# Patient Record
Sex: FEMALE | Race: WHITE | Employment: FULL TIME | URBAN - METROPOLITAN AREA
[De-identification: names, ages, dates, MRNs, and addresses within clinical notes are randomized per-mention and may not be internally consistent; named-entity substitution may affect disease eponyms.]

---

## 2017-04-27 PROBLEM — B00.89 HERPES DERMATITIS: Chronic | Status: ACTIVE | Noted: 2017-04-27

## 2018-04-05 ENCOUNTER — TELEPHONE (OUTPATIENT)
Dept: FAMILY MEDICINE CLINIC | Age: 63
End: 2018-04-05

## 2018-04-05 DIAGNOSIS — F51.05 INSOMNIA SECONDARY TO ANXIETY: Primary | ICD-10-CM

## 2018-04-05 DIAGNOSIS — F41.9 INSOMNIA SECONDARY TO ANXIETY: Primary | ICD-10-CM

## 2018-04-06 ENCOUNTER — TELEPHONE (OUTPATIENT)
Dept: FAMILY MEDICINE CLINIC | Age: 63
End: 2018-04-06

## 2018-04-06 PROBLEM — F41.9 INSOMNIA SECONDARY TO ANXIETY: Status: ACTIVE | Noted: 2018-04-06

## 2018-04-06 PROBLEM — F51.05 INSOMNIA SECONDARY TO ANXIETY: Status: ACTIVE | Noted: 2018-04-06

## 2018-04-06 RX ORDER — HYDROXYZINE HYDROCHLORIDE 25 MG/1
TABLET, FILM COATED ORAL
Qty: 60 TABLET | Refills: 2 | Status: SHIPPED | OUTPATIENT
Start: 2018-04-06 | End: 2018-06-28 | Stop reason: SDUPTHER

## 2018-06-05 ENCOUNTER — OFFICE VISIT (OUTPATIENT)
Dept: FAMILY MEDICINE CLINIC | Age: 63
End: 2018-06-05
Payer: COMMERCIAL

## 2018-06-05 VITALS
SYSTOLIC BLOOD PRESSURE: 124 MMHG | HEART RATE: 76 BPM | WEIGHT: 172 LBS | DIASTOLIC BLOOD PRESSURE: 80 MMHG | BODY MASS INDEX: 26.07 KG/M2 | RESPIRATION RATE: 12 BRPM | TEMPERATURE: 98.1 F | HEIGHT: 68 IN | OXYGEN SATURATION: 98 %

## 2018-06-05 DIAGNOSIS — J30.2 ACUTE SEASONAL ALLERGIC RHINITIS, UNSPECIFIED TRIGGER: Primary | ICD-10-CM

## 2018-06-05 PROCEDURE — 99213 OFFICE O/P EST LOW 20 MIN: CPT | Performed by: PHYSICIAN ASSISTANT

## 2018-06-05 RX ORDER — FLUTICASONE PROPIONATE 50 MCG
1 SPRAY, SUSPENSION (ML) NASAL DAILY
Qty: 1 BOTTLE | Refills: 1 | Status: SHIPPED
Start: 2018-06-05 | End: 2020-08-19

## 2018-08-23 ENCOUNTER — OFFICE VISIT (OUTPATIENT)
Dept: FAMILY MEDICINE CLINIC | Age: 63
End: 2018-08-23
Payer: MEDICAID

## 2018-08-23 VITALS
HEIGHT: 68 IN | BODY MASS INDEX: 26.41 KG/M2 | DIASTOLIC BLOOD PRESSURE: 70 MMHG | WEIGHT: 174.25 LBS | OXYGEN SATURATION: 98 % | SYSTOLIC BLOOD PRESSURE: 124 MMHG | TEMPERATURE: 98.3 F | HEART RATE: 82 BPM

## 2018-08-23 DIAGNOSIS — G43.909 MIGRAINE WITHOUT STATUS MIGRAINOSUS, NOT INTRACTABLE, UNSPECIFIED MIGRAINE TYPE: ICD-10-CM

## 2018-08-23 DIAGNOSIS — K21.00 GASTROESOPHAGEAL REFLUX DISEASE WITH ESOPHAGITIS: Chronic | ICD-10-CM

## 2018-08-23 DIAGNOSIS — G44.029 CHRONIC CLUSTER HEADACHE, NOT INTRACTABLE: ICD-10-CM

## 2018-08-23 DIAGNOSIS — F41.9 INSOMNIA SECONDARY TO ANXIETY: Primary | ICD-10-CM

## 2018-08-23 DIAGNOSIS — Z00.00 HEALTH CARE MAINTENANCE: ICD-10-CM

## 2018-08-23 DIAGNOSIS — B00.9 HERPES: ICD-10-CM

## 2018-08-23 DIAGNOSIS — F51.05 INSOMNIA SECONDARY TO ANXIETY: Primary | ICD-10-CM

## 2018-08-23 DIAGNOSIS — Z23 NEED FOR SHINGLES VACCINE: ICD-10-CM

## 2018-08-23 PROCEDURE — G8419 CALC BMI OUT NRM PARAM NOF/U: HCPCS | Performed by: FAMILY MEDICINE

## 2018-08-23 PROCEDURE — 3017F COLORECTAL CA SCREEN DOC REV: CPT | Performed by: FAMILY MEDICINE

## 2018-08-23 PROCEDURE — 99214 OFFICE O/P EST MOD 30 MIN: CPT | Performed by: FAMILY MEDICINE

## 2018-08-23 PROCEDURE — G8427 DOCREV CUR MEDS BY ELIG CLIN: HCPCS | Performed by: FAMILY MEDICINE

## 2018-08-23 PROCEDURE — 1036F TOBACCO NON-USER: CPT | Performed by: FAMILY MEDICINE

## 2018-08-23 RX ORDER — SODIUM BICARBONATE 650 MG/1
1300 TABLET ORAL DAILY
Qty: 60 TABLET | Refills: 11 | Status: SHIPPED | OUTPATIENT
Start: 2018-08-23 | End: 2019-02-19

## 2018-08-23 RX ORDER — ACYCLOVIR 400 MG/1
TABLET ORAL
Qty: 90 TABLET | Refills: 3 | Status: SHIPPED | OUTPATIENT
Start: 2018-08-23 | End: 2019-02-19 | Stop reason: SDUPTHER

## 2018-08-23 RX ORDER — BUTALBITAL, ACETAMINOPHEN AND CAFFEINE 50; 325; 40 MG/1; MG/1; MG/1
1 TABLET ORAL 3 TIMES DAILY PRN
Qty: 90 TABLET | Refills: 5 | Status: SHIPPED | OUTPATIENT
Start: 2018-08-23 | End: 2019-02-19 | Stop reason: SDUPTHER

## 2018-08-23 RX ORDER — ACETAMINOPHEN, ASPIRIN AND CAFFEINE 250; 250; 65 MG/1; MG/1; MG/1
1 TABLET, FILM COATED ORAL PRN
Qty: 90 TABLET | Refills: 5 | Status: SHIPPED
Start: 2018-08-23 | End: 2020-08-19 | Stop reason: SDUPTHER

## 2018-08-23 RX ORDER — OMEPRAZOLE 40 MG/1
40 CAPSULE, DELAYED RELEASE ORAL DAILY
Qty: 90 CAPSULE | Refills: 3 | Status: SHIPPED | OUTPATIENT
Start: 2018-08-23 | End: 2019-08-20 | Stop reason: SDUPTHER

## 2018-08-23 ASSESSMENT — ENCOUNTER SYMPTOMS
SHORTNESS OF BREATH: 0
HEARTBURN: 0
BLURRED VISION: 0
BLOOD IN STOOL: 0
COUGH: 0
SORE THROAT: 0
WHEEZING: 0
NAUSEA: 0
VOMITING: 0
SPUTUM PRODUCTION: 0
ABDOMINAL PAIN: 0
ORTHOPNEA: 0
CONSTIPATION: 0
BACK PAIN: 0
DIARRHEA: 0
DOUBLE VISION: 0

## 2018-08-23 ASSESSMENT — PATIENT HEALTH QUESTIONNAIRE - PHQ9
SUM OF ALL RESPONSES TO PHQ9 QUESTIONS 1 & 2: 0
SUM OF ALL RESPONSES TO PHQ QUESTIONS 1-9: 0
SUM OF ALL RESPONSES TO PHQ QUESTIONS 1-9: 0
1. LITTLE INTEREST OR PLEASURE IN DOING THINGS: 0
2. FEELING DOWN, DEPRESSED OR HOPELESS: 0

## 2018-08-23 NOTE — PROGRESS NOTES
Danie Javier is a 63 y.o. female who presents today for follow up and medication refills  She has been treated for GERD and migraine/cluster headaches. Headache  Patient with a history of  headache. Symptoms began about several years ago. Generally, the headaches last about several hours and occur several times per week. The headaches do not seem to be related to any time of day or year. The headaches are usually moderate, dull, sharp and throbbing and are located in global scalp/head. The patient rates her most severe headaches a 8 on a scale from 1 to 10. Recently, the headaches have been stable. Work attendance or other daily activities are not affected by the headaches. Precipitating factors include: cold temperatures, light, stress and weather changes. The headaches are usually not preceded by an aura. Associated neurologic symptoms: vomiting in the early morning. The patient denies decreased physical activity, depression, dizziness, loss of balance, muscle weakness, numbness of extremities, speech difficulties, vision problems and worsening school/work performance. Home treatment has included acetaminophen, amitriptyline, fioricet, Imitrex oral and Tylenol with codeine, darkening the room, resting and sleeping with no improvement. Other history includes: headaches of unknown type diagnosed in the past. Family history includes no known family members with significant headaches. Fioricet provides the most relief from this medication as opposed to the multiple trial of other medication in the past.  Maximum use would be TID. States she takes it couple times a week. GERD  Paitent complains of heartburn. This has been associated with abdominal bloating, bilious reflux, early satiety, midespigastric pain, upper abdominal discomfort and vomiting.   She denies belching, belching and eructation, chest pain, choking on food, cough, difficulty swallowing, dysphagia, heartburn, hematemesis, hoarseness, laryngitis, melena, need to clear throat frequently, nocturnal burning, odynophagia, shortness of breath, symptoms primarily relate to meals, and lying down after meals, unexpected weight loss and wheezing. Symptoms have been present for 10 years. She denies dysphagia. She has had a weight loss of 40 pounds over a period of 2 years because she is afraid to eat. She denies melena, hematochezia, hematemesis, and coffee ground emesis. Medical therapy in the past has included antacids, H2 antagonists and proton pump inhibitors. Patient's primary symptoms are vomiting of acidic material.  She has S/P UEGD 6/15; H. Pylori reported at that time to be negative. The only medication that seems to be effective to somewhat controlling her symptoms is omeprazole and sodium bicarbonate. Even so, she states that she is still symptomatic at least once a month. She has been advise to consider surgery but then was informed that she was underweight. Patient has a history of consuming a lot of peppermint/peppermint candy, which has been implicated in persistent/worsening GERD. Anxiety/Insomnia Secondary to Anxiety:  Both parents passed away in spring of 2018. She was treated with hydroxyzine 25 mg 1-2 tablets QHS PRN. Initially got mild to moderate benefit but not getting much benefit at this time. Patient's father had been on Hosp[facility] prior to his passing. She is reticent to become involved in group bereavement and has inquired about one on one bereavement counseling. She was advised to inquire about such resources with [facility]. She indicated that she will take hydroxyzine as directed until the prescription is completed then she will discontinue. Herpes Suppression:  On acyclovir. Still taking daily without any flare ups since last year in April. Contracted the infection from a dirty tanning bed but has since quit going.        Health Maintenance:  Aristides Lynch is due for Hepatitis C and HIV screening and multiple immunizations. She is also due for Breast cancer and colon cancer screening. She knows she needs mammogram and colon cancer screening but she declines at this time, citing this to be a bad time. She states she will do so at a later time. She is agreeable to shingles vaccine    PMFSH:  Patient's past medical, surgical, social and/or family history reviewed, updated in chart, and are non-contributory (unless otherwise stated). Medications and allergies also reviewed and updated in chart. Review of Systems  Review of Systems   Constitutional: Negative for malaise/fatigue and weight loss. HENT: Negative for congestion, ear pain and sore throat. Cold symptoms x \"a couple of days\"   Eyes: Negative for blurred vision and double vision. Respiratory: Negative for cough, sputum production, shortness of breath and wheezing. Cardiovascular: Negative for chest pain, palpitations, orthopnea and leg swelling. Gastrointestinal: Negative for abdominal pain, blood in stool, constipation, diarrhea, heartburn, nausea and vomiting. Genitourinary: Negative for dysuria, frequency, hematuria and urgency. Musculoskeletal: Negative for back pain, joint pain, myalgias and neck pain. Skin: Negative for itching and rash. Neurological: Positive for headaches. Negative for dizziness, tingling, focal weakness and weakness. Psychiatric/Behavioral: Negative for depression, memory loss and substance abuse. The patient is not nervous/anxious and does not have insomnia. Physical Exam:    VS:    /70   Pulse 82   Temp 98.3 °F (36.8 °C) (Oral)   Ht 5' 8\" (1.727 m)   Wt 174 lb 4 oz (79 kg)   SpO2 98%   Breastfeeding? No   BMI 26.49 kg/m²   LAST WEIGHT:  Wt Readings from Last 3 Encounters:   08/23/18 174 lb 4 oz (79 kg)   06/05/18 172 lb (78 kg)   02/25/18 166 lb (75.3 kg)     Physical Exam   Constitutional: She is oriented to person, place, and time.  She regarding the possible side effects, risks, benefits and alternatives to treatment; patient and/or guardian verbalizes understanding, agrees, feels comfortable with and wishes to proceed with above treatment plan. Advised patient to call with any new medication issues, and read all Rx info from pharmacy to assure aware of all possible risks and side effects of medication before taking. Reviewed age and gender appropriate health screening exams and vaccinations. Advised patient regarding importance of keeping up with recommended health maintenance and to schedule as soon as possible if overdue, as this is important in assessing for undiagnosed pathology, especially cancer, as well as protecting against potentially harmful/life threatening disease. Patient and/or guardian verbalizes understanding and agrees with above counseling, assessment and plan. All questions answered.     Leigha Haq MD

## 2018-09-19 ENCOUNTER — TELEPHONE (OUTPATIENT)
Dept: FAMILY MEDICINE CLINIC | Age: 63
End: 2018-09-19

## 2018-09-19 DIAGNOSIS — F51.05 INSOMNIA SECONDARY TO ANXIETY: ICD-10-CM

## 2018-09-19 DIAGNOSIS — F41.9 INSOMNIA SECONDARY TO ANXIETY: ICD-10-CM

## 2018-09-20 RX ORDER — HYDROXYZINE HYDROCHLORIDE 25 MG/1
TABLET, FILM COATED ORAL
Qty: 60 TABLET | Refills: 5 | Status: SHIPPED | OUTPATIENT
Start: 2018-09-20 | End: 2019-02-19 | Stop reason: SDUPTHER

## 2018-09-22 PROBLEM — Z00.00 HEALTH CARE MAINTENANCE: Status: RESOLVED | Noted: 2018-08-23 | Resolved: 2018-09-22

## 2019-02-19 ENCOUNTER — OFFICE VISIT (OUTPATIENT)
Dept: FAMILY MEDICINE CLINIC | Age: 64
End: 2019-02-19
Payer: COMMERCIAL

## 2019-02-19 VITALS
DIASTOLIC BLOOD PRESSURE: 86 MMHG | HEART RATE: 88 BPM | OXYGEN SATURATION: 98 % | RESPIRATION RATE: 16 BRPM | TEMPERATURE: 98.1 F | WEIGHT: 188 LBS | SYSTOLIC BLOOD PRESSURE: 134 MMHG | HEIGHT: 68 IN | BODY MASS INDEX: 28.49 KG/M2

## 2019-02-19 DIAGNOSIS — G43.909 MIGRAINE WITHOUT STATUS MIGRAINOSUS, NOT INTRACTABLE, UNSPECIFIED MIGRAINE TYPE: Primary | ICD-10-CM

## 2019-02-19 DIAGNOSIS — B00.9 HERPES: ICD-10-CM

## 2019-02-19 DIAGNOSIS — G44.029 CHRONIC CLUSTER HEADACHE, NOT INTRACTABLE: ICD-10-CM

## 2019-02-19 DIAGNOSIS — K21.00 GASTROESOPHAGEAL REFLUX DISEASE WITH ESOPHAGITIS: Chronic | ICD-10-CM

## 2019-02-19 DIAGNOSIS — F41.9 INSOMNIA SECONDARY TO ANXIETY: ICD-10-CM

## 2019-02-19 DIAGNOSIS — F51.05 INSOMNIA SECONDARY TO ANXIETY: ICD-10-CM

## 2019-02-19 PROCEDURE — G8484 FLU IMMUNIZE NO ADMIN: HCPCS | Performed by: FAMILY MEDICINE

## 2019-02-19 PROCEDURE — 3017F COLORECTAL CA SCREEN DOC REV: CPT | Performed by: FAMILY MEDICINE

## 2019-02-19 PROCEDURE — 1036F TOBACCO NON-USER: CPT | Performed by: FAMILY MEDICINE

## 2019-02-19 PROCEDURE — 99214 OFFICE O/P EST MOD 30 MIN: CPT | Performed by: FAMILY MEDICINE

## 2019-02-19 PROCEDURE — G8419 CALC BMI OUT NRM PARAM NOF/U: HCPCS | Performed by: FAMILY MEDICINE

## 2019-02-19 PROCEDURE — G8427 DOCREV CUR MEDS BY ELIG CLIN: HCPCS | Performed by: FAMILY MEDICINE

## 2019-02-19 RX ORDER — OMEPRAZOLE 40 MG/1
40 CAPSULE, DELAYED RELEASE ORAL DAILY
Qty: 90 CAPSULE | Refills: 3 | Status: SHIPPED
Start: 2019-02-19 | End: 2020-02-19 | Stop reason: SDUPTHER

## 2019-02-19 RX ORDER — ACYCLOVIR 400 MG/1
TABLET ORAL
Qty: 90 TABLET | Refills: 3 | Status: SHIPPED | OUTPATIENT
Start: 2019-02-19 | End: 2019-08-20 | Stop reason: SDUPTHER

## 2019-02-19 RX ORDER — HYDROXYZINE HYDROCHLORIDE 25 MG/1
TABLET, FILM COATED ORAL
Qty: 120 TABLET | Refills: 5 | Status: SHIPPED | OUTPATIENT
Start: 2019-02-19 | End: 2019-08-20 | Stop reason: SDUPTHER

## 2019-02-19 RX ORDER — BUTALBITAL, ACETAMINOPHEN AND CAFFEINE 50; 325; 40 MG/1; MG/1; MG/1
1 TABLET ORAL 3 TIMES DAILY PRN
Qty: 90 TABLET | Refills: 5 | Status: SHIPPED | OUTPATIENT
Start: 2019-02-19 | End: 2019-08-20 | Stop reason: SDUPTHER

## 2019-02-19 ASSESSMENT — ENCOUNTER SYMPTOMS
DOUBLE VISION: 0
BLOOD IN STOOL: 0
CONSTIPATION: 0
BACK PAIN: 0
VOMITING: 0
SPUTUM PRODUCTION: 0
WHEEZING: 0
SHORTNESS OF BREATH: 0
BLURRED VISION: 0
COUGH: 0
SORE THROAT: 0
DIARRHEA: 0
ORTHOPNEA: 0
NAUSEA: 0
ABDOMINAL PAIN: 0
HEARTBURN: 0

## 2019-02-19 ASSESSMENT — PATIENT HEALTH QUESTIONNAIRE - PHQ9
SUM OF ALL RESPONSES TO PHQ QUESTIONS 1-9: 0
SUM OF ALL RESPONSES TO PHQ9 QUESTIONS 1 & 2: 0
SUM OF ALL RESPONSES TO PHQ QUESTIONS 1-9: 0
2. FEELING DOWN, DEPRESSED OR HOPELESS: 0
1. LITTLE INTEREST OR PLEASURE IN DOING THINGS: 0
DEPRESSION UNABLE TO ASSESS: FUNCTIONAL CAPACITY MOTIVATION LIMITS ACCURACY

## 2019-03-25 ENCOUNTER — APPOINTMENT (OUTPATIENT)
Dept: GENERAL RADIOLOGY | Age: 64
End: 2019-03-25
Payer: MEDICAID

## 2019-03-25 ENCOUNTER — HOSPITAL ENCOUNTER (EMERGENCY)
Age: 64
Discharge: HOME | End: 2019-03-25
Attending: EMERGENCY MEDICINE
Payer: MEDICAID

## 2019-03-25 ENCOUNTER — TELEPHONE (OUTPATIENT)
Dept: FAMILY MEDICINE CLINIC | Age: 64
End: 2019-03-25

## 2019-03-25 VITALS
SYSTOLIC BLOOD PRESSURE: 154 MMHG | HEIGHT: 68 IN | DIASTOLIC BLOOD PRESSURE: 90 MMHG | BODY MASS INDEX: 24.25 KG/M2 | TEMPERATURE: 98.1 F | RESPIRATION RATE: 16 BRPM | WEIGHT: 160 LBS | OXYGEN SATURATION: 98 % | HEART RATE: 72 BPM

## 2019-03-25 DIAGNOSIS — S82.832A OTHER CLOSED FRACTURE OF DISTAL END OF LEFT FIBULA, INITIAL ENCOUNTER: ICD-10-CM

## 2019-03-25 DIAGNOSIS — I10 ESSENTIAL HYPERTENSION: Primary | ICD-10-CM

## 2019-03-25 PROCEDURE — 99283 EMERGENCY DEPT VISIT LOW MDM: CPT

## 2019-03-25 PROCEDURE — 73610 X-RAY EXAM OF ANKLE: CPT

## 2019-03-25 RX ORDER — IBUPROFEN 600 MG/1
600 TABLET ORAL EVERY 6 HOURS PRN
Qty: 30 TABLET | Refills: 0 | Status: SHIPPED | OUTPATIENT
Start: 2019-03-25 | End: 2020-11-08

## 2019-03-25 ASSESSMENT — PAIN DESCRIPTION - DESCRIPTORS: DESCRIPTORS: ACHING

## 2019-03-25 ASSESSMENT — PAIN DESCRIPTION - FREQUENCY: FREQUENCY: CONTINUOUS

## 2019-03-25 ASSESSMENT — PAIN - FUNCTIONAL ASSESSMENT: PAIN_FUNCTIONAL_ASSESSMENT: PREVENTS OR INTERFERES SOME ACTIVE ACTIVITIES AND ADLS

## 2019-03-25 ASSESSMENT — PAIN DESCRIPTION - PROGRESSION: CLINICAL_PROGRESSION: GRADUALLY WORSENING

## 2019-03-25 ASSESSMENT — PAIN DESCRIPTION - PAIN TYPE: TYPE: ACUTE PAIN

## 2019-03-25 ASSESSMENT — PAIN DESCRIPTION - ORIENTATION: ORIENTATION: RIGHT

## 2019-03-25 ASSESSMENT — PAIN DESCRIPTION - LOCATION: LOCATION: ANKLE

## 2019-03-25 ASSESSMENT — PAIN SCALES - GENERAL: PAINLEVEL_OUTOF10: 1

## 2019-08-20 ENCOUNTER — OFFICE VISIT (OUTPATIENT)
Dept: FAMILY MEDICINE CLINIC | Age: 64
End: 2019-08-20
Payer: COMMERCIAL

## 2019-08-20 ENCOUNTER — HOSPITAL ENCOUNTER (OUTPATIENT)
Age: 64
Discharge: HOME | End: 2019-08-22
Payer: MEDICAID

## 2019-08-20 VITALS
BODY MASS INDEX: 28.49 KG/M2 | OXYGEN SATURATION: 98 % | DIASTOLIC BLOOD PRESSURE: 80 MMHG | WEIGHT: 188 LBS | HEART RATE: 84 BPM | TEMPERATURE: 98.2 F | HEIGHT: 68 IN | SYSTOLIC BLOOD PRESSURE: 122 MMHG

## 2019-08-20 DIAGNOSIS — Z79.899 ENCOUNTER FOR MONITORING LONG-TERM PROTON PUMP INHIBITOR THERAPY: ICD-10-CM

## 2019-08-20 DIAGNOSIS — Z51.81 ENCOUNTER FOR MONITORING LONG-TERM PROTON PUMP INHIBITOR THERAPY: ICD-10-CM

## 2019-08-20 DIAGNOSIS — K21.00 GASTROESOPHAGEAL REFLUX DISEASE WITH ESOPHAGITIS: Chronic | ICD-10-CM

## 2019-08-20 DIAGNOSIS — G44.029 CHRONIC CLUSTER HEADACHE, NOT INTRACTABLE: ICD-10-CM

## 2019-08-20 DIAGNOSIS — S82.892S CLOSED FRACTURE OF LEFT ANKLE, SEQUELA: ICD-10-CM

## 2019-08-20 DIAGNOSIS — F51.05 INSOMNIA SECONDARY TO ANXIETY: ICD-10-CM

## 2019-08-20 DIAGNOSIS — F41.9 INSOMNIA SECONDARY TO ANXIETY: ICD-10-CM

## 2019-08-20 DIAGNOSIS — B00.9 HERPES: ICD-10-CM

## 2019-08-20 DIAGNOSIS — Z51.81 ENCOUNTER FOR MONITORING LONG-TERM PROTON PUMP INHIBITOR THERAPY: Primary | ICD-10-CM

## 2019-08-20 DIAGNOSIS — Z79.899 ENCOUNTER FOR MONITORING LONG-TERM PROTON PUMP INHIBITOR THERAPY: Primary | ICD-10-CM

## 2019-08-20 DIAGNOSIS — G43.909 MIGRAINE WITHOUT STATUS MIGRAINOSUS, NOT INTRACTABLE, UNSPECIFIED MIGRAINE TYPE: ICD-10-CM

## 2019-08-20 LAB
ALBUMIN SERPL-MCNC: 4.2 G/DL (ref 3.5–5.2)
ALP BLD-CCNC: 95 U/L (ref 35–104)
ALT SERPL-CCNC: 18 U/L (ref 0–32)
ANION GAP SERPL CALCULATED.3IONS-SCNC: 13 MMOL/L (ref 7–16)
AST SERPL-CCNC: 20 U/L (ref 0–31)
BILIRUB SERPL-MCNC: <0.2 MG/DL (ref 0–1.2)
BUN BLDV-MCNC: 15 MG/DL (ref 8–23)
CALCIUM SERPL-MCNC: 9.4 MG/DL (ref 8.6–10.2)
CHLORIDE BLD-SCNC: 106 MMOL/L (ref 98–107)
CO2: 24 MMOL/L (ref 22–29)
CREAT SERPL-MCNC: 0.8 MG/DL (ref 0.5–1)
FOLATE: 12.5 NG/ML (ref 4.8–24.2)
GFR AFRICAN AMERICAN: >60
GFR NON-AFRICAN AMERICAN: >60 ML/MIN/1.73
GLUCOSE BLD-MCNC: 87 MG/DL (ref 74–99)
POTASSIUM SERPL-SCNC: 4 MMOL/L (ref 3.5–5)
SODIUM BLD-SCNC: 143 MMOL/L (ref 132–146)
TOTAL PROTEIN: 7.2 G/DL (ref 6.4–8.3)
VITAMIN B-12: 285 PG/ML (ref 211–946)
VITAMIN D 25-HYDROXY: 27 NG/ML (ref 30–100)

## 2019-08-20 PROCEDURE — 82607 VITAMIN B-12: CPT

## 2019-08-20 PROCEDURE — 1036F TOBACCO NON-USER: CPT | Performed by: FAMILY MEDICINE

## 2019-08-20 PROCEDURE — 3017F COLORECTAL CA SCREEN DOC REV: CPT | Performed by: FAMILY MEDICINE

## 2019-08-20 PROCEDURE — G8427 DOCREV CUR MEDS BY ELIG CLIN: HCPCS | Performed by: FAMILY MEDICINE

## 2019-08-20 PROCEDURE — G8419 CALC BMI OUT NRM PARAM NOF/U: HCPCS | Performed by: FAMILY MEDICINE

## 2019-08-20 PROCEDURE — 80053 COMPREHEN METABOLIC PANEL: CPT

## 2019-08-20 PROCEDURE — 82306 VITAMIN D 25 HYDROXY: CPT

## 2019-08-20 PROCEDURE — 82746 ASSAY OF FOLIC ACID SERUM: CPT

## 2019-08-20 PROCEDURE — 99214 OFFICE O/P EST MOD 30 MIN: CPT | Performed by: FAMILY MEDICINE

## 2019-08-20 RX ORDER — OMEPRAZOLE 40 MG/1
CAPSULE, DELAYED RELEASE ORAL
Qty: 90 CAPSULE | Refills: 3 | Status: SHIPPED
Start: 2019-08-20 | End: 2020-02-19 | Stop reason: SDUPTHER

## 2019-08-20 RX ORDER — HYDROXYZINE HYDROCHLORIDE 25 MG/1
TABLET, FILM COATED ORAL
Qty: 120 TABLET | Refills: 5 | Status: SHIPPED
Start: 2019-08-20 | End: 2020-02-19 | Stop reason: DRUGHIGH

## 2019-08-20 RX ORDER — BUTALBITAL, ACETAMINOPHEN AND CAFFEINE 50; 325; 40 MG/1; MG/1; MG/1
1 TABLET ORAL 3 TIMES DAILY PRN
Qty: 90 TABLET | Refills: 5 | Status: SHIPPED
Start: 2019-08-20 | End: 2020-02-19 | Stop reason: SDUPTHER

## 2019-08-20 RX ORDER — ACYCLOVIR 400 MG/1
TABLET ORAL
Qty: 90 TABLET | Refills: 3 | Status: SHIPPED
Start: 2019-08-20 | End: 2020-02-19 | Stop reason: SDUPTHER

## 2019-08-20 ASSESSMENT — ENCOUNTER SYMPTOMS
SPUTUM PRODUCTION: 0
BLOOD IN STOOL: 0
BLURRED VISION: 0
VOMITING: 0
HEARTBURN: 0
SORE THROAT: 0
NAUSEA: 0
ABDOMINAL PAIN: 0
SHORTNESS OF BREATH: 0
COUGH: 0
ORTHOPNEA: 0
DIARRHEA: 0
CONSTIPATION: 0
DOUBLE VISION: 0
WHEEZING: 0
BACK PAIN: 0

## 2019-08-20 ASSESSMENT — PATIENT HEALTH QUESTIONNAIRE - PHQ9
SUM OF ALL RESPONSES TO PHQ QUESTIONS 1-9: 0
SUM OF ALL RESPONSES TO PHQ9 QUESTIONS 1 & 2: 0
DEPRESSION UNABLE TO ASSESS: FUNCTIONAL CAPACITY MOTIVATION LIMITS ACCURACY
1. LITTLE INTEREST OR PLEASURE IN DOING THINGS: 0
SUM OF ALL RESPONSES TO PHQ QUESTIONS 1-9: 0
2. FEELING DOWN, DEPRESSED OR HOPELESS: 0

## 2019-08-20 NOTE — PROGRESS NOTES
Bernice Augustine is a 66 y.o. female who presents today for     Chief Complaint   Patient presents with    6 Month Follow-Up    Medication Refill       She has been treated for GERD and migraine/cluster headaches. Headache  Patient with a history of  headache. Symptoms began about several years ago. Generally, the headaches last about several hours and occur several times per week. The headaches do not seem to be related to any time of day or year. The headaches are usually moderate, dull, sharp and throbbing and are located in global scalp/head. The patient rates her most severe headaches a 8 on a scale from 1 to 10. Recently, the headaches have been stable. Work attendance or other daily activities are not affected by the headaches. Precipitating factors include: cold temperatures, light, stress and weather changes. The headaches are usually not preceded by an aura. Associated neurologic symptoms: vomiting in the early morning. The patient denies decreased physical activity, depression, dizziness, loss of balance, muscle weakness, numbness of extremities, speech difficulties, vision problems and worsening school/work performance. Home treatment has included acetaminophen, amitriptyline, fioricet, Imitrex oral and Tylenol with codeine, darkening the room, resting and sleeping with no improvement. Other history includes: headaches of unknown type diagnosed in the past. Family history includes no known family members with significant headaches. Fioricet provides the most relief from this medication as opposed to the multiple trial of other medication in the past.  Maximum use would be TID. States she takes it couple times a week. GERD  Paitent complains of heartburn. This has been associated with abdominal bloating, bilious reflux, early satiety, midespigastric pain, upper abdominal discomfort and vomiting.   She denies belching, belching and eructation, chest pain, choking on food, cough, screening. She reports that the time is not right now. .. [address]:  Patient's past medical, surgical, social and/or family history reviewed, updated in chart, and are non-contributory (unless otherwise stated). Medications and allergies also reviewed and updated in chart. Review of Systems  Review of Systems   Constitutional: Negative for malaise/fatigue and weight loss. HENT: Negative for congestion, ear pain and sore throat. Eyes: Negative for blurred vision and double vision. Respiratory: Negative for cough, sputum production, shortness of breath and wheezing. Cardiovascular: Negative for chest pain, palpitations, orthopnea and leg swelling. Gastrointestinal: Negative for abdominal pain, blood in stool, constipation, diarrhea, heartburn, nausea and vomiting. Genitourinary: Negative for dysuria, frequency, hematuria and urgency. Musculoskeletal: Negative for back pain, joint pain, myalgias and neck pain. Fx left ankle 3/2019. Closed fracture that did not require surgery. She was advised that PPI may be putting her at risk for bone thinning/increased risk fracture   Skin: Negative for itching and rash. Neurological: Positive for headaches. Negative for dizziness, tingling, focal weakness and weakness. Psychiatric/Behavioral: Negative for depression, memory loss and substance abuse. The patient is not nervous/anxious and does not have insomnia. Physical Exam:    VS:    /80   Pulse 84   Temp 98.2 °F (36.8 °C) (Oral)   Ht 5' 8\" (1.727 m)   Wt 188 lb (85.3 kg)   SpO2 98%   BMI 28.59 kg/m²   LAST WEIGHT:  Wt Readings from Last 3 Encounters:   08/20/19 188 lb (85.3 kg)   03/25/19 160 lb (72.6 kg)   02/19/19 188 lb (85.3 kg)     Physical Exam   Constitutional: She is oriented to person, place, and time. She appears well-developed and well-nourished. No distress. HENT:   Head: Normocephalic and atraumatic.    Right Ear: External ear normal.   Left Ear: External ear

## 2019-08-21 ENCOUNTER — TELEPHONE (OUTPATIENT)
Dept: FAMILY MEDICINE CLINIC | Age: 64
End: 2019-08-21

## 2019-08-21 DIAGNOSIS — T63.441A ALLERGIC REACTION TO BEE STING: Primary | ICD-10-CM

## 2019-08-21 RX ORDER — EPINEPHRINE 0.3 MG/.3ML
INJECTION SUBCUTANEOUS
Qty: 1 EACH | Refills: 5 | Status: SHIPPED
Start: 2019-08-21 | End: 2021-08-24 | Stop reason: SDUPTHER

## 2020-02-19 ENCOUNTER — OFFICE VISIT (OUTPATIENT)
Dept: FAMILY MEDICINE CLINIC | Age: 65
End: 2020-02-19
Payer: COMMERCIAL

## 2020-02-19 VITALS
TEMPERATURE: 98.4 F | DIASTOLIC BLOOD PRESSURE: 82 MMHG | WEIGHT: 179.5 LBS | SYSTOLIC BLOOD PRESSURE: 128 MMHG | OXYGEN SATURATION: 97 % | HEART RATE: 74 BPM | BODY MASS INDEX: 27.2 KG/M2 | HEIGHT: 68 IN | RESPIRATION RATE: 16 BRPM

## 2020-02-19 PROCEDURE — G8427 DOCREV CUR MEDS BY ELIG CLIN: HCPCS | Performed by: FAMILY MEDICINE

## 2020-02-19 PROCEDURE — G8484 FLU IMMUNIZE NO ADMIN: HCPCS | Performed by: FAMILY MEDICINE

## 2020-02-19 PROCEDURE — 99214 OFFICE O/P EST MOD 30 MIN: CPT | Performed by: FAMILY MEDICINE

## 2020-02-19 PROCEDURE — G8419 CALC BMI OUT NRM PARAM NOF/U: HCPCS | Performed by: FAMILY MEDICINE

## 2020-02-19 PROCEDURE — 1036F TOBACCO NON-USER: CPT | Performed by: FAMILY MEDICINE

## 2020-02-19 PROCEDURE — 3017F COLORECTAL CA SCREEN DOC REV: CPT | Performed by: FAMILY MEDICINE

## 2020-02-19 RX ORDER — OMEPRAZOLE 40 MG/1
40 CAPSULE, DELAYED RELEASE ORAL DAILY
Qty: 90 CAPSULE | Refills: 3 | Status: SHIPPED
Start: 2020-02-19 | End: 2020-08-19 | Stop reason: SDUPTHER

## 2020-02-19 RX ORDER — HYDROXYZINE HYDROCHLORIDE 25 MG/1
TABLET, FILM COATED ORAL
Qty: 120 TABLET | Refills: 5 | Status: SHIPPED
Start: 2020-02-19 | End: 2020-08-19 | Stop reason: SDUPTHER

## 2020-02-19 RX ORDER — ACYCLOVIR 400 MG/1
TABLET ORAL
Qty: 90 TABLET | Refills: 3 | Status: SHIPPED
Start: 2020-02-19 | End: 2020-08-19 | Stop reason: SDUPTHER

## 2020-02-19 RX ORDER — BUTALBITAL, ACETAMINOPHEN AND CAFFEINE 50; 325; 40 MG/1; MG/1; MG/1
1 TABLET ORAL 3 TIMES DAILY PRN
Qty: 90 TABLET | Refills: 5 | Status: SHIPPED
Start: 2020-02-19 | End: 2020-08-19 | Stop reason: SDUPTHER

## 2020-02-19 ASSESSMENT — ENCOUNTER SYMPTOMS
DOUBLE VISION: 0
DIARRHEA: 0
BLOOD IN STOOL: 0
CONSTIPATION: 0
ABDOMINAL PAIN: 0
BLURRED VISION: 0
SHORTNESS OF BREATH: 0
HEARTBURN: 0
COUGH: 0
NAUSEA: 0
WHEEZING: 0
SORE THROAT: 0
BACK PAIN: 0
VOMITING: 0
ORTHOPNEA: 0
SPUTUM PRODUCTION: 0

## 2020-02-19 ASSESSMENT — PATIENT HEALTH QUESTIONNAIRE - PHQ9
1. LITTLE INTEREST OR PLEASURE IN DOING THINGS: 0
SUM OF ALL RESPONSES TO PHQ QUESTIONS 1-9: 0
2. FEELING DOWN, DEPRESSED OR HOPELESS: 0
SUM OF ALL RESPONSES TO PHQ9 QUESTIONS 1 & 2: 0
SUM OF ALL RESPONSES TO PHQ QUESTIONS 1-9: 0

## 2020-02-19 NOTE — PROGRESS NOTES
Sujit Abreu is a 60 y.o. female who presents today for     Chief Complaint   Patient presents with    Gastroesophageal Reflux     follow up, medication refills       She has been treated for GERD and migraine/cluster headaches. Headache  Patient with a history of  headache. Symptoms began about several years ago. Generally, the headaches last about several hours and occur several times per week. The headaches do not seem to be related to any time of day or year. The headaches are usually moderate, dull, sharp and throbbing and are located in global scalp/head. The patient rates her most severe headaches a 8 on a scale from 1 to 10. Recently, the headaches have been stable. Work attendance or other daily activities are not affected by the headaches. Precipitating factors include: cold temperatures, light, stress and weather changes. The headaches are usually not preceded by an aura. Associated neurologic symptoms: vomiting in the early morning. The patient denies decreased physical activity, depression, dizziness, loss of balance, muscle weakness, numbness of extremities, speech difficulties, vision problems and worsening school/work performance. Home treatment has included acetaminophen, amitriptyline, fioricet, Imitrex oral and Tylenol with codeine, darkening the room, resting and sleeping with no improvement. Other history includes: headaches of unknown type diagnosed in the past. Family history includes no known family members with significant headaches. Fioricet provides the most relief from this medication as opposed to the multiple trial of other medication in the past.  Maximum use would be TID. States she takes it couple times a week. GERD  Paitent complains of heartburn. This has been associated with abdominal bloating, bilious reflux, early satiety, midespigastric pain, upper abdominal discomfort and vomiting.   She denies belching, belching and eructation, chest pain, choking on food, cough, difficulty swallowing, dysphagia, heartburn, hematemesis, hoarseness, laryngitis, melena, need to clear throat frequently, nocturnal burning, odynophagia, shortness of breath, symptoms primarily relate to meals, and lying down after meals, unexpected weight loss and wheezing. Symptoms have been present for several years. She denies dysphagia. She denies melena, hematochezia, hematemesis, and coffee ground emesis. Medical therapy in the past has included antacids, H2 antagonists and proton pump inhibitors. Patient's primary symptoms are vomiting of acidic material.  She has S/P UEGD 6/15; H. Pylori reported at that time to be negative. The only medication that seems to be effective to somewhat controlling her symptoms is omeprazole and sodium bicarbonate. She does continue to consume peppermint candy. Symptoms have been well controlled until the day after Super Bowl Sunday. ..she became very symptomatic after eating pizza. ... As long as she is mindful of what/how she eats, she is fine    She is on long term PPI. She reports that she had a closed fracture of left ankle that may suggest adverse effects of long term PPI use. Herpes Suppression:  On acyclovir. Still taking daily without any flare ups since last year in April. Health Maintenance:  Bonita Colin is due for breast cancer/colon cancer screening. She reports that the time is not right now. .. [address]:  Patient's past medical, surgical, social and/or family history reviewed, updated in chart, and are non-contributory (unless otherwise stated). Medications and allergies also reviewed and updated in chart. Review of Systems  Review of Systems   Constitutional: Negative for malaise/fatigue and weight loss. HENT: Negative for congestion, ear pain and sore throat. Eyes: Negative for blurred vision and double vision. Respiratory: Negative for cough, sputum production, shortness of breath and wheezing. Cardiovascular: Negative for chest pain, palpitations, orthopnea and leg swelling. Gastrointestinal: Negative for abdominal pain, blood in stool, constipation, diarrhea, heartburn, nausea and vomiting. Genitourinary: Negative for dysuria, frequency, hematuria and urgency. Musculoskeletal: Negative for back pain, joint pain, myalgias and neck pain. Skin: Negative for itching and rash. Neurological: Positive for headaches. Negative for dizziness, tingling, focal weakness and weakness. Psychiatric/Behavioral: Negative for depression, memory loss and substance abuse. The patient is not nervous/anxious and does not have insomnia. Physical Exam:    VS:    /82 (Site: Left Upper Arm, Position: Sitting, Cuff Size: Medium Adult)   Pulse 74   Temp 98.4 °F (36.9 °C) (Oral)   Resp 16   Ht 5' 8\" (1.727 m)   Wt 179 lb 8 oz (81.4 kg)   SpO2 97%   Breastfeeding No   BMI 27.29 kg/m²   LAST WEIGHT:  Wt Readings from Last 3 Encounters:   02/19/20 179 lb 8 oz (81.4 kg)   08/20/19 188 lb (85.3 kg)   03/25/19 160 lb (72.6 kg)     BMI Readings from Last 3 Encounters:   02/19/20 27.29 kg/m²   08/20/19 28.59 kg/m²   03/25/19 24.33 kg/m²       Physical Exam   Constitutional: She is oriented to person, place, and time. She appears well-developed and well-nourished. No distress. HENT:   Head: Normocephalic and atraumatic. Right Ear: External ear normal.   Left Ear: External ear normal.   Mouth/Throat: Oropharynx is clear and moist. No oropharyngeal exudate. Eyes: Pupils are equal, round, and reactive to light. Conjunctivae and EOM are normal. Right eye exhibits no discharge. No scleral icterus. Neck: Normal range of motion. Neck supple. No thyromegaly present. Cardiovascular: Normal rate, regular rhythm, normal heart sounds and intact distal pulses. No murmur heard. Pulmonary/Chest: Effort normal. No stridor. No respiratory distress. She has no wheezes. She has no rales.  She exhibits no tenderness. Abdominal: Soft. Bowel sounds are normal. She exhibits no distension and no mass. There is no abdominal tenderness. There is no guarding. Musculoskeletal: Normal range of motion. General: No tenderness or edema. Lymphadenopathy:     She has no cervical adenopathy. Neurological: She is alert and oriented to person, place, and time. Skin: Skin is warm and dry. No rash noted. She is not diaphoretic. No erythema. No pallor. Psychiatric: She has a normal mood and affect. Her behavior is normal. Thought content normal.       Labs:  Lab Results   Component Value Date     08/20/2019    K 4.0 08/20/2019     08/20/2019    CO2 24 08/20/2019    BUN 15 08/20/2019    CREATININE 0.8 08/20/2019    PROT 7.2 08/20/2019    LABALBU 4.2 08/20/2019    CALCIUM 9.4 08/20/2019    GFRAA >60 08/20/2019    LABGLOM >60 08/20/2019    GLUCOSE 87 08/20/2019    AST 20 08/20/2019    ALT 18 08/20/2019    ALKPHOS 95 08/20/2019    BILITOT <0.2 08/20/2019         Assessment / Plan:      Charlene Plasencia was seen today for gastroesophageal reflux. Diagnoses and all orders for this visit:    Chronic cluster headache, not intractable: stable and well controlled  -     butalbital-acetaminophen-caffeine (FIORICET, ESGIC) -40 MG per tablet; Take 1 tablet by mouth 3 times daily as needed for Headaches or Migraine Indications: Cluster Headache    Migraine without status migrainosus, not intractable, unspecified migraine type: stable and well controlled  -     butalbital-acetaminophen-caffeine (FIORICET, ESGIC) -40 MG per tablet; Take 1 tablet by mouth 3 times daily as needed for Headaches or Migraine Indications: Cluster Headache    Gastroesophageal reflux disease with esophagitis:  Stable  -     omeprazole (PRILOSEC) 40 MG delayed release capsule; Take 1 capsule by mouth daily  -     Patient counseled to keep mint intake to a minimum because it will exacerbate GERD.   Patient verbalizes understanding     Herpes

## 2020-08-19 ENCOUNTER — OFFICE VISIT (OUTPATIENT)
Dept: FAMILY MEDICINE CLINIC | Age: 65
End: 2020-08-19
Payer: MEDICAID

## 2020-08-19 VITALS
HEART RATE: 64 BPM | OXYGEN SATURATION: 94 % | RESPIRATION RATE: 14 BRPM | SYSTOLIC BLOOD PRESSURE: 120 MMHG | TEMPERATURE: 97 F | DIASTOLIC BLOOD PRESSURE: 82 MMHG | BODY MASS INDEX: 26.98 KG/M2 | WEIGHT: 178 LBS | HEIGHT: 68 IN

## 2020-08-19 PROCEDURE — G8427 DOCREV CUR MEDS BY ELIG CLIN: HCPCS | Performed by: FAMILY MEDICINE

## 2020-08-19 PROCEDURE — 99214 OFFICE O/P EST MOD 30 MIN: CPT | Performed by: FAMILY MEDICINE

## 2020-08-19 PROCEDURE — G8419 CALC BMI OUT NRM PARAM NOF/U: HCPCS | Performed by: FAMILY MEDICINE

## 2020-08-19 PROCEDURE — 3017F COLORECTAL CA SCREEN DOC REV: CPT | Performed by: FAMILY MEDICINE

## 2020-08-19 PROCEDURE — 1036F TOBACCO NON-USER: CPT | Performed by: FAMILY MEDICINE

## 2020-08-19 RX ORDER — ACETAMINOPHEN, ASPIRIN AND CAFFEINE 250; 250; 65 MG/1; MG/1; MG/1
1 TABLET, FILM COATED ORAL PRN
Qty: 90 TABLET | Refills: 11 | COMMUNITY
Start: 2020-08-19 | End: 2022-03-02 | Stop reason: SDUPTHER

## 2020-08-19 RX ORDER — OMEPRAZOLE 40 MG/1
40 CAPSULE, DELAYED RELEASE ORAL DAILY
Qty: 90 CAPSULE | Refills: 3 | Status: SHIPPED
Start: 2020-08-19 | End: 2021-02-17 | Stop reason: SDUPTHER

## 2020-08-19 RX ORDER — HYDROXYZINE HYDROCHLORIDE 25 MG/1
TABLET, FILM COATED ORAL
Qty: 120 TABLET | Refills: 5 | Status: SHIPPED
Start: 2020-08-19 | End: 2021-02-17 | Stop reason: SDUPTHER

## 2020-08-19 RX ORDER — BUTALBITAL, ACETAMINOPHEN AND CAFFEINE 50; 325; 40 MG/1; MG/1; MG/1
1 TABLET ORAL 3 TIMES DAILY PRN
Qty: 90 TABLET | Refills: 5 | Status: SHIPPED
Start: 2020-08-19 | End: 2021-02-17 | Stop reason: SDUPTHER

## 2020-08-19 RX ORDER — ACYCLOVIR 400 MG/1
TABLET ORAL
Qty: 90 TABLET | Refills: 3 | Status: SHIPPED
Start: 2020-08-19 | End: 2021-02-17 | Stop reason: SDUPTHER

## 2020-08-19 ASSESSMENT — PATIENT HEALTH QUESTIONNAIRE - PHQ9
SUM OF ALL RESPONSES TO PHQ QUESTIONS 1-9: 0
SUM OF ALL RESPONSES TO PHQ QUESTIONS 1-9: 0
1. LITTLE INTEREST OR PLEASURE IN DOING THINGS: 0
2. FEELING DOWN, DEPRESSED OR HOPELESS: 0
SUM OF ALL RESPONSES TO PHQ9 QUESTIONS 1 & 2: 0

## 2020-08-19 ASSESSMENT — ENCOUNTER SYMPTOMS
SHORTNESS OF BREATH: 0
NAUSEA: 0
DOUBLE VISION: 0
ORTHOPNEA: 0
WHEEZING: 0
DIARRHEA: 0
COUGH: 0
SPUTUM PRODUCTION: 0
HEARTBURN: 0
SORE THROAT: 0
CONSTIPATION: 0
BACK PAIN: 0
VOMITING: 0
BLOOD IN STOOL: 0
ABDOMINAL PAIN: 0
BLURRED VISION: 0

## 2020-08-19 NOTE — PROGRESS NOTES
Siobhan Lomax is a 60 y.o. female who presents today for     Chief Complaint   Patient presents with    6 Month Follow-Up       She has been treated for GERD and migraine/cluster headaches. Headache  Patient with a history of  headache. Symptoms began about several years ago. Generally, the headaches last about several hours and occur several times per week. The headaches do not seem to be related to any time of day or year. The headaches are usually moderate, dull, sharp and throbbing and are located in global scalp/head. The patient rates her most severe headaches a 8 on a scale from 1 to 10. Recently, the headaches have been stable. Work attendance or other daily activities are not affected by the headaches. Precipitating factors include: cold temperatures, light, stress and weather changes. The headaches are usually not preceded by an aura. Associated neurologic symptoms: vomiting in the early morning. The patient denies decreased physical activity, depression, dizziness, loss of balance, muscle weakness, numbness of extremities, speech difficulties, vision problems and worsening school/work performance. Home treatment has included acetaminophen, amitriptyline, fioricet, Imitrex oral and Tylenol with codeine, darkening the room, resting and sleeping with no improvement. Other history includes: headaches of unknown type diagnosed in the past. Family history includes no known family members with significant headaches. Fioricet provides the most relief from this medication as opposed to the multiple trial of other medication in the past.  Maximum use would be TID. States she takes it couple times a week. GERD  Paitent complains of heartburn. This has been associated with abdominal bloating, bilious reflux, early satiety, midespigastric pain, upper abdominal discomfort and vomiting.   She denies belching, belching and eructation, chest pain, choking on food, cough, difficulty swallowing, dysphagia, heartburn, hematemesis, hoarseness, laryngitis, melena, need to clear throat frequently, nocturnal burning, odynophagia, shortness of breath, symptoms primarily relate to meals, and lying down after meals, unexpected weight loss and wheezing. Symptoms have been present for several years. She denies dysphagia. She denies melena, hematochezia, hematemesis, and coffee ground emesis. Medical therapy in the past has included antacids, H2 antagonists and proton pump inhibitors. Patient's primary symptoms are vomiting of acidic material.  She has S/P UEGD 6/15; H. Pylori reported at that time to be negative. The only medication that seems to be effective to somewhat controlling her symptoms is omeprazole and sodium bicarbonate. She does continue to consume peppermint candy. Symptoms have been well controlled until the day after Super Bowl Sunday. ..she became very symptomatic after eating pizza. ... As long as she is mindful of what/how she eats, she is fine    She is on long term PPI. Herpes Suppression:  On acyclovir. Still taking daily without any flare ups since last year in April. Health Maintenance:  Andrea Marquez is due for breast cancer screening. She agreed to take the order today, but she is not sure when she will be able to schedule it. [address]:  Patient's past medical, surgical, social and/or family history reviewed, updated in chart, and are non-contributory (unless otherwise stated). Medications and allergies also reviewed and updated in chart. Review of Systems  Review of Systems   Constitutional: Negative for malaise/fatigue and weight loss. HENT: Negative for congestion, ear pain and sore throat. Eyes: Negative for blurred vision and double vision. Respiratory: Negative for cough, sputum production, shortness of breath and wheezing. Cardiovascular: Negative for chest pain, palpitations, orthopnea and leg swelling.    Gastrointestinal: Negative for abdominal pain, blood in stool, constipation, diarrhea, heartburn, nausea and vomiting. Genitourinary: Negative for dysuria, frequency, hematuria and urgency. Musculoskeletal: Negative for back pain, joint pain, myalgias and neck pain. Skin: Negative for itching and rash. Neurological: Positive for headaches. Negative for dizziness, tingling, focal weakness and weakness. Psychiatric/Behavioral: Negative for depression, memory loss and substance abuse. The patient is not nervous/anxious and does not have insomnia. Physical Exam:    VS:    /82   Pulse 64   Temp 97 °F (36.1 °C) (Temporal)   Resp 14   Ht 5' 8\" (1.727 m)   Wt 178 lb (80.7 kg)   SpO2 94%   BMI 27.06 kg/m²   LAST WEIGHT:  Wt Readings from Last 3 Encounters:   08/19/20 178 lb (80.7 kg)   02/19/20 179 lb 8 oz (81.4 kg)   08/20/19 188 lb (85.3 kg)     BMI Readings from Last 3 Encounters:   08/19/20 27.06 kg/m²   02/19/20 27.29 kg/m²   08/20/19 28.59 kg/m²       Physical Exam   Constitutional: She is oriented to person, place, and time. She appears well-developed and well-nourished. No distress. HENT:   Head: Normocephalic and atraumatic. Right Ear: External ear normal.   Left Ear: External ear normal.   Mouth/Throat: Oropharynx is clear and moist. No oropharyngeal exudate. Eyes: Pupils are equal, round, and reactive to light. Conjunctivae and EOM are normal. Right eye exhibits no discharge. No scleral icterus. Neck: Normal range of motion. Neck supple. No thyromegaly present. Cardiovascular: Normal rate, regular rhythm, normal heart sounds and intact distal pulses. No murmur heard. Pulmonary/Chest: Effort normal. No stridor. No respiratory distress. She has no wheezes. She has no rales. She exhibits no tenderness. Abdominal: Soft. Bowel sounds are normal. She exhibits no distension and no mass. There is no abdominal tenderness. There is no guarding. Musculoskeletal: Normal range of motion.          General: No tenderness or edema.   Lymphadenopathy:     She has no cervical adenopathy. Neurological: She is alert and oriented to person, place, and time. Skin: Skin is warm and dry. No rash noted. She is not diaphoretic. No erythema. No pallor. Psychiatric: She has a normal mood and affect. Her behavior is normal. Thought content normal.       Labs:  Lab Results   Component Value Date     08/20/2019    K 4.0 08/20/2019     08/20/2019    CO2 24 08/20/2019    BUN 15 08/20/2019    CREATININE 0.8 08/20/2019    PROT 7.2 08/20/2019    LABALBU 4.2 08/20/2019    CALCIUM 9.4 08/20/2019    GFRAA >60 08/20/2019    LABGLOM >60 08/20/2019    GLUCOSE 87 08/20/2019    AST 20 08/20/2019    ALT 18 08/20/2019    ALKPHOS 95 08/20/2019    BILITOT <0.2 08/20/2019         Assessment / Plan:      Oral So was seen today for 6 month follow-up. Diagnoses and all orders for this visit:    Migraine without status migrainosus, not intractable, unspecified migraine type: stable and well controlled  -     aspirin-acetaminophen-caffeine (EXCEDRIN MIGRAINE) 250-250-65 MG per tablet; Take 1 tablet by mouth as needed for Headaches  -     butalbital-acetaminophen-caffeine (FIORICET, ESGIC) -40 MG per tablet; Take 1 tablet by mouth 3 times daily as needed for Headaches or Migraine Indications: Cluster Headache    Chronic cluster headache, not intractable: stable and well controlled  -     aspirin-acetaminophen-caffeine (EXCEDRIN MIGRAINE) 250-250-65 MG per tablet; Take 1 tablet by mouth as needed for Headaches  -     butalbital-acetaminophen-caffeine (FIORICET, ESGIC) -40 MG per tablet; Take 1 tablet by mouth 3 times daily as needed for Headaches or Migraine Indications: Cluster Headache    Gastroesophageal reflux disease with esophagitis: stable and well controlled  -     omeprazole (PRILOSEC) 40 MG delayed release capsule;  Take 1 capsule by mouth daily    Insomnia secondary to anxiety: stable and well controlled  -     hydrOXYzine (ATARAX) 25 MG tablet; take 1 to 2 tablets by mouth daily PRN    Herpes suppression: stable and well controlled  -     acyclovir (ZOVIRAX) 400 MG tablet; take 1 tablet by mouth once daily    Breast cancer screening  -     [facility] DIGITAL SCREEN W OR WO CAD BILATERAL; Future      Call or go to ED immediately if symptoms worsen or persist.    Follow Up:  Return for F/U 6 mos (2/2021) for medication refills. , or sooner if necessary. Educational materials and/or home exercises printed for patient's review and were included in patient instructions on his/her After Visit Summary and given to patient at the end of visit. Counseled regarding above diagnosis, including possible risks and complications,  especially if left uncontrolled. Counseled regarding the possible side effects, risks, benefits and alternatives to treatment; patient and/or guardian verbalizes understanding, agrees, feels comfortable with and wishes to proceed with above treatment plan. Advised patient to call with any new medication issues, and read all Rx info from pharmacy to assure aware of all possible risks and side effects of medication before taking. Reviewed age and gender appropriate health screening exams and vaccinations. Advised patient regarding importance of keeping up with recommended health maintenance and to schedule as soon as possible if overdue, as this is important in assessing for undiagnosed pathology, especially cancer, as well as protecting against potentially harmful/life threatening disease. Patient and/or guardian verbalizes understanding and agrees with above counseling, assessment and plan. All questions answered.     Jeanette Cook MD

## 2020-11-08 ENCOUNTER — APPOINTMENT (OUTPATIENT)
Dept: GENERAL RADIOLOGY | Age: 65
End: 2020-11-08
Payer: MEDICAID

## 2020-11-08 ENCOUNTER — HOSPITAL ENCOUNTER (EMERGENCY)
Age: 65
Discharge: HOME | End: 2020-11-08
Attending: FAMILY MEDICINE
Payer: MEDICAID

## 2020-11-08 VITALS
HEIGHT: 68 IN | RESPIRATION RATE: 16 BRPM | DIASTOLIC BLOOD PRESSURE: 80 MMHG | BODY MASS INDEX: 25.01 KG/M2 | TEMPERATURE: 97.5 F | OXYGEN SATURATION: 97 % | WEIGHT: 165 LBS | SYSTOLIC BLOOD PRESSURE: 147 MMHG | HEART RATE: 73 BPM

## 2020-11-08 PROCEDURE — 99283 EMERGENCY DEPT VISIT LOW MDM: CPT

## 2020-11-08 PROCEDURE — 73610 X-RAY EXAM OF ANKLE: CPT

## 2020-11-08 ASSESSMENT — PAIN DESCRIPTION - ORIENTATION: ORIENTATION: LEFT

## 2020-11-08 ASSESSMENT — PAIN - FUNCTIONAL ASSESSMENT: PAIN_FUNCTIONAL_ASSESSMENT: PREVENTS OR INTERFERES SOME ACTIVE ACTIVITIES AND ADLS

## 2020-11-08 ASSESSMENT — PAIN DESCRIPTION - PAIN TYPE: TYPE: ACUTE PAIN

## 2020-11-08 ASSESSMENT — PAIN DESCRIPTION - LOCATION: LOCATION: ANKLE

## 2020-11-08 ASSESSMENT — PAIN DESCRIPTION - FREQUENCY: FREQUENCY: INTERMITTENT

## 2020-11-08 ASSESSMENT — PAIN SCALES - GENERAL: PAINLEVEL_OUTOF10: 3

## 2020-11-08 ASSESSMENT — PAIN DESCRIPTION - DESCRIPTORS: DESCRIPTORS: DISCOMFORT

## 2020-11-08 ASSESSMENT — PAIN DESCRIPTION - ONSET: ONSET: ON-GOING

## 2020-11-08 NOTE — ED PROVIDER NOTES
Department of Emergency Medicine   ED  Provider Note  Admit Date/RoomTime: 11/8/2020 10:09 AM  ED Room: 08/08  Chief Complaint:   Ankle Pain (Left ankle pain, no known injury. Hx of fracture years ago.)    History of Present Illness   Source of history provided by:  patient. History/Exam Limitations: none. Betty Bai is a 60 y.o. old female presenting to the emergency department by private vehicle and ambulatory, for Left ankle foot pain which occured 1 week(s) prior to arrival.  Cause of complaint: No known recent injury There has been a history of prior problems with this area in the past.  Since onset the symptoms have been moderate in degree with pain with inversion of the foot. Her pain is aggraveated by movement of foot and ankle and relieved by nothing. Tetanus Status: within past 10 years. ROS    Pertinent positives and negatives are stated within HPI, all other systems reviewed and are negative. Past Surgical History:   Procedure Laterality Date    CHOLECYSTECTOMY, LAPAROSCOPIC  04/08/2014    TONSILLECTOMY  1960    1960s    UPPER GASTROINTESTINAL ENDOSCOPY  12/13/2013    [facility]  [facility]    GERD, Dr. Janis Anthony, office   Social History:  reports that she has never smoked. She has never used smokeless tobacco. She reports that she does not drink alcohol or use drugs. Family History: family history includes Diabetes in her mother; Heart Attack in her father and maternal grandfather; Heart Disease in her father; Heart Surgery in her father; High Cholesterol in her father; Hypertension in her mother; Stroke in her father.    Allergies: Bee pollen and Tetracyclines & related    Physical Exam           ED Triage Vitals [11/08/20 0953]   BP Temp Temp Source Pulse Resp SpO2 Height Weight   (!) 147/80 97.5 °F (36.4 °C) Temporal 73 16 97 % 5' 8\" (1.727 m) 165 lb (74.8 kg)       Oxygen Saturation Interpretation: Normal.    Constitutional:  Alert, development consistent with age. Neck:  Normal ROM. Supple. Ankle:  Left Posterior and Lateral:              Tenderness:  mild. Swelling: None. Deformity: no.             ROM: full range of motion. Skin:  no erythema, rash or wounds noted. Neurovascular: Motor deficit: none. Sensory deficit:   none. Pulse deficit: none. Capillary refill: normal.  Knee:              Tenderness:  none. Swelling: None. Deformity: no.             ROM: full range of motion. Skin:  no erythema, rash or wounds noted. Foot:              Tenderness:  none. Swelling: None. Deformity: no.             ROM: full range of motion. Skin:  no erythema, rash or wounds noted. Gait:  normal.   Lymphatics: No lymphangitis or adenopathy noted. Neurological:  Oriented. Motor functions intact. Lab / Imaging Results   (All laboratory and radiology results have been personally reviewed by myself)  Labs:  No results found for this visit on 11/08/20. Imaging: All Radiology results interpreted by Radiologist unless otherwise noted. XR ANKLE LEFT (MIN 3 VIEWS)   Final Result   No definite radiographically visible acute skeletal pathology. ED Course / Medical Decision Making   Medications - No data to display     Consults:   None    Procedure(s):   none    MDM:       Films were obtained based on low suspicion for bony injury as per history/physical findings . Plan is subsequently for symptom control, limited use and  immobilization with appropriate outpatient follow-up. Counseling: The emergency provider has spoken with the patient and discussed todays results, in addition to providing specific details for the plan of care and counseling regarding the diagnosis and prognosis.   Questions are answered at this time and they are agreeable with the plan. Assessment      1. Left ankle pain, unspecified chronicity      Plan   Discharge to home  Patient condition is good    New Medications     New Prescriptions    NAPROXEN (NAPROXEN) 500 MG EC TABLET    Take 1 tablet by mouth 2 times daily (with meals) And a full glass of water     Electronically signed by Chari Kilgore MD   DD: 11/8/20  **This report was transcribed using voice recognition software. Every effort was made to ensure accuracy; however, inadvertent computerized transcription errors may be present.   END OF ED PROVIDER NOTE        Chari Kilgore MD  11/08/20 8001

## 2021-01-27 ASSESSMENT — ENCOUNTER SYMPTOMS
CONSTIPATION: 0
BLURRED VISION: 0
HEARTBURN: 0
COUGH: 0
SHORTNESS OF BREATH: 0
DIARRHEA: 0
ABDOMINAL PAIN: 0
VOMITING: 0
ORTHOPNEA: 0
SORE THROAT: 0
DOUBLE VISION: 0
SPUTUM PRODUCTION: 0
BACK PAIN: 0
BLOOD IN STOOL: 0
WHEEZING: 0
NAUSEA: 0

## 2021-01-27 NOTE — PROGRESS NOTES
Rasta Becerra is a 63 y.o. female who presents today for     Chief Complaint   Patient presents with    Medication Refill     6 month follow up       She has been treated for GERD and migraine/cluster headaches. Headache  Patient with a history of  headache. Symptoms began about several years ago. Generally, the headaches last about several hours and occur several times per week. The headaches do not seem to be related to any time of day or year. The headaches are usually moderate, dull, sharp and throbbing and are located in global scalp/head. The patient rates her most severe headaches a 8 on a scale from 1 to 10. Recently, the headaches have been stable. Work attendance or other daily activities are not affected by the headaches. Precipitating factors include: cold temperatures, light, stress and weather changes. The headaches are usually not preceded by an aura. Associated neurologic symptoms: vomiting in the early morning. The patient denies decreased physical activity, depression, dizziness, loss of balance, muscle weakness, numbness of extremities, speech difficulties, vision problems and worsening school/work performance. Home treatment has included acetaminophen, amitriptyline, fioricet, Imitrex oral and Tylenol with codeine, darkening the room, resting and sleeping with no improvement. Other history includes: headaches of unknown type diagnosed in the past. Family history includes no known family members with significant headaches. Fioricet provides the most relief from this medication as opposed to the multiple trial of other medication in the past.  Maximum use would be TID. States she takes it couple times a week. GERD  Paitent complains of heartburn. This has been associated with abdominal bloating, bilious reflux, early satiety, midespigastric pain, upper abdominal discomfort and vomiting.   She denies belching, belching and eructation, chest pain, choking on food, cough, difficulty swallowing, dysphagia, heartburn, hematemesis, hoarseness, laryngitis, melena, need to clear throat frequently, nocturnal burning, odynophagia, shortness of breath, symptoms primarily relate to meals, and lying down after meals, unexpected weight loss and wheezing. Symptoms have been present for several years. She denies dysphagia. She denies melena, hematochezia, hematemesis, and coffee ground emesis. Medical therapy in the past has included antacids, H2 antagonists and proton pump inhibitors. Patient's primary symptoms are vomiting of acidic material.  She has S/P UEGD 6/15; H. Pylori reported at that time to be negative. The only medication that seems to be effective to somewhat controlling her symptoms is omeprazole and sodium bicarbonate. She does continue to consume peppermint candy. Symptoms have been well controlled until the day after Super Bowl Sunday. ..she became very symptomatic after eating pizza. ... As long as she is mindful of what/how she eats, she is fine    She is on long term PPI. Herpes Suppression:  On acyclovir. Still taking daily without any recent flare ups. Health Maintenance:  Shaniqua Be is due for breast cancer screening. She has yet to schedule it. .. Discussed x 15 minutes benefits/risks of COVID vaccine    [facility]:  Patient's past medical, surgical, social and/or family history reviewed, updated in chart, and are non-contributory (unless otherwise stated). Medications and allergies also reviewed and updated in chart. Review of Systems  Review of Systems   Constitutional: Negative for malaise/fatigue and weight loss. HENT: Negative for congestion, ear pain and sore throat. Eyes: Negative for blurred vision and double vision. Respiratory: Negative for cough, sputum production, shortness of breath and wheezing. Cardiovascular: Negative for chest pain, palpitations, orthopnea and leg swelling.    Gastrointestinal: Negative for abdominal pain, blood in stool, constipation, diarrhea, heartburn, nausea and vomiting. Genitourinary: Negative for dysuria, frequency, hematuria and urgency. Musculoskeletal: Negative for back pain, joint pain, myalgias and neck pain. Skin: Negative for itching and rash. Neurological: Positive for headaches. Negative for dizziness, tingling, focal weakness and weakness. Psychiatric/Behavioral: Negative for depression, memory loss and substance abuse. The patient is not nervous/anxious and does not have insomnia. Physical Exam:    VS:    /80   Pulse 78   Temp 96.4 °F (35.8 °C) (Infrared)   Resp 18   Ht 5' 8\" (1.727 m)   Wt 187 lb (84.8 kg)   SpO2 95%   BMI 28.43 kg/m²   LAST WEIGHT:  Wt Readings from Last 3 Encounters:   02/17/21 187 lb (84.8 kg)   11/08/20 165 lb (74.8 kg)   08/19/20 178 lb (80.7 kg)     BMI Readings from Last 3 Encounters:   02/17/21 28.43 kg/m²   11/08/20 25.09 kg/m²   08/19/20 27.06 kg/m²       Physical Exam   Constitutional: She is oriented to person, place, and time. She appears well-developed and well-nourished. No distress. HENT:   Head: Normocephalic and atraumatic. Right Ear: External ear normal.   Left Ear: External ear normal.   Mouth/Throat: Oropharynx is clear and moist. No oropharyngeal exudate. Eyes: Pupils are equal, round, and reactive to light. Conjunctivae and EOM are normal. Right eye exhibits no discharge. No scleral icterus. Neck: Normal range of motion. Neck supple. No thyromegaly present. Cardiovascular: Normal rate, regular rhythm, normal heart sounds and intact distal pulses. No murmur heard. Pulmonary/Chest: Effort normal. No stridor. No respiratory distress. She has no wheezes. She has no rales. She exhibits no tenderness. Abdominal: Soft. Bowel sounds are normal. She exhibits no distension and no mass. There is no abdominal tenderness. There is no guarding. Musculoskeletal: Normal range of motion.          General: No tenderness or edema.   Lymphadenopathy:     She has no cervical adenopathy. Neurological: She is alert and oriented to person, place, and time. Skin: Skin is warm and dry. No rash noted. She is not diaphoretic. No erythema. No pallor. Psychiatric: She has a normal mood and affect. Her behavior is normal. Thought content normal.       Labs:  Lab Results   Component Value Date     08/20/2019    K 4.0 08/20/2019     08/20/2019    CO2 24 08/20/2019    BUN 15 08/20/2019    CREATININE 0.8 08/20/2019    PROT 7.2 08/20/2019    LABALBU 4.2 08/20/2019    CALCIUM 9.4 08/20/2019    GFRAA >60 08/20/2019    LABGLOM >60 08/20/2019    GLUCOSE 87 08/20/2019    AST 20 08/20/2019    ALT 18 08/20/2019    ALKPHOS 95 08/20/2019    BILITOT <0.2 08/20/2019         Assessment / Plan:      Klaus Real was seen today for medication refill. Diagnoses and all orders for this visit:    Migraine without status migrainosus, not intractable, unspecified migraine type: stable and well controlled  -     aspirin-acetaminophen-caffeine (EXCEDRIN MIGRAINE) 250-250-65 MG per tablet; Take 1 tablet by mouth as needed for Headaches  -     butalbital-acetaminophen-caffeine (FIORICET, ESGIC) -40 MG per tablet; Take 1 tablet by mouth 3 times daily as needed for Headaches or Migraine Indications: Cluster Headache    Chronic cluster headache, not intractable: stable and well controlled  -     aspirin-acetaminophen-caffeine (EXCEDRIN MIGRAINE) 250-250-65 MG per tablet; Take 1 tablet by mouth as needed for Headaches  -     butalbital-acetaminophen-caffeine (FIORICET, ESGIC) -40 MG per tablet; Take 1 tablet by mouth 3 times daily as needed for Headaches or Migraine Indications: Cluster Headache    Gastroesophageal reflux disease with esophagitis: stable and well controlled  -     omeprazole (PRILOSEC) 40 MG delayed release capsule;  Take 1 capsule by mouth daily    Insomnia secondary to anxiety: stable and well controlled  -     hydrOXYzine (ATARAX) 25

## 2021-02-17 ENCOUNTER — OFFICE VISIT (OUTPATIENT)
Dept: FAMILY MEDICINE CLINIC | Age: 66
End: 2021-02-17
Payer: MEDICAID

## 2021-02-17 VITALS
BODY MASS INDEX: 28.34 KG/M2 | TEMPERATURE: 96.4 F | WEIGHT: 187 LBS | SYSTOLIC BLOOD PRESSURE: 136 MMHG | RESPIRATION RATE: 18 BRPM | OXYGEN SATURATION: 95 % | HEIGHT: 68 IN | DIASTOLIC BLOOD PRESSURE: 80 MMHG | HEART RATE: 78 BPM

## 2021-02-17 DIAGNOSIS — K21.00 GASTROESOPHAGEAL REFLUX DISEASE WITH ESOPHAGITIS WITHOUT HEMORRHAGE: ICD-10-CM

## 2021-02-17 DIAGNOSIS — B00.9 HERPES: ICD-10-CM

## 2021-02-17 DIAGNOSIS — G43.909 MIGRAINE WITHOUT STATUS MIGRAINOSUS, NOT INTRACTABLE, UNSPECIFIED MIGRAINE TYPE: ICD-10-CM

## 2021-02-17 DIAGNOSIS — F41.9 INSOMNIA SECONDARY TO ANXIETY: ICD-10-CM

## 2021-02-17 DIAGNOSIS — G44.029 CHRONIC CLUSTER HEADACHE, NOT INTRACTABLE: ICD-10-CM

## 2021-02-17 DIAGNOSIS — F51.05 INSOMNIA SECONDARY TO ANXIETY: ICD-10-CM

## 2021-02-17 PROCEDURE — 3017F COLORECTAL CA SCREEN DOC REV: CPT | Performed by: FAMILY MEDICINE

## 2021-02-17 PROCEDURE — G8484 FLU IMMUNIZE NO ADMIN: HCPCS | Performed by: FAMILY MEDICINE

## 2021-02-17 PROCEDURE — 99214 OFFICE O/P EST MOD 30 MIN: CPT | Performed by: FAMILY MEDICINE

## 2021-02-17 PROCEDURE — 1036F TOBACCO NON-USER: CPT | Performed by: FAMILY MEDICINE

## 2021-02-17 PROCEDURE — G8427 DOCREV CUR MEDS BY ELIG CLIN: HCPCS | Performed by: FAMILY MEDICINE

## 2021-02-17 PROCEDURE — G8419 CALC BMI OUT NRM PARAM NOF/U: HCPCS | Performed by: FAMILY MEDICINE

## 2021-02-17 RX ORDER — OMEPRAZOLE 40 MG/1
40 CAPSULE, DELAYED RELEASE ORAL DAILY
Qty: 90 CAPSULE | Refills: 3 | Status: SHIPPED
Start: 2021-02-17 | End: 2022-03-02 | Stop reason: SDUPTHER

## 2021-02-17 RX ORDER — BUTALBITAL, ACETAMINOPHEN AND CAFFEINE 50; 325; 40 MG/1; MG/1; MG/1
1 TABLET ORAL 3 TIMES DAILY PRN
Qty: 90 TABLET | Refills: 5 | Status: SHIPPED
Start: 2021-02-17 | End: 2021-08-23 | Stop reason: SDUPTHER

## 2021-02-17 RX ORDER — HYDROXYZINE HYDROCHLORIDE 25 MG/1
TABLET, FILM COATED ORAL
Qty: 120 TABLET | Refills: 5 | Status: SHIPPED
Start: 2021-02-17 | End: 2022-03-02 | Stop reason: SDUPTHER

## 2021-02-17 RX ORDER — ACYCLOVIR 400 MG/1
TABLET ORAL
Qty: 90 TABLET | Refills: 3 | Status: SHIPPED
Start: 2021-02-17 | End: 2022-03-02 | Stop reason: SDUPTHER

## 2021-08-23 ASSESSMENT — ENCOUNTER SYMPTOMS
CONSTIPATION: 0
COUGH: 0
BLOOD IN STOOL: 0
SHORTNESS OF BREATH: 0
ORTHOPNEA: 0
HEARTBURN: 0
NAUSEA: 0
SORE THROAT: 0
BLURRED VISION: 0
ABDOMINAL PAIN: 0
BACK PAIN: 0
VOMITING: 0
SPUTUM PRODUCTION: 0
DOUBLE VISION: 0
DIARRHEA: 0
WHEEZING: 0

## 2021-08-24 ENCOUNTER — OFFICE VISIT (OUTPATIENT)
Dept: FAMILY MEDICINE CLINIC | Age: 66
End: 2021-08-24
Payer: MEDICAID

## 2021-08-24 VITALS
WEIGHT: 188 LBS | RESPIRATION RATE: 16 BRPM | HEIGHT: 68 IN | TEMPERATURE: 97.8 F | HEART RATE: 75 BPM | OXYGEN SATURATION: 96 % | DIASTOLIC BLOOD PRESSURE: 80 MMHG | SYSTOLIC BLOOD PRESSURE: 132 MMHG | BODY MASS INDEX: 28.49 KG/M2

## 2021-08-24 DIAGNOSIS — G44.029 CHRONIC CLUSTER HEADACHE, NOT INTRACTABLE: Primary | ICD-10-CM

## 2021-08-24 DIAGNOSIS — F41.9 INSOMNIA SECONDARY TO ANXIETY: ICD-10-CM

## 2021-08-24 DIAGNOSIS — T63.441A ALLERGIC REACTION TO BEE STING: ICD-10-CM

## 2021-08-24 DIAGNOSIS — K21.00 GASTROESOPHAGEAL REFLUX DISEASE WITH ESOPHAGITIS WITHOUT HEMORRHAGE: ICD-10-CM

## 2021-08-24 DIAGNOSIS — Z12.31 ENCOUNTER FOR SCREENING MAMMOGRAM FOR MALIGNANT NEOPLASM OF BREAST: ICD-10-CM

## 2021-08-24 DIAGNOSIS — G43.909 MIGRAINE WITHOUT STATUS MIGRAINOSUS, NOT INTRACTABLE, UNSPECIFIED MIGRAINE TYPE: ICD-10-CM

## 2021-08-24 DIAGNOSIS — F51.05 INSOMNIA SECONDARY TO ANXIETY: ICD-10-CM

## 2021-08-24 PROCEDURE — G8419 CALC BMI OUT NRM PARAM NOF/U: HCPCS | Performed by: FAMILY MEDICINE

## 2021-08-24 PROCEDURE — 99214 OFFICE O/P EST MOD 30 MIN: CPT | Performed by: FAMILY MEDICINE

## 2021-08-24 PROCEDURE — 1036F TOBACCO NON-USER: CPT | Performed by: FAMILY MEDICINE

## 2021-08-24 PROCEDURE — G8427 DOCREV CUR MEDS BY ELIG CLIN: HCPCS | Performed by: FAMILY MEDICINE

## 2021-08-24 PROCEDURE — 3017F COLORECTAL CA SCREEN DOC REV: CPT | Performed by: FAMILY MEDICINE

## 2021-08-24 RX ORDER — OMEPRAZOLE 40 MG/1
40 CAPSULE, DELAYED RELEASE ORAL DAILY
Qty: 90 CAPSULE | Refills: 3 | Status: CANCELLED | OUTPATIENT
Start: 2021-08-24 | End: 2022-08-24

## 2021-08-24 RX ORDER — BUTALBITAL, ACETAMINOPHEN AND CAFFEINE 50; 325; 40 MG/1; MG/1; MG/1
1 TABLET ORAL 3 TIMES DAILY PRN
Qty: 90 TABLET | Refills: 5 | Status: SHIPPED
Start: 2021-08-24 | End: 2022-03-02 | Stop reason: SDUPTHER

## 2021-08-24 RX ORDER — HYDROXYZINE HYDROCHLORIDE 25 MG/1
TABLET, FILM COATED ORAL
Qty: 120 TABLET | Refills: 5 | Status: CANCELLED | OUTPATIENT
Start: 2021-08-24 | End: 2022-02-24

## 2021-08-24 RX ORDER — ACYCLOVIR 400 MG/1
TABLET ORAL
Qty: 90 TABLET | Refills: 3 | Status: CANCELLED | OUTPATIENT
Start: 2021-08-24 | End: 2022-08-24

## 2021-08-24 RX ORDER — EPINEPHRINE 0.3 MG/.3ML
INJECTION SUBCUTANEOUS
Qty: 1 EACH | Refills: 5 | Status: SHIPPED
Start: 2021-08-24 | End: 2022-08-31 | Stop reason: SDUPTHER

## 2021-08-24 SDOH — ECONOMIC STABILITY: FOOD INSECURITY: WITHIN THE PAST 12 MONTHS, THE FOOD YOU BOUGHT JUST DIDN'T LAST AND YOU DIDN'T HAVE MONEY TO GET MORE.: NEVER TRUE

## 2021-08-24 SDOH — ECONOMIC STABILITY: FOOD INSECURITY: WITHIN THE PAST 12 MONTHS, YOU WORRIED THAT YOUR FOOD WOULD RUN OUT BEFORE YOU GOT MONEY TO BUY MORE.: NEVER TRUE

## 2021-08-24 ASSESSMENT — PATIENT HEALTH QUESTIONNAIRE - PHQ9
SUM OF ALL RESPONSES TO PHQ QUESTIONS 1-9: 0
SUM OF ALL RESPONSES TO PHQ9 QUESTIONS 1 & 2: 0
1. LITTLE INTEREST OR PLEASURE IN DOING THINGS: 0
SUM OF ALL RESPONSES TO PHQ QUESTIONS 1-9: 0
2. FEELING DOWN, DEPRESSED OR HOPELESS: 0
SUM OF ALL RESPONSES TO PHQ QUESTIONS 1-9: 0

## 2021-08-24 ASSESSMENT — SOCIAL DETERMINANTS OF HEALTH (SDOH): HOW HARD IS IT FOR YOU TO PAY FOR THE VERY BASICS LIKE FOOD, HOUSING, MEDICAL CARE, AND HEATING?: NOT HARD AT ALL

## 2021-08-24 ASSESSMENT — LIFESTYLE VARIABLES: HOW OFTEN DO YOU HAVE A DRINK CONTAINING ALCOHOL: NEVER

## 2021-08-24 NOTE — PROGRESS NOTES
Christy Garcia is a 63 y.o. female who presents today for     Chief Complaint   Patient presents with    Medication Refill       She has been treated for GERD and migraine/cluster headaches. Headache  Patient with a history of  headache. Symptoms began about several years ago. Generally, the headaches last about several hours and occur several times per week. The headaches do not seem to be related to any time of day or year. The headaches are usually moderate, dull, sharp and throbbing and are located in global scalp/head. The patient rates her most severe headaches a 8 on a scale from 1 to 10. Recently, the headaches have been stable. Work attendance or other daily activities are not affected by the headaches. Precipitating factors include: cold temperatures, light, stress and weather changes. The headaches are usually not preceded by an aura. Associated neurologic symptoms: vomiting in the early morning. The patient denies decreased physical activity, depression, dizziness, loss of balance, muscle weakness, numbness of extremities, speech difficulties, vision problems and worsening school/work performance. Home treatment has included acetaminophen, amitriptyline, fioricet, Imitrex oral and Tylenol with codeine, darkening the room, resting and sleeping with no improvement. Other history includes: headaches of unknown type diagnosed in the past. Family history includes no known family members with significant headaches. Fioricet provides the most relief from this medication as opposed to the multiple trial of other medication in the past.  Maximum use would be TID. States she takes it couple times a week. GERD  Paitent complains of heartburn. This has been associated with abdominal bloating, bilious reflux, early satiety, midespigastric pain, upper abdominal discomfort and vomiting.   She denies belching, belching and eructation, chest pain, choking on food, cough, difficulty swallowing, dysphagia, heartburn, hematemesis, hoarseness, laryngitis, melena, need to clear throat frequently, nocturnal burning, odynophagia, shortness of breath, symptoms primarily relate to meals, and lying down after meals, unexpected weight loss and wheezing. Symptoms have been present for several years. She denies dysphagia. She denies melena, hematochezia, hematemesis, and coffee ground emesis. Medical therapy in the past has included antacids, H2 antagonists and proton pump inhibitors. Patient's primary symptoms are vomiting of acidic material.  She has S/P UEGD 6/15; H. Pylori reported at that time to be negative. The only medication that seems to be effective to somewhat controlling her symptoms is omeprazole and sodium bicarbonate. She does continue to consume peppermint candy. Symptoms have been well controlled until the day after Super Bowl Sunday. ..she became very symptomatic after eating pizza. ... As long as she is mindful of what/how she eats, she is fine    She is on long term PPI. Herpes Suppression:  On acyclovir. Still taking daily without any recent flare ups. Health Maintenance:  Betty Bai is due for breast cancer screening and COVID vaccination. After counseling patient, specifically about the COVID vaccine, she states that she will contemplate both aspects of . .. [address]:  Patient's past medical, surgical, social and/or family history reviewed, updated in chart, and are non-contributory (unless otherwise stated). Medications and allergies also reviewed and updated in chart. Review of Systems  Review of Systems   Constitutional: Negative for malaise/fatigue and weight loss. HENT: Negative for congestion, ear pain and sore throat. Eyes: Negative for blurred vision and double vision. Respiratory: Negative for cough, sputum production, shortness of breath and wheezing. Cardiovascular: Negative for chest pain, palpitations, orthopnea and leg swelling. Gastrointestinal: Negative for abdominal pain, blood in stool, constipation, diarrhea, heartburn, nausea and vomiting. Genitourinary: Negative for dysuria, frequency, hematuria and urgency. Musculoskeletal: Negative for back pain, joint pain, myalgias and neck pain. 8/24/21: Actively treated for left LE venous insufficiency ulcer   Skin: Negative for itching and rash. Neurological: Positive for headaches. Negative for dizziness, tingling, focal weakness and weakness. Psychiatric/Behavioral: Negative for depression, memory loss and substance abuse. The patient is not nervous/anxious and does not have insomnia. Physical Exam:    VS:    /80   Pulse 75   Temp 97.8 °F (36.6 °C) (Infrared)   Resp 16   Ht 5' 8\" (1.727 m)   Wt 188 lb (85.3 kg)   SpO2 96%   BMI 28.59 kg/m²   LAST WEIGHT:  Wt Readings from Last 3 Encounters:   08/24/21 188 lb (85.3 kg)   02/17/21 187 lb (84.8 kg)   11/08/20 165 lb (74.8 kg)     BMI Readings from Last 3 Encounters:   02/17/21 28.43 kg/m²   11/08/20 25.09 kg/m²   08/19/20 27.06 kg/m²       Physical Exam  Constitutional:       General: She is not in acute distress. Appearance: She is well-developed. She is not diaphoretic. HENT:      Head: Normocephalic and atraumatic. Right Ear: External ear normal.      Left Ear: External ear normal.      Mouth/Throat:      Pharynx: No oropharyngeal exudate. Eyes:      General: No scleral icterus. Right eye: No discharge. Conjunctiva/sclera: Conjunctivae normal.      Pupils: Pupils are equal, round, and reactive to light. Neck:      Thyroid: No thyromegaly. Cardiovascular:      Rate and Rhythm: Normal rate and regular rhythm. Heart sounds: Normal heart sounds. No murmur heard. Pulmonary:      Effort: Pulmonary effort is normal. No respiratory distress. Breath sounds: No stridor. No wheezing or rales. Chest:      Chest wall: No tenderness.    Abdominal:      General: Bowel sounds are normal. There is no distension. Palpations: Abdomen is soft. There is no mass. Tenderness: There is no abdominal tenderness. There is no guarding. Musculoskeletal:         General: No tenderness. Normal range of motion. Cervical back: Normal range of motion and neck supple. Lymphadenopathy:      Cervical: No cervical adenopathy. Skin:     General: Skin is warm and dry. Coloration: Skin is not pale. Findings: No erythema or rash. Neurological:      Mental Status: She is alert and oriented to person, place, and time. Psychiatric:         Behavior: Behavior normal.         Thought Content: Thought content normal.         Labs:  Lab Results   Component Value Date     08/20/2019    K 4.0 08/20/2019     08/20/2019    CO2 24 08/20/2019    BUN 15 08/20/2019    CREATININE 0.8 08/20/2019    PROT 7.2 08/20/2019    LABALBU 4.2 08/20/2019    CALCIUM 9.4 08/20/2019    GFRAA >60 08/20/2019    LABGLOM >60 08/20/2019    GLUCOSE 87 08/20/2019    AST 20 08/20/2019    ALT 18 08/20/2019    ALKPHOS 95 08/20/2019    BILITOT <0.2 08/20/2019         Assessment / Plan:      Carin Schulte was seen today for medication refill. Diagnoses and all orders for this visit:    Migraine without status migrainosus, not intractable, unspecified migraine type: stable and well controlled  -     aspirin-acetaminophen-caffeine (EXCEDRIN MIGRAINE) 250-250-65 MG per tablet; Take 1 tablet by mouth as needed for Headaches  -     butalbital-acetaminophen-caffeine (FIORICET, ESGIC) -40 MG per tablet; Take 1 tablet by mouth 3 times daily as needed for Headaches or Migraine Indications: Cluster Headache    Chronic cluster headache, not intractable: stable and well controlled  -     aspirin-acetaminophen-caffeine (EXCEDRIN MIGRAINE) 250-250-65 MG per tablet; Take 1 tablet by mouth as needed for Headaches  -     butalbital-acetaminophen-caffeine (FIORICET, ESGIC) -40 MG per tablet;  Take 1 tablet by mouth 3 times daily as needed for Headaches or Migraine Indications: Cluster Headache    Gastroesophageal reflux disease with esophagitis: stable and well controlled  -     omeprazole (PRILOSEC) 40 MG delayed release capsule; Take 1 capsule by mouth daily    Insomnia secondary to anxiety: stable and well controlled  -     hydrOXYzine (ATARAX) 25 MG tablet; take 1 to 2 tablets by mouth daily PRN    Allergic reaction to bee sting  -     EPINEPHrine (EPIPEN) 0.3 MG/0.3ML SOAJ injection; Use as directed for allergic reaction    Encounter for screening mammogram for malignant neoplasm of breast  -     [facility] DIGITAL SCREEN BILATERAL; Future      Time spent in pre-visit planning, interview, exam, /education and coordination of care: 34 minutes    Call or go to ED immediately if symptoms worsen or persist.    Follow Up:  Return for F/U 6 mos for medication refills. , or sooner if necessary. Educational materials and/or home exercises printed for patient's review and were included in patient instructions on his/her After Visit Summary and given to patient at the end of visit. Counseled regarding above diagnosis, including possible risks and complications,  especially if left uncontrolled. Counseled regarding the possible side effects, risks, benefits and alternatives to treatment; patient and/or guardian verbalizes understanding, agrees, feels comfortable with and wishes to proceed with above treatment plan. Advised patient to call with any new medication issues, and read all Rx info from pharmacy to assure aware of all possible risks and side effects of medication before taking. Reviewed age and gender appropriate health screening exams and vaccinations.   Advised patient regarding importance of keeping up with recommended health maintenance and to schedule as soon as possible if overdue, as this is important in assessing for undiagnosed pathology, especially cancer, as well as protecting against potentially harmful/life threatening disease. Patient and/or guardian verbalizes understanding and agrees with above counseling, assessment and plan. All questions answered.     Diamante Arauz MD

## 2022-02-02 ENCOUNTER — HOSPITAL ENCOUNTER (OUTPATIENT)
Dept: WOUND CARE | Age: 67
Discharge: HOME | End: 2022-02-02
Payer: MEDICAID

## 2022-02-02 VITALS
HEIGHT: 68 IN | SYSTOLIC BLOOD PRESSURE: 164 MMHG | BODY MASS INDEX: 28.59 KG/M2 | RESPIRATION RATE: 18 BRPM | HEART RATE: 80 BPM | TEMPERATURE: 97.7 F | DIASTOLIC BLOOD PRESSURE: 88 MMHG

## 2022-02-02 DIAGNOSIS — I83.023 VENOUS STASIS ULCER OF LEFT ANKLE WITH FAT LAYER EXPOSED WITH VARICOSE VEINS (HCC): Chronic | ICD-10-CM

## 2022-02-02 DIAGNOSIS — L97.322 VENOUS STASIS ULCER OF LEFT ANKLE WITH FAT LAYER EXPOSED WITH VARICOSE VEINS (HCC): Chronic | ICD-10-CM

## 2022-02-02 PROCEDURE — 99244 OFF/OP CNSLTJ NEW/EST MOD 40: CPT | Performed by: SURGERY

## 2022-02-02 PROCEDURE — 99214 OFFICE O/P EST MOD 30 MIN: CPT

## 2022-02-02 RX ORDER — LIDOCAINE HYDROCHLORIDE 40 MG/ML
SOLUTION TOPICAL ONCE
Status: CANCELLED | OUTPATIENT
Start: 2022-02-02 | End: 2022-02-02

## 2022-02-02 RX ORDER — LIDOCAINE HYDROCHLORIDE 20 MG/ML
JELLY TOPICAL ONCE
Status: CANCELLED | OUTPATIENT
Start: 2022-02-02 | End: 2022-02-02

## 2022-02-02 RX ORDER — BACITRACIN ZINC AND POLYMYXIN B SULFATE 500; 1000 [USP'U]/G; [USP'U]/G
OINTMENT TOPICAL ONCE
Status: CANCELLED | OUTPATIENT
Start: 2022-02-02 | End: 2022-02-02

## 2022-02-02 RX ORDER — CLOBETASOL PROPIONATE 0.5 MG/G
OINTMENT TOPICAL ONCE
Status: CANCELLED | OUTPATIENT
Start: 2022-02-02 | End: 2022-02-02

## 2022-02-02 RX ORDER — GINSENG 100 MG
CAPSULE ORAL ONCE
Status: CANCELLED | OUTPATIENT
Start: 2022-02-02 | End: 2022-02-02

## 2022-02-02 RX ORDER — GENTAMICIN SULFATE 1 MG/G
OINTMENT TOPICAL ONCE
Status: CANCELLED | OUTPATIENT
Start: 2022-02-02 | End: 2022-02-02

## 2022-02-02 RX ORDER — BACITRACIN, NEOMYCIN, POLYMYXIN B 400; 3.5; 5 [USP'U]/G; MG/G; [USP'U]/G
OINTMENT TOPICAL ONCE
Status: CANCELLED | OUTPATIENT
Start: 2022-02-02 | End: 2022-02-02

## 2022-02-02 RX ORDER — CIPROFLOXACIN 500 MG/1
500 TABLET, FILM COATED ORAL 2 TIMES DAILY
COMMUNITY
End: 2022-02-09 | Stop reason: ALTCHOICE

## 2022-02-02 RX ORDER — LIDOCAINE 40 MG/G
CREAM TOPICAL ONCE
Status: CANCELLED | OUTPATIENT
Start: 2022-02-02 | End: 2022-02-02

## 2022-02-02 RX ORDER — BETAMETHASONE DIPROPIONATE 0.05 %
OINTMENT (GRAM) TOPICAL ONCE
Status: CANCELLED | OUTPATIENT
Start: 2022-02-02 | End: 2022-02-02

## 2022-02-02 RX ORDER — LIDOCAINE 50 MG/G
OINTMENT TOPICAL ONCE
Status: CANCELLED | OUTPATIENT
Start: 2022-02-02 | End: 2022-02-02

## 2022-02-02 NOTE — PROGRESS NOTES
[facility]   History and Physical/Consultation  Vascular    Referring Physician : Gene Myles MD  [address] [place] [address] Road RECORD NUMBER:  [id number]  AGE: 61 y.o. GENDER: female  : 1955  EPISODE DATE:  2022  Subjective:     Chief Complaint   Patient presents with    Wound Check     left ankle          HISTORY of PRESENT ILLNESS HPI     Ramesh Skaggs is a 61 y.o. female who presents today for wound/ulcer evaluation. History of Wound Context:  The patient has had a wound of her left ankle/calf which was first noted approximately 2021. This has been treated local wound care. On their initial visit to the wound healing center, 22,  the patient has noted that the wound has been improving. The patient has not had similar previous wounds in the past.      She started seeing Dr. Sarita Singh in 2021 and than Dr. Yadiel Orellana. She was started in [facility] ~ 2021. She has noticed some improvement since starting [facility] boot. She is currently following with Dr. Matias Jean. Pt is not on abx at time of initial visit, but has been treated with previously by podiatry. She is not a DM. She is not a smoker. She denies hx of DVT, and per her report had recent us noting no evidence of dvt at [facility]. She also had arterial studies done. I had previously seen her in the past in regards to left buttock thigh wound, which started as abscess. Pt works at [employer] in [facility] and is on her feet all day.     22  · Reflux study - if significant findings will schedule for fu  · Continue compression therapy [facility] boot per podiatry with aquacell dressing  · Elevation  · She does not have significant arterial occlusive disease    Wound/Ulcer Pain Timing/Severity: constant, moderate  Quality of pain: aching, throbbing, tender  Severity:  5 / 10   Modifying Factors: Pain worsens with dressing changes, debridement  Associated Signs/Symptoms: edema, drainage and pain    Ulcer Identification:  Ulcer Type: venous  Contributing Factors: edema and venous stasis    Diabetic/Pressure/Non Pressure Ulcers only:  Ulcer: Non-Pressure ulcer, fat layer exposed        PAST MEDICAL HISTORY      Diagnosis Date    Dizziness - light-headed     GERD (gastroesophageal reflux disease)     Herpes dermatitis 4/27/2017    Insomnia secondary to anxiety 4/6/2018    Lightheadedness     Migraine     Skin ulcer of buttock with fat layer exposed ([address] 7/20/2016    Venous stasis ulcer of left ankle with fat layer exposed with varicose veins ([facility] [address] 2/2/2022     Past Surgical History:   Procedure Laterality Date    CHOLECYSTECTOMY, LAPAROSCOPIC  04/08/2014    TONSILLECTOMY  1960    1960s    UPPER GASTROINTESTINAL ENDOSCOPY  12/13/2013    mild gastritis and small hiatal hernia, Dr Renan Rios, [facility]  2015    GERD, Dr. Ning Kinney, office     Family History   Problem Relation Age of Onset    Diabetes Mother     Hypertension Mother     Heart Disease Father     Heart Attack Father     Heart Surgery Father         angioplasty    Stroke Father     High Cholesterol Father     Heart Attack Maternal Grandfather      Social History     Tobacco Use    Smoking status: Never Smoker    Smokeless tobacco: Never Used   Substance Use Topics    Alcohol use: No    Drug use: No     Allergies   Allergen Reactions    Bee Pollen Anaphylaxis    Tetracyclines & Related Hives     Current Outpatient Medications on File Prior to Encounter   Medication Sig Dispense Refill    ciprofloxacin (CIPRO) 500 MG tablet Take 500 mg by mouth 2 times daily      omeprazole (PRILOSEC) 40 MG delayed release capsule Take 1 capsule by mouth daily 90 capsule 3    hydrOXYzine (ATARAX) 25 MG tablet take 1 to 2 tablets by mouth daily  tablet 5    acyclovir (ZOVIRAX) 400 MG tablet take 1 tablet by mouth once daily 90 tablet 3    butalbital-acetaminophen-caffeine (FIORICET, ESGIC) -40 MG per tablet Take 1 tablet by mouth 3 times daily as needed for Headaches or Migraine Indications: Cluster Headache 90 tablet 5    EPINEPHrine (EPIPEN) 0.3 MG/0.3ML SOAJ injection Use as directed for allergic reaction 1 each 5    aspirin-acetaminophen-caffeine (EXCEDRIN MIGRAINE) 250-250-65 MG per tablet Take 1 tablet by mouth as needed for Headaches 90 tablet 11     No current facility-administered medications on file prior to encounter.      REVIEW OF SYSTEMS   ROS : All others Negative if blank [], Positive if [x]  General Urinary   [] Fevers [] Hematuria   [] Chills [] Dysuria   [] Weight Loss Vascular   Skin [] Claudication   [x] Tissue Loss [] Rest Pain   Eyes Neurologic   [] Wears Glasses/Contacts [] Stroke/TIA   [] Vision Changes [] Focal weakness   Respiratory [] Slurred Speech    [] Shortness of breath ENT   Cardiovascular [] Difficulty swallowing   [] Chest Pain Gastrointestinal   [] Shortness of breath with exertion [] Abdominal Pain    [] Melena       [] Hematochezia               Objective:    BP (!) 164/88   Pulse 80   Temp 97.7 °F (36.5 °C)   Resp 18   Ht 5' 8\" (1.727 m)   BMI 28.59 kg/m²   Wt Readings from Last 3 Encounters:   08/24/21 188 lb (85.3 kg)   02/17/21 187 lb (84.8 kg)   11/08/20 165 lb (74.8 kg)     PHYSICAL EXAM  CONSTITUTIONAL:   Awake, alert, cooperative   PSYCHIATRIC :  Oriented to time, place and person      normal insight to disease process  ENT:  External ears and nose without lesions    Hearing deficits is not noted  NECK: Supple, symmetrical, trachea midline    Thyroid goiter not appreciated   LUNGS:  No increased work of breathing                  Clear to auscultation bilaterally   CARDIOVASCULAR:  regular rate and rhythm   ABDOMEN:  soft, non-distended, non-tender   Lymphatics : Cervical lymphadenopathy is not noted     Femoral lymphadenopathy is not noted  SKIN:   Venous stasis changes  EXTREMITIES:   R UE Edema is not noted  L UE Edema is not noted  R LE Edema is noted  L LE Edema is  noted  R femoral 2+ L femoral 2+   R dorsalis pedis 2+ L dorsalis pedis 2+   R posterior tibial biphasic L posterior tibial biphasic     Assessment:     Problem List Items Addressed This Visit     Venous stasis ulcer of left ankle with fat layer exposed with varicose veins (HCC) (Chronic)    Relevant Orders    Reflux study/Reflux Venous Insufficiency Unilateral          Pre Debridement Measurements:  Are located in the [facility]  Documentation Flow Sheet  Post Debridement Measurements:  Wound/Ulcer Descriptions are Pre Debridement except measurements:     Wound 08/10/16 Other (Comment) Buttocks Left;Distal #2 acq 8/4/16 (Active)   Number of days: 2002       Wound 08/31/16 Buttocks Left;Proximal #3 aquired 8-27-26 (Active)   Number of days: 1981       Incision 04/08/14 Abdomen (Active)   Number of days: 0232       Wound 02/02/22 Ankle Left;Medial #1 (Active)   Wound Image   02/02/22 0802   Wound Length (cm) 6.6 cm 02/02/22 0802   Wound Width (cm) 4.1 cm 02/02/22 0802   Wound Depth (cm) 0.1 cm 02/02/22 0802   Wound Surface Area (cm^2) 27.06 cm^2 02/02/22 0802   Wound Volume (cm^3) 2. 706 cm^3 02/02/22 0802   Wound Assessment Fibrin;Slough;Pink/red 02/02/22 0802   Drainage Amount Moderate 02/02/22 0802   Drainage Description Yellow;Serosanguinous 02/02/22 0802   Odor Moderate 02/02/22 0802   Number of days: 0        No debridement    Plan:     Pt is not a smoker   - Discussed relationship of smoking and negative affects on wound healing   - Emphasized importance of tobacco avoidace/cessation    In my professional opinion and based off the information that is available at this time this patient has appropriate indication for HBO Therapy: No    Treatment Note please see attached Discharge Instructions    Written patient dismissal instructions given to patient and signed by patient or POA.          Discharge Instructions       Visit Discharge/Physician Orders    Discharge condition: Stable    Assessment of pain at discharge: yes    Anesthetic used: 4% lidocaine solution    Discharge to: Home    Left via:Private automobile    Accompanied by:self    ECF/HHA: n/a    Dressing Orders:LEFT MEDIAL LOWER LEG-Cleanse with normal saline, apply calcium alginate, ABD pad and unna boot change weekly. Treatment Orders: Eat a diet high in protein and vitamin C. Take a multiple vitamin daily unless contraindicated. Venous study to be ordered for reflux    [address] [place] [address],[address] followup visit : 1 week_____________________________  (Please note your next appointment above and if you are unable to keep, kindly give a 24 hour notice. Thank you.)    Physician signature:__________________________    If you experience any of the following, please call the [facility] during business hours:    * Increase in Pain  * Temperature over 101  * Increase in drainage from your wound  * Drainage with a foul odor  * Bleeding  * Increase in swelling  * Need for compression bandage changes due to slippage, breakthrough drainage. If you need medical attention outside of the business hours of the [facility] please contact your PCP or go to the nearest emergency room.         Electronically signed by Maryanne Haq MD on 2/2/2022 at 9:17 AM

## 2022-02-09 ENCOUNTER — HOSPITAL ENCOUNTER (OUTPATIENT)
Dept: WOUND CARE | Age: 67
Discharge: HOME | End: 2022-02-09
Payer: MEDICAID

## 2022-02-09 VITALS
HEIGHT: 68 IN | RESPIRATION RATE: 18 BRPM | DIASTOLIC BLOOD PRESSURE: 80 MMHG | TEMPERATURE: 96.6 F | HEART RATE: 74 BPM | BODY MASS INDEX: 25.01 KG/M2 | WEIGHT: 165 LBS | SYSTOLIC BLOOD PRESSURE: 144 MMHG

## 2022-02-09 DIAGNOSIS — L97.322 VENOUS STASIS ULCER OF LEFT ANKLE WITH FAT LAYER EXPOSED WITH VARICOSE VEINS (HCC): Primary | ICD-10-CM

## 2022-02-09 DIAGNOSIS — I83.023 VENOUS STASIS ULCER OF LEFT ANKLE WITH FAT LAYER EXPOSED WITH VARICOSE VEINS (HCC): Primary | ICD-10-CM

## 2022-02-09 PROCEDURE — 11042 DBRDMT SUBQ TIS 1ST 20SQCM/<: CPT | Performed by: SURGERY

## 2022-02-09 PROCEDURE — 11042 DBRDMT SUBQ TIS 1ST 20SQCM/<: CPT

## 2022-02-09 PROCEDURE — 6370000000 HC RX 637 (ALT 250 FOR IP): Performed by: SURGERY

## 2022-02-09 RX ORDER — GINSENG 100 MG
CAPSULE ORAL ONCE
Status: CANCELLED | OUTPATIENT
Start: 2022-02-09 | End: 2022-02-09

## 2022-02-09 RX ORDER — LIDOCAINE HYDROCHLORIDE 20 MG/ML
JELLY TOPICAL ONCE
Status: CANCELLED | OUTPATIENT
Start: 2022-02-09 | End: 2022-02-09

## 2022-02-09 RX ORDER — LIDOCAINE HYDROCHLORIDE 40 MG/ML
SOLUTION TOPICAL ONCE
Status: CANCELLED | OUTPATIENT
Start: 2022-02-09 | End: 2022-02-09

## 2022-02-09 RX ORDER — GENTAMICIN SULFATE 1 MG/G
OINTMENT TOPICAL ONCE
Status: CANCELLED | OUTPATIENT
Start: 2022-02-09 | End: 2022-02-09

## 2022-02-09 RX ORDER — LIDOCAINE 40 MG/G
CREAM TOPICAL ONCE
Status: CANCELLED | OUTPATIENT
Start: 2022-02-09 | End: 2022-02-09

## 2022-02-09 RX ORDER — BETAMETHASONE DIPROPIONATE 0.05 %
OINTMENT (GRAM) TOPICAL ONCE
Status: CANCELLED | OUTPATIENT
Start: 2022-02-09 | End: 2022-02-09

## 2022-02-09 RX ORDER — BACITRACIN, NEOMYCIN, POLYMYXIN B 400; 3.5; 5 [USP'U]/G; MG/G; [USP'U]/G
OINTMENT TOPICAL ONCE
Status: CANCELLED | OUTPATIENT
Start: 2022-02-09 | End: 2022-02-09

## 2022-02-09 RX ORDER — LIDOCAINE HYDROCHLORIDE 40 MG/ML
SOLUTION TOPICAL ONCE
Status: COMPLETED | OUTPATIENT
Start: 2022-02-09 | End: 2022-02-09

## 2022-02-09 RX ORDER — CLOBETASOL PROPIONATE 0.5 MG/G
OINTMENT TOPICAL ONCE
Status: CANCELLED | OUTPATIENT
Start: 2022-02-09 | End: 2022-02-09

## 2022-02-09 RX ORDER — LIDOCAINE 50 MG/G
OINTMENT TOPICAL ONCE
Status: CANCELLED | OUTPATIENT
Start: 2022-02-09 | End: 2022-02-09

## 2022-02-09 RX ORDER — BACITRACIN ZINC AND POLYMYXIN B SULFATE 500; 1000 [USP'U]/G; [USP'U]/G
OINTMENT TOPICAL ONCE
Status: CANCELLED | OUTPATIENT
Start: 2022-02-09 | End: 2022-02-09

## 2022-02-09 RX ADMIN — LIDOCAINE HYDROCHLORIDE 5 ML: 40 SOLUTION TOPICAL at 07:49

## 2022-02-09 NOTE — PROGRESS NOTES
Wound Healing Center Followup Visit Note    Referring Physician : Thu Belle MD  [address] [place] [address] RECORD NUMBER:  [id number]  AGE: 61 y.o. GENDER: female  : 1955  EPISODE DATE:  2022    Subjective:     Chief Complaint   Patient presents with    Wound Check     left ankle      HISTORY of PRESENT ILLNESS HPI   Angel Ackerman is a 61 y.o. female who presents today in regards to follow up evaluation and treatment of wound/ulcer. That patient's past medical, family and social hx were reviewed and changes were made if present. History of Wound Context:  The patient has had a wound of her left ankle/calf which was first noted approximately 2021. This has been treated local wound care. On their initial visit to the wound healing center, 22,  the patient has noted that the wound has been improving. The patient has not had similar previous wounds in the past.      She started seeing Dr. Fahad Hawthorne in 2021 and than Dr. Elizabeth Alcaraz. She was started in [facility] ~ 2021. She has noticed some improvement since starting unna boot. She is currently following with Dr. Beni Pardo. Pt is not on abx at time of initial visit, but has been treated with previously by podiatry. She is not a DM. She is not a smoker. She denies hx of DVT, and per her report had recent us noting no evidence of dvt at [facility]. She also had arterial studies done. I had previously seen her in the past in regards to left buttock thigh wound, which started as abscess. Pt works at [employer] in [facility] [facility] and is on her feet all day.     22  · Reflux study - if significant findings will schedule for fu  · Continue compression therapy [facility] bo per podiatry with aquacell dressing  · Elevation  · She does not have significant arterial occlusive disease  22  · Patient has asked to continuing following with me going forward because of our previous relationship  · She is going to let Dr. Savanna Ro office know  · She tolerated unna boot and aquacell - some improvement    Wound/Ulcer Pain Timing/Severity: constant, moderate  Quality of pain: aching, throbbing, tender  Severity:  5 / 10   Modifying Factors: Pain worsens with dressing changes, debridement  Associated Signs/Symptoms: edema, drainage and pain    Ulcer Identification:  Ulcer Type: venous  Contributing Factors: edema and venous stasis    Diabetic/Pressure/Non Pressure Ulcers only:  Ulcer: Non-Pressure ulcer, fat layer exposed        PAST MEDICAL HISTORY      Diagnosis Date    Dizziness - light-headed     GERD (gastroesophageal reflux disease)     Herpes dermatitis 4/27/2017    Insomnia secondary to anxiety 4/6/2018    Lightheadedness     Migraine     Skin ulcer of buttock with fat layer exposed ([facility] 7/20/2016    Venous stasis ulcer of left ankle with fat layer exposed with varicose veins ([facility] [address] 2/2/2022     Past Surgical History:   Procedure Laterality Date    CHOLECYSTECTOMY, LAPAROSCOPIC  04/08/2014    TONSILLECTOMY  1960    1960s    UPPER GASTROINTESTINAL ENDOSCOPY  12/13/2013    mild gastritis and small hiatal hernia, Dr Margareth Grove, [facility]    UPPER GASTROINTESTINAL ENDOSCOPY  2015    GERD, Dr. Aníbal Sanchez, office     Family History   Problem Relation Age of Onset    Diabetes Mother     Hypertension Mother     Heart Disease Father     Heart Attack Father     Heart Surgery Father         angioplasty    Stroke Father     High Cholesterol Father     Heart Attack Maternal Grandfather      Social History     Tobacco Use    Smoking status: Never Smoker    Smokeless tobacco: Never Used   Substance Use Topics    Alcohol use: No    Drug use: No     Allergies   Allergen Reactions    Bee Pollen Anaphylaxis    Tetracyclines & Related Hives     Current Outpatient Medications on File Prior to Encounter   Medication Sig Dispense Refill    butalbital-acetaminophen-caffeine (FIORICET, ESGIC) -40 MG per tablet Take 1 tablet by mouth 3 times daily as needed for Headaches or Migraine Indications: Cluster Headache 90 tablet 5    omeprazole (PRILOSEC) 40 MG delayed release capsule Take 1 capsule by mouth daily 90 capsule 3    hydrOXYzine (ATARAX) 25 MG tablet take 1 to 2 tablets by mouth daily  tablet 5    acyclovir (ZOVIRAX) 400 MG tablet take 1 tablet by mouth once daily 90 tablet 3    aspirin-acetaminophen-caffeine (EXCEDRIN MIGRAINE) 250-250-65 MG per tablet Take 1 tablet by mouth as needed for Headaches 90 tablet 11    EPINEPHrine (EPIPEN) 0.3 MG/0.3ML SOAJ injection Use as directed for allergic reaction 1 each 5     No current facility-administered medications on file prior to encounter.        REVIEW OF SYSTEMS See HPI    Objective:    BP (!) 144/80   Pulse 74   Temp 96.6 °F (35.9 °C) (Temporal)   Resp 18   Ht 5' 8\" (1.727 m)   Wt 165 lb (74.8 kg)   BMI 25.09 kg/m²   Wt Readings from Last 3 Encounters:   02/09/22 165 lb (74.8 kg)   08/24/21 188 lb (85.3 kg)   02/17/21 187 lb (84.8 kg)     PHYSICAL EXAM  CONSTITUTIONAL:   Awake, alert, cooperative   EYES:  lids and lashes normal   ENT: external ears and nose without lesions   NECK:  supple, symmetrical, trachea midline   SKIN:  Open wound Present    Assessment:     Problem List Items Addressed This Visit     Venous stasis ulcer of left ankle with fat layer exposed with varicose veins ([address] - Primary (Chronic)    Relevant Orders    Initiate Outpatient Wound Care Protocol          Pre Debridement Measurements:  Are located in the [facility]  Documentation Flow Sheet  Post Debridement Measurements:  Wound/Ulcer Descriptions are Pre Debridement except measurements:     Wound 08/10/16 Other (Comment) Buttocks Left;Distal #2 acq 8/4/16 (Active)   Number of days: 2008       Wound 08/31/16 Buttocks Left;Proximal #3 aquired 8-27-26 (Active)   Number of days: 1988       Incision 04/08/14 Abdomen (Active)   Number of days: 8277       Wound 02/02/22 Ankle Left;Medial #1 (Active)   Wound Image 02/02/22 0802   Wound Length (cm) 6.1 cm 02/09/22 0744   Wound Width (cm) 4 cm 02/09/22 0744   Wound Depth (cm) 0.1 cm 02/09/22 0744   Wound Surface Area (cm^2) 24.4 cm^2 02/09/22 0744   Change in Wound Size % (l*w) 9.83 02/09/22 0744   Wound Volume (cm^3) 2.44 cm^3 02/09/22 0744   Wound Healing % 10 02/09/22 0744   Post-Procedure Length (cm) 6.3 cm 02/09/22 0827   Post-Procedure Width (cm) 4.1 cm 02/09/22 0827   Post-Procedure Depth (cm) 0.1 cm 02/09/22 0827   Post-Procedure Surface Area (cm^2) 25.83 cm^2 02/09/22 0827   Post-Procedure Volume (cm^3) 2.583 cm^3 02/09/22 0827   Wound Assessment Fibrin;Pink/red 02/09/22 0744   Drainage Amount Moderate 02/09/22 0744   Drainage Description Yellow 02/09/22 0744   Odor None 02/09/22 0744   Melany-wound Assessment Intact 02/09/22 0744   Number of days: 7          Procedure Note  Indications:  Based on my examination of this patient's wound(s)/ulcer(s) today, debridement is required to promote healing and evaluate the wound base. Performed by: Venu Boyd MD    Consent obtained:  Yes    Time out taken:  Yes    Pain Control: Anesthetic  Anesthetic: 4% Lidocaine Liquid Topical     Debridement:Excisional Debridement    Using curette the wound(s)/ulcer(s) was/were sharply debrided down through and including the removal of epidermis, dermis and subcutaneous tissue. Devitalized Tissue Debrided:  fibrin, biofilm, slough and exudate to stimulate bleeding to promote healing, post debridement good bleeding base and wound edges noted    Wound/Ulcer #: 1    Percent of Wound/Ulcer Debrided: 80%    Total Surface Area Debrided:  20 sq cm     Estimated Blood Loss:  Minimal  Hemostasis Achieved:  by pressure    Procedural Pain:  7  / 10   Post Procedural Pain:  5 / 10     Response to treatment:  With complaints of pain.      Plan:   Treatment Note please see attached Discharge Instructions    Written patient dismissal instructions given to patient and signed by patient or POA.         Discharge Instructions       Visit Discharge/Physician Orders     Discharge condition: Stable     Assessment of pain at discharge: yes     Anesthetic used: 4% lidocaine solution     Discharge to: Home     Left via:Private automobile     Accompanied by:self     ECF/HHA: n/a     Dressing Orders:LEFT MEDIAL LOWER LEG-Cleanse with normal saline, apply calcium alginate, ABD pad and unna boot change weekly.     Treatment Orders: Eat a diet high in protein and vitamin C. Take a multiple vitamin daily unless contraindicated.     Venous study to be ordered for reflux     [address] [place] [address],[address] followup visit : 1 week_____________________________  (Please note your next appointment above and if you are unable to keep, kindly give a 24 hour notice.  Thank you.)     Physician signature:__________________________     If you experience any of the following, please call the [facility] during business hours:     * Increase in Pain  * Temperature over 101  * Increase in drainage from your wound  * Drainage with a foul odor  * Bleeding  * Increase in swelling  * Need for compression bandage changes due to slippage, breakthrough drainage.     If you need medical attention outside of the business hours of the [facility] please contact your PCP or go to the nearest emergency room.                 Electronically signed by Calixto Dumas MD on 2/9/2022 at 8:45 AM

## 2022-02-16 ENCOUNTER — HOSPITAL ENCOUNTER (OUTPATIENT)
Dept: WOUND CARE | Age: 67
Discharge: HOME | End: 2022-02-16
Payer: MEDICAID

## 2022-02-16 VITALS
WEIGHT: 165 LBS | HEART RATE: 90 BPM | DIASTOLIC BLOOD PRESSURE: 78 MMHG | BODY MASS INDEX: 25.09 KG/M2 | RESPIRATION RATE: 18 BRPM | TEMPERATURE: 98.7 F | SYSTOLIC BLOOD PRESSURE: 148 MMHG

## 2022-02-16 DIAGNOSIS — L97.322 VENOUS STASIS ULCER OF LEFT ANKLE WITH FAT LAYER EXPOSED WITH VARICOSE VEINS (HCC): Primary | ICD-10-CM

## 2022-02-16 DIAGNOSIS — I83.023 VENOUS STASIS ULCER OF LEFT ANKLE WITH FAT LAYER EXPOSED WITH VARICOSE VEINS (HCC): Primary | ICD-10-CM

## 2022-02-16 PROCEDURE — 11042 DBRDMT SUBQ TIS 1ST 20SQCM/<: CPT

## 2022-02-16 PROCEDURE — 11042 DBRDMT SUBQ TIS 1ST 20SQCM/<: CPT | Performed by: SURGERY

## 2022-02-16 PROCEDURE — 6370000000 HC RX 637 (ALT 250 FOR IP): Performed by: SURGERY

## 2022-02-16 RX ORDER — LIDOCAINE HYDROCHLORIDE 20 MG/ML
JELLY TOPICAL ONCE
Status: CANCELLED | OUTPATIENT
Start: 2022-02-16 | End: 2022-02-16

## 2022-02-16 RX ORDER — LIDOCAINE HYDROCHLORIDE 40 MG/ML
SOLUTION TOPICAL ONCE
Status: COMPLETED | OUTPATIENT
Start: 2022-02-16 | End: 2022-02-16

## 2022-02-16 RX ORDER — BACITRACIN, NEOMYCIN, POLYMYXIN B 400; 3.5; 5 [USP'U]/G; MG/G; [USP'U]/G
OINTMENT TOPICAL ONCE
Status: CANCELLED | OUTPATIENT
Start: 2022-02-16 | End: 2022-02-16

## 2022-02-16 RX ORDER — CLOBETASOL PROPIONATE 0.5 MG/G
OINTMENT TOPICAL ONCE
Status: CANCELLED | OUTPATIENT
Start: 2022-02-16 | End: 2022-02-16

## 2022-02-16 RX ORDER — BETAMETHASONE DIPROPIONATE 0.05 %
OINTMENT (GRAM) TOPICAL ONCE
Status: CANCELLED | OUTPATIENT
Start: 2022-02-16 | End: 2022-02-16

## 2022-02-16 RX ORDER — LIDOCAINE 50 MG/G
OINTMENT TOPICAL ONCE
Status: CANCELLED | OUTPATIENT
Start: 2022-02-16 | End: 2022-02-16

## 2022-02-16 RX ORDER — LIDOCAINE 40 MG/G
CREAM TOPICAL ONCE
Status: CANCELLED | OUTPATIENT
Start: 2022-02-16 | End: 2022-02-16

## 2022-02-16 RX ORDER — BACITRACIN ZINC AND POLYMYXIN B SULFATE 500; 1000 [USP'U]/G; [USP'U]/G
OINTMENT TOPICAL ONCE
Status: CANCELLED | OUTPATIENT
Start: 2022-02-16 | End: 2022-02-16

## 2022-02-16 RX ORDER — GINSENG 100 MG
CAPSULE ORAL ONCE
Status: CANCELLED | OUTPATIENT
Start: 2022-02-16 | End: 2022-02-16

## 2022-02-16 RX ORDER — LIDOCAINE HYDROCHLORIDE 40 MG/ML
SOLUTION TOPICAL ONCE
Status: CANCELLED | OUTPATIENT
Start: 2022-02-16 | End: 2022-02-16

## 2022-02-16 RX ORDER — GENTAMICIN SULFATE 1 MG/G
OINTMENT TOPICAL ONCE
Status: CANCELLED | OUTPATIENT
Start: 2022-02-16 | End: 2022-02-16

## 2022-02-16 RX ADMIN — LIDOCAINE HYDROCHLORIDE 10 ML: 40 SOLUTION TOPICAL at 07:52

## 2022-02-16 ASSESSMENT — PAIN SCALES - GENERAL: PAINLEVEL_OUTOF10: 0

## 2022-02-16 NOTE — PROGRESS NOTES
know  · She tolerated unna boot and aquacell - some improvement  216/22  · Appearance improved, slightly larger  · Consider drawtek next week but overall drainage seems reasonably managed as periwound appears ok    Wound/Ulcer Pain Timing/Severity: constant, moderate  Quality of pain: aching, throbbing, tender  Severity:  5 / 10   Modifying Factors: Pain worsens with dressing changes, debridement  Associated Signs/Symptoms: edema, drainage and pain    Ulcer Identification:  Ulcer Type: venous  Contributing Factors: edema and venous stasis    Diabetic/Pressure/Non Pressure Ulcers only:  Ulcer: Non-Pressure ulcer, fat layer exposed        PAST MEDICAL HISTORY      Diagnosis Date    Dizziness - light-headed     GERD (gastroesophageal reflux disease)     Herpes dermatitis 4/27/2017    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 Mars Wilks, office     Family History   Problem Relation Age of Onset    Diabetes Mother     Hypertension Mother     Heart Disease Father     Heart Attack Father     Heart Surgery Father         angioplasty    Stroke Father     High Cholesterol Father     Heart Attack Maternal Grandfather      Social History     Tobacco Use    Smoking status: Never Smoker    Smokeless tobacco: Never Used   Substance Use Topics    Alcohol use: No    Drug use: No     Allergies   Allergen Reactions    Bee Pollen Anaphylaxis    Tetracyclines & Related Hives     Current Outpatient Medications on File Prior to Encounter Incision 04/08/14 Abdomen (Active)   Number of days: 6498       Wound 02/02/22 Ankle Left;Medial #1 (Active)   Wound Image   02/02/22 0802   Wound Length (cm) 7.1 cm 02/16/22 0751   Wound Width (cm) 4.4 cm 02/16/22 0751   Wound Depth (cm) 0.2 cm 02/16/22 0751   Wound Surface Area (cm^2) 31.24 cm^2 02/16/22 0751   Change in Wound Size % (l*w) -15.45 02/16/22 0751   Wound Volume (cm^3) 6.248 cm^3 02/16/22 0751   Wound Healing % -131 02/16/22 0751   Post-Procedure Length (cm) 7.2 cm 02/16/22 0817   Post-Procedure Width (cm) 4.4 cm 02/16/22 0817   Post-Procedure Depth (cm) 0.2 cm 02/16/22 0817   Post-Procedure Surface Area (cm^2) 31.68 cm^2 02/16/22 0817   Post-Procedure Volume (cm^3) 6.336 cm^3 02/16/22 0817   Wound Assessment Fibrin;Pink/red 02/16/22 0751   Drainage Amount Large 02/16/22 0751   Drainage Description Serosanguinous; Yellow; Thick 02/16/22 0751   Odor None 02/16/22 0751   Melany-wound Assessment Blanchable erythema 02/16/22 0751   Number of days: 14          Procedure Note  Indications:  Based on my examination of this patient's wound(s)/ulcer(s) today, debridement is required to promote healing and evaluate the wound base. Performed by: Mirza Hines MD    Consent obtained:  Yes    Time out taken:  Yes    Pain Control: Anesthetic  Anesthetic: 4% Lidocaine Liquid Topical     Debridement:Excisional Debridement    Using curette the wound(s)/ulcer(s) was/were sharply debrided down through and including the removal of epidermis, dermis and subcutaneous tissue.         Devitalized Tissue Debrided:  fibrin, biofilm, slough and exudate to stimulate bleeding to promote healing, post debridement good bleeding base and wound edges noted    Wound/Ulcer #: 1    Percent of Wound/Ulcer Debrided: 60%    Total Surface Area Debrided:  20 sq cm     Estimated Blood Loss:  Minimal  Hemostasis Achieved:  by pressure    Procedural Pain:  7  / 10   Post Procedural Pain:  5 / 10     Response to treatment:  With complaints of pain. Plan:   Treatment Note please see attached Discharge Instructions    Written patient dismissal instructions given to patient and signed by patient or POA. Discharge Instructions       Visit Discharge/Physician Orders     Discharge condition: Stable     Assessment of pain at discharge: yes     Anesthetic used: 4% lidocaine solution     Discharge to: Home     Left via:Private automobile     Accompanied by:self     ECF/HHA: n/a     Dressing Orders:LEFT MEDIAL LOWER LEG-Cleanse with normal saline, apply calcium alginate, ABD pad and unna boot change weekly.     Treatment Orders: Eat a diet high in protein and vitamin C. Take a multiple vitamin daily unless contraindicated.     Venous study 2-22-22     [facility] followup visit : 1 week_____________________________  (Please note your next appointment above and if you are unable to keep, kindly give a 24 hour notice.  Thank you.)     Physician signature:__________________________     If you experience any of the following, please call the [facility] during business hours:     * Increase in Pain  * Temperature over 101  * Increase in drainage from your wound  * Drainage with a foul odor  * Bleeding  * Increase in swelling  * Need for compression bandage changes due to slippage, breakthrough drainage.     If you need medical attention outside of the business hours of the [facility] please contact your PCP or go to the nearest emergency room.                    Electronically signed by Devonte Roth MD on 2/16/2022 at 8:23 AM

## 2022-02-22 ENCOUNTER — HOSPITAL ENCOUNTER (OUTPATIENT)
Dept: ULTRASOUND IMAGING | Age: 67
Discharge: HOME | End: 2022-02-24
Payer: MEDICAID

## 2022-02-22 DIAGNOSIS — I83.023 VENOUS STASIS ULCER OF LEFT ANKLE WITH FAT LAYER EXPOSED WITH VARICOSE VEINS (HCC): Chronic | ICD-10-CM

## 2022-02-22 DIAGNOSIS — L97.322 VENOUS STASIS ULCER OF LEFT ANKLE WITH FAT LAYER EXPOSED WITH VARICOSE VEINS (HCC): Chronic | ICD-10-CM

## 2022-02-22 PROCEDURE — 93971 EXTREMITY STUDY: CPT

## 2022-02-22 PROCEDURE — 93971 EXTREMITY STUDY: CPT | Performed by: RADIOLOGY

## 2022-02-23 ENCOUNTER — HOSPITAL ENCOUNTER (OUTPATIENT)
Dept: WOUND CARE | Age: 67
Discharge: HOME | End: 2022-02-23
Payer: MEDICAID

## 2022-02-23 VITALS
BODY MASS INDEX: 25.01 KG/M2 | HEIGHT: 68 IN | TEMPERATURE: 97.7 F | HEART RATE: 84 BPM | RESPIRATION RATE: 18 BRPM | DIASTOLIC BLOOD PRESSURE: 90 MMHG | WEIGHT: 165 LBS | SYSTOLIC BLOOD PRESSURE: 162 MMHG

## 2022-02-23 DIAGNOSIS — I83.023 VENOUS STASIS ULCER OF LEFT ANKLE WITH FAT LAYER EXPOSED WITH VARICOSE VEINS (HCC): Primary | ICD-10-CM

## 2022-02-23 DIAGNOSIS — L97.322 VENOUS STASIS ULCER OF LEFT ANKLE WITH FAT LAYER EXPOSED WITH VARICOSE VEINS (HCC): Primary | ICD-10-CM

## 2022-02-23 PROCEDURE — 87075 CULTR BACTERIA EXCEPT BLOOD: CPT

## 2022-02-23 PROCEDURE — 87186 SC STD MICRODIL/AGAR DIL: CPT

## 2022-02-23 PROCEDURE — 11045 DBRDMT SUBQ TISS EACH ADDL: CPT

## 2022-02-23 PROCEDURE — 11042 DBRDMT SUBQ TIS 1ST 20SQCM/<: CPT | Performed by: SURGERY

## 2022-02-23 PROCEDURE — 87070 CULTURE OTHR SPECIMN AEROBIC: CPT

## 2022-02-23 PROCEDURE — 11042 DBRDMT SUBQ TIS 1ST 20SQCM/<: CPT

## 2022-02-23 PROCEDURE — 87077 CULTURE AEROBIC IDENTIFY: CPT

## 2022-02-23 PROCEDURE — 6370000000 HC RX 637 (ALT 250 FOR IP): Performed by: SURGERY

## 2022-02-23 RX ORDER — LIDOCAINE HYDROCHLORIDE 20 MG/ML
JELLY TOPICAL ONCE
Status: CANCELLED | OUTPATIENT
Start: 2022-02-23 | End: 2022-02-23

## 2022-02-23 RX ORDER — GENTAMICIN SULFATE 1 MG/G
OINTMENT TOPICAL ONCE
Status: CANCELLED | OUTPATIENT
Start: 2022-02-23 | End: 2022-02-23

## 2022-02-23 RX ORDER — LIDOCAINE HYDROCHLORIDE 40 MG/ML
SOLUTION TOPICAL ONCE
Status: CANCELLED | OUTPATIENT
Start: 2022-02-23 | End: 2022-02-23

## 2022-02-23 RX ORDER — LIDOCAINE 40 MG/G
CREAM TOPICAL ONCE
Status: CANCELLED | OUTPATIENT
Start: 2022-02-23 | End: 2022-02-23

## 2022-02-23 RX ORDER — BACITRACIN ZINC AND POLYMYXIN B SULFATE 500; 1000 [USP'U]/G; [USP'U]/G
OINTMENT TOPICAL ONCE
Status: CANCELLED | OUTPATIENT
Start: 2022-02-23 | End: 2022-02-23

## 2022-02-23 RX ORDER — BETAMETHASONE DIPROPIONATE 0.05 %
OINTMENT (GRAM) TOPICAL ONCE
Status: CANCELLED | OUTPATIENT
Start: 2022-02-23 | End: 2022-02-23

## 2022-02-23 RX ORDER — LIDOCAINE HYDROCHLORIDE 40 MG/ML
SOLUTION TOPICAL ONCE
Status: COMPLETED | OUTPATIENT
Start: 2022-02-23 | End: 2022-02-23

## 2022-02-23 RX ORDER — LIDOCAINE 50 MG/G
OINTMENT TOPICAL ONCE
Status: CANCELLED | OUTPATIENT
Start: 2022-02-23 | End: 2022-02-23

## 2022-02-23 RX ORDER — CLOBETASOL PROPIONATE 0.5 MG/G
OINTMENT TOPICAL ONCE
Status: CANCELLED | OUTPATIENT
Start: 2022-02-23 | End: 2022-02-23

## 2022-02-23 RX ORDER — BACITRACIN, NEOMYCIN, POLYMYXIN B 400; 3.5; 5 [USP'U]/G; MG/G; [USP'U]/G
OINTMENT TOPICAL ONCE
Status: CANCELLED | OUTPATIENT
Start: 2022-02-23 | End: 2022-02-23

## 2022-02-23 RX ORDER — GINSENG 100 MG
CAPSULE ORAL ONCE
Status: CANCELLED | OUTPATIENT
Start: 2022-02-23 | End: 2022-02-23

## 2022-02-23 RX ADMIN — LIDOCAINE HYDROCHLORIDE 10 ML: 40 SOLUTION TOPICAL at 08:06

## 2022-02-23 NOTE — PLAN OF CARE
Problem: Wound:  Goal: Will show signs of wound healing; wound closure and no evidence of infection  Description: Will show signs of wound healing; wound closure and no evidence of infection  Outcome: Met This Shift     Problem: Venous:  Goal: Signs of wound healing will improve  Description: Signs of wound healing will improve  Outcome: Met This Shift

## 2022-02-23 NOTE — PROGRESS NOTES
Wound Healing Center Followup Visit Note    Referring Physician : Chantal Melo MD  [address] [place] [address] Road RECORD NUMBER:  [id number]  AGE: 61 y.o. GENDER: female  : 1955  EPISODE DATE:  2022    Subjective:     Chief Complaint   Patient presents with    Wound Check     left leg      HISTORY of PRESENT ILLNESS HPI   Marck Chanel is a 61 y.o. female who presents today in regards to follow up evaluation and treatment of wound/ulcer. That patient's past medical, family and social hx were reviewed and changes were made if present. History of Wound Context:  The patient has had a wound of her left ankle/calf which was first noted approximately 2021. This has been treated local wound care. On their initial visit to the wound healing center, 22,  the patient has noted that the wound has been improving. The patient has not had similar previous wounds in the past.      She started seeing Dr. Arlyn Shearer in 2021 and than Dr. Moustapha Srinivasan. She was started in [facility] ~ 2021. She has noticed some improvement since starting [facility] boot. She is currently following with Dr. Diya Brown. Pt is not on abx at time of initial visit, but has been treated with previously by podiatry. She is not a DM. She is not a smoker. She denies hx of DVT, and per her report had recent us noting no evidence of dvt at [facility]. She also had arterial studies done. I had previously seen her in the past in regards to left buttock thigh wound, which started as abscess. Pt works at [employer] in [place] [facility] and is on her feet all day.     22  · Reflux study - if significant findings will schedule for fu  · Continue compression therapy unna boot per podiatry with aquacell dressing  · Elevation  · She does not have significant arterial occlusive disease  22  · Patient has asked to continuing following with me going forward because of our previous relationship  · She is going to let Dr. Silvia Renae office know  · She tolerated unna boot and aquacell - some improvement  216/22  · Appearance improved, slightly larger  · Consider drawtek next week but overall drainage seems reasonably managed as periwound appears ok  2/23/2022  · The wound, has some exudate, no recent cultures were done, will do wound cultures today      Wound/Ulcer Pain Timing/Severity: constant, moderate  Quality of pain: aching, throbbing, tender  Severity:  5 / 10   Modifying Factors: Pain worsens with dressing changes, debridement  Associated Signs/Symptoms: edema, drainage and pain    Ulcer Identification:  Ulcer Type: venous  Contributing Factors: edema and venous stasis    Diabetic/Pressure/Non Pressure Ulcers only:  Ulcer: Non-Pressure ulcer, fat layer exposed        PAST MEDICAL HISTORY      Diagnosis Date    Dizziness - light-headed     GERD (gastroesophageal reflux disease)     Herpes dermatitis 4/27/2017    Insomnia secondary to anxiety 4/6/2018    Lightheadedness     Migraine     Skin ulcer of buttock with fat layer exposed ([address] 7/20/2016    Venous stasis ulcer of left ankle with fat layer exposed with varicose veins ([address] 2/2/2022     Past Surgical History:   Procedure Laterality Date    CHOLECYSTECTOMY, LAPAROSCOPIC  04/08/2014    TONSILLECTOMY  1960    1960s    UPPER GASTROINTESTINAL ENDOSCOPY  12/13/2013    mild gastritis and small hiatal hernia, Dr Christa Maldonado, [facility] ENDOSCOPY  2015    GERD, Dr. Moises Cid, office     Family History   Problem Relation Age of Onset    Diabetes Mother     Hypertension Mother     Heart Disease Father     Heart Attack Father     Heart Surgery Father         angioplasty    Stroke Father     High Cholesterol Father     Heart Attack Maternal Grandfather      Social History     Tobacco Use    Smoking status: Never Smoker    Smokeless tobacco: Never Used   Vaping Use    Vaping Use: Never used   Substance Use Topics    Alcohol use: No    Drug use: No     Allergies Allergen Reactions    Bee Pollen Anaphylaxis    Tetracyclines & Related Hives     Current Outpatient Medications on File Prior to Encounter   Medication Sig Dispense Refill    omeprazole (PRILOSEC) 40 MG delayed release capsule Take 1 capsule by mouth daily 90 capsule 3    butalbital-acetaminophen-caffeine (FIORICET, ESGIC) -40 MG per tablet Take 1 tablet by mouth 3 times daily as needed for Headaches or Migraine Indications: Cluster Headache 90 tablet 5    EPINEPHrine (EPIPEN) 0.3 MG/0.3ML SOAJ injection Use as directed for allergic reaction 1 each 5    aspirin-acetaminophen-caffeine (EXCEDRIN MIGRAINE) 250-250-65 MG per tablet Take 1 tablet by mouth as needed for Headaches 90 tablet 11     No current facility-administered medications on file prior to encounter.        REVIEW OF SYSTEMS See HPI    Objective:    BP (!) 162/90   Pulse 84   Temp 97.7 °F (36.5 °C) (Temporal)   Resp 18   Ht 5' 8\" (1.727 m)   Wt 165 lb (74.8 kg)   BMI 25.09 kg/m²   Wt Readings from Last 3 Encounters:   02/23/22 165 lb (74.8 kg)   02/16/22 165 lb (74.8 kg)   02/09/22 165 lb (74.8 kg)     PHYSICAL EXAM  CONSTITUTIONAL:   Awake, alert, cooperative   EYES:  lids and lashes normal   ENT: external ears and nose without lesions   NECK:  supple, symmetrical, trachea midline   SKIN:  Open wound Present    Assessment:     Problem List Items Addressed This Visit     Venous stasis ulcer of left ankle with fat layer exposed with varicose veins ([address] - Primary (Chronic)    Relevant Orders    Initiate Outpatient Wound Care Protocol          Pre Debridement Measurements:  Are located in the [place]  Documentation Flow Sheet  Post Debridement Measurements:  Wound/Ulcer Descriptions are Pre Debridement except measurements:     Wound 08/10/16 Other (Comment) Buttocks Left;Distal #2 acq 8/4/16 (Active)   Number of days: 2022       Wound 08/31/16 Buttocks Left;Proximal #3 aquired 8-27-26 (Active)   Number of days: 2002       Incision 04/08/14 Abdomen (Active)   Number of days: 7737       Wound 02/02/22 Ankle Left;Medial #1 (Active)   Wound Image   02/02/22 0802   Dressing Status New dressing applied 02/16/22 0839   Wound Cleansed Cleansed with saline 02/16/22 0839   Dressing/Treatment Alginate;ABD 02/16/22 0839   Offloading for Diabetic Foot Ulcers Offloading not ordered 02/16/22 0839   Wound Length (cm) 7.7 cm 02/23/22 0807   Wound Width (cm) 4.8 cm 02/23/22 0807   Wound Depth (cm) 0.2 cm 02/23/22 0807   Wound Surface Area (cm^2) 36.96 cm^2 02/23/22 0807   Change in Wound Size % (l*w) -36.59 02/23/22 0807   Wound Volume (cm^3) 7.392 cm^3 02/23/22 0807   Wound Healing % -173 02/23/22 0807   Post-Procedure Length (cm) 7.9 cm 02/23/22 0819   Post-Procedure Width (cm) 4.9 cm 02/23/22 0819   Post-Procedure Depth (cm) 0.2 cm 02/23/22 0819   Post-Procedure Surface Area (cm^2) 38.71 cm^2 02/23/22 0819   Post-Procedure Volume (cm^3) 7.742 cm^3 02/23/22 0819   Wound Assessment Fibrin;Pink/red 02/23/22 0807   Drainage Amount Large 02/23/22 0807   Drainage Description Serosanguinous 02/23/22 0807   Odor None 02/23/22 0807   Melany-wound Assessment Blanchable erythema 02/23/22 0807   Number of days: 21          Procedure Note  Indications:  Based on my examination of this patient's wound(s)/ulcer(s) today, debridement is required to promote healing and evaluate the wound base. Performed by: Ronda Mcknight MD    Consent obtained:  Yes    Time out taken:  Yes    Pain Control: Anesthetic  Anesthetic: 4% Lidocaine Liquid Topical     Debridement:Excisional Debridement    Using curette the wound(s)/ulcer(s) was/were sharply debrided down through and including the removal of epidermis, dermis and subcutaneous tissue.         Devitalized Tissue Debrided:  fibrin, biofilm, slough and exudate to stimulate bleeding to promote healing, post debridement good bleeding base and wound edges noted    Wound/Ulcer #: 1    Percent of Wound/Ulcer Debrided: 80%    Total

## 2022-02-26 LAB — ANAEROBIC CULTURE: NORMAL

## 2022-02-27 LAB
ORGANISM: ABNORMAL
WOUND/ABSCESS: ABNORMAL

## 2022-03-02 ENCOUNTER — OFFICE VISIT (OUTPATIENT)
Dept: FAMILY MEDICINE CLINIC | Age: 67
End: 2022-03-02
Payer: MEDICAID

## 2022-03-02 ENCOUNTER — HOSPITAL ENCOUNTER (OUTPATIENT)
Dept: WOUND CARE | Age: 67
Discharge: HOME | End: 2022-03-02
Payer: MEDICAID

## 2022-03-02 VITALS
BODY MASS INDEX: 25.09 KG/M2 | TEMPERATURE: 96.4 F | HEART RATE: 72 BPM | SYSTOLIC BLOOD PRESSURE: 162 MMHG | DIASTOLIC BLOOD PRESSURE: 82 MMHG | RESPIRATION RATE: 18 BRPM | WEIGHT: 165 LBS

## 2022-03-02 VITALS
OXYGEN SATURATION: 97 % | SYSTOLIC BLOOD PRESSURE: 142 MMHG | DIASTOLIC BLOOD PRESSURE: 82 MMHG | HEART RATE: 73 BPM | RESPIRATION RATE: 16 BRPM | TEMPERATURE: 97.7 F | HEIGHT: 68 IN | BODY MASS INDEX: 27.28 KG/M2 | WEIGHT: 180 LBS

## 2022-03-02 DIAGNOSIS — G44.029 CHRONIC CLUSTER HEADACHE, NOT INTRACTABLE: ICD-10-CM

## 2022-03-02 DIAGNOSIS — Z76.0 ENCOUNTER FOR MEDICATION REFILL: Primary | ICD-10-CM

## 2022-03-02 DIAGNOSIS — G43.909 MIGRAINE WITHOUT STATUS MIGRAINOSUS, NOT INTRACTABLE, UNSPECIFIED MIGRAINE TYPE: ICD-10-CM

## 2022-03-02 DIAGNOSIS — B00.9 HERPES: ICD-10-CM

## 2022-03-02 DIAGNOSIS — I83.023 VENOUS STASIS ULCER OF LEFT ANKLE WITH FAT LAYER EXPOSED WITH VARICOSE VEINS (HCC): Primary | ICD-10-CM

## 2022-03-02 DIAGNOSIS — L97.322 VENOUS STASIS ULCER OF LEFT ANKLE WITH FAT LAYER EXPOSED WITH VARICOSE VEINS (HCC): Primary | ICD-10-CM

## 2022-03-02 DIAGNOSIS — F51.05 INSOMNIA SECONDARY TO ANXIETY: ICD-10-CM

## 2022-03-02 DIAGNOSIS — F41.9 INSOMNIA SECONDARY TO ANXIETY: ICD-10-CM

## 2022-03-02 DIAGNOSIS — K21.00 GASTROESOPHAGEAL REFLUX DISEASE WITH ESOPHAGITIS WITHOUT HEMORRHAGE: ICD-10-CM

## 2022-03-02 PROCEDURE — G8484 FLU IMMUNIZE NO ADMIN: HCPCS | Performed by: FAMILY MEDICINE

## 2022-03-02 PROCEDURE — G8427 DOCREV CUR MEDS BY ELIG CLIN: HCPCS | Performed by: FAMILY MEDICINE

## 2022-03-02 PROCEDURE — 1036F TOBACCO NON-USER: CPT | Performed by: FAMILY MEDICINE

## 2022-03-02 PROCEDURE — 3017F COLORECTAL CA SCREEN DOC REV: CPT | Performed by: FAMILY MEDICINE

## 2022-03-02 PROCEDURE — 6370000000 HC RX 637 (ALT 250 FOR IP): Performed by: SURGERY

## 2022-03-02 PROCEDURE — G8419 CALC BMI OUT NRM PARAM NOF/U: HCPCS | Performed by: FAMILY MEDICINE

## 2022-03-02 PROCEDURE — 99214 OFFICE O/P EST MOD 30 MIN: CPT | Performed by: FAMILY MEDICINE

## 2022-03-02 PROCEDURE — 11042 DBRDMT SUBQ TIS 1ST 20SQCM/<: CPT

## 2022-03-02 PROCEDURE — 11042 DBRDMT SUBQ TIS 1ST 20SQCM/<: CPT | Performed by: SURGERY

## 2022-03-02 RX ORDER — BACITRACIN ZINC AND POLYMYXIN B SULFATE 500; 1000 [USP'U]/G; [USP'U]/G
OINTMENT TOPICAL ONCE
Status: CANCELLED | OUTPATIENT
Start: 2022-03-02 | End: 2022-03-02

## 2022-03-02 RX ORDER — HYDRALAZINE HYDROCHLORIDE 100 MG/1
100 TABLET, FILM COATED ORAL 2 TIMES DAILY
COMMUNITY
End: 2022-03-02 | Stop reason: CLARIF

## 2022-03-02 RX ORDER — AMOXICILLIN AND CLAVULANATE POTASSIUM 875; 125 MG/1; MG/1
1 TABLET, FILM COATED ORAL 2 TIMES DAILY
Qty: 20 TABLET | Refills: 0 | Status: SHIPPED | OUTPATIENT
Start: 2022-03-02 | End: 2022-03-12

## 2022-03-02 RX ORDER — ACETAMINOPHEN, ASPIRIN AND CAFFEINE 250; 250; 65 MG/1; MG/1; MG/1
1 TABLET, FILM COATED ORAL PRN
Qty: 300 TABLET | Refills: 3 | COMMUNITY
Start: 2022-03-02

## 2022-03-02 RX ORDER — LIDOCAINE HYDROCHLORIDE 20 MG/ML
JELLY TOPICAL ONCE
Status: CANCELLED | OUTPATIENT
Start: 2022-03-02 | End: 2022-03-02

## 2022-03-02 RX ORDER — LIDOCAINE HYDROCHLORIDE 40 MG/ML
SOLUTION TOPICAL ONCE
Status: CANCELLED | OUTPATIENT
Start: 2022-03-02 | End: 2022-03-02

## 2022-03-02 RX ORDER — HYDROXYZINE HYDROCHLORIDE 25 MG/1
TABLET, FILM COATED ORAL
Qty: 60 TABLET | Refills: 5 | Status: SHIPPED
Start: 2022-03-02 | End: 2022-08-31 | Stop reason: SDUPTHER

## 2022-03-02 RX ORDER — LIDOCAINE 50 MG/G
OINTMENT TOPICAL ONCE
Status: CANCELLED | OUTPATIENT
Start: 2022-03-02 | End: 2022-03-02

## 2022-03-02 RX ORDER — ACYCLOVIR 400 MG/1
TABLET ORAL
Qty: 90 TABLET | Refills: 3 | Status: SHIPPED
Start: 2022-03-02 | End: 2022-08-03

## 2022-03-02 RX ORDER — LIDOCAINE 40 MG/G
CREAM TOPICAL ONCE
Status: CANCELLED | OUTPATIENT
Start: 2022-03-02 | End: 2022-03-02

## 2022-03-02 RX ORDER — ACETAMINOPHEN, ASPIRIN AND CAFFEINE 250; 250; 65 MG/1; MG/1; MG/1
1 TABLET, FILM COATED ORAL PRN
Qty: 90 TABLET | Refills: 11 | Status: CANCELLED | COMMUNITY
Start: 2022-03-02 | End: 2023-03-02

## 2022-03-02 RX ORDER — OMEPRAZOLE 40 MG/1
40 CAPSULE, DELAYED RELEASE ORAL DAILY
Qty: 90 CAPSULE | Refills: 3 | Status: SHIPPED
Start: 2022-03-02 | End: 2022-08-31 | Stop reason: ALTCHOICE

## 2022-03-02 RX ORDER — CLOBETASOL PROPIONATE 0.5 MG/G
OINTMENT TOPICAL ONCE
Status: CANCELLED | OUTPATIENT
Start: 2022-03-02 | End: 2022-03-02

## 2022-03-02 RX ORDER — BETAMETHASONE DIPROPIONATE 0.05 %
OINTMENT (GRAM) TOPICAL ONCE
Status: CANCELLED | OUTPATIENT
Start: 2022-03-02 | End: 2022-03-02

## 2022-03-02 RX ORDER — GENTAMICIN SULFATE 1 MG/G
OINTMENT TOPICAL ONCE
Status: CANCELLED | OUTPATIENT
Start: 2022-03-02 | End: 2022-03-02

## 2022-03-02 RX ORDER — HYDRALAZINE HYDROCHLORIDE 100 MG/1
100 TABLET, FILM COATED ORAL 2 TIMES DAILY
Qty: 60 TABLET | Status: CANCELLED | OUTPATIENT
Start: 2022-03-02

## 2022-03-02 RX ORDER — BACITRACIN, NEOMYCIN, POLYMYXIN B 400; 3.5; 5 [USP'U]/G; MG/G; [USP'U]/G
OINTMENT TOPICAL ONCE
Status: CANCELLED | OUTPATIENT
Start: 2022-03-02 | End: 2022-03-02

## 2022-03-02 RX ORDER — LIDOCAINE HYDROCHLORIDE 40 MG/ML
SOLUTION TOPICAL ONCE
Status: COMPLETED | OUTPATIENT
Start: 2022-03-02 | End: 2022-03-02

## 2022-03-02 RX ORDER — BUTALBITAL, ACETAMINOPHEN AND CAFFEINE 50; 325; 40 MG/1; MG/1; MG/1
1 TABLET ORAL 3 TIMES DAILY PRN
Qty: 90 TABLET | Refills: 5 | Status: SHIPPED
Start: 2022-03-02 | End: 2022-08-31 | Stop reason: SDUPTHER

## 2022-03-02 RX ORDER — GINSENG 100 MG
CAPSULE ORAL ONCE
Status: CANCELLED | OUTPATIENT
Start: 2022-03-02 | End: 2022-03-02

## 2022-03-02 RX ADMIN — LIDOCAINE HYDROCHLORIDE 10 ML: 40 SOLUTION TOPICAL at 07:46

## 2022-03-02 ASSESSMENT — ENCOUNTER SYMPTOMS
DIARRHEA: 0
SHORTNESS OF BREATH: 0
HEARTBURN: 0
BACK PAIN: 0
DOUBLE VISION: 0
WHEEZING: 0
VOMITING: 0
ORTHOPNEA: 0
BLOOD IN STOOL: 0
COUGH: 0
CONSTIPATION: 0
BLURRED VISION: 0
SORE THROAT: 0
ABDOMINAL PAIN: 0
NAUSEA: 0
SPUTUM PRODUCTION: 0

## 2022-03-02 ASSESSMENT — PAIN SCALES - GENERAL: PAINLEVEL_OUTOF10: 4

## 2022-03-02 ASSESSMENT — PAIN DESCRIPTION - DESCRIPTORS: DESCRIPTORS: ACHING

## 2022-03-02 ASSESSMENT — PAIN DESCRIPTION - LOCATION: LOCATION: LEG

## 2022-03-02 ASSESSMENT — PAIN DESCRIPTION - PAIN TYPE: TYPE: CHRONIC PAIN

## 2022-03-02 ASSESSMENT — PAIN DESCRIPTION - ORIENTATION: ORIENTATION: LEFT

## 2022-03-02 ASSESSMENT — PAIN - FUNCTIONAL ASSESSMENT: PAIN_FUNCTIONAL_ASSESSMENT: PREVENTS OR INTERFERES SOME ACTIVE ACTIVITIES AND ADLS

## 2022-03-02 ASSESSMENT — PAIN DESCRIPTION - FREQUENCY: FREQUENCY: INTERMITTENT

## 2022-03-02 ASSESSMENT — PAIN DESCRIPTION - ONSET: ONSET: ON-GOING

## 2022-03-02 ASSESSMENT — PAIN DESCRIPTION - PROGRESSION: CLINICAL_PROGRESSION: NOT CHANGED

## 2022-03-02 NOTE — PLAN OF CARE
Problem: Venous:  Goal: Signs of wound healing will improve  Description: Signs of wound healing will improve  Outcome: Met This Shift

## 2022-03-02 NOTE — PROGRESS NOTES
Jake Luke is a 61 y.o. female who presents today for     Chief Complaint   Patient presents with    Headache    Medication Refill       She has been treated for GERD and migraine/cluster headaches. She continues to experience anxiety frequently; hydroxyzine helps. Headache  Patient with a history of  headache. Symptoms began about several years ago. Generally, the headaches last about several hours and occur several times per week. The headaches do not seem to be related to any time of day or year. The headaches are usually moderate, dull, sharp and throbbing and are located in global scalp/head. The patient rates her most severe headaches a 8 on a scale from 1 to 10. Recently, the headaches have been stable. Work attendance or other daily activities are not affected by the headaches. Precipitating factors include: cold temperatures, light, stress and weather changes. The headaches are usually not preceded by an aura. Associated neurologic symptoms: vomiting in the early morning. The patient denies decreased physical activity, depression, dizziness, loss of balance, muscle weakness, numbness of extremities, speech difficulties, vision problems and worsening school/work performance. Home treatment has included acetaminophen, amitriptyline, fioricet, Imitrex oral and Tylenol with codeine, darkening the room, resting and sleeping with no improvement. Other history includes: headaches of unknown type diagnosed in the past. Family history includes no known family members with significant headaches. Fioricet provides the most relief from this medication as opposed to the multiple trial of other medication in the past.  Maximum use would be TID. States she takes it couple times a week. GERD  Paitent complains of heartburn. This has been associated with abdominal bloating, bilious reflux, early satiety, midespigastric pain, upper abdominal discomfort and vomiting.   She denies belching, belching and eructation, chest pain, choking on food, cough, difficulty swallowing, dysphagia, heartburn, hematemesis, hoarseness, laryngitis, melena, need to clear throat frequently, nocturnal burning, odynophagia, shortness of breath, symptoms primarily relate to meals, and lying down after meals, unexpected weight loss and wheezing. Symptoms have been present for several years. She denies dysphagia. She denies melena, hematochezia, hematemesis, and coffee ground emesis. Medical therapy in the past has included antacids, H2 antagonists and proton pump inhibitors. Patient's primary symptoms are vomiting of acidic material.  She has S/P UEGD 6/15; H. Pylori reported at that time to be negative. The only medication that seems to be effective to somewhat controlling her symptoms is omeprazole and sodium bicarbonate. She does continue to consume peppermint candy. Symptoms have been well controlled until the day after Super Bowl Sunday. ..she became very symptomatic after eating pizza. ... As long as she is mindful of what/how she eats, she is fine    She is on long term PPI. Herpes Suppression:  On acyclovir. Still taking daily without any recent flare ups. Anxiety:  Main symptom is anxious mood and difficulty sleeping. She has had a lot of stress, including illness and passing of her mother and recent severe illness of her boyfriend, who is still in subacute rehab. She reports that hydroxyzine does seem to work well. After further discussion, agreed to take hydroxyzine 25 mg twice daily as needed anxiety    Health Maintenance:  Rosemary Sunshine is due for breast cancer screening. Still has not scheduled her mammogram.  Got 1 dose of the J&J since last visit    [address] [place] [address]:  Patient's past medical, surgical, social and/or family history reviewed, updated in chart, and are non-contributory (unless otherwise stated). Medications and allergies also reviewed and updated in chart.       Review of Systems  Review of Systems   Constitutional: Negative for malaise/fatigue and weight loss. HENT: Negative for congestion, ear pain and sore throat. Eyes: Negative for blurred vision and double vision. Respiratory: Negative for cough, sputum production, shortness of breath and wheezing. Cardiovascular: Negative for chest pain, palpitations, orthopnea and leg swelling. Gastrointestinal: Negative for abdominal pain, blood in stool, constipation, diarrhea, heartburn, nausea and vomiting. Genitourinary: Negative for dysuria, frequency, hematuria and urgency. Musculoskeletal: Negative for back pain, joint pain, myalgias and neck pain. 8/24/21: Actively treated for left LE venous insufficiency ulcer   Skin: Negative for itching and rash. Neurological: Positive for headaches. Negative for dizziness, tingling, focal weakness and weakness. Psychiatric/Behavioral: Negative for depression, memory loss and substance abuse. The patient is not nervous/anxious and does not have insomnia. Physical Exam:    VS:    BP (!) 142/82   Pulse 73   Temp 97.7 °F (36.5 °C) (Infrared)   Resp 16   Ht 5' 8\" (1.727 m)   Wt 180 lb (81.6 kg)   SpO2 97%   BMI 27.37 kg/m²   LAST WEIGHT:  Wt Readings from Last 3 Encounters:   03/02/22 180 lb (81.6 kg)   03/02/22 165 lb (74.8 kg)   02/23/22 165 lb (74.8 kg)     BMI Readings from Last 3 Encounters:   03/02/22 27.37 kg/m²   03/02/22 25.09 kg/m²   02/23/22 25.09 kg/m²       Physical Exam  Constitutional:       General: She is not in acute distress. Appearance: She is well-developed. She is not diaphoretic. HENT:      Head: Normocephalic and atraumatic. Right Ear: External ear normal.      Left Ear: External ear normal.      Mouth/Throat:      Pharynx: No oropharyngeal exudate. Eyes:      General: No scleral icterus. Right eye: No discharge. Conjunctiva/sclera: Conjunctivae normal.      Pupils: Pupils are equal, round, and reactive to light.    Neck: Thyroid: No thyromegaly. Cardiovascular:      Rate and Rhythm: Normal rate and regular rhythm. Heart sounds: Normal heart sounds. No murmur heard. Pulmonary:      Effort: Pulmonary effort is normal. No respiratory distress. Breath sounds: No stridor. No wheezing or rales. Chest:      Chest wall: No tenderness. Abdominal:      General: Bowel sounds are normal. There is no distension. Palpations: Abdomen is soft. There is no mass. Tenderness: There is no abdominal tenderness. There is no guarding. Musculoskeletal:         General: No tenderness. Normal range of motion. Cervical back: Normal range of motion and neck supple. Lymphadenopathy:      Cervical: No cervical adenopathy. Skin:     General: Skin is warm and dry. Coloration: Skin is not pale. Findings: No erythema or rash. Neurological:      Mental Status: She is alert and oriented to person, place, and time. Psychiatric:         Behavior: Behavior normal.         Thought Content: Thought content normal.         Labs:  Lab Results   Component Value Date     08/20/2019    K 4.0 08/20/2019     08/20/2019    CO2 24 08/20/2019    BUN 15 08/20/2019    CREATININE 0.8 08/20/2019    PROT 7.2 08/20/2019    LABALBU 4.2 08/20/2019    CALCIUM 9.4 08/20/2019    GFRAA >60 08/20/2019    LABGLOM >60 08/20/2019    GLUCOSE 87 08/20/2019    AST 20 08/20/2019    ALT 18 08/20/2019    ALKPHOS 95 08/20/2019    BILITOT <0.2 08/20/2019         Assessment / Plan:      Donette Sever was seen today for headache and medication refill. Diagnoses and all orders for this visit:    Encounter for medication refill  -     butalbital-acetaminophen-caffeine (FIORICET, ESGIC) -40 MG per tablet; Take 1 tablet by mouth 3 times daily as needed for Headaches or Migraine Indications: Cluster Headache  -     omeprazole (PRILOSEC) 40 MG delayed release capsule;  Take 1 capsule by mouth daily    Chronic cluster headache, not intractable: diagnosis, including possible risks and complications,  especially if left uncontrolled. Counseled regarding the possible side effects, risks, benefits and alternatives to treatment; patient and/or guardian verbalizes understanding, agrees, feels comfortable with and wishes to proceed with above treatment plan. Advised patient to call with any new medication issues, and read all Rx info from pharmacy to assure aware of all possible risks and side effects of medication before taking. Reviewed age and gender appropriate health screening exams and vaccinations. Advised patient regarding importance of keeping up with recommended health maintenance and to schedule as soon as possible if overdue, as this is important in assessing for undiagnosed pathology, especially cancer, as well as protecting against potentially harmful/life threatening disease. Patient and/or guardian verbalizes understanding and agrees with above counseling, assessment and plan. All questions answered.     Dara Marcus MD

## 2022-03-02 NOTE — PROGRESS NOTES
Wound Healing Center Followup Visit Note    Referring Physician : Maribel Deleon MD  [address] [place] [address] Road RECORD NUMBER:  [id number]  AGE: 61 y.o. GENDER: female  : 1955  EPISODE DATE:  3/2/2022    Subjective:     Chief Complaint   Patient presents with    Wound Check     Left leg      HISTORY of PRESENT ILLNESS HPI   Erwin Reveles is a 61 y.o. female who presents today in regards to follow up evaluation and treatment of wound/ulcer. That patient's past medical, family and social hx were reviewed and changes were made if present. History of Wound Context:  The patient has had a wound of her left ankle/calf which was first noted approximately 2021. This has been treated local wound care. On their initial visit to the wound healing center, 22,  the patient has noted that the wound has been improving. The patient has not had similar previous wounds in the past.      She started seeing Dr. Rena Sarabia in 2021 and than Dr. Abilio Ledezma. She was started in [facility] ~ 2021. She has noticed some improvement since starting [facility] boot. She is currently following with Dr. Patrice Still. Pt is not on abx at time of initial visit, but has been treated with previously by podiatry. She is not a DM. She is not a smoker. She denies hx of DVT, and per her report had recent us noting no evidence of dvt at [facility]. She also had arterial studies done. I had previously seen her in the past in regards to left buttock thigh wound, which started as abscess. Pt works at [employer] in [place] [employer] [facility] [employer] [facility] and is on her feet all day.     22  · Reflux study - if significant findings will schedule for fu  · Continue compression therapy unna boot per podiatry with aquacell dressing  · Elevation  · She does not have significant arterial occlusive disease  22  · Patient has asked to continuing following with me going forward because of our previous relationship  · She is going to let Dr. Wanda Ambrosio office know  · She tolerated unna boot and aquacell - some improvement  216/22  · Appearance improved, slightly larger  · Consider drawtek next week but overall drainage seems reasonably managed as periwound appears ok  2/23/2022  · The wound, has some exudate, no recent cultures were done, will do wound cultures today  3/2/22  · Reflux study reviewed - no significant reflux  · Will treat culture - augmentin 875 mg bid x 10 days - script sent  · More drainage - stable size    Wound/Ulcer Pain Timing/Severity: constant, moderate  Quality of pain: aching, throbbing, tender  Severity:  5 / 10   Modifying Factors: Pain worsens with dressing changes, debridement  Associated Signs/Symptoms: edema, drainage and pain    Ulcer Identification:  Ulcer Type: venous  Contributing Factors: edema and venous stasis    Diabetic/Pressure/Non Pressure Ulcers only:  Ulcer: Non-Pressure ulcer, fat layer exposed        PAST MEDICAL HISTORY      Diagnosis Date    Dizziness - light-headed     GERD (gastroesophageal reflux disease)     Herpes dermatitis 4/27/2017    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 Bailey Handy, office     Family History   Problem Relation Age of Onset    Diabetes Mother     Hypertension Mother     Heart Disease Father     Heart Attack Father     Heart Surgery Father         angioplasty    Stroke Father     High Cholesterol Father     Heart Attack Maternal Grandfather      Social History     Tobacco Use    Smoking status: Never Smoker    Smokeless tobacco: Never Used   Vaping Use    Vaping Use: Never used   Substance Use Topics    Alcohol use: No    Drug use: No     Allergies   Allergen Reactions    Bee Pollen Anaphylaxis    Tetracyclines & Related Hives     Current Outpatient Medications on File Prior to Encounter   Medication Sig Dispense Refill    butalbital-acetaminophen-caffeine (FIORICET, ESGIC) -40 MG per tablet Take 1 tablet by mouth 3 times daily as needed for Headaches or Migraine Indications: Cluster Headache 90 tablet 5    EPINEPHrine (EPIPEN) 0.3 MG/0.3ML SOAJ injection Use as directed for allergic reaction 1 each 5    omeprazole (PRILOSEC) 40 MG delayed release capsule Take 1 capsule by mouth daily 90 capsule 3    aspirin-acetaminophen-caffeine (EXCEDRIN MIGRAINE) 250-250-65 MG per tablet Take 1 tablet by mouth as needed for Headaches 90 tablet 11     No current facility-administered medications on file prior to encounter.        REVIEW OF SYSTEMS See HPI    Objective:    BP (!) 162/82   Pulse 72   Temp 96.4 °F (35.8 °C) (Tympanic)   Resp 18   Wt 165 lb (74.8 kg)   BMI 25.09 kg/m²   Wt Readings from Last 3 Encounters:   03/02/22 165 lb (74.8 kg)   02/23/22 165 lb (74.8 kg)   02/16/22 165 lb (74.8 kg)     PHYSICAL EXAM  CONSTITUTIONAL:   Awake, alert, cooperative   EYES:  lids and lashes normal   ENT: external ears and nose without lesions   NECK:  supple, symmetrical, trachea midline   SKIN:  Open wound Present    Assessment:     Problem List Items Addressed This Visit     * (Principal) Venous stasis ulcer of left ankle with fat layer exposed with varicose veins (HCC) - Primary (Chronic)    Relevant Orders    Initiate Outpatient Wound Care Protocol          Pre Debridement Measurements:  Are located in the [place]  Documentation Flow Sheet  Post Debridement Measurements:  Wound/Ulcer Descriptions are Pre Debridement except measurements:     Wound 08/10/16 Other (Comment) Buttocks Left;Distal #2 acq 8/4/16 (Active)   Number of days: 2029       Wound 08/31/16 Buttocks Left;Proximal #3 aquired 8-27-26 (Active)   Number of days: 2009       Incision 04/08/14 Abdomen (Active)   Number of days: 2884       Wound 02/02/22 Ankle Left;Medial #1 (Active)   Wound Image   03/02/22 0742   Dressing Status New dressing applied 02/23/22 0826   Wound Cleansed Cleansed with saline 02/23/22 0826   Dressing/Treatment Alginate with Ag;ABD 02/23/22 0826   Offloading for Diabetic Foot Ulcers Offloading not required 02/23/22 0826   Wound Length (cm) 7.8 cm 03/02/22 0742   Wound Width (cm) 4.4 cm 03/02/22 0742   Wound Depth (cm) 0.2 cm 03/02/22 0742   Wound Surface Area (cm^2) 34.32 cm^2 03/02/22 0742   Change in Wound Size % (l*w) -26.83 03/02/22 0742   Wound Volume (cm^3) 6.864 cm^3 03/02/22 0742   Wound Healing % -154 03/02/22 0742   Post-Procedure Length (cm) 7.8 cm 03/02/22 0815   Post-Procedure Width (cm) 4.6 cm 03/02/22 0815   Post-Procedure Depth (cm) 0.2 cm 03/02/22 0815   Post-Procedure Surface Area (cm^2) 35.88 cm^2 03/02/22 0815   Post-Procedure Volume (cm^3) 7.176 cm^3 03/02/22 0815   Wound Assessment Fibrin;Pink/red 03/02/22 0742   Drainage Amount Large 03/02/22 0742   Drainage Description Serosanguinous;Brown 03/02/22 0742   Odor None 03/02/22 0742   Melany-wound Assessment Blanchable erythema 03/02/22 0742   Number of days: 28          Procedure Note  Indications:  Based on my examination of this patient's wound(s)/ulcer(s) today, debridement is required to promote healing and evaluate the wound base. Performed by: Venu Boyd MD    Consent obtained:  Yes    Time out taken:  Yes    Pain Control: Anesthetic  Anesthetic: 4% Lidocaine Liquid Topical     Debridement:Excisional Debridement    Using curette the wound(s)/ulcer(s) was/were sharply debrided down through and including the removal of epidermis, dermis and subcutaneous tissue.         Devitalized Tissue Debrided:  fibrin, biofilm, slough and exudate to stimulate bleeding to promote healing, post debridement good bleeding base and wound edges noted    Wound/Ulcer #: 1    Percent of Wound/Ulcer Debrided: 50%    Total Surface Area Debrided:  20 sq cm     Estimated Blood Loss:  Minimal  Hemostasis Achieved:  by pressure    Procedural Pain:  7 / 10   Post Procedural Pain:  5 / 10     Response to treatment:  With complaints of pain. Plan:   Treatment Note please see attached Discharge Instructions    Written patient dismissal instructions given to patient and signed by patient or POA. Discharge Instructions       Visit Discharge/Physician Orders     Discharge condition: Stable     Assessment of pain at discharge: yes     Anesthetic used: 4% lidocaine solution     Discharge to: Home     Left via:Private automobile     Accompanied by:self     ECF/HHA: n/a     Dressing Orders:LEFT MEDIAL LOWER LEG-Cleanse with normal saline, apply drawtex, ABD pad and unna boot change weekly.     Treatment Orders: Eat a diet high in protein and vitamin C. Take a multiple vitamin daily unless contraindicated.      Start augmenten as ordered for 10 days    [address] [facility] followup visit : 1 week_____________________________  (Please note your next appointment above and if you are unable to keep, kindly give a 24 hour notice.  Thank you.)     Physician signature:__________________________     If you experience any of the following, please call the [email or web address] during business hours:     * Increase in Pain  * Temperature over 101  * Increase in drainage from your wound  * Drainage with a foul odor  * Bleeding  * Increase in swelling  * Need for compression bandage changes due to slippage, breakthrough drainage.     If you need medical attention outside of the business hours of the [email or web address] please contact your PCP or go to the nearest emergency room.                    Electronically signed by Melanie Hawthorne MD on 3/2/2022 at 8:18 AM

## 2022-03-09 ENCOUNTER — HOSPITAL ENCOUNTER (OUTPATIENT)
Dept: WOUND CARE | Age: 67
Discharge: HOME | End: 2022-03-09
Payer: MEDICAID

## 2022-03-09 VITALS
DIASTOLIC BLOOD PRESSURE: 86 MMHG | BODY MASS INDEX: 27.37 KG/M2 | RESPIRATION RATE: 18 BRPM | SYSTOLIC BLOOD PRESSURE: 180 MMHG | TEMPERATURE: 97.2 F | WEIGHT: 180 LBS | HEART RATE: 82 BPM

## 2022-03-09 DIAGNOSIS — L97.322 VENOUS STASIS ULCER OF LEFT ANKLE WITH FAT LAYER EXPOSED WITH VARICOSE VEINS (HCC): Primary | ICD-10-CM

## 2022-03-09 DIAGNOSIS — I83.023 VENOUS STASIS ULCER OF LEFT ANKLE WITH FAT LAYER EXPOSED WITH VARICOSE VEINS (HCC): Primary | ICD-10-CM

## 2022-03-09 PROCEDURE — 11042 DBRDMT SUBQ TIS 1ST 20SQCM/<: CPT | Performed by: SURGERY

## 2022-03-09 PROCEDURE — 6370000000 HC RX 637 (ALT 250 FOR IP): Performed by: SURGERY

## 2022-03-09 PROCEDURE — 11042 DBRDMT SUBQ TIS 1ST 20SQCM/<: CPT

## 2022-03-09 RX ORDER — BACITRACIN ZINC AND POLYMYXIN B SULFATE 500; 1000 [USP'U]/G; [USP'U]/G
OINTMENT TOPICAL ONCE
Status: CANCELLED | OUTPATIENT
Start: 2022-03-09 | End: 2022-03-09

## 2022-03-09 RX ORDER — BACITRACIN, NEOMYCIN, POLYMYXIN B 400; 3.5; 5 [USP'U]/G; MG/G; [USP'U]/G
OINTMENT TOPICAL ONCE
Status: CANCELLED | OUTPATIENT
Start: 2022-03-09 | End: 2022-03-09

## 2022-03-09 RX ORDER — LIDOCAINE HYDROCHLORIDE 40 MG/ML
SOLUTION TOPICAL ONCE
Status: CANCELLED | OUTPATIENT
Start: 2022-03-09 | End: 2022-03-09

## 2022-03-09 RX ORDER — CLOBETASOL PROPIONATE 0.5 MG/G
OINTMENT TOPICAL ONCE
Status: CANCELLED | OUTPATIENT
Start: 2022-03-09 | End: 2022-03-09

## 2022-03-09 RX ORDER — LIDOCAINE HYDROCHLORIDE 20 MG/ML
JELLY TOPICAL ONCE
Status: CANCELLED | OUTPATIENT
Start: 2022-03-09 | End: 2022-03-09

## 2022-03-09 RX ORDER — GINSENG 100 MG
CAPSULE ORAL ONCE
Status: CANCELLED | OUTPATIENT
Start: 2022-03-09 | End: 2022-03-09

## 2022-03-09 RX ORDER — LIDOCAINE 40 MG/G
CREAM TOPICAL ONCE
Status: CANCELLED | OUTPATIENT
Start: 2022-03-09 | End: 2022-03-09

## 2022-03-09 RX ORDER — BETAMETHASONE DIPROPIONATE 0.05 %
OINTMENT (GRAM) TOPICAL ONCE
Status: CANCELLED | OUTPATIENT
Start: 2022-03-09 | End: 2022-03-09

## 2022-03-09 RX ORDER — LIDOCAINE 50 MG/G
OINTMENT TOPICAL ONCE
Status: CANCELLED | OUTPATIENT
Start: 2022-03-09 | End: 2022-03-09

## 2022-03-09 RX ORDER — GENTAMICIN SULFATE 1 MG/G
OINTMENT TOPICAL ONCE
Status: CANCELLED | OUTPATIENT
Start: 2022-03-09 | End: 2022-03-09

## 2022-03-09 RX ORDER — LIDOCAINE HYDROCHLORIDE 40 MG/ML
SOLUTION TOPICAL ONCE
Status: COMPLETED | OUTPATIENT
Start: 2022-03-09 | End: 2022-03-09

## 2022-03-09 RX ADMIN — LIDOCAINE HYDROCHLORIDE 10 ML: 40 SOLUTION TOPICAL at 08:05

## 2022-03-09 ASSESSMENT — PAIN DESCRIPTION - PAIN TYPE: TYPE: CHRONIC PAIN

## 2022-03-09 ASSESSMENT — PAIN DESCRIPTION - ORIENTATION: ORIENTATION: LEFT

## 2022-03-09 ASSESSMENT — PAIN DESCRIPTION - LOCATION: LOCATION: LEG

## 2022-03-09 ASSESSMENT — PAIN DESCRIPTION - DESCRIPTORS: DESCRIPTORS: ACHING

## 2022-03-09 ASSESSMENT — PAIN SCALES - GENERAL: PAINLEVEL_OUTOF10: 4

## 2022-03-09 NOTE — PLAN OF CARE
Problem: Wound:  Goal: Will show signs of wound healing; wound closure and no evidence of infection  Description: Will show signs of wound healing; wound closure and no evidence of infection  Outcome: Ongoing     Problem: Venous:  Goal: Signs of wound healing will improve  Description: Signs of wound healing will improve  Outcome: Met This Shift

## 2022-03-09 NOTE — PROGRESS NOTES
Wound Healing Center Followup Visit Note    Referring Physician : Micki Allen MD  [address] [place] [address] RECORD NUMBER:  [id number]  AGE: 61 y.o. GENDER: female  : 1955  EPISODE DATE:  3/9/2022    Subjective:     Chief Complaint   Patient presents with    Wound Check     Left leg      HISTORY of PRESENT ILLNESS HPI   Merian Bloch is a 61 y.o. female who presents today in regards to follow up evaluation and treatment of wound/ulcer. That patient's past medical, family and social hx were reviewed and changes were made if present. History of Wound Context:  The patient has had a wound of her left ankle/calf which was first noted approximately 2021. This has been treated local wound care. On their initial visit to the wound healing center, 22,  the patient has noted that the wound has been improving. The patient has not had similar previous wounds in the past.      She started seeing Dr. Presley Cunningham in 2021 and than Dr. Lidya Turner. She was started in [facility] ~ 2021. She has noticed some improvement since starting unna boot. She is currently following with Dr. Leena Gold. Pt is not on abx at time of initial visit, but has been treated with previously by podiatry. She is not a DM. She is not a smoker. She denies hx of DVT, and per her report had recent us noting no evidence of dvt at [facility]. She also had arterial studies done. I had previously seen her in the past in regards to left buttock thigh wound, which started as abscess. Pt works at [employer] in [facility] and is on her feet all day.     22  · Reflux study - if significant findings will schedule for fu  · Continue compression therapy [facility] bo per podiatry with aquacell dressing  · Elevation  · She does not have significant arterial occlusive disease  22  · Patient has asked to continuing following with me going forward because of our previous relationship  · She is going to let Dr. Ko Car office know  · She tolerated unna boot and aquacell - some improvement  216/22  · Appearance improved, slightly larger  · Consider drawtek next week but overall drainage seems reasonably managed as periwound appears ok  2/23/2022  · The wound, has some exudate, no recent cultures were done, will do wound cultures today  3/2/22  · Reflux study reviewed - no significant reflux  · Will treat culture - augmentin 875 mg bid x 10 days - script sent  · More drainage - stable size  3/9/22  · Wound appearance improved, stable in size  · drawtek    Wound/Ulcer Pain Timing/Severity: constant, moderate  Quality of pain: aching, throbbing, tender  Severity:  5 / 10   Modifying Factors: Pain worsens with dressing changes, debridement  Associated Signs/Symptoms: edema, drainage and pain    Ulcer Identification:  Ulcer Type: venous  Contributing Factors: edema and venous stasis    Diabetic/Pressure/Non Pressure Ulcers only:  Ulcer: Non-Pressure ulcer, fat layer exposed        PAST MEDICAL HISTORY      Diagnosis Date    Dizziness - light-headed     GERD (gastroesophageal reflux disease)     Herpes dermatitis 4/27/2017    Insomnia secondary to anxiety 4/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 Tj Stanley, office     Family History   Problem Relation Age of Onset    Diabetes Mother     Hypertension Mother     Heart Disease Father     Heart Attack Father     Heart Surgery Father         angioplasty    Stroke Father     High Cholesterol Father     Heart Attack Maternal Grandfather Social History     Tobacco Use    Smoking status: Never Smoker    Smokeless tobacco: Never Used   Vaping Use    Vaping Use: Never used   Substance Use Topics    Alcohol use: No    Drug use: No     Allergies   Allergen Reactions    Bee Pollen Anaphylaxis    Tetracyclines & Related Hives     Current Outpatient Medications on File Prior to Encounter   Medication Sig Dispense Refill    amoxicillin-clavulanate (AUGMENTIN) 875-125 MG per tablet Take 1 tablet by mouth 2 times daily for 10 days 20 tablet 0    omeprazole (PRILOSEC) 40 MG delayed release capsule Take 1 capsule by mouth daily 90 capsule 3    hydrOXYzine (ATARAX) 25 MG tablet take 1 tablet BID 60 tablet 5    acyclovir (ZOVIRAX) 400 MG tablet take 1 tablet by mouth once daily 90 tablet 3    butalbital-acetaminophen-caffeine (FIORICET, ESGIC) -40 MG per tablet Take 1 tablet by mouth 3 times daily as needed for Headaches or Migraine Indications: Cluster Headache 90 tablet 5    aspirin-acetaminophen-caffeine (EXCEDRIN MIGRAINE) 250-250-65 MG per tablet Take 1 tablet by mouth as needed for Headaches 300 tablet 3    EPINEPHrine (EPIPEN) 0.3 MG/0.3ML SOAJ injection Use as directed for allergic reaction 1 each 5     No current facility-administered medications on file prior to encounter.        REVIEW OF SYSTEMS See HPI    Objective:    BP (!) 180/86   Pulse 82   Temp 97.2 °F (36.2 °C) (Tympanic)   Resp 18   Wt 180 lb (81.6 kg)   BMI 27.37 kg/m²   Wt Readings from Last 3 Encounters:   03/09/22 180 lb (81.6 kg)   03/02/22 165 lb (74.8 kg)   03/02/22 180 lb (81.6 kg)     PHYSICAL EXAM  CONSTITUTIONAL:   Awake, alert, cooperative   EYES:  lids and lashes normal   ENT: external ears and nose without lesions   NECK:  supple, symmetrical, trachea midline   SKIN:  Open wound Present    Assessment:     Problem List Items Addressed This Visit     Venous stasis ulcer of left ankle with fat layer exposed with varicose veins (HCC) - Primary (Chronic) Relevant Orders    Initiate Outpatient Wound Care Protocol          Pre Debridement Measurements:  Are located in the [facility]  Documentation Flow Sheet  Post Debridement Measurements:  Wound/Ulcer Descriptions are Pre Debridement except measurements:     Wound 08/10/16 Other (Comment) Buttocks Left;Distal #2 acq 8/4/16 (Active)   Number of days: 2037       Wound 08/31/16 Buttocks Left;Proximal #3 aquired 8-27-26 (Active)   Number of days: 2016       Incision 04/08/14 Abdomen (Active)   Number of days: 9545       Wound 02/02/22 Ankle Left;Medial #1 (Active)   Wound Image   03/02/22 0742   Dressing Status New dressing applied 03/02/22 0827   Wound Cleansed Cleansed with saline 03/02/22 0827   Dressing/Treatment ABD 03/02/22 0827   Offloading for Diabetic Foot Ulcers Offloading not required 03/02/22 0827   Wound Length (cm) 8.4 cm 03/09/22 0801   Wound Width (cm) 4.7 cm 03/09/22 0801   Wound Depth (cm) 0.2 cm 03/09/22 0801   Wound Surface Area (cm^2) 39.48 cm^2 03/09/22 0801   Change in Wound Size % (l*w) -45.9 03/09/22 0801   Wound Volume (cm^3) 7.896 cm^3 03/09/22 0801   Wound Healing % -192 03/09/22 0801   Post-Procedure Length (cm) 8.6 cm 03/09/22 0859   Post-Procedure Width (cm) 4.8 cm 03/09/22 0859   Post-Procedure Depth (cm) 0.2 cm 03/09/22 0859   Post-Procedure Surface Area (cm^2) 41.28 cm^2 03/09/22 0859   Post-Procedure Volume (cm^3) 8. 256 cm^3 03/09/22 0859   Wound Assessment Fibrin;Slough 03/09/22 0801   Drainage Amount Large 03/09/22 0801   Drainage Description Serosanguinous; Yellow 03/09/22 0801   Odor None 03/09/22 0801   Melany-wound Assessment Blanchable erythema;Dry/flaky 03/09/22 0801   Number of days: 35          Procedure Note  Indications:  Based on my examination of this patient's wound(s)/ulcer(s) today, debridement is required to promote healing and evaluate the wound base.     Performed by: Dorinda Cote MD    Consent obtained:  Yes    Time out taken:  Yes    Pain Control: Anesthetic  Anesthetic: 4% Lidocaine Liquid Topical     Debridement:Excisional Debridement    Using curette the wound(s)/ulcer(s) was/were sharply debrided down through and including the removal of epidermis, dermis and subcutaneous tissue. Devitalized Tissue Debrided:  fibrin, biofilm, slough and exudate to stimulate bleeding to promote healing, post debridement good bleeding base and wound edges noted    Wound/Ulcer #: 1    Percent of Wound/Ulcer Debrided: 50%    Total Surface Area Debrided:  20 sq cm     Estimated Blood Loss:  Minimal  Hemostasis Achieved:  by pressure    Procedural Pain:  7  / 10   Post Procedural Pain:  5 / 10     Response to treatment:  With complaints of pain. Plan:   Treatment Note please see attached Discharge Instructions    Written patient dismissal instructions given to patient and signed by patient or POA. Discharge Instructions        Visit Discharge/Physician Orders     Discharge condition: Stable     Assessment of pain at discharge: yes     Anesthetic used: 4% lidocaine solution     Discharge to: Home     Left via:Private automobile     Accompanied by:self     ECF/HHA: n/a     Dressing Orders:LEFT MEDIAL LOWER LEG-Cleanse with normal saline, apply drawtex, ABD pad and unna boot change weekly.     Treatment Orders: Eat a diet high in protein and vitamin C. Take a multiple vitamin daily unless contraindicated.      Start augmenten as ordered for 10 days     [facility] followup visit : 1 week_____________________________  (Please note your next appointment above and if you are unable to keep, kindly give a 24 hour notice.  Thank you.)     Physician signature:__________________________     If you experience any of the following, please call the [address] [place] [address] during business hours:     * Increase in Pain  * Temperature over 101  * Increase in drainage from your wound  * Drainage with a foul odor  * Bleeding  * Increase in swelling  * Need for compression bandage changes due to slippage, breakthrough drainage.     If you need medical attention outside of the business hours of the [address] [place] [address] please contact your PCP or go to the nearest emergency room.                        Electronically signed by Maryanne Haq MD on 3/9/2022 at 9:03 AM

## 2022-03-11 ASSESSMENT — PAIN DESCRIPTION - PAIN TYPE: TYPE: NEUROPATHIC PAIN

## 2022-03-16 ENCOUNTER — HOSPITAL ENCOUNTER (OUTPATIENT)
Dept: WOUND CARE | Age: 67
Discharge: HOME | End: 2022-03-16
Payer: MEDICAID

## 2022-03-16 VITALS
TEMPERATURE: 97.4 F | SYSTOLIC BLOOD PRESSURE: 142 MMHG | HEART RATE: 84 BPM | WEIGHT: 180 LBS | HEIGHT: 68 IN | BODY MASS INDEX: 27.28 KG/M2 | DIASTOLIC BLOOD PRESSURE: 84 MMHG | RESPIRATION RATE: 18 BRPM

## 2022-03-16 DIAGNOSIS — I83.023 VENOUS STASIS ULCER OF LEFT ANKLE WITH FAT LAYER EXPOSED WITH VARICOSE VEINS (HCC): Primary | ICD-10-CM

## 2022-03-16 DIAGNOSIS — L97.322 VENOUS STASIS ULCER OF LEFT ANKLE WITH FAT LAYER EXPOSED WITH VARICOSE VEINS (HCC): Primary | ICD-10-CM

## 2022-03-16 PROCEDURE — 87077 CULTURE AEROBIC IDENTIFY: CPT

## 2022-03-16 PROCEDURE — 11042 DBRDMT SUBQ TIS 1ST 20SQCM/<: CPT | Performed by: SURGERY

## 2022-03-16 PROCEDURE — 87205 SMEAR GRAM STAIN: CPT

## 2022-03-16 PROCEDURE — 11045 DBRDMT SUBQ TISS EACH ADDL: CPT | Performed by: SURGERY

## 2022-03-16 PROCEDURE — 11042 DBRDMT SUBQ TIS 1ST 20SQCM/<: CPT

## 2022-03-16 PROCEDURE — 87186 SC STD MICRODIL/AGAR DIL: CPT

## 2022-03-16 PROCEDURE — 87070 CULTURE OTHR SPECIMN AEROBIC: CPT

## 2022-03-16 PROCEDURE — 87075 CULTR BACTERIA EXCEPT BLOOD: CPT

## 2022-03-16 PROCEDURE — 11045 DBRDMT SUBQ TISS EACH ADDL: CPT

## 2022-03-16 RX ORDER — GENTAMICIN SULFATE 1 MG/G
OINTMENT TOPICAL ONCE
Status: CANCELLED | OUTPATIENT
Start: 2022-03-16 | End: 2022-03-16

## 2022-03-16 RX ORDER — BETAMETHASONE DIPROPIONATE 0.05 %
OINTMENT (GRAM) TOPICAL ONCE
Status: CANCELLED | OUTPATIENT
Start: 2022-03-16 | End: 2022-03-16

## 2022-03-16 RX ORDER — LIDOCAINE 40 MG/G
CREAM TOPICAL ONCE
Status: CANCELLED | OUTPATIENT
Start: 2022-03-16 | End: 2022-03-16

## 2022-03-16 RX ORDER — BACITRACIN, NEOMYCIN, POLYMYXIN B 400; 3.5; 5 [USP'U]/G; MG/G; [USP'U]/G
OINTMENT TOPICAL ONCE
Status: CANCELLED | OUTPATIENT
Start: 2022-03-16 | End: 2022-03-16

## 2022-03-16 RX ORDER — LIDOCAINE HYDROCHLORIDE 40 MG/ML
SOLUTION TOPICAL ONCE
Status: COMPLETED | OUTPATIENT
Start: 2022-03-16 | End: 2022-03-16

## 2022-03-16 RX ORDER — LIDOCAINE HYDROCHLORIDE 20 MG/ML
JELLY TOPICAL ONCE
Status: CANCELLED | OUTPATIENT
Start: 2022-03-16 | End: 2022-03-16

## 2022-03-16 RX ORDER — LIDOCAINE HYDROCHLORIDE 40 MG/ML
SOLUTION TOPICAL ONCE
Status: CANCELLED | OUTPATIENT
Start: 2022-03-16 | End: 2022-03-16

## 2022-03-16 RX ORDER — LIDOCAINE 50 MG/G
OINTMENT TOPICAL ONCE
Status: CANCELLED | OUTPATIENT
Start: 2022-03-16 | End: 2022-03-16

## 2022-03-16 RX ORDER — CLOBETASOL PROPIONATE 0.5 MG/G
OINTMENT TOPICAL ONCE
Status: CANCELLED | OUTPATIENT
Start: 2022-03-16 | End: 2022-03-16

## 2022-03-16 RX ORDER — GINSENG 100 MG
CAPSULE ORAL ONCE
Status: CANCELLED | OUTPATIENT
Start: 2022-03-16 | End: 2022-03-16

## 2022-03-16 RX ORDER — BACITRACIN ZINC AND POLYMYXIN B SULFATE 500; 1000 [USP'U]/G; [USP'U]/G
OINTMENT TOPICAL ONCE
Status: CANCELLED | OUTPATIENT
Start: 2022-03-16 | End: 2022-03-16

## 2022-03-16 RX ADMIN — LIDOCAINE HYDROCHLORIDE: 40 SOLUTION TOPICAL at 07:53

## 2022-03-16 ASSESSMENT — PAIN DESCRIPTION - DESCRIPTORS: DESCRIPTORS: ACHING

## 2022-03-16 ASSESSMENT — PAIN DESCRIPTION - ORIENTATION: ORIENTATION: LEFT

## 2022-03-16 ASSESSMENT — PAIN SCALES - GENERAL: PAINLEVEL_OUTOF10: 5

## 2022-03-16 ASSESSMENT — PAIN DESCRIPTION - LOCATION: LOCATION: LEG

## 2022-03-16 ASSESSMENT — PAIN DESCRIPTION - PAIN TYPE: TYPE: CHRONIC PAIN

## 2022-03-16 ASSESSMENT — PAIN DESCRIPTION - ONSET: ONSET: ON-GOING

## 2022-03-16 ASSESSMENT — PAIN DESCRIPTION - FREQUENCY: FREQUENCY: INTERMITTENT

## 2022-03-16 NOTE — PROGRESS NOTES
Wound Healing Center Followup Visit Note    Referring Physician : Gene Myles MD  [address] [place] [address] RECORD NUMBER:  [id number]  AGE: 61 y.o. GENDER: female  : 1955  EPISODE DATE:  3/16/2022    Subjective:     Chief Complaint   Patient presents with    Wound Check     left leg      HISTORY of PRESENT ILLNESS HPI   Ramesh Skaggs is a 61 y.o. female who presents today in regards to follow up evaluation and treatment of wound/ulcer. That patient's past medical, family and social hx were reviewed and changes were made if present. History of Wound Context:  The patient has had a wound of her left ankle/calf which was first noted approximately 2021. This has been treated local wound care. On their initial visit to the wound healing center, 22,  the patient has noted that the wound has been improving. The patient has not had similar previous wounds in the past.      She started seeing Dr. Sarita Singh in 2021 and than Dr. Yadiel Orellana. She was started in [facility] ~ 2021. She has noticed some improvement since starting [facility] boot. She is currently following with Dr. Matias Jean. Pt is not on abx at time of initial visit, but has been treated with previously by podiatry. She is not a DM. She is not a smoker. She denies hx of DVT, and per her report had recent us noting no evidence of dvt at [facility]. She also had arterial studies done. I had previously seen her in the past in regards to left buttock thigh wound, which started as abscess. Pt works at [employer] in [place] and is on her feet all day.     22  · Reflux study - if significant findings will schedule for fu  · Continue compression therapy unna boot per podiatry with aquacell dressing  · Elevation  · She does not have significant arterial occlusive disease  22  · Patient has asked to continuing following with me going forward because of our previous relationship  · She is going to let Dr. Cheri Vergara office know  · She tolerated unna boot and aquacell - some improvement  216/22  · Appearance improved, slightly larger  · Consider drawtek next week but overall drainage seems reasonably managed as periwound appears ok  2/23/2022  · The wound, has some exudate, no recent cultures were done, will do wound cultures today  3/2/22  · Reflux study reviewed - no significant reflux  · Will treat culture - augmentin 875 mg bid x 10 days - script sent  · More drainage - stable size  3/9/22  · Wound appearance improved, stable in size  · drawtek  3/16/22  · Wound slightly larger in size, new wound of ankle  · Continue Drawtek, change to profore  · Avoid shoes which will contact ankle area    Wound/Ulcer Pain Timing/Severity: constant, moderate  Quality of pain: aching, throbbing, tender  Severity:  5 / 10   Modifying Factors: Pain worsens with dressing changes, debridement  Associated Signs/Symptoms: edema, drainage and pain    Ulcer Identification:  Ulcer Type: venous  Contributing Factors: edema and venous stasis    Diabetic/Pressure/Non Pressure Ulcers only:  Ulcer: Non-Pressure ulcer, fat layer exposed        PAST MEDICAL HISTORY      Diagnosis Date    Dizziness - light-headed     GERD (gastroesophageal reflux disease)     Herpes dermatitis 4/27/2017    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 Maria Eugenia Burks, office     Family History   Problem Relation Age of Onset    Diabetes Mother     Hypertension Mother     Heart Disease Father     Heart Attack Father     Heart Surgery Father         angioplasty    Stroke Father     High Cholesterol Father     Heart Attack Maternal Grandfather      Social History     Tobacco Use    Smoking status: Never Smoker    Smokeless tobacco: Never Used   Vaping Use    Vaping Use: Never used   Substance Use Topics    Alcohol use: No    Drug use: No     Allergies   Allergen Reactions    Bee Pollen Anaphylaxis    Tetracyclines & Related Hives     Current Outpatient Medications on File Prior to Encounter   Medication Sig Dispense Refill    butalbital-acetaminophen-caffeine (FIORICET, ESGIC) -40 MG per tablet Take 1 tablet by mouth 3 times daily as needed for Headaches or Migraine Indications: Cluster Headache 90 tablet 5    omeprazole (PRILOSEC) 40 MG delayed release capsule Take 1 capsule by mouth daily 90 capsule 3    hydrOXYzine (ATARAX) 25 MG tablet take 1 tablet BID 60 tablet 5    aspirin-acetaminophen-caffeine (EXCEDRIN MIGRAINE) 250-250-65 MG per tablet Take 1 tablet by mouth as needed for Headaches 300 tablet 3    acyclovir (ZOVIRAX) 400 MG tablet take 1 tablet by mouth once daily 90 tablet 3    EPINEPHrine (EPIPEN) 0.3 MG/0.3ML SOAJ injection Use as directed for allergic reaction 1 each 5     No current facility-administered medications on file prior to encounter.        REVIEW OF SYSTEMS See HPI    Objective:    BP (!) 142/84   Pulse 84   Temp 97.4 °F (36.3 °C) (Temporal)   Resp 18   Ht 5' 8\" (1.727 m)   Wt 180 lb (81.6 kg)   BMI 27.37 kg/m²   Wt Readings from Last 3 Encounters:   03/16/22 180 lb (81.6 kg)   03/09/22 180 lb (81.6 kg)   03/02/22 165 lb (74.8 kg)     PHYSICAL EXAM  CONSTITUTIONAL:   Awake, alert, cooperative   EYES:  lids and lashes normal   ENT: external ears and nose without lesions   NECK:  supple, symmetrical, trachea midline   SKIN:  Open wound Present    Assessment:     Problem List Items Addressed This Visit     Venous stasis ulcer of left ankle with fat layer exposed with varicose veins ([facility] - Primary (Chronic)    Relevant Orders    Initiate Outpatient Wound Care Protocol          Pre Debridement Measurements:  Are located in the [facility]  Documentation Flow Sheet  Post Debridement Measurements:  Wound/Ulcer Descriptions are Pre Debridement except measurements:     Wound 08/10/16 Other (Comment) Buttocks Left;Distal #2 acq 8/4/16 (Active)   Number of days: 2043       Wound 08/31/16 Buttocks Left;Proximal #3 aquired 8-27-26 (Active)   Number of days: 2023       Incision 04/08/14 Abdomen (Active)   Number of days: 2898       Wound 02/02/22 Ankle Left;Medial #1 (Active)   Wound Image   03/02/22 0742   Dressing Status New dressing applied 03/16/22 0900   Wound Cleansed Cleansed with saline 03/16/22 0900   Dressing/Treatment ABD; Other (comment) 03/16/22 0900   Offloading for Diabetic Foot Ulcers Offloading not required 03/09/22 0913   Wound Length (cm) 9 cm 03/16/22 0753   Wound Width (cm) 5.1 cm 03/16/22 0753   Wound Depth (cm) 0.2 cm 03/16/22 0753   Wound Surface Area (cm^2) 45.9 cm^2 03/16/22 0753   Change in Wound Size % (l*w) -69.62 03/16/22 0753   Wound Volume (cm^3) 9.18 cm^3 03/16/22 0753   Wound Healing % -239 03/16/22 0753   Post-Procedure Length (cm) 9.2 cm 03/16/22 0840   Post-Procedure Width (cm) 5.3 cm 03/16/22 0840   Post-Procedure Depth (cm) 0.2 cm 03/16/22 0840   Post-Procedure Surface Area (cm^2) 48.76 cm^2 03/16/22 0840   Post-Procedure Volume (cm^3) 9.752 cm^3 03/16/22 0840   Wound Assessment Fibrin;Slough 03/16/22 0753   Drainage Amount Large 03/16/22 0753   Drainage Description Serosanguinous; Yellow 03/16/22 0753   Odor None 03/16/22 0753   Melany-wound Assessment Blanchable erythema;Dry/flaky 03/16/22 0753   Number of days: 41       Wound 03/16/22 Ankle Posterior; Left #2 (Active)   Wound Image   03/16/22 0198   Dressing Status New dressing applied 03/16/22 0900   Wound Cleansed Irrigated with saline 03/16/22 0900   Dressing/Treatment Dry dressing 03/16/22 0900 Wound Length (cm) 2.5 cm 03/16/22 0753   Wound Width (cm) 1 cm 03/16/22 0753   Wound Depth (cm) 0.1 cm 03/16/22 0753   Wound Surface Area (cm^2) 2.5 cm^2 03/16/22 0753   Wound Volume (cm^3) 0.25 cm^3 03/16/22 0753   Post-Procedure Length (cm) 2.6 cm 03/16/22 0840   Post-Procedure Width (cm) 1.1 cm 03/16/22 0840   Post-Procedure Depth (cm) 0.1 cm 03/16/22 0840   Post-Procedure Surface Area (cm^2) 2.86 cm^2 03/16/22 0840   Post-Procedure Volume (cm^3) 0.286 cm^3 03/16/22 0840   Wound Assessment Fibrin;Slough;Pink/red 03/16/22 0753   Drainage Amount Moderate 03/16/22 0753   Drainage Description Serosanguinous; Yellow 03/16/22 0753   Odor None 03/16/22 0753   Melany-wound Assessment Blanchable erythema 03/16/22 0753   Number of days: 0          Procedure Note  Indications:  Based on my examination of this patient's wound(s)/ulcer(s) today, debridement is required to promote healing and evaluate the wound base. Performed by: Calixto Dumas MD    Consent obtained:  Yes    Time out taken:  Yes    Pain Control: Anesthetic  Anesthetic: 4% Lidocaine Liquid Topical     Debridement:Excisional Debridement    Using curette the wound(s)/ulcer(s) was/were sharply debrided down through and including the removal of epidermis, dermis and subcutaneous tissue. Devitalized Tissue Debrided:  fibrin, biofilm, slough and exudate to stimulate bleeding to promote healing, post debridement good bleeding base and wound edges noted    Wound/Ulcer #: 1, 2    Percent of Wound/Ulcer Debrided: 80%    Total Surface Area Debrided:  40 sq cm     Estimated Blood Loss:  Minimal  Hemostasis Achieved:  by pressure    Procedural Pain:  7 / 10   Post Procedural Pain:  5 / 10     Response to treatment:  With complaints of pain. Plan:   Treatment Note please see attached Discharge Instructions    Written patient dismissal instructions given to patient and signed by patient or POA.          Discharge Instructions       Visit Discharge/Physician Orders     Discharge condition: Stable     Assessment of pain at discharge: yes     Anesthetic used: 4% lidocaine solution     Discharge to: Home     Left via:Private automobile     Accompanied by:self     ECF/HHA: n/a     Dressing Orders:LEFT MEDIAL LOWER LEG-Cleanse with normal saline, apply drawtex, ABD pad and profore wrap, change weekly.     Treatment Orders: Eat a diet high in protein and vitamin C. Take a multiple vitamin daily unless contraindicated.      Tissue culture obtained at [address] [place] [address] today-3-16-22     [address] [place] [address],[address] [facility] followup visit : 1 week_____________________________  (Please note your next appointment above and if you are unable to keep, kindly give a 24 hour notice.  Thank you.)     Physician signature:__________________________     If you experience any of the following, please call the [address] [place] [address] Road during business hours:     * Increase in Pain  * Temperature over 101  * Increase in drainage from your wound  * Drainage with a foul odor  * Bleeding  * Increase in swelling  * Need for compression bandage changes due to slippage, breakthrough drainage.     If you need medical attention outside of the business hours of the [facility] [address] please contact your PCP or go to the nearest emergency room.                    Electronically signed by Kelly Ghosh MD on 3/16/2022 at 9:02 AM

## 2022-03-16 NOTE — PLAN OF CARE
Problem: Pain:  Goal: Pain level will decrease  Description: Pain level will decrease  Outcome: Ongoing  Goal: Control of acute pain  Description: Control of acute pain  Outcome: Ongoing  Goal: Control of chronic pain  Description: Control of chronic pain  Outcome: Ongoing     Problem: Wound:  Goal: Will show signs of wound healing; wound closure and no evidence of infection  Description: Will show signs of wound healing; wound closure and no evidence of infection  Outcome: Ongoing     Problem: Venous:  Goal: Signs of wound healing will improve  Description: Signs of wound healing will improve  Outcome: Met This Shift     Problem: Pain:  Goal: Pain level will decrease  Description: Pain level will decrease  Outcome: Ongoing  Goal: Control of acute pain  Description: Control of acute pain  Outcome: Ongoing  Goal: Control of chronic pain  Description: Control of chronic pain  Outcome: Ongoing     Problem: Wound:  Goal: Will show signs of wound healing; wound closure and no evidence of infection  Description: Will show signs of wound healing; wound closure and no evidence of infection  Outcome: Ongoing     Problem: Venous:  Goal: Signs of wound healing will improve  Description: Signs of wound healing will improve  Outcome: Met This Shift

## 2022-03-17 ENCOUNTER — HOSPITAL ENCOUNTER (OUTPATIENT)
Dept: WOUND CARE | Age: 67
Discharge: HOME | End: 2022-03-17
Payer: MEDICAID

## 2022-03-17 DIAGNOSIS — I83.023 VENOUS STASIS ULCER OF LEFT ANKLE WITH FAT LAYER EXPOSED WITH VARICOSE VEINS (HCC): ICD-10-CM

## 2022-03-17 DIAGNOSIS — L97.322 VENOUS STASIS ULCER OF LEFT ANKLE WITH FAT LAYER EXPOSED WITH VARICOSE VEINS (HCC): ICD-10-CM

## 2022-03-17 PROCEDURE — 29581 APPL MULTLAYER CMPRN SYS LEG: CPT

## 2022-03-17 PROCEDURE — 29580 STRAPPING UNNA BOOT: CPT

## 2022-03-17 NOTE — FLOWSHEET NOTE
Wound Care Nurse Visit Note        Erwin Reveles  Age: 61 y.o. YOB: 1955    Today's Date:  3/17/2022    Patient presents today per physician order for a scheduled nurse visit. Orders to follow same dressings as previous visit and to report any changes in conditions if noted. There were no vitals taken for this visit. Wound Assessment:      Wound 08/10/16 Other (Comment) Buttocks Left;Distal #2 acq 8/4/16 (Active)   Number of days: 2045       Wound 08/31/16 Buttocks Left;Proximal #3 aquired 8-27-26 (Active)   Number of days: 2024       Incision 04/08/14 Abdomen (Active)   Number of days: 2899       Wound 02/02/22 Ankle Left;Medial #1 (Active)   Wound Image   03/02/22 0742   Dressing Status New dressing applied 03/17/22 1342   Wound Cleansed Cleansed with saline 03/17/22 1342   Dressing/Treatment ABD; Other (comment) 03/17/22 1342   Offloading for Diabetic Foot Ulcers Offloading not required 03/17/22 1342   Wound Length (cm) 9 cm 03/16/22 0753   Wound Width (cm) 5.1 cm 03/16/22 0753   Wound Depth (cm) 0.2 cm 03/16/22 0753   Wound Surface Area (cm^2) 45.9 cm^2 03/16/22 0753   Change in Wound Size % (l*w) -69.62 03/16/22 0753   Wound Volume (cm^3) 9.18 cm^3 03/16/22 0753   Wound Healing % -239 03/16/22 0753   Post-Procedure Length (cm) 9.2 cm 03/16/22 0840   Post-Procedure Width (cm) 5.3 cm 03/16/22 0840   Post-Procedure Depth (cm) 0.2 cm 03/16/22 0840   Post-Procedure Surface Area (cm^2) 48.76 cm^2 03/16/22 0840   Post-Procedure Volume (cm^3) 9.752 cm^3 03/16/22 0840   Wound Assessment Fibrin;Slough 03/16/22 0753   Drainage Amount Large 03/16/22 0753   Drainage Description Serosanguinous; Yellow 03/16/22 0753   Odor None 03/16/22 0753   Melany-wound Assessment Blanchable erythema;Dry/flaky 03/16/22 0756   Number of days: 43       Wound 03/16/22 Ankle Posterior; Left #2 (Active)   Wound Image   03/16/22 0759   Dressing Status New dressing applied 03/17/22 1342   Wound Cleansed Cleansed with saline 03/17/22 1342   Dressing/Treatment ABD; Other (comment) 03/17/22 1342   Offloading for Diabetic Foot Ulcers Offloading not required 03/17/22 1342   Wound Length (cm) 2.5 cm 03/16/22 0753   Wound Width (cm) 1 cm 03/16/22 0753   Wound Depth (cm) 0.1 cm 03/16/22 0753   Wound Surface Area (cm^2) 2.5 cm^2 03/16/22 0753   Wound Volume (cm^3) 0.25 cm^3 03/16/22 0753   Post-Procedure Length (cm) 2.6 cm 03/16/22 0840   Post-Procedure Width (cm) 1.1 cm 03/16/22 0840   Post-Procedure Depth (cm) 0.1 cm 03/16/22 0840   Post-Procedure Surface Area (cm^2) 2.86 cm^2 03/16/22 0840   Post-Procedure Volume (cm^3) 0.286 cm^3 03/16/22 0840   Wound Assessment Fibrin;Slough;Pink/red 03/16/22 0753   Drainage Amount Moderate 03/16/22 0753   Drainage Description Serosanguinous; Yellow 03/16/22 0753   Odor None 03/16/22 0753   Melany-wound Assessment Blanchable erythema 03/16/22 0753   Number of days: 1        Wound was redressed per order and patient discharged after instructions given.       Arjun Sharma RN

## 2022-03-19 LAB — ANAEROBIC CULTURE: NORMAL

## 2022-03-20 LAB
GRAM STAIN RESULT: ABNORMAL
ORGANISM: ABNORMAL
WOUND/ABSCESS: ABNORMAL

## 2022-03-23 ENCOUNTER — HOSPITAL ENCOUNTER (OUTPATIENT)
Dept: WOUND CARE | Age: 67
Discharge: HOME | End: 2022-03-23
Payer: MEDICAID

## 2022-03-23 VITALS
WEIGHT: 180 LBS | SYSTOLIC BLOOD PRESSURE: 160 MMHG | RESPIRATION RATE: 18 BRPM | DIASTOLIC BLOOD PRESSURE: 78 MMHG | BODY MASS INDEX: 27.37 KG/M2 | TEMPERATURE: 97.1 F | HEART RATE: 89 BPM

## 2022-03-23 DIAGNOSIS — I83.023 VENOUS STASIS ULCER OF LEFT ANKLE WITH FAT LAYER EXPOSED WITH VARICOSE VEINS (HCC): Primary | ICD-10-CM

## 2022-03-23 DIAGNOSIS — L97.322 VENOUS STASIS ULCER OF LEFT ANKLE WITH FAT LAYER EXPOSED WITH VARICOSE VEINS (HCC): Primary | ICD-10-CM

## 2022-03-23 PROCEDURE — 11042 DBRDMT SUBQ TIS 1ST 20SQCM/<: CPT

## 2022-03-23 PROCEDURE — 6370000000 HC RX 637 (ALT 250 FOR IP): Performed by: SURGERY

## 2022-03-23 PROCEDURE — 11042 DBRDMT SUBQ TIS 1ST 20SQCM/<: CPT | Performed by: SURGERY

## 2022-03-23 RX ORDER — BACITRACIN ZINC AND POLYMYXIN B SULFATE 500; 1000 [USP'U]/G; [USP'U]/G
OINTMENT TOPICAL ONCE
Status: CANCELLED | OUTPATIENT
Start: 2022-03-23 | End: 2022-03-23

## 2022-03-23 RX ORDER — LIDOCAINE HYDROCHLORIDE 40 MG/ML
SOLUTION TOPICAL ONCE
Status: CANCELLED | OUTPATIENT
Start: 2022-03-23 | End: 2022-03-23

## 2022-03-23 RX ORDER — LIDOCAINE 40 MG/G
CREAM TOPICAL ONCE
Status: CANCELLED | OUTPATIENT
Start: 2022-03-23 | End: 2022-03-23

## 2022-03-23 RX ORDER — BACITRACIN, NEOMYCIN, POLYMYXIN B 400; 3.5; 5 [USP'U]/G; MG/G; [USP'U]/G
OINTMENT TOPICAL ONCE
Status: CANCELLED | OUTPATIENT
Start: 2022-03-23 | End: 2022-03-23

## 2022-03-23 RX ORDER — LIDOCAINE HYDROCHLORIDE 20 MG/ML
JELLY TOPICAL ONCE
Status: CANCELLED | OUTPATIENT
Start: 2022-03-23 | End: 2022-03-23

## 2022-03-23 RX ORDER — CLOBETASOL PROPIONATE 0.5 MG/G
OINTMENT TOPICAL ONCE
Status: CANCELLED | OUTPATIENT
Start: 2022-03-23 | End: 2022-03-23

## 2022-03-23 RX ORDER — GENTAMICIN SULFATE 1 MG/G
OINTMENT TOPICAL ONCE
Status: CANCELLED | OUTPATIENT
Start: 2022-03-23 | End: 2022-03-23

## 2022-03-23 RX ORDER — BETAMETHASONE DIPROPIONATE 0.05 %
OINTMENT (GRAM) TOPICAL ONCE
Status: CANCELLED | OUTPATIENT
Start: 2022-03-23 | End: 2022-03-23

## 2022-03-23 RX ORDER — LIDOCAINE 50 MG/G
OINTMENT TOPICAL ONCE
Status: CANCELLED | OUTPATIENT
Start: 2022-03-23 | End: 2022-03-23

## 2022-03-23 RX ORDER — GINSENG 100 MG
CAPSULE ORAL ONCE
Status: CANCELLED | OUTPATIENT
Start: 2022-03-23 | End: 2022-03-23

## 2022-03-23 RX ORDER — LIDOCAINE HYDROCHLORIDE 40 MG/ML
SOLUTION TOPICAL ONCE
Status: COMPLETED | OUTPATIENT
Start: 2022-03-23 | End: 2022-03-23

## 2022-03-23 RX ADMIN — LIDOCAINE HYDROCHLORIDE 10 ML: 40 SOLUTION TOPICAL at 07:48

## 2022-03-23 ASSESSMENT — PAIN DESCRIPTION - DESCRIPTORS: DESCRIPTORS: ACHING

## 2022-03-23 ASSESSMENT — PAIN SCALES - GENERAL: PAINLEVEL_OUTOF10: 2

## 2022-03-23 ASSESSMENT — PAIN DESCRIPTION - LOCATION: LOCATION: LEG

## 2022-03-23 ASSESSMENT — PAIN DESCRIPTION - ORIENTATION: ORIENTATION: LEFT

## 2022-03-23 ASSESSMENT — PAIN DESCRIPTION - FREQUENCY: FREQUENCY: INTERMITTENT

## 2022-03-23 ASSESSMENT — PAIN DESCRIPTION - PROGRESSION: CLINICAL_PROGRESSION: NOT CHANGED

## 2022-03-23 ASSESSMENT — PAIN DESCRIPTION - PAIN TYPE: TYPE: CHRONIC PAIN

## 2022-03-23 ASSESSMENT — PAIN - FUNCTIONAL ASSESSMENT: PAIN_FUNCTIONAL_ASSESSMENT: PREVENTS OR INTERFERES SOME ACTIVE ACTIVITIES AND ADLS

## 2022-03-23 ASSESSMENT — PAIN DESCRIPTION - ONSET: ONSET: ON-GOING

## 2022-03-23 NOTE — PLAN OF CARE
Problem: Pain:  Goal: Pain level will decrease  Description: Pain level will decrease  Outcome: Ongoing  Goal: Control of acute pain  Description: Control of acute pain  Outcome: Ongoing  Goal: Control of chronic pain  Description: Control of chronic pain  Outcome: Ongoing     Problem: Wound:  Goal: Will show signs of wound healing; wound closure and no evidence of infection  Description: Will show signs of wound healing; wound closure and no evidence of infection  Outcome: Ongoing     Problem: Venous:  Goal: Signs of wound healing will improve  Description: Signs of wound healing will improve  Outcome: Met This Shift

## 2022-03-23 NOTE — PROGRESS NOTES
Wound Healing Center Followup Visit Note    Referring Physician : Jose Pimentel MD  [address] [place] [address] RECORD NUMBER:  [id number]  AGE: 61 y.o. GENDER: female  : 1955  EPISODE DATE:  3/23/2022    Subjective:     Chief Complaint   Patient presents with    Wound Check     Left leg      HISTORY of PRESENT ILLNESS HPI   Marifer Tian is a 61 y.o. female who presents today in regards to follow up evaluation and treatment of wound/ulcer. That patient's past medical, family and social hx were reviewed and changes were made if present. History of Wound Context:  The patient has had a wound of her left ankle/calf which was first noted approximately 2021. This has been treated local wound care. On their initial visit to the wound healing center, 22,  the patient has noted that the wound has been improving. The patient has not had similar previous wounds in the past.      She started seeing Dr. Berry Melo in 2021 and than Dr. Kathleen Damon. She was started in [facility] ~ 2021. She has noticed some improvement since starting [facility] boot. She is currently following with Dr. Jordan Choi. Pt is not on abx at time of initial visit, but has been treated with previously by podiatry. She is not a DM. She is not a smoker. She denies hx of DVT, and per her report had recent us noting no evidence of dvt at [facility]. She also had arterial studies done. I had previously seen her in the past in regards to left buttock thigh wound, which started as abscess. Pt works at [employer] in [place] [employer] and is on her feet all day.     22  · Reflux study - if significant findings will schedule for fu  · Continue compression therapy unna boot per podiatry with aquacell dressing  · Elevation  · She does not have significant arterial occlusive disease  22  · Patient has asked to continuing following with me going forward because of our previous relationship  · She is going to let Dr. Lisa Yao office know  · She tolerated unna boot and aquacell - some improvement  216/22  · Appearance improved, slightly larger  · Consider drawtek next week but overall drainage seems reasonably managed as periwound appears ok  2/23/2022  · The wound, has some exudate, no recent cultures were done, will do wound cultures today  3/2/22  · Reflux study reviewed - no significant reflux  · Will treat culture - augmentin 875 mg bid x 10 days - script sent  · More drainage - stable size  3/9/22  · Wound appearance improved, stable in size  · drawtek  3/16/22  · Wound slightly larger in size, new wound of ankle  · Continue Drawtek, change to profore  · Avoid shoes which will contact ankle area  3/23/22  · Wounds stable  · Overall appearance improved  · Will have pt see ID re cultures - disc with Dr Amarilis Hawthorne    Wound/Ulcer Pain Timing/Severity: constant, moderate  Quality of pain: aching, throbbing, tender  Severity:  5 / 10   Modifying Factors: Pain worsens with dressing changes, debridement  Associated Signs/Symptoms: edema, drainage and pain    Ulcer Identification:  Ulcer Type: venous  Contributing Factors: edema and venous stasis    Diabetic/Pressure/Non Pressure Ulcers only:  Ulcer: Non-Pressure ulcer, fat layer exposed        PAST MEDICAL HISTORY      Diagnosis Date    Dizziness - light-headed     GERD (gastroesophageal reflux disease)     Herpes dermatitis 4/27/2017    Insomnia secondary to anxiety 4/6/2018    Lightheadedness     Migraine     Skin ulcer of buttock with fat layer exposed ([address] 7/20/2016    Venous stasis ulcer of left ankle with fat layer exposed with varicose veins ([address] 2/2/2022     Past Surgical History:   Procedure Laterality Date    CHOLECYSTECTOMY, LAPAROSCOPIC  04/08/2014    TONSILLECTOMY  1960    1960s    UPPER GASTROINTESTINAL ENDOSCOPY  12/13/2013    mild gastritis and small hiatal hernia, Dr Cristin Fontana, [facility]  2015    GERD, Dr. Asim Cruz, office     Family History Problem Relation Age of Onset    Diabetes Mother     Hypertension Mother     Heart Disease Father     Heart Attack Father     Heart Surgery Father         angioplasty    Stroke Father     High Cholesterol Father     Heart Attack Maternal Grandfather      Social History     Tobacco Use    Smoking status: Never Smoker    Smokeless tobacco: Never Used   Vaping Use    Vaping Use: Never used   Substance Use Topics    Alcohol use: No    Drug use: No     Allergies   Allergen Reactions    Bee Pollen Anaphylaxis    Tetracyclines & Related Hives     Current Outpatient Medications on File Prior to Encounter   Medication Sig Dispense Refill    butalbital-acetaminophen-caffeine (FIORICET, ESGIC) -40 MG per tablet Take 1 tablet by mouth 3 times daily as needed for Headaches or Migraine Indications: Cluster Headache 90 tablet 5    omeprazole (PRILOSEC) 40 MG delayed release capsule Take 1 capsule by mouth daily 90 capsule 3    hydrOXYzine (ATARAX) 25 MG tablet take 1 tablet BID 60 tablet 5    aspirin-acetaminophen-caffeine (EXCEDRIN MIGRAINE) 250-250-65 MG per tablet Take 1 tablet by mouth as needed for Headaches 300 tablet 3    acyclovir (ZOVIRAX) 400 MG tablet take 1 tablet by mouth once daily 90 tablet 3    EPINEPHrine (EPIPEN) 0.3 MG/0.3ML SOAJ injection Use as directed for allergic reaction 1 each 5     No current facility-administered medications on file prior to encounter.        REVIEW OF SYSTEMS See HPI    Objective:    BP (!) 160/78   Pulse 89   Temp 97.1 °F (36.2 °C) (Tympanic)   Resp 18   Wt 180 lb (81.6 kg)   BMI 27.37 kg/m²   Wt Readings from Last 3 Encounters:   03/23/22 180 lb (81.6 kg)   03/16/22 180 lb (81.6 kg)   03/09/22 180 lb (81.6 kg)     PHYSICAL EXAM  CONSTITUTIONAL:   Awake, alert, cooperative   EYES:  lids and lashes normal   ENT: external ears and nose without lesions   NECK:  supple, symmetrical, trachea midline   SKIN:  Open wound Present    Assessment:     Problem List Items Addressed This Visit     Venous stasis ulcer of left ankle with fat layer exposed with varicose veins (HCC) - Primary (Chronic)    Relevant Orders    Initiate Outpatient Wound Care Protocol          Pre Debridement Measurements:  Are located in the [facility]  Documentation Flow Sheet  Post Debridement Measurements:  Wound/Ulcer Descriptions are Pre Debridement except measurements:     Wound 08/10/16 Other (Comment) Buttocks Left;Distal #2 acq 8/4/16 (Active)   Number of days: 2050       Wound 08/31/16 Buttocks Left;Proximal #3 aquired 8-27-26 (Active)   Number of days: 2029       Incision 04/08/14 Abdomen (Active)   Number of days: 9137       Wound 02/02/22 Ankle Left;Medial #1 (Active)   Wound Image   03/23/22 0826   Dressing Status New dressing applied 03/17/22 1342   Wound Cleansed Cleansed with saline 03/17/22 1342   Dressing/Treatment ABD; Other (comment) 03/17/22 1342   Offloading for Diabetic Foot Ulcers Offloading not required 03/17/22 1342   Wound Length (cm) 8 cm 03/23/22 0746   Wound Width (cm) 5.2 cm 03/23/22 0746   Wound Depth (cm) 0.2 cm 03/23/22 0746   Wound Surface Area (cm^2) 41.6 cm^2 03/23/22 0746   Change in Wound Size % (l*w) -53.73 03/23/22 0746   Wound Volume (cm^3) 8.32 cm^3 03/23/22 0746   Wound Healing % -207 03/23/22 0746   Post-Procedure Length (cm) 8.3 cm 03/23/22 0826   Post-Procedure Width (cm) 5.3 cm 03/23/22 0826   Post-Procedure Depth (cm) 0.2 cm 03/23/22 0826   Post-Procedure Surface Area (cm^2) 43.99 cm^2 03/23/22 0826   Post-Procedure Volume (cm^3) 8.798 cm^3 03/23/22 0826   Wound Assessment Fibrin;Pink/red 03/23/22 0746   Drainage Amount Large 03/23/22 0746   Drainage Description Serosanguinous;Brown 03/23/22 0746   Odor None 03/23/22 0746   Melany-wound Assessment Dry/flaky 03/23/22 0746   Number of days: 48       Wound 03/16/22 Ankle Posterior; Left #2 (Active)   Wound Image   03/23/22 0818   Dressing Status New dressing applied 03/17/22 1342   Wound Cleansed Cleansed with saline 03/17/22 1342   Dressing/Treatment ABD; Other (comment) 03/17/22 1342   Offloading for Diabetic Foot Ulcers Offloading not required 03/17/22 1342   Wound Length (cm) 1.6 cm 03/23/22 0746   Wound Width (cm) 1.4 cm 03/23/22 0746   Wound Depth (cm) 0.1 cm 03/23/22 0746   Wound Surface Area (cm^2) 2.24 cm^2 03/23/22 0746   Change in Wound Size % (l*w) 10.4 03/23/22 0746   Wound Volume (cm^3) 0.224 cm^3 03/23/22 0746   Wound Healing % 10 03/23/22 0746   Post-Procedure Length (cm) 1.8 cm 03/23/22 0826   Post-Procedure Width (cm) 1.4 cm 03/23/22 0826   Post-Procedure Depth (cm) 0.1 cm 03/23/22 0826   Post-Procedure Surface Area (cm^2) 2.52 cm^2 03/23/22 0826   Post-Procedure Volume (cm^3) 0.252 cm^3 03/23/22 0826   Wound Assessment Fibrin 03/23/22 0746   Drainage Amount Moderate 03/23/22 0746   Drainage Description Serosanguinous; Yellow 03/23/22 0746   Odor None 03/23/22 0746   Melany-wound Assessment Blanchable erythema 03/23/22 0746   Number of days: 7          Procedure Note  Indications:  Based on my examination of this patient's wound(s)/ulcer(s) today, debridement is required to promote healing and evaluate the wound base. Performed by: Dana Riley MD    Consent obtained:  Yes    Time out taken:  Yes    Pain Control: Anesthetic  Anesthetic: 4% Lidocaine Liquid Topical     Debridement:Excisional Debridement    Using curette the wound(s)/ulcer(s) was/were sharply debrided down through and including the removal of epidermis, dermis and subcutaneous tissue.         Devitalized Tissue Debrided:  fibrin, biofilm, slough and exudate to stimulate bleeding to promote healing, post debridement good bleeding base and wound edges noted    Wound/Ulcer #: 1, 2    Percent of Wound/Ulcer Debrided: 40%    Total Surface Area Debrided:  20 sq cm     Estimated Blood Loss:  Minimal  Hemostasis Achieved:  by pressure    Procedural Pain:  7  / 10   Post Procedural Pain:  5 / 10     Response to treatment:  With complaints of pain. Plan:   Treatment Note please see attached Discharge Instructions    Written patient dismissal instructions given to patient and signed by patient or POA. Discharge Instructions       Visit Discharge/Physician Orders     Discharge condition: Stable     Assessment of pain at discharge: yes     Anesthetic used: 4% lidocaine solution     Discharge to: Home     Left via:Private automobile     Accompanied by:self     ECF/HHA: n/a     Dressing Orders:LEFT MEDIAL LOWER LEG-Cleanse with normal saline, apply drawtex, ABD pad and profore wrap, change weekly.     Treatment Orders: Eat a diet high in protein and vitamin C. Take a multiple vitamin daily unless contraindicated.      To see Dr. Vincenzo Sun in future- his office to call     [facility] followup visit : 1 week_____________________________  (Please note your next appointment above and if you are unable to keep, kindly give a 24 hour notice.  Thank you.)     Physician signature:__________________________     If you experience any of the following, please call the [facility] during business hours:     * Increase in Pain  * Temperature over 101  * Increase in drainage from your wound  * Drainage with a foul odor  * Bleeding  * Increase in swelling  * Need for compression bandage changes due to slippage, breakthrough drainage.     If you need medical attention outside of the business hours of the [facility] please contact your PCP or go to the nearest emergency room.                 Electronically signed by Arlene Timmons MD on 3/23/2022 at 8:36 AM

## 2022-03-30 ENCOUNTER — HOSPITAL ENCOUNTER (OUTPATIENT)
Dept: WOUND CARE | Age: 67
Discharge: HOME | End: 2022-03-30
Payer: MEDICAID

## 2022-03-30 VITALS
RESPIRATION RATE: 18 BRPM | HEART RATE: 69 BPM | TEMPERATURE: 97.3 F | WEIGHT: 180 LBS | DIASTOLIC BLOOD PRESSURE: 90 MMHG | BODY MASS INDEX: 27.37 KG/M2 | SYSTOLIC BLOOD PRESSURE: 178 MMHG

## 2022-03-30 DIAGNOSIS — I83.023 VENOUS STASIS ULCER OF LEFT ANKLE WITH FAT LAYER EXPOSED WITH VARICOSE VEINS (HCC): Primary | ICD-10-CM

## 2022-03-30 DIAGNOSIS — L97.322 VENOUS STASIS ULCER OF LEFT ANKLE WITH FAT LAYER EXPOSED WITH VARICOSE VEINS (HCC): Primary | ICD-10-CM

## 2022-03-30 PROCEDURE — 11042 DBRDMT SUBQ TIS 1ST 20SQCM/<: CPT

## 2022-03-30 PROCEDURE — 11045 DBRDMT SUBQ TISS EACH ADDL: CPT | Performed by: SURGERY

## 2022-03-30 PROCEDURE — 11042 DBRDMT SUBQ TIS 1ST 20SQCM/<: CPT | Performed by: SURGERY

## 2022-03-30 PROCEDURE — 6370000000 HC RX 637 (ALT 250 FOR IP): Performed by: SURGERY

## 2022-03-30 PROCEDURE — 11045 DBRDMT SUBQ TISS EACH ADDL: CPT

## 2022-03-30 RX ORDER — BETAMETHASONE DIPROPIONATE 0.05 %
OINTMENT (GRAM) TOPICAL ONCE
Status: CANCELLED | OUTPATIENT
Start: 2022-03-30 | End: 2022-03-30

## 2022-03-30 RX ORDER — LIDOCAINE 50 MG/G
OINTMENT TOPICAL ONCE
Status: CANCELLED | OUTPATIENT
Start: 2022-03-30 | End: 2022-03-30

## 2022-03-30 RX ORDER — LIDOCAINE HYDROCHLORIDE 40 MG/ML
SOLUTION TOPICAL ONCE
Status: COMPLETED | OUTPATIENT
Start: 2022-03-30 | End: 2022-03-30

## 2022-03-30 RX ORDER — GENTAMICIN SULFATE 1 MG/G
OINTMENT TOPICAL ONCE
Status: CANCELLED | OUTPATIENT
Start: 2022-03-30 | End: 2022-03-30

## 2022-03-30 RX ORDER — LIDOCAINE HYDROCHLORIDE 20 MG/ML
JELLY TOPICAL ONCE
Status: CANCELLED | OUTPATIENT
Start: 2022-03-30 | End: 2022-03-30

## 2022-03-30 RX ORDER — GINSENG 100 MG
CAPSULE ORAL ONCE
Status: CANCELLED | OUTPATIENT
Start: 2022-03-30 | End: 2022-03-30

## 2022-03-30 RX ORDER — LINEZOLID 600 MG/1
600 TABLET, FILM COATED ORAL 2 TIMES DAILY
COMMUNITY
End: 2022-04-06 | Stop reason: ALTCHOICE

## 2022-03-30 RX ORDER — BACITRACIN, NEOMYCIN, POLYMYXIN B 400; 3.5; 5 [USP'U]/G; MG/G; [USP'U]/G
OINTMENT TOPICAL ONCE
Status: CANCELLED | OUTPATIENT
Start: 2022-03-30 | End: 2022-03-30

## 2022-03-30 RX ORDER — CLOBETASOL PROPIONATE 0.5 MG/G
OINTMENT TOPICAL ONCE
Status: CANCELLED | OUTPATIENT
Start: 2022-03-30 | End: 2022-03-30

## 2022-03-30 RX ORDER — LIDOCAINE HYDROCHLORIDE 40 MG/ML
SOLUTION TOPICAL ONCE
Status: CANCELLED | OUTPATIENT
Start: 2022-03-30 | End: 2022-03-30

## 2022-03-30 RX ORDER — LIDOCAINE 40 MG/G
CREAM TOPICAL ONCE
Status: CANCELLED | OUTPATIENT
Start: 2022-03-30 | End: 2022-03-30

## 2022-03-30 RX ORDER — BACITRACIN ZINC AND POLYMYXIN B SULFATE 500; 1000 [USP'U]/G; [USP'U]/G
OINTMENT TOPICAL ONCE
Status: CANCELLED | OUTPATIENT
Start: 2022-03-30 | End: 2022-03-30

## 2022-03-30 RX ADMIN — LIDOCAINE HYDROCHLORIDE 10 ML: 40 SOLUTION TOPICAL at 07:43

## 2022-03-30 ASSESSMENT — PAIN DESCRIPTION - DESCRIPTORS: DESCRIPTORS: ACHING

## 2022-03-30 ASSESSMENT — PAIN DESCRIPTION - PROGRESSION: CLINICAL_PROGRESSION: NOT CHANGED

## 2022-03-30 ASSESSMENT — PAIN SCALES - GENERAL: PAINLEVEL_OUTOF10: 1

## 2022-03-30 ASSESSMENT — PAIN DESCRIPTION - FREQUENCY: FREQUENCY: INTERMITTENT

## 2022-03-30 ASSESSMENT — PAIN - FUNCTIONAL ASSESSMENT: PAIN_FUNCTIONAL_ASSESSMENT: PREVENTS OR INTERFERES SOME ACTIVE ACTIVITIES AND ADLS

## 2022-03-30 ASSESSMENT — PAIN DESCRIPTION - ONSET: ONSET: ON-GOING

## 2022-03-30 ASSESSMENT — PAIN DESCRIPTION - PAIN TYPE: TYPE: CHRONIC PAIN

## 2022-03-30 ASSESSMENT — PAIN DESCRIPTION - LOCATION: LOCATION: LEG

## 2022-03-30 ASSESSMENT — PAIN DESCRIPTION - ORIENTATION: ORIENTATION: LEFT

## 2022-03-30 NOTE — PROGRESS NOTES
Wound Healing Center Followup Visit Note    Referring Physician : Jairo Frederick MD  [address] [place] [address] Road RECORD NUMBER:  [id number]  AGE: 61 y.o. GENDER: female  : 1955  EPISODE DATE:  3/30/2022    Subjective:     Chief Complaint   Patient presents with    Wound Check     Left leg      HISTORY of PRESENT ILLNESS HPI   Carlita Ventura is a 61 y.o. female who presents today in regards to follow up evaluation and treatment of wound/ulcer. That patient's past medical, family and social hx were reviewed and changes were made if present. History of Wound Context:  The patient has had a wound of her left ankle/calf which was first noted approximately 2021. This has been treated local wound care. On their initial visit to the wound healing center, 22,  the patient has noted that the wound has been improving. The patient has not had similar previous wounds in the past.      She started seeing Dr. Leopoldo Expose in 2021 and than Dr. Deshawn Rojas. She was started in [facility] ~ 2021. She has noticed some improvement since starting [facility] boot. She is currently following with Dr. Christa Tian. Pt is not on abx at time of initial visit, but has been treated with previously by podiatry. She is not a DM. She is not a smoker. She denies hx of DVT, and per her report had recent us noting no evidence of dvt at [facility]. She also had arterial studies done. I had previously seen her in the past in regards to left buttock thigh wound, which started as abscess. Pt works at [employer] in [facility] and is on her feet all day.     22  · Reflux study - if significant findings will schedule for fu  · Continue compression therapy unna boot per podiatry with aquacell dressing  · Elevation  · She does not have significant arterial occlusive disease  22  · Patient has asked to continuing following with me going forward because of our previous relationship  · She is going to let Dr. He Torres office know  · She tolerated unna boot and aquacell - some improvement  216/22  · Appearance improved, slightly larger  · Consider drawtek next week but overall drainage seems reasonably managed as periwound appears ok  2/23/2022  · The wound, has some exudate, no recent cultures were done, will do wound cultures today  3/2/22  · Reflux study reviewed - no significant reflux  · Will treat culture - augmentin 875 mg bid x 10 days - script sent  · More drainage - stable size  3/9/22  · Wound appearance improved, stable in size  · drawtek  3/16/22  · Wound slightly larger in size, new wound of ankle  · Continue Drawtek, change to profore  · Avoid shoes which will contact ankle area  3/23/22  · Wounds stable  · Overall appearance improved  · Will have pt see ID re cultures - disc with Dr Remy Wise  3/30/22  · Much improved appearance  · On oral abx per ID - concerns re pt ability to work while on IV - will see how her wound progresses    Wound/Ulcer Pain Timing/Severity: constant, moderate  Quality of pain: aching, throbbing, tender  Severity:  5 / 10   Modifying Factors: Pain worsens with dressing changes, debridement  Associated Signs/Symptoms: edema, drainage and pain    Ulcer Identification:  Ulcer Type: venous  Contributing Factors: edema and venous stasis    Diabetic/Pressure/Non Pressure Ulcers only:  Ulcer: Non-Pressure ulcer, fat layer exposed        PAST MEDICAL HISTORY      Diagnosis Date    Dizziness - light-headed     GERD (gastroesophageal reflux disease)     Herpes dermatitis 4/27/2017    Insomnia secondary to anxiety 4/6/2018    Lightheadedness     Migraine     Skin ulcer of buttock with fat layer exposed ([address] 7/20/2016    Venous stasis ulcer of left ankle with fat layer exposed with varicose veins ([address] 2/2/2022     Past Surgical History:   Procedure Laterality Date    CHOLECYSTECTOMY, LAPAROSCOPIC  04/08/2014    TONSILLECTOMY  1960    1960s    UPPER GASTROINTESTINAL ENDOSCOPY  12/13/2013    mild gastritis and small hiatal hernia, Dr Coleen Rodriguez, [facility]  2015    GERD, Dr. Angi Parekh, office     Family History   Problem Relation Age of Onset    Diabetes Mother     Hypertension Mother     Heart Disease Father     Heart Attack Father     Heart Surgery Father         angioplasty    Stroke Father     High Cholesterol Father     Heart Attack Maternal Grandfather      Social History     Tobacco Use    Smoking status: Never Smoker    Smokeless tobacco: Never Used   Vaping Use    Vaping Use: Never used   Substance Use Topics    Alcohol use: No    Drug use: No     Allergies   Allergen Reactions    Bee Pollen Anaphylaxis    Tetracyclines & Related Hives     Current Outpatient Medications on File Prior to Encounter   Medication Sig Dispense Refill    linezolid (ZYVOX) 600 MG tablet Take 600 mg by mouth 2 times daily      butalbital-acetaminophen-caffeine (FIORICET, ESGIC) -40 MG per tablet Take 1 tablet by mouth 3 times daily as needed for Headaches or Migraine Indications: Cluster Headache 90 tablet 5    omeprazole (PRILOSEC) 40 MG delayed release capsule Take 1 capsule by mouth daily 90 capsule 3    hydrOXYzine (ATARAX) 25 MG tablet take 1 tablet BID 60 tablet 5    aspirin-acetaminophen-caffeine (EXCEDRIN MIGRAINE) 250-250-65 MG per tablet Take 1 tablet by mouth as needed for Headaches 300 tablet 3    acyclovir (ZOVIRAX) 400 MG tablet take 1 tablet by mouth once daily 90 tablet 3    EPINEPHrine (EPIPEN) 0.3 MG/0.3ML SOAJ injection Use as directed for allergic reaction 1 each 5     No current facility-administered medications on file prior to encounter.        REVIEW OF SYSTEMS See HPI    Objective:    BP (!) 178/90   Pulse 69   Temp 97.3 °F (36.3 °C) (Tympanic)   Resp 18   Wt 180 lb (81.6 kg)   BMI 27.37 kg/m²   Wt Readings from Last 3 Encounters:   03/30/22 180 lb (81.6 kg)   03/23/22 180 lb (81.6 kg)   03/16/22 180 lb (81.6 kg)     PHYSICAL EXAM  CONSTITUTIONAL:   Awake, alert, cooperative   EYES:  lids and lashes normal   ENT: external ears and nose without lesions   NECK:  supple, symmetrical, trachea midline   SKIN:  Open wound Present    Assessment:     Problem List Items Addressed This Visit     Venous stasis ulcer of left ankle with fat layer exposed with varicose veins (HCC) - Primary (Chronic)    Relevant Orders    Initiate Outpatient Wound Care Protocol          Pre Debridement Measurements:  Are located in the [place]  Documentation Flow Sheet  Post Debridement Measurements:  Wound/Ulcer Descriptions are Pre Debridement except measurements:     Wound 08/10/16 Other (Comment) Buttocks Left;Distal #2 acq 8/4/16 (Active)   Number of days: 2058       Wound 08/31/16 Buttocks Left;Proximal #3 aquired 8-27-26 (Active)   Number of days: 2037       Incision 04/08/14 Abdomen (Active)   Number of days: 0446       Wound 02/02/22 Ankle Left;Medial #1 (Active)   Wound Image   03/23/22 0826   Dressing Status New dressing applied 03/30/22 0937   Wound Cleansed Cleansed with saline 03/30/22 0937   Dressing/Treatment ABD; Other (comment) 03/30/22 0937   Offloading for Diabetic Foot Ulcers Offloading not required 03/17/22 1342   Wound Length (cm) 8 cm 03/30/22 0739   Wound Width (cm) 5 cm 03/30/22 0739   Wound Depth (cm) 0.2 cm 03/30/22 0739   Wound Surface Area (cm^2) 40 cm^2 03/30/22 0739   Change in Wound Size % (l*w) -47.82 03/30/22 0739   Wound Volume (cm^3) 8 cm^3 03/30/22 0739   Wound Healing % -196 03/30/22 0739   Post-Procedure Length (cm) 8.2 cm 03/30/22 0813   Post-Procedure Width (cm) 5 cm 03/30/22 0813   Post-Procedure Depth (cm) 0.2 cm 03/30/22 0813   Post-Procedure Surface Area (cm^2) 41 cm^2 03/30/22 0813   Post-Procedure Volume (cm^3) 8.2 cm^3 03/30/22 0813   Wound Assessment Fibrin;Pink/red 03/30/22 0739   Drainage Amount Large 03/30/22 0739   Drainage Description Brown;Green 03/30/22 0779   Odor None 03/30/22 0739   Melany-wound Assessment Dry/flaky; Blanchable erythema 03/30/22 0739   Number of days: 56       Wound 03/16/22 Ankle Posterior; Left #2 (Active)   Wound Image   03/23/22 0826   Dressing Status New dressing applied 03/30/22 0937   Wound Cleansed Cleansed with saline 03/30/22 0937   Dressing/Treatment Other (comment);ABD 03/30/22 0937   Offloading for Diabetic Foot Ulcers Offloading not required 03/23/22 0917   Wound Length (cm) 2.5 cm 03/30/22 0739   Wound Width (cm) 1.5 cm 03/30/22 0739   Wound Depth (cm) 0.2 cm 03/30/22 0739   Wound Surface Area (cm^2) 3.75 cm^2 03/30/22 0739   Change in Wound Size % (l*w) -50 03/30/22 0739   Wound Volume (cm^3) 0.75 cm^3 03/30/22 0739   Wound Healing % -200 03/30/22 0739   Post-Procedure Length (cm) 2.6 cm 03/30/22 0813   Post-Procedure Width (cm) 1.6 cm 03/30/22 0813   Post-Procedure Depth (cm) 0.2 cm 03/30/22 0813   Post-Procedure Surface Area (cm^2) 4.16 cm^2 03/30/22 0813   Post-Procedure Volume (cm^3) 0.832 cm^3 03/30/22 0813   Wound Assessment Fibrin;Pink/red 03/30/22 0739   Drainage Amount Moderate 03/30/22 0739   Drainage Description [place] Sat 03/30/22 0739   Odor None 03/30/22 0739   Melany-wound Assessment Blanchable erythema 03/30/22 0739   Number of days: 14          Procedure Note  Indications:  Based on my examination of this patient's wound(s)/ulcer(s) today, debridement is required to promote healing and evaluate the wound base. Performed by: Oran Ahumada, MD    Consent obtained:  Yes    Time out taken:  Yes    Pain Control: Anesthetic  Anesthetic: 4% Lidocaine Liquid Topical     Debridement:Excisional Debridement    Using curette the wound(s)/ulcer(s) was/were sharply debrided down through and including the removal of epidermis, dermis and subcutaneous tissue.         Devitalized Tissue Debrided:  fibrin, biofilm, slough and exudate to stimulate bleeding to promote healing, post debridement good bleeding base and wound edges noted    Wound/Ulcer #: 1, 2    Percent of Wound/Ulcer

## 2022-04-06 ENCOUNTER — HOSPITAL ENCOUNTER (OUTPATIENT)
Dept: WOUND CARE | Age: 67
Discharge: HOME | End: 2022-04-06
Payer: MEDICAID

## 2022-04-06 VITALS
DIASTOLIC BLOOD PRESSURE: 90 MMHG | RESPIRATION RATE: 18 BRPM | TEMPERATURE: 97.7 F | WEIGHT: 180 LBS | BODY MASS INDEX: 27.28 KG/M2 | HEART RATE: 82 BPM | HEIGHT: 68 IN | SYSTOLIC BLOOD PRESSURE: 180 MMHG

## 2022-04-06 DIAGNOSIS — I83.023 VENOUS STASIS ULCER OF LEFT ANKLE WITH FAT LAYER EXPOSED WITH VARICOSE VEINS (HCC): Primary | ICD-10-CM

## 2022-04-06 DIAGNOSIS — L97.322 VENOUS STASIS ULCER OF LEFT ANKLE WITH FAT LAYER EXPOSED WITH VARICOSE VEINS (HCC): Primary | ICD-10-CM

## 2022-04-06 PROCEDURE — 6370000000 HC RX 637 (ALT 250 FOR IP): Performed by: SURGERY

## 2022-04-06 PROCEDURE — 11042 DBRDMT SUBQ TIS 1ST 20SQCM/<: CPT

## 2022-04-06 PROCEDURE — 11042 DBRDMT SUBQ TIS 1ST 20SQCM/<: CPT | Performed by: SURGERY

## 2022-04-06 RX ORDER — LIDOCAINE HYDROCHLORIDE 20 MG/ML
JELLY TOPICAL ONCE
Status: CANCELLED | OUTPATIENT
Start: 2022-04-06 | End: 2022-04-06

## 2022-04-06 RX ORDER — LIDOCAINE HYDROCHLORIDE 40 MG/ML
SOLUTION TOPICAL ONCE
Status: CANCELLED | OUTPATIENT
Start: 2022-04-06 | End: 2022-04-06

## 2022-04-06 RX ORDER — LIDOCAINE HYDROCHLORIDE 40 MG/ML
SOLUTION TOPICAL ONCE
Status: COMPLETED | OUTPATIENT
Start: 2022-04-06 | End: 2022-04-06

## 2022-04-06 RX ORDER — CLOBETASOL PROPIONATE 0.5 MG/G
OINTMENT TOPICAL ONCE
Status: CANCELLED | OUTPATIENT
Start: 2022-04-06 | End: 2022-04-06

## 2022-04-06 RX ORDER — GINSENG 100 MG
CAPSULE ORAL ONCE
Status: CANCELLED | OUTPATIENT
Start: 2022-04-06 | End: 2022-04-06

## 2022-04-06 RX ORDER — BACITRACIN ZINC AND POLYMYXIN B SULFATE 500; 1000 [USP'U]/G; [USP'U]/G
OINTMENT TOPICAL ONCE
Status: CANCELLED | OUTPATIENT
Start: 2022-04-06 | End: 2022-04-06

## 2022-04-06 RX ORDER — LIDOCAINE 50 MG/G
OINTMENT TOPICAL ONCE
Status: CANCELLED | OUTPATIENT
Start: 2022-04-06 | End: 2022-04-06

## 2022-04-06 RX ORDER — GENTAMICIN SULFATE 1 MG/G
OINTMENT TOPICAL ONCE
Status: CANCELLED | OUTPATIENT
Start: 2022-04-06 | End: 2022-04-06

## 2022-04-06 RX ORDER — LIDOCAINE 40 MG/G
CREAM TOPICAL ONCE
Status: CANCELLED | OUTPATIENT
Start: 2022-04-06 | End: 2022-04-06

## 2022-04-06 RX ORDER — BETAMETHASONE DIPROPIONATE 0.05 %
OINTMENT (GRAM) TOPICAL ONCE
Status: CANCELLED | OUTPATIENT
Start: 2022-04-06 | End: 2022-04-06

## 2022-04-06 RX ORDER — BACITRACIN, NEOMYCIN, POLYMYXIN B 400; 3.5; 5 [USP'U]/G; MG/G; [USP'U]/G
OINTMENT TOPICAL ONCE
Status: CANCELLED | OUTPATIENT
Start: 2022-04-06 | End: 2022-04-06

## 2022-04-06 RX ADMIN — LIDOCAINE HYDROCHLORIDE 10 ML: 40 SOLUTION TOPICAL at 08:05

## 2022-04-06 ASSESSMENT — PAIN - FUNCTIONAL ASSESSMENT: PAIN_FUNCTIONAL_ASSESSMENT: PREVENTS OR INTERFERES SOME ACTIVE ACTIVITIES AND ADLS

## 2022-04-06 ASSESSMENT — PAIN DESCRIPTION - LOCATION: LOCATION: LEG

## 2022-04-06 ASSESSMENT — PAIN SCALES - GENERAL: PAINLEVEL_OUTOF10: 2

## 2022-04-06 ASSESSMENT — PAIN DESCRIPTION - ONSET: ONSET: ON-GOING

## 2022-04-06 ASSESSMENT — PAIN DESCRIPTION - PROGRESSION: CLINICAL_PROGRESSION: NOT CHANGED

## 2022-04-06 ASSESSMENT — PAIN DESCRIPTION - PAIN TYPE: TYPE: CHRONIC PAIN

## 2022-04-06 ASSESSMENT — PAIN DESCRIPTION - DESCRIPTORS: DESCRIPTORS: ACHING

## 2022-04-06 ASSESSMENT — PAIN DESCRIPTION - ORIENTATION: ORIENTATION: LEFT

## 2022-04-06 ASSESSMENT — PAIN DESCRIPTION - FREQUENCY: FREQUENCY: INTERMITTENT

## 2022-04-06 NOTE — PROGRESS NOTES
Wound Healing Center Followup Visit Note    Referring Physician : Jay Shrestha MD  [address] [place] [address] RECORD NUMBER:  [id number]  AGE: 61 y.o. GENDER: female  : 1955  EPISODE DATE:  2022    Subjective:     Chief Complaint   Patient presents with    Wound Check     left leg      HISTORY of PRESENT ILLNESS HPI   Hola Faith is a 61 y.o. female who presents today in regards to follow up evaluation and treatment of wound/ulcer. That patient's past medical, family and social hx were reviewed and changes were made if present. History of Wound Context:  The patient has had a wound of her left ankle/calf which was first noted approximately 2021. This has been treated local wound care. On their initial visit to the wound healing center, 22,  the patient has noted that the wound has been improving. The patient has not had similar previous wounds in the past.      She started seeing Dr. Marquis Horvath in 2021 and than Dr. Madison Chris. She was started in [facility] ~ 2021. She has noticed some improvement since starting unna boot. She is currently following with Dr. Shashank Marshall. Pt is not on abx at time of initial visit, but has been treated with previously by podiatry. She is not a DM. She is not a smoker. She denies hx of DVT, and per her report had recent us noting no evidence of dvt at [facility]. She also had arterial studies done. I had previously seen her in the past in regards to left buttock thigh wound, which started as abscess. Pt works at [employer] in [facility] [employer] [facility] and is on her feet all day.     22  · Reflux study - if significant findings will schedule for fu  · Continue compression therapy [facility] per podiatry with aquacell dressing  · Elevation  · She does not have significant arterial occlusive disease  22  · Patient has asked to continuing following with me going forward because of our previous relationship  · She is going to let Dr. Dia Major office know  · She tolerated unna boot and aquacell - some improvement  216/22  · Appearance improved, slightly larger  · Consider drawtek next week but overall drainage seems reasonably managed as periwound appears ok  2/23/2022  · The wound, has some exudate, no recent cultures were done, will do wound cultures today  3/2/22  · Reflux study reviewed - no significant reflux  · Will treat culture - augmentin 875 mg bid x 10 days - script sent  · More drainage - stable size  3/9/22  · Wound appearance improved, stable in size  · drawtek  3/16/22  · Wound slightly larger in size, new wound of ankle  · Continue Drawtek, change to profore  · Avoid shoes which will contact ankle area  3/23/22  · Wounds stable  · Overall appearance improved  · Will have pt see ID re cultures - disc with Dr Cathleen An  3/30/22  · Much improved appearance  · On oral abx per ID - concerns re pt ability to work while on IV - will see how her wound progresses  4/6/22  · Appearance improved but size not much different  · Finished oral abx  · Will re culture next week if no significant size change - possible advance skin therapy    Wound/Ulcer Pain Timing/Severity: constant, moderate  Quality of pain: aching, throbbing, tender  Severity:  5 / 10   Modifying Factors: Pain worsens with dressing changes, debridement  Associated Signs/Symptoms: edema, drainage and pain    Ulcer Identification:  Ulcer Type: venous  Contributing Factors: edema and venous stasis    Diabetic/Pressure/Non Pressure Ulcers only:  Ulcer: Non-Pressure ulcer, fat layer exposed        PAST MEDICAL HISTORY      Diagnosis Date    Dizziness - light-headed     GERD (gastroesophageal reflux disease)     Herpes dermatitis 4/27/2017    Insomnia secondary to anxiety 4/6/2018    Lightheadedness     Migraine     Skin ulcer of buttock with fat layer exposed ([address] 7/20/2016    Venous stasis ulcer of left ankle with fat layer exposed with varicose veins ([address] 2/2/2022     Past Surgical History:   Procedure Laterality Date    CHOLECYSTECTOMY, LAPAROSCOPIC  04/08/2014    TONSILLECTOMY  1960    1960s    UPPER GASTROINTESTINAL ENDOSCOPY  12/13/2013    mild gastritis and small hiatal hernia, Dr Allie Guan, [place] [facility]  2015    GERD, Dr. Donya Chambers, office     Family History   Problem Relation Age of Onset    Diabetes Mother     Hypertension Mother     Heart Disease Father     Heart Attack Father     Heart Surgery Father         angioplasty    Stroke Father     High Cholesterol Father     Heart Attack Maternal Grandfather      Social History     Tobacco Use    Smoking status: Never Smoker    Smokeless tobacco: Never Used   Vaping Use    Vaping Use: Never used   Substance Use Topics    Alcohol use: No    Drug use: No     Allergies   Allergen Reactions    Bee Pollen Anaphylaxis    Tetracyclines & Related Hives     Current Outpatient Medications on File Prior to Encounter   Medication Sig Dispense Refill    butalbital-acetaminophen-caffeine (FIORICET, ESGIC) -40 MG per tablet Take 1 tablet by mouth 3 times daily as needed for Headaches or Migraine Indications: Cluster Headache 90 tablet 5    omeprazole (PRILOSEC) 40 MG delayed release capsule Take 1 capsule by mouth daily 90 capsule 3    hydrOXYzine (ATARAX) 25 MG tablet take 1 tablet BID 60 tablet 5    aspirin-acetaminophen-caffeine (EXCEDRIN MIGRAINE) 250-250-65 MG per tablet Take 1 tablet by mouth as needed for Headaches 300 tablet 3    acyclovir (ZOVIRAX) 400 MG tablet take 1 tablet by mouth once daily 90 tablet 3    EPINEPHrine (EPIPEN) 0.3 MG/0.3ML SOAJ injection Use as directed for allergic reaction 1 each 5     No current facility-administered medications on file prior to encounter.        REVIEW OF SYSTEMS See HPI    Objective:    BP (!) 180/90   Pulse 82   Temp 97.7 °F (36.5 °C) (Tympanic)   Resp 18   Ht 5' 8\" (1.727 m)   Wt 180 lb (81.6 kg)   BMI 27.37 kg/m²   Wt Readings from Last 3 Encounters:   04/06/22 180 lb (81.6 kg)   03/30/22 180 lb (81.6 kg)   03/23/22 180 lb (81.6 kg)     PHYSICAL EXAM  CONSTITUTIONAL:   Awake, alert, cooperative   EYES:  lids and lashes normal   ENT: external ears and nose without lesions   NECK:  supple, symmetrical, trachea midline   SKIN:  Open wound Present    Assessment:     Problem List Items Addressed This Visit     Venous stasis ulcer of left ankle with fat layer exposed with varicose veins ([facility]) - Primary (Chronic)    Relevant Orders    Initiate Outpatient Wound Care Protocol          Pre Debridement Measurements:  Are located in the [place]  Documentation Flow Sheet  Post Debridement Measurements:  Wound/Ulcer Descriptions are Pre Debridement except measurements:     Wound 08/10/16 Other (Comment) Buttocks Left;Distal #2 acq 8/4/16 (Active)   Number of days: 2064       Wound 08/31/16 Buttocks Left;Proximal #3 aquired 8-27-26 (Active)   Number of days: 2043       Incision 04/08/14 Abdomen (Active)   Number of days: 7811       Wound 02/02/22 Ankle Left;Medial #1 (Active)   Wound Image   03/23/22 0826   Dressing Status New dressing applied 03/30/22 0937   Wound Cleansed Cleansed with saline 03/30/22 0937   Dressing/Treatment ABD; Other (comment) 03/30/22 0937   Offloading for Diabetic Foot Ulcers Offloading not required 03/17/22 1342   Wound Length (cm) 8 cm 04/06/22 0801   Wound Width (cm) 4.7 cm 04/06/22 0801   Wound Depth (cm) 0.2 cm 04/06/22 0801   Wound Surface Area (cm^2) 37.6 cm^2 04/06/22 0801   Change in Wound Size % (l*w) -38.95 04/06/22 0801   Wound Volume (cm^3) 7.52 cm^3 04/06/22 0801   Wound Healing % -178 04/06/22 0801   Post-Procedure Length (cm) 8.2 cm 04/06/22 0834   Post-Procedure Width (cm) 4.9 cm 04/06/22 0834   Post-Procedure Depth (cm) 0.2 cm 04/06/22 0834   Post-Procedure Surface Area (cm^2) 40.18 cm^2 04/06/22 0834   Post-Procedure Volume (cm^3) 8.036 cm^3 04/06/22 0834   Wound Assessment Granulation tissue;Fibrin 04/06/22 0801 Drainage Amount Large 04/06/22 0801   Drainage Description Green;Yellow 04/06/22 0801   Odor None 04/06/22 0801   Melany-wound Assessment Blanchable erythema 04/06/22 0801   Number of days: 62       Wound 03/16/22 Ankle Posterior; Left #2 (Active)   Wound Image   03/23/22 0826   Dressing Status New dressing applied 03/30/22 0937   Wound Cleansed Cleansed with saline 03/30/22 0937   Dressing/Treatment Other (comment);ABD 03/30/22 0937   Offloading for Diabetic Foot Ulcers Offloading not required 03/23/22 0917   Wound Length (cm) 2.4 cm 04/06/22 0801   Wound Width (cm) 1.5 cm 04/06/22 0801   Wound Depth (cm) 0.4 cm 04/06/22 0801   Wound Surface Area (cm^2) 3.6 cm^2 04/06/22 0801   Change in Wound Size % (l*w) -44 04/06/22 0801   Wound Volume (cm^3) 1.44 cm^3 04/06/22 0801   Wound Healing % -476 04/06/22 0801   Post-Procedure Length (cm) 2.6 cm 04/06/22 0834   Post-Procedure Width (cm) 1.6 cm 04/06/22 0834   Post-Procedure Depth (cm) 0.4 cm 04/06/22 0834   Post-Procedure Surface Area (cm^2) 4.16 cm^2 04/06/22 0834   Post-Procedure Volume (cm^3) 1.664 cm^3 04/06/22 0834   Wound Assessment Fibrin;Pink/red;Slough 04/06/22 0801   Drainage Amount Moderate 04/06/22 0801   Drainage Description Yellow;Green 04/06/22 0801   Odor None 04/06/22 0801   Melany-wound Assessment Maceration 04/06/22 0801   Number of days: 21          Procedure Note  Indications:  Based on my examination of this patient's wound(s)/ulcer(s) today, debridement is required to promote healing and evaluate the wound base. Performed by: Luis Vickers MD    Consent obtained:  Yes    Time out taken:  Yes    Pain Control: Anesthetic  Anesthetic: 4% Lidocaine Liquid Topical     Debridement:Excisional Debridement    Using curette the wound(s)/ulcer(s) was/were sharply debrided down through and including the removal of epidermis, dermis and subcutaneous tissue.         Devitalized Tissue Debrided:  fibrin, biofilm, slough and exudate to stimulate bleeding to promote healing, post debridement good bleeding base and wound edges noted    Wound/Ulcer #: 1, 2    Percent of Wound/Ulcer Debrided: 50%    Total Surface Area Debrided:  20 sq cm     Estimated Blood Loss:  Minimal  Hemostasis Achieved:  by pressure    Procedural Pain:  6 / 10   Post Procedural Pain:  5 / 10     Response to treatment:  With complaints of pain. Plan:   Treatment Note please see attached Discharge Instructions    Written patient dismissal instructions given to patient and signed by patient or POA. Discharge Instructions       Visit Discharge/Physician Orders     Discharge condition: Stable     Assessment of pain at discharge: yes     Anesthetic used: 4% lidocaine solution     Discharge to: Home     Left via:Private automobile     Accompanied by:self     ECF/HHA: n/a     Dressing Orders:LEFT MEDIAL LOWER LEG-Cleanse with normal saline, apply drawtex, ABD pad and profore wrap, change weekly.     Treatment Orders: Eat a diet high in protein and vitamin C. Take a multiple vitamin daily unless contraindicated.      To see Dr. Nikolay Wilhelm in future- his office to call     Must wear slip on shoe to work due to wrap on leg!     [facility] followup visit : 1 week_____________________________  (Please note your next appointment above and if you are unable to keep, kindly give a 24 hour notice.  Thank you.)     Physician signature:__________________________     If you experience any of the following, please call the [facility] during business hours:     * Increase in Pain  * Temperature over 101  * Increase in drainage from your wound  * Drainage with a foul odor  * Bleeding  * Increase in swelling  * Need for compression bandage changes due to slippage, breakthrough drainage.     If you need medical attention outside of the business hours of the [facility] [address] please contact your PCP or go to the nearest emergency room.                             Electronically signed by Agueda Hillman MD on 4/6/2022 at 8:37 AM

## 2022-04-13 ENCOUNTER — HOSPITAL ENCOUNTER (OUTPATIENT)
Dept: WOUND CARE | Age: 67
Discharge: HOME | End: 2022-04-13
Payer: MEDICAID

## 2022-04-13 VITALS
BODY MASS INDEX: 27.37 KG/M2 | SYSTOLIC BLOOD PRESSURE: 170 MMHG | WEIGHT: 180 LBS | DIASTOLIC BLOOD PRESSURE: 86 MMHG | RESPIRATION RATE: 18 BRPM | TEMPERATURE: 96.8 F | HEART RATE: 82 BPM

## 2022-04-13 DIAGNOSIS — I83.023 VENOUS STASIS ULCER OF LEFT ANKLE WITH FAT LAYER EXPOSED WITH VARICOSE VEINS (HCC): Primary | ICD-10-CM

## 2022-04-13 DIAGNOSIS — L97.322 VENOUS STASIS ULCER OF LEFT ANKLE WITH FAT LAYER EXPOSED WITH VARICOSE VEINS (HCC): Primary | ICD-10-CM

## 2022-04-13 PROCEDURE — 11045 DBRDMT SUBQ TISS EACH ADDL: CPT

## 2022-04-13 PROCEDURE — 11045 DBRDMT SUBQ TISS EACH ADDL: CPT | Performed by: SURGERY

## 2022-04-13 PROCEDURE — 11042 DBRDMT SUBQ TIS 1ST 20SQCM/<: CPT | Performed by: SURGERY

## 2022-04-13 PROCEDURE — 11042 DBRDMT SUBQ TIS 1ST 20SQCM/<: CPT

## 2022-04-13 PROCEDURE — 6370000000 HC RX 637 (ALT 250 FOR IP): Performed by: SURGERY

## 2022-04-13 RX ORDER — LIDOCAINE 50 MG/G
OINTMENT TOPICAL ONCE
Status: CANCELLED | OUTPATIENT
Start: 2022-04-13 | End: 2022-04-13

## 2022-04-13 RX ORDER — GINSENG 100 MG
CAPSULE ORAL ONCE
Status: CANCELLED | OUTPATIENT
Start: 2022-04-13 | End: 2022-04-13

## 2022-04-13 RX ORDER — CLOBETASOL PROPIONATE 0.5 MG/G
OINTMENT TOPICAL ONCE
Status: CANCELLED | OUTPATIENT
Start: 2022-04-13 | End: 2022-04-13

## 2022-04-13 RX ORDER — LIDOCAINE HYDROCHLORIDE 20 MG/ML
JELLY TOPICAL ONCE
Status: CANCELLED | OUTPATIENT
Start: 2022-04-13 | End: 2022-04-13

## 2022-04-13 RX ORDER — BACITRACIN, NEOMYCIN, POLYMYXIN B 400; 3.5; 5 [USP'U]/G; MG/G; [USP'U]/G
OINTMENT TOPICAL ONCE
Status: CANCELLED | OUTPATIENT
Start: 2022-04-13 | End: 2022-04-13

## 2022-04-13 RX ORDER — GENTAMICIN SULFATE 1 MG/G
OINTMENT TOPICAL ONCE
Status: CANCELLED | OUTPATIENT
Start: 2022-04-13 | End: 2022-04-13

## 2022-04-13 RX ORDER — LIDOCAINE 40 MG/G
CREAM TOPICAL ONCE
Status: CANCELLED | OUTPATIENT
Start: 2022-04-13 | End: 2022-04-13

## 2022-04-13 RX ORDER — LIDOCAINE HYDROCHLORIDE 40 MG/ML
SOLUTION TOPICAL ONCE
Status: COMPLETED | OUTPATIENT
Start: 2022-04-13 | End: 2022-04-13

## 2022-04-13 RX ORDER — BACITRACIN ZINC AND POLYMYXIN B SULFATE 500; 1000 [USP'U]/G; [USP'U]/G
OINTMENT TOPICAL ONCE
Status: CANCELLED | OUTPATIENT
Start: 2022-04-13 | End: 2022-04-13

## 2022-04-13 RX ORDER — LIDOCAINE HYDROCHLORIDE 40 MG/ML
SOLUTION TOPICAL ONCE
Status: CANCELLED | OUTPATIENT
Start: 2022-04-13 | End: 2022-04-13

## 2022-04-13 RX ORDER — BETAMETHASONE DIPROPIONATE 0.05 %
OINTMENT (GRAM) TOPICAL ONCE
Status: CANCELLED | OUTPATIENT
Start: 2022-04-13 | End: 2022-04-13

## 2022-04-13 RX ADMIN — LIDOCAINE HYDROCHLORIDE 10 ML: 40 SOLUTION TOPICAL at 07:45

## 2022-04-13 ASSESSMENT — PAIN DESCRIPTION - FREQUENCY: FREQUENCY: INTERMITTENT

## 2022-04-13 ASSESSMENT — PAIN DESCRIPTION - ORIENTATION: ORIENTATION: LEFT

## 2022-04-13 ASSESSMENT — PAIN DESCRIPTION - DESCRIPTORS: DESCRIPTORS: ACHING

## 2022-04-13 ASSESSMENT — PAIN DESCRIPTION - PAIN TYPE: TYPE: CHRONIC PAIN

## 2022-04-13 ASSESSMENT — PAIN DESCRIPTION - LOCATION: LOCATION: LEG

## 2022-04-13 ASSESSMENT — PAIN SCALES - GENERAL: PAINLEVEL_OUTOF10: 1

## 2022-04-13 NOTE — PLAN OF CARE
Problem: Pain:  Goal: Pain level will decrease  Description: Pain level will decrease  Outcome: Ongoing  Goal: Control of acute pain  Description: Control of acute pain  Outcome: Ongoing     Problem: Wound:  Goal: Will show signs of wound healing; wound closure and no evidence of infection  Description: Will show signs of wound healing; wound closure and no evidence of infection  Outcome: Ongoing     Problem: Venous:  Goal: Signs of wound healing will improve  Description: Signs of wound healing will improve  Outcome: Met This Shift

## 2022-04-13 NOTE — PROGRESS NOTES
know  · She tolerated unna boot and aquacell - some improvement  216/22  · Appearance improved, slightly larger  · Consider drawtek next week but overall drainage seems reasonably managed as periwound appears ok  2/23/2022  · The wound, has some exudate, no recent cultures were done, will do wound cultures today  3/2/22  · Reflux study reviewed - no significant reflux  · Will treat culture - augmentin 875 mg bid x 10 days - script sent  · More drainage - stable size  3/9/22  · Wound appearance improved, stable in size  · drawtek  3/16/22  · Wound slightly larger in size, new wound of ankle  · Continue Drawtek, change to profore  · Avoid shoes which will contact ankle area  3/23/22  · Wounds stable  · Overall appearance improved  · Will have pt see ID re cultures - disc with Dr Nicolle Ayala  3/30/22  · Much improved appearance  · On oral abx per ID - concerns re pt ability to work while on IV - will see how her wound progresses  4/6/22  · Appearance improved but size not much different  · Finished oral abx  · Will re culture next week if no significant size change - possible advance skin therapy  4/13/22  · Appearance continues improved medially, laterally stable  · Completed abx  · Continue wrap  · Will re culture in 2 weeks if no improvement and consider advanced skin therapy    Wound/Ulcer Pain Timing/Severity: constant, moderate  Quality of pain: aching, throbbing, tender  Severity:  5 / 10   Modifying Factors: Pain worsens with dressing changes, debridement  Associated Signs/Symptoms: edema, drainage and pain    Ulcer Identification:  Ulcer Type: venous  Contributing Factors: edema and venous stasis    Diabetic/Pressure/Non Pressure Ulcers only:  Ulcer: Non-Pressure ulcer, fat layer exposed        PAST MEDICAL HISTORY      Diagnosis Date    Dizziness - light-headed     GERD (gastroesophageal reflux disease)     Herpes dermatitis 4/27/2017    Insomnia secondary to anxiety 4/6/2018    Lightheadedness     Migraine  Skin ulcer of buttock with fat layer exposed ([facility] [address] 7/20/2016    Venous stasis ulcer of left ankle with fat layer exposed with varicose veins ([facility] [address] 2/2/2022     Past Surgical History:   Procedure Laterality Date    CHOLECYSTECTOMY, LAPAROSCOPIC  04/08/2014    TONSILLECTOMY  1960    1960s    UPPER GASTROINTESTINAL ENDOSCOPY  12/13/2013    mild gastritis and small hiatal hernia, Dr Melisa Dorantes, [facility] [address]  2015    GERD, Dr. Naidu Pa, office     Family History   Problem Relation Age of Onset    Diabetes Mother     Hypertension Mother     Heart Disease Father     Heart Attack Father     Heart Surgery Father         angioplasty    Stroke Father     High Cholesterol Father     Heart Attack Maternal Grandfather      Social History     Tobacco Use    Smoking status: Never Smoker    Smokeless tobacco: Never Used   Vaping Use    Vaping Use: Never used   Substance Use Topics    Alcohol use: No    Drug use: No     Allergies   Allergen Reactions    Bee Pollen Anaphylaxis    Tetracyclines & Related Hives     Current Outpatient Medications on File Prior to Encounter   Medication Sig Dispense Refill    butalbital-acetaminophen-caffeine (FIORICET, ESGIC) -40 MG per tablet Take 1 tablet by mouth 3 times daily as needed for Headaches or Migraine Indications: Cluster Headache 90 tablet 5    omeprazole (PRILOSEC) 40 MG delayed release capsule Take 1 capsule by mouth daily 90 capsule 3    hydrOXYzine (ATARAX) 25 MG tablet take 1 tablet BID 60 tablet 5    aspirin-acetaminophen-caffeine (EXCEDRIN MIGRAINE) 250-250-65 MG per tablet Take 1 tablet by mouth as needed for Headaches 300 tablet 3    acyclovir (ZOVIRAX) 400 MG tablet take 1 tablet by mouth once daily 90 tablet 3    EPINEPHrine (EPIPEN) 0.3 MG/0.3ML SOAJ injection Use as directed for allergic reaction 1 each 5     No current facility-administered medications on file prior to encounter.        REVIEW OF SYSTEMS See HPI    Objective:    BP (!) 170/86   Pulse 82   Temp 96.8 °F (36 °C) (Tympanic)   Resp 18   Wt 180 lb (81.6 kg)   BMI 27.37 kg/m²   Wt Readings from Last 3 Encounters:   04/13/22 180 lb (81.6 kg)   04/06/22 180 lb (81.6 kg)   03/30/22 180 lb (81.6 kg)     PHYSICAL EXAM  CONSTITUTIONAL:   Awake, alert, cooperative   EYES:  lids and lashes normal   ENT: external ears and nose without lesions   NECK:  supple, symmetrical, trachea midline   SKIN:  Open wound Present    Assessment:     Problem List Items Addressed This Visit     Venous stasis ulcer of left ankle with fat layer exposed with varicose veins ([address] - Primary (Chronic)    Relevant Orders    Initiate Outpatient Wound Care Protocol          Pre Debridement Measurements:  Are located in the [place]  Documentation Flow Sheet  Post Debridement Measurements:  Wound/Ulcer Descriptions are Pre Debridement except measurements:     Wound 08/10/16 Other (Comment) Buttocks Left;Distal #2 acq 8/4/16 (Active)   Number of days: 2071       Wound 08/31/16 Buttocks Left;Proximal #3 aquired 8-27-26 (Active)   Number of days: 2050       Incision 04/08/14 Abdomen (Active)   Number of days: 2926       Wound 02/02/22 Ankle Left;Medial #1 (Active)   Wound Image   03/23/22 0826   Dressing Status New dressing applied 04/06/22 0921   Wound Cleansed Cleansed with saline 04/06/22 0921   Dressing/Treatment ABD; Other (comment) 04/06/22 0921   Offloading for Diabetic Foot Ulcers Offloading not required 03/17/22 1342   Wound Length (cm) 8 cm 04/13/22 0742   Wound Width (cm) 4.8 cm 04/13/22 0742   Wound Depth (cm) 0.2 cm 04/13/22 0742   Wound Surface Area (cm^2) 38.4 cm^2 04/13/22 0742   Change in Wound Size % (l*w) -41.91 04/13/22 0742   Wound Volume (cm^3) 7.68 cm^3 04/13/22 0742   Wound Healing % -184 04/13/22 0742   Post-Procedure Length (cm) 8.2 cm 04/13/22 0820   Post-Procedure Width (cm) 4.8 cm 04/13/22 0820   Post-Procedure Depth (cm) 0.2 cm 04/13/22 0820   Post-Procedure Surface Area (cm^2) 39.36 cm^2 04/13/22 0820   Post-Procedure Volume (cm^3) 7.872 cm^3 04/13/22 0820   Wound Assessment Fibrin;Granulation tissue 04/13/22 0742   Drainage Amount Large 04/13/22 0742   Drainage Description Green;Yellow;Brown 04/13/22 0742   Odor None 04/13/22 0742   Melany-wound Assessment Fragile;Dry/flaky 04/13/22 0742   Number of days: 69       Wound 03/16/22 Ankle Posterior; Left #2 (Active)   Wound Image   03/23/22 0826   Dressing Status New dressing applied 04/06/22 0921   Wound Cleansed Cleansed with saline 04/06/22 0921   Dressing/Treatment Other (comment);ABD 04/06/22 0921   Offloading for Diabetic Foot Ulcers Offloading not required 03/23/22 0917   Wound Length (cm) 2.4 cm 04/13/22 0742   Wound Width (cm) 1.5 cm 04/13/22 0742   Wound Depth (cm) 0.5 cm 04/13/22 0742   Wound Surface Area (cm^2) 3.6 cm^2 04/13/22 0742   Change in Wound Size % (l*w) -44 04/13/22 0742   Wound Volume (cm^3) 1.8 cm^3 04/13/22 0742   Wound Healing % -620 04/13/22 0742   Post-Procedure Length (cm) 2.5 cm 04/13/22 0820   Post-Procedure Width (cm) 1.6 cm 04/13/22 0820   Post-Procedure Depth (cm) 0.5 cm 04/13/22 0820   Post-Procedure Surface Area (cm^2) 4 cm^2 04/13/22 0820   Post-Procedure Volume (cm^3) 2 cm^3 04/13/22 0820   Wound Assessment Fibrin;Pink/red 04/13/22 0742   Drainage Amount Moderate 04/13/22 0742   Drainage Description Yellow;Green 04/13/22 0742   Odor None 04/13/22 0742   Melany-wound Assessment Dry/flaky 04/13/22 0742   Number of days: 28          Procedure Note  Indications:  Based on my examination of this patient's wound(s)/ulcer(s) today, debridement is required to promote healing and evaluate the wound base.     Performed by: Devin More MD    Consent obtained:  Yes    Time out taken:  Yes    Pain Control: Anesthetic  Anesthetic: 4% Lidocaine Liquid Topical     Debridement:Excisional Debridement    Using curette the wound(s)/ulcer(s) was/were sharply debrided down through and including the removal of epidermis, dermis and subcutaneous tissue. Devitalized Tissue Debrided:  fibrin, biofilm, slough and exudate to stimulate bleeding to promote healing, post debridement good bleeding base and wound edges noted    Wound/Ulcer #: 1, 2    Percent of Wound/Ulcer Debrided: 90%    Total Surface Area Debrided:  40 sq cm     Estimated Blood Loss:  Minimal  Hemostasis Achieved:  by pressure    Procedural Pain:  6  / 10   Post Procedural Pain:  5 / 10     Response to treatment:  With complaints of pain. Plan:   Treatment Note please see attached Discharge Instructions    Written patient dismissal instructions given to patient and signed by patient or POA. Discharge Instructions       Visit Discharge/Physician Orders     Discharge condition: Stable     Assessment of pain at discharge: yes     Anesthetic used: 4% lidocaine solution     Discharge to: Home     Left via:Private automobile     Accompanied by:self     ECF/HHA: n/a     Dressing Orders:LEFT MEDIAL LOWER LEG-Cleanse with normal saline, apply drawtex, ABD pad and profore wrap, change weekly.     Treatment Orders: Eat a diet high in protein and vitamin C. Take a multiple vitamin daily unless contraindicated.      To see Dr. Meghan Francis in future- his office to call     Must wear slip on shoe to work due to wrap on leg!     [facility] followup visit : 1 week_____________________________  (Please note your next appointment above and if you are unable to keep, kindly give a 24 hour notice.  Thank you.)     Physician signature:__________________________     If you experience any of the following, please call the [address] [place] [address] Road during business hours:     * Increase in Pain  * Temperature over 101  * Increase in drainage from your wound  * Drainage with a foul odor  * Bleeding  * Increase in swelling  * Need for compression bandage changes due to slippage, breakthrough drainage.     If you need medical attention outside of the business hours of the Wound Care Centers please contact your PCP or go to the nearest emergency room.                                      Electronically signed by Elly Clarke MD on 4/13/2022 at 8:24 AM

## 2022-04-20 ENCOUNTER — HOSPITAL ENCOUNTER (OUTPATIENT)
Dept: WOUND CARE | Age: 67
Discharge: HOME | End: 2022-04-20
Payer: MEDICAID

## 2022-04-20 VITALS
RESPIRATION RATE: 18 BRPM | TEMPERATURE: 97.4 F | HEART RATE: 63 BPM | WEIGHT: 180 LBS | SYSTOLIC BLOOD PRESSURE: 162 MMHG | DIASTOLIC BLOOD PRESSURE: 80 MMHG | BODY MASS INDEX: 27.37 KG/M2

## 2022-04-20 DIAGNOSIS — I83.023 VENOUS STASIS ULCER OF LEFT ANKLE WITH FAT LAYER EXPOSED WITH VARICOSE VEINS (HCC): Primary | ICD-10-CM

## 2022-04-20 DIAGNOSIS — L97.322 VENOUS STASIS ULCER OF LEFT ANKLE WITH FAT LAYER EXPOSED WITH VARICOSE VEINS (HCC): Primary | ICD-10-CM

## 2022-04-20 PROCEDURE — 11045 DBRDMT SUBQ TISS EACH ADDL: CPT | Performed by: SURGERY

## 2022-04-20 PROCEDURE — 11042 DBRDMT SUBQ TIS 1ST 20SQCM/<: CPT

## 2022-04-20 PROCEDURE — 6370000000 HC RX 637 (ALT 250 FOR IP): Performed by: SURGERY

## 2022-04-20 PROCEDURE — 11042 DBRDMT SUBQ TIS 1ST 20SQCM/<: CPT | Performed by: SURGERY

## 2022-04-20 PROCEDURE — 11045 DBRDMT SUBQ TISS EACH ADDL: CPT

## 2022-04-20 RX ORDER — GENTAMICIN SULFATE 1 MG/G
OINTMENT TOPICAL ONCE
Status: CANCELLED | OUTPATIENT
Start: 2022-04-20 | End: 2022-04-20

## 2022-04-20 RX ORDER — LIDOCAINE HYDROCHLORIDE 20 MG/ML
JELLY TOPICAL ONCE
Status: CANCELLED | OUTPATIENT
Start: 2022-04-20 | End: 2022-04-20

## 2022-04-20 RX ORDER — LIDOCAINE 50 MG/G
OINTMENT TOPICAL ONCE
Status: CANCELLED | OUTPATIENT
Start: 2022-04-20 | End: 2022-04-20

## 2022-04-20 RX ORDER — CLOBETASOL PROPIONATE 0.5 MG/G
OINTMENT TOPICAL ONCE
Status: CANCELLED | OUTPATIENT
Start: 2022-04-20 | End: 2022-04-20

## 2022-04-20 RX ORDER — BETAMETHASONE DIPROPIONATE 0.05 %
OINTMENT (GRAM) TOPICAL ONCE
Status: CANCELLED | OUTPATIENT
Start: 2022-04-20 | End: 2022-04-20

## 2022-04-20 RX ORDER — BACITRACIN, NEOMYCIN, POLYMYXIN B 400; 3.5; 5 [USP'U]/G; MG/G; [USP'U]/G
OINTMENT TOPICAL ONCE
Status: CANCELLED | OUTPATIENT
Start: 2022-04-20 | End: 2022-04-20

## 2022-04-20 RX ORDER — LIDOCAINE HYDROCHLORIDE 40 MG/ML
SOLUTION TOPICAL ONCE
Status: COMPLETED | OUTPATIENT
Start: 2022-04-20 | End: 2022-04-20

## 2022-04-20 RX ORDER — LIDOCAINE 40 MG/G
CREAM TOPICAL ONCE
Status: CANCELLED | OUTPATIENT
Start: 2022-04-20 | End: 2022-04-20

## 2022-04-20 RX ORDER — LIDOCAINE HYDROCHLORIDE 40 MG/ML
SOLUTION TOPICAL ONCE
Status: CANCELLED | OUTPATIENT
Start: 2022-04-20 | End: 2022-04-20

## 2022-04-20 RX ORDER — GINSENG 100 MG
CAPSULE ORAL ONCE
Status: CANCELLED | OUTPATIENT
Start: 2022-04-20 | End: 2022-04-20

## 2022-04-20 RX ORDER — BACITRACIN ZINC AND POLYMYXIN B SULFATE 500; 1000 [USP'U]/G; [USP'U]/G
OINTMENT TOPICAL ONCE
Status: CANCELLED | OUTPATIENT
Start: 2022-04-20 | End: 2022-04-20

## 2022-04-20 RX ADMIN — LIDOCAINE HYDROCHLORIDE 10 ML: 40 SOLUTION TOPICAL at 07:37

## 2022-04-20 NOTE — PLAN OF CARE
Problem: Wound:  Goal: Will show signs of wound healing; wound closure and no evidence of infection  Description: Will show signs of wound healing; wound closure and no evidence of infection  Outcome: Not Progressing     Problem: Venous:  Goal: Signs of wound healing will improve  Description: Signs of wound healing will improve  Outcome: Not Progressing

## 2022-04-27 ENCOUNTER — HOSPITAL ENCOUNTER (OUTPATIENT)
Dept: WOUND CARE | Age: 67
Discharge: HOME | End: 2022-04-27
Payer: MEDICAID

## 2022-04-27 VITALS
SYSTOLIC BLOOD PRESSURE: 155 MMHG | TEMPERATURE: 97 F | DIASTOLIC BLOOD PRESSURE: 88 MMHG | HEART RATE: 80 BPM | BODY MASS INDEX: 27.28 KG/M2 | RESPIRATION RATE: 16 BRPM | WEIGHT: 180 LBS | HEIGHT: 68 IN

## 2022-04-27 DIAGNOSIS — I83.023 VENOUS STASIS ULCER OF LEFT ANKLE WITH FAT LAYER EXPOSED WITH VARICOSE VEINS (HCC): Primary | ICD-10-CM

## 2022-04-27 DIAGNOSIS — L97.322 VENOUS STASIS ULCER OF LEFT ANKLE WITH FAT LAYER EXPOSED WITH VARICOSE VEINS (HCC): Primary | ICD-10-CM

## 2022-04-27 PROCEDURE — 87070 CULTURE OTHR SPECIMN AEROBIC: CPT

## 2022-04-27 PROCEDURE — 11042 DBRDMT SUBQ TIS 1ST 20SQCM/<: CPT | Performed by: SURGERY

## 2022-04-27 PROCEDURE — 11042 DBRDMT SUBQ TIS 1ST 20SQCM/<: CPT

## 2022-04-27 PROCEDURE — 11045 DBRDMT SUBQ TISS EACH ADDL: CPT | Performed by: SURGERY

## 2022-04-27 PROCEDURE — 87077 CULTURE AEROBIC IDENTIFY: CPT

## 2022-04-27 PROCEDURE — 87186 SC STD MICRODIL/AGAR DIL: CPT

## 2022-04-27 PROCEDURE — 11045 DBRDMT SUBQ TISS EACH ADDL: CPT

## 2022-04-27 PROCEDURE — 87075 CULTR BACTERIA EXCEPT BLOOD: CPT

## 2022-04-27 RX ORDER — LIDOCAINE HYDROCHLORIDE 40 MG/ML
SOLUTION TOPICAL ONCE
Status: DISCONTINUED | OUTPATIENT
Start: 2022-04-27 | End: 2022-04-28 | Stop reason: HOSPADM

## 2022-04-27 RX ORDER — BACITRACIN, NEOMYCIN, POLYMYXIN B 400; 3.5; 5 [USP'U]/G; MG/G; [USP'U]/G
OINTMENT TOPICAL ONCE
Status: CANCELLED | OUTPATIENT
Start: 2022-04-27 | End: 2022-04-27

## 2022-04-27 RX ORDER — LIDOCAINE 40 MG/G
CREAM TOPICAL ONCE
Status: CANCELLED | OUTPATIENT
Start: 2022-04-27 | End: 2022-04-27

## 2022-04-27 RX ORDER — GINSENG 100 MG
CAPSULE ORAL ONCE
Status: CANCELLED | OUTPATIENT
Start: 2022-04-27 | End: 2022-04-27

## 2022-04-27 RX ORDER — LIDOCAINE 50 MG/G
OINTMENT TOPICAL ONCE
Status: CANCELLED | OUTPATIENT
Start: 2022-04-27 | End: 2022-04-27

## 2022-04-27 RX ORDER — LIDOCAINE HYDROCHLORIDE 20 MG/ML
JELLY TOPICAL ONCE
Status: CANCELLED | OUTPATIENT
Start: 2022-04-27 | End: 2022-04-27

## 2022-04-27 RX ORDER — LIDOCAINE HYDROCHLORIDE 40 MG/ML
SOLUTION TOPICAL ONCE
Status: CANCELLED | OUTPATIENT
Start: 2022-04-27 | End: 2022-04-27

## 2022-04-27 RX ORDER — CLOBETASOL PROPIONATE 0.5 MG/G
OINTMENT TOPICAL ONCE
Status: CANCELLED | OUTPATIENT
Start: 2022-04-27 | End: 2022-04-27

## 2022-04-27 RX ORDER — GENTAMICIN SULFATE 1 MG/G
OINTMENT TOPICAL ONCE
Status: CANCELLED | OUTPATIENT
Start: 2022-04-27 | End: 2022-04-27

## 2022-04-27 RX ORDER — BACITRACIN ZINC AND POLYMYXIN B SULFATE 500; 1000 [USP'U]/G; [USP'U]/G
OINTMENT TOPICAL ONCE
Status: CANCELLED | OUTPATIENT
Start: 2022-04-27 | End: 2022-04-27

## 2022-04-27 RX ORDER — BETAMETHASONE DIPROPIONATE 0.05 %
OINTMENT (GRAM) TOPICAL ONCE
Status: CANCELLED | OUTPATIENT
Start: 2022-04-27 | End: 2022-04-27

## 2022-04-27 NOTE — PLAN OF CARE
Problem: Wound:  Goal: Will show signs of wound healing; wound closure and no evidence of infection  Description: Will show signs of wound healing; wound closure and no evidence of infection  Outcome: Progressing     Problem: Venous:  Goal: Signs of wound healing will improve  Description: Signs of wound healing will improve  Outcome: Progressing

## 2022-04-27 NOTE — PROGRESS NOTES
Wound Healing Center Followup Visit Note    Referring Physician : Tommy Amato MD  [address] [place] [address] RECORD NUMBER:  [id number]  AGE: 61 y.o. GENDER: female  : 1955  EPISODE DATE:  2022    Subjective:     Chief Complaint   Patient presents with    Wound Check     left ankle      HISTORY of PRESENT ILLNESS HPI   Ebenezer Nathan is a 61 y.o. female who presents today in regards to follow up evaluation and treatment of wound/ulcer. That patient's past medical, family and social hx were reviewed and changes were made if present. History of Wound Context:  The patient has had a wound of her left ankle/calf which was first noted approximately 2021. This has been treated local wound care. On their initial visit to the wound healing center, 22,  the patient has noted that the wound has been improving. The patient has not had similar previous wounds in the past.      She started seeing Dr. Jeff Geronimo in 2021 and than Dr. Ruiz Walsh. She was started in [facility] ~ 2021. She has noticed some improvement since starting unna boot. She is currently following with Dr. Rafat Fisher. Pt is not on abx at time of initial visit, but has been treated with previously by podiatry. She is not a DM. She is not a smoker. She denies hx of DVT, and per her report had recent us noting no evidence of dvt at [facility]. She also had arterial studies done. I had previously seen her in the past in regards to left buttock thigh wound, which started as abscess. Pt works at [employer] in [facility] and is on her feet all day.     22  · Reflux study - if significant findings will schedule for fu  · Continue compression therapy unna boot per podiatry with aquacell dressing  · Elevation  · She does not have significant arterial occlusive disease  22  · Patient has asked to continuing following with me going forward because of our previous relationship  · She is going to let Dr. Stephanie Bowles office know  · She tolerated unna boot and aquacell - some improvement  216/22  · Appearance improved, slightly larger  · Consider drawtek next week but overall drainage seems reasonably managed as periwound appears ok  2/23/2022  · The wound, has some exudate, no recent cultures were done, will do wound cultures today  3/2/22  · Reflux study reviewed - no significant reflux  · Will treat culture - augmentin 875 mg bid x 10 days - script sent  · More drainage - stable size  3/9/22  · Wound appearance improved, stable in size  · drawtek  3/16/22  · Wound slightly larger in size, new wound of ankle  · Continue Drawtek, change to profore  · Avoid shoes which will contact ankle area  3/23/22  · Wounds stable  · Overall appearance improved  · Will have pt see ID re cultures - disc with Dr Mahi Infante  3/30/22  · Much improved appearance  · On oral abx per ID - concerns re pt ability to work while on IV - will see how her wound progresses  4/6/22  · Appearance improved but size not much different  · Finished oral abx  · Will re culture next week if no significant size change - possible advance skin therapy  4/20/2022  · Ulcer on the medial aspect, fairly clean and granulating, ulcer of the lateral aspect, has some exudate, debrided  4/27/22  · Wounds stable   · Hypergranulation tissue removed  · Culture done - possible graft in future    Wound/Ulcer Pain Timing/Severity: constant, moderate  Quality of pain: aching, throbbing, tender  Severity:  5 / 10   Modifying Factors: Pain worsens with dressing changes, debridement  Associated Signs/Symptoms: edema, drainage and pain    Ulcer Identification:  Ulcer Type: venous  Contributing Factors: edema and venous stasis    Diabetic/Pressure/Non Pressure Ulcers only:  Ulcer: Non-Pressure ulcer, fat layer exposed        PAST MEDICAL HISTORY      Diagnosis Date    Dizziness - light-headed     GERD (gastroesophageal reflux disease)     Herpes dermatitis 4/27/2017    Insomnia secondary to anxiety 4/6/2018    Lightheadedness     Migraine     Skin ulcer of buttock with fat layer exposed ([facility] [address] 7/20/2016    Venous stasis ulcer of left ankle with fat layer exposed [facility]icose veins [facility] 2/2/2022     Past Surgical History:   Procedure Laterality Date    CHOLECYSTECTOMY, LAPAROSCOPIC  04/08/2014    TONSILLECTOMY  1960    1960s    UPPER GASTROINTESTINAL ENDOSCOPY  12/13/2013    mild gastritis and small hiatal hernia, Dr Tobin Bach, [facility]  2015    GERD, Dr. Diana Kapadia, office     Family History   Problem Relation Age of Onset    Diabetes Mother     Hypertension Mother     Heart Disease Father     Heart Attack Father     Heart Surgery Father         angioplasty    Stroke Father     High Cholesterol Father     Heart Attack Maternal Grandfather      Social History     Tobacco Use    Smoking status: Never Smoker    Smokeless tobacco: Never Used   Vaping Use    Vaping Use: Never used   Substance Use Topics    Alcohol use: No    Drug use: No     Allergies   Allergen Reactions    Bee Pollen Anaphylaxis    Tetracyclines & Related Hives     Current Outpatient Medications on File Prior to Encounter   Medication Sig Dispense Refill    butalbital-acetaminophen-caffeine (FIORICET, ESGIC) -40 MG per tablet Take 1 tablet by mouth 3 times daily as needed for Headaches or Migraine Indications: Cluster Headache 90 tablet 5    omeprazole (PRILOSEC) 40 MG delayed release capsule Take 1 capsule by mouth daily 90 capsule 3    hydrOXYzine (ATARAX) 25 MG tablet take 1 tablet BID 60 tablet 5    aspirin-acetaminophen-caffeine (EXCEDRIN MIGRAINE) 250-250-65 MG per tablet Take 1 tablet by mouth as needed for Headaches 300 tablet 3    acyclovir (ZOVIRAX) 400 MG tablet take 1 tablet by mouth once daily 90 tablet 3    EPINEPHrine (EPIPEN) 0.3 MG/0.3ML SOAJ injection Use as directed for allergic reaction 1 each 5     No current facility-administered medications on file prior to encounter. REVIEW OF SYSTEMS See HPI    Objective:    BP (!) 155/88   Pulse 80   Temp 97 °F (36.1 °C) (Temporal)   Resp 16   Ht 5' 8\" (1.727 m)   Wt 180 lb (81.6 kg)   BMI 27.37 kg/m²   Wt Readings from Last 3 Encounters:   04/27/22 180 lb (81.6 kg)   04/20/22 180 lb (81.6 kg)   04/13/22 180 lb (81.6 kg)     PHYSICAL EXAM  CONSTITUTIONAL:   Awake, alert, cooperative   EYES:  lids and lashes normal   ENT: external ears and nose without lesions   NECK:  supple, symmetrical, trachea midline   SKIN:  Open wound Present    Assessment:     Problem List Items Addressed This Visit     Venous stasis ulcer of left ankle with fat layer exposed with varicose veins ([facility]) - Primary (Chronic)    Relevant Medications    lidocaine (XYLOCAINE) 4 % external solution    Other Relevant Orders    Initiate Outpatient Wound Care Protocol          Pre Debridement Measurements:  Are located in the [facility]  Documentation Flow Sheet  Post Debridement Measurements:  Wound/Ulcer Descriptions are Pre Debridement except measurements:     Wound 08/10/16 Other (Comment) Buttocks Left;Distal #2 acq 8/4/16 (Active)   Number of days: 2085       Wound 08/31/16 Buttocks Left;Proximal #3 aquired 8-27-26 (Active)   Number of days: 2064       Incision 04/08/14 Abdomen (Active)   Number of days: 8560       Wound 02/02/22 Ankle Left;Medial #1 (Active)   Wound Image   04/20/22 0734   Dressing Status New dressing applied 04/20/22 0814   Wound Cleansed Cleansed with saline 04/20/22 0814   Dressing/Treatment ABD; Other (comment) 04/20/22 0814   Offloading for Diabetic Foot Ulcers Offloading not required 03/17/22 1342   Wound Length (cm) 8 cm 04/27/22 0807   Wound Width (cm) 5 cm 04/27/22 0807   Wound Depth (cm) 0.1 cm 04/27/22 0807   Wound Surface Area (cm^2) 40 cm^2 04/27/22 0807   Change in Wound Size % (l*w) -47.82 04/27/22 0807   Wound Volume (cm^3) 4 cm^3 04/27/22 0807   Wound Healing % -48 04/27/22 0807   Post-Procedure Length (cm) 8.2 cm 04/27/22 0831   Post-Procedure Width (cm) 5.1 cm 04/27/22 0831   Post-Procedure Depth (cm) 0.1 cm 04/27/22 0831   Post-Procedure Surface Area (cm^2) 41.82 cm^2 04/27/22 0831   Post-Procedure Volume (cm^3) 4.182 cm^3 04/27/22 0831   Wound Assessment Fibrin;Granulation tissue 04/27/22 0807   Drainage Amount Large 04/27/22 0807   Drainage Description Green;Yellow;Brown 04/27/22 0807   Odor None 04/27/22 0807   Melany-wound Assessment Dry/flaky 04/27/22 0807   Number of days: 83       Wound 03/16/22 Ankle Posterior; Left #2 (Active)   Wound Image   04/20/22 0734   Dressing Status New dressing applied 04/20/22 0814   Wound Cleansed Cleansed with saline 04/20/22 0814   Dressing/Treatment Dry dressing;ABD 04/20/22 0814   Offloading for Diabetic Foot Ulcers Offloading not required 04/20/22 0814   Wound Length (cm) 2.5 cm 04/27/22 0807   Wound Width (cm) 1.7 cm 04/27/22 0807   Wound Depth (cm) 0.4 cm 04/27/22 0807   Wound Surface Area (cm^2) 4.25 cm^2 04/27/22 0807   Change in Wound Size % (l*w) -70 04/27/22 0807   Wound Volume (cm^3) 1.7 cm^3 04/27/22 0807   Wound Healing % -580 04/27/22 0807   Post-Procedure Length (cm) 2.6 cm 04/27/22 0831   Post-Procedure Width (cm) 1.9 cm 04/27/22 0831   Post-Procedure Depth (cm) 0.4 cm 04/27/22 0831   Post-Procedure Surface Area (cm^2) 4.94 cm^2 04/27/22 0831   Post-Procedure Volume (cm^3) 1.976 cm^3 04/27/22 0831   Wound Assessment Fibrin;Pink/red;Slough 04/27/22 0807   Drainage Amount Moderate 04/27/22 0807   Drainage Description Denzel Olp 04/27/22 0807   Odor None 04/27/22 0807   Melany-wound Assessment Dry/flaky 04/20/22 0734   Number of days: 42          Procedure Note  Indications:  Based on my examination of this patient's wound(s)/ulcer(s) today, debridement is required to promote healing and evaluate the wound base.     Performed by: Elly Clarke MD    Consent obtained:  Yes    Time out taken:  Yes    Pain Control:       Debridement:Excisional Debridement    Using curette the wound(s)/ulcer(s) was/were sharply debrided down through and including the removal of epidermis, dermis and subcutaneous tissue. Devitalized Tissue Debrided:  fibrin, biofilm, slough and exudate to stimulate bleeding to promote healing, post debridement good bleeding base and wound edges noted    Wound/Ulcer #: 1, 2    Percent of Wound/Ulcer Debrided: 80%    Total Surface Area Debrided:  40 sq cm     Estimated Blood Loss:  Minimal  Hemostasis Achieved:  by pressure    Procedural Pain:  6 / 10   Post Procedural Pain:  5 / 10     Response to treatment:  With complaints of pain. Plan:   Treatment Note please see attached Discharge Instructions    Written patient dismissal instructions given to patient and signed by patient or POA. Discharge Instructions       Visit Discharge/Physician Orders     Discharge condition: Stable     Assessment of pain at discharge: yes     Anesthetic used: 4% lidocaine solution     Discharge to: Home     Left via:Private automobile     Accompanied by:self     ECF/HHA: n/a     Dressing Orders:LEFT MEDIAL LOWER LEG-Cleanse with normal saline, apply drawtex, ABD pad and profore wrap, change weekly.     Treatment Orders: Eat a diet high in protein and vitamin C. Take a multiple vitamin daily unless contraindicated.      To see Dr. Deneen Buck in future- his office to call     Must wear slip on shoe to work due to wrap on leg! C&S taken 4/27/22     [facility] followup visit : 1 week_____________________________  (Please note your next appointment above and if you are unable to keep, kindly give a 24 hour notice.  Thank you.)     Physician signature:__________________________     If you experience any of the following, please call the [address] [place] [address] Road during business hours:     * Increase in Pain  * Temperature over 101  * Increase in drainage from your wound  * Drainage with a foul odor  * Bleeding  * Increase in swelling  * Need for compression bandage changes due to slippage, breakthrough drainage.     If you need medical attention outside of the business hours of the [address] [place] [address] Road please contact your PCP or go to the nearest emergency room.                                         Electronically signed by Elizabeth Leigh MD on 4/27/2022 at 8:38 AM

## 2022-04-29 LAB
ANAEROBIC CULTURE: NORMAL
ORGANISM: ABNORMAL
ORGANISM: ABNORMAL
WOUND/ABSCESS: ABNORMAL
WOUND/ABSCESS: ABNORMAL

## 2022-05-04 ENCOUNTER — HOSPITAL ENCOUNTER (OUTPATIENT)
Dept: WOUND CARE | Age: 67
Discharge: HOME | End: 2022-05-04
Payer: MEDICAID

## 2022-05-04 VITALS
RESPIRATION RATE: 20 BRPM | DIASTOLIC BLOOD PRESSURE: 82 MMHG | HEART RATE: 82 BPM | TEMPERATURE: 96.9 F | BODY MASS INDEX: 27.37 KG/M2 | SYSTOLIC BLOOD PRESSURE: 174 MMHG | WEIGHT: 180 LBS

## 2022-05-04 DIAGNOSIS — L97.322 VENOUS STASIS ULCER OF LEFT ANKLE WITH FAT LAYER EXPOSED WITH VARICOSE VEINS (HCC): Primary | ICD-10-CM

## 2022-05-04 DIAGNOSIS — I83.023 VENOUS STASIS ULCER OF LEFT ANKLE WITH FAT LAYER EXPOSED WITH VARICOSE VEINS (HCC): Primary | ICD-10-CM

## 2022-05-04 PROCEDURE — 6370000000 HC RX 637 (ALT 250 FOR IP): Performed by: SURGERY

## 2022-05-04 PROCEDURE — 11042 DBRDMT SUBQ TIS 1ST 20SQCM/<: CPT | Performed by: SURGERY

## 2022-05-04 PROCEDURE — 11042 DBRDMT SUBQ TIS 1ST 20SQCM/<: CPT

## 2022-05-04 RX ORDER — BACITRACIN ZINC AND POLYMYXIN B SULFATE 500; 1000 [USP'U]/G; [USP'U]/G
OINTMENT TOPICAL ONCE
Status: CANCELLED | OUTPATIENT
Start: 2022-05-04 | End: 2022-05-04

## 2022-05-04 RX ORDER — GENTAMICIN SULFATE 1 MG/G
OINTMENT TOPICAL ONCE
Status: CANCELLED | OUTPATIENT
Start: 2022-05-04 | End: 2022-05-04

## 2022-05-04 RX ORDER — LIDOCAINE HYDROCHLORIDE 40 MG/ML
SOLUTION TOPICAL ONCE
Status: CANCELLED | OUTPATIENT
Start: 2022-05-04 | End: 2022-05-04

## 2022-05-04 RX ORDER — LIDOCAINE HYDROCHLORIDE 20 MG/ML
JELLY TOPICAL ONCE
Status: CANCELLED | OUTPATIENT
Start: 2022-05-04 | End: 2022-05-04

## 2022-05-04 RX ORDER — BACITRACIN, NEOMYCIN, POLYMYXIN B 400; 3.5; 5 [USP'U]/G; MG/G; [USP'U]/G
OINTMENT TOPICAL ONCE
Status: CANCELLED | OUTPATIENT
Start: 2022-05-04 | End: 2022-05-04

## 2022-05-04 RX ORDER — LIDOCAINE HYDROCHLORIDE 40 MG/ML
SOLUTION TOPICAL ONCE
Status: COMPLETED | OUTPATIENT
Start: 2022-05-04 | End: 2022-05-04

## 2022-05-04 RX ORDER — CLOBETASOL PROPIONATE 0.5 MG/G
OINTMENT TOPICAL ONCE
Status: CANCELLED | OUTPATIENT
Start: 2022-05-04 | End: 2022-05-04

## 2022-05-04 RX ORDER — GINSENG 100 MG
CAPSULE ORAL ONCE
Status: CANCELLED | OUTPATIENT
Start: 2022-05-04 | End: 2022-05-04

## 2022-05-04 RX ORDER — LIDOCAINE 50 MG/G
OINTMENT TOPICAL ONCE
Status: CANCELLED | OUTPATIENT
Start: 2022-05-04 | End: 2022-05-04

## 2022-05-04 RX ORDER — LIDOCAINE 40 MG/G
CREAM TOPICAL ONCE
Status: CANCELLED | OUTPATIENT
Start: 2022-05-04 | End: 2022-05-04

## 2022-05-04 RX ORDER — BETAMETHASONE DIPROPIONATE 0.05 %
OINTMENT (GRAM) TOPICAL ONCE
Status: CANCELLED | OUTPATIENT
Start: 2022-05-04 | End: 2022-05-04

## 2022-05-04 RX ADMIN — LIDOCAINE HYDROCHLORIDE 10 ML: 40 SOLUTION TOPICAL at 07:47

## 2022-05-04 NOTE — PLAN OF CARE
Problem: Wound:  Goal: Will show signs of wound healing; wound closure and no evidence of infection  Description: Will show signs of wound healing; wound closure and no evidence of infection  Outcome: Progressing     Problem: Venous:  Goal: Signs of wound healing will improve  Description: Signs of wound healing will improve  Outcome: Not Progressing

## 2022-05-04 NOTE — DISCHARGE INSTR - COC
Continuity of Care Form    Patient Name: Bairon Lizarraga   :  1955  MRN:  [id number]    Admit date:  2022  Discharge date:  ***    Code Status Order: Prior   Advance Directives:      Admitting Physician:  No admitting provider for patient encounter. PCP: Santiago Spurling, MD    Discharging Nurse: [facility] Unit/Room#: No information available for this encounter. Discharging Unit Phone Number: ***    Emergency Contact:   Extended Emergency Contact Information  Primary Emergency Contact: Abril Ledesma  Home Phone: [phone]  Relation: Brother/Sister  Secondary Emergency Contact: Janet Omalley  Mobile Phone: [phone]  Relation: Domestic Partner  Preferred language: English   needed?  No    Past Surgical History:  Past Surgical History:   Procedure Laterality Date    CHOLECYSTECTOMY, LAPAROSCOPIC  2014    TONSILLECTOMY  1960    1960s    UPPER GASTROINTESTINAL ENDOSCOPY  2013    mild gastritis and small hiatal hernia, Dr Thalia Lomeli, [place] [address]  2015    GERD, Dr. Larry Conklin, office       Immunization History:   Immunization History   Administered Date(s) Administered    COVID-19, J&J, PF, 0.5 mL 10/18/2021    Influenza Virus Vaccine 2017, 08/15/2019       Active Problems:  Patient Active Problem List   Diagnosis Code    Chronic cluster headache, not intractable G44.029    Migraine G43.909    GERD (gastroesophageal reflux disease) K21.9    H/O peptic ulcer Z87.11    Herpes dermatitis B00.89    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, F51.05    Encounter for monitoring long-term proton pump inhibitor therapy Z51.81, Z79.899    Herpes suppression B00.9    Venous stasis ulcer of left ankle with fat layer exposed with varicose veins ([facility] [address] I83.023, L97.322    Encounter for medication refill Z76.0       Isolation/Infection:   Isolation            No Isolation          Patient Infection Status       None to display            Nurse Assessment:  Last Vital Signs: BP (!) 174/82   Pulse 82   Temp 96.9 °F (36.1 °C) (Tympanic)   Resp 20   Wt 180 lb (81.6 kg)   BMI 27.37 kg/m²     Last documented pain score (0-10 scale):    Last Weight:   Wt Readings from Last 1 Encounters:   05/04/22 180 lb (81.6 kg)     Mental Status:  {IP PT MENTAL STATUS:23044}    IV Access:  { EDDIE IV ACCESS:023381269}    Nursing Mobility/ADLs:  Walking   {CHP DME WQEY:292968303}  Transfer  {CHP DME GSMU:109779570}  Bathing  {CHP DME AKDX:762600104}  Dressing  {CHP DME KJQE:298079731}  Toileting  {CHP DME TSNK:021746470}  Feeding  {CHP DME QYUR:806883610}  Med Admin  {CHP DME MEYS:299959719}  Med Delivery   { EDDIE MED Delivery:861882580}    Wound Care Documentation and Therapy:  Wound 08/10/16 Other (Comment) Buttocks Left;Distal #2 acq 8/4/16 (Active)   Number of days: 2092       Wound 08/31/16 Buttocks Left;Proximal #3 aquired 8-27-26 (Active)   Number of days: 2071       Incision 04/08/14 Abdomen (Active)   Number of days: 2947       Wound 02/02/22 Ankle Left;Medial #1 (Active)   Wound Image   04/20/22 0734   Dressing Status New dressing applied 04/27/22 0858   Wound Cleansed Cleansed with saline 04/27/22 0858   Dressing/Treatment ABD; Other (comment) 04/27/22 0858   Offloading for Diabetic Foot Ulcers Offloading not required 03/17/22 1342   Wound Length (cm) 7.6 cm 05/04/22 0743   Wound Width (cm) 5.2 cm 05/04/22 0743   Wound Depth (cm) 0.1 cm 05/04/22 0743   Wound Surface Area (cm^2) 39.52 cm^2 05/04/22 0743   Change in Wound Size % (l*w) -46.05 05/04/22 0743   Wound Volume (cm^3) 3.952 cm^3 05/04/22 0743   Wound Healing % -46 05/04/22 0743   Post-Procedure Length (cm) 7.7 cm 05/04/22 0822   Post-Procedure Width (cm) 5.4 cm 05/04/22 0822   Post-Procedure Depth (cm) 0.1 cm 05/04/22 0822   Post-Procedure Surface Area (cm^2) 41.58 cm^2 05/04/22 0822   Post-Procedure Volume (cm^3) 4. 158 cm^3 05/04/22 0822   Wound Assessment Fibrin;Pink/red 05/04/22 0743   Drainage Amount Large 05/04/22 0743   Drainage Description Green;Brown 05/04/22 0743   Odor Moderate 05/04/22 0743   Melany-wound Assessment Dry/flaky 05/04/22 0743   Number of days: 90       Wound 03/16/22 Ankle Posterior; Left #2 (Active)   Wound Image   04/20/22 0734   Dressing Status New dressing applied 04/27/22 0858   Wound Cleansed Cleansed with saline 04/27/22 0858   Dressing/Treatment Dry dressing;ABD 04/27/22 0858   Offloading for Diabetic Foot Ulcers Offloading not required 04/20/22 0814   Wound Length (cm) 2.4 cm 05/04/22 0743   Wound Width (cm) 1.8 cm 05/04/22 0743   Wound Depth (cm) 0.3 cm 05/04/22 0743   Wound Surface Area (cm^2) 4.32 cm^2 05/04/22 0743   Change in Wound Size % (l*w) -72.8 05/04/22 0743   Wound Volume (cm^3) 1.296 cm^3 05/04/22 0743   Wound Healing % -418 05/04/22 0743   Post-Procedure Length (cm) 2.6 cm 05/04/22 0822   Post-Procedure Width (cm) 1.9 cm 05/04/22 0822   Post-Procedure Depth (cm) 0.4 cm 05/04/22 0822   Post-Procedure Surface Area (cm^2) 4.94 cm^2 05/04/22 0822   Post-Procedure Volume (cm^3) 1.976 cm^3 05/04/22 0822   Wound Assessment Fibrin;Pink/red 05/04/22 0743   Drainage Amount Moderate 05/04/22 0743   Drainage Description Brown;Green 05/04/22 0743   Odor None 05/04/22 0743   Melany-wound Assessment Blanchable erythema 05/04/22 0743   Number of days: 49        Elimination:  Continence: Bowel: {YES / HY:96315}  Bladder: {YES / KF:14593}  Urinary Catheter: {Urinary Catheter:603540819}   Colostomy/Ileostomy/Ileal Conduit: {YES / VR:08541}       Date of Last BM: ***  No intake or output data in the 24 hours ending 05/04/22 0824  No intake/output data recorded.     Safety Concerns:     [address] Safety Concerns:576044472}    Impairments/Disabilities:      [address] Impairments/Disabilities:590818940}    Nutrition Therapy:  Current Nutrition Therapy:   [address] Diet List:975571366}    Routes of Feeding: {CHP DME Other Feedings:578978329}  Liquids: {Slp liquid thickness:46392}  Daily Fluid Restriction: {CHP DME Yes amt example:400131725}  Last Modified Barium Swallow with Video (Video Swallowing Test): {Done Not Done WH:870011575}    Treatments at the Time of Hospital Discharge:   Respiratory Treatments: ***  Oxygen Therapy:  {Therapy; copd oxygen:44811}  Ventilator:    { CC Vent LOTV:370702543}    Rehab Therapies: {THERAPEUTIC INTERVENTION:2828807631}  Weight Bearing Status/Restrictions: { CC Weight Bearin}  Other Medical Equipment (for information only, NOT a DME order):  {EQUIPMENT:130492587}  Other Treatments: ***    Patient's personal belongings (please select all that are sent with patient):  {[facility] DME Belongings:499076220}    RN SIGNATURE:  {Esignature:174735113}    CASE MANAGEMENT/SOCIAL WORK SECTION    Inpatient Status Date: ***    Readmission Risk Assessment Score:  Readmission Risk              Risk of Unplanned Readmission:  0           Discharging to Facility/ Agency   Name:   Address:  Phone:  Fax:    Dialysis Facility (if applicable)   Name:  Address:  Dialysis Schedule:  Phone:  Fax:    / signature: {Esignature:180342094}    PHYSICIAN SECTION    Prognosis: {Prognosis:7008879892}    Condition at Discharge: [address] [place] [address] Patient Condition:222979866}    Rehab Potential (if transferring to Rehab): {Prognosis:0987429268}    Recommended Labs or Other Treatments After Discharge: ***    Physician Certification: I certify the above information and transfer of Mert Arreguin  is necessary for the continuing treatment of the diagnosis listed and that she requires {Admit to Appropriate Level of Care:63435} for {GREATER/LESS:023741418} 30 days.      Update Admission H&P: {CHP DME Changes in ZQYYS:622067993}    PHYSICIAN SIGNATURE:  {Esignature:131602283}

## 2022-05-04 NOTE — PROGRESS NOTES
Wound Healing Center Followup Visit Note    Referring Physician : Frances Escobedo MD  [address] [place] [address] RECORD NUMBER:  [id number]  AGE: 61 y.o. GENDER: female  : 1955  EPISODE DATE:  2022    Subjective:     Chief Complaint   Patient presents with    Wound Check     Left leg      HISTORY of PRESENT ILLNESS HPI   Ovidio Thompson is a 61 y.o. female who presents today in regards to follow up evaluation and treatment of wound/ulcer. That patient's past medical, family and social hx were reviewed and changes were made if present. History of Wound Context:  The patient has had a wound of her left ankle/calf which was first noted approximately 2021. This has been treated local wound care. On their initial visit to the wound healing center, 22,  the patient has noted that the wound has been improving. The patient has not had similar previous wounds in the past.      She started seeing Dr. Stephanie Overton in 2021 and than Dr. Claude Hough. She was started in [facility] ~ 2021. She has noticed some improvement since starting [facility] boot. She is currently following with Dr. Letitia Cox. Pt is not on abx at time of initial visit, but has been treated with previously by podiatry. She is not a DM. She is not a smoker. She denies hx of DVT, and per her report had recent us noting no evidence of dvt at [facility]. She also had arterial studies done. I had previously seen her in the past in regards to left buttock thigh wound, which started as abscess. Pt works at [employer] in [facility] and is on her feet all day.     22  · Reflux study - if significant findings will schedule for fu  · Continue compression therapy unna boot per podiatry with aquacell dressing  · Elevation  · She does not have significant arterial occlusive disease  22  · Patient has asked to continuing following with me going forward because of our previous relationship  · She is going to let Dr. Ariel Gerber office know  · She tolerated unna boot and aquacell - some improvement  216/22  · Appearance improved, slightly larger  · Consider drawtek next week but overall drainage seems reasonably managed as periwound appears ok  2/23/2022  · The wound, has some exudate, no recent cultures were done, will do wound cultures today  3/2/22  · Reflux study reviewed - no significant reflux  · Will treat culture - augmentin 875 mg bid x 10 days - script sent  · More drainage - stable size  3/9/22  · Wound appearance improved, stable in size  · drawtek  3/16/22  · Wound slightly larger in size, new wound of ankle  · Continue Drawtek, change to profore  · Avoid shoes which will contact ankle area  3/23/22  · Wounds stable  · Overall appearance improved  · Will have pt see ID re cultures - disc with Dr Jessica Vasquez  3/30/22  · Much improved appearance  · On oral abx per ID - concerns re pt ability to work while on IV - will see how her wound progresses  4/6/22  · Appearance improved but size not much different  · Finished oral abx  · Will re culture next week if no significant size change - possible advance skin therapy  4/20/2022  · Ulcer on the medial aspect, fairly clean and granulating, ulcer of the lateral aspect, has some exudate, debrided  4/27/22  · Wounds stable   · Hypergranulation tissue removed  · Culture done - possible graft in future  5/4/22  · Wound stable  · Disc culture with Dr Jessica Vasquez who would like to start her on iv abx    Wound/Ulcer Pain Timing/Severity: constant, moderate  Quality of pain: aching, throbbing, tender  Severity:  5 / 10   Modifying Factors: Pain worsens with dressing changes, debridement  Associated Signs/Symptoms: edema, drainage and pain    Ulcer Identification:  Ulcer Type: venous  Contributing Factors: edema and venous stasis    Diabetic/Pressure/Non Pressure Ulcers only:  Ulcer: Non-Pressure ulcer, fat layer exposed        PAST MEDICAL HISTORY      Diagnosis Date    Dizziness - light-headed     GERD (gastroesophageal reflux disease)     Herpes dermatitis 4/27/2017    Insomnia secondary to anxiety 4/6/2018    Lightheadedness     Migraine     Skin ulcer of buttock with fat layer exposed ([address] 7/20/2016    Venous stasis ulcer of left ankle with fat layer exposed with varicose veins ([facility] [address] 2/2/2022     Past Surgical History:   Procedure Laterality Date    CHOLECYSTECTOMY, LAPAROSCOPIC  04/08/2014    TONSILLECTOMY  1960    1960s    UPPER GASTROINTESTINAL ENDOSCOPY  12/13/2013    [facility]    GERD, Dr. Ennis Nesc[place]ck, office     Family History   Problem Relation Age of Onset    Diabetes Mother     Hypertension Mother     Heart Disease Father     Heart Attack Father     Heart Surgery Father         angioplasty    Stroke Father     High Cholesterol Father     Heart Attack Maternal Grandfather      Social History     Tobacco Use    Smoking status: Never Smoker    Smokeless tobacco: Never Used   Vaping Use    Vaping Use: Never used   Substance Use Topics    Alcohol use: No    Drug use: No     Allergies   Allergen Reactions    Bee Pollen Anaphylaxis    Tetracyclines & Related Hives     Current Outpatient Medications on File Prior to Encounter   Medication Sig Dispense Refill    butalbital-acetaminophen-caffeine (FIORICET, ESGIC) -40 MG per tablet Take 1 tablet by mouth 3 times daily as needed for Headaches or Migraine Indications: Cluster Headache 90 tablet 5    omeprazole (PRILOSEC) 40 MG delayed release capsule Take 1 capsule by mouth daily 90 capsule 3    hydrOXYzine (ATARAX) 25 MG tablet take 1 tablet BID 60 tablet 5    aspirin-acetaminophen-caffeine (EXCEDRIN MIGRAINE) 250-250-65 MG per tablet Take 1 tablet by mouth as needed for Headaches 300 tablet 3    acyclovir (ZOVIRAX) 400 MG tablet take 1 tablet by mouth once daily 90 tablet 3    EPINEPHrine (EPIPEN) 0.3 MG/0.3ML SOAJ injection Use as directed for allergic reaction 1 each 5     No current facility-administered medications on file prior to encounter. REVIEW OF SYSTEMS See HPI    Objective:    BP (!) 174/82   Pulse 82   Temp 96.9 °F (36.1 °C) (Tympanic)   Resp 20   Wt 180 lb (81.6 kg)   BMI 27.37 kg/m²   Wt Readings from Last 3 Encounters:   05/04/22 180 lb (81.6 kg)   04/27/22 180 lb (81.6 kg)   04/20/22 180 lb (81.6 kg)     PHYSICAL EXAM  CONSTITUTIONAL:   Awake, alert, cooperative   EYES:  lids and lashes normal   ENT: external ears and nose without lesions   NECK:  supple, symmetrical, trachea midline   SKIN:  Open wound Present    Assessment:     Problem List Items Addressed This Visit     Venous stasis ulcer of left ankle with fat layer exposed with varicose veins ([address] - Primary (Chronic)    Relevant Orders    Initiate Outpatient Wound Care Protocol          Pre Debridement Measurements:  Are located in the [place]  Documentation Flow Sheet  Post Debridement Measurements:  Wound/Ulcer Descriptions are Pre Debridement except measurements:     Wound 08/10/16 Other (Comment) Buttocks Left;Distal #2 acq 8/4/16 (Active)   Number of days: 2092       Wound 08/31/16 Buttocks Left;Proximal #3 aquired 8-27-26 (Active)   Number of days: 2071       Incision 04/08/14 Abdomen (Active)   Number of days: 2947       Wound 02/02/22 Ankle Left;Medial #1 (Active)   Wound Image   04/20/22 0734   Dressing Status New dressing applied 04/27/22 0858   Wound Cleansed Cleansed with saline 04/27/22 0858   Dressing/Treatment ABD; Other (comment) 04/27/22 0858   Offloading for Diabetic Foot Ulcers Offloading not required 03/17/22 1342   Wound Length (cm) 7.6 cm 05/04/22 0743   Wound Width (cm) 5.2 cm 05/04/22 0743   Wound Depth (cm) 0.1 cm 05/04/22 0743   Wound Surface Area (cm^2) 39.52 cm^2 05/04/22 0743   Change in Wound Size % (l*w) -46.05 05/04/22 0743   Wound Volume (cm^3) 3.952 cm^3 05/04/22 0743   Wound Healing % -46 05/04/22 6034   Post-Procedure Length (cm) 7.7 cm 05/04/22 0822   Post-Procedure Width (cm) 5.4 cm 05/04/22 0822   Post-Procedure Depth (cm) 0.1 cm 05/04/22 0822   Post-Procedure Surface Area (cm^2) 41.58 cm^2 05/04/22 0822   Post-Procedure Volume (cm^3) 4. 158 cm^3 05/04/22 0822   Wound Assessment Fibrin;Pink/red 05/04/22 0743   Drainage Amount Large 05/04/22 0743   Drainage Description Green;Brown 05/04/22 0743   Odor Moderate 05/04/22 0743   Melany-wound Assessment Dry/flaky 05/04/22 0743   Number of days: 90       Wound 03/16/22 Ankle Posterior; Left #2 (Active)   Wound Image   04/20/22 0734   Dressing Status New dressing applied 04/27/22 0858   Wound Cleansed Cleansed with saline 04/27/22 0858   Dressing/Treatment Dry dressing;ABD 04/27/22 0858   Offloading for Diabetic Foot Ulcers Offloading not required 04/20/22 0814   Wound Length (cm) 2.4 cm 05/04/22 0743   Wound Width (cm) 1.8 cm 05/04/22 0743   Wound Depth (cm) 0.3 cm 05/04/22 0743   Wound Surface Area (cm^2) 4.32 cm^2 05/04/22 0743   Change in Wound Size % (l*w) -72.8 05/04/22 0743   Wound Volume (cm^3) 1.296 cm^3 05/04/22 0743   Wound Healing % -418 05/04/22 0743   Post-Procedure Length (cm) 2.6 cm 05/04/22 0822   Post-Procedure Width (cm) 1.9 cm 05/04/22 0822   Post-Procedure Depth (cm) 0.4 cm 05/04/22 0822   Post-Procedure Surface Area (cm^2) 4.94 cm^2 05/04/22 0822   Post-Procedure Volume (cm^3) 1.976 cm^3 05/04/22 0822   Wound Assessment Fibrin;Pink/red 05/04/22 0743   Drainage Amount Moderate 05/04/22 0743   Drainage Description Brown;Green 05/04/22 0743   Odor None 05/04/22 0743   Melany-wound Assessment Blanchable erythema 05/04/22 0743   Number of days: 49          Procedure Note  Indications:  Based on my examination of this patient's wound(s)/ulcer(s) today, debridement is required to promote healing and evaluate the wound base.     Performed by: Hubert Becerra MD    Consent obtained:  Yes    Time out taken:  Yes    Pain Control: Anesthetic  Anesthetic: 4% Lidocaine Liquid Topical Debridement:Excisional Debridement    Using curette the wound(s)/ulcer(s) was/were sharply debrided down through and including the removal of epidermis, dermis and subcutaneous tissue. Devitalized Tissue Debrided:  fibrin, biofilm, slough and exudate to stimulate bleeding to promote healing, post debridement good bleeding base and wound edges noted    Wound/Ulcer #: 1, 2    Percent of Wound/Ulcer Debrided: 50%    Total Surface Area Debrided:  20 sq cm     Estimated Blood Loss:  Minimal  Hemostasis Achieved:  by pressure    Procedural Pain:  6 / 10   Post Procedural Pain:  5 / 10     Response to treatment:  With complaints of pain. Plan:   Treatment Note please see attached Discharge Instructions    Written patient dismissal instructions given to patient and signed by patient or POA. Discharge Instructions       Visit Discharge/Physician Orders     Discharge condition: Stable     Assessment of pain at discharge: yes     Anesthetic used: 4% lidocaine solution     Discharge to: Home     Left via:Private automobile     Accompanied by:self     ECF/HHA: n/a     Dressing Orders:LEFT MEDIAL LOWER LEG-Cleanse with normal saline, apply drawtex, ABD pad and profore wrap, change weekly.     Treatment Orders: Eat a diet high in protein and vitamin C. Take a multiple vitamin daily unless contraindicated.      To see Dr. Hannah Landin in future- his office to call     Must wear slip on shoe to work due to wrap on leg!     Antibiotics as per Dr Hannah Landin     [facility] followup visit : 1 week_____________________________  (Please note your next appointment above and if you are unable to keep, kindly give a 24 hour notice.  Thank you.)     Physician signature:__________________________     If you experience any of the following, please call the [address] [place] [address] Road during business hours:     * Increase in Pain  * Temperature over 101  * Increase in drainage from your wound  * Drainage with a foul odor  * Bleeding  * Increase in swelling  * Need for compression bandage changes due to slippage, breakthrough drainage.     If you need medical attention outside of the business hours of the [address] [place] [address] please contact your PCP or go to the nearest emergency room.    Neal Meier                 Electronically signed by Christa Du MD on 5/4/2022 at 8:27 AM

## 2022-05-11 ENCOUNTER — HOSPITAL ENCOUNTER (OUTPATIENT)
Dept: WOUND CARE | Age: 67
Discharge: HOME | End: 2022-05-11
Payer: MEDICAID

## 2022-05-11 VITALS
HEIGHT: 68 IN | BODY MASS INDEX: 27.28 KG/M2 | TEMPERATURE: 97.4 F | RESPIRATION RATE: 20 BRPM | SYSTOLIC BLOOD PRESSURE: 180 MMHG | DIASTOLIC BLOOD PRESSURE: 92 MMHG | WEIGHT: 180 LBS | HEART RATE: 84 BPM

## 2022-05-11 DIAGNOSIS — I83.023 VENOUS STASIS ULCER OF LEFT ANKLE WITH FAT LAYER EXPOSED WITH VARICOSE VEINS (HCC): Primary | ICD-10-CM

## 2022-05-11 DIAGNOSIS — L97.322 VENOUS STASIS ULCER OF LEFT ANKLE WITH FAT LAYER EXPOSED WITH VARICOSE VEINS (HCC): Primary | ICD-10-CM

## 2022-05-11 PROCEDURE — 11042 DBRDMT SUBQ TIS 1ST 20SQCM/<: CPT | Performed by: SURGERY

## 2022-05-11 PROCEDURE — 6370000000 HC RX 637 (ALT 250 FOR IP): Performed by: SURGERY

## 2022-05-11 PROCEDURE — 11042 DBRDMT SUBQ TIS 1ST 20SQCM/<: CPT

## 2022-05-11 RX ORDER — BETAMETHASONE DIPROPIONATE 0.05 %
OINTMENT (GRAM) TOPICAL ONCE
Status: CANCELLED | OUTPATIENT
Start: 2022-05-11 | End: 2022-05-11

## 2022-05-11 RX ORDER — LIDOCAINE 40 MG/G
CREAM TOPICAL ONCE
Status: CANCELLED | OUTPATIENT
Start: 2022-05-11 | End: 2022-05-11

## 2022-05-11 RX ORDER — BACITRACIN ZINC AND POLYMYXIN B SULFATE 500; 1000 [USP'U]/G; [USP'U]/G
OINTMENT TOPICAL ONCE
Status: CANCELLED | OUTPATIENT
Start: 2022-05-11 | End: 2022-05-11

## 2022-05-11 RX ORDER — BACITRACIN, NEOMYCIN, POLYMYXIN B 400; 3.5; 5 [USP'U]/G; MG/G; [USP'U]/G
OINTMENT TOPICAL ONCE
Status: CANCELLED | OUTPATIENT
Start: 2022-05-11 | End: 2022-05-11

## 2022-05-11 RX ORDER — LIDOCAINE HYDROCHLORIDE 20 MG/ML
JELLY TOPICAL ONCE
Status: CANCELLED | OUTPATIENT
Start: 2022-05-11 | End: 2022-05-11

## 2022-05-11 RX ORDER — CLOBETASOL PROPIONATE 0.5 MG/G
OINTMENT TOPICAL ONCE
Status: CANCELLED | OUTPATIENT
Start: 2022-05-11 | End: 2022-05-11

## 2022-05-11 RX ORDER — GENTAMICIN SULFATE 1 MG/G
OINTMENT TOPICAL ONCE
Status: CANCELLED | OUTPATIENT
Start: 2022-05-11 | End: 2022-05-11

## 2022-05-11 RX ORDER — LIDOCAINE 50 MG/G
OINTMENT TOPICAL ONCE
Status: CANCELLED | OUTPATIENT
Start: 2022-05-11 | End: 2022-05-11

## 2022-05-11 RX ORDER — LIDOCAINE HYDROCHLORIDE 40 MG/ML
SOLUTION TOPICAL ONCE
Status: COMPLETED | OUTPATIENT
Start: 2022-05-11 | End: 2022-05-11

## 2022-05-11 RX ORDER — LIDOCAINE HYDROCHLORIDE 40 MG/ML
SOLUTION TOPICAL ONCE
Status: CANCELLED | OUTPATIENT
Start: 2022-05-11 | End: 2022-05-11

## 2022-05-11 RX ORDER — GINSENG 100 MG
CAPSULE ORAL ONCE
Status: CANCELLED | OUTPATIENT
Start: 2022-05-11 | End: 2022-05-11

## 2022-05-11 RX ADMIN — LIDOCAINE HYDROCHLORIDE 10 ML: 40 SOLUTION TOPICAL at 07:55

## 2022-05-11 ASSESSMENT — PAIN SCALES - GENERAL: PAINLEVEL_OUTOF10: 3

## 2022-05-11 ASSESSMENT — PAIN DESCRIPTION - FREQUENCY: FREQUENCY: INTERMITTENT

## 2022-05-11 ASSESSMENT — PAIN DESCRIPTION - LOCATION: LOCATION: LEG;BACK

## 2022-05-11 ASSESSMENT — PAIN DESCRIPTION - PAIN TYPE: TYPE: CHRONIC PAIN

## 2022-05-11 ASSESSMENT — PAIN - FUNCTIONAL ASSESSMENT: PAIN_FUNCTIONAL_ASSESSMENT: ACTIVITIES ARE NOT PREVENTED

## 2022-05-11 ASSESSMENT — PAIN DESCRIPTION - ORIENTATION: ORIENTATION: LEFT

## 2022-05-11 ASSESSMENT — PAIN DESCRIPTION - DESCRIPTORS: DESCRIPTORS: ACHING

## 2022-05-11 ASSESSMENT — PAIN DESCRIPTION - ONSET: ONSET: ON-GOING

## 2022-05-11 NOTE — PLAN OF CARE
Problem: Pain  Goal: Verbalizes/displays adequate comfort level or baseline comfort level  Outcome: Progressing     Problem: Wound:  Goal: Will show signs of wound healing; wound closure and no evidence of infection  Description: Will show signs of wound healing; wound closure and no evidence of infection  Outcome: Progressing     Problem: Venous:  Goal: Signs of wound healing will improve  Description: Signs of wound healing will improve  Outcome: Adequate for Discharge

## 2022-05-11 NOTE — PROGRESS NOTES
Wound Healing Center Followup Visit Note    Referring Physician : Angely Keys MD  [address] [place] [address] RECORD NUMBER:  [id number]  AGE: 61 y.o. GENDER: female  : 1955  EPISODE DATE:  2022    Subjective:     Chief Complaint   Patient presents with    Wound Check     left leg      HISTORY of PRESENT ILLNESS HPI   Qasim Mays is a 61 y.o. female who presents today in regards to follow up evaluation and treatment of wound/ulcer. That patient's past medical, family and social hx were reviewed and changes were made if present. History of Wound Context:  The patient has had a wound of her left ankle/calf which was first noted approximately 2021. This has been treated local wound care. On their initial visit to the wound healing center, 22,  the patient has noted that the wound has been improving. The patient has not had similar previous wounds in the past.      She started seeing Dr. June Joseph in 2021 and than Dr. Kusum Pulido. She was started in [facility] ~ 2021. She has noticed some improvement since starting [facility] boot. She is currently following with Dr. Sameer Call. Pt is not on abx at time of initial visit, but has been treated with previously by podiatry. She is not a DM. She is not a smoker. She denies hx of DVT, and per her report had recent us noting no evidence of dvt at [facility]. She also had arterial studies done. I had previously seen her in the past in regards to left buttock thigh wound, which started as abscess. Pt works at [employer] in [facility] and is on her feet all day.     22  · Reflux study - if significant findings will schedule for fu  · Continue compression therapy unna boot per podiatry with aquacell dressing  · Elevation  · She does not have significant arterial occlusive disease  22  · Patient has asked to continuing following with me going forward because of our previous relationship  · She is going to let Dr. Daxa Naranjo office know  · She tolerated unna boot and aquacell - some improvement  216/22  · Appearance improved, slightly larger  · Consider drawtek next week but overall drainage seems reasonably managed as periwound appears ok  2/23/2022  · The wound, has some exudate, no recent cultures were done, will do wound cultures today  3/2/22  · Reflux study reviewed - no significant reflux  · Will treat culture - augmentin 875 mg bid x 10 days - script sent  · More drainage - stable size  3/9/22  · Wound appearance improved, stable in size  · drawtek  3/16/22  · Wound slightly larger in size, new wound of ankle  · Continue Drawtek, change to profore  · Avoid shoes which will contact ankle area  3/23/22  · Wounds stable  · Overall appearance improved  · Will have pt see ID re cultures - disc with Dr Renée Zaragoza  3/30/22  · Much improved appearance  · On oral abx per ID - concerns re pt ability to work while on IV - will see how her wound progresses  4/6/22  · Appearance improved but size not much different  · Finished oral abx  · Will re culture next week if no significant size change - possible advance skin therapy  4/20/2022  · Ulcer on the medial aspect, fairly clean and granulating, ulcer of the lateral aspect, has some exudate, debrided  4/27/22  · Wounds stable   · Hypergranulation tissue removed  · Culture done - possible graft in future  5/4/22  · Wound stable  · Disc culture with Dr Renée Zaragoza who would like to start her on iv abx  5/11/22  · Wound stable  · Iv abx have not been started yet - spoke with Dr Renée Zaragoza - spoke with pt she will call his office  · She had spoke with [facility] but there were some issues    Wound/Ulcer Pain Timing/Severity: constant, moderate  Quality of pain: aching, throbbing, tender  Severity:  5 / 10   Modifying Factors: Pain worsens with dressing changes, debridement  Associated Signs/Symptoms: edema, drainage and pain    Ulcer Identification:  Ulcer Type: venous  Contributing Factors: edema and venous stasis    Diabetic/Pressure/Non Pressure Ulcers only:  Ulcer: Non-Pressure ulcer, fat layer exposed        PAST MEDICAL HISTORY      Diagnosis Date    Dizziness - light-headed     GERD (gastroesophageal reflux disease)     Herpes dermatitis 4/27/2017    Insomnia secondary to anxiety 4/6/2018    Lightheadedness     Migraine     Skin ulcer of buttock with fat layer exposed ([address] 7/20/2016    Venous stasis ulcer of left ankle with fat layer exposed with varicose veins ([address] 2/2/2022     Past Surgical History:   Procedure Laterality Date    CHOLECYSTECTOMY, LAPAROSCOPIC  04/08/2014    TONSILLECTOMY  1960    1960s    UPPER GASTROINTESTINAL ENDOSCOPY  12/13/2013    mild gastritis and small hiatal hernia, Dr Cristel Luna, [facility]  2015    GERD, Dr. Coni Leavitt, office     Family History   Problem Relation Age of Onset    Diabetes Mother     Hypertension Mother     Heart Disease Father     Heart Attack Father     Heart Surgery Father         angioplasty    Stroke Father     High Cholesterol Father     Heart Attack Maternal Grandfather      Social History     Tobacco Use    Smoking status: Never Smoker    Smokeless tobacco: Never Used   Vaping Use    Vaping Use: Never used   Substance Use Topics    Alcohol use: No    Drug use: No     Allergies   Allergen Reactions    Bee Pollen Anaphylaxis    Tetracyclines & Related Hives     Current Outpatient Medications on File Prior to Encounter   Medication Sig Dispense Refill    butalbital-acetaminophen-caffeine (FIORICET, ESGIC) -40 MG per tablet Take 1 tablet by mouth 3 times daily as needed for Headaches or Migraine Indications: Cluster Headache 90 tablet 5    omeprazole (PRILOSEC) 40 MG delayed release capsule Take 1 capsule by mouth daily 90 capsule 3    hydrOXYzine (ATARAX) 25 MG tablet take 1 tablet BID 60 tablet 5    aspirin-acetaminophen-caffeine (EXCEDRIN MIGRAINE) 250-250-65 MG per tablet Take 1 tablet by mouth as needed for Headaches 300 tablet 3    acyclovir (ZOVIRAX) 400 MG tablet take 1 tablet by mouth once daily 90 tablet 3    EPINEPHrine (EPIPEN) 0.3 MG/0.3ML SOAJ injection Use as directed for allergic reaction 1 each 5     No current facility-administered medications on file prior to encounter.        REVIEW OF SYSTEMS See HPI    Objective:    BP (!) 180/92   Pulse 84   Temp 97.4 °F (36.3 °C) (Tympanic)   Resp 20   Ht 5' 8\" (1.727 m)   Wt 180 lb (81.6 kg)   BMI 27.37 kg/m²   Wt Readings from Last 3 Encounters:   05/11/22 180 lb (81.6 kg)   05/04/22 180 lb (81.6 kg)   04/27/22 180 lb (81.6 kg)     PHYSICAL EXAM  CONSTITUTIONAL:   Awake, alert, cooperative   EYES:  lids and lashes normal   ENT: external ears and nose without lesions   NECK:  supple, symmetrical, trachea midline   SKIN:  Open wound Present    Assessment:     Problem List Items Addressed This Visit     Venous stasis ulcer of left ankle with fat layer exposed with varicose veins ([address] - Primary (Chronic)    Relevant Orders    Initiate Outpatient Wound Care Protocol          Pre Debridement Measurements:  Are located in the [place]  Documentation Flow Sheet  Post Debridement Measurements:  Wound/Ulcer Descriptions are Pre Debridement except measurements:     Wound 08/10/16 Other (Comment) Buttocks Left;Distal #2 acq 8/4/16 (Active)   Number of days: 2099       Wound 08/31/16 Buttocks Left;Proximal #3 aquired 8-27-26 (Active)   Number of days: 2079       Incision 04/08/14 Abdomen (Active)   Number of days: 6124       Wound 02/02/22 Ankle Left;Medial #1 (Active)   Wound Image   04/20/22 0734   Dressing Status New dressing applied 05/04/22 0844   Wound Cleansed Cleansed with saline 05/04/22 0844   Dressing/Treatment ABD 05/04/22 0844   Offloading for Diabetic Foot Ulcers Offloading not required 03/17/22 1342   Wound Length (cm) 7.9 cm 05/11/22 0752   Wound Width (cm) 5.5 cm 05/11/22 0752   Wound Depth (cm) 0.1 cm 05/11/22 0752   Wound Surface Area (cm^2) 43.45 cm^2 05/11/22 0752   Change in Wound Size % (l*w) -60.57 05/11/22 0752   Wound Volume (cm^3) 4.345 cm^3 05/11/22 0752   Wound Healing % -61 05/11/22 0752   Post-Procedure Length (cm) 7.9 cm 05/11/22 0813   Post-Procedure Width (cm) 5.7 cm 05/11/22 0813   Post-Procedure Depth (cm) 0.1 cm 05/11/22 0813   Post-Procedure Surface Area (cm^2) 45.03 cm^2 05/11/22 0813   Post-Procedure Volume (cm^3) 4.503 cm^3 05/11/22 0813   Wound Assessment Fibrin;Granulation tissue;Pink/red 05/11/22 0752   Drainage Amount Moderate 05/11/22 0752   Drainage Description Yellow;Green 05/11/22 0752   Odor None 05/11/22 0752   Melany-wound Assessment Dry/flaky 05/11/22 0752   Number of days: 98       Wound 03/16/22 Ankle Posterior; Left #2 (Active)   Wound Image   04/20/22 0734   Dressing Status New dressing applied 05/11/22 0835   Wound Cleansed Cleansed with saline 05/11/22 0835   Dressing/Treatment ABD 05/11/22 0835   Offloading for Diabetic Foot Ulcers Offloading not required 05/11/22 0835   Wound Length (cm) 2.3 cm 05/11/22 0752   Wound Width (cm) 1.8 cm 05/11/22 0752   Wound Depth (cm) 0.3 cm 05/11/22 0752   Wound Surface Area (cm^2) 4.14 cm^2 05/11/22 0752   Change in Wound Size % (l*w) -65.6 05/11/22 0752   Wound Volume (cm^3) 1.242 cm^3 05/11/22 0752   Wound Healing % -397 05/11/22 0752   Post-Procedure Length (cm) 2.4 cm 05/11/22 0813   Post-Procedure Width (cm) 1.9 cm 05/11/22 0813   Post-Procedure Depth (cm) 0.3 cm 05/11/22 0813   Post-Procedure Surface Area (cm^2) 4.56 cm^2 05/11/22 0813   Post-Procedure Volume (cm^3) 1.368 cm^3 05/11/22 0813   Wound Assessment Fibrin;Pink/red 05/11/22 0752   Drainage Amount Moderate 05/11/22 0752   Drainage Description Yellow;Green 05/11/22 0752   Odor None 05/11/22 0752   Melany-wound Assessment Intact 05/11/22 0752   Number of days: 56          Procedure Note  Indications:  Based on my examination of this patient's wound(s)/ulcer(s) today, debridement is required to promote healing and evaluate the wound base. Performed by: Hubert Becerra MD    Consent obtained:  Yes    Time out taken:  Yes    Pain Control: Anesthetic  Anesthetic: 4% Lidocaine Liquid Topical     Debridement:Excisional Debridement    Using curette the wound(s)/ulcer(s) was/were sharply debrided down through and including the removal of epidermis, dermis and subcutaneous tissue. Devitalized Tissue Debrided:  fibrin, biofilm, slough and exudate to stimulate bleeding to promote healing, post debridement good bleeding base and wound edges noted    Wound/Ulcer #: 1, 2    Percent of Wound/Ulcer Debrided: 50%    Total Surface Area Debrided:  20 sq cm     Estimated Blood Loss:  Minimal  Hemostasis Achieved:  by pressure    Procedural Pain:  6  / 10   Post Procedural Pain:  5 / 10     Response to treatment:  With complaints of pain. Plan:   Treatment Note please see attached Discharge Instructions    Written patient dismissal instructions given to patient and signed by patient or POA. Discharge Instructions       Visit Discharge/Physician Orders     Discharge condition: Stable     Assessment of pain at discharge: yes     Anesthetic used: 4% lidocaine solution     Discharge to: Home     Left via:Private automobile     Accompanied by:self     ECF/HHA: n/a     Dressing Orders:LEFT MEDIAL LOWER LEG-Cleanse with normal saline, apply drawtex, ABD pad and profore wrap, change weekly.     Treatment Orders: Eat a diet high in protein and vitamin C. Take a multiple vitamin daily unless contraindicated.      To see Dr. Mohan Callahan in future- his office to call     Must wear slip on shoe to work due to wrap on leg!     Antibiotics as per Dr Mohan Callahan     [facility] followup visit : 1 week_____________________________  (Please note your next appointment above and if you are unable to keep, kindly give a 24 hour notice.  Thank you.)     Physician signature:__________________________     If you experience any of the following, please call the [address] during business hours:     * Increase in Pain  * Temperature over 101  * Increase in drainage from your wound  * Drainage with a foul odor  * Bleeding  * Increase in swelling  * Need for compression bandage changes due to slippage, breakthrough drainage.     If you need medical attention outside of the business hours of the [address] please contact your PCP or go to the nearest emergency room.                                                     Electronically signed by Eleazar Fontenot MD on 5/11/2022 at 9:09 AM

## 2022-05-18 ENCOUNTER — HOSPITAL ENCOUNTER (OUTPATIENT)
Dept: INFUSION THERAPY | Age: 67
Setting detail: INFUSION SERIES
Discharge: HOME | End: 2022-05-18
Payer: MEDICAID

## 2022-05-18 ENCOUNTER — HOSPITAL ENCOUNTER (OUTPATIENT)
Dept: WOUND CARE | Age: 67
Discharge: HOME | End: 2022-05-18
Payer: MEDICAID

## 2022-05-18 ENCOUNTER — TELEPHONE (OUTPATIENT)
Dept: VASCULAR SURGERY | Age: 67
End: 2022-05-18

## 2022-05-18 VITALS
TEMPERATURE: 96.3 F | DIASTOLIC BLOOD PRESSURE: 80 MMHG | SYSTOLIC BLOOD PRESSURE: 171 MMHG | HEART RATE: 66 BPM | RESPIRATION RATE: 18 BRPM

## 2022-05-18 VITALS
TEMPERATURE: 99.4 F | HEART RATE: 80 BPM | WEIGHT: 180 LBS | DIASTOLIC BLOOD PRESSURE: 78 MMHG | HEIGHT: 68 IN | SYSTOLIC BLOOD PRESSURE: 152 MMHG | BODY MASS INDEX: 27.28 KG/M2 | RESPIRATION RATE: 18 BRPM

## 2022-05-18 DIAGNOSIS — L97.322 VENOUS STASIS ULCER OF LEFT ANKLE WITH FAT LAYER EXPOSED WITH VARICOSE VEINS (HCC): Primary | ICD-10-CM

## 2022-05-18 DIAGNOSIS — I83.023 VENOUS STASIS ULCER OF LEFT ANKLE WITH FAT LAYER EXPOSED WITH VARICOSE VEINS (HCC): Primary | ICD-10-CM

## 2022-05-18 DIAGNOSIS — I70.242 ATHEROSCLEROSIS OF NATIVE ARTERY OF LEFT LOWER EXTREMITY WITH ULCERATION OF CALF (HCC): Chronic | ICD-10-CM

## 2022-05-18 PROBLEM — I70.25 ATHEROSCLEROSIS OF NATIVE ARTERY OF EXTREMITY WITH ULCERATION (HCC): Chronic | Status: ACTIVE | Noted: 2022-05-18

## 2022-05-18 PROCEDURE — 36410 VNPNXR 3YR/> PHY/QHP DX/THER: CPT

## 2022-05-18 PROCEDURE — 76937 US GUIDE VASCULAR ACCESS: CPT

## 2022-05-18 PROCEDURE — 11042 DBRDMT SUBQ TIS 1ST 20SQCM/<: CPT

## 2022-05-18 PROCEDURE — 6370000000 HC RX 637 (ALT 250 FOR IP): Performed by: SURGERY

## 2022-05-18 PROCEDURE — 11042 DBRDMT SUBQ TIS 1ST 20SQCM/<: CPT | Performed by: SURGERY

## 2022-05-18 PROCEDURE — C1751 CATH, INF, PER/CENT/MIDLINE: HCPCS

## 2022-05-18 RX ORDER — BACITRACIN, NEOMYCIN, POLYMYXIN B 400; 3.5; 5 [USP'U]/G; MG/G; [USP'U]/G
OINTMENT TOPICAL ONCE
Status: CANCELLED | OUTPATIENT
Start: 2022-05-18 | End: 2022-05-18

## 2022-05-18 RX ORDER — LIDOCAINE 40 MG/G
CREAM TOPICAL ONCE
Status: CANCELLED | OUTPATIENT
Start: 2022-05-18 | End: 2022-05-18

## 2022-05-18 RX ORDER — GENTAMICIN SULFATE 1 MG/G
OINTMENT TOPICAL ONCE
Status: CANCELLED | OUTPATIENT
Start: 2022-05-18 | End: 2022-05-18

## 2022-05-18 RX ORDER — LIDOCAINE 50 MG/G
OINTMENT TOPICAL ONCE
Status: CANCELLED | OUTPATIENT
Start: 2022-05-18 | End: 2022-05-18

## 2022-05-18 RX ORDER — CLOBETASOL PROPIONATE 0.5 MG/G
OINTMENT TOPICAL ONCE
Status: CANCELLED | OUTPATIENT
Start: 2022-05-18 | End: 2022-05-18

## 2022-05-18 RX ORDER — BETAMETHASONE DIPROPIONATE 0.05 %
OINTMENT (GRAM) TOPICAL ONCE
Status: CANCELLED | OUTPATIENT
Start: 2022-05-18 | End: 2022-05-18

## 2022-05-18 RX ORDER — GINSENG 100 MG
CAPSULE ORAL ONCE
Status: CANCELLED | OUTPATIENT
Start: 2022-05-18 | End: 2022-05-18

## 2022-05-18 RX ORDER — LIDOCAINE HYDROCHLORIDE 40 MG/ML
SOLUTION TOPICAL ONCE
Status: COMPLETED | OUTPATIENT
Start: 2022-05-18 | End: 2022-05-18

## 2022-05-18 RX ORDER — BACITRACIN ZINC AND POLYMYXIN B SULFATE 500; 1000 [USP'U]/G; [USP'U]/G
OINTMENT TOPICAL ONCE
Status: CANCELLED | OUTPATIENT
Start: 2022-05-18 | End: 2022-05-18

## 2022-05-18 RX ORDER — LIDOCAINE HYDROCHLORIDE 20 MG/ML
JELLY TOPICAL ONCE
Status: CANCELLED | OUTPATIENT
Start: 2022-05-18 | End: 2022-05-18

## 2022-05-18 RX ORDER — LIDOCAINE HYDROCHLORIDE 40 MG/ML
SOLUTION TOPICAL ONCE
Status: CANCELLED | OUTPATIENT
Start: 2022-05-18 | End: 2022-05-18

## 2022-05-18 RX ORDER — OXYCODONE HYDROCHLORIDE AND ACETAMINOPHEN 5; 325 MG/1; MG/1
1 TABLET ORAL EVERY 6 HOURS PRN
Qty: 28 TABLET | Refills: 0 | Status: SHIPPED | OUTPATIENT
Start: 2022-05-18 | End: 2022-05-25

## 2022-05-18 RX ADMIN — LIDOCAINE HYDROCHLORIDE 10 ML: 40 SOLUTION TOPICAL at 08:05

## 2022-05-18 ASSESSMENT — PAIN DESCRIPTION - FREQUENCY: FREQUENCY: INTERMITTENT

## 2022-05-18 ASSESSMENT — PAIN DESCRIPTION - PAIN TYPE: TYPE: CHRONIC PAIN

## 2022-05-18 ASSESSMENT — PAIN DESCRIPTION - LOCATION: LOCATION: BACK;LEG

## 2022-05-18 ASSESSMENT — PAIN SCALES - GENERAL: PAINLEVEL_OUTOF10: 4

## 2022-05-18 ASSESSMENT — PAIN DESCRIPTION - ORIENTATION: ORIENTATION: LEFT

## 2022-05-18 ASSESSMENT — PAIN DESCRIPTION - DESCRIPTORS: DESCRIPTORS: ACHING;SHARP;STABBING

## 2022-05-18 ASSESSMENT — PAIN DESCRIPTION - ONSET: ONSET: ON-GOING

## 2022-05-18 NOTE — TELEPHONE ENCOUNTER
Scheduled abdominal aortogram possible intervention with Dr. Ayan Garcia 5/24 at 1:30 pm, pt will call back for instructions.

## 2022-05-18 NOTE — FLOWSHEET NOTE
Patient tolerated procedure well. Patient alert and oriented x3. No distress noted. Vital signs stable. Patient denies any new or worsening pain. Patient educated on mid line and about importance of not getting the site wet. Educated patient on possible signs and symptoms of infection at insertion site. Patient verbalized understanding. Offered patient education an/or discharge material.  Patient received. .   Patient denies any needs. All questions answered.

## 2022-05-18 NOTE — PROGRESS NOTES
POWER CHG MIDLINE Placement 5/18/2022    Product number: [id number]   Lot Number: [id number]      Ultrasound: YES/SONOSITE   Right Basilic vein:                Upper Arm Circumference: 28    Size: 4.5    Exposed Length: 0    Internal Length: 15   Cut: 0   Vein Measurement: 0.45    Sal Neff RN  5/18/2022  11:33 AM

## 2022-05-18 NOTE — PROGRESS NOTES
Wound Healing Center Followup Visit Note    Referring Physician : Dulce Hilario MD  [address] [place] [address] RECORD NUMBER:  [id number]  AGE: 61 y.o. GENDER: female  : 1955  EPISODE DATE:  2022    Subjective:     Chief Complaint   Patient presents with    Wound Check      HISTORY of PRESENT ILLNESS HPI   Renny Turner is a 61 y.o. female who presents today in regards to follow up evaluation and treatment of wound/ulcer. That patient's past medical, family and social hx were reviewed and changes were made if present. History of Wound Context:  The patient has had a wound of her left ankle/calf which was first noted approximately 2021. This has been treated local wound care. On their initial visit to the wound healing center, 22,  the patient has noted that the wound has been improving. The patient has not had similar previous wounds in the past.      She started seeing Dr. Elizabeth Ospina in 2021 and than Dr. Jamie Tapia. She was started in [facility] ~ 2021. She has noticed some improvement since starting unna boot. She is currently following with Dr. Samuel Maciel. Pt is not on abx at time of initial visit, but has been treated with previously by podiatry. She is not a DM. She is not a smoker. She denies hx of DVT, and per her report had recent us noting no evidence of dvt at [facility]. She also had arterial studies done. I had previously seen her in the past in regards to left buttock thigh wound, which started as abscess. Pt works at [employer] in [place] and is on her feet all day.     22  · Reflux study - if significant findings will schedule for fu  · Continue compression therapy [facility] boot per podiatry with aquacell dressing  · Elevation  · She does not have significant arterial occlusive disease  22  · Patient has asked to continuing following with me going forward because of our previous relationship  · She is going to let Dr. Dony Fuentes office know  · She tolerated unna boot and aquacell - some improvement  216/22  · Appearance improved, slightly larger  · Consider drawtek next week but overall drainage seems reasonably managed as periwound appears ok  2/23/2022  · The wound, has some exudate, no recent cultures were done, will do wound cultures today  3/2/22  · Reflux study reviewed - no significant reflux  · Will treat culture - augmentin 875 mg bid x 10 days - script sent  · More drainage - stable size  3/9/22  · Wound appearance improved, stable in size  · drawtek  3/16/22  · Wound slightly larger in size, new wound of ankle  · Continue Drawtek, change to profore  · Avoid shoes which will contact ankle area  3/23/22  · Wounds stable  · Overall appearance improved  · Will have pt see ID re cultures - disc with Dr Darrel Powell  3/30/22  · Much improved appearance  · On oral abx per ID - concerns re pt ability to work while on IV - will see how her wound progresses  4/6/22  · Appearance improved but size not much different  · Finished oral abx  · Will re culture next week if no significant size change - possible advance skin therapy  4/20/2022  · Ulcer on the medial aspect, fairly clean and granulating, ulcer of the lateral aspect, has some exudate, debrided  4/27/22  · Wounds stable   · Hypergranulation tissue removed  · Culture done - possible graft in future  5/4/22  · Wound stable  · Disc culture with Dr Darrel Powell who would like to start her on iv abx  5/11/22  · Wound stable  · Iv abx have not been started yet - spoke with Dr Darrel Powell - spoke with pt she will call his office  · She had spoke with [facility] but there were some issues  5/18/22  · Calf stable, ankle improved  · Iv abx to start this week after picc placement  · On exam   · L dp 2+, PT triphasic - with compression of dp - PT becomes weakly biphasic  · Concern that PT though triphasic is not getting inline flow and as a result isn't healing in the calf distribution because of occlusive disease  · We discussed angiogram Use Topics    Alcohol use: No    Drug use: No     Allergies   Allergen Reactions    Bee Pollen Anaphylaxis    Tetracyclines & Related Hives     Current Outpatient Medications on File Prior to Encounter   Medication Sig Dispense Refill    butalbital-acetaminophen-caffeine (FIORICET, ESGIC) -40 MG per tablet Take 1 tablet by mouth 3 times daily as needed for Headaches or Migraine Indications: Cluster Headache 90 tablet 5    omeprazole (PRILOSEC) 40 MG delayed release capsule Take 1 capsule by mouth daily 90 capsule 3    hydrOXYzine (ATARAX) 25 MG tablet take 1 tablet BID 60 tablet 5    aspirin-acetaminophen-caffeine (EXCEDRIN MIGRAINE) 250-250-65 MG per tablet Take 1 tablet by mouth as needed for Headaches 300 tablet 3    acyclovir (ZOVIRAX) 400 MG tablet take 1 tablet by mouth once daily 90 tablet 3    EPINEPHrine (EPIPEN) 0.3 MG/0.3ML SOAJ injection Use as directed for allergic reaction 1 each 5     No current facility-administered medications on file prior to encounter.        REVIEW OF SYSTEMS See HPI    Objective:    BP (!) 152/78   Pulse 80   Temp 99.4 °F (37.4 °C) (Temporal)   Resp 18   Ht 5' 8\" (1.727 m)   Wt 180 lb (81.6 kg)   BMI 27.37 kg/m²   Wt Readings from Last 3 Encounters:   05/18/22 180 lb (81.6 kg)   05/11/22 180 lb (81.6 kg)   05/04/22 180 lb (81.6 kg)     PHYSICAL EXAM  CONSTITUTIONAL:   Awake, alert, cooperative   EYES:  lids and lashes normal   ENT: external ears and nose without lesions   NECK:  supple, symmetrical, trachea midline   SKIN:  Open wound Present    Assessment:     Problem List Items Addressed This Visit     Venous stasis ulcer of left ankle with fat layer exposed with varicose veins (HCC) - Primary (Chronic)    Relevant Orders    Initiate Outpatient Wound Care Protocol    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 ([address] (Chronic)          Pre Debridement Measurements:  Are located in the [facility]  Documentation Flow Sheet  Post Debridement Measurements:  Wound/Ulcer Descriptions are Pre Debridement except measurements:     Wound 08/10/16 Other (Comment) Buttocks Left;Distal #2 acq 8/4/16 (Active)   Number of days: 2106       Wound 08/31/16 Buttocks Left;Proximal #3 aquired 8-27-26 (Active)   Number of days: 2086       Incision 04/08/14 Abdomen (Active)   Number of days: 2961       Wound 02/02/22 Ankle Left;Medial #1 (Active)   Wound Image   05/18/22 0803   Dressing Status New dressing applied 05/04/22 0844   Wound Cleansed Cleansed with saline 05/04/22 0844   Dressing/Treatment ABD 05/04/22 0844   Offloading for Diabetic Foot Ulcers Offloading not required 03/17/22 1342   Wound Length (cm) 8 cm 05/18/22 0803   Wound Width (cm) 4.5 cm 05/18/22 0803   Wound Depth (cm) 0.1 cm 05/18/22 0803   Wound Surface Area (cm^2) 36 cm^2 05/18/22 0803   Change in Wound Size % (l*w) -33.04 05/18/22 0803   Wound Volume (cm^3) 3.6 cm^3 05/18/22 0803   Wound Healing % -33 05/18/22 0803   Post-Procedure Length (cm) 8.1 cm 05/18/22 0841   Post-Procedure Width (cm) 4.8 cm 05/18/22 0841   Post-Procedure Depth (cm) 0.1 cm 05/18/22 0841   Post-Procedure Surface Area (cm^2) 38.88 cm^2 05/18/22 0841   Post-Procedure Volume (cm^3) 3.888 cm^3 05/18/22 0841   Wound Assessment Fibrin;Pink/red 05/18/22 0803   Drainage Amount Moderate 05/18/22 0803   Drainage Description Serosanguinous; Yellow 05/18/22 0803   Odor None 05/18/22 0803   Melany-wound Assessment Excoriated 05/18/22 0803   Number of days: 105       Wound 03/16/22 Ankle Posterior; Left #2 (Active)   Wound Image   05/18/22 0803   Dressing Status New dressing applied 05/18/22 0903   Wound Cleansed Cleansed with saline 05/18/22 0903   Dressing/Treatment ABD; Alginate; Foam 05/18/22 0903   Offloading for Diabetic Foot Ulcers Offloading not required 05/11/22 0835   Wound Length (cm) 3 cm 05/18/22 0803   Wound Width (cm) 1.3 cm 05/18/22 0803   Wound Depth (cm) 0.4 cm 05/18/22 0803   Wound Surface Area (cm^2) 3.9 cm^2 05/18/22 0803 Change in Wound Size % (l*w) -56 05/18/22 0803   Wound Volume (cm^3) 1.56 cm^3 05/18/22 0803   Wound Healing % -524 05/18/22 0803   Post-Procedure Length (cm) 3 cm 05/18/22 0841   Post-Procedure Width (cm) 1.6 cm 05/18/22 0841   Post-Procedure Depth (cm) 0.4 cm 05/18/22 0841   Post-Procedure Surface Area (cm^2) 4.8 cm^2 05/18/22 0841   Post-Procedure Volume (cm^3) 1.92 cm^3 05/18/22 0841   Wound Assessment Fibrin;Pink/red 05/18/22 0803   Drainage Amount Moderate 05/18/22 0803   Drainage Description Serosanguinous; Yellow 05/18/22 0803   Odor None 05/18/22 0803   Melany-wound Assessment Excoriated 05/18/22 0803   Number of days: 63          Procedure Note  Indications:  Based on my examination of this patient's wound(s)/ulcer(s) today, debridement is required to promote healing and evaluate the wound base. Performed by: Ethel Gomez MD    Consent obtained:  Yes    Time out taken:  Yes    Pain Control: Anesthetic  Anesthetic: 4% Lidocaine Liquid Topical     Debridement:Excisional Debridement    Using curette the wound(s)/ulcer(s) was/were sharply debrided down through and including the removal of epidermis, dermis and subcutaneous tissue. Devitalized Tissue Debrided:  fibrin, biofilm, slough and exudate to stimulate bleeding to promote healing, post debridement good bleeding base and wound edges noted    Wound/Ulcer #: 1, 2    Percent of Wound/Ulcer Debrided: 50%    Total Surface Area Debrided:  20 sq cm     Estimated Blood Loss:  Minimal  Hemostasis Achieved:  by pressure    Procedural Pain:  8  / 10   Post Procedural Pain:  7 / 10     Response to treatment:  With complaints of pain. Plan:   Treatment Note please see attached Discharge Instructions    Written patient dismissal instructions given to patient and signed by patient or POA.          Discharge Instructions       Visit Discharge/Physician Orders     Discharge condition: Stable     Assessment of pain at discharge: yes     Anesthetic used: 4% lidocaine solution     Discharge to: Home     Left via:Private automobile     Accompanied by:self     ECF/HHA: n/a     Dressing Orders:LEFT MEDIAL LOWER LEG-Cleanse with normal saline, apply drawtex, ABD pad and profore wrap, change weekly.     Treatment Orders: Eat a diet high in protein and vitamin C. Take a multiple vitamin daily unless contraindicated.      To see Dr. Julia Marcus -to start IV antibiotics per his orders      Must wear slip on shoe to work due to wrap on leg!     Angiogram in future- Dr. Charo Mata office will call     [address] [place] [address],[address] followup visit : 1 week_____________________________  (Please note your next appointment above and if you are unable to keep, kindly give a 24 hour notice.  Thank you.)     Physician signature:__________________________     If you experience any of the following, please call the [facility] during business hours:     * Increase in Pain  * Temperature over 101  * Increase in drainage from your wound  * Drainage with a foul odor  * Bleeding  * Increase in swelling  * Need for compression bandage changes due to slippage, breakthrough drainage.     If you need medical attention outside of the business hours of the [facility] please contact your PCP or go to the nearest emergency room.    Margareth Espinosa                 Electronically signed by Leora Abdullahi MD on 5/18/2022 at 9:13 AM

## 2022-05-23 ENCOUNTER — TELEPHONE (OUTPATIENT)
Dept: CARDIAC CATH/INVASIVE PROCEDURES | Age: 67
End: 2022-05-23

## 2022-05-24 ENCOUNTER — HOSPITAL ENCOUNTER (OUTPATIENT)
Dept: CARDIAC CATH/INVASIVE PROCEDURES | Age: 67
Discharge: HOME | End: 2022-05-24
Payer: MEDICAID

## 2022-05-24 VITALS
OXYGEN SATURATION: 100 % | WEIGHT: 160 LBS | HEIGHT: 68 IN | HEART RATE: 77 BPM | RESPIRATION RATE: 17 BRPM | DIASTOLIC BLOOD PRESSURE: 79 MMHG | SYSTOLIC BLOOD PRESSURE: 195 MMHG | BODY MASS INDEX: 24.25 KG/M2 | TEMPERATURE: 98.3 F

## 2022-05-24 DIAGNOSIS — I73.9 PVD (PERIPHERAL VASCULAR DISEASE) (HCC): ICD-10-CM

## 2022-05-24 LAB
ABO/RH: NORMAL
ANION GAP SERPL CALCULATED.3IONS-SCNC: 7 MMOL/L (ref 7–16)
ANTIBODY SCREEN: NORMAL
BUN BLDV-MCNC: 14 MG/DL (ref 6–23)
CALCIUM SERPL-MCNC: 8.8 MG/DL (ref 8.6–10.2)
CHLORIDE BLD-SCNC: 105 MMOL/L (ref 98–107)
CO2: 26 MMOL/L (ref 22–29)
CREAT SERPL-MCNC: 0.6 MG/DL (ref 0.5–1)
GFR AFRICAN AMERICAN: >60
GFR NON-AFRICAN AMERICAN: >60 ML/MIN/1.73
GLUCOSE BLD-MCNC: 100 MG/DL (ref 74–99)
HCT VFR BLD CALC: 27.1 % (ref 34–48)
HEMOGLOBIN: 8.1 G/DL (ref 11.5–15.5)
INR BLD: 1.1
MCH RBC QN AUTO: 22.4 PG (ref 26–35)
MCHC RBC AUTO-ENTMCNC: 29.9 % (ref 32–34.5)
MCV RBC AUTO: 75.1 FL (ref 80–99.9)
PDW BLD-RTO: 17.5 FL (ref 11.5–15)
PLATELET # BLD: 303 E9/L (ref 130–450)
PMV BLD AUTO: 10 FL (ref 7–12)
POTASSIUM REFLEX MAGNESIUM: 4 MMOL/L (ref 3.5–5)
PROTHROMBIN TIME: 11.9 SEC (ref 9.3–12.4)
RBC # BLD: 3.61 E12/L (ref 3.5–5.5)
SODIUM BLD-SCNC: 138 MMOL/L (ref 132–146)
WBC # BLD: 6.5 E9/L (ref 4.5–11.5)

## 2022-05-24 PROCEDURE — C1760 CLOSURE DEV, VASC: HCPCS

## 2022-05-24 PROCEDURE — 86901 BLOOD TYPING SEROLOGIC RH(D): CPT

## 2022-05-24 PROCEDURE — 36415 COLL VENOUS BLD VENIPUNCTURE: CPT

## 2022-05-24 PROCEDURE — 2709999900 HC NON-CHARGEABLE SUPPLY

## 2022-05-24 PROCEDURE — 80048 BASIC METABOLIC PNL TOTAL CA: CPT

## 2022-05-24 PROCEDURE — 86900 BLOOD TYPING SEROLOGIC ABO: CPT

## 2022-05-24 PROCEDURE — C1769 GUIDE WIRE: HCPCS

## 2022-05-24 PROCEDURE — 2500000003 HC RX 250 WO HCPCS

## 2022-05-24 PROCEDURE — 6360000002 HC RX W HCPCS

## 2022-05-24 PROCEDURE — C1887 CATHETER, GUIDING: HCPCS

## 2022-05-24 PROCEDURE — 75710 ARTERY X-RAYS ARM/LEG: CPT | Performed by: SURGERY

## 2022-05-24 PROCEDURE — 85027 COMPLETE CBC AUTOMATED: CPT

## 2022-05-24 PROCEDURE — 37228 PR REVSC OPN/PRQ TIB/PERO W/ANGIOPLASTY UNI: CPT | Performed by: SURGERY

## 2022-05-24 PROCEDURE — 85610 PROTHROMBIN TIME: CPT

## 2022-05-24 PROCEDURE — C1894 INTRO/SHEATH, NON-LASER: HCPCS

## 2022-05-24 PROCEDURE — 37232 PR REVSC OPN/PRQ TIB/PERO W/ANGIOPLASTY UNI EA VSL: CPT | Performed by: SURGERY

## 2022-05-24 PROCEDURE — C1725 CATH, TRANSLUMIN NON-LASER: HCPCS

## 2022-05-24 PROCEDURE — 37228 HC TIB PER TERRITORY PLASTY: CPT

## 2022-05-24 PROCEDURE — 37232 HC TIB PER TERR ADDL PLASTY: CPT

## 2022-05-24 PROCEDURE — 86850 RBC ANTIBODY SCREEN: CPT

## 2022-05-24 PROCEDURE — 6370000000 HC RX 637 (ALT 250 FOR IP)

## 2022-05-24 PROCEDURE — 75710 ARTERY X-RAYS ARM/LEG: CPT

## 2022-05-24 PROCEDURE — 2580000003 HC RX 258

## 2022-05-24 RX ORDER — ONDANSETRON 2 MG/ML
4 INJECTION INTRAMUSCULAR; INTRAVENOUS EVERY 6 HOURS PRN
Status: DISCONTINUED | OUTPATIENT
Start: 2022-05-24 | End: 2022-05-25 | Stop reason: HOSPADM

## 2022-05-24 RX ORDER — SODIUM CHLORIDE 0.9 % (FLUSH) 0.9 %
5-40 SYRINGE (ML) INJECTION PRN
Status: DISCONTINUED | OUTPATIENT
Start: 2022-05-24 | End: 2022-05-24

## 2022-05-24 RX ORDER — ACETAMINOPHEN 325 MG/1
650 TABLET ORAL EVERY 4 HOURS PRN
Status: DISCONTINUED | OUTPATIENT
Start: 2022-05-24 | End: 2022-05-25 | Stop reason: HOSPADM

## 2022-05-24 RX ORDER — SODIUM CHLORIDE 9 MG/ML
INJECTION, SOLUTION INTRAVENOUS CONTINUOUS
Status: DISCONTINUED | OUTPATIENT
Start: 2022-05-24 | End: 2022-05-24

## 2022-05-24 RX ORDER — SODIUM CHLORIDE 0.9 % (FLUSH) 0.9 %
5-40 SYRINGE (ML) INJECTION EVERY 12 HOURS SCHEDULED
Status: DISCONTINUED | OUTPATIENT
Start: 2022-05-24 | End: 2022-05-24

## 2022-05-24 RX ORDER — SODIUM CHLORIDE 9 MG/ML
INJECTION, SOLUTION INTRAVENOUS PRN
Status: DISCONTINUED | OUTPATIENT
Start: 2022-05-24 | End: 2022-05-24

## 2022-05-24 RX ADMIN — SODIUM CHLORIDE: 9 INJECTION, SOLUTION INTRAVENOUS at 14:00

## 2022-05-24 NOTE — OP NOTE
Cardiovascular Lab Procedure Report    Tammy Candelaria  1955    Date : 5/24/2022  Surgeon: Tami Gottron, M.D. Pre-procedure Diagnosis: L LE tissue loss  Post-procedure Diagnosis: Same  Procedure:        Right  common femoral artery access   with US guidance     6 fr angioseal used for closure    TF Left lower extremity angiogram   39712 Angiogram with catheter in Left   Common femoral, popliteal artery   61969 Left aterior tibial artery plasty with 2.5x120 nanocross   58730 Left distal peroneal, tarsal branch plasty with 2-1.5x210 nanocross   Anesthesia: Local with IV sedation  Assistants: Cath Lab Staff  Estimated Blood Loss: Minimal  Complications: none  Findings:    Left Left s/p Intervention   Common Femoral Art Patent Patent   Superficial Femoral Art Patent Patent   Profunda Femoral Art Patent Patent   AK Popliteal Art Patent Patent   BK Popliteal Art Patent Patent   Anterior Tibial Art Proximal stenosis ~ 50%   Distally small 50% stenosis Patent   Tibioperoneal Trunk Patent Patent   Peroneal Art Patent but distally diseased, occluded tarsal branches Patent, improved flow in tarsal branches   Posterior Tibial Art Patent Patent     Procedure Details :  Timeout preformed identifying pt and procedure. Groins prepped and draped in sterile fashion. Patient given sedation as needed throughout the case. Right  common femoral artery was noted to be patent and was accessed under ultrasound guidance after infiltrating with local.  Micropuncture placed, exchanged out for 5 fr sheath. Advantage glide wire and contra 2 catheter advanced into aorta. Aortogram preformed. Contra 2 and wire used to access Left  iliac system and angiogram of theLeft  lower extremity preformed. A high grade stenosis of the tibials was noted. Patient was given 6000 units of heparin and than a 6 fr destination sheath advanced to popliteal artery on the Left.  0.35 Trail blazer catheter and advantage glide wire used to traverse stenosis. The AT lesion was treated with plasty as above. The peroneal and tarsal branch lesion was treated with plasty as above. There was evidence of some residual stenosis. Completion angiogram noted much improved filling distally and brisk flow though the stenotic area. Sheath and wire pulled back to Right iliac system.   After femoral angiogram a 6 fr angioseal device used to close the Right common femoral artery    Postop Exam  Right DP 1+  PT 1+  Left Wrap in place DP triphasic    Plan  Continue Medical Management with asa  Encourage continued tobacco cessation  No plans for further surgical intervention     Emi Garcia MD    PCP : Basilio Prince MD

## 2022-05-24 NOTE — H&P
Vascular Surgery History & Physical Exam    Chief Complaint: Peripheral vascular disease, L LE tissue loss    HISTORY OF PRESENT ILLNESS:    The patient is a 61 y.o. female who presents to the hospital for elective arteriogram with possible intervention. The patient has a history of peripheral vascular disease and L LE tissue loss. 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 ([address] 5/18/2022    Dizziness - light-headed     GERD (gastroesophageal reflux disease)     Herpes dermatitis 4/27/2017    Insomnia secondary to anxiety 4/6/2018    Lightheadedness     Migraine     Skin ulcer of buttock with fat layer exposed ([address] 7/20/2016    Venous stasis ulcer of left ankle with fat layer exposed with varicose veins ([address] 2/2/2022     Past Surgical History:   Procedure Laterality Date    CHOLECYSTECTOMY, LAPAROSCOPIC  04/08/2014    TONSILLECTOMY  1960    1960s    UPPER GASTROINTESTINAL ENDOSCOPY  12/13/2013    mild gastritis and small hiatal hernia, Dr Melisa Housepe, [facility]  2015    GERD, Dr. Naidu Pa, office     Current Medications:     Current Outpatient Medications:     oxyCODONE-acetaminophen (PERCOCET) 5-325 MG per tablet, Take 1 tablet by mouth every 6 hours as needed for Pain for up to 7 days. Intended supply: 7 days.  Take lowest dose possible to manage pain, Disp: 28 tablet, Rfl: 0    butalbital-acetaminophen-caffeine (FIORICET, ESGIC) -40 MG per tablet, Take 1 tablet by mouth 3 times daily as needed for Headaches or Migraine Indications: Cluster Headache, Disp: 90 tablet, Rfl: 5    omeprazole (PRILOSEC) 40 MG delayed release capsule, Take 1 capsule by mouth daily, Disp: 90 capsule, Rfl: 3    hydrOXYzine (ATARAX) 25 MG tablet, take 1 tablet BID, Disp: 60 tablet, Rfl: 5    aspirin-acetaminophen-caffeine (EXCEDRIN MIGRAINE) 250-250-65 MG per tablet, Take 1 tablet by mouth as needed for Headaches, Disp: 300 tablet, Rfl: 3    acyclovir (ZOVIRAX) 400 MG tablet, take 1 tablet by mouth once daily, Disp: 90 tablet, Rfl: 3    EPINEPHrine (EPIPEN) 0.3 MG/0.3ML SOAJ injection, Use as directed for allergic reaction, Disp: 1 each, Rfl: 5    Current Facility-Administered Medications:     0.9 % sodium chloride infusion, , IntraVENous, Continuous, Hayden Brown APRN - CNP    sodium chloride flush 0.9 % injection 5-40 mL, 5-40 mL, IntraVENous, 2 times per day, Hayden Brown APRN - CNP    sodium chloride flush 0.9 % injection 5-40 mL, 5-40 mL, IntraVENous, PRN, Hayden Brown APRN - CNP    0.9 % sodium chloride infusion, , IntraVENous, PRN, Haydne Brown, APRN - CNP  Allergies:  Bee pollen and Tetracyclines & related  Social History     Socioeconomic History    Marital status: Single     Spouse name: Not on file    Number of children: Not on file    Years of education: Not on file    Highest education level: Not on file   Occupational History    Not on file   Tobacco Use    Smoking status: Never Smoker    Smokeless tobacco: Never Used   Vaping Use    Vaping Use: Never used   Substance and Sexual Activity    Alcohol use: No    Drug use: No    Sexual activity: Not on file   Other Topics Concern    Not on file   Social History Narrative    Not on file     Social Determinants of Health     Financial Resource Strain: Low Risk     Difficulty of Paying Living Expenses: Not hard at all   Food Insecurity: No Food Insecurity    Worried About Running Out of Food in the Last Year: Never true    Mariza of Food in the Last Year: Never true   Transportation Needs:     Lack of Transportation (Medical): Not on file    Lack of Transportation (Non-Medical):  Not on file   Physical Activity:     Days of Exercise per Week: Not on file    Minutes of Exercise per Session: Not on file   Stress:     Feeling of Stress : Not on file   Social Connections:     Frequency of Communication with Friends and Family: Not on file    Frequency of Social Gatherings with Friends and Family: Not on file    Attends Yazidi Services: Not on file    Active Member of [address] [place] [address] [employer] or Organizations: Not on file    Attends Club or Organization Meetings: Not on file    Marital Status: Not on file   Intimate Partner Violence:     Fear of Current or Ex-Partner: Not on file    Emotionally Abused: Not on file    Physically Abused: Not on file    Sexually Abused: Not on file   Housing Stability:     Unable to Pay for Housing in the Last Year: Not on file    Number of [place] in the Last Year: Not on file    Unstable Housing in the Last Year: Not on file     Family History   Problem Relation Age of Onset    Diabetes Mother     Hypertension Mother     Heart Disease Father     Heart Attack Father     Heart Surgery Father         angioplasty    Stroke Father     High Cholesterol Father     Heart Attack Maternal Grandfather      PHYSICAL EXAM:    Vitals:    05/24/22 1214   BP: (!) 195/79   Pulse: 77   Resp: 17   Temp: 98.3 °F (36.8 °C)   SpO2: 100%     CONSTITUTIONAL:  awake, alert, cooperative, no apparent distress, and appears stated age  NECK:  supple, symmetrical, trachea midline  LUNGS:  no increased work of breathing, good resp excursion  CARDIOVASCULAR:  regular rate and rhythm  ABDOMEN:  soft, non-distended and non-tenderl   Pulse Exam   R femoral 2+ L femoral 2+   R dorsalis pedis 2+ L dorsalis pedis 2+   R posterior tibial 1+ L posterior tibial triphasic     LABS:    Lab Results   Component Value Date    WBC 6.4 05/18/2022    HGB 7.2 (L) 05/18/2022    HCT 25.2 (L) 05/18/2022     05/18/2022 PROTIME 11.2 11/14/2012    INR 1.0 11/14/2012    APTT 31.7 11/14/2012    K 3.9 05/18/2022    BUN 11 05/18/2022    CREATININE 0.8 05/18/2022     Assesment:  Peripheral vascular disease. L LE tissuel oss  PLAN:    · Aortogram,  Left lower extremity arteriogram, possible intervention. · I reviewed the procedure with the patient and family as available. I discussed the procedure, risks, benefits, complications, and alternatives of the procedure. They understand and consent.   All questions were answered    Emi Garcia MD

## 2022-05-25 ENCOUNTER — HOSPITAL ENCOUNTER (OUTPATIENT)
Dept: WOUND CARE | Age: 67
Discharge: HOME | End: 2022-05-25
Payer: MEDICAID

## 2022-05-25 VITALS
RESPIRATION RATE: 18 BRPM | SYSTOLIC BLOOD PRESSURE: 126 MMHG | HEART RATE: 76 BPM | DIASTOLIC BLOOD PRESSURE: 74 MMHG | BODY MASS INDEX: 24.25 KG/M2 | WEIGHT: 160 LBS | TEMPERATURE: 96.7 F | HEIGHT: 68 IN

## 2022-05-25 DIAGNOSIS — I83.023 VENOUS STASIS ULCER OF LEFT ANKLE WITH FAT LAYER EXPOSED WITH VARICOSE VEINS (HCC): Primary | ICD-10-CM

## 2022-05-25 DIAGNOSIS — I70.242 ATHEROSCLEROSIS OF NATIVE ARTERY OF LEFT LOWER EXTREMITY WITH ULCERATION OF CALF (HCC): Chronic | ICD-10-CM

## 2022-05-25 DIAGNOSIS — L97.322 VENOUS STASIS ULCER OF LEFT ANKLE WITH FAT LAYER EXPOSED WITH VARICOSE VEINS (HCC): Primary | ICD-10-CM

## 2022-05-25 PROCEDURE — 11045 DBRDMT SUBQ TISS EACH ADDL: CPT | Performed by: SURGERY

## 2022-05-25 PROCEDURE — 11042 DBRDMT SUBQ TIS 1ST 20SQCM/<: CPT | Performed by: SURGERY

## 2022-05-25 PROCEDURE — 11045 DBRDMT SUBQ TISS EACH ADDL: CPT

## 2022-05-25 PROCEDURE — 11042 DBRDMT SUBQ TIS 1ST 20SQCM/<: CPT

## 2022-05-25 RX ORDER — BACITRACIN ZINC AND POLYMYXIN B SULFATE 500; 1000 [USP'U]/G; [USP'U]/G
OINTMENT TOPICAL ONCE
Status: CANCELLED | OUTPATIENT
Start: 2022-05-25 | End: 2022-05-25

## 2022-05-25 RX ORDER — LIDOCAINE HYDROCHLORIDE 40 MG/ML
SOLUTION TOPICAL ONCE
Status: COMPLETED | OUTPATIENT
Start: 2022-05-25 | End: 2022-05-25

## 2022-05-25 RX ORDER — GINSENG 100 MG
CAPSULE ORAL ONCE
Status: CANCELLED | OUTPATIENT
Start: 2022-05-25 | End: 2022-05-25

## 2022-05-25 RX ORDER — LIDOCAINE HYDROCHLORIDE 20 MG/ML
JELLY TOPICAL ONCE
Status: CANCELLED | OUTPATIENT
Start: 2022-05-25 | End: 2022-05-25

## 2022-05-25 RX ORDER — GENTAMICIN SULFATE 1 MG/G
OINTMENT TOPICAL ONCE
Status: CANCELLED | OUTPATIENT
Start: 2022-05-25 | End: 2022-05-25

## 2022-05-25 RX ORDER — LIDOCAINE 40 MG/G
CREAM TOPICAL ONCE
Status: CANCELLED | OUTPATIENT
Start: 2022-05-25 | End: 2022-05-25

## 2022-05-25 RX ORDER — LIDOCAINE HYDROCHLORIDE 40 MG/ML
SOLUTION TOPICAL ONCE
Status: CANCELLED | OUTPATIENT
Start: 2022-05-25 | End: 2022-05-25

## 2022-05-25 RX ORDER — BETAMETHASONE DIPROPIONATE 0.05 %
OINTMENT (GRAM) TOPICAL ONCE
Status: CANCELLED | OUTPATIENT
Start: 2022-05-25 | End: 2022-05-25

## 2022-05-25 RX ORDER — LIDOCAINE 50 MG/G
OINTMENT TOPICAL ONCE
Status: CANCELLED | OUTPATIENT
Start: 2022-05-25 | End: 2022-05-25

## 2022-05-25 RX ORDER — CLOBETASOL PROPIONATE 0.5 MG/G
OINTMENT TOPICAL ONCE
Status: CANCELLED | OUTPATIENT
Start: 2022-05-25 | End: 2022-05-25

## 2022-05-25 RX ORDER — BACITRACIN, NEOMYCIN, POLYMYXIN B 400; 3.5; 5 [USP'U]/G; MG/G; [USP'U]/G
OINTMENT TOPICAL ONCE
Status: CANCELLED | OUTPATIENT
Start: 2022-05-25 | End: 2022-05-25

## 2022-05-25 RX ADMIN — LIDOCAINE HYDROCHLORIDE: 40 SOLUTION TOPICAL at 07:53

## 2022-05-25 NOTE — PLAN OF CARE
Problem: Wound:  Goal: Will show signs of wound healing; wound closure and no evidence of infection  Description: Will show signs of wound healing; wound closure and no evidence of infection  Outcome: Progressing     Problem: Venous:  Goal: Signs of wound healing will improve  Description: Signs of wound healing will improve  Outcome: Adequate for Discharge

## 2022-05-25 NOTE — PROGRESS NOTES
Wound Healing Center Followup Visit Note    Referring Physician : Prakash Khan MD  [address] [place] [address] RECORD NUMBER:  [id number]  AGE: 61 y.o. GENDER: female  : 1955  EPISODE DATE:  2022    Subjective:     Chief Complaint   Patient presents with    Wound Check     left leg      HISTORY of PRESENT ILLNESS HPI   Leanne Cohn is a 61 y.o. female who presents today in regards to follow up evaluation and treatment of wound/ulcer. That patient's past medical, family and social hx were reviewed and changes were made if present. History of Wound Context:  The patient has had a wound of her left ankle/calf which was first noted approximately 2021. This has been treated local wound care. On their initial visit to the wound healing center, 22,  the patient has noted that the wound has been improving. The patient has not had similar previous wounds in the past.      She started seeing Dr. Oli Rebolledo in 2021 and than Dr. Gian Singletary. She was started in [facility] ~ 2021. She has noticed some improvement since starting unna boot. She is currently following with Dr. Luis A Pena. Pt is not on abx at time of initial visit, but has been treated with previously by podiatry. She is not a DM. She is not a smoker. She denies hx of DVT, and per her report had recent us noting no evidence of dvt at [facility]. She also had arterial studies done. I had previously seen her in the past in regards to left buttock thigh wound, which started as abscess. Pt works at [employer] in [facility] [employer] [facility] and is on her feet all day.     22  · Reflux study - if significant findings will schedule for fu  · Continue compression therapy [facility] boot per podiatry with aquacell dressing  · Elevation  · She does not have significant arterial occlusive disease  22  · Patient has asked to continuing following with me going forward because of our previous relationship  · She is going to let Dr. Samantha rizzo know  · She tolerated unna boot and aquacell - some improvement  216/22  · Appearance improved, slightly larger  · Consider drawtek next week but overall drainage seems reasonably managed as periwound appears ok  2/23/2022  · The wound, has some exudate, no recent cultures were done, will do wound cultures today  3/2/22  · Reflux study reviewed - no significant reflux  · Will treat culture - augmentin 875 mg bid x 10 days - script sent  · More drainage - stable size  3/9/22  · Wound appearance improved, stable in size  · drawtek  3/16/22  · Wound slightly larger in size, new wound of ankle  · Continue Drawtek, change to profore  · Avoid shoes which will contact ankle area  3/23/22  · Wounds stable  · Overall appearance improved  · Will have pt see ID re cultures - disc with Dr Mohan Callahan  3/30/22  · Much improved appearance  · On oral abx per ID - concerns re pt ability to work while on IV - will see how her wound progresses  4/6/22  · Appearance improved but size not much different  · Finished oral abx  · Will re culture next week if no significant size change - possible advance skin therapy  4/20/2022  · Ulcer on the medial aspect, fairly clean and granulating, ulcer of the lateral aspect, has some exudate, debrided  4/27/22  · Wounds stable   · Hypergranulation tissue removed  · Culture done - possible graft in future  5/4/22  · Wound stable  · Disc culture with Dr Mohan Callahan who would like to start her on iv abx  5/11/22  · Wound stable  · Iv abx have not been started yet - spoke with Dr Mohan Calalhan - spoke with pt she will call his office  · She had spoke with [facility] but there were some issues  5/18/22  · Calf stable, ankle improved  · Iv abx to start this week after picc placement  · On exam   · L dp 2+, PT triphasic - with compression of dp - PT becomes weakly biphasic  · Concern that PT though triphasic is not getting inline flow and as a result isn't healing in the calf distribution because of occlusive disease  · We discussed angiogram - will schedule  I reviewed the procedure with the patient and family as available. I discussed the procedure, risks, benefits, complications, and alternatives of the procedure. They understand and consent.   All questions were answered  Script for percocet 5/325 mg #28 prn q6 hrs - given, oarrs run  5/24/22  · Angio, L PT and peroneal plasty  5/25/22  · On iv bx  · Wound appearance better  · R groin no hematoma, L DP 2+, PT triphasic     Wound/Ulcer Pain Timing/Severity: constant, moderate  Quality of pain: aching, throbbing, tender  Severity:  7 / 10   Modifying Factors: Pain worsens with dressing changes, debridement  Associated Signs/Symptoms: edema, drainage and pain    Ulcer Identification:  Ulcer Type: venous  Contributing Factors: edema and venous stasis    Diabetic/Pressure/Non Pressure Ulcers only:  Ulcer: Non-Pressure ulcer, fat layer exposed        PAST MEDICAL HISTORY      Diagnosis Date    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 ([address] 5/18/2022    Dizziness - light-headed     GERD (gastroesophageal reflux disease)     Herpes dermatitis 4/27/2017    Insomnia secondary to anxiety 4/6/2018    Lightheadedness     Migraine     Skin ulcer of buttock with fat layer exposed ([address] 7/20/2016    Venous stasis ulcer of left ankle with fat layer exposed with varicose veins ([address] 2/2/2022     Past Surgical History:   Procedure Laterality Date    CHOLECYSTECTOMY, LAPAROSCOPIC  04/08/2014    TONSILLECTOMY  1960    1960s    UPPER GASTROINTESTINAL ENDOSCOPY  12/13/2013    mild gastritis and small hiatal hernia, Dr Melyssa Paz, [facility] Form  2015    GERD, Dr. Mcgee Duty, office     Family History   Problem Relation Age of Onset    Diabetes Mother     Hypertension Mother     Heart Disease Father     Heart Attack Father     Heart Surgery Father         angioplasty    Stroke Father     High Cholesterol Father     Heart Attack Maternal Grandfather      Social History     Tobacco Use    Smoking status: Never Smoker    Smokeless tobacco: Never Used   Vaping Use    Vaping Use: Never used   Substance Use Topics    Alcohol use: No    Drug use: No     Allergies   Allergen Reactions    Bee Pollen Anaphylaxis    Tetracyclines & Related Hives     Current Outpatient Medications on File Prior to Encounter   Medication Sig Dispense Refill    oxyCODONE-acetaminophen (PERCOCET) 5-325 MG per tablet Take 1 tablet by mouth every 6 hours as needed for Pain for up to 7 days. Intended supply: 7 days. Take lowest dose possible to manage pain 28 tablet 0    butalbital-acetaminophen-caffeine (FIORICET, ESGIC) -40 MG per tablet Take 1 tablet by mouth 3 times daily as needed for Headaches or Migraine Indications: Cluster Headache 90 tablet 5    omeprazole (PRILOSEC) 40 MG delayed release capsule Take 1 capsule by mouth daily 90 capsule 3    hydrOXYzine (ATARAX) 25 MG tablet take 1 tablet BID 60 tablet 5    aspirin-acetaminophen-caffeine (EXCEDRIN MIGRAINE) 250-250-65 MG per tablet Take 1 tablet by mouth as needed for Headaches 300 tablet 3    acyclovir (ZOVIRAX) 400 MG tablet take 1 tablet by mouth once daily 90 tablet 3    EPINEPHrine (EPIPEN) 0.3 MG/0.3ML SOAJ injection Use as directed for allergic reaction 1 each 5     No current facility-administered medications on file prior to encounter.        REVIEW OF SYSTEMS See HPI    Objective:    /74   Pulse 76   Temp (!) 96.7 °F (35.9 °C) (Temporal)   Resp 18   Ht 5' 8\" (1.727 m)   Wt 160 lb (72.6 kg)   BMI 24.33 kg/m²   Wt Readings from Last 3 Encounters:   05/25/22 160 lb (72.6 kg)   05/24/22 160 lb (72.6 kg)   05/18/22 180 lb (81.6 kg)     PHYSICAL EXAM  CONSTITUTIONAL:   Awake, alert, cooperative   EYES:  lids and lashes normal   ENT: external ears and nose without lesions   NECK:  supple, symmetrical, trachea midline   SKIN:  Open wound Present    Assessment:     Problem List Items Addressed This Visit     Venous stasis ulcer of left ankle with fat layer exposed with varicose veins ([address] - Primary (Chronic)    Relevant Orders    Initiate Outpatient Wound Care Protocol    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) (Chronic)          Pre Debridement Measurements:  Are located in the [facility]  Documentation Flow Sheet  Post Debridement Measurements:  Wound/Ulcer Descriptions are Pre Debridement except measurements:     Wound 08/10/16 Other (Comment) Buttocks Left;Distal #2 acq 8/4/16 (Active)   Number of days: 2113       Wound 08/31/16 Buttocks Left;Proximal #3 aquired 8-27-26 (Active)   Number of days: 2092       Incision 04/08/14 Abdomen (Active)   Number of days: 2968       Wound 02/02/22 Ankle Left;Medial #1 (Active)   Wound Image   05/18/22 0803   Dressing Status New dressing applied 05/04/22 0844   Wound Cleansed Cleansed with saline 05/04/22 0844   Dressing/Treatment ABD 05/04/22 0844   Offloading for Diabetic Foot Ulcers Offloading not required 03/17/22 1342   Wound Length (cm) 7.6 cm 05/25/22 0753   Wound Width (cm) 4.5 cm 05/25/22 0753   Wound Depth (cm) 0.1 cm 05/25/22 0753   Wound Surface Area (cm^2) 34.2 cm^2 05/25/22 0753   Change in Wound Size % (l*w) -26.39 05/25/22 0753   Wound Volume (cm^3) 3.42 cm^3 05/25/22 0753   Wound Healing % -26 05/25/22 0753   Post-Procedure Length (cm) 7.7 cm 05/25/22 0815   Post-Procedure Width (cm) 4.8 cm 05/25/22 0815   Post-Procedure Depth (cm) 0.1 cm 05/25/22 0815   Post-Procedure Surface Area (cm^2) 36.96 cm^2 05/25/22 0815   Post-Procedure Volume (cm^3) 3.696 cm^3 05/25/22 0815   Wound Assessment Fibrin;Pink/red 05/25/22 0753   Drainage Amount Moderate 05/25/22 0753   Drainage Description Green;Yellow;Serosanguinous 05/25/22 0753   Odor None 05/25/22 0753   Melany-wound Assessment Excoriated 05/25/22 9541   Number of days: 111       Wound 03/16/22 Ankle Posterior; Left #2 (Active)   Wound Image   05/18/22 0803   Dressing Status New dressing applied 05/18/22 0903   Wound Cleansed Cleansed with saline 05/18/22 0903   Dressing/Treatment ABD; Alginate; Foam 05/18/22 0903   Offloading for Diabetic Foot Ulcers Offloading not required 05/11/22 0835   Wound Length (cm) 2.5 cm 05/25/22 0753   Wound Width (cm) 1.9 cm 05/25/22 0753   Wound Depth (cm) 0.3 cm 05/25/22 0753   Wound Surface Area (cm^2) 4.75 cm^2 05/25/22 0753   Change in Wound Size % (l*w) -90 05/25/22 0753   Wound Volume (cm^3) 1.425 cm^3 05/25/22 0753   Wound Healing % -470 05/25/22 0753   Post-Procedure Length (cm) 2.7 cm 05/25/22 0815   Post-Procedure Width (cm) 1.9 cm 05/25/22 0815   Post-Procedure Depth (cm) 0.3 cm 05/25/22 0815   Post-Procedure Surface Area (cm^2) 5.13 cm^2 05/25/22 0815   Post-Procedure Volume (cm^3) 1.539 cm^3 05/25/22 0815   Wound Assessment Fibrin;Pink/red 05/25/22 0753   Drainage Amount Moderate 05/25/22 0753   Drainage Description Serosanguinous; Yellow;Green 05/25/22 0753   Odor None 05/25/22 0753   Melany-wound Assessment Excoriated 05/25/22 0753   Number of days: 70          Procedure Note  Indications:  Based on my examination of this patient's wound(s)/ulcer(s) today, debridement is required to promote healing and evaluate the wound base. Performed by: Ai Lam MD    Consent obtained:  Yes    Time out taken:  Yes    Pain Control: Anesthetic  Anesthetic: 4% Lidocaine Liquid Topical     Debridement:Excisional Debridement    Using curette the wound(s)/ulcer(s) was/were sharply debrided down through and including the removal of epidermis, dermis and subcutaneous tissue.         Devitalized Tissue Debrided:  fibrin, biofilm, slough and exudate to stimulate bleeding to promote healing, post debridement good bleeding base and wound edges noted    Wound/Ulcer #: 1, 2    Percent of Wound/Ulcer Debrided: 90%    Total Surface Area Debrided:  40 sq cm     Estimated Blood Loss:  Minimal  Hemostasis Achieved:  by pressure    Procedural Pain:  8  / 10   Post Procedural Pain:  7 / 10     Response to treatment:  With complaints of pain. Plan:   Treatment Note please see attached Discharge Instructions    Written patient dismissal instructions given to patient and signed by patient or POA. Discharge Instructions        Visit Discharge/Physician Orders     Discharge condition: Stable     Assessment of pain at discharge: yes     Anesthetic used: 4% lidocaine solution     Discharge to: Home     Left via:Private automobile     Accompanied by:self     ECF/HHA: n/a     Dressing Orders:LEFT MEDIAL LOWER LEG-Cleanse with normal saline, apply drawtex, ABD pad and profore wrap, change weekly.     Treatment Orders: Eat a diet high in protein and vitamin C. Take a multiple vitamin daily unless contraindicated.      To see Dr. Julio Cesar Blanca -to start IV antibiotics per his orders      Must wear slip on shoe to work due to wrap on leg!      [address] [place] [address],[address] followup visit : 1 week_____________________________  (Please note your next appointment above and if you are unable to keep, kindly give a 24 hour notice.  Thank you.)     Physician signature:__________________________     If you experience any of the following, please call the [facility] during business hours:     * Increase in Pain  * Temperature over 101  * Increase in drainage from your wound  * Drainage with a foul odor  * Bleeding  * Increase in swelling  * Need for compression bandage changes due to slippage, breakthrough drainage.     If you need medical attention outside of the business hours of the [email or web address] please contact your PCP or go to the nearest emergency room.    Mady Mishra                           Electronically signed by Christa Du MD on 5/25/2022 at 8:22 AM

## 2022-06-01 ENCOUNTER — HOSPITAL ENCOUNTER (OUTPATIENT)
Dept: WOUND CARE | Age: 67
Discharge: HOME | End: 2022-06-01
Payer: MEDICAID

## 2022-06-01 VITALS
DIASTOLIC BLOOD PRESSURE: 98 MMHG | HEART RATE: 74 BPM | TEMPERATURE: 97.7 F | SYSTOLIC BLOOD PRESSURE: 162 MMHG | BODY MASS INDEX: 24.33 KG/M2 | RESPIRATION RATE: 18 BRPM | WEIGHT: 160 LBS

## 2022-06-01 DIAGNOSIS — I70.242 ATHEROSCLEROSIS OF NATIVE ARTERY OF LEFT LOWER EXTREMITY WITH ULCERATION OF CALF (HCC): Primary | ICD-10-CM

## 2022-06-01 DIAGNOSIS — L97.322 VENOUS STASIS ULCER OF LEFT ANKLE WITH FAT LAYER EXPOSED WITH VARICOSE VEINS (HCC): ICD-10-CM

## 2022-06-01 DIAGNOSIS — I83.023 VENOUS STASIS ULCER OF LEFT ANKLE WITH FAT LAYER EXPOSED WITH VARICOSE VEINS (HCC): ICD-10-CM

## 2022-06-01 PROCEDURE — 11042 DBRDMT SUBQ TIS 1ST 20SQCM/<: CPT | Performed by: SURGERY

## 2022-06-01 PROCEDURE — 11042 DBRDMT SUBQ TIS 1ST 20SQCM/<: CPT

## 2022-06-01 PROCEDURE — 6370000000 HC RX 637 (ALT 250 FOR IP): Performed by: SURGERY

## 2022-06-01 RX ORDER — BACITRACIN, NEOMYCIN, POLYMYXIN B 400; 3.5; 5 [USP'U]/G; MG/G; [USP'U]/G
OINTMENT TOPICAL ONCE
Status: CANCELLED | OUTPATIENT
Start: 2022-06-01 | End: 2022-06-01

## 2022-06-01 RX ORDER — GENTAMICIN SULFATE 1 MG/G
OINTMENT TOPICAL ONCE
Status: CANCELLED | OUTPATIENT
Start: 2022-06-01 | End: 2022-06-01

## 2022-06-01 RX ORDER — OXYCODONE HYDROCHLORIDE AND ACETAMINOPHEN 5; 325 MG/1; MG/1
1 TABLET ORAL EVERY 4 HOURS PRN
COMMUNITY
End: 2022-10-19 | Stop reason: ALTCHOICE

## 2022-06-01 RX ORDER — LIDOCAINE 40 MG/G
CREAM TOPICAL ONCE
Status: CANCELLED | OUTPATIENT
Start: 2022-06-01 | End: 2022-06-01

## 2022-06-01 RX ORDER — BACITRACIN ZINC AND POLYMYXIN B SULFATE 500; 1000 [USP'U]/G; [USP'U]/G
OINTMENT TOPICAL ONCE
Status: CANCELLED | OUTPATIENT
Start: 2022-06-01 | End: 2022-06-01

## 2022-06-01 RX ORDER — LIDOCAINE HYDROCHLORIDE 20 MG/ML
JELLY TOPICAL ONCE
Status: CANCELLED | OUTPATIENT
Start: 2022-06-01 | End: 2022-06-01

## 2022-06-01 RX ORDER — LIDOCAINE HYDROCHLORIDE 40 MG/ML
SOLUTION TOPICAL ONCE
Status: COMPLETED | OUTPATIENT
Start: 2022-06-01 | End: 2022-06-01

## 2022-06-01 RX ORDER — LIDOCAINE HYDROCHLORIDE 40 MG/ML
SOLUTION TOPICAL ONCE
Status: CANCELLED | OUTPATIENT
Start: 2022-06-01 | End: 2022-06-01

## 2022-06-01 RX ORDER — BETAMETHASONE DIPROPIONATE 0.05 %
OINTMENT (GRAM) TOPICAL ONCE
Status: CANCELLED | OUTPATIENT
Start: 2022-06-01 | End: 2022-06-01

## 2022-06-01 RX ORDER — GINSENG 100 MG
CAPSULE ORAL ONCE
Status: CANCELLED | OUTPATIENT
Start: 2022-06-01 | End: 2022-06-01

## 2022-06-01 RX ORDER — CLOBETASOL PROPIONATE 0.5 MG/G
OINTMENT TOPICAL ONCE
Status: CANCELLED | OUTPATIENT
Start: 2022-06-01 | End: 2022-06-01

## 2022-06-01 RX ORDER — LIDOCAINE 50 MG/G
OINTMENT TOPICAL ONCE
Status: CANCELLED | OUTPATIENT
Start: 2022-06-01 | End: 2022-06-01

## 2022-06-01 RX ADMIN — LIDOCAINE HYDROCHLORIDE 10 ML: 40 SOLUTION TOPICAL at 07:50

## 2022-06-01 NOTE — PROGRESS NOTES
Wound Healing Center Followup Visit Note    Referring Physician : Fernando Todd MD  [address] [place] [address] RECORD NUMBER:  [id number]  AGE: 61 y.o. GENDER: female  : 1955  EPISODE DATE:  2022    Subjective:     Chief Complaint   Patient presents with    Wound Check     Left leg      HISTORY of PRESENT ILLNESS HPI   Terry Husain is a 61 y.o. female who presents today in regards to follow up evaluation and treatment of wound/ulcer. That patient's past medical, family and social hx were reviewed and changes were made if present. History of Wound Context:  The patient has had a wound of her left ankle/calf which was first noted approximately 2021. This has been treated local wound care. On their initial visit to the wound healing center, 22,  the patient has noted that the wound has been improving. The patient has not had similar previous wounds in the past.      She started seeing Dr. Anabel Sullivan in 2021 and than Dr. Vanessa Bonilla. She was started in [facility] ~ 2021. She has noticed some improvement since starting unna boot. She is currently following with Dr. Ban Ward. Pt is not on abx at time of initial visit, but has been treated with previously by podiatry. She is not a DM. She is not a smoker. She denies hx of DVT, and per her report had recent us noting no evidence of dvt at [facility]. She also had arterial studies done. I had previously seen her in the past in regards to left buttock thigh wound, which started as abscess. Pt works at [employer] in [facility] and is on her feet all day.     22  · Reflux study - if significant findings will schedule for fu  · Continue compression therapy [facility] bo per podiatry with aquacell dressing  · Elevation  · She does not have significant arterial occlusive disease  22  · Patient has asked to continuing following with me going forward because of our previous relationship  · She is going to let Dr. Josselyn Finney office know  · She tolerated unna boot and aquacell - some improvement  216/22  · Appearance improved, slightly larger  · Consider drawtek next week but overall drainage seems reasonably managed as periwound appears ok  2/23/2022  · The wound, has some exudate, no recent cultures were done, will do wound cultures today  3/2/22  · Reflux study reviewed - no significant reflux  · Will treat culture - augmentin 875 mg bid x 10 days - script sent  · More drainage - stable size  3/9/22  · Wound appearance improved, stable in size  · drawtek  3/16/22  · Wound slightly larger in size, new wound of ankle  · Continue Drawtek, change to profore  · Avoid shoes which will contact ankle area  3/23/22  · Wounds stable  · Overall appearance improved  · Will have pt see ID re cultures - disc with Dr Jessica Vasquez  3/30/22  · Much improved appearance  · On oral abx per ID - concerns re pt ability to work while on IV - will see how her wound progresses  4/6/22  · Appearance improved but size not much different  · Finished oral abx  · Will re culture next week if no significant size change - possible advance skin therapy  4/20/2022  · Ulcer on the medial aspect, fairly clean and granulating, ulcer of the lateral aspect, has some exudate, debrided  4/27/22  · Wounds stable   · Hypergranulation tissue removed  · Culture done - possible graft in future  5/4/22  · Wound stable  · Disc culture with Dr Jessica Vasquez who would like to start her on iv abx  5/11/22  · Wound stable  · Iv abx have not been started yet - spoke with Dr Jessica Vasquez - spoke with pt she will call his office  · She had spoke with [facility] but there were some issues  5/18/22  · Calf stable, ankle improved  · Iv abx to start this week after picc placement  · On exam   · L dp 2+, PT triphasic - with compression of dp - PT becomes weakly biphasic  · Concern that PT though triphasic is not getting inline flow and as a result isn't healing in the calf distribution because of occlusive disease  · We discussed angiogram - will schedule  I reviewed the procedure with the patient and family as available. I discussed the procedure, risks, benefits, complications, and alternatives of the procedure. They understand and consent.   All questions were answered  Script for percocet 5/325 mg #28 prn q6 hrs - given, oarrs run  5/24/22  · Angio, L PT and peroneal plasty  5/25/22  · On iv bx  · Wound appearance better  · R groin no hematoma, L DP 2+, PT triphasic   6/1/22  · Wound size and appearance better    Wound/Ulcer Pain Timing/Severity: constant, moderate  Quality of pain: aching, throbbing, tender  Severity:  7 / 10   Modifying Factors: Pain worsens with dressing changes, debridement  Associated Signs/Symptoms: edema, drainage and pain    Ulcer Identification:  Ulcer Type: venous  Contributing Factors: edema and venous stasis    Diabetic/Pressure/Non Pressure Ulcers only:  Ulcer: Non-Pressure ulcer, fat layer exposed        PAST MEDICAL HISTORY      Diagnosis Date    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 ([address] 5/18/2022    Dizziness - light-headed     GERD (gastroesophageal reflux disease)     Herpes dermatitis 4/27/2017    Insomnia secondary to anxiety 4/6/2018    Lightheadedness     Migraine     Skin ulcer of buttock with fat layer exposed ([address] 7/20/2016    Venous stasis ulcer of left ankle with fat layer exposed with varicose veins ([address] 2/2/2022     Past Surgical History:   Procedure Laterality Date    CHOLECYSTECTOMY, LAPAROSCOPIC  04/08/2014    TONSILLECTOMY  1960    1960s    UPPER GASTROINTESTINAL ENDOSCOPY  12/13/2013    mild gastritis and small hiatal hernia, Dr Allie Guan, [address] ENDOSCOPY  2015    GERD, Dr. Donya Chambers, office     Family History   Problem Relation Age of Onset    Diabetes Mother     Hypertension Mother     Heart Disease Father     Heart Attack Father     Heart Surgery Father         angioplasty    Stroke Father     High Cholesterol Father     Heart Attack Maternal Grandfather      Social History     Tobacco Use    Smoking status: Never Smoker    Smokeless tobacco: Never Used   Vaping Use    Vaping Use: Never used   Substance Use Topics    Alcohol use: No    Drug use: No     Allergies   Allergen Reactions    Bee Pollen Anaphylaxis    Tetracyclines & Related Hives     Current Outpatient Medications on File Prior to Encounter   Medication Sig Dispense Refill    oxyCODONE-acetaminophen (PERCOCET) 5-325 MG per tablet Take 1 tablet by mouth every 4 hours as needed for Pain.  sodium chloride 0.9 % SOLN 100 mL with meropenem 1 g SOLR 1,000 mg Infuse 1,000 mg intravenously      butalbital-acetaminophen-caffeine (FIORICET, ESGIC) -40 MG per tablet Take 1 tablet by mouth 3 times daily as needed for Headaches or Migraine Indications: Cluster Headache 90 tablet 5    omeprazole (PRILOSEC) 40 MG delayed release capsule Take 1 capsule by mouth daily 90 capsule 3    hydrOXYzine (ATARAX) 25 MG tablet take 1 tablet BID 60 tablet 5    aspirin-acetaminophen-caffeine (EXCEDRIN MIGRAINE) 250-250-65 MG per tablet Take 1 tablet by mouth as needed for Headaches 300 tablet 3    acyclovir (ZOVIRAX) 400 MG tablet take 1 tablet by mouth once daily 90 tablet 3    EPINEPHrine (EPIPEN) 0.3 MG/0.3ML SOAJ injection Use as directed for allergic reaction 1 each 5     No current facility-administered medications on file prior to encounter.        REVIEW OF SYSTEMS See HPI    Objective:    BP (!) 162/98   Pulse 74   Temp 97.7 °F (36.5 °C) (Tympanic)   Resp 18   Wt 160 lb (72.6 kg)   BMI 24.33 kg/m²   Wt Readings from Last 3 Encounters:   06/01/22 160 lb (72.6 kg)   05/25/22 160 lb (72.6 kg)   05/24/22 160 lb (72.6 kg)     PHYSICAL EXAM  CONSTITUTIONAL:   Awake, alert, cooperative   EYES:  lids and lashes normal   ENT: external ears and nose without lesions   NECK:  supple, symmetrical, trachea midline   SKIN:  Open wound 0745   Number of days: 118       Wound 03/16/22 Ankle Posterior; Left #2 (Active)   Wound Image   05/18/22 0803   Dressing Status New dressing applied 05/18/22 0903   Wound Cleansed Cleansed with saline 05/18/22 0903   Dressing/Treatment ABD; Alginate; Foam 05/18/22 0903   Offloading for Diabetic Foot Ulcers Offloading not required 05/11/22 0835   Wound Length (cm) 2.7 cm 06/01/22 0745   Wound Width (cm) 1 cm 06/01/22 0745   Wound Depth (cm) 0.2 cm 06/01/22 0745   Wound Surface Area (cm^2) 2.7 cm^2 06/01/22 0745   Change in Wound Size % (l*w) -8 06/01/22 0745   Wound Volume (cm^3) 0.54 cm^3 06/01/22 0745   Wound Healing % -116 06/01/22 0745   Post-Procedure Length (cm) 2.8 cm 06/01/22 0808   Post-Procedure Width (cm) 1.1 cm 06/01/22 0808   Post-Procedure Depth (cm) 0.2 cm 06/01/22 0808   Post-Procedure Surface Area (cm^2) 3.08 cm^2 06/01/22 0808   Post-Procedure Volume (cm^3) 0.616 cm^3 06/01/22 0808   Wound Assessment Fibrin;Pink/red 06/01/22 0745   Drainage Amount Moderate 06/01/22 0745   Drainage Description Serosanguinous 06/01/22 0745   Odor None 06/01/22 0745   Melany-wound Assessment Blanchable erythema 06/01/22 0745   Number of days: 77          Procedure Note  Indications:  Based on my examination of this patient's wound(s)/ulcer(s) today, debridement is required to promote healing and evaluate the wound base. Performed by: Justin Quiros MD    Consent obtained:  Yes    Time out taken:  Yes    Pain Control: Anesthetic  Anesthetic: 4% Lidocaine Liquid Topical     Debridement:Excisional Debridement    Using curette the wound(s)/ulcer(s) was/were sharply debrided down through and including the removal of epidermis, dermis and subcutaneous tissue.         Devitalized Tissue Debrided:  fibrin, biofilm, slough and exudate to stimulate bleeding to promote healing, post debridement good bleeding base and wound edges noted    Wound/Ulcer #: 1, 2    Percent of Wound/Ulcer Debrided: 70%    Total Surface Area Debrided:  20 sq cm     Estimated Blood Loss:  Minimal  Hemostasis Achieved:  by pressure    Procedural Pain:  8  / 10   Post Procedural Pain:  7 / 10     Response to treatment:  With complaints of pain. Plan:   Treatment Note please see attached Discharge Instructions    Written patient dismissal instructions given to patient and signed by patient or POA. Discharge Instructions       Visit Discharge/Physician Orders     Discharge condition: Stable     Assessment of pain at discharge: yes     Anesthetic used: 4% lidocaine solution     Discharge to: Home     Left via:Private automobile     Accompanied by:self     ECF/HHA: n/a     Dressing Orders:LEFT MEDIAL LOWER LEG-Cleanse with normal saline, apply drawtex, ABD pad and profore wrap, change weekly.     Treatment Orders: Eat a diet high in protein and vitamin C. Take a multiple vitamin daily unless contraindicated.      To see Dr. Sandra Mcgee -to start IV antibiotics per his orders      Must wear slip on shoe to work due to wrap on leg!      [facility] followup visit : 1 week_____________________________  (Please note your next appointment above and if you are unable to keep, kindly give a 24 hour notice.  Thank you.)     Physician signature:__________________________     If you experience any of the following, please call the [facility] [address] during business hours:     * Increase in Pain  * Temperature over 101  * Increase in drainage from your wound  * Drainage with a foul odor  * Bleeding  * Increase in swelling  * Need for compression bandage changes due to slippage, breakthrough drainage.     If you need medical attention outside of the business hours of the [facility] [address] please contact your PCP or go to the nearest emergency room.    Tootie Edmond                       Electronically signed by Ray Tuttle MD on 6/1/2022 at 8:19 AM

## 2022-06-08 ENCOUNTER — HOSPITAL ENCOUNTER (OUTPATIENT)
Dept: WOUND CARE | Age: 67
Discharge: HOME | End: 2022-06-08
Payer: MEDICAID

## 2022-06-08 VITALS
TEMPERATURE: 96.6 F | SYSTOLIC BLOOD PRESSURE: 142 MMHG | DIASTOLIC BLOOD PRESSURE: 68 MMHG | HEART RATE: 80 BPM | BODY MASS INDEX: 24.33 KG/M2 | RESPIRATION RATE: 18 BRPM | WEIGHT: 160 LBS

## 2022-06-08 DIAGNOSIS — I83.023 VENOUS STASIS ULCER OF LEFT ANKLE WITH FAT LAYER EXPOSED WITH VARICOSE VEINS (HCC): ICD-10-CM

## 2022-06-08 DIAGNOSIS — L97.322 VENOUS STASIS ULCER OF LEFT ANKLE WITH FAT LAYER EXPOSED WITH VARICOSE VEINS (HCC): ICD-10-CM

## 2022-06-08 DIAGNOSIS — I70.242 ATHEROSCLEROSIS OF NATIVE ARTERY OF LEFT LOWER EXTREMITY WITH ULCERATION OF CALF (HCC): Primary | ICD-10-CM

## 2022-06-08 PROCEDURE — 6370000000 HC RX 637 (ALT 250 FOR IP): Performed by: SURGERY

## 2022-06-08 PROCEDURE — 11042 DBRDMT SUBQ TIS 1ST 20SQCM/<: CPT

## 2022-06-08 PROCEDURE — 11042 DBRDMT SUBQ TIS 1ST 20SQCM/<: CPT | Performed by: SURGERY

## 2022-06-08 RX ORDER — CLOBETASOL PROPIONATE 0.5 MG/G
OINTMENT TOPICAL ONCE
Status: CANCELLED | OUTPATIENT
Start: 2022-06-08 | End: 2022-06-08

## 2022-06-08 RX ORDER — LIDOCAINE HYDROCHLORIDE 40 MG/ML
SOLUTION TOPICAL ONCE
Status: CANCELLED | OUTPATIENT
Start: 2022-06-08 | End: 2022-06-08

## 2022-06-08 RX ORDER — LIDOCAINE HYDROCHLORIDE 20 MG/ML
JELLY TOPICAL ONCE
Status: CANCELLED | OUTPATIENT
Start: 2022-06-08 | End: 2022-06-08

## 2022-06-08 RX ORDER — GENTAMICIN SULFATE 1 MG/G
OINTMENT TOPICAL ONCE
Status: CANCELLED | OUTPATIENT
Start: 2022-06-08 | End: 2022-06-08

## 2022-06-08 RX ORDER — GINSENG 100 MG
CAPSULE ORAL ONCE
Status: CANCELLED | OUTPATIENT
Start: 2022-06-08 | End: 2022-06-08

## 2022-06-08 RX ORDER — BETAMETHASONE DIPROPIONATE 0.05 %
OINTMENT (GRAM) TOPICAL ONCE
Status: CANCELLED | OUTPATIENT
Start: 2022-06-08 | End: 2022-06-08

## 2022-06-08 RX ORDER — LIDOCAINE 40 MG/G
CREAM TOPICAL ONCE
Status: CANCELLED | OUTPATIENT
Start: 2022-06-08 | End: 2022-06-08

## 2022-06-08 RX ORDER — LIDOCAINE 50 MG/G
OINTMENT TOPICAL ONCE
Status: CANCELLED | OUTPATIENT
Start: 2022-06-08 | End: 2022-06-08

## 2022-06-08 RX ORDER — BACITRACIN ZINC AND POLYMYXIN B SULFATE 500; 1000 [USP'U]/G; [USP'U]/G
OINTMENT TOPICAL ONCE
Status: CANCELLED | OUTPATIENT
Start: 2022-06-08 | End: 2022-06-08

## 2022-06-08 RX ORDER — BACITRACIN, NEOMYCIN, POLYMYXIN B 400; 3.5; 5 [USP'U]/G; MG/G; [USP'U]/G
OINTMENT TOPICAL ONCE
Status: CANCELLED | OUTPATIENT
Start: 2022-06-08 | End: 2022-06-08

## 2022-06-08 RX ORDER — LIDOCAINE HYDROCHLORIDE 40 MG/ML
SOLUTION TOPICAL ONCE
Status: COMPLETED | OUTPATIENT
Start: 2022-06-08 | End: 2022-06-08

## 2022-06-08 RX ADMIN — LIDOCAINE HYDROCHLORIDE 10 ML: 40 SOLUTION TOPICAL at 07:38

## 2022-06-08 NOTE — PROGRESS NOTES
Wound Healing Center Followup Visit Note    Referring Physician : Angely Keys MD  [address] [place] [address] RECORD NUMBER:  [id number]  AGE: 61 y.o. GENDER: female  : 1955  EPISODE DATE:  2022    Subjective:     Chief Complaint   Patient presents with    Wound Check     Left leg      HISTORY of PRESENT ILLNESS HPI   Qasim Mays is a 61 y.o. female who presents today in regards to follow up evaluation and treatment of wound/ulcer. That patient's past medical, family and social hx were reviewed and changes were made if present. History of Wound Context:  The patient has had a wound of her left ankle/calf which was first noted approximately 2021. This has been treated local wound care. On their initial visit to the wound healing center, 22,  the patient has noted that the wound has been improving. The patient has not had similar previous wounds in the past.      She started seeing Dr. Jnue Joseph in 2021 and than Dr. Kusum Pulido. She was started in [facility] ~ 2021. She has noticed some improvement since starting [facility] boot. She is currently following with Dr. Sameer Call. Pt is not on abx at time of initial visit, but has been treated with previously by podiatry. She is not a DM. She is not a smoker. She denies hx of DVT, and per her report had recent us noting no evidence of dvt at [facility]. She also had arterial studies done. I had previously seen her in the past in regards to left buttock thigh wound, which started as abscess. Pt works at [employer] in [place] [facility] and is on her feet all day.     22  · Reflux study - if significant findings will schedule for fu  · Continue compression therapy unna boot per podiatry with aquacell dressing  · Elevation  · She does not have significant arterial occlusive disease  22  · Patient has asked to continuing following with me going forward because of our previous relationship  · She is going to let Dr. Daxa Naranjo office know  · She tolerated unna boot and aquacell - some improvement  216/22  · Appearance improved, slightly larger  · Consider drawtek next week but overall drainage seems reasonably managed as periwound appears ok  2/23/2022  · The wound, has some exudate, no recent cultures were done, will do wound cultures today  3/2/22  · Reflux study reviewed - no significant reflux  · Will treat culture - augmentin 875 mg bid x 10 days - script sent  · More drainage - stable size  3/9/22  · Wound appearance improved, stable in size  · drawtek  3/16/22  · Wound slightly larger in size, new wound of ankle  · Continue Drawtek, change to profore  · Avoid shoes which will contact ankle area  3/23/22  · Wounds stable  · Overall appearance improved  · Will have pt see ID re cultures - disc with Dr Susana Lopes  3/30/22  · Much improved appearance  · On oral abx per ID - concerns re pt ability to work while on IV - will see how her wound progresses  4/6/22  · Appearance improved but size not much different  · Finished oral abx  · Will re culture next week if no significant size change - possible advance skin therapy  4/20/2022  · Ulcer on the medial aspect, fairly clean and granulating, ulcer of the lateral aspect, has some exudate, debrided  4/27/22  · Wounds stable   · Hypergranulation tissue removed  · Culture done - possible graft in future  5/4/22  · Wound stable  · Disc culture with Dr Susana Lopes who would like to start her on iv abx  5/11/22  · Wound stable  · Iv abx have not been started yet - spoke with Dr Susana Lopes - spoke with pt she will call his office  · She had spoke with [facility] but there were some issues  5/18/22  · Calf stable, ankle improved  · Iv abx to start this week after picc placement  · On exam   · L dp 2+, PT triphasic - with compression of dp - PT becomes weakly biphasic  · Concern that PT though triphasic is not getting inline flow and as a result isn't healing in the calf distribution because of occlusive disease  · We discussed angiogram - will schedule  I reviewed the procedure with the patient and family as available. I discussed the procedure, risks, benefits, complications, and alternatives of the procedure. They understand and consent.   All questions were answered  Script for percocet 5/325 mg #28 prn q6 hrs - given, oarrs run  5/24/22  · Angio, L PT and peroneal plasty  5/25/22  · On iv bx  · Wound appearance better  · R groin no hematoma, L DP 2+, PT triphasic   6/1/22  · Wound size and appearance better  6/8/22  · Wound size and appearance better  · Discussed with Dr Julia Marcus - he will stop abx    Wound/Ulcer Pain Timing/Severity: constant, moderate  Quality of pain: aching, throbbing, tender  Severity:  7 / 10   Modifying Factors: Pain worsens with dressing changes, debridement  Associated Signs/Symptoms: edema, drainage and pain    Ulcer Identification:  Ulcer Type: venous  Contributing Factors: edema and venous stasis    Diabetic/Pressure/Non Pressure Ulcers only:  Ulcer: Non-Pressure ulcer, fat layer exposed        PAST MEDICAL HISTORY      Diagnosis Date    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 ([address] 5/18/2022    Dizziness - light-headed     GERD (gastroesophageal reflux disease)     Herpes dermatitis 4/27/2017    Insomnia secondary to anxiety 4/6/2018    Lightheadedness     Migraine     Skin ulcer of buttock with fat layer exposed ([address] 7/20/2016    Venous stasis ulcer of left ankle with fat layer exposed with varicose veins ([address] 2/2/2022     Past Surgical History:   Procedure Laterality Date    CHOLECYSTECTOMY, LAPAROSCOPIC  04/08/2014    TONSILLECTOMY  1960    1960s    UPPER GASTROINTESTINAL ENDOSCOPY  12/13/2013    mild gastritis and small hiatal hernia, Dr Melisa Dorantes, [address] ENDOSCOPY  2015    GERD, Dr. Naidu Pa, office     Family History   Problem Relation Age of Onset    Diabetes Mother     Hypertension Mother     Heart Disease Father     Heart Attack Father     Heart Surgery Father         angioplasty    Stroke Father     High Cholesterol Father     Heart Attack Maternal Grandfather      Social History     Tobacco Use    Smoking status: Never Smoker    Smokeless tobacco: Never Used   Vaping Use    Vaping Use: Never used   Substance Use Topics    Alcohol use: No    Drug use: No     Allergies   Allergen Reactions    Bee Pollen Anaphylaxis    Tetracyclines & Related Hives     Current Outpatient Medications on File Prior to Encounter   Medication Sig Dispense Refill    oxyCODONE-acetaminophen (PERCOCET) 5-325 MG per tablet Take 1 tablet by mouth every 4 hours as needed for Pain.  sodium chloride 0.9 % SOLN 100 mL with meropenem 1 g SOLR 1,000 mg Infuse 1,000 mg intravenously      butalbital-acetaminophen-caffeine (FIORICET, ESGIC) -40 MG per tablet Take 1 tablet by mouth 3 times daily as needed for Headaches or Migraine Indications: Cluster Headache 90 tablet 5    omeprazole (PRILOSEC) 40 MG delayed release capsule Take 1 capsule by mouth daily 90 capsule 3    hydrOXYzine (ATARAX) 25 MG tablet take 1 tablet BID 60 tablet 5    aspirin-acetaminophen-caffeine (EXCEDRIN MIGRAINE) 250-250-65 MG per tablet Take 1 tablet by mouth as needed for Headaches 300 tablet 3    acyclovir (ZOVIRAX) 400 MG tablet take 1 tablet by mouth once daily 90 tablet 3    EPINEPHrine (EPIPEN) 0.3 MG/0.3ML SOAJ injection Use as directed for allergic reaction 1 each 5     No current facility-administered medications on file prior to encounter.        REVIEW OF SYSTEMS See HPI    Objective:    BP (!) 142/68   Pulse 80   Temp (!) 96.6 °F (35.9 °C) (Tympanic)   Resp 18   Wt 160 lb (72.6 kg)   BMI 24.33 kg/m²   Wt Readings from Last 3 Encounters:   06/08/22 160 lb (72.6 kg)   06/01/22 160 lb (72.6 kg)   05/25/22 160 lb (72.6 kg)     PHYSICAL EXAM  CONSTITUTIONAL:   Awake, alert, cooperative   EYES:  lids and lashes normal   ENT: external ears and nose without lesions NECK:  supple, symmetrical, trachea midline   SKIN:  Open wound Present    Assessment:     Problem List Items Addressed This Visit     Venous stasis ulcer of left ankle with fat layer exposed with varicose veins (HCC) (Chronic)    Relevant Orders    Initiate Outpatient Wound Care Protocol    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 ([address] - Primary (Chronic)    Relevant Orders    Initiate Outpatient Wound Care Protocol          Pre Debridement Measurements:  Are located in the [place]  Documentation Flow Sheet  Post Debridement Measurements:  Wound/Ulcer Descriptions are Pre Debridement except measurements:     Wound 08/10/16 Other (Comment) Buttocks Left;Distal #2 acq 8/4/16 (Active)   Number of days: 2128       Wound 08/31/16 Buttocks Left;Proximal #3 aquired 8-27-26 (Active)   Number of days: 2107       Incision 04/08/14 Abdomen (Active)   Number of days: 2982       Wound 02/02/22 Ankle Left;Medial #1 (Active)   Wound Image   06/08/22 0816   Dressing Status New dressing applied 06/08/22 0826   Wound Cleansed Cleansed with saline 06/08/22 0826   Dressing/Treatment ABD 06/08/22 0826   Offloading for Diabetic Foot Ulcers Offloading not required 03/17/22 1342   Wound Length (cm) 6.8 cm 06/08/22 0735   Wound Width (cm) 4.2 cm 06/08/22 0735   Wound Depth (cm) 0.1 cm 06/08/22 0735   Wound Surface Area (cm^2) 28.56 cm^2 06/08/22 0735   Change in Wound Size % (l*w) -5.54 06/08/22 0735   Wound Volume (cm^3) 2. 856 cm^3 06/08/22 0735   Wound Healing % -6 06/08/22 0735   Post-Procedure Length (cm) 6.8 cm 06/08/22 0816   Post-Procedure Width (cm) 4.4 cm 06/08/22 0816   Post-Procedure Depth (cm) 0.1 cm 06/08/22 0816   Post-Procedure Surface Area (cm^2) 29.92 cm^2 06/08/22 0816   Post-Procedure Volume (cm^3) 2.992 cm^3 06/08/22 0816   Wound Assessment Granulation tissue;Fibrin 06/08/22 0735   Drainage Amount Moderate 06/08/22 0735   Drainage Description Brown;Green 06/08/22 0735   Odor None 06/08/22 0735 Melany-wound Assessment Fragile 06/08/22 0735   Number of days: 126       Wound 03/16/22 Ankle Posterior; Left #2 (Active)   Wound Image   06/08/22 0816   Dressing Status New dressing applied;Clean;Dry; Intact 06/08/22 0826   Wound Cleansed Cleansed with saline 06/08/22 0826   Dressing/Treatment ABD 06/08/22 0826   Offloading for Diabetic Foot Ulcers Offloading not required 05/11/22 0835   Wound Length (cm) 2.6 cm 06/08/22 0735   Wound Width (cm) 1 cm 06/08/22 0735   Wound Depth (cm) 0.2 cm 06/08/22 0735   Wound Surface Area (cm^2) 2.6 cm^2 06/08/22 0735   Change in Wound Size % (l*w) -4 06/08/22 0735   Wound Volume (cm^3) 0.52 cm^3 06/08/22 0735   Wound Healing % -108 06/08/22 0735   Post-Procedure Length (cm) 2.7 cm 06/08/22 0816   Post-Procedure Width (cm) 1.2 cm 06/08/22 0816   Post-Procedure Depth (cm) 0.2 cm 06/08/22 0816   Post-Procedure Surface Area (cm^2) 3.24 cm^2 06/08/22 0816   Post-Procedure Volume (cm^3) 0.648 cm^3 06/08/22 0816   Wound Assessment Fibrin;Pink/red 06/08/22 0735   Drainage Amount Moderate 06/08/22 0735   Drainage Description Brown;Green 06/08/22 0735   Odor None 06/08/22 0735   Mleany-wound Assessment Fragile 06/08/22 0735   Number of days: 84          Procedure Note  Indications:  Based on my examination of this patient's wound(s)/ulcer(s) today, debridement is required to promote healing and evaluate the wound base. Performed by: Ray Tuttle MD    Consent obtained:  Yes    Time out taken:  Yes    Pain Control: Anesthetic  Anesthetic: 4% Lidocaine Liquid Topical     Debridement:Excisional Debridement    Using curette the wound(s)/ulcer(s) was/were sharply debrided down through and including the removal of epidermis, dermis and subcutaneous tissue.         Devitalized Tissue Debrided:  fibrin, biofilm, slough and exudate to stimulate bleeding to promote healing, post debridement good bleeding base and wound edges noted    Wound/Ulcer #: 1, 2    Percent of Wound/Ulcer Debrided: 70%    Total Surface Area Debrided:  20 sq cm     Estimated Blood Loss:  Minimal  Hemostasis Achieved:  by pressure    Procedural Pain:  8  / 10   Post Procedural Pain:  7 / 10     Response to treatment:  With complaints of pain. Plan:   Treatment Note please see attached Discharge Instructions    Written patient dismissal instructions given to patient and signed by patient or POA. Discharge Instructions       Visit Discharge/Physician Orders     Discharge condition: Stable     Assessment of pain at discharge: yes     Anesthetic used: 4% lidocaine solution     Discharge to: Home     Left via:Private automobile     Accompanied by:self     ECF/HHA: n/a     Dressing Orders:LEFT MEDIAL LOWER LEG-Cleanse with normal saline, apply drawtex, ABD pad and profore wrap, change weekly.     Treatment Orders: Eat a diet high in protein and vitamin C. Take a multiple vitamin daily unless contraindicated.      To see Dr. Kristin Romero -to start IV antibiotics per his orders      Must wear slip on shoe to work due to wrap on leg!      [facility] followup visit : 1 week_____________________________  (Please note your next appointment above and if you are unable to keep, kindly give a 24 hour notice.  Thank you.)     Physician signature:__________________________     If you experience any of the following, please call the [address] [place] [address] Road during business hours:     * Increase in Pain  * Temperature over 101  * Increase in drainage from your wound  * Drainage with a foul odor  * Bleeding  * Increase in swelling  * Need for compression bandage changes due to slippage, breakthrough drainage.     If you need medical attention outside of the business hours of the [facility] [address] please contact your PCP or go to the nearest emergency room.    Alicia Mancilla                                Electronically signed by Alisha Gomez MD on 6/8/2022 at 11:54 AM

## 2022-06-15 ENCOUNTER — HOSPITAL ENCOUNTER (OUTPATIENT)
Dept: WOUND CARE | Age: 67
Discharge: HOME | End: 2022-06-15
Payer: MEDICAID

## 2022-06-15 VITALS
HEART RATE: 60 BPM | HEIGHT: 68 IN | BODY MASS INDEX: 24.25 KG/M2 | DIASTOLIC BLOOD PRESSURE: 74 MMHG | TEMPERATURE: 97.8 F | SYSTOLIC BLOOD PRESSURE: 128 MMHG | RESPIRATION RATE: 18 BRPM | WEIGHT: 160 LBS

## 2022-06-15 DIAGNOSIS — I70.242 ATHEROSCLEROSIS OF NATIVE ARTERY OF LEFT LOWER EXTREMITY WITH ULCERATION OF CALF (HCC): Primary | ICD-10-CM

## 2022-06-15 DIAGNOSIS — L97.322 VENOUS STASIS ULCER OF LEFT ANKLE WITH FAT LAYER EXPOSED WITH VARICOSE VEINS (HCC): ICD-10-CM

## 2022-06-15 DIAGNOSIS — I83.023 VENOUS STASIS ULCER OF LEFT ANKLE WITH FAT LAYER EXPOSED WITH VARICOSE VEINS (HCC): ICD-10-CM

## 2022-06-15 PROCEDURE — 6370000000 HC RX 637 (ALT 250 FOR IP): Performed by: SURGERY

## 2022-06-15 PROCEDURE — 11042 DBRDMT SUBQ TIS 1ST 20SQCM/<: CPT

## 2022-06-15 PROCEDURE — 11042 DBRDMT SUBQ TIS 1ST 20SQCM/<: CPT | Performed by: SURGERY

## 2022-06-15 RX ORDER — LIDOCAINE HYDROCHLORIDE 40 MG/ML
SOLUTION TOPICAL ONCE
Status: COMPLETED | OUTPATIENT
Start: 2022-06-15 | End: 2022-06-15

## 2022-06-15 RX ORDER — BACITRACIN, NEOMYCIN, POLYMYXIN B 400; 3.5; 5 [USP'U]/G; MG/G; [USP'U]/G
OINTMENT TOPICAL ONCE
Status: CANCELLED | OUTPATIENT
Start: 2022-06-15 | End: 2022-06-15

## 2022-06-15 RX ORDER — CLOBETASOL PROPIONATE 0.5 MG/G
OINTMENT TOPICAL ONCE
Status: CANCELLED | OUTPATIENT
Start: 2022-06-15 | End: 2022-06-15

## 2022-06-15 RX ORDER — LIDOCAINE HYDROCHLORIDE 20 MG/ML
JELLY TOPICAL ONCE
Status: CANCELLED | OUTPATIENT
Start: 2022-06-15 | End: 2022-06-15

## 2022-06-15 RX ORDER — LIDOCAINE HYDROCHLORIDE 40 MG/ML
SOLUTION TOPICAL ONCE
Status: CANCELLED | OUTPATIENT
Start: 2022-06-15 | End: 2022-06-15

## 2022-06-15 RX ORDER — BACITRACIN ZINC AND POLYMYXIN B SULFATE 500; 1000 [USP'U]/G; [USP'U]/G
OINTMENT TOPICAL ONCE
Status: CANCELLED | OUTPATIENT
Start: 2022-06-15 | End: 2022-06-15

## 2022-06-15 RX ORDER — LIDOCAINE 50 MG/G
OINTMENT TOPICAL ONCE
Status: CANCELLED | OUTPATIENT
Start: 2022-06-15 | End: 2022-06-15

## 2022-06-15 RX ORDER — GENTAMICIN SULFATE 1 MG/G
OINTMENT TOPICAL ONCE
Status: CANCELLED | OUTPATIENT
Start: 2022-06-15 | End: 2022-06-15

## 2022-06-15 RX ORDER — LIDOCAINE 40 MG/G
CREAM TOPICAL ONCE
Status: CANCELLED | OUTPATIENT
Start: 2022-06-15 | End: 2022-06-15

## 2022-06-15 RX ORDER — BETAMETHASONE DIPROPIONATE 0.05 %
OINTMENT (GRAM) TOPICAL ONCE
Status: CANCELLED | OUTPATIENT
Start: 2022-06-15 | End: 2022-06-15

## 2022-06-15 RX ORDER — GINSENG 100 MG
CAPSULE ORAL ONCE
Status: CANCELLED | OUTPATIENT
Start: 2022-06-15 | End: 2022-06-15

## 2022-06-15 RX ADMIN — LIDOCAINE HYDROCHLORIDE 10 ML: 40 SOLUTION TOPICAL at 08:14

## 2022-06-15 ASSESSMENT — PAIN DESCRIPTION - ORIENTATION: ORIENTATION: LEFT

## 2022-06-15 ASSESSMENT — PAIN SCALES - GENERAL: PAINLEVEL_OUTOF10: 1

## 2022-06-15 ASSESSMENT — PAIN DESCRIPTION - LOCATION: LOCATION: LEG

## 2022-06-15 ASSESSMENT — PAIN DESCRIPTION - DESCRIPTORS: DESCRIPTORS: JABBING

## 2022-06-15 ASSESSMENT — PAIN DESCRIPTION - FREQUENCY: FREQUENCY: INTERMITTENT

## 2022-06-15 ASSESSMENT — PAIN DESCRIPTION - PAIN TYPE: TYPE: CHRONIC PAIN

## 2022-06-15 ASSESSMENT — PAIN DESCRIPTION - ONSET: ONSET: ON-GOING

## 2022-06-15 ASSESSMENT — PAIN - FUNCTIONAL ASSESSMENT: PAIN_FUNCTIONAL_ASSESSMENT: PREVENTS OR INTERFERES SOME ACTIVE ACTIVITIES AND ADLS

## 2022-06-15 NOTE — PROGRESS NOTES
Wound Healing Center Followup Visit Note    Referring Physician : Basilio Prince MD  [address] [place] [address] RECORD NUMBER:  [id number]  AGE: 61 y.o. GENDER: female  : 1955  EPISODE DATE:  6/15/2022    Subjective:     Chief Complaint   Patient presents with    Wound Check     left leg      HISTORY of PRESENT ILLNESS AMELIA Augustine is a 61 y.o. female who presents today in regards to follow up evaluation and treatment of wound/ulcer. That patient's past medical, family and social hx were reviewed and changes were made if present. History of Wound Context:  The patient has had a wound of her left ankle/calf which was first noted approximately 2021. This has been treated local wound care. On their initial visit to the wound healing center, 22,  the patient has noted that the wound has been improving. The patient has not had similar previous wounds in the past.      She started seeing Dr. Luisa Dominguez in 2021 and than Dr. Bassem Jenkins. She was started in [employer] ~ 2021. She has noticed some improvement since starting [facility] boot. She is currently following with Dr. Artem Turner. Pt is not on abx at time of initial visit, but has been treated with previously by podiatry. She is not a DM. She is not a smoker. She denies hx of DVT, and per her report had recent us noting no evidence of dvt at [facility]. She also had arterial studies done. I had previously seen her in the past in regards to left buttock thigh wound, which started as abscess. Pt works at [employer] in [place] and is on her feet all day.     22  · Reflux study - if significant findings will schedule for fu  · Continue compression therapy [facility] bo per podiatry with aquacell dressing  · Elevation  · She does not have significant arterial occlusive disease  22  · Patient has asked to continuing following with me going forward because of our previous relationship  · She is going to let Dr. Erick Bahena office know  · She tolerated unna boot and aquacell - some improvement  216/22  · Appearance improved, slightly larger  · Consider drawtek next week but overall drainage seems reasonably managed as periwound appears ok  2/23/2022  · The wound, has some exudate, no recent cultures were done, will do wound cultures today  3/2/22  · Reflux study reviewed - no significant reflux  · Will treat culture - augmentin 875 mg bid x 10 days - script sent  · More drainage - stable size  3/9/22  · Wound appearance improved, stable in size  · drawtek  3/16/22  · Wound slightly larger in size, new wound of ankle  · Continue Drawtek, change to profore  · Avoid shoes which will contact ankle area  3/23/22  · Wounds stable  · Overall appearance improved  · Will have pt see ID re cultures - disc with Dr Samuel Ambriz  3/30/22  · Much improved appearance  · On oral abx per ID - concerns re pt ability to work while on IV - will see how her wound progresses  4/6/22  · Appearance improved but size not much different  · Finished oral abx  · Will re culture next week if no significant size change - possible advance skin therapy  4/20/2022  · Ulcer on the medial aspect, fairly clean and granulating, ulcer of the lateral aspect, has some exudate, debrided  4/27/22  · Wounds stable   · Hypergranulation tissue removed  · Culture done - possible graft in future  5/4/22  · Wound stable  · Disc culture with Dr Samuel Ambriz who would like to start her on iv abx  5/11/22  · Wound stable  · Iv abx have not been started yet - spoke with Dr Samuel Ambriz - spoke with pt she will call his office  · She had spoke with [facility] but there were some issues  5/18/22  · Calf stable, ankle improved  · Iv abx to start this week after picc placement  · On exam   · L dp 2+, PT triphasic - with compression of dp - PT becomes weakly biphasic  · Concern that PT though triphasic is not getting inline flow and as a result isn't healing in the calf distribution because of occlusive disease  · We discussed angiogram - will schedule  I reviewed the procedure with the patient and family as available. I discussed the procedure, risks, benefits, complications, and alternatives of the procedure. They understand and consent.   All questions were answered  Script for percocet 5/325 mg #28 prn q6 hrs - given, oarrs run  5/24/22  · Angio, L PT and peroneal plasty  5/25/22  · On iv bx  · Wound appearance better  · R groin no hematoma, L DP 2+, PT triphasic   6/1/22  · Wound size and appearance better  6/8/22  · Wound size and appearance better  · Discussed with Dr Cathleen An - he will stop abx  6/15/22  · Wound size slightly improvement, appearance better    Wound/Ulcer Pain Timing/Severity: constant, moderate  Quality of pain: aching, throbbing, tender  Severity:  7 / 10   Modifying Factors: Pain worsens with dressing changes, debridement  Associated Signs/Symptoms: edema, drainage and pain    Ulcer Identification:  Ulcer Type: venous  Contributing Factors: edema and venous stasis    Diabetic/Pressure/Non Pressure Ulcers only:  Ulcer: Non-Pressure ulcer, fat layer exposed        PAST MEDICAL HISTORY      Diagnosis Date    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 ([address] 5/18/2022    Dizziness - light-headed     GERD (gastroesophageal reflux disease)     Herpes dermatitis 4/27/2017    Insomnia secondary to anxiety 4/6/2018    Lightheadedness     Migraine     Skin ulcer of buttock with fat layer exposed ([address] 7/20/2016    Venous stasis ulcer of left ankle with fat layer exposed with varicose veins ([address] 2/2/2022     Past Surgical History:   Procedure Laterality Date    CHOLECYSTECTOMY, LAPAROSCOPIC  04/08/2014    TONSILLECTOMY  1960    1960s    UPPER GASTROINTESTINAL ENDOSCOPY  12/13/2013    mild gastritis and small hiatal hernia, Dr Dread Marques, [address] ENDOSCOPY  2015    GERD, Dr. Mal De Oliveira, office     Family History   Problem Relation Age of Onset    Diabetes Mother    [facility] Hypertension Mother     Heart Disease Father     Heart Attack Father     Heart Surgery Father         angioplasty    Stroke Father     High Cholesterol Father     Heart Attack Maternal Grandfather      Social History     Tobacco Use    Smoking status: Never Smoker    Smokeless tobacco: Never Used   Vaping Use    Vaping Use: Never used   Substance Use Topics    Alcohol use: No    Drug use: No     Allergies   Allergen Reactions    Bee Pollen Anaphylaxis    Tetracyclines & Related Hives     Current Outpatient Medications on File Prior to Encounter   Medication Sig Dispense Refill    oxyCODONE-acetaminophen (PERCOCET) 5-325 MG per tablet Take 1 tablet by mouth every 4 hours as needed for Pain.  butalbital-acetaminophen-caffeine (FIORICET, ESGIC) -40 MG per tablet Take 1 tablet by mouth 3 times daily as needed for Headaches or Migraine Indications: Cluster Headache 90 tablet 5    omeprazole (PRILOSEC) 40 MG delayed release capsule Take 1 capsule by mouth daily 90 capsule 3    hydrOXYzine (ATARAX) 25 MG tablet take 1 tablet BID 60 tablet 5    aspirin-acetaminophen-caffeine (EXCEDRIN MIGRAINE) 250-250-65 MG per tablet Take 1 tablet by mouth as needed for Headaches 300 tablet 3    acyclovir (ZOVIRAX) 400 MG tablet take 1 tablet by mouth once daily 90 tablet 3    EPINEPHrine (EPIPEN) 0.3 MG/0.3ML SOAJ injection Use as directed for allergic reaction 1 each 5     No current facility-administered medications on file prior to encounter.        REVIEW OF SYSTEMS See HPI    Objective:    /74   Pulse 60   Temp 97.8 °F (36.6 °C) (Temporal)   Resp 18   Ht 5' 8\" (1.727 m)   Wt 160 lb (72.6 kg)   BMI 24.33 kg/m²   Wt Readings from Last 3 Encounters:   06/15/22 160 lb (72.6 kg)   06/08/22 160 lb (72.6 kg)   06/01/22 160 lb (72.6 kg)     PHYSICAL EXAM  CONSTITUTIONAL:   Awake, alert, cooperative   EYES:  lids and lashes normal   ENT: external ears and nose without lesions   NECK:  supple, symmetrical, trachea midline   SKIN:  Open wound Present    Assessment:     Problem List Items Addressed This Visit     Venous stasis ulcer of left ankle with fat layer exposed with varicose veins (HCC) (Chronic)    Relevant Orders    Initiate Outpatient Wound Care Protocol    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 ([address] - Primary (Chronic)    Relevant Orders    Initiate Outpatient Wound Care Protocol          Pre Debridement Measurements:  Are located in the [facility]  Documentation Flow Sheet  Post Debridement Measurements:  Wound/Ulcer Descriptions are Pre Debridement except measurements:     Wound 08/10/16 Other (Comment) Buttocks Left;Distal #2 acq 8/4/16 (Active)   Number of days: 2134       Wound 08/31/16 Buttocks Left;Proximal #3 aquired 8-27-26 (Active)   Number of days: 2113       Incision 04/08/14 Abdomen (Active)   Number of days: 2989       Wound 02/02/22 Ankle Left;Medial #1 (Active)   Wound Image   06/08/22 0816   Dressing Status New dressing applied 06/08/22 0826   Wound Cleansed Cleansed with saline 06/08/22 0826   Dressing/Treatment ABD 06/08/22 0826   Offloading for Diabetic Foot Ulcers Offloading not required 03/17/22 1342   Wound Length (cm) 6.5 cm 06/15/22 0801   Wound Width (cm) 4.1 cm 06/15/22 0801   Wound Depth (cm) 0.1 cm 06/15/22 0801   Wound Surface Area (cm^2) 26.65 cm^2 06/15/22 0801   Change in Wound Size % (l*w) 1.52 06/15/22 0801   Wound Volume (cm^3) 2.665 cm^3 06/15/22 0801   Wound Healing % 2 06/15/22 0801   Post-Procedure Length (cm) 6.6 cm 06/15/22 0849   Post-Procedure Width (cm) 4.2 cm 06/15/22 0849   Post-Procedure Depth (cm) 0.1 cm 06/15/22 0849   Post-Procedure Surface Area (cm^2) 27.72 cm^2 06/15/22 0849   Post-Procedure Volume (cm^3) 2.772 cm^3 06/15/22 0849   Wound Assessment Granulation tissue;Pink/red;Fibrin 06/15/22 0801   Drainage Amount Moderate 06/15/22 0801   Drainage Description Yellow 06/15/22 0801   Odor None 06/15/22 0801   Melany-wound Debrided: 70%    Total Surface Area Debrided:  20 sq cm     Estimated Blood Loss:  Minimal  Hemostasis Achieved:  by pressure    Procedural Pain:  8  / 10   Post Procedural Pain:  7 / 10     Response to treatment:  With complaints of pain. Plan:   Treatment Note please see attached Discharge Instructions    Written patient dismissal instructions given to patient and signed by patient or POA. Discharge Instructions       Visit Discharge/Physician Orders     Discharge condition: Stable     Assessment of pain at discharge: yes     Anesthetic used: 4% lidocaine solution     Discharge to: Home     Left via:Private automobile     Accompanied by:self     ECF/HHA: n/a     Dressing Orders:LEFT MEDIAL and LATERAL LOWER LEG-Cleanse with normal saline, apply drawtex, ABD pad and profore wrap, change weekly.     Treatment Orders: Eat a diet high in protein and vitamin C. Take a multiple vitamin daily unless contraindicated.       Dr. Mahi Infante as ordered      Must wear slip on shoe to work due to wrap on leg!      [address] [place] [address],[address] followup visit : 1 week_____________________________  (Please note your next appointment above and if you are unable to keep, kindly give a 24 hour notice.  Thank you.)     Physician signature:__________________________     If you experience any of the following, please call the [facility] during business hours:     * Increase in Pain  * Temperature over 101  * Increase in drainage from your wound  * Drainage with a foul odor  * Bleeding  * Increase in swelling  * Need for compression bandage changes due to slippage, breakthrough drainage.     If you need medical attention outside of the business hours of the [facility] please contact your PCP or go to the nearest emergency room.    Jesica Trevino          Electronically signed by Raul Holloway MD on 6/15/2022 at 8:53 AM

## 2022-06-22 ENCOUNTER — HOSPITAL ENCOUNTER (OUTPATIENT)
Dept: WOUND CARE | Age: 67
Discharge: HOME | End: 2022-06-22
Payer: MEDICAID

## 2022-06-22 VITALS
WEIGHT: 160 LBS | BODY MASS INDEX: 24.25 KG/M2 | HEIGHT: 68 IN | TEMPERATURE: 97.7 F | SYSTOLIC BLOOD PRESSURE: 142 MMHG | HEART RATE: 76 BPM | RESPIRATION RATE: 18 BRPM | DIASTOLIC BLOOD PRESSURE: 84 MMHG

## 2022-06-22 DIAGNOSIS — I83.023 VENOUS STASIS ULCER OF LEFT ANKLE WITH FAT LAYER EXPOSED WITH VARICOSE VEINS (HCC): Primary | ICD-10-CM

## 2022-06-22 DIAGNOSIS — I70.242 ATHEROSCLEROSIS OF NATIVE ARTERY OF LEFT LOWER EXTREMITY WITH ULCERATION OF CALF (HCC): ICD-10-CM

## 2022-06-22 DIAGNOSIS — L97.322 VENOUS STASIS ULCER OF LEFT ANKLE WITH FAT LAYER EXPOSED WITH VARICOSE VEINS (HCC): Primary | ICD-10-CM

## 2022-06-22 PROCEDURE — 11042 DBRDMT SUBQ TIS 1ST 20SQCM/<: CPT | Performed by: SURGERY

## 2022-06-22 PROCEDURE — 11042 DBRDMT SUBQ TIS 1ST 20SQCM/<: CPT

## 2022-06-22 PROCEDURE — 6370000000 HC RX 637 (ALT 250 FOR IP): Performed by: SURGERY

## 2022-06-22 RX ORDER — LIDOCAINE HYDROCHLORIDE 20 MG/ML
JELLY TOPICAL ONCE
Status: CANCELLED | OUTPATIENT
Start: 2022-06-22 | End: 2022-06-22

## 2022-06-22 RX ORDER — LIDOCAINE HYDROCHLORIDE 40 MG/ML
SOLUTION TOPICAL ONCE
Status: CANCELLED | OUTPATIENT
Start: 2022-06-22 | End: 2022-06-22

## 2022-06-22 RX ORDER — GINSENG 100 MG
CAPSULE ORAL ONCE
Status: CANCELLED | OUTPATIENT
Start: 2022-06-22 | End: 2022-06-22

## 2022-06-22 RX ORDER — LIDOCAINE 40 MG/G
CREAM TOPICAL ONCE
Status: CANCELLED | OUTPATIENT
Start: 2022-06-22 | End: 2022-06-22

## 2022-06-22 RX ORDER — LIDOCAINE 50 MG/G
OINTMENT TOPICAL ONCE
Status: CANCELLED | OUTPATIENT
Start: 2022-06-22 | End: 2022-06-22

## 2022-06-22 RX ORDER — CLOBETASOL PROPIONATE 0.5 MG/G
OINTMENT TOPICAL ONCE
Status: CANCELLED | OUTPATIENT
Start: 2022-06-22 | End: 2022-06-22

## 2022-06-22 RX ORDER — BACITRACIN ZINC AND POLYMYXIN B SULFATE 500; 1000 [USP'U]/G; [USP'U]/G
OINTMENT TOPICAL ONCE
Status: CANCELLED | OUTPATIENT
Start: 2022-06-22 | End: 2022-06-22

## 2022-06-22 RX ORDER — BETAMETHASONE DIPROPIONATE 0.05 %
OINTMENT (GRAM) TOPICAL ONCE
Status: CANCELLED | OUTPATIENT
Start: 2022-06-22 | End: 2022-06-22

## 2022-06-22 RX ORDER — BACITRACIN, NEOMYCIN, POLYMYXIN B 400; 3.5; 5 [USP'U]/G; MG/G; [USP'U]/G
OINTMENT TOPICAL ONCE
Status: CANCELLED | OUTPATIENT
Start: 2022-06-22 | End: 2022-06-22

## 2022-06-22 RX ORDER — GENTAMICIN SULFATE 1 MG/G
OINTMENT TOPICAL ONCE
Status: CANCELLED | OUTPATIENT
Start: 2022-06-22 | End: 2022-06-22

## 2022-06-22 RX ORDER — LIDOCAINE HYDROCHLORIDE 40 MG/ML
SOLUTION TOPICAL ONCE
Status: COMPLETED | OUTPATIENT
Start: 2022-06-22 | End: 2022-06-22

## 2022-06-22 RX ADMIN — LIDOCAINE HYDROCHLORIDE 10 ML: 40 SOLUTION TOPICAL at 07:58

## 2022-06-22 ASSESSMENT — PAIN SCALES - GENERAL: PAINLEVEL_OUTOF10: 1

## 2022-06-22 ASSESSMENT — PAIN DESCRIPTION - LOCATION: LOCATION: LEG

## 2022-06-22 ASSESSMENT — PAIN DESCRIPTION - PAIN TYPE: TYPE: CHRONIC PAIN

## 2022-06-22 ASSESSMENT — PAIN DESCRIPTION - FREQUENCY: FREQUENCY: INTERMITTENT

## 2022-06-22 ASSESSMENT — PAIN DESCRIPTION - ONSET: ONSET: ON-GOING

## 2022-06-22 ASSESSMENT — PAIN DESCRIPTION - ORIENTATION: ORIENTATION: LEFT

## 2022-06-22 ASSESSMENT — PAIN DESCRIPTION - DESCRIPTORS: DESCRIPTORS: JABBING

## 2022-06-22 ASSESSMENT — PAIN - FUNCTIONAL ASSESSMENT: PAIN_FUNCTIONAL_ASSESSMENT: PREVENTS OR INTERFERES SOME ACTIVE ACTIVITIES AND ADLS

## 2022-06-22 NOTE — PROGRESS NOTES
Wound Healing Center Followup Visit Note    Referring Physician : Beni Padron MD  [address] [place] [address] RECORD NUMBER:  [id number]  AGE: 61 y.o. GENDER: female  : 1955  EPISODE DATE:  2022    Subjective:     Chief Complaint   Patient presents with    Wound Check     left leg      HISTORY of PRESENT ILLNESS HPI   Pernell Huerta is a 61 y.o. female who presents today in regards to follow up evaluation and treatment of wound/ulcer. That patient's past medical, family and social hx were reviewed and changes were made if present. History of Wound Context:  The patient has had a wound of her left ankle/calf which was first noted approximately 2021. This has been treated local wound care. On their initial visit to the wound healing center, 22,  the patient has noted that the wound has been improving. The patient has not had similar previous wounds in the past.      She started seeing Dr. Shaniqua Max in 2021 and than Dr. Ina Bermeo. She was started in [facility] ~ 2021. She has noticed some improvement since starting [facility] boot. She is currently following with Dr. Ethan Chao. Pt is not on abx at time of initial visit, but has been treated with previously by podiatry. She is not a DM. She is not a smoker. She denies hx of DVT, and per her report had recent us noting no evidence of dvt at [facility]. She also had arterial studies done. I had previously seen her in the past in regards to left buttock thigh wound, which started as abscess. Pt works at [employer] in [place] and is on her feet all day.     22  · Reflux study - if significant findings will schedule for fu  · Continue compression therapy unna boot per podiatry with aquacell dressing  · Elevation  · She does not have significant arterial occlusive disease  22  · Patient has asked to continuing following with me going forward because of our previous relationship  · She is going to let Dr. Jluis Nath office know  · She tolerated unna boot and aquacell - some improvement  216/22  · Appearance improved, slightly larger  · Consider drawtek next week but overall drainage seems reasonably managed as periwound appears ok  2/23/2022  · The wound, has some exudate, no recent cultures were done, will do wound cultures today  3/2/22  · Reflux study reviewed - no significant reflux  · Will treat culture - augmentin 875 mg bid x 10 days - script sent  · More drainage - stable size  3/9/22  · Wound appearance improved, stable in size  · drawtek  3/16/22  · Wound slightly larger in size, new wound of ankle  · Continue Drawtek, change to profore  · Avoid shoes which will contact ankle area  3/23/22  · Wounds stable  · Overall appearance improved  · Will have pt see ID re cultures - disc with Dr Renée Zaragoza  3/30/22  · Much improved appearance  · On oral abx per ID - concerns re pt ability to work while on IV - will see how her wound progresses  4/6/22  · Appearance improved but size not much different  · Finished oral abx  · Will re culture next week if no significant size change - possible advance skin therapy  4/20/2022  · Ulcer on the medial aspect, fairly clean and granulating, ulcer of the lateral aspect, has some exudate, debrided  4/27/22  · Wounds stable   · Hypergranulation tissue removed  · Culture done - possible graft in future  5/4/22  · Wound stable  · Disc culture with Dr Renée Zaragoza who would like to start her on iv abx  5/11/22  · Wound stable  · Iv abx have not been started yet - spoke with Dr Renée Zaragoza - spoke with pt she will call his office  · She had spoke with [facility] but there were some issues  5/18/22  · Calf stable, ankle improved  · Iv abx to start this week after picc placement  · On exam   · L dp 2+, PT triphasic - with compression of dp - PT becomes weakly biphasic  · Concern that PT though triphasic is not getting inline flow and as a result isn't healing in the calf distribution because of occlusive disease  · We discussed angiogram - will schedule  I reviewed the procedure with the patient and family as available. I discussed the procedure, risks, benefits, complications, and alternatives of the procedure. They understand and consent.   All questions were answered  Script for percocet 5/325 mg #28 prn q6 hrs - given, oarrs run  5/24/22  · Angio, L PT and peroneal plasty  5/25/22  · On iv bx  · Wound appearance better  · R groin no hematoma, L DP 2+, PT triphasic   6/1/22  · Wound size and appearance better  6/8/22  · Wound size and appearance better  · Discussed with Dr Kristin Romero - he will stop abx  6/15/22  · Wound size slightly improvement, appearance better  6/22/22  · Wounds sizes smaller    Wound/Ulcer Pain Timing/Severity: constant, moderate  Quality of pain: aching, throbbing, tender  Severity:  6/ 10   Modifying Factors: Pain worsens with dressing changes, debridement  Associated Signs/Symptoms: edema, drainage and pain    Ulcer Identification:  Ulcer Type: venous  Contributing Factors: edema and venous stasis    Diabetic/Pressure/Non Pressure Ulcers only:  Ulcer: Non-Pressure ulcer, fat layer exposed        PAST MEDICAL HISTORY      Diagnosis Date    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 ([address] 5/18/2022    Dizziness - light-headed     GERD (gastroesophageal reflux disease)     Herpes dermatitis 4/27/2017    Insomnia secondary to anxiety 4/6/2018    Lightheadedness     Migraine     Skin ulcer of buttock with fat layer exposed ([address] 7/20/2016    Venous stasis ulcer of left ankle with fat layer exposed with varicose veins ([address] 2/2/2022     Past Surgical History:   Procedure Laterality Date    CHOLECYSTECTOMY, LAPAROSCOPIC  04/08/2014    TONSILLECTOMY  1960    1960s    UPPER GASTROINTESTINAL ENDOSCOPY  12/13/2013    mild gastritis and small hiatal hernia, Dr Rudd Isreal, [address] ENDOSCOPY  2015    GERD, Dr. Diana Kapadia, office     Family History   Problem Relation Age of Onset    Diabetes Mother     Hypertension Mother     Heart Disease Father     Heart Attack Father     Heart Surgery Father         angioplasty    Stroke Father     High Cholesterol Father     Heart Attack Maternal Grandfather      Social History     Tobacco Use    Smoking status: Never Smoker    Smokeless tobacco: Never Used   Vaping Use    Vaping Use: Never used   Substance Use Topics    Alcohol use: No    Drug use: No     Allergies   Allergen Reactions    Bee Pollen Anaphylaxis    Tetracyclines & Related Hives     Current Outpatient Medications on File Prior to Encounter   Medication Sig Dispense Refill    oxyCODONE-acetaminophen (PERCOCET) 5-325 MG per tablet Take 1 tablet by mouth every 4 hours as needed for Pain.  butalbital-acetaminophen-caffeine (FIORICET, ESGIC) -40 MG per tablet Take 1 tablet by mouth 3 times daily as needed for Headaches or Migraine Indications: Cluster Headache 90 tablet 5    omeprazole (PRILOSEC) 40 MG delayed release capsule Take 1 capsule by mouth daily 90 capsule 3    hydrOXYzine (ATARAX) 25 MG tablet take 1 tablet BID 60 tablet 5    aspirin-acetaminophen-caffeine (EXCEDRIN MIGRAINE) 250-250-65 MG per tablet Take 1 tablet by mouth as needed for Headaches 300 tablet 3    acyclovir (ZOVIRAX) 400 MG tablet take 1 tablet by mouth once daily 90 tablet 3    EPINEPHrine (EPIPEN) 0.3 MG/0.3ML SOAJ injection Use as directed for allergic reaction 1 each 5     No current facility-administered medications on file prior to encounter.        REVIEW OF SYSTEMS See HPI    Objective:    BP (!) 142/84   Pulse 76   Temp 97.7 °F (36.5 °C) (Temporal)   Resp 18   Ht 5' 8\" (1.727 m)   Wt 160 lb (72.6 kg)   BMI 24.33 kg/m²   Wt Readings from Last 3 Encounters:   06/22/22 160 lb (72.6 kg)   06/15/22 160 lb (72.6 kg)   06/08/22 160 lb (72.6 kg)     PHYSICAL EXAM  CONSTITUTIONAL:   Awake, alert, cooperative   EYES:  lids and lashes normal   ENT: external ears and nose without lesions   NECK:  supple, symmetrical, trachea midline   SKIN:  Open wound Present    Assessment:     Problem List Items Addressed This Visit     Venous stasis ulcer of left ankle with fat layer exposed with varicose veins ([address] - Primary (Chronic)    Relevant Orders    Initiate Outpatient Wound Care Protocol    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 ([address] (Chronic)    Relevant Orders    Initiate Outpatient Wound Care Protocol          Pre Debridement Measurements:  Are located in the [place]  Documentation Flow Sheet  Post Debridement Measurements:  Wound/Ulcer Descriptions are Pre Debridement except measurements:     Wound 08/10/16 Other (Comment) Buttocks Left;Distal #2 acq 8/4/16 (Active)   Number of days: 2141       Wound 08/31/16 Buttocks Left;Proximal #3 aquired 8-27-26 (Active)   Number of days: 2121       Incision 04/08/14 Abdomen (Active)   Number of days: 2996       Wound 02/02/22 Ankle Left;Medial #1 (Active)   Wound Image   06/08/22 0816   Dressing Status New dressing applied 06/15/22 0906   Wound Cleansed Cleansed with saline 06/15/22 0906   Dressing/Treatment ABD 06/15/22 0906   Offloading for Diabetic Foot Ulcers Offloading not required 06/15/22 0906   Wound Length (cm) 5.9 cm 06/22/22 0748   Wound Width (cm) 4.2 cm 06/22/22 0748   Wound Depth (cm) 0.1 cm 06/22/22 0748   Wound Surface Area (cm^2) 24.78 cm^2 06/22/22 0748   Change in Wound Size % (l*w) 8.43 06/22/22 0748   Wound Volume (cm^3) 2.478 cm^3 06/22/22 0748   Wound Healing % 8 06/22/22 0748   Post-Procedure Length (cm) 6.1 cm 06/22/22 0818   Post-Procedure Width (cm) 4.3 cm 06/22/22 0818   Post-Procedure Depth (cm) 0.1 cm 06/22/22 0818   Post-Procedure Surface Area (cm^2) 26.23 cm^2 06/22/22 0818   Post-Procedure Volume (cm^3) 2.623 cm^3 06/22/22 0818   Wound Assessment Granulation tissue;Pink/red;Fibrin 06/22/22 0748   Drainage Amount Moderate 06/22/22 0748   Drainage Description Yellow 06/22/22 0748   Odor None 06/22/22 0748   Melany-wound Assessment Intact 06/22/22 0748   Number of days: 140       Wound 03/16/22 Ankle Posterior; Left #2 (Active)   Wound Image   06/08/22 0816   Dressing Status New dressing applied;Clean;Dry; Intact 06/22/22 0835   Wound Cleansed Cleansed with saline 06/22/22 0835   Dressing/Treatment ABD; Foam 06/22/22 0835   Offloading for Diabetic Foot Ulcers Offloading not required 06/22/22 0835   Wound Length (cm) 2.3 cm 06/22/22 0748   Wound Width (cm) 0.9 cm 06/22/22 0748   Wound Depth (cm) 0.2 cm 06/22/22 0748   Wound Surface Area (cm^2) 2.07 cm^2 06/22/22 0748   Change in Wound Size % (l*w) 17.2 06/22/22 0748   Wound Volume (cm^3) 0.414 cm^3 06/22/22 0748   Wound Healing % -66 06/22/22 0748   Post-Procedure Length (cm) 2.4 cm 06/22/22 0818   Post-Procedure Width (cm) 1 cm 06/22/22 0818   Post-Procedure Depth (cm) 0.2 cm 06/22/22 0818   Post-Procedure Surface Area (cm^2) 2.4 cm^2 06/22/22 0818   Post-Procedure Volume (cm^3) 0.48 cm^3 06/22/22 0818   Wound Assessment Granulation tissue;Fibrin;Pink/red 06/22/22 0748   Drainage Amount Moderate 06/22/22 0748   Drainage Description Yellow 06/22/22 0748   Odor None 06/22/22 0748   Melany-wound Assessment Intact 06/22/22 0748   Number of days: 98          Procedure Note  Indications:  Based on my examination of this patient's wound(s)/ulcer(s) today, debridement is required to promote healing and evaluate the wound base. Performed by: Emi Garcia MD    Consent obtained:  Yes    Time out taken:  Yes    Pain Control: Anesthetic  Anesthetic: 4% Lidocaine Liquid Topical     Debridement:Excisional Debridement    Using curette the wound(s)/ulcer(s) was/were sharply debrided down through and including the removal of epidermis, dermis and subcutaneous tissue.         Devitalized Tissue Debrided:  fibrin, biofilm, slough and exudate to stimulate bleeding to promote healing, post debridement good bleeding base and wound edges noted    Wound/Ulcer #: 1, 2    Percent of Wound/Ulcer Debrided: 80%    Total Surface Area Debrided:  20 sq cm     Estimated Blood Loss:  Minimal  Hemostasis Achieved:  by pressure    Procedural Pain:  7  / 10   Post Procedural Pain:  6 / 10     Response to treatment:  With complaints of pain. Plan:   Treatment Note please see attached Discharge Instructions    Written patient dismissal instructions given to patient and signed by patient or POA. Discharge Instructions       Visit Discharge/Physician Orders     Discharge condition: Stable     Assessment of pain at discharge: yes     Anesthetic used: 4% lidocaine solution     Discharge to: Home     Left via:Private automobile     Accompanied by:self     ECF/HHA: n/a     Dressing Orders:LEFT MEDIAL and LATERAL LOWER LEG-Cleanse with normal saline, apply drawtex, ABD pad and profore wrap, change weekly.     Treatment Orders: Eat a diet high in protein and vitamin C. Take a multiple vitamin daily unless contraindicated.       Dr. Matilde Wilks as ordered      Must wear slip on shoe to work due to wrap on leg!      [facility] followup visit : 1 week_____________________________  (Please note your next appointment above and if you are unable to keep, kindly give a 24 hour notice.  Thank you.)     Physician signature:__________________________     If you experience any of the following, please call the [address] during business hours:     * Increase in Pain  * Temperature over 101  * Increase in drainage from your wound  * Drainage with a foul odor  * Bleeding  * Increase in swelling  * Need for compression bandage changes due to slippage, breakthrough drainage.     If you need medical attention outside of the business hours of the [facility] [address] please contact your PCP or go to the nearest emergency room.    [place]          Electronically signed by Lakeisha Del Real MD on 6/22/2022 at 9:30 AM

## 2022-06-29 ENCOUNTER — HOSPITAL ENCOUNTER (OUTPATIENT)
Dept: WOUND CARE | Age: 67
Discharge: HOME | End: 2022-06-29
Payer: MEDICAID

## 2022-06-29 VITALS
SYSTOLIC BLOOD PRESSURE: 154 MMHG | WEIGHT: 160 LBS | BODY MASS INDEX: 24.25 KG/M2 | HEART RATE: 80 BPM | RESPIRATION RATE: 18 BRPM | DIASTOLIC BLOOD PRESSURE: 78 MMHG | HEIGHT: 68 IN | TEMPERATURE: 98.2 F

## 2022-06-29 DIAGNOSIS — I83.023 VENOUS STASIS ULCER OF LEFT ANKLE WITH FAT LAYER EXPOSED WITH VARICOSE VEINS (HCC): Primary | ICD-10-CM

## 2022-06-29 DIAGNOSIS — I70.242 ATHEROSCLEROSIS OF NATIVE ARTERY OF LEFT LOWER EXTREMITY WITH ULCERATION OF CALF (HCC): ICD-10-CM

## 2022-06-29 DIAGNOSIS — L97.322 VENOUS STASIS ULCER OF LEFT ANKLE WITH FAT LAYER EXPOSED WITH VARICOSE VEINS (HCC): Primary | ICD-10-CM

## 2022-06-29 PROCEDURE — 11045 DBRDMT SUBQ TISS EACH ADDL: CPT

## 2022-06-29 PROCEDURE — 11042 DBRDMT SUBQ TIS 1ST 20SQCM/<: CPT | Performed by: NURSE PRACTITIONER

## 2022-06-29 PROCEDURE — 11045 DBRDMT SUBQ TISS EACH ADDL: CPT | Performed by: NURSE PRACTITIONER

## 2022-06-29 PROCEDURE — 11042 DBRDMT SUBQ TIS 1ST 20SQCM/<: CPT

## 2022-06-29 RX ORDER — CLOBETASOL PROPIONATE 0.5 MG/G
OINTMENT TOPICAL ONCE
Status: CANCELLED | OUTPATIENT
Start: 2022-06-29 | End: 2022-06-29

## 2022-06-29 RX ORDER — BACITRACIN, NEOMYCIN, POLYMYXIN B 400; 3.5; 5 [USP'U]/G; MG/G; [USP'U]/G
OINTMENT TOPICAL ONCE
Status: CANCELLED | OUTPATIENT
Start: 2022-06-29 | End: 2022-06-29

## 2022-06-29 RX ORDER — LIDOCAINE HYDROCHLORIDE 20 MG/ML
JELLY TOPICAL ONCE
Status: CANCELLED | OUTPATIENT
Start: 2022-06-29 | End: 2022-06-29

## 2022-06-29 RX ORDER — BACITRACIN ZINC AND POLYMYXIN B SULFATE 500; 1000 [USP'U]/G; [USP'U]/G
OINTMENT TOPICAL ONCE
Status: CANCELLED | OUTPATIENT
Start: 2022-06-29 | End: 2022-06-29

## 2022-06-29 RX ORDER — LIDOCAINE HYDROCHLORIDE 40 MG/ML
SOLUTION TOPICAL ONCE
Status: CANCELLED | OUTPATIENT
Start: 2022-06-29 | End: 2022-06-29

## 2022-06-29 RX ORDER — LIDOCAINE 40 MG/G
CREAM TOPICAL ONCE
Status: CANCELLED | OUTPATIENT
Start: 2022-06-29 | End: 2022-06-29

## 2022-06-29 RX ORDER — LIDOCAINE 50 MG/G
OINTMENT TOPICAL ONCE
Status: CANCELLED | OUTPATIENT
Start: 2022-06-29 | End: 2022-06-29

## 2022-06-29 RX ORDER — GINSENG 100 MG
CAPSULE ORAL ONCE
Status: CANCELLED | OUTPATIENT
Start: 2022-06-29 | End: 2022-06-29

## 2022-06-29 RX ORDER — GENTAMICIN SULFATE 1 MG/G
OINTMENT TOPICAL ONCE
Status: CANCELLED | OUTPATIENT
Start: 2022-06-29 | End: 2022-06-29

## 2022-06-29 RX ORDER — LIDOCAINE HYDROCHLORIDE 40 MG/ML
SOLUTION TOPICAL ONCE
Status: COMPLETED | OUTPATIENT
Start: 2022-06-29 | End: 2022-06-29

## 2022-06-29 RX ORDER — BETAMETHASONE DIPROPIONATE 0.05 %
OINTMENT (GRAM) TOPICAL ONCE
Status: CANCELLED | OUTPATIENT
Start: 2022-06-29 | End: 2022-06-29

## 2022-06-29 RX ADMIN — LIDOCAINE HYDROCHLORIDE: 40 SOLUTION TOPICAL at 07:58

## 2022-06-29 NOTE — PROGRESS NOTES
Wound Healing Center Followup Visit Note    Referring Physician : Bolivar Fung MD  [address] [place] [address] RECORD NUMBER:  [id number]  AGE: 61 y.o. GENDER: female  : 1955  EPISODE DATE:  2022    Subjective:     Chief Complaint   Patient presents with    Wound Check     left leg      HISTORY of PRESENT ILLNESS HPI   Obie Betancur is a 61 y.o. female who presents today in regards to follow up evaluation and treatment of wound/ulcer. That patient's past medical, family and social hx were reviewed and changes were made if present. History of Wound Context:  The patient has had a wound of her left ankle/calf which was first noted approximately 2021. This has been treated local wound care. On their initial visit to the wound healing center, 22,  the patient has noted that the wound has been improving. The patient has not had similar previous wounds in the past.      She started seeing Dr. Lilian Velarde in 2021 and than Dr. Teto Barajas. She was started in [facility] ~ 2021. She has noticed some improvement since starting unna boot. She is currently following with Dr. Merlin Gupta. Pt is not on abx at time of initial visit, but has been treated with previously by podiatry. She is not a DM. She is not a smoker. She denies hx of DVT, and per her report had recent us noting no evidence of dvt at [facility]. She also had arterial studies done. I had previously seen her in the past in regards to left buttock thigh wound, which started as abscess. Pt works at [employer] in [facility] and is on her feet all day.     22  · Reflux study - if significant findings will schedule for fu  · [place] [facility] with aquacell dressing  · Elevation  · She does not have significant arterial occlusive disease  22  · Patient has asked to continuing following with me going forward because of our previous relationship  · She is going to let Dr. Genesis Iniguez office know  · She tolerated unna boot and aquacell - some improvement  216/22  · Appearance improved, slightly larger  · Consider drawtek next week but overall drainage seems reasonably managed as periwound appears ok  2/23/2022  · The wound, has some exudate, no recent cultures were done, will do wound cultures today  3/2/22  · Reflux study reviewed - no significant reflux  · Will treat culture - augmentin 875 mg bid x 10 days - script sent  · More drainage - stable size  3/9/22  · Wound appearance improved, stable in size  · drawtek  3/16/22  · Wound slightly larger in size, new wound of ankle  · Continue Drawtek, change to profore  · Avoid shoes which will contact ankle area  3/23/22  · Wounds stable  · Overall appearance improved  · Will have pt see ID re cultures - disc with Dr Vivian Monique  3/30/22  · Much improved appearance  · On oral abx per ID - concerns re pt ability to work while on IV - will see how her wound progresses  4/6/22  · Appearance improved but size not much different  · Finished oral abx  · Will re culture next week if no significant size change - possible advance skin therapy  4/20/2022  · Ulcer on the medial aspect, fairly clean and granulating, ulcer of the lateral aspect, has some exudate, debrided  4/27/22  · Wounds stable   · Hypergranulation tissue removed  · Culture done - possible graft in future  5/4/22  · Wound stable  · Disc culture with Dr Vivian Monique who would like to start her on iv abx  5/11/22  · Wound stable  · Iv abx have not been started yet - spoke with Dr Vivian Monique - spoke with pt she will call his office  · She had spoke with [facility] but there were some issues  5/18/22  · Calf stable, ankle improved  · Iv abx to start this week after picc placement  · On exam   · L dp 2+, PT triphasic - with compression of dp - PT becomes weakly biphasic  · Concern that PT though triphasic is not getting inline flow and as a result isn't healing in the calf distribution because of occlusive disease  · We discussed angiogram - will schedule  I reviewed the procedure with the patient and family as available. I discussed the procedure, risks, benefits, complications, and alternatives of the procedure. They understand and consent.   All questions were answered  Script for percocet 5/325 mg #28 prn q6 hrs - given, oarrs run  5/24/22  · Angio, L PT and peroneal plasty  5/25/22  · On iv bx  · Wound appearance better  · R groin no hematoma, L DP 2+, PT triphasic   6/1/22  · Wound size and appearance better  6/8/22  · Wound size and appearance better  · Discussed with Dr Julia Marcus - he will stop abx  6/15/22  · Wound size slightly improvement, appearance better  6/22/22  · Wounds sizes smaller    6/29/22  · Wounds stable   · Hypergranulation tissue present throughout wound beds    · Continue drawtex, foam, ABD and profore     Wound/Ulcer Pain Timing/Severity: constant, moderate  Quality of pain: aching, throbbing, tender  Severity:  6/ 10   Modifying Factors: Pain worsens with dressing changes, debridement  Associated Signs/Symptoms: edema, drainage and pain    Ulcer Identification:  Ulcer Type: venous  Contributing Factors: edema and venous stasis    Diabetic/Pressure/Non Pressure Ulcers only:  Ulcer: Non-Pressure ulcer, fat layer exposed        PAST MEDICAL HISTORY      Diagnosis Date    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 ([address] 5/18/2022    Dizziness - light-headed     GERD (gastroesophageal reflux disease)     Herpes dermatitis 4/27/2017    Insomnia secondary to anxiety 4/6/2018    Lightheadedness     Migraine     Skin ulcer of buttock with fat layer exposed ([address] 7/20/2016    Venous stasis ulcer of left ankle with fat layer exposed with varicose veins ([address] 2/2/2022     Past Surgical History:   Procedure Laterality Date    CHOLECYSTECTOMY, LAPAROSCOPIC  04/08/2014    TONSILLECTOMY  1960    1960s    UPPER GASTROINTESTINAL ENDOSCOPY  12/13/2013    mild gastritis and small hiatal hernia, Dr Jaymie Shelton, [place]  2015    GERD, Dr. Nicky Ames, office     Family History   Problem Relation Age of Onset    Diabetes Mother     Hypertension Mother     Heart Disease Father     Heart Attack Father     Heart Surgery Father         angioplasty    Stroke Father     High Cholesterol Father     Heart Attack Maternal Grandfather      Social History     Tobacco Use    Smoking status: Never Smoker    Smokeless tobacco: Never Used   Vaping Use    Vaping Use: Never used   Substance Use Topics    Alcohol use: No    Drug use: No     Allergies   Allergen Reactions    Bee Pollen Anaphylaxis    Tetracyclines & Related Hives     Current Outpatient Medications on File Prior to Encounter   Medication Sig Dispense Refill    oxyCODONE-acetaminophen (PERCOCET) 5-325 MG per tablet Take 1 tablet by mouth every 4 hours as needed for Pain.  butalbital-acetaminophen-caffeine (FIORICET, ESGIC) -40 MG per tablet Take 1 tablet by mouth 3 times daily as needed for Headaches or Migraine Indications: Cluster Headache 90 tablet 5    omeprazole (PRILOSEC) 40 MG delayed release capsule Take 1 capsule by mouth daily 90 capsule 3    hydrOXYzine (ATARAX) 25 MG tablet take 1 tablet BID 60 tablet 5    aspirin-acetaminophen-caffeine (EXCEDRIN MIGRAINE) 250-250-65 MG per tablet Take 1 tablet by mouth as needed for Headaches 300 tablet 3    acyclovir (ZOVIRAX) 400 MG tablet take 1 tablet by mouth once daily 90 tablet 3    EPINEPHrine (EPIPEN) 0.3 MG/0.3ML SOAJ injection Use as directed for allergic reaction 1 each 5     No current facility-administered medications on file prior to encounter.        REVIEW OF SYSTEMS See HPI    Objective:    BP (!) 154/78   Pulse 80   Temp 98.2 °F (36.8 °C) (Temporal)   Resp 18   Ht 5' 8\" (1.727 m)   Wt 160 lb (72.6 kg)   BMI 24.33 kg/m²   Wt Readings from Last 3 Encounters:   06/29/22 160 lb (72.6 kg)   06/22/22 160 lb (72.6 kg)   06/15/22 160 lb (72.6 kg) PHYSICAL EXAM  CONSTITUTIONAL:   Awake, alert, cooperative   EYES:  lids and lashes normal   ENT: external ears and nose without lesions   NECK:  supple, symmetrical, trachea midline   SKIN:  Open wound Present    Assessment:     Problem List Items Addressed This Visit     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 ([address] (Chronic)    Relevant Orders    Initiate Outpatient Wound Care Protocol    Venous stasis ulcer of left ankle with fat layer exposed with varicose veins (HCC) - Primary (Chronic)    Relevant Orders    Initiate Outpatient Wound Care Protocol          Pre Debridement Measurements:  Are located in the [place]  Documentation Flow Sheet  Post Debridement Measurements:  Wound/Ulcer Descriptions are Pre Debridement except measurements:     Wound 08/10/16 Other (Comment) Buttocks Left;Distal #2 acq 8/4/16 (Active)   Number of days: 2148       Wound 08/31/16 Buttocks Left;Proximal #3 aquired 8-27-26 (Active)   Number of days: 2127       Incision 04/08/14 Abdomen (Active)   Number of days: 1092       Wound 02/02/22 Ankle Left;Medial #1 (Active)   Wound Image   06/08/22 0816   Dressing Status New dressing applied 06/15/22 0906   Wound Cleansed Cleansed with saline 06/15/22 0906   Dressing/Treatment ABD 06/15/22 0906   Offloading for Diabetic Foot Ulcers Offloading not required 06/15/22 0906   Wound Length (cm) 5.9 cm 06/29/22 0751   Wound Width (cm) 4 cm 06/29/22 0751   Wound Depth (cm) 0.1 cm 06/29/22 0751   Wound Surface Area (cm^2) 23.6 cm^2 06/29/22 0751   Change in Wound Size % (l*w) 12.79 06/29/22 0751   Wound Volume (cm^3) 2.36 cm^3 06/29/22 0751   Wound Healing % 13 06/29/22 0751   Post-Procedure Length (cm) 6 cm 06/29/22 0841   Post-Procedure Width (cm) 4.3 cm 06/29/22 0841   Post-Procedure Depth (cm) 0.1 cm 06/29/22 0841   Post-Procedure Surface Area (cm^2) 25.8 cm^2 06/29/22 0841   Post-Procedure Volume (cm^3) 2.58 cm^3 06/29/22 0841   Wound Assessment Granulation tissue;Pink/red;Fibrin 06/29/22 0751   Drainage Amount Moderate 06/29/22 0751   Drainage Description Thick; Yellow 06/29/22 0751   Odor None 06/29/22 0751   Melany-wound Assessment Intact 06/29/22 0751   Number of days: 146       Wound 03/16/22 Ankle Posterior; Left #2 (Active)   Wound Image   06/08/22 0816   Dressing Status New dressing applied;Clean;Dry; Intact 06/22/22 0835   Wound Cleansed Cleansed with saline 06/22/22 0835   Dressing/Treatment ABD; Foam 06/22/22 0835   Offloading for Diabetic Foot Ulcers Offloading not required 06/22/22 0835   Wound Length (cm) 2.3 cm 06/29/22 0751   Wound Width (cm) 1.1 cm 06/29/22 0751   Wound Depth (cm) 0.2 cm 06/29/22 0751   Wound Surface Area (cm^2) 2.53 cm^2 06/29/22 0751   Change in Wound Size % (l*w) -1.2 06/29/22 0751   Wound Volume (cm^3) 0.506 cm^3 06/29/22 0751   Wound Healing % -102 06/29/22 0751   Post-Procedure Length (cm) 2.4 cm 06/29/22 0841   Post-Procedure Width (cm) 1.2 cm 06/29/22 0841   Post-Procedure Depth (cm) 0.2 cm 06/29/22 0841   Post-Procedure Surface Area (cm^2) 2.88 cm^2 06/29/22 0841   Post-Procedure Volume (cm^3) 0.576 cm^3 06/29/22 0841   Wound Assessment Granulation tissue;Fibrin;Pink/red 06/29/22 0751   Drainage Amount Moderate 06/29/22 0751   Drainage Description Yellow 06/29/22 0751   Odor None 06/29/22 0751   Melany-wound Assessment Intact 06/29/22 0751   Number of days: 105          Procedure Note  Indications:  Based on my examination of this patient's wound(s)/ulcer(s) today, debridement is required to promote healing and evaluate the wound base. Performed by: LISA Godinez CNP    Consent obtained:  Yes    Time out taken:  Yes    Pain Control: Anesthetic  Anesthetic: 4% Lidocaine Liquid Topical     Debridement:Excisional Debridement    Using curette the wound(s)/ulcer(s) was/were sharply debrided down through and including the removal of epidermis, dermis and subcutaneous tissue.         Devitalized Tissue Debrided:  fibrin, Debbie                                                          Electronically signed by LISA Manning CNP on 6/29/2022 at 8:49 AM

## 2022-07-06 ENCOUNTER — HOSPITAL ENCOUNTER (OUTPATIENT)
Dept: WOUND CARE | Age: 67
Discharge: HOME | End: 2022-07-06
Payer: MEDICAID

## 2022-07-06 VITALS
TEMPERATURE: 96.4 F | SYSTOLIC BLOOD PRESSURE: 182 MMHG | DIASTOLIC BLOOD PRESSURE: 90 MMHG | WEIGHT: 160 LBS | HEART RATE: 78 BPM | RESPIRATION RATE: 18 BRPM | BODY MASS INDEX: 24.33 KG/M2

## 2022-07-06 DIAGNOSIS — L97.322 VENOUS STASIS ULCER OF LEFT ANKLE WITH FAT LAYER EXPOSED WITH VARICOSE VEINS (HCC): Primary | ICD-10-CM

## 2022-07-06 DIAGNOSIS — I70.242 ATHEROSCLEROSIS OF NATIVE ARTERY OF LEFT LOWER EXTREMITY WITH ULCERATION OF CALF (HCC): ICD-10-CM

## 2022-07-06 DIAGNOSIS — I83.023 VENOUS STASIS ULCER OF LEFT ANKLE WITH FAT LAYER EXPOSED WITH VARICOSE VEINS (HCC): Primary | ICD-10-CM

## 2022-07-06 PROCEDURE — 11042 DBRDMT SUBQ TIS 1ST 20SQCM/<: CPT

## 2022-07-06 PROCEDURE — 11042 DBRDMT SUBQ TIS 1ST 20SQCM/<: CPT | Performed by: PHYSICIAN ASSISTANT

## 2022-07-06 PROCEDURE — 6370000000 HC RX 637 (ALT 250 FOR IP): Performed by: SURGERY

## 2022-07-06 RX ORDER — LIDOCAINE 40 MG/G
CREAM TOPICAL ONCE
Status: CANCELLED | OUTPATIENT
Start: 2022-07-06 | End: 2022-07-06

## 2022-07-06 RX ORDER — LIDOCAINE HYDROCHLORIDE 20 MG/ML
JELLY TOPICAL ONCE
Status: CANCELLED | OUTPATIENT
Start: 2022-07-06 | End: 2022-07-06

## 2022-07-06 RX ORDER — LIDOCAINE 50 MG/G
OINTMENT TOPICAL ONCE
Status: CANCELLED | OUTPATIENT
Start: 2022-07-06 | End: 2022-07-06

## 2022-07-06 RX ORDER — LIDOCAINE HYDROCHLORIDE 40 MG/ML
SOLUTION TOPICAL ONCE
Status: CANCELLED | OUTPATIENT
Start: 2022-07-06 | End: 2022-07-06

## 2022-07-06 RX ORDER — BETAMETHASONE DIPROPIONATE 0.05 %
OINTMENT (GRAM) TOPICAL ONCE
Status: CANCELLED | OUTPATIENT
Start: 2022-07-06 | End: 2022-07-06

## 2022-07-06 RX ORDER — BACITRACIN ZINC AND POLYMYXIN B SULFATE 500; 1000 [USP'U]/G; [USP'U]/G
OINTMENT TOPICAL ONCE
Status: CANCELLED | OUTPATIENT
Start: 2022-07-06 | End: 2022-07-06

## 2022-07-06 RX ORDER — CLOBETASOL PROPIONATE 0.5 MG/G
OINTMENT TOPICAL ONCE
Status: CANCELLED | OUTPATIENT
Start: 2022-07-06 | End: 2022-07-06

## 2022-07-06 RX ORDER — LIDOCAINE HYDROCHLORIDE 40 MG/ML
SOLUTION TOPICAL ONCE
Status: COMPLETED | OUTPATIENT
Start: 2022-07-06 | End: 2022-07-06

## 2022-07-06 RX ORDER — BACITRACIN, NEOMYCIN, POLYMYXIN B 400; 3.5; 5 [USP'U]/G; MG/G; [USP'U]/G
OINTMENT TOPICAL ONCE
Status: CANCELLED | OUTPATIENT
Start: 2022-07-06 | End: 2022-07-06

## 2022-07-06 RX ORDER — GENTAMICIN SULFATE 1 MG/G
OINTMENT TOPICAL ONCE
Status: CANCELLED | OUTPATIENT
Start: 2022-07-06 | End: 2022-07-06

## 2022-07-06 RX ORDER — GINSENG 100 MG
CAPSULE ORAL ONCE
Status: CANCELLED | OUTPATIENT
Start: 2022-07-06 | End: 2022-07-06

## 2022-07-06 RX ADMIN — LIDOCAINE HYDROCHLORIDE 10 ML: 40 SOLUTION TOPICAL at 07:34

## 2022-07-06 NOTE — PROGRESS NOTES
Wound Healing Center Followup Visit Note    Referring Physician : Ghada Orozco MD  [address] [place] [address] RECORD NUMBER:  [id number]  AGE: 61 y.o. GENDER: female  : 1955  EPISODE DATE:  2022    Subjective:     Chief Complaint   Patient presents with    Wound Check     Left leg      HISTORY of PRESENT ILLNESS HPI   Cornelius Armstrong is a 61 y.o. female who presents today in regards to follow up evaluation and treatment of wound/ulcer. That patient's past medical, family and social hx were reviewed and changes were made if present. History of Wound Context:  The patient has had a wound of her left ankle/calf which was first noted approximately 2021. This has been treated local wound care. On their initial visit to the wound healing center, 22,  the patient has noted that the wound has been improving. The patient has not had similar previous wounds in the past.      She started seeing Dr. Jus Lara in 2021 and than Dr. Diana Henderson. She was started in [facility] ~ 2021. She has noticed some improvement since starting unna boot. She is currently following with Dr. Ester Mcburney. Pt is not on abx at time of initial visit, but has been treated with previously by podiatry. She is not a DM. She is not a smoker. She denies hx of DVT, and per her report had recent us noting no evidence of dvt at [facility]. She also had arterial studies done. I had previously seen her in the past in regards to left buttock thigh wound, which started as abscess. Pt works at [employer] in [facility] and is on her feet all day.     22  · Reflux study - if significant findings will schedule for fu  · Continue compression therapy unn boot per podiatry with aquacell dressing  · Elevation  · She does not have significant arterial occlusive disease  22  · Patient has asked to continuing following with me going forward because of our previous relationship  · She is going to let Dr. Sarmad Junior office know  · She tolerated unna boot and aquacell - some improvement  216/22  · Appearance improved, slightly larger  · Consider drawtek next week but overall drainage seems reasonably managed as periwound appears ok  2/23/2022  · The wound, has some exudate, no recent cultures were done, will do wound cultures today  3/2/22  · Reflux study reviewed - no significant reflux  · Will treat culture - augmentin 875 mg bid x 10 days - script sent  · More drainage - stable size  3/9/22  · Wound appearance improved, stable in size  · drawtek  3/16/22  · Wound slightly larger in size, new wound of ankle  · Continue Drawtek, change to profore  · Avoid shoes which will contact ankle area  3/23/22  · Wounds stable  · Overall appearance improved  · Will have pt see ID re cultures - disc with Dr Luis Sarabia  3/30/22  · Much improved appearance  · On oral abx per ID - concerns re pt ability to work while on IV - will see how her wound progresses  4/6/22  · Appearance improved but size not much different  · Finished oral abx  · Will re culture next week if no significant size change - possible advance skin therapy  4/20/2022  · Ulcer on the medial aspect, fairly clean and granulating, ulcer of the lateral aspect, has some exudate, debrided  4/27/22  · Wounds stable   · Hypergranulation tissue removed  · Culture done - possible graft in future  5/4/22  · Wound stable  · Disc culture with Dr Luis Sarabia who would like to start her on iv abx  5/11/22  · Wound stable  · Iv abx have not been started yet - spoke with Dr Luis Sarabia - spoke with pt she will call his office  · She had spoke with [facility] but there were some issues  5/18/22  · Calf stable, ankle improved  · Iv abx to start this week after picc placement  · On exam   · L dp 2+, PT triphasic - with compression of dp - PT becomes weakly biphasic  · Concern that PT though triphasic is not getting inline flow and as a result isn't healing in the calf distribution because of occlusive disease  · We discussed angiogram - will schedule  I reviewed the procedure with the patient and family as available. I discussed the procedure, risks, benefits, complications, and alternatives of the procedure. They understand and consent.   All questions were answered  Script for percocet 5/325 mg #28 prn q6 hrs - given, oarrs run  5/24/22  · Angio, L PT and peroneal plasty  5/25/22  · On iv abx  · Wound appearance better  · R groin no hematoma, L DP 2+, PT triphasic   6/1/22  · Wound size and appearance better  6/8/22  · Wound size and appearance better  · Discussed with Dr Ta Morgan - he will stop abx  6/15/22  · Wound size slightly improvement, appearance better  6/22/22  · Wounds sizes smaller    6/29/22  · Wounds stable   · Hypergranulation tissue present throughout wound beds    · Continue drawtex, foam, ABD and profore     7/6/22  · Wounds slightly improved    Wound/Ulcer Pain Timing/Severity: constant, moderate  Quality of pain: aching, throbbing, tender  Severity:  6/ 10   Modifying Factors: Pain worsens with dressing changes, debridement  Associated Signs/Symptoms: edema, drainage and pain    Ulcer Identification:  Ulcer Type: venous  Contributing Factors: edema and venous stasis    Diabetic/Pressure/Non Pressure Ulcers only:  Ulcer: Non-Pressure ulcer, fat layer exposed        PAST MEDICAL HISTORY      Diagnosis Date    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 ([address] 5/18/2022    Dizziness - light-headed     GERD (gastroesophageal reflux disease)     Herpes dermatitis 4/27/2017    Insomnia secondary to anxiety 4/6/2018    Lightheadedness     Migraine     Skin ulcer of buttock with fat layer exposed ([address] 7/20/2016    Venous stasis ulcer of left ankle with fat layer exposed with varicose veins ([address] 2/2/2022     Past Surgical History:   Procedure Laterality Date    CHOLECYSTECTOMY, LAPAROSCOPIC  04/08/2014    TONSILLECTOMY  1960    1960s    UPPER GASTROINTESTINAL ENDOSCOPY  12/13/2013 mild gastritis and small hiatal hernia, Dr Soni Venegas, [place]  2015    GERD, Dr. Blanquita Duffy, office     Family History   Problem Relation Age of Onset    Diabetes Mother     Hypertension Mother     Heart Disease Father     Heart Attack Father     Heart Surgery Father         angioplasty    Stroke Father     High Cholesterol Father     Heart Attack Maternal Grandfather      Social History     Tobacco Use    Smoking status: Never Smoker    Smokeless tobacco: Never Used   Vaping Use    Vaping Use: Never used   Substance Use Topics    Alcohol use: No    Drug use: No     Allergies   Allergen Reactions    Bee Pollen Anaphylaxis    Tetracyclines & Related Hives     Current Outpatient Medications on File Prior to Encounter   Medication Sig Dispense Refill    oxyCODONE-acetaminophen (PERCOCET) 5-325 MG per tablet Take 1 tablet by mouth every 4 hours as needed for Pain.  butalbital-acetaminophen-caffeine (FIORICET, ESGIC) -40 MG per tablet Take 1 tablet by mouth 3 times daily as needed for Headaches or Migraine Indications: Cluster Headache 90 tablet 5    omeprazole (PRILOSEC) 40 MG delayed release capsule Take 1 capsule by mouth daily 90 capsule 3    hydrOXYzine (ATARAX) 25 MG tablet take 1 tablet BID 60 tablet 5    aspirin-acetaminophen-caffeine (EXCEDRIN MIGRAINE) 250-250-65 MG per tablet Take 1 tablet by mouth as needed for Headaches 300 tablet 3    acyclovir (ZOVIRAX) 400 MG tablet take 1 tablet by mouth once daily 90 tablet 3    EPINEPHrine (EPIPEN) 0.3 MG/0.3ML SOAJ injection Use as directed for allergic reaction 1 each 5     No current facility-administered medications on file prior to encounter.        REVIEW OF SYSTEMS See HPI    Objective:    BP (!) 182/90   Pulse 78   Temp (!) 96.4 °F (35.8 °C) (Tympanic)   Resp 18   Wt 160 lb (72.6 kg)   BMI 24.33 kg/m²   Wt Readings from Last 3 Encounters:   07/06/22 160 lb (72.6 kg)   06/29/22 160 lb (72.6 kg) 06/22/22 160 lb (72.6 kg)     PHYSICAL EXAM  CONSTITUTIONAL:   Awake, alert, cooperative   EYES:  lids and lashes normal   ENT: external ears and nose without lesions   NECK:  supple, symmetrical, trachea midline   SKIN:  Open wound Present    Assessment:     Problem List Items Addressed This Visit        Circulatory    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 ([address] (Chronic)    Relevant Orders    Initiate Outpatient Wound Care Protocol    * (Principal) Venous stasis ulcer of left ankle with fat layer exposed with varicose veins (HCC) - Primary (Chronic)    Relevant Orders    Initiate Outpatient Wound Care Protocol          Pre Debridement Measurements:  Are located in the [place]  Documentation Flow Sheet  Post Debridement Measurements:  Wound/Ulcer Descriptions are Pre Debridement except measurements:     Wound 08/10/16 Other (Comment) Buttocks Left;Distal #2 acq 8/4/16 (Active)   Number of days: 2155       Wound 08/31/16 Buttocks Left;Proximal #3 aquired 8-27-26 (Active)   Number of days: 2134       Incision 04/08/14 Abdomen (Active)   Number of days: 3010       Wound 02/02/22 Ankle Left;Medial #1 (Active)   Wound Image   07/06/22 0731   Dressing Status New dressing applied 06/15/22 0906   Wound Cleansed Cleansed with saline 06/15/22 0906   Dressing/Treatment ABD 06/15/22 0906   Offloading for Diabetic Foot Ulcers Offloading not required 06/15/22 0906   Wound Length (cm) 6 cm 07/06/22 0731   Wound Width (cm) 3.5 cm 07/06/22 0731   Wound Depth (cm) 0.1 cm 07/06/22 0731   Wound Surface Area (cm^2) 21 cm^2 07/06/22 0731   Change in Wound Size % (l*w) 22.39 07/06/22 0731   Wound Volume (cm^3) 2.1 cm^3 07/06/22 0731   Wound Healing % 22 07/06/22 0731   Post-Procedure Length (cm) 6.1 cm 07/06/22 0811   Post-Procedure Width (cm) 3.6 cm 07/06/22 0811   Post-Procedure Depth (cm) 0.1 cm 07/06/22 0811   Post-Procedure Surface Area (cm^2) 21.96 cm^2 07/06/22 0811   Post-Procedure Volume (cm^3) 2.196 cm^3 07/06/22 0811   Wound Assessment Fibrin;Pink/red 07/06/22 0731   Drainage Amount Moderate 07/06/22 0731   Drainage Description Green;Yellow;Brown 07/06/22 0731   Odor None 07/06/22 0731   Melany-wound Assessment Dry/flaky 07/06/22 0731   Number of days: 153       Wound 03/16/22 Ankle Posterior; Left #2 (Active)   Wound Image   07/06/22 0731   Dressing Status New dressing applied;Clean;Dry; Intact 06/29/22 0902   Wound Cleansed Cleansed with saline 06/29/22 0902   Dressing/Treatment ABD; Foam 06/29/22 0902   Offloading for Diabetic Foot Ulcers Offloading not required 06/29/22 0902   Wound Length (cm) 2 cm 07/06/22 0731   Wound Width (cm) 1.2 cm 07/06/22 0731   Wound Depth (cm) 0.1 cm 07/06/22 0731   Wound Surface Area (cm^2) 2.4 cm^2 07/06/22 0731   Change in Wound Size % (l*w) 4 07/06/22 0731   Wound Volume (cm^3) 0.24 cm^3 07/06/22 0731   Wound Healing % 4 07/06/22 0731   Post-Procedure Length (cm) 2.4 cm 06/29/22 0841   Post-Procedure Width (cm) 1.2 cm 06/29/22 0841   Post-Procedure Depth (cm) 0.2 cm 06/29/22 0841   Post-Procedure Surface Area (cm^2) 2.88 cm^2 06/29/22 0841   Post-Procedure Volume (cm^3) 0.576 cm^3 06/29/22 0841   Wound Assessment Fibrin;Pink/red 07/06/22 0731   Drainage Amount Moderate 07/06/22 0731   Drainage Description Green;Yellow;Brown 07/06/22 0731   Odor None 07/06/22 0731   Melany-wound Assessment Dry/flaky 07/06/22 0731   Number of days: 112          Procedure Note  Indications:  Based on my examination of this patient's wound(s)/ulcer(s) today, debridement is required to promote healing and evaluate the wound base. Performed by: Luis Wilks PA-C    Consent obtained:  Yes    Time out taken:  Yes    Pain Control: Anesthetic  Anesthetic: 4% Lidocaine Liquid Topical     Debridement:Excisional Debridement    Using curette the wound(s)/ulcer(s) was/were sharply debrided down through and including the removal of epidermis, dermis and subcutaneous tissue.         Devitalized Tissue Kang Trotter                      Electronically signed by Paulo Brennan PA-C on 7/6/2022 at 8:15 AM

## 2022-07-13 ENCOUNTER — HOSPITAL ENCOUNTER (OUTPATIENT)
Dept: WOUND CARE | Age: 67
Discharge: HOME | End: 2022-07-13
Payer: MEDICAID

## 2022-07-13 VITALS
BODY MASS INDEX: 24.33 KG/M2 | HEART RATE: 76 BPM | TEMPERATURE: 99.9 F | DIASTOLIC BLOOD PRESSURE: 82 MMHG | RESPIRATION RATE: 18 BRPM | WEIGHT: 160 LBS | SYSTOLIC BLOOD PRESSURE: 152 MMHG

## 2022-07-13 DIAGNOSIS — I70.242 ATHEROSCLEROSIS OF NATIVE ARTERY OF LEFT LOWER EXTREMITY WITH ULCERATION OF CALF (HCC): ICD-10-CM

## 2022-07-13 DIAGNOSIS — I83.023 VENOUS STASIS ULCER OF LEFT ANKLE WITH FAT LAYER EXPOSED WITH VARICOSE VEINS (HCC): Primary | ICD-10-CM

## 2022-07-13 DIAGNOSIS — L97.322 VENOUS STASIS ULCER OF LEFT ANKLE WITH FAT LAYER EXPOSED WITH VARICOSE VEINS (HCC): Primary | ICD-10-CM

## 2022-07-13 PROCEDURE — 6370000000 HC RX 637 (ALT 250 FOR IP): Performed by: SURGERY

## 2022-07-13 PROCEDURE — 11042 DBRDMT SUBQ TIS 1ST 20SQCM/<: CPT | Performed by: SURGERY

## 2022-07-13 PROCEDURE — 11042 DBRDMT SUBQ TIS 1ST 20SQCM/<: CPT

## 2022-07-13 RX ORDER — BETAMETHASONE DIPROPIONATE 0.05 %
OINTMENT (GRAM) TOPICAL ONCE
Status: CANCELLED | OUTPATIENT
Start: 2022-07-13 | End: 2022-07-13

## 2022-07-13 RX ORDER — CLOBETASOL PROPIONATE 0.5 MG/G
OINTMENT TOPICAL ONCE
Status: CANCELLED | OUTPATIENT
Start: 2022-07-13 | End: 2022-07-13

## 2022-07-13 RX ORDER — LIDOCAINE HYDROCHLORIDE 20 MG/ML
JELLY TOPICAL ONCE
Status: CANCELLED | OUTPATIENT
Start: 2022-07-13 | End: 2022-07-13

## 2022-07-13 RX ORDER — GENTAMICIN SULFATE 1 MG/G
OINTMENT TOPICAL ONCE
Status: CANCELLED | OUTPATIENT
Start: 2022-07-13 | End: 2022-07-13

## 2022-07-13 RX ORDER — BACITRACIN ZINC AND POLYMYXIN B SULFATE 500; 1000 [USP'U]/G; [USP'U]/G
OINTMENT TOPICAL ONCE
Status: CANCELLED | OUTPATIENT
Start: 2022-07-13 | End: 2022-07-13

## 2022-07-13 RX ORDER — BACITRACIN, NEOMYCIN, POLYMYXIN B 400; 3.5; 5 [USP'U]/G; MG/G; [USP'U]/G
OINTMENT TOPICAL ONCE
Status: CANCELLED | OUTPATIENT
Start: 2022-07-13 | End: 2022-07-13

## 2022-07-13 RX ORDER — GINSENG 100 MG
CAPSULE ORAL ONCE
Status: CANCELLED | OUTPATIENT
Start: 2022-07-13 | End: 2022-07-13

## 2022-07-13 RX ORDER — LIDOCAINE 50 MG/G
OINTMENT TOPICAL ONCE
Status: CANCELLED | OUTPATIENT
Start: 2022-07-13 | End: 2022-07-13

## 2022-07-13 RX ORDER — LIDOCAINE HYDROCHLORIDE 40 MG/ML
SOLUTION TOPICAL ONCE
Status: COMPLETED | OUTPATIENT
Start: 2022-07-13 | End: 2022-07-13

## 2022-07-13 RX ORDER — LIDOCAINE 40 MG/G
CREAM TOPICAL ONCE
Status: CANCELLED | OUTPATIENT
Start: 2022-07-13 | End: 2022-07-13

## 2022-07-13 RX ORDER — LIDOCAINE HYDROCHLORIDE 40 MG/ML
SOLUTION TOPICAL ONCE
Status: CANCELLED | OUTPATIENT
Start: 2022-07-13 | End: 2022-07-13

## 2022-07-13 RX ADMIN — LIDOCAINE HYDROCHLORIDE 8 ML: 40 SOLUTION TOPICAL at 07:56

## 2022-07-13 ASSESSMENT — PAIN DESCRIPTION - PAIN TYPE: TYPE: CHRONIC PAIN

## 2022-07-13 ASSESSMENT — PAIN DESCRIPTION - LOCATION: LOCATION: LEG

## 2022-07-13 ASSESSMENT — PAIN DESCRIPTION - ONSET: ONSET: ON-GOING

## 2022-07-13 ASSESSMENT — PAIN DESCRIPTION - ORIENTATION: ORIENTATION: LEFT

## 2022-07-13 ASSESSMENT — PAIN SCALES - GENERAL: PAINLEVEL_OUTOF10: 0

## 2022-07-13 ASSESSMENT — PAIN DESCRIPTION - FREQUENCY: FREQUENCY: INTERMITTENT

## 2022-07-13 NOTE — PROGRESS NOTES
Wound Healing Center Followup Visit Note    Referring Physician : Jayne Cm MD  [address] [place] [address] RECORD NUMBER:  [id number]  AGE: 61 y.o. GENDER: female  : 1955  EPISODE DATE:  2022    Subjective:     Chief Complaint   Patient presents with    Wound Check     left ankle wounds      HISTORY of PRESENT ILLNESS HPI   Yari Rowley is a 61 y.o. female who presents today in regards to follow up evaluation and treatment of wound/ulcer. That patient's past medical, family and social hx were reviewed and changes were made if present. History of Wound Context:  The patient has had a wound of her left ankle/calf which was first noted approximately 2021. This has been treated local wound care. On their initial visit to the wound healing center, 22,  the patient has noted that the wound has been improving. The patient has not had similar previous wounds in the past.      She started seeing Dr. Nurys Salas in 2021 and than Dr. Siddhartha Starr. She was started in [facility] ~ 2021. She has noticed some improvement since starting unna boot. She is currently following with Dr. Sammie Mcintosh. Pt is not on abx at time of initial visit, but has been treated with previously by podiatry. She is not a DM. She is not a smoker. She denies hx of DVT, and per her report had recent us noting no evidence of dvt at [facility]. She also had arterial studies done. I had previously seen her in the past in regards to left buttock thigh wound, which started as abscess. Pt works at [employer] in [place] and is on her feet all day.     22  · Reflux study - if significant findings will schedule for fu  · Continue compression therapy [facility] bo per podiatry with aquacell dressing  · Elevation  · She does not have significant arterial occlusive disease  22  · Patient has asked to continuing following with me going forward because of our previous relationship  · She is going to let  disease  · We discussed angiogram - will schedule  I reviewed the procedure with the patient and family as available. I discussed the procedure, risks, benefits, complications, and alternatives of the procedure. They understand and consent.   All questions were answered  Script for percocet 5/325 mg #28 prn q6 hrs - given, oarrs run  5/24/22  · Angio, L PT and peroneal plasty  5/25/22  · On iv abx  · Wound appearance better  · R groin no hematoma, L DP 2+, PT triphasic   6/1/22  · Wound size and appearance better  6/8/22  · Wound size and appearance better  · Discussed with Dr Mayr Valentin - he will stop abx  6/15/22  · Wound size slightly improvement, appearance better  6/22/22  · Wounds sizes smaller  6/29/22  · Wounds stable   · Hypergranulation tissue present throughout wound beds    · Continue drawtex, foam, ABD and profore   7/6/22  · Wounds slightly improved  7/13/22  · Both wounds slightly larger  · Hyperkeratotic tissue debrided back    Wound/Ulcer Pain Timing/Severity: constant, moderate  Quality of pain: aching, throbbing, tender  Severity:  6/ 10   Modifying Factors: Pain worsens with dressing changes, debridement  Associated Signs/Symptoms: edema, drainage and pain    Ulcer Identification:  Ulcer Type: venous  Contributing Factors: edema and venous stasis    Diabetic/Pressure/Non Pressure Ulcers only:  Ulcer: Non-Pressure ulcer, fat layer exposed        PAST MEDICAL HISTORY      Diagnosis Date    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 ([address] 5/18/2022    Dizziness - light-headed     GERD (gastroesophageal reflux disease)     Herpes dermatitis 4/27/2017    Insomnia secondary to anxiety 4/6/2018    Lightheadedness     Migraine     Skin ulcer of buttock with fat layer exposed ([address] 7/20/2016    Venous stasis ulcer of left ankle with fat layer exposed with varicose veins ([address] 2/2/2022     Past Surgical History:   Procedure Laterality Date    CHOLECYSTECTOMY, LAPAROSCOPIC 04/08/2014    TONSILLECTOMY  1960    1960s    UPPER GASTROINTESTINAL ENDOSCOPY  12/13/2013    mild gastritis and small hiatal hernia, Dr Gonzales [facility], [facility]    UPPER GASTROINTESTINAL ENDOSCOPY  2015    GERD, Dr. Desirae Lazaro, office     Family History   Problem Relation Age of Onset    Diabetes Mother     Hypertension Mother     Heart Disease Father     Heart Attack Father     Heart Surgery Father         angioplasty    Stroke Father     High Cholesterol Father     Heart Attack Maternal Grandfather      Social History     Tobacco Use    Smoking status: Never Smoker    Smokeless tobacco: Never Used   Vaping Use    Vaping Use: Never used   Substance Use Topics    Alcohol use: No    Drug use: No     Allergies   Allergen Reactions    Bee Pollen Anaphylaxis    Tetracyclines & Related Hives     Current Outpatient Medications on File Prior to Encounter   Medication Sig Dispense Refill    oxyCODONE-acetaminophen (PERCOCET) 5-325 MG per tablet Take 1 tablet by mouth every 4 hours as needed for Pain.  butalbital-acetaminophen-caffeine (FIORICET, ESGIC) -40 MG per tablet Take 1 tablet by mouth 3 times daily as needed for Headaches or Migraine Indications: Cluster Headache 90 tablet 5    omeprazole (PRILOSEC) 40 MG delayed release capsule Take 1 capsule by mouth daily 90 capsule 3    hydrOXYzine (ATARAX) 25 MG tablet take 1 tablet BID 60 tablet 5    aspirin-acetaminophen-caffeine (EXCEDRIN MIGRAINE) 250-250-65 MG per tablet Take 1 tablet by mouth as needed for Headaches 300 tablet 3    acyclovir (ZOVIRAX) 400 MG tablet take 1 tablet by mouth once daily 90 tablet 3    EPINEPHrine (EPIPEN) 0.3 MG/0.3ML SOAJ injection Use as directed for allergic reaction 1 each 5     No current facility-administered medications on file prior to encounter.        REVIEW OF SYSTEMS See HPI    Objective:    BP (!) 152/82   Pulse 76   Temp 99.9 °F (37.7 °C) (Temporal)   Resp 18   Wt 160 lb (72.6 kg)   BMI 24.33 kg/m²   Wt Readings from Last 3 Encounters:   07/13/22 160 lb (72.6 kg)   07/06/22 160 lb (72.6 kg)   06/29/22 160 lb (72.6 kg)     PHYSICAL EXAM  CONSTITUTIONAL:   Awake, alert, cooperative   EYES:  lids and lashes normal   ENT: external ears and nose without lesions   NECK:  supple, symmetrical, trachea midline   SKIN:  Open wound Present    Assessment:     Problem List Items Addressed This Visit     Venous stasis ulcer of left ankle with fat layer exposed with varicose veins (HCC) - Primary (Chronic)    Relevant Orders    Initiate Outpatient Wound Care Protocol    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 ([address] (Chronic)    Relevant Orders    Initiate Outpatient Wound Care Protocol          Pre Debridement Measurements:  Are located in the [place]  Documentation Flow Sheet  Post Debridement Measurements:  Wound/Ulcer Descriptions are Pre Debridement except measurements:     Wound 08/10/16 Other (Comment) Buttocks Left;Distal #2 acq 8/4/16 (Active)   Number of days: 2162       Wound 08/31/16 Buttocks Left;Proximal #3 aquired 8-27-26 (Active)   Number of days: 2141       Incision 04/08/14 Abdomen (Active)   Number of days: 0737       Wound 02/02/22 Ankle Left;Medial #1 (Active)   Wound Image   07/06/22 0731   Dressing Status New dressing applied 07/06/22 0811   Wound Cleansed Cleansed with saline 07/06/22 0811   Dressing/Treatment ABD 07/06/22 0811   Offloading for Diabetic Foot Ulcers Offloading not required 06/15/22 0906   Wound Length (cm) 6.6 cm 07/13/22 0753   Wound Width (cm) 3.7 cm 07/13/22 0753   Wound Depth (cm) 0.1 cm 07/13/22 0753   Wound Surface Area (cm^2) 24.42 cm^2 07/13/22 0753   Change in Wound Size % (l*w) 9.76 07/13/22 0753   Wound Volume (cm^3) 2.442 cm^3 07/13/22 0753   Wound Healing % 10 07/13/22 0753   Post-Procedure Length (cm) 6.7 cm 07/13/22 0816   Post-Procedure Width (cm) 3.9 cm 07/13/22 0816   Post-Procedure Depth (cm) 0.1 cm 07/13/22 0816   Post-Procedure Surface Area (cm^2) 26.13 cm^2 07/13/22 0816   Post-Procedure Volume (cm^3) 2.613 cm^3 07/13/22 0816   Wound Assessment Fibrin;Pink/red 07/13/22 0753   Drainage Amount Moderate 07/13/22 0753   Drainage Description Green;Yellow;Brown 07/13/22 0753   Odor None 07/13/22 0753   Melany-wound Assessment Dry/flaky 07/13/22 0753   Number of days: 160       Wound 03/16/22 Ankle Posterior; Left #2 (Active)   Wound Image   07/06/22 0731   Dressing Status New dressing applied;Clean;Dry; Intact 07/06/22 0811   Wound Cleansed Cleansed with saline 07/06/22 0811   Dressing/Treatment ABD; Foam 07/06/22 0811   Offloading for Diabetic Foot Ulcers Offloading not required 06/29/22 0902   Wound Length (cm) 2.3 cm 07/13/22 0753   Wound Width (cm) 1.7 cm 07/13/22 0753   Wound Depth (cm) 0.2 cm 07/13/22 0753   Wound Surface Area (cm^2) 3.91 cm^2 07/13/22 0753   Change in Wound Size % (l*w) -56.4 07/13/22 0753   Wound Volume (cm^3) 0.782 cm^3 07/13/22 0753   Wound Healing % -213 07/13/22 0753   Post-Procedure Length (cm) 2.5 cm 07/13/22 0816   Post-Procedure Width (cm) 1.7 cm 07/13/22 0816   Post-Procedure Depth (cm) 0.2 cm 07/13/22 0816   Post-Procedure Surface Area (cm^2) 4.25 cm^2 07/13/22 0816   Post-Procedure Volume (cm^3) 0.85 cm^3 07/13/22 0816   Wound Assessment Fibrin;Pink/red 07/13/22 0753   Drainage Amount Moderate 07/13/22 0753   Drainage Description Green;Yellow;Brown 07/13/22 0753   Odor None 07/13/22 0753   Melany-wound Assessment Maceration 07/13/22 0753   Number of days: 119          Procedure Note  Indications:  Based on my examination of this patient's wound(s)/ulcer(s) today, debridement is required to promote healing and evaluate the wound base.     Performed by: Tamia Haas MD    Consent obtained:  Yes    Time out taken:  Yes    Pain Control: Anesthetic  Anesthetic: 4% Lidocaine Liquid Topical     Debridement:Excisional Debridement    Using curette the wound(s)/ulcer(s) was/were sharply debrided down through and including the removal of epidermis, dermis and subcutaneous tissue. Devitalized Tissue Debrided:  fibrin, biofilm, slough and exudate to stimulate bleeding to promote healing, post debridement good bleeding base and wound edges noted    Wound/Ulcer #: 1 and 2    Percent of Wound/Ulcer Debrided: 70%    Total Surface Area Debrided: 20 sq cm     Estimated Blood Loss:  Minimal  Hemostasis Achieved:  by pressure    Procedural Pain:  6  / 10   Post Procedural Pain:  5 / 10     Response to treatment:  With complaints of pain. Plan:   Treatment Note please see attached Discharge Instructions    Written patient dismissal instructions given to patient and signed by patient or POA. Discharge Instructions       Visit Discharge/Physician Orders     Discharge condition: Stable     Assessment of pain at discharge: yes     Anesthetic used: 4% lidocaine solution     Discharge to: Home     Left via:Private automobile     Accompanied by:self     ECF/HHA: n/a     Dressing Orders:LEFT MEDIAL and LATERAL LOWER LEG-Cleanse with normal saline, apply drawtex, foam, ABD pad and profore wrap, change weekly.     Treatment Orders: Eat a diet high in protein and vitamin C. Take a multiple vitamin daily unless contraindicated.       Dr. Diamond Keyes as ordered      Must wear slip on shoe to work due to wrap on leg!      [facility] followup visit : 1 week_____________________________  (Please note your next appointment above and if you are unable to keep, kindly give a 24 hour notice.  Thank you.)     Physician signature:__________________________     If you experience any of the following, please call the [address] [place] [address] Road during business hours:     * Increase in Pain  * Temperature over 101  * Increase in drainage from your wound  * Drainage with a foul odor  * Bleeding  * Increase in swelling  * Need for compression bandage changes due to slippage, breakthrough drainage.     If you need medical attention outside of the business hours of the Wound Care Centers please contact your PCP or go to the nearest emergency room.    Holley Davalos                Electronically signed by Amaryllis Prader, MD on 7/13/2022 at 8:21 AM

## 2022-07-14 ENCOUNTER — HOSPITAL ENCOUNTER (OUTPATIENT)
Dept: WOUND CARE | Age: 67
Discharge: HOME | End: 2022-07-14
Payer: MEDICAID

## 2022-07-14 VITALS
RESPIRATION RATE: 18 BRPM | TEMPERATURE: 96.7 F | HEIGHT: 68 IN | HEART RATE: 82 BPM | BODY MASS INDEX: 24.25 KG/M2 | DIASTOLIC BLOOD PRESSURE: 84 MMHG | SYSTOLIC BLOOD PRESSURE: 162 MMHG | WEIGHT: 160 LBS

## 2022-07-14 DIAGNOSIS — I83.023 VENOUS STASIS ULCER OF LEFT ANKLE WITH FAT LAYER EXPOSED WITH VARICOSE VEINS (HCC): Primary | ICD-10-CM

## 2022-07-14 DIAGNOSIS — I70.242 ATHEROSCLEROSIS OF NATIVE ARTERY OF LEFT LOWER EXTREMITY WITH ULCERATION OF CALF (HCC): ICD-10-CM

## 2022-07-14 DIAGNOSIS — L97.322 VENOUS STASIS ULCER OF LEFT ANKLE WITH FAT LAYER EXPOSED WITH VARICOSE VEINS (HCC): Primary | ICD-10-CM

## 2022-07-14 PROCEDURE — 99213 OFFICE O/P EST LOW 20 MIN: CPT

## 2022-07-14 PROCEDURE — 99213 OFFICE O/P EST LOW 20 MIN: CPT | Performed by: NURSE PRACTITIONER

## 2022-07-14 PROCEDURE — 87070 CULTURE OTHR SPECIMN AEROBIC: CPT

## 2022-07-14 PROCEDURE — 87186 SC STD MICRODIL/AGAR DIL: CPT

## 2022-07-14 PROCEDURE — 6370000000 HC RX 637 (ALT 250 FOR IP): Performed by: SURGERY

## 2022-07-14 PROCEDURE — 87075 CULTR BACTERIA EXCEPT BLOOD: CPT

## 2022-07-14 PROCEDURE — 87205 SMEAR GRAM STAIN: CPT

## 2022-07-14 PROCEDURE — 87077 CULTURE AEROBIC IDENTIFY: CPT

## 2022-07-14 RX ORDER — BACITRACIN ZINC AND POLYMYXIN B SULFATE 500; 1000 [USP'U]/G; [USP'U]/G
OINTMENT TOPICAL ONCE
Status: CANCELLED | OUTPATIENT
Start: 2022-07-14 | End: 2022-07-14

## 2022-07-14 RX ORDER — GENTAMICIN SULFATE 1 MG/G
OINTMENT TOPICAL ONCE
Status: CANCELLED | OUTPATIENT
Start: 2022-07-14 | End: 2022-07-14

## 2022-07-14 RX ORDER — BACITRACIN, NEOMYCIN, POLYMYXIN B 400; 3.5; 5 [USP'U]/G; MG/G; [USP'U]/G
OINTMENT TOPICAL ONCE
Status: CANCELLED | OUTPATIENT
Start: 2022-07-14 | End: 2022-07-14

## 2022-07-14 RX ORDER — CLOBETASOL PROPIONATE 0.5 MG/G
OINTMENT TOPICAL ONCE
Status: CANCELLED | OUTPATIENT
Start: 2022-07-14 | End: 2022-07-14

## 2022-07-14 RX ORDER — LIDOCAINE 50 MG/G
OINTMENT TOPICAL ONCE
Status: CANCELLED | OUTPATIENT
Start: 2022-07-14 | End: 2022-07-14

## 2022-07-14 RX ORDER — LIDOCAINE HYDROCHLORIDE 40 MG/ML
SOLUTION TOPICAL ONCE
Status: COMPLETED | OUTPATIENT
Start: 2022-07-14 | End: 2022-07-14

## 2022-07-14 RX ORDER — LIDOCAINE HYDROCHLORIDE 20 MG/ML
JELLY TOPICAL ONCE
Status: CANCELLED | OUTPATIENT
Start: 2022-07-14 | End: 2022-07-14

## 2022-07-14 RX ORDER — LIDOCAINE 40 MG/G
CREAM TOPICAL ONCE
Status: CANCELLED | OUTPATIENT
Start: 2022-07-14 | End: 2022-07-14

## 2022-07-14 RX ORDER — LIDOCAINE HYDROCHLORIDE 40 MG/ML
SOLUTION TOPICAL ONCE
Status: CANCELLED | OUTPATIENT
Start: 2022-07-14 | End: 2022-07-14

## 2022-07-14 RX ORDER — GINSENG 100 MG
CAPSULE ORAL ONCE
Status: CANCELLED | OUTPATIENT
Start: 2022-07-14 | End: 2022-07-14

## 2022-07-14 RX ORDER — BETAMETHASONE DIPROPIONATE 0.05 %
OINTMENT (GRAM) TOPICAL ONCE
Status: CANCELLED | OUTPATIENT
Start: 2022-07-14 | End: 2022-07-14

## 2022-07-14 RX ADMIN — LIDOCAINE HYDROCHLORIDE 15 ML: 40 SOLUTION TOPICAL at 11:16

## 2022-07-14 ASSESSMENT — PAIN SCALES - GENERAL: PAINLEVEL_OUTOF10: 5

## 2022-07-14 ASSESSMENT — PAIN DESCRIPTION - ORIENTATION: ORIENTATION: LEFT

## 2022-07-14 ASSESSMENT — PAIN DESCRIPTION - PAIN TYPE: TYPE: CHRONIC PAIN

## 2022-07-14 ASSESSMENT — PAIN DESCRIPTION - FREQUENCY: FREQUENCY: INTERMITTENT

## 2022-07-14 ASSESSMENT — PAIN - FUNCTIONAL ASSESSMENT: PAIN_FUNCTIONAL_ASSESSMENT: PREVENTS OR INTERFERES SOME ACTIVE ACTIVITIES AND ADLS

## 2022-07-14 ASSESSMENT — PAIN DESCRIPTION - LOCATION: LOCATION: LEG

## 2022-07-14 ASSESSMENT — PAIN DESCRIPTION - DESCRIPTORS: DESCRIPTORS: STABBING

## 2022-07-14 ASSESSMENT — PAIN DESCRIPTION - ONSET: ONSET: ON-GOING

## 2022-07-14 NOTE — DISCHARGE INSTRUCTIONS
Visit Discharge/Physician Orders     Discharge condition: Stable     Assessment of pain at discharge: yes     Anesthetic used: 4% lidocaine solution     Discharge to: Home     Left via:Private automobile     Accompanied by:self     ECF/HHA: n/a     Dressing Orders:LEFT MEDIAL and LATERAL LOWER LEG-Cleanse with normal saline, apply drawtex, foam, ABD pad and profore wrap, change weekly.     Treatment Orders: Eat a diet high in protein and vitamin C. Take a multiple vitamin daily unless contraindicated.       Dr. uDlce Lenz as ordered      Must wear slip on shoe to work due to wrap on leg!      [facility] followup visit : 1 week_____________________________  (Please note your next appointment above and if you are unable to keep, kindly give a 24 hour notice.  Thank you.)     Physician signature:__________________________     If you experience any of the following, please call the [facility] during business hours:     * Increase in Pain  * Temperature over 101  * Increase in drainage from your wound  * Drainage with a foul odor  * Bleeding  * Increase in swelling  * Need for compression bandage changes due to slippage, breakthrough drainage.     If you need medical attention outside of the business hours of the [facility] please contact your PCP or go to the nearest emergency room

## 2022-07-15 NOTE — PROGRESS NOTES
Wound Healing Center Followup Visit Note    Referring Physician : Alma Adame MD  [address] [place] [address] RECORD NUMBER:  [id number]  AGE: 61 y.o. GENDER: female  : 1955  EPISODE DATE:  2022    Subjective:     Chief Complaint   Patient presents with    Wound Check     left lower leg      HISTORY of PRESENT ILLNESS HPI   Olivia Chatterjee is a 61 y.o. female who presents today in regards to follow up evaluation and treatment of wound/ulcer. That patient's past medical, family and social hx were reviewed and changes were made if present. History of Wound Context:  The patient has had a wound of her left ankle/calf which was first noted approximately 2021. This has been treated local wound care. On their initial visit to the wound healing center, 22,  the patient has noted that the wound has been improving. The patient has not had similar previous wounds in the past.      She started seeing Dr. Mireya Leonard in 2021 and than Dr. Nellie Severance. She was started in [facility] ~ 2021. She has noticed some improvement since starting unna boot. She is currently following with Dr. Jocelyn Woo. Pt is not on abx at time of initial visit, but has been treated with previously by podiatry. She is not a DM. She is not a smoker. She denies hx of DVT, and per her report had recent us noting no evidence of dvt at [facility]. She also had arterial studies done. I had previously seen her in the past in regards to left buttock thigh wound, which started as abscess. Pt works at [employer] in [facility] and is on her feet all day.     22  Reflux study - if significant findings will schedule for fu  Continue compression therapy [facility] bo per podiatry with aquacell dressing  Elevation  She does not have significant arterial occlusive disease  22  Patient has asked to continuing following with me going forward because of our previous relationship  She is going to let Dr. Rosario Merlin office know  She tolerated unna boot and aquacell - some improvement  216/22  Appearance improved, slightly larger  Consider drawtek next week but overall drainage seems reasonably managed as periwound appears ok  2/23/2022  The wound, has some exudate, no recent cultures were done, will do wound cultures today  3/2/22  Reflux study reviewed - no significant reflux  Will treat culture - augmentin 875 mg bid x 10 days - script sent  More drainage - stable size  3/9/22  Wound appearance improved, stable in size  drawtek  3/16/22  Wound slightly larger in size, new wound of ankle  Continue Drawtek, change to profore  Avoid shoes which will contact ankle area  3/23/22  Wounds stable  Overall appearance improved  Will have pt see ID re cultures - disc with Dr Nestor Rodriguez  3/30/22  Much improved appearance  On oral abx per ID - concerns re pt ability to work while on IV - will see how her wound progresses  4/6/22  Appearance improved but size not much different  Finished oral abx  Will re culture next week if no significant size change - possible advance skin therapy  4/20/2022  Ulcer on the medial aspect, fairly clean and granulating, ulcer of the lateral aspect, has some exudate, debrided  4/27/22  Wounds stable   Hypergranulation tissue removed  Culture done - possible graft in future  5/4/22  Wound stable  Disc culture with Dr Nestor Rodriguez who would like to start her on iv abx  5/11/22  Wound stable  Iv abx have not been started yet - spoke with Dr Nestor Rodriguez - spoke with pt she will call his office  She had spoke with [facility] but there were some issues  5/18/22  Calf stable, ankle improved  Iv abx to start this week after picc placement  On exam   L dp 2+, PT triphasic - with compression of dp - PT becomes weakly biphasic  Concern that PT though triphasic is not getting inline flow and as a result isn't healing in the calf distribution because of occlusive disease  We discussed angiogram - will schedule  I reviewed the procedure with the patient and family as available. I discussed the procedure, risks, benefits, complications, and alternatives of the procedure. They understand and consent.   All questions were answered  Script for percocet 5/325 mg #28 prn q6 hrs - given, oarrs run  5/24/22  Angio, L PT and peroneal plasty  5/25/22  On iv abx  Wound appearance better  R groin no hematoma, L DP 2+, PT triphasic   6/1/22  Wound size and appearance better  6/8/22  Wound size and appearance better  Discussed with Dr Humphries Files - he will stop abx  6/15/22  Wound size slightly improvement, appearance better  6/22/22  Wounds sizes smaller  6/29/22  Wounds stable   Hypergranulation tissue present throughout wound beds    Continue drawtex, foam, ABD and profore   7/6/22  Wounds slightly improved  7/13/22  Both wounds slightly larger  Hyperkeratotic tissue debrided back    7/14/22  Patient came in due to drainage through her wrap  Dressing noted to have large amounts of yellow/green drainage throughout  Cultures obtained      Wound/Ulcer Pain Timing/Severity: constant, moderate  Quality of pain: aching, throbbing, tender  Severity:  6/ 10   Modifying Factors: Pain worsens with dressing changes, debridement  Associated Signs/Symptoms: edema, drainage and pain    Ulcer Identification:  Ulcer Type: venous  Contributing Factors: edema and venous stasis    Diabetic/Pressure/Non Pressure Ulcers only:  Ulcer: Non-Pressure ulcer, fat layer exposed        PAST MEDICAL HISTORY      Diagnosis Date    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 ([address] 5/18/2022    Dizziness - light-headed     GERD (gastroesophageal reflux disease)     Herpes dermatitis 4/27/2017    Insomnia secondary to anxiety 4/6/2018    Lightheadedness     Migraine     Skin ulcer of buttock with fat layer exposed ([address] 7/20/2016    Venous stasis ulcer of left ankle with fat layer exposed with varicose veins ([address] 2/2/2022     Past Surgical History:   Procedure Laterality Date    CHOLECYSTECTOMY, LAPAROSCOPIC  04/08/2014    TONSILLECTOMY  1960    1960s    UPPER GASTROINTESTINAL ENDOSCOPY  12/13/2013    mild gastritis and small hiatal hernia, Dr Denise Tovar, [place] [address]  2015    GERD, Dr. Mookie Nuno, office     Family History   Problem Relation Age of Onset    Diabetes Mother     Hypertension Mother     Heart Disease Father     Heart Attack Father     Heart Surgery Father         angioplasty    Stroke Father     High Cholesterol Father     Heart Attack Maternal Grandfather      Social History     Tobacco Use    Smoking status: Never    Smokeless tobacco: Never   Vaping Use    Vaping Use: Never used   Substance Use Topics    Alcohol use: No    Drug use: No     Allergies   Allergen Reactions    Bee Pollen Anaphylaxis    Tetracyclines & Related Hives     Current Outpatient Medications on File Prior to Encounter   Medication Sig Dispense Refill    oxyCODONE-acetaminophen (PERCOCET) 5-325 MG per tablet Take 1 tablet by mouth every 4 hours as needed for Pain. butalbital-acetaminophen-caffeine (FIORICET, ESGIC) -40 MG per tablet Take 1 tablet by mouth 3 times daily as needed for Headaches or Migraine Indications: Cluster Headache 90 tablet 5    omeprazole (PRILOSEC) 40 MG delayed release capsule Take 1 capsule by mouth daily 90 capsule 3    hydrOXYzine (ATARAX) 25 MG tablet take 1 tablet BID 60 tablet 5    aspirin-acetaminophen-caffeine (EXCEDRIN MIGRAINE) 250-250-65 MG per tablet Take 1 tablet by mouth as needed for Headaches 300 tablet 3    acyclovir (ZOVIRAX) 400 MG tablet take 1 tablet by mouth once daily 90 tablet 3    EPINEPHrine (EPIPEN) 0.3 MG/0.3ML SOAJ injection Use as directed for allergic reaction 1 each 5     No current facility-administered medications on file prior to encounter.        REVIEW OF SYSTEMS See HPI    Objective:    BP (!) 162/84   Pulse 82   Temp (!) 96.7 °F (35.9 °C) (Temporal)   Resp 18   Ht 5' 8\" (1.727 m)   Wt 160 lb (72.6 kg)   BMI 24.33 kg/m²   Wt Readings from Last 3 Encounters:   07/14/22 160 lb (72.6 kg)   07/13/22 160 lb (72.6 kg)   07/06/22 160 lb (72.6 kg)     PHYSICAL EXAM  CONSTITUTIONAL:   Awake, alert, cooperative   EYES:  lids and lashes normal   ENT: external ears and nose without lesions   NECK:  supple, symmetrical, trachea midline   SKIN:  Open wound Present    Assessment:     Problem List Items Addressed This Visit       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 ([address] (Chronic)    Venous stasis ulcer of left ankle with fat layer exposed with varicose veins (HCC) - Primary (Chronic)       Pre Debridement Measurements:  Are located in the [place]  Documentation Flow Sheet  Post Debridement Measurements:  Wound/Ulcer Descriptions are Pre Debridement except measurements:     Wound 08/10/16 Other (Comment) Buttocks Left;Distal #2 acq 8/4/16 (Active)   Number of days: 2165       Wound 08/31/16 Buttocks Left;Proximal #3 aquired 8-27-26 (Active)   Number of days: 2144       Incision 04/08/14 Abdomen (Active)   Number of days: 6766       Wound 02/02/22 Ankle Left;Medial #1 (Active)   Wound Image   07/06/22 0731   Dressing Status New dressing applied 07/14/22 1204   Wound Cleansed Cleansed with saline 07/14/22 1204   Dressing/Treatment Other (comment) 07/14/22 1204   Offloading for Diabetic Foot Ulcers Offloading not required 07/13/22 0835   Wound Length (cm) 6.6 cm 07/14/22 1114   Wound Width (cm) 3.7 cm 07/14/22 1114   Wound Depth (cm) 0.1 cm 07/14/22 1114   Wound Surface Area (cm^2) 24.42 cm^2 07/14/22 1114   Change in Wound Size % (l*w) 9.76 07/14/22 1114   Wound Volume (cm^3) 2.442 cm^3 07/14/22 1114   Wound Healing % 10 07/14/22 1114   Post-Procedure Length (cm) 6.7 cm 07/13/22 0816   Post-Procedure Width (cm) 3.9 cm 07/13/22 0816   Post-Procedure Depth (cm) 0.1 cm 07/13/22 0816   Post-Procedure Surface Area (cm^2) 26.13 cm^2 07/13/22 0816   Post-Procedure Volume (cm^3) 2.613 cm^3 07/13/22 0816   Wound Assessment Fibrin;Pink/red 07/14/22 1114   Drainage Amount Moderate 07/14/22 1114   Drainage Description Green;Serosanguinous; Other (Comment) 07/14/22 1114   Odor None 07/14/22 1114   Melany-wound Assessment Intact 07/14/22 1114   Number of days: 163       Wound 03/16/22 Ankle Posterior; Left #2 (Active)   Wound Image   07/06/22 0731   Dressing Status New dressing applied 07/14/22 1204   Wound Cleansed Cleansed with saline 07/14/22 1204   Dressing/Treatment Other (comment) 07/14/22 1204   Offloading for Diabetic Foot Ulcers Offloading not required 07/13/22 0835   Wound Length (cm) 2.3 cm 07/14/22 1114   Wound Width (cm) 1.7 cm 07/14/22 1114   Wound Depth (cm) 0.2 cm 07/14/22 1114   Wound Surface Area (cm^2) 3.91 cm^2 07/14/22 1114   Change in Wound Size % (l*w) -56.4 07/14/22 1114   Wound Volume (cm^3) 0.782 cm^3 07/14/22 1114   Wound Healing % -213 07/14/22 1114   Post-Procedure Length (cm) 2.5 cm 07/13/22 0816   Post-Procedure Width (cm) 1.7 cm 07/13/22 0816   Post-Procedure Depth (cm) 0.2 cm 07/13/22 0816   Post-Procedure Surface Area (cm^2) 4.25 cm^2 07/13/22 0816   Post-Procedure Volume (cm^3) 0.85 cm^3 07/13/22 0816   Wound Assessment Fibrin;Pink/red 07/14/22 1114   Drainage Amount Moderate 07/14/22 1114   Drainage Description Green;Serosanguinous; Other (Comment) 07/14/22 1114   Odor None 07/14/22 1114   Melany-wound Assessment Intact 07/14/22 1114   Number of days: 121          Procedure Note  Indications:  Based on my examination of this patient's wound(s)/ulcer(s) today, debridement is not required to promote healing and evaluate the wound base.     Performed by: LISA Aguayo CNP    Consent obtained:  Yes    Time out taken:  Yes    Pain Control: Anesthetic  Anesthetic: 4% Lidocaine Liquid Topical     Debridement:Other (comment)Wound culture     Estimated Blood Loss:  Minimal  Hemostasis Achieved:  by pressure    Procedural Pain:  6  / 10   Post Procedural Pain:  5 / 10     Response to treatment:  With

## 2022-07-16 LAB
ANAEROBIC CULTURE: NORMAL
GRAM STAIN RESULT: ABNORMAL
ORGANISM: ABNORMAL
WOUND/ABSCESS: ABNORMAL

## 2022-07-18 NOTE — DISCHARGE INSTRUCTIONS
Visit Discharge/Physician Orders     Discharge condition: Stable     Assessment of pain at discharge: yes     Anesthetic used: 4% lidocaine solution     Discharge to: Home     Left via:Private automobile     Accompanied by:self     ECF/HHA: n/a     Dressing Orders:LEFT MEDIAL and LATERAL LOWER LEG-Cleanse with normal saline, apply aquacel ag,  ABD pad and profore wrap. Change weekly. Treatment Orders: Eat a diet high in protein and vitamin C. Take a multiple vitamin daily unless contraindicated. To see Dr. Yael Ross due to culture     In addition to seeing Dr Yael Ross start clindamycin as ordered. Must wear slip on shoe to work due to wrap on leg! [facility] followup visit : 1 week_____________________________  (Please note your next appointment above and if you are unable to keep, kindly give a 24 hour notice. Thank you.)     Physician signature:__________________________     If you experience any of the following, please call the [facility] during business hours:     * Increase in Pain  * Temperature over 101  * Increase in drainage from your wound  * Drainage with a foul odor  * Bleeding  * Increase in swelling  * Need for compression bandage changes due to slippage, breakthrough drainage. If you need medical attention outside of the business hours of the [facility] please contact your PCP or go to the nearest emergency room.

## 2022-07-20 ENCOUNTER — HOSPITAL ENCOUNTER (OUTPATIENT)
Dept: WOUND CARE | Age: 67
Discharge: HOME | End: 2022-07-20
Payer: MEDICAID

## 2022-07-20 VITALS
HEART RATE: 68 BPM | TEMPERATURE: 97.8 F | RESPIRATION RATE: 18 BRPM | DIASTOLIC BLOOD PRESSURE: 78 MMHG | BODY MASS INDEX: 24.33 KG/M2 | SYSTOLIC BLOOD PRESSURE: 152 MMHG | WEIGHT: 160 LBS

## 2022-07-20 DIAGNOSIS — L97.322 VENOUS STASIS ULCER OF LEFT ANKLE WITH FAT LAYER EXPOSED WITH VARICOSE VEINS (HCC): Primary | ICD-10-CM

## 2022-07-20 DIAGNOSIS — I83.023 VENOUS STASIS ULCER OF LEFT ANKLE WITH FAT LAYER EXPOSED WITH VARICOSE VEINS (HCC): Primary | ICD-10-CM

## 2022-07-20 DIAGNOSIS — I70.243 ATHEROSCLEROSIS OF NATIVE ARTERY OF LEFT LOWER EXTREMITY WITH ULCERATION OF ANKLE (HCC): ICD-10-CM

## 2022-07-20 DIAGNOSIS — I70.242 ATHEROSCLEROSIS OF NATIVE ARTERY OF LEFT LOWER EXTREMITY WITH ULCERATION OF CALF (HCC): ICD-10-CM

## 2022-07-20 PROCEDURE — 11042 DBRDMT SUBQ TIS 1ST 20SQCM/<: CPT

## 2022-07-20 PROCEDURE — 11042 DBRDMT SUBQ TIS 1ST 20SQCM/<: CPT | Performed by: SURGERY

## 2022-07-20 PROCEDURE — 6370000000 HC RX 637 (ALT 250 FOR IP): Performed by: SURGERY

## 2022-07-20 RX ORDER — LIDOCAINE 50 MG/G
OINTMENT TOPICAL ONCE
Status: CANCELLED | OUTPATIENT
Start: 2022-07-20 | End: 2022-07-20

## 2022-07-20 RX ORDER — CLOBETASOL PROPIONATE 0.5 MG/G
OINTMENT TOPICAL ONCE
Status: CANCELLED | OUTPATIENT
Start: 2022-07-20 | End: 2022-07-20

## 2022-07-20 RX ORDER — GINSENG 100 MG
CAPSULE ORAL ONCE
Status: CANCELLED | OUTPATIENT
Start: 2022-07-20 | End: 2022-07-20

## 2022-07-20 RX ORDER — GENTAMICIN SULFATE 1 MG/G
OINTMENT TOPICAL ONCE
Status: CANCELLED | OUTPATIENT
Start: 2022-07-20 | End: 2022-07-20

## 2022-07-20 RX ORDER — CLINDAMYCIN HYDROCHLORIDE 300 MG/1
600 CAPSULE ORAL 3 TIMES DAILY
Qty: 60 CAPSULE | Refills: 0 | Status: SHIPPED | OUTPATIENT
Start: 2022-07-20 | End: 2022-07-30

## 2022-07-20 RX ORDER — BACITRACIN, NEOMYCIN, POLYMYXIN B 400; 3.5; 5 [USP'U]/G; MG/G; [USP'U]/G
OINTMENT TOPICAL ONCE
Status: CANCELLED | OUTPATIENT
Start: 2022-07-20 | End: 2022-07-20

## 2022-07-20 RX ORDER — BETAMETHASONE DIPROPIONATE 0.05 %
OINTMENT (GRAM) TOPICAL ONCE
Status: CANCELLED | OUTPATIENT
Start: 2022-07-20 | End: 2022-07-20

## 2022-07-20 RX ORDER — BACITRACIN ZINC AND POLYMYXIN B SULFATE 500; 1000 [USP'U]/G; [USP'U]/G
OINTMENT TOPICAL ONCE
Status: CANCELLED | OUTPATIENT
Start: 2022-07-20 | End: 2022-07-20

## 2022-07-20 RX ORDER — LIDOCAINE HYDROCHLORIDE 20 MG/ML
JELLY TOPICAL ONCE
Status: CANCELLED | OUTPATIENT
Start: 2022-07-20 | End: 2022-07-20

## 2022-07-20 RX ORDER — LIDOCAINE HYDROCHLORIDE 40 MG/ML
SOLUTION TOPICAL ONCE
Status: CANCELLED | OUTPATIENT
Start: 2022-07-20 | End: 2022-07-20

## 2022-07-20 RX ORDER — LIDOCAINE HYDROCHLORIDE 40 MG/ML
SOLUTION TOPICAL ONCE
Status: COMPLETED | OUTPATIENT
Start: 2022-07-20 | End: 2022-07-20

## 2022-07-20 RX ORDER — LIDOCAINE 40 MG/G
CREAM TOPICAL ONCE
Status: CANCELLED | OUTPATIENT
Start: 2022-07-20 | End: 2022-07-20

## 2022-07-20 RX ADMIN — LIDOCAINE HYDROCHLORIDE 10 ML: 40 SOLUTION TOPICAL at 07:49

## 2022-07-20 ASSESSMENT — PAIN DESCRIPTION - DESCRIPTORS: DESCRIPTORS: STABBING

## 2022-07-20 ASSESSMENT — PAIN DESCRIPTION - LOCATION: LOCATION: LEG

## 2022-07-20 ASSESSMENT — PAIN DESCRIPTION - ORIENTATION: ORIENTATION: LEFT

## 2022-07-20 ASSESSMENT — PAIN SCALES - GENERAL: PAINLEVEL_OUTOF10: 3

## 2022-07-20 NOTE — PROGRESS NOTES
Wound Healing Center Followup Visit Note    Referring Physician : Julieta Valentin MD  [address] [place] [address] RECORD NUMBER:  [id number]  AGE: 61 y.o. GENDER: female  : 1955  EPISODE DATE:  2022    Subjective:     Chief Complaint   Patient presents with    Wound Check     Left leg      HISTORY of PRESENT ILLNESS HPI   Cloria Goltz is a 61 y.o. female who presents today in regards to follow up evaluation and treatment of wound/ulcer. That patient's past medical, family and social hx were reviewed and changes were made if present. History of Wound Context:  The patient has had a wound of her left ankle/calf which was first noted approximately 2021. This has been treated local wound care. On their initial visit to the wound healing center, 22,  the patient has noted that the wound has been improving. The patient has not had similar previous wounds in the past.      She started seeing Dr. Mendez Sanchez in 2021 and than Dr. Daniel Villarreal. She was started in [facility] ~ 2021. She has noticed some improvement since starting unna boot. She is currently following with Dr. Roxy Morrow. Pt is not on abx at time of initial visit, but has been treated with previously by podiatry. She is not a DM. She is not a smoker. She denies hx of DVT, and per her report had recent us noting no evidence of dvt at [facility]. She also had arterial studies done. I had previously seen her in the past in regards to left buttock thigh wound, which started as abscess. Pt works at [employer] in [facility] and is on her feet all day.     22  Reflux study - if significant findings will schedule for fu  Continue compression therapy [facility] per podiatry with aquacell dressing  Elevation  She does not have significant arterial occlusive disease  22  Patient has asked to continuing following with me going forward because of our previous relationship  She is going to let Dr. Vicky Solano office know  She tolerated unna boot and aquacell - some improvement  216/22  Appearance improved, slightly larger  Consider drawtek next week but overall drainage seems reasonably managed as periwound appears ok  2/23/2022  The wound, has some exudate, no recent cultures were done, will do wound cultures today  3/2/22  Reflux study reviewed - no significant reflux  Will treat culture - augmentin 875 mg bid x 10 days - script sent  More drainage - stable size  3/9/22  Wound appearance improved, stable in size  drawtek  3/16/22  Wound slightly larger in size, new wound of ankle  Continue Drawtek, change to profore  Avoid shoes which will contact ankle area  3/23/22  Wounds stable  Overall appearance improved  Will have pt see ID re cultures - disc with Dr Dulce Lenz  3/30/22  Much improved appearance  On oral abx per ID - concerns re pt ability to work while on IV - will see how her wound progresses  4/6/22  Appearance improved but size not much different  Finished oral abx  Will re culture next week if no significant size change - possible advance skin therapy  4/20/2022  Ulcer on the medial aspect, fairly clean and granulating, ulcer of the lateral aspect, has some exudate, debrided  4/27/22  Wounds stable   Hypergranulation tissue removed  Culture done - possible graft in future  5/4/22  Wound stable  Disc culture with Dr Dulce Lenz who would like to start her on iv abx  5/11/22  Wound stable  Iv abx have not been started yet - spoke with Dr Dulce Lenz - spoke with pt she will call his office  She had spoke with [facility] but there were some issues  5/18/22  Calf stable, ankle improved  Iv abx to start this week after picc placement  On exam   L dp 2+, PT triphasic - with compression of dp - PT becomes weakly biphasic  Concern that PT though triphasic is not getting inline flow and as a result isn't healing in the calf distribution because of occlusive disease  We discussed angiogram - will schedule  I reviewed the procedure with the patient and family as available. I discussed the procedure, risks, benefits, complications, and alternatives of the procedure. They understand and consent.   All questions were answered  Script for percocet 5/325 mg #28 prn q6 hrs - given, oarrs run  5/24/22  Angio, L PT and peroneal plasty  5/25/22  On iv abx  Wound appearance better  R groin no hematoma, L DP 2+, PT triphasic   6/1/22  Wound size and appearance better  6/8/22  Wound size and appearance better  Discussed with Dr Ermelinda Gauthier - he will stop abx  6/15/22  Wound size slightly improvement, appearance better  6/22/22  Wounds sizes smaller  6/29/22  Wounds stable   Hypergranulation tissue present throughout wound beds    Continue drawtex, foam, ABD and profore   7/6/22  Wounds slightly improved  7/13/22  Both wounds slightly larger  Hyperkeratotic tissue debrided back  7/14/22  Patient came in due to drainage through her wrap  Dressing noted to have large amounts of yellow/green drainage throughout  Cultures obtained    7/20/22  Culture reviewed large growth S aureus and Pseudomonas  Disc with Dr Hannon - will start clindamycin 600 mg tid for staph  He will see her in office in regards to IV for pseudomonas  Wounds stable in size  Change to [employer] ag    Wound/Ulcer Pain Timing/Severity: constant, moderate  Quality of pain: aching, throbbing, tender  Severity:  6/ 10   Modifying Factors: Pain worsens with dressing changes, debridement  Associated Signs/Symptoms: edema, drainage and pain    Ulcer Identification:  Ulcer Type: venous  Contributing Factors: edema and venous stasis    Diabetic/Pressure/Non Pressure Ulcers only:  Ulcer: Non-Pressure ulcer, fat layer exposed        PAST MEDICAL HISTORY      Diagnosis Date    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 ([facility] [address] 5/18/2022    Dizziness - light-headed     GERD (gastroesophageal reflux disease)     Herpes dermatitis 4/27/2017    Insomnia secondary to anxiety 4/6/2018    Lightheadedness     Migraine Skin ulcer of buttock with fat layer exposed ([facility] [address] 7/20/2016    Venous stasis ulcer of left ankle with fat layer exposed with varicose veins ([facility] [address] 2/2/2022     Past Surgical History:   Procedure Laterality Date    CHOLECYSTECTOMY, LAPAROSCOPIC  04/08/2014    TONSILLECTOMY  1960    1960s    UPPER GASTROINTESTINAL ENDOSCOPY  12/13/2013    mild gastritis and small hiatal hernia, Dr Gonzales [facility], [place] [address]  2015    GERD, Dr. Desirae Lazaro, office     Family History   Problem Relation Age of Onset    Diabetes Mother     Hypertension Mother     Heart Disease Father     Heart Attack Father     Heart Surgery Father         angioplasty    Stroke Father     High Cholesterol Father     Heart Attack Maternal Grandfather      Social History     Tobacco Use    Smoking status: Never    Smokeless tobacco: Never   Vaping Use    Vaping Use: Never used   Substance Use Topics    Alcohol use: No    Drug use: No     Allergies   Allergen Reactions    Bee Pollen Anaphylaxis    Tetracyclines & Related Hives     Current Outpatient Medications on File Prior to Encounter   Medication Sig Dispense Refill    oxyCODONE-acetaminophen (PERCOCET) 5-325 MG per tablet Take 1 tablet by mouth every 4 hours as needed for Pain.       butalbital-acetaminophen-caffeine (FIORICET, ESGIC) -40 MG per tablet Take 1 tablet by mouth 3 times daily as needed for Headaches or Migraine Indications: Cluster Headache 90 tablet 5    omeprazole (PRILOSEC) 40 MG delayed release capsule Take 1 capsule by mouth daily 90 capsule 3    hydrOXYzine (ATARAX) 25 MG tablet take 1 tablet BID 60 tablet 5    aspirin-acetaminophen-caffeine (EXCEDRIN MIGRAINE) 250-250-65 MG per tablet Take 1 tablet by mouth as needed for Headaches 300 tablet 3    acyclovir (ZOVIRAX) 400 MG tablet take 1 tablet by mouth once daily 90 tablet 3    EPINEPHrine (EPIPEN) 0.3 MG/0.3ML SOAJ injection Use as directed for allergic reaction 1 each 5     No current facility-administered medications on file prior to encounter.        REVIEW OF SYSTEMS See HPI    Objective:    BP (!) 152/78   Pulse 68   Temp 97.8 °F (36.6 °C) (Tympanic)   Resp 18   Wt 160 lb (72.6 kg)   BMI 24.33 kg/m²   Wt Readings from Last 3 Encounters:   07/20/22 160 lb (72.6 kg)   07/14/22 160 lb (72.6 kg)   07/13/22 160 lb (72.6 kg)     PHYSICAL EXAM  CONSTITUTIONAL:   Awake, alert, cooperative   EYES:  lids and lashes normal   ENT: external ears and nose without lesions   NECK:  supple, symmetrical, trachea midline   SKIN:  Open wound Present    Assessment:     Problem List Items Addressed This Visit       Venous stasis ulcer of left ankle with fat layer exposed with varicose veins ([address] - Primary (Chronic)    Relevant Orders    Initiate Outpatient Wound Care Protocol    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 ([address] (Chronic)    Relevant Orders    Initiate Outpatient Wound Care Protocol       Pre Debridement Measurements:  Are located in the [facility]  Documentation Flow Sheet  Post Debridement Measurements:  Wound/Ulcer Descriptions are Pre Debridement except measurements:     Wound 08/10/16 Other (Comment) Buttocks Left;Distal #2 acq 8/4/16 (Active)   Number of days: 2169       Wound 08/31/16 Buttocks Left;Proximal #3 aquired 8-27-26 (Active)   Number of days: 2148       Incision 04/08/14 Abdomen (Active)   Number of days: 4346       Wound 02/02/22 Ankle Left;Medial #1 (Active)   Wound Image   07/06/22 0731   Dressing Status New dressing applied 07/14/22 1204   Wound Cleansed Cleansed with saline 07/14/22 1204   Dressing/Treatment Other (comment) 07/14/22 1204   Offloading for Diabetic Foot Ulcers Offloading not required 07/13/22 0835   Wound Length (cm) 6.6 cm 07/20/22 0745   Wound Width (cm) 3.6 cm 07/20/22 0745   Wound Depth (cm) 0.1 cm 07/20/22 0745   Wound Surface Area (cm^2) 23.76 cm^2 07/20/22 0745   Change in Wound Size % (l*w) 12.2 07/20/22 0745   Wound Volume (cm^3) 2.376 cm^3 07/20/22 0745   Wound Healing % 12 07/20/22 0745   Post-Procedure Length (cm) 6.7 cm 07/20/22 0814   Post-Procedure Width (cm) 3.7 cm 07/20/22 0814   Post-Procedure Depth (cm) 0.1 cm 07/20/22 0814   Post-Procedure Surface Area (cm^2) 24.79 cm^2 07/20/22 0814   Post-Procedure Volume (cm^3) 2.479 cm^3 07/20/22 0814   Wound Assessment Fibrin;Pink/red 07/20/22 0745   Drainage Amount Large 07/20/22 0745   Drainage Description Green;Yellow 07/20/22 0745   Odor None 07/20/22 0745   Melany-wound Assessment Excoriated;Fragile 07/20/22 0745   Number of days: 167       Wound 03/16/22 Ankle Posterior; Left #2 (Active)   Wound Image   07/06/22 0731   Dressing Status New dressing applied 07/14/22 1204   Wound Cleansed Cleansed with saline 07/14/22 1204   Dressing/Treatment Other (comment) 07/14/22 1204   Offloading for Diabetic Foot Ulcers Offloading not required 07/13/22 0835   Wound Length (cm) 2.6 cm 07/20/22 0745   Wound Width (cm) 1.7 cm 07/20/22 0745   Wound Depth (cm) 0.2 cm 07/20/22 0745   Wound Surface Area (cm^2) 4.42 cm^2 07/20/22 0745   Change in Wound Size % (l*w) -76.8 07/20/22 0745   Wound Volume (cm^3) 0.884 cm^3 07/20/22 0745   Wound Healing % -254 07/20/22 0745   Post-Procedure Length (cm) 2.7 cm 07/20/22 0813   Post-Procedure Width (cm) 1.7 cm 07/20/22 0813   Post-Procedure Depth (cm) 0.2 cm 07/20/22 0813   Post-Procedure Surface Area (cm^2) 4.59 cm^2 07/20/22 0813   Post-Procedure Volume (cm^3) 0.918 cm^3 07/20/22 0813   Wound Assessment Fibrin;Pink/red 07/20/22 0745   Drainage Amount Large 07/20/22 0745   Drainage Description Yellow 07/20/22 0745   Odor None 07/20/22 0745   Melany-wound Assessment Fragile 07/20/22 0745   Number of days: 126     Procedure Note  Indications:  Based on my examination of this patient's wound(s)/ulcer(s) today, debridement is required to promote healing and evaluate the wound base.     Performed by: Spenser Beaver MD    Consent obtained:  Yes    Time out taken:  Yes    Pain Control: Anesthetic  Anesthetic: 4% Lidocaine Liquid Topical     Debridement:Excisional Debridement    Using curette the wound(s)/ulcer(s) was/were sharply debrided down through and including the removal of epidermis, dermis, and subcutaneous tissue. Devitalized Tissue Debrided:  fibrin, biofilm, slough, and exudate to stimulate bleeding to promote healing, post debridement good bleeding base and wound edges noted    Wound/Ulcer #: 1 and 2    Percent of Wound/Ulcer Debrided: 70%    Total Surface Area Debrided:  20 sq cm     Estimated Blood Loss:  Minimal  Hemostasis Achieved:  by pressure    Procedural Pain:  7 / 10   Post Procedural Pain:  6 / 10     Response to treatment:  Well tolerated by patient. Plan:   Treatment Note please see attached Discharge Instructions    Written patient dismissal instructions given to patient and signed by patient or POA. Discharge Instructions         Visit Discharge/Physician Orders     Discharge condition: Stable     Assessment of pain at discharge: yes     Anesthetic used: 4% lidocaine solution     Discharge to: Home     Left via:Private automobile     Accompanied by:self     ECF/HHA: n/a     Dressing Orders:LEFT MEDIAL and LATERAL LOWER LEG-Cleanse with normal saline, apply aquacel ag, foam, ABD pad and profore wrap, change weekly. Treatment Orders: Eat a diet high in protein and vitamin C. Take a multiple vitamin daily unless contraindicated. Dr. Yecenia Garvin as ordered     Must wear slip on shoe to work due to wrap on leg! [facility] followup visit : 1 week_____________________________  (Please note your next appointment above and if you are unable to keep, kindly give a 24 hour notice.  Thank you.)     Physician signature:__________________________     If you experience any of the following, please call the [address] [place] [address] Road during business hours:     * Increase in Pain  * Temperature over 101  * Increase in drainage from your wound  * Drainage with a foul odor  * Bleeding  * Increase in swelling  * Need for compression bandage changes due to slippage, breakthrough drainage. If you need medical attention outside of the business hours of the [address] [place] [address] Road please contact your PCP or go to the nearest emergency room.                                                                          Electronically signed by Calli Goldsmith MD on 7/20/2022 at 8:19 AM

## 2022-07-22 NOTE — DISCHARGE INSTRUCTIONS
Visit Discharge/Physician Orders     Discharge condition: Stable     Assessment of pain at discharge: yes     Anesthetic used: 4% lidocaine solution     Discharge to: Home     Left via:Private automobile     Accompanied by:self     ECF/HHA: n/a     Dressing Orders:LEFT MEDIAL and LATERAL LOWER LEG-Cleanse with normal saline, apply aquacel ag,  ABD pad and profore wrap. Change weekly. Treatment Orders: Eat a diet high in protein and vitamin C. Take a multiple vitamin daily unless contraindicated. To see Dr. Dulce Lenz due to culture     In addition to seeing Dr Dulce Lenz start clindamycin as ordered. Must wear slip on shoe to work due to wrap on leg! [facility] followup visit : 1 week_____________________________  (Please note your next appointment above and if you are unable to keep, kindly give a 24 hour notice. Thank you.)     Physician signature:__________________________     If you experience any of the following, please call the [facility] during business hours:     * Increase in Pain  * Temperature over 101  * Increase in drainage from your wound  * Drainage with a foul odor  * Bleeding  * Increase in swelling  * Need for compression bandage changes due to slippage, breakthrough drainage. If you need medical attention outside of the business hours of the [facility] [facility] please contact your PCP or go to the nearest emergency room.

## 2022-07-27 ENCOUNTER — HOSPITAL ENCOUNTER (OUTPATIENT)
Dept: WOUND CARE | Age: 67
Discharge: HOME | End: 2022-07-27
Payer: MEDICAID

## 2022-07-27 VITALS
TEMPERATURE: 98.9 F | DIASTOLIC BLOOD PRESSURE: 66 MMHG | WEIGHT: 160 LBS | BODY MASS INDEX: 24.25 KG/M2 | SYSTOLIC BLOOD PRESSURE: 128 MMHG | RESPIRATION RATE: 16 BRPM | HEIGHT: 68 IN | HEART RATE: 72 BPM

## 2022-07-27 DIAGNOSIS — I70.243 ATHEROSCLEROSIS OF NATIVE ARTERY OF LEFT LOWER EXTREMITY WITH ULCERATION OF ANKLE (HCC): ICD-10-CM

## 2022-07-27 DIAGNOSIS — L97.322 VENOUS STASIS ULCER OF LEFT ANKLE WITH FAT LAYER EXPOSED WITH VARICOSE VEINS (HCC): Primary | ICD-10-CM

## 2022-07-27 DIAGNOSIS — I70.242 ATHEROSCLEROSIS OF NATIVE ARTERY OF LEFT LOWER EXTREMITY WITH ULCERATION OF CALF (HCC): ICD-10-CM

## 2022-07-27 DIAGNOSIS — I83.023 VENOUS STASIS ULCER OF LEFT ANKLE WITH FAT LAYER EXPOSED WITH VARICOSE VEINS (HCC): Primary | ICD-10-CM

## 2022-07-27 PROCEDURE — 11042 DBRDMT SUBQ TIS 1ST 20SQCM/<: CPT | Performed by: SURGERY

## 2022-07-27 PROCEDURE — 11042 DBRDMT SUBQ TIS 1ST 20SQCM/<: CPT

## 2022-07-27 PROCEDURE — 6370000000 HC RX 637 (ALT 250 FOR IP): Performed by: SURGERY

## 2022-07-27 RX ORDER — BACITRACIN ZINC AND POLYMYXIN B SULFATE 500; 1000 [USP'U]/G; [USP'U]/G
OINTMENT TOPICAL ONCE
Status: CANCELLED | OUTPATIENT
Start: 2022-07-27 | End: 2022-07-27

## 2022-07-27 RX ORDER — LIDOCAINE HYDROCHLORIDE 40 MG/ML
SOLUTION TOPICAL ONCE
Status: CANCELLED | OUTPATIENT
Start: 2022-07-27 | End: 2022-07-27

## 2022-07-27 RX ORDER — LIDOCAINE HYDROCHLORIDE 20 MG/ML
JELLY TOPICAL ONCE
Status: CANCELLED | OUTPATIENT
Start: 2022-07-27 | End: 2022-07-27

## 2022-07-27 RX ORDER — BACITRACIN, NEOMYCIN, POLYMYXIN B 400; 3.5; 5 [USP'U]/G; MG/G; [USP'U]/G
OINTMENT TOPICAL ONCE
Status: CANCELLED | OUTPATIENT
Start: 2022-07-27 | End: 2022-07-27

## 2022-07-27 RX ORDER — LIDOCAINE HYDROCHLORIDE 40 MG/ML
SOLUTION TOPICAL ONCE
Status: COMPLETED | OUTPATIENT
Start: 2022-07-27 | End: 2022-07-27

## 2022-07-27 RX ORDER — GINSENG 100 MG
CAPSULE ORAL ONCE
Status: CANCELLED | OUTPATIENT
Start: 2022-07-27 | End: 2022-07-27

## 2022-07-27 RX ORDER — GENTAMICIN SULFATE 1 MG/G
OINTMENT TOPICAL ONCE
Status: CANCELLED | OUTPATIENT
Start: 2022-07-27 | End: 2022-07-27

## 2022-07-27 RX ORDER — CLOBETASOL PROPIONATE 0.5 MG/G
OINTMENT TOPICAL ONCE
Status: CANCELLED | OUTPATIENT
Start: 2022-07-27 | End: 2022-07-27

## 2022-07-27 RX ORDER — BETAMETHASONE DIPROPIONATE 0.05 %
OINTMENT (GRAM) TOPICAL ONCE
Status: CANCELLED | OUTPATIENT
Start: 2022-07-27 | End: 2022-07-27

## 2022-07-27 RX ORDER — LIDOCAINE 40 MG/G
CREAM TOPICAL ONCE
Status: CANCELLED | OUTPATIENT
Start: 2022-07-27 | End: 2022-07-27

## 2022-07-27 RX ORDER — LIDOCAINE 50 MG/G
OINTMENT TOPICAL ONCE
Status: CANCELLED | OUTPATIENT
Start: 2022-07-27 | End: 2022-07-27

## 2022-07-27 RX ADMIN — LIDOCAINE HYDROCHLORIDE 15 ML: 40 SOLUTION TOPICAL at 08:00

## 2022-07-27 ASSESSMENT — PAIN DESCRIPTION - PROGRESSION: CLINICAL_PROGRESSION: NOT CHANGED

## 2022-07-27 NOTE — PLAN OF CARE
Problem: Wound:  Goal: Will show signs of wound healing; wound closure and no evidence of infection  Description: Will show signs of wound healing; wound closure and no evidence of infection  Outcome: Progressing Towards Goal     Problem: Venous:  Goal: Signs of wound healing will improve  Description: Signs of wound healing will improve  Outcome: Progressing Towards Goal

## 2022-07-27 NOTE — PROGRESS NOTES
family as available. I discussed the procedure, risks, benefits, complications, and alternatives of the procedure. They understand and consent.   All questions were answered  Script for percocet 5/325 mg #28 prn q6 hrs - given, oarrs run  5/24/22  Angio, L PT and peroneal plasty  5/25/22  On iv abx  Wound appearance better  R groin no hematoma, L DP 2+, PT triphasic   6/1/22  Wound size and appearance better  6/8/22  Wound size and appearance better  Discussed with Dr Dre Dior - he will stop abx  6/15/22  Wound size slightly improvement, appearance better  6/22/22  Wounds sizes smaller  6/29/22  Wounds stable   Hypergranulation tissue present throughout wound beds    Continue drawtex, foam, ABD and profore   7/6/22  Wounds slightly improved  7/13/22  Both wounds slightly larger  Hyperkeratotic tissue debrided back  7/14/22  Patient came in due to drainage through her wrap  Dressing noted to have large amounts of yellow/green drainage throughout  Cultures obtained    7/20/22  Culture reviewed large growth S aureus and Pseudomonas  Disc with Dr Hannon - will start clindamycin 600 mg tid for staph  He will see her in office in regards to IV for pseudomonas  Wounds stable in size  Change to aquacell ag  7/27/22  Dr Dre Dior to see  Medial slightly larger, lateral slightly smaller    Wound/Ulcer Pain Timing/Severity: constant, moderate  Quality of pain: aching, throbbing, tender  Severity:  6/ 10   Modifying Factors: Pain worsens with dressing changes, debridement  Associated Signs/Symptoms: edema, drainage and pain    Ulcer Identification:  Ulcer Type: venous  Contributing Factors: edema and venous stasis    Diabetic/Pressure/Non Pressure Ulcers only:  Ulcer: Non-Pressure ulcer, fat layer exposed        PAST MEDICAL HISTORY      Diagnosis Date    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 ([facility] [address] 5/18/2022    Dizziness - light-headed     GERD (gastroesophageal reflux disease)     Herpes dermatitis 4/27/2017 Headaches 300 tablet 3    acyclovir (ZOVIRAX) 400 MG tablet take 1 tablet by mouth once daily 90 tablet 3    EPINEPHrine (EPIPEN) 0.3 MG/0.3ML SOAJ injection Use as directed for allergic reaction 1 each 5     No current facility-administered medications on file prior to encounter.        REVIEW OF SYSTEMS See HPI    Objective:    /66   Pulse 72   Temp 98.9 °F (37.2 °C) (Temporal)   Resp 16   Ht 5' 8\" (1.727 m)   Wt 160 lb (72.6 kg)   BMI 24.33 kg/m²   Wt Readings from Last 3 Encounters:   07/27/22 160 lb (72.6 kg)   07/20/22 160 lb (72.6 kg)   07/14/22 160 lb (72.6 kg)     PHYSICAL EXAM  CONSTITUTIONAL:   Awake, alert, cooperative   EYES:  lids and lashes normal   ENT: external ears and nose without lesions   NECK:  supple, symmetrical, trachea midline   SKIN:  Open wound Present    Assessment:     Problem List Items Addressed This Visit       Venous stasis ulcer of left ankle with fat layer exposed with varicose veins (HCC) - Primary (Chronic)    Relevant Orders    Initiate Outpatient Wound Care Protocol    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 ([address] (Chronic)    Relevant Orders    Initiate Outpatient Wound Care Protocol       Pre Debridement Measurements:  Are located in the [place]  Documentation Flow Sheet  Post Debridement Measurements:  Wound/Ulcer Descriptions are Pre Debridement except measurements:     Wound 08/10/16 Other (Comment) Buttocks Left;Distal #2 acq 8/4/16 (Active)   Number of days: 2176       Wound 08/31/16 Buttocks Left;Proximal #3 aquired 8-27-26 (Active)   Number of days: 2156       Incision 04/08/14 Abdomen (Active)   Number of days: 9604       Wound 02/02/22 Ankle Left;Medial #1 (Active)   Wound Image   07/27/22 0751   Dressing Status New dressing applied 07/14/22 1204   Wound Cleansed Cleansed with saline 07/14/22 1204   Dressing/Treatment Other (comment) 07/14/22 1204   Offloading for Diabetic Foot Ulcers Offloading not required 07/13/22 0835   Wound Length (cm) 6.5 cm 07/27/22 0751   Wound Width (cm) 4 cm 07/27/22 0751   Wound Depth (cm) 0.1 cm 07/27/22 0751   Wound Surface Area (cm^2) 26 cm^2 07/27/22 0751   Change in Wound Size % (l*w) 3.92 07/27/22 0751   Wound Volume (cm^3) 2.6 cm^3 07/27/22 0751   Wound Healing % 4 07/27/22 0751   Post-Procedure Length (cm) 6.6 cm 07/27/22 0846   Post-Procedure Width (cm) 4.2 cm 07/27/22 0846   Post-Procedure Depth (cm) 0.1 cm 07/27/22 0846   Post-Procedure Surface Area (cm^2) 27.72 cm^2 07/27/22 0846   Post-Procedure Volume (cm^3) 2.772 cm^3 07/27/22 0846   Wound Assessment Granulation tissue;Fibrin 07/27/22 0751   Drainage Amount Large 07/27/22 0751   Drainage Description Yellow;Brown 07/27/22 0751   Odor None 07/27/22 0751   Melany-wound Assessment Blanchable erythema 07/27/22 0751   Number of days: 174       Wound 03/16/22 Ankle Posterior; Left #2 (Active)   Wound Image   07/27/22 0751   Dressing Status New dressing applied 07/20/22 0835   Wound Cleansed Cleansed with saline 07/20/22 0835   Dressing/Treatment ABD; Alginate with Ag 07/20/22 0835   Offloading for Diabetic Foot Ulcers Offloading not required 07/20/22 0835   Wound Length (cm) 1.9 cm 07/27/22 0751   Wound Width (cm) 1.8 cm 07/27/22 0751   Wound Depth (cm) 0.2 cm 07/27/22 0751   Wound Surface Area (cm^2) 3.42 cm^2 07/27/22 0751   Change in Wound Size % (l*w) -36.8 07/27/22 0751   Wound Volume (cm^3) 0.684 cm^3 07/27/22 0751   Wound Healing % -174 07/27/22 0751   Post-Procedure Length (cm) 1.9 cm 07/27/22 0846   Post-Procedure Width (cm) 1.9 cm 07/27/22 0846   Post-Procedure Depth (cm) 0.2 cm 07/27/22 0846   Post-Procedure Surface Area (cm^2) 3.61 cm^2 07/27/22 0846   Post-Procedure Volume (cm^3) 0.722 cm^3 07/27/22 0846   Wound Assessment Granulation tissue;Fibrin 07/27/22 0751   Drainage Amount Moderate 07/27/22 0751   Drainage Description Yellow;Brown 07/27/22 0751   Odor None 07/27/22 0751   Melany-wound Assessment Dry/flaky 07/27/22 0751   Number of days: 133 Procedure Note  Indications:  Based on my examination of this patient's wound(s)/ulcer(s) today, debridement is required to promote healing and evaluate the wound base. Performed by: Patel Boucher MD    Consent obtained:  Yes    Time out taken:  Yes    Pain Control: Anesthetic  Anesthetic: 4% Lidocaine Liquid Topical     Debridement:Excisional Debridement    Using curette the wound(s)/ulcer(s) was/were sharply debrided down through and including the removal of epidermis, dermis, and subcutaneous tissue. Devitalized Tissue Debrided:  fibrin, biofilm, slough, and exudate to stimulate bleeding to promote healing, post debridement good bleeding base and wound edges noted    Wound/Ulcer #: 1 and 2    Percent of Wound/Ulcer Debrided: 70%    Total Surface Area Debrided:  20 sq cm     Estimated Blood Loss:  Minimal  Hemostasis Achieved:  by pressure    Procedural Pain:  7 / 10   Post Procedural Pain:  6 / 10     Response to treatment:  Well tolerated by patient. Plan:   Treatment Note please see attached Discharge Instructions    Written patient dismissal instructions given to patient and signed by patient or POA. Discharge Instructions         Visit Discharge/Physician Orders     Discharge condition: Stable     Assessment of pain at discharge: yes     Anesthetic used: 4% lidocaine solution     Discharge to: Home     Left via:Private automobile     Accompanied by:self     ECF/HHA: n/a     Dressing Orders:LEFT MEDIAL and LATERAL LOWER LEG-Cleanse with normal saline, apply aquacel ag,  ABD pad and profore wrap. Change weekly. Treatment Orders: Eat a diet high in protein and vitamin C. Take a multiple vitamin daily unless contraindicated. To see Dr. Mele Arauz due to culture     In addition to seeing Dr Mele Arauz start clindamycin as ordered. Must wear slip on shoe to work due to wrap on leg!        [facility] [facility] followup visit : 1 week_____________________________  (Please note your next appointment above and if you are unable to keep, kindly give a 24 hour notice. Thank you.)     Physician signature:__________________________     If you experience any of the following, [facility] during business hours:     * Increase in Pain  * Temperature over 101  * Increase in drainage from your wound  * Drainage with a foul odor  * Bleeding  * Increase in swelling  * Need for compression bandage changes due to slippage, breakthrough drainage. If you need medical attention outside of the business hours of the [address] [place] [address] please contact your PCP or go to the nearest emergency room.                                                               Electronically signed by Geoffrey Abad MD on 7/27/2022 at 8:59 AM

## 2022-08-02 NOTE — DISCHARGE INSTRUCTIONS
Visit Discharge/Physician Orders     Discharge condition: Stable     Assessment of pain at discharge: yes     Anesthetic used: 4% lidocaine solution     Discharge to: Home     Left via:Private automobile     Accompanied by:self     ECF/HHA: n/a     Dressing Orders:LEFT MEDIAL and LATERAL LOWER LEG-Cleanse with normal saline, apply aquacel ag,  foam around site, ABD pad and profore wrap. Change weekly. Treatment Orders: Eat a diet high in protein and vitamin C. Take a multiple vitamin daily unless contraindicated. To see Dr. Hayder Pak as scheduled      Must wear slip on shoe to work due to wrap on leg! [facility] followup visit : 1 week_____________________________  (Please note your next appointment above and if you are unable to keep, kindly give a 24 hour notice. Thank you.)     Physician signature:__________________________     If you experience any of the following, please call the [facility] during business hours:     * Increase in Pain  * Temperature over 101  * Increase in drainage from your wound  * Drainage with a foul odor  * Bleeding  * Increase in swelling  * Need for compression bandage changes due to slippage, breakthrough drainage. If you need medical attention outside of the business hours of the [facility] please contact your PCP or go to the nearest emergency room.

## 2022-08-03 ENCOUNTER — HOSPITAL ENCOUNTER (OUTPATIENT)
Dept: WOUND CARE | Age: 67
Discharge: HOME | End: 2022-08-03
Payer: MEDICAID

## 2022-08-03 VITALS
TEMPERATURE: 98.1 F | SYSTOLIC BLOOD PRESSURE: 138 MMHG | HEART RATE: 78 BPM | DIASTOLIC BLOOD PRESSURE: 68 MMHG | RESPIRATION RATE: 16 BRPM

## 2022-08-03 DIAGNOSIS — I70.242 ATHEROSCLEROSIS OF NATIVE ARTERY OF LEFT LOWER EXTREMITY WITH ULCERATION OF CALF (HCC): ICD-10-CM

## 2022-08-03 DIAGNOSIS — I83.023 VENOUS STASIS ULCER OF LEFT ANKLE WITH FAT LAYER EXPOSED WITH VARICOSE VEINS (HCC): Primary | ICD-10-CM

## 2022-08-03 DIAGNOSIS — I70.243 ATHEROSCLEROSIS OF NATIVE ARTERY OF LEFT LOWER EXTREMITY WITH ULCERATION OF ANKLE (HCC): ICD-10-CM

## 2022-08-03 DIAGNOSIS — L97.322 VENOUS STASIS ULCER OF LEFT ANKLE WITH FAT LAYER EXPOSED WITH VARICOSE VEINS (HCC): Primary | ICD-10-CM

## 2022-08-03 PROCEDURE — 11042 DBRDMT SUBQ TIS 1ST 20SQCM/<: CPT

## 2022-08-03 PROCEDURE — 11042 DBRDMT SUBQ TIS 1ST 20SQCM/<: CPT | Performed by: SURGERY

## 2022-08-03 RX ORDER — LIDOCAINE HYDROCHLORIDE 20 MG/ML
JELLY TOPICAL ONCE
Status: CANCELLED | OUTPATIENT
Start: 2022-08-03 | End: 2022-08-03

## 2022-08-03 RX ORDER — CLOBETASOL PROPIONATE 0.5 MG/G
OINTMENT TOPICAL ONCE
Status: CANCELLED | OUTPATIENT
Start: 2022-08-03 | End: 2022-08-03

## 2022-08-03 RX ORDER — LIDOCAINE 40 MG/G
CREAM TOPICAL ONCE
Status: CANCELLED | OUTPATIENT
Start: 2022-08-03 | End: 2022-08-03

## 2022-08-03 RX ORDER — LIDOCAINE 50 MG/G
OINTMENT TOPICAL ONCE
Status: CANCELLED | OUTPATIENT
Start: 2022-08-03 | End: 2022-08-03

## 2022-08-03 RX ORDER — BACITRACIN ZINC AND POLYMYXIN B SULFATE 500; 1000 [USP'U]/G; [USP'U]/G
OINTMENT TOPICAL ONCE
Status: CANCELLED | OUTPATIENT
Start: 2022-08-03 | End: 2022-08-03

## 2022-08-03 RX ORDER — GENTAMICIN SULFATE 1 MG/G
OINTMENT TOPICAL ONCE
Status: CANCELLED | OUTPATIENT
Start: 2022-08-03 | End: 2022-08-03

## 2022-08-03 RX ORDER — GINSENG 100 MG
CAPSULE ORAL ONCE
Status: CANCELLED | OUTPATIENT
Start: 2022-08-03 | End: 2022-08-03

## 2022-08-03 RX ORDER — LIDOCAINE HYDROCHLORIDE 40 MG/ML
SOLUTION TOPICAL ONCE
Status: COMPLETED | OUTPATIENT
Start: 2022-08-03 | End: 2022-08-03

## 2022-08-03 RX ORDER — BETAMETHASONE DIPROPIONATE 0.05 %
OINTMENT (GRAM) TOPICAL ONCE
Status: CANCELLED | OUTPATIENT
Start: 2022-08-03 | End: 2022-08-03

## 2022-08-03 RX ORDER — LIDOCAINE HYDROCHLORIDE 40 MG/ML
SOLUTION TOPICAL ONCE
Status: CANCELLED | OUTPATIENT
Start: 2022-08-03 | End: 2022-08-03

## 2022-08-03 RX ORDER — BACITRACIN, NEOMYCIN, POLYMYXIN B 400; 3.5; 5 [USP'U]/G; MG/G; [USP'U]/G
OINTMENT TOPICAL ONCE
Status: CANCELLED | OUTPATIENT
Start: 2022-08-03 | End: 2022-08-03

## 2022-08-03 RX ADMIN — LIDOCAINE HYDROCHLORIDE: 40 SOLUTION TOPICAL at 08:02

## 2022-08-03 ASSESSMENT — PAIN DESCRIPTION - PAIN TYPE: TYPE: CHRONIC PAIN

## 2022-08-03 ASSESSMENT — PAIN DESCRIPTION - LOCATION: LOCATION: LEG

## 2022-08-03 ASSESSMENT — PAIN DESCRIPTION - DESCRIPTORS: DESCRIPTORS: STABBING

## 2022-08-03 ASSESSMENT — PAIN - FUNCTIONAL ASSESSMENT: PAIN_FUNCTIONAL_ASSESSMENT: PREVENTS OR INTERFERES SOME ACTIVE ACTIVITIES AND ADLS

## 2022-08-03 ASSESSMENT — PAIN DESCRIPTION - ORIENTATION: ORIENTATION: LEFT

## 2022-08-03 ASSESSMENT — PAIN DESCRIPTION - FREQUENCY: FREQUENCY: CONTINUOUS

## 2022-08-03 NOTE — PROGRESS NOTES
Wound Healing Center Followup Visit Note    Referring Physician : Darleene Goodpasture, MD  [address] [place] [address] Road RECORD NUMBER:  [id number]  AGE: 61 y.o. GENDER: female  : 1955  EPISODE DATE:  8/3/2022    Subjective:     Chief Complaint   Patient presents with    Wound Check     Left leg      HISTORY of PRESENT ILLNESS HPI   Clarke Garcia is a 61 y.o. female who presents today in regards to follow up evaluation and treatment of wound/ulcer. That patient's past medical, family and social hx were reviewed and changes were made if present. History of Wound Context:  The patient has had a wound of her left ankle/calf which was first noted approximately 2021. This has been treated local wound care. On their initial visit to the wound healing center, 22,  the patient has noted that the wound has been improving. The patient has not had similar previous wounds in the past.      She started seeing Dr. Sue Rudolph in 2021 and than Dr. Alejandro Maxwell. She was started in [facility] ~ 2021. She has noticed some improvement since starting unna boot. She is currently following with Dr. Apolinar Klinefelter. Pt is not on abx at time of initial visit, but has been treated with previously by podiatry. She is not a DM. She is not a smoker. She denies hx of DVT, and per her report had recent us noting no evidence of dvt at [facility]. She also had arterial studies done. I had previously seen her in the past in regards to left buttock thigh wound, which started as abscess. Pt works at [employer] in [employer] and is on her feet all day.     22  Reflux study - if significant findings will schedule for fu  Continue compression therapy [facility] per podiatry with aquacell dressing  Elevation  She does not have significant arterial occlusive disease  22  Patient has asked to continuing following with me going forward because of our previous relationship  She is going to let Dr. Luciana De Souza office know  She tolerated unna boot and aquacell - some improvement  216/22  Appearance improved, slightly larger  Consider drawtek next week but overall drainage seems reasonably managed as periwound appears ok  2/23/2022  The wound, has some exudate, no recent cultures were done, will do wound cultures today  3/2/22  Reflux study reviewed - no significant reflux  Will treat culture - augmentin 875 mg bid x 10 days - script sent  More drainage - stable size  3/9/22  Wound appearance improved, stable in size  drawtek  3/16/22  Wound slightly larger in size, new wound of ankle  Continue Drawtek, change to profore  Avoid shoes which will contact ankle area  3/23/22  Wounds stable  Overall appearance improved  Will have pt see ID re cultures - disc with Dr Bolivar Garcia  3/30/22  Much improved appearance  On oral abx per ID - concerns re pt ability to work while on IV - will see how her wound progresses  4/6/22  Appearance improved but size not much different  Finished oral abx  Will re culture next week if no significant size change - possible advance skin therapy  4/20/2022  Ulcer on the medial aspect, fairly clean and granulating, ulcer of the lateral aspect, has some exudate, debrided  4/27/22  Wounds stable   Hypergranulation tissue removed  Culture done - possible graft in future  5/4/22  Wound stable  Disc culture with Dr Bolivar Garcia who would like to start her on iv abx  5/11/22  Wound stable  Iv abx have not been started yet - spoke with Dr Bolivar Garcia - spoke with pt she will call his office  She had spoke with [facility] but there were some issues  5/18/22  Calf stable, ankle improved  Iv abx to start this week after picc placement  On exam   L dp 2+, PT triphasic - with compression of dp - PT becomes weakly biphasic  Concern that PT though triphasic is not getting inline flow and as a result isn't healing in the calf distribution because of occlusive disease  We discussed angiogram - will schedule  I reviewed the procedure with the patient and family as available. I discussed the procedure, risks, benefits, complications, and alternatives of the procedure. They understand and consent.   All questions were answered  Script for percocet 5/325 mg #28 prn q6 hrs - given, oarrs run  5/24/22  Angio, L PT and peroneal plasty  5/25/22  On iv abx  Wound appearance better  R groin no hematoma, L DP 2+, PT triphasic   6/1/22  Wound size and appearance better  6/8/22  Wound size and appearance better  Discussed with Dr Taiwo Pearl - he will stop abx  6/15/22  Wound size slightly improvement, appearance better  6/22/22  Wounds sizes smaller  6/29/22  Wounds stable   Hypergranulation tissue present throughout wound beds    Continue drawtex, foam, ABD and profore   7/6/22  Wounds slightly improved  7/13/22  Both wounds slightly larger  Hyperkeratotic tissue debrided back  7/14/22  Patient came in due to drainage through her wrap  Dressing noted to have large amounts of yellow/green drainage throughout  Cultures obtained    7/20/22  Culture reviewed large growth S aureus and Pseudomonas  Disc with Dr Hannon - will start clindamycin 600 mg tid for staph  He will see her in office in regards to IV for pseudomonas  Wounds stable in size  Change to aquacell ag  7/27/22  Dr Taiwo Pearl to see  Medial slightly larger, lateral slightly smaller  8/3/22  Dr Taiwo Pearl saw her felt wound appearance better - will hold off on iv for now  Medial slightly improved, lateral stable    Wound/Ulcer Pain Timing/Severity: constant, moderate  Quality of pain: aching, throbbing, tender  Severity:  6/ 10   Modifying Factors: Pain worsens with dressing changes, debridement  Associated Signs/Symptoms: edema, drainage and pain    Ulcer Identification:  Ulcer Type: venous  Contributing Factors: edema and venous stasis    Diabetic/Pressure/Non Pressure Ulcers only:  Ulcer: Non-Pressure ulcer, fat layer exposed        PAST MEDICAL HISTORY      Diagnosis Date    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 ([facility] [address] 5/18/2022    Dizziness - light-headed     GERD (gastroesophageal reflux disease)     Herpes dermatitis 4/27/2017    Insomnia secondary to anxiety 4/6/2018    Lightheadedness     Migraine     Skin ulcer of buttock with fat layer exposed ([address] 7/20/2016    Venous stasis ulcer of left ankle with fat layer exposed with varicose veins ([address] 2/2/2022     Past Surgical History:   Procedure Laterality Date    CHOLECYSTECTOMY, LAPAROSCOPIC  04/08/2014    TONSILLECTOMY  1960    1960s    UPPER GASTROINTESTINAL ENDOSCOPY  12/13/2013    mild gastritis and small hiatal hernia, Dr Gonzales Client, [facility]  2015    GERD, Dr. Desirae Lazaro, office     Family History   Problem Relation Age of Onset    Diabetes Mother     Hypertension Mother     Heart Disease Father     Heart Attack Father     Heart Surgery Father         angioplasty    Stroke Father     High Cholesterol Father     Heart Attack Maternal Grandfather      Social History     Tobacco Use    Smoking status: Never    Smokeless tobacco: Never   Vaping Use    Vaping Use: Never used   Substance Use Topics    Alcohol use: No    Drug use: No     Allergies   Allergen Reactions    Bee Pollen Anaphylaxis    Tetracyclines & Related Hives     Current Outpatient Medications on File Prior to Encounter   Medication Sig Dispense Refill    oxyCODONE-acetaminophen (PERCOCET) 5-325 MG per tablet Take 1 tablet by mouth every 4 hours as needed for Pain.       butalbital-acetaminophen-caffeine (FIORICET, ESGIC) -40 MG per tablet Take 1 tablet by mouth 3 times daily as needed for Headaches or Migraine Indications: Cluster Headache 90 tablet 5    omeprazole (PRILOSEC) 40 MG delayed release capsule Take 1 capsule by mouth daily 90 capsule 3    hydrOXYzine (ATARAX) 25 MG tablet take 1 tablet BID 60 tablet 5    aspirin-acetaminophen-caffeine (EXCEDRIN MIGRAINE) 250-250-65 MG per tablet Take 1 tablet by mouth as needed for Headaches 300 tablet 3    EPINEPHrine (EPIPEN) 0.3 MG/0.3ML SOAJ injection Use as directed for allergic reaction 1 each 5     No current facility-administered medications on file prior to encounter.        REVIEW OF SYSTEMS See HPI    Objective:    /68   Pulse 78   Temp 98.1 °F (36.7 °C) (Temporal)   Resp 16   Wt Readings from Last 3 Encounters:   07/27/22 160 lb (72.6 kg)   07/20/22 160 lb (72.6 kg)   07/14/22 160 lb (72.6 kg)     PHYSICAL EXAM  CONSTITUTIONAL:   Awake, alert, cooperative   EYES:  lids and lashes normal   ENT: external ears and nose without lesions   NECK:  supple, symmetrical, trachea midline   SKIN:  Open wound Present    Assessment:     Problem List Items Addressed This Visit       Venous stasis ulcer of left ankle with fat layer exposed with varicose veins ([address] - Primary (Chronic)    Relevant Orders    Initiate Outpatient Wound Care Protocol    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 ([address] (Chronic)    Relevant Orders    Initiate Outpatient Wound Care Protocol       Pre Debridement Measurements:  Are located in the [place]  Documentation Flow Sheet  Post Debridement Measurements:  Wound/Ulcer Descriptions are Pre Debridement except measurements:     Wound 08/10/16 Other (Comment) Buttocks Left;Distal #2 acq 8/4/16 (Active)   Number of days: 2183       Wound 08/31/16 Buttocks Left;Proximal #3 aquired 8-27-26 (Active)   Number of days: 2162       Incision 04/08/14 Abdomen (Active)   Number of days: 3799       Wound 02/02/22 Ankle Left;Medial #1 (Active)   Wound Image   07/27/22 0751   Dressing Status New dressing applied 07/14/22 1204   Wound Cleansed Cleansed with saline 07/14/22 1204   Dressing/Treatment Other (comment) 07/14/22 1204   Offloading for Diabetic Foot Ulcers Offloading not required 07/13/22 0835   Wound Length (cm) 6.3 cm 08/03/22 0754   Wound Width (cm) 4 cm 08/03/22 0754   Wound Depth (cm) 0.1 cm 08/03/22 0754   Wound Surface Area (cm^2) 25.2 cm^2 08/03/22 0754   Change in Wound Size % (l*w) 6.87 08/03/22 0754   Wound Volume (cm^3) 2.52 cm^3 08/03/22 0754   Wound Healing % 7 08/03/22 0754   Post-Procedure Length (cm) 6.6 cm 07/27/22 0846   Post-Procedure Width (cm) 4.2 cm 07/27/22 0846   Post-Procedure Depth (cm) 0.1 cm 07/27/22 0846   Post-Procedure Surface Area (cm^2) 27.72 cm^2 07/27/22 0846   Post-Procedure Volume (cm^3) 2.772 cm^3 07/27/22 0846   Wound Assessment Granulation tissue;Fibrin 08/03/22 0754   Drainage Amount Moderate 08/03/22 0754   Drainage Description Yellow;Brown 08/03/22 0754   Odor None 08/03/22 0754   Melany-wound Assessment Blanchable erythema 08/03/22 0754   Number of days: 181       Wound 03/16/22 Ankle Posterior; Left #2 (Active)   Wound Image   07/27/22 0751   Dressing Status New dressing applied 07/27/22 0906   Wound Cleansed Cleansed with saline 07/27/22 0906   Dressing/Treatment ABD; Alginate with Ag; Foam 07/27/22 0906   Offloading for Diabetic Foot Ulcers Offloading not required 07/20/22 0835   Wound Length (cm) 2 cm 08/03/22 0754   Wound Width (cm) 1.7 cm 08/03/22 0754   Wound Depth (cm) 0.2 cm 08/03/22 0754   Wound Surface Area (cm^2) 3.4 cm^2 08/03/22 0754   Change in Wound Size % (l*w) -36 08/03/22 0754   Wound Volume (cm^3) 0.68 cm^3 08/03/22 0754   Wound Healing % -172 08/03/22 0754   Post-Procedure Length (cm) 1.9 cm 07/27/22 0846   Post-Procedure Width (cm) 1.9 cm 07/27/22 0846   Post-Procedure Depth (cm) 0.2 cm 07/27/22 0846   Post-Procedure Surface Area (cm^2) 3.61 cm^2 07/27/22 0846   Post-Procedure Volume (cm^3) 0.722 cm^3 07/27/22 0846   Wound Assessment Granulation tissue;Fibrin 08/03/22 0754   Drainage Amount Moderate 08/03/22 0754   Drainage Description Yellow 08/03/22 0754   Odor None 08/03/22 0754   Melany-wound Assessment Dry/flaky 08/03/22 0754   Number of days: 140     Procedure Note  Indications:  Based on my examination of this patient's wound(s)/ulcer(s) today, debridement is required to promote healing and evaluate the wound base.     Performed by: Eli Blancas MD    Consent obtained:  Yes    Time out taken:  Yes    Pain Control: Anesthetic  Anesthetic: 4% Lidocaine Liquid Topical     Debridement:Excisional Debridement    Using curette the wound(s)/ulcer(s) was/were sharply debrided down through and including the removal of epidermis, dermis, and subcutaneous tissue. Devitalized Tissue Debrided:  fibrin, biofilm, slough, and exudate to stimulate bleeding to promote healing, post debridement good bleeding base and wound edges noted    Wound/Ulcer #: 1 and 2    Percent of Wound/Ulcer Debrided: 70%    Total Surface Area Debrided:  20 sq cm     Estimated Blood Loss:  Minimal  Hemostasis Achieved:  by pressure    Procedural Pain:  7  / 10   Post Procedural Pain:  6 / 10     Response to treatment:  Well tolerated by patient. Plan:   Treatment Note please see attached Discharge Instructions    Written patient dismissal instructions given to patient and signed by patient or POA. Discharge Instructions         Visit Discharge/Physician Orders     Discharge condition: Stable     Assessment of pain at discharge: yes     Anesthetic used: 4% lidocaine solution     Discharge to: Home     Left via:Private automobile     Accompanied by:self     ECF/HHA: n/a     Dressing Orders:LEFT MEDIAL and LATERAL LOWER LEG-Cleanse with normal saline, apply aquacel ag,  foam around site, ABD pad and profore wrap. Change weekly. Treatment Orders: Eat a diet high in protein and vitamin C. Take a multiple vitamin daily unless contraindicated. To see Dr. Ta Morgan as scheduled      Must wear slip on shoe to work due to wrap on leg! [facility] followup visit : 1 week_____________________________  (Please note your next appointment above and if you are unable to keep, kindly give a 24 hour notice.  Thank you.)     Physician signature:__________________________     If you experience any of the following, please call the [address] [place] [address] during business

## 2022-08-08 NOTE — DISCHARGE INSTRUCTIONS
Visit Discharge/Physician Orders     Discharge condition: Stable     Assessment of pain at discharge: yes     Anesthetic used: 4% lidocaine solution     Discharge to: Home     Left via:Private automobile     Accompanied by:self     ECF/HHA: n/a     Dressing Orders:LEFT MEDIAL and LATERAL LOWER LEG-Cleanse with normal saline, apply aquacel ag,  foam around site, ABD pad and profore wrap. Change weekly. Treatment Orders: Eat a diet high in protein and vitamin C. Take a multiple vitamin daily unless contraindicated. To see Dr. Freya Sargent as scheduled      Must wear slip on shoe to work due to wrap on leg! [facility] followup visit : 1 week_____________________________  (Please note your next appointment above and if you are unable to keep, kindly give a 24 hour notice. Thank you.)     Physician signature:__________________________     If you experience any of the following, please call the [facility] during business hours:     * Increase in Pain  * Temperature over 101  * Increase in drainage from your wound  * Drainage with a foul odor  * Bleeding  * Increase in swelling  * Need for compression bandage changes due to slippage, breakthrough drainage. If you need medical attention outside of the business hours of the [facility] please contact your PCP or go to the nearest emergency room.

## 2022-08-10 ENCOUNTER — HOSPITAL ENCOUNTER (OUTPATIENT)
Dept: WOUND CARE | Age: 67
Discharge: HOME | End: 2022-08-10
Payer: MEDICAID

## 2022-08-10 VITALS
WEIGHT: 160 LBS | TEMPERATURE: 97.8 F | HEIGHT: 68 IN | DIASTOLIC BLOOD PRESSURE: 76 MMHG | BODY MASS INDEX: 24.25 KG/M2 | RESPIRATION RATE: 18 BRPM | HEART RATE: 78 BPM | SYSTOLIC BLOOD PRESSURE: 146 MMHG

## 2022-08-10 DIAGNOSIS — I70.243 ATHEROSCLEROSIS OF NATIVE ARTERY OF LEFT LOWER EXTREMITY WITH ULCERATION OF ANKLE (HCC): ICD-10-CM

## 2022-08-10 DIAGNOSIS — I70.242 ATHEROSCLEROSIS OF NATIVE ARTERY OF LEFT LOWER EXTREMITY WITH ULCERATION OF CALF (HCC): ICD-10-CM

## 2022-08-10 DIAGNOSIS — I83.023 VENOUS STASIS ULCER OF LEFT ANKLE WITH FAT LAYER EXPOSED WITH VARICOSE VEINS (HCC): Primary | ICD-10-CM

## 2022-08-10 DIAGNOSIS — L97.322 VENOUS STASIS ULCER OF LEFT ANKLE WITH FAT LAYER EXPOSED WITH VARICOSE VEINS (HCC): Primary | ICD-10-CM

## 2022-08-10 PROCEDURE — 11042 DBRDMT SUBQ TIS 1ST 20SQCM/<: CPT | Performed by: SURGERY

## 2022-08-10 PROCEDURE — 6370000000 HC RX 637 (ALT 250 FOR IP): Performed by: SURGERY

## 2022-08-10 PROCEDURE — 11042 DBRDMT SUBQ TIS 1ST 20SQCM/<: CPT

## 2022-08-10 RX ORDER — LIDOCAINE HYDROCHLORIDE 40 MG/ML
SOLUTION TOPICAL ONCE
Status: CANCELLED | OUTPATIENT
Start: 2022-08-10 | End: 2022-08-10

## 2022-08-10 RX ORDER — BACITRACIN, NEOMYCIN, POLYMYXIN B 400; 3.5; 5 [USP'U]/G; MG/G; [USP'U]/G
OINTMENT TOPICAL ONCE
Status: CANCELLED | OUTPATIENT
Start: 2022-08-10 | End: 2022-08-10

## 2022-08-10 RX ORDER — GENTAMICIN SULFATE 1 MG/G
OINTMENT TOPICAL ONCE
Status: CANCELLED | OUTPATIENT
Start: 2022-08-10 | End: 2022-08-10

## 2022-08-10 RX ORDER — LIDOCAINE 50 MG/G
OINTMENT TOPICAL ONCE
Status: CANCELLED | OUTPATIENT
Start: 2022-08-10 | End: 2022-08-10

## 2022-08-10 RX ORDER — LIDOCAINE HYDROCHLORIDE 20 MG/ML
JELLY TOPICAL ONCE
Status: CANCELLED | OUTPATIENT
Start: 2022-08-10 | End: 2022-08-10

## 2022-08-10 RX ORDER — CLOBETASOL PROPIONATE 0.5 MG/G
OINTMENT TOPICAL ONCE
Status: CANCELLED | OUTPATIENT
Start: 2022-08-10 | End: 2022-08-10

## 2022-08-10 RX ORDER — LIDOCAINE HYDROCHLORIDE 40 MG/ML
SOLUTION TOPICAL ONCE
Status: COMPLETED | OUTPATIENT
Start: 2022-08-10 | End: 2022-08-10

## 2022-08-10 RX ORDER — BETAMETHASONE DIPROPIONATE 0.05 %
OINTMENT (GRAM) TOPICAL ONCE
Status: CANCELLED | OUTPATIENT
Start: 2022-08-10 | End: 2022-08-10

## 2022-08-10 RX ORDER — BACITRACIN ZINC AND POLYMYXIN B SULFATE 500; 1000 [USP'U]/G; [USP'U]/G
OINTMENT TOPICAL ONCE
Status: CANCELLED | OUTPATIENT
Start: 2022-08-10 | End: 2022-08-10

## 2022-08-10 RX ORDER — LIDOCAINE 40 MG/G
CREAM TOPICAL ONCE
Status: CANCELLED | OUTPATIENT
Start: 2022-08-10 | End: 2022-08-10

## 2022-08-10 RX ORDER — GINSENG 100 MG
CAPSULE ORAL ONCE
Status: CANCELLED | OUTPATIENT
Start: 2022-08-10 | End: 2022-08-10

## 2022-08-10 RX ADMIN — LIDOCAINE HYDROCHLORIDE 10 ML: 40 SOLUTION TOPICAL at 07:56

## 2022-08-10 ASSESSMENT — PAIN DESCRIPTION - DESCRIPTORS: DESCRIPTORS: THROBBING

## 2022-08-10 ASSESSMENT — PAIN DESCRIPTION - ORIENTATION: ORIENTATION: LEFT

## 2022-08-10 ASSESSMENT — PAIN DESCRIPTION - LOCATION: LOCATION: LEG

## 2022-08-10 ASSESSMENT — PAIN DESCRIPTION - PAIN TYPE: TYPE: CHRONIC PAIN

## 2022-08-10 ASSESSMENT — PAIN - FUNCTIONAL ASSESSMENT: PAIN_FUNCTIONAL_ASSESSMENT: ACTIVITIES ARE NOT PREVENTED

## 2022-08-10 ASSESSMENT — PAIN SCALES - GENERAL: PAINLEVEL_OUTOF10: 2

## 2022-08-10 ASSESSMENT — PAIN DESCRIPTION - ONSET: ONSET: ON-GOING

## 2022-08-10 ASSESSMENT — PAIN DESCRIPTION - FREQUENCY: FREQUENCY: INTERMITTENT

## 2022-08-10 NOTE — PLAN OF CARE
Problem: Pain  Goal: Verbalizes/displays adequate comfort level or baseline comfort level  Outcome: Progressing     Problem: Venous:  Goal: Signs of wound healing will improve  Description: Signs of wound healing will improve  Outcome: Adequate for Discharge

## 2022-08-10 NOTE — PROGRESS NOTES
Wound Healing Center Followup Visit Note    Referring Physician : Julieta Valentin MD  [address] [place] [address] RECORD NUMBER:  [id number]  AGE: 61 y.o. GENDER: female  : 1955  EPISODE DATE:  8/10/2022    Subjective:     Chief Complaint   Patient presents with    Wound Check     Left lower leg      HISTORY of PRESENT ILLNESS HPI   Cloria Goltz is a 61 y.o. female who presents today in regards to follow up evaluation and treatment of wound/ulcer. That patient's past medical, family and social hx were reviewed and changes were made if present. History of Wound Context:  The patient has had a wound of her left ankle/calf which was first noted approximately 2021. This has been treated local wound care. On their initial visit to the wound healing center, 22,  the patient has noted that the wound has been improving. The patient has not had similar previous wounds in the past.      She started seeing Dr. Mendez Sanchez in 2021 and than Dr. Daniel Villarreal. She was started in [facility] ~ 2021. She has noticed some improvement since starting unna boot. She is currently following with Dr. Roxy Morrow. Pt is not on abx at time of initial visit, but has been treated with previously by podiatry. She is not a DM. She is not a smoker. She denies hx of DVT, and per her report had recent us noting no evidence of dvt at [facility]. She also had arterial studies done. I had previously seen her in the past in regards to left buttock thigh wound, which started as abscess. Pt works at [employer] in [facility] and is on her feet all day.     22  Reflux study - if significant findings will schedule for fu  Continue compression therapy [facility] per podiatry with aquacell dressing  Elevation  She does not have significant arterial occlusive disease  22  Patient has asked to continuing following with me going forward because of our previous relationship  She is going to let Dr. Vicky Solano office and family as available. I discussed the procedure, risks, benefits, complications, and alternatives of the procedure. They understand and consent.   All questions were answered  Script for percocet 5/325 mg #28 prn q6 hrs - given, oarrs run  5/24/22  Angio, L PT and peroneal plasty  5/25/22  On iv abx  Wound appearance better  R groin no hematoma, L DP 2+, PT triphasic   6/1/22  Wound size and appearance better  6/8/22  Wound size and appearance better  Discussed with Dr Yoni Mirelse - he will stop abx  6/15/22  Wound size slightly improvement, appearance better  6/22/22  Wounds sizes smaller  6/29/22  Wounds stable   Hypergranulation tissue present throughout wound beds    Continue drawtex, foam, ABD and profore   7/6/22  Wounds slightly improved  7/13/22  Both wounds slightly larger  Hyperkeratotic tissue debrided back  7/14/22  Patient came in due to drainage through her wrap  Dressing noted to have large amounts of yellow/green drainage throughout  Cultures obtained    7/20/22  Culture reviewed large growth S aureus and Pseudomonas  Disc with Dr Hannon - will start clindamycin 600 mg tid for staph  He will see her in office in regards to IV for pseudomonas  Wounds stable in size  Change to aquacell ag  7/27/22  Dr Yoni Mireles to see  Medial slightly larger, lateral slightly smaller  8/3/22  Dr Yoni Mireles saw her felt wound appearance better - will hold off on iv for now  Medial slightly improved, lateral stable  8/10/2022  Wounds stable    Wound/Ulcer Pain Timing/Severity: constant, moderate  Quality of pain: aching, throbbing, tender  Severity:  6/ 10   Modifying Factors: Pain worsens with dressing changes, debridement  Associated Signs/Symptoms: edema, drainage and pain    Ulcer Identification:  Ulcer Type: venous  Contributing Factors: edema and venous stasis    Diabetic/Pressure/Non Pressure Ulcers only:  Ulcer: Non-Pressure ulcer, fat layer exposed        PAST MEDICAL HISTORY      Diagnosis Date    Atherosclerosis of native tablet 3    EPINEPHrine (EPIPEN) 0.3 MG/0.3ML SOAJ injection Use as directed for allergic reaction 1 each 5     No current facility-administered medications on file prior to encounter. REVIEW OF SYSTEMS See HPI    Objective:    BP (!) 146/76   Pulse 78   Temp 97.8 °F (36.6 °C) (Temporal)   Resp 18   Ht 5' 8\" (1.727 m)   Wt 160 lb (72.6 kg)   BMI 24.33 kg/m²   Wt Readings from Last 3 Encounters:   08/10/22 160 lb (72.6 kg)   07/27/22 160 lb (72.6 kg)   07/20/22 160 lb (72.6 kg)     PHYSICAL EXAM  CONSTITUTIONAL:   Awake, alert, cooperative   EYES:  lids and lashes normal   ENT: external ears and nose without lesions   NECK:  supple, symmetrical, trachea midline   SKIN:  Open wound Present    Assessment:     Problem List Items Addressed This Visit          High    Venous stasis ulcer of left ankle with fat layer exposed with varicose veins (HCC) - Primary (Chronic)    Relevant Orders    Initiate Outpatient Wound Care Protocol       Medium    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 ([address] (Chronic)    Relevant Orders    Initiate Outpatient Wound Care Protocol       Pre Debridement Measurements:  Are located in the [place]  Documentation Flow Sheet  Post Debridement Measurements:  Wound/Ulcer Descriptions are Pre Debridement except measurements:     Wound 08/10/16 Other (Comment) Buttocks Left;Distal #2 acq 8/4/16 (Active)   Number of days: 2190       Wound 08/31/16 Buttocks Left;Proximal #3 aquired 8-27-26 (Active)   Number of days: 2169       Incision 04/08/14 Abdomen (Active)   Number of days: 6365       Wound 02/02/22 Ankle Left;Medial #1 (Active)   Wound Image   07/27/22 0751   Dressing Status New dressing applied 08/03/22 0837   Wound Cleansed Cleansed with saline 08/03/22 0837   Dressing/Treatment ABD; Alginate with Ag 08/03/22 0837   Offloading for Diabetic Foot Ulcers Offloading not required 07/13/22 0835   Wound Length (cm) 5.8 cm 08/10/22 0743   Wound Width (cm) 4.5 cm 08/10/22 0743   Wound Depth (cm) 0.1 cm 08/10/22 0743   Wound Surface Area (cm^2) 26.1 cm^2 08/10/22 0743   Change in Wound Size % (l*w) 3.55 08/10/22 0743   Wound Volume (cm^3) 2.61 cm^3 08/10/22 0743   Wound Healing % 4 08/10/22 0743   Post-Procedure Length (cm) 6 cm 08/10/22 0810   Post-Procedure Width (cm) 4.6 cm 08/10/22 0810   Post-Procedure Depth (cm) 0.1 cm 08/10/22 0810   Post-Procedure Surface Area (cm^2) 27.6 cm^2 08/10/22 0810   Post-Procedure Volume (cm^3) 2.76 cm^3 08/10/22 0810   Wound Assessment Granulation tissue;Fibrin 08/10/22 0743   Drainage Amount Moderate 08/10/22 0743   Drainage Description Yellow;Brown 08/10/22 0743   Odor None 08/10/22 0743   Melany-wound Assessment Intact;Dry/flaky 08/10/22 0743   Number of days: 188       Wound 03/16/22 Ankle Posterior; Left #2 (Active)   Wound Image   07/27/22 0751   Dressing Status New dressing applied 07/27/22 0906   Wound Cleansed Cleansed with saline 07/27/22 0906   Dressing/Treatment ABD; Alginate with Ag; Foam 07/27/22 0906   Offloading for Diabetic Foot Ulcers Offloading not required 07/20/22 0835   Wound Length (cm) 2.9 cm 08/10/22 0743   Wound Width (cm) 2.8 cm 08/10/22 0743   Wound Depth (cm) 0.2 cm 08/10/22 0743   Wound Surface Area (cm^2) 8.12 cm^2 08/10/22 0743   Change in Wound Size % (l*w) -224.8 08/10/22 0743   Wound Volume (cm^3) 1.624 cm^3 08/10/22 0743   Wound Healing % -550 08/10/22 0743   Post-Procedure Length (cm) 3 cm 08/10/22 0810   Post-Procedure Width (cm) 3 cm 08/10/22 0810   Post-Procedure Depth (cm) 0.2 cm 08/10/22 0810   Post-Procedure Surface Area (cm^2) 9 cm^2 08/10/22 0810   Post-Procedure Volume (cm^3) 1.8 cm^3 08/10/22 0810   Wound Assessment Granulation tissue;Fibrin 08/10/22 0743   Drainage Amount Moderate 08/10/22 0743   Drainage Description Yellow 08/10/22 0743   Odor None 08/10/22 0743   Melany-wound Assessment Dry/flaky 08/10/22 0743   Number of days: 147     Procedure Note  Indications:  Based on my examination of this patient's wound(s)/ulcer(s) today, debridement is required to promote healing and evaluate the wound base. Performed by: Maxim Akbar MD    Consent obtained:  Yes    Time out taken:  Yes    Pain Control: Anesthetic  Anesthetic: 4% Lidocaine Liquid Topical     Debridement:Excisional Debridement    Using curette the wound(s)/ulcer(s) was/were sharply debrided down through and including the removal of epidermis, dermis, and subcutaneous tissue. Devitalized Tissue Debrided:  fibrin, biofilm, slough, and exudate to stimulate bleeding to promote healing, post debridement good bleeding base and wound edges noted    Wound/Ulcer #: 1 and 2    Percent of Wound/Ulcer Debrided: 70%    Total Surface Area Debrided:  20 sq cm     Estimated Blood Loss:  Minimal  Hemostasis Achieved:  by pressure    Procedural Pain:  7  / 10   Post Procedural Pain:  6 / 10     Response to treatment:  Well tolerated by patient. Plan:   Treatment Note please see attached Discharge Instructions    Written patient dismissal instructions given to patient and signed by patient or POA. Discharge Instructions         Visit Discharge/Physician Orders     Discharge condition: Stable     Assessment of pain at discharge: yes     Anesthetic used: 4% lidocaine solution     Discharge to: Home     Left via:Private automobile     Accompanied by:self     ECF/HHA: n/a     Dressing Orders:LEFT MEDIAL and LATERAL LOWER LEG-Cleanse with normal saline, apply aquacel ag,  foam around site, ABD pad and profore wrap. Change weekly. Treatment Orders: Eat a diet high in protein and vitamin C. Take a multiple vitamin daily unless contraindicated. To see Dr. Mirna Brennan as scheduled      Must wear slip on shoe to work due to wrap on leg! [facility] followup visit : 1 week_____________________________  (Please note your next appointment above and if you are unable to keep, kindly give a 24 hour notice.  Thank you.)     Physician

## 2022-08-15 NOTE — DISCHARGE INSTRUCTIONS
Visit Discharge/Physician Orders     Discharge condition: Stable     Assessment of pain at discharge: yes     Anesthetic used: 4% lidocaine solution     Discharge to: Home     Left via:Private automobile     Accompanied by:self     ECF/HHA: n/a     Dressing Orders:LEFT MEDIAL and LATERAL LOWER LEG-Cleanse with normal saline, apply aquacel ag,  foam around site, ABD pad and profore wrap. Change weekly. Treatment Orders: Eat a diet high in protein and vitamin C. Take a multiple vitamin daily unless contraindicated. To see Dr. Yecenia Garvin as scheduled      Must wear slip on shoe to work due to wrap on leg! [address] [facility] followup visit : 2 weeks D/T out patient surgical debridement_____________________________  (Please note your next appointment above and if you are unable to keep, kindly give a 24 hour notice. Thank you.)     Physician signature:__________________________     If you experience any of the following, please call the [facility] during business hours:     * Increase in Pain  * Temperature over 101  * Increase in drainage from your wound  * Drainage with a foul odor  * Bleeding  * Increase in swelling  * Need for compression bandage changes due to slippage, breakthrough drainage. If you need medical attention outside of the business hours of the [facility] please contact your PCP or go to the nearest emergency room.

## 2022-08-17 ENCOUNTER — TELEPHONE (OUTPATIENT)
Dept: VASCULAR SURGERY | Age: 67
End: 2022-08-17

## 2022-08-17 ENCOUNTER — HOSPITAL ENCOUNTER (OUTPATIENT)
Dept: WOUND CARE | Age: 67
Discharge: HOME | End: 2022-08-17
Payer: MEDICAID

## 2022-08-17 VITALS
RESPIRATION RATE: 18 BRPM | SYSTOLIC BLOOD PRESSURE: 122 MMHG | DIASTOLIC BLOOD PRESSURE: 72 MMHG | TEMPERATURE: 96.5 F | HEART RATE: 70 BPM

## 2022-08-17 DIAGNOSIS — L97.322 VENOUS STASIS ULCER OF LEFT ANKLE WITH FAT LAYER EXPOSED WITH VARICOSE VEINS (HCC): Primary | ICD-10-CM

## 2022-08-17 DIAGNOSIS — I70.242 ATHEROSCLEROSIS OF NATIVE ARTERY OF LEFT LOWER EXTREMITY WITH ULCERATION OF CALF (HCC): ICD-10-CM

## 2022-08-17 DIAGNOSIS — I83.023 VENOUS STASIS ULCER OF LEFT ANKLE WITH FAT LAYER EXPOSED WITH VARICOSE VEINS (HCC): Primary | ICD-10-CM

## 2022-08-17 DIAGNOSIS — I70.243 ATHEROSCLEROSIS OF NATIVE ARTERY OF LEFT LOWER EXTREMITY WITH ULCERATION OF ANKLE (HCC): ICD-10-CM

## 2022-08-17 PROCEDURE — 11042 DBRDMT SUBQ TIS 1ST 20SQCM/<: CPT | Performed by: SURGERY

## 2022-08-17 PROCEDURE — 11042 DBRDMT SUBQ TIS 1ST 20SQCM/<: CPT

## 2022-08-17 RX ORDER — LIDOCAINE HYDROCHLORIDE 20 MG/ML
JELLY TOPICAL ONCE
Status: CANCELLED | OUTPATIENT
Start: 2022-08-17 | End: 2022-08-17

## 2022-08-17 RX ORDER — GENTAMICIN SULFATE 1 MG/G
OINTMENT TOPICAL ONCE
Status: CANCELLED | OUTPATIENT
Start: 2022-08-17 | End: 2022-08-17

## 2022-08-17 RX ORDER — BACITRACIN, NEOMYCIN, POLYMYXIN B 400; 3.5; 5 [USP'U]/G; MG/G; [USP'U]/G
OINTMENT TOPICAL ONCE
Status: CANCELLED | OUTPATIENT
Start: 2022-08-17 | End: 2022-08-17

## 2022-08-17 RX ORDER — CLOBETASOL PROPIONATE 0.5 MG/G
OINTMENT TOPICAL ONCE
Status: CANCELLED | OUTPATIENT
Start: 2022-08-17 | End: 2022-08-17

## 2022-08-17 RX ORDER — LIDOCAINE HYDROCHLORIDE 40 MG/ML
SOLUTION TOPICAL ONCE
Status: CANCELLED | OUTPATIENT
Start: 2022-08-17 | End: 2022-08-17

## 2022-08-17 RX ORDER — GINSENG 100 MG
CAPSULE ORAL ONCE
Status: CANCELLED | OUTPATIENT
Start: 2022-08-17 | End: 2022-08-17

## 2022-08-17 RX ORDER — BACITRACIN ZINC AND POLYMYXIN B SULFATE 500; 1000 [USP'U]/G; [USP'U]/G
OINTMENT TOPICAL ONCE
Status: CANCELLED | OUTPATIENT
Start: 2022-08-17 | End: 2022-08-17

## 2022-08-17 RX ORDER — BETAMETHASONE DIPROPIONATE 0.05 %
OINTMENT (GRAM) TOPICAL ONCE
Status: CANCELLED | OUTPATIENT
Start: 2022-08-17 | End: 2022-08-17

## 2022-08-17 RX ORDER — LIDOCAINE 40 MG/G
CREAM TOPICAL ONCE
Status: CANCELLED | OUTPATIENT
Start: 2022-08-17 | End: 2022-08-17

## 2022-08-17 RX ORDER — LIDOCAINE 50 MG/G
OINTMENT TOPICAL ONCE
Status: CANCELLED | OUTPATIENT
Start: 2022-08-17 | End: 2022-08-17

## 2022-08-17 ASSESSMENT — PAIN DESCRIPTION - DESCRIPTORS: DESCRIPTORS: THROBBING

## 2022-08-17 ASSESSMENT — PAIN DESCRIPTION - ORIENTATION: ORIENTATION: LEFT

## 2022-08-17 ASSESSMENT — PAIN DESCRIPTION - PAIN TYPE: TYPE: CHRONIC PAIN

## 2022-08-17 ASSESSMENT — PAIN - FUNCTIONAL ASSESSMENT: PAIN_FUNCTIONAL_ASSESSMENT: ACTIVITIES ARE NOT PREVENTED

## 2022-08-17 ASSESSMENT — PAIN SCALES - GENERAL: PAINLEVEL_OUTOF10: 2

## 2022-08-17 ASSESSMENT — PAIN DESCRIPTION - FREQUENCY: FREQUENCY: INTERMITTENT

## 2022-08-17 ASSESSMENT — PAIN DESCRIPTION - LOCATION: LOCATION: LEG

## 2022-08-17 NOTE — PROGRESS NOTES
Wound Healing Center Followup Visit Note    Referring Physician : Guru Li MD  [address] [place] [address] RECORD NUMBER:  [id number]  AGE: 61 y.o. GENDER: female  : 1955  EPISODE DATE:  2022    Subjective:     Chief Complaint   Patient presents with    Wound Check     Leg lower left      HISTORY of PRESENT ILLNESS HPI   Little Hands is a 61 y.o. female who presents today in regards to follow up evaluation and treatment of wound/ulcer. That patient's past medical, family and social hx were reviewed and changes were made if present. History of Wound Context:  The patient has had a wound of her left ankle/calf which was first noted approximately 2021. This has been treated local wound care. On their initial visit to the wound healing center, 22,  the patient has noted that the wound has been improving. The patient has not had similar previous wounds in the past.      She started seeing Dr. Mk Yoder in 2021 and than Dr. Ras Cedeno. She was started in [facility] ~ 2021. She has noticed some improvement since starting [facility] boot. She is currently following with Dr. Cleo Singh. Pt is not on abx at time of initial visit, but has been treated with previously by podiatry. She is not a DM. She is not a smoker. She denies hx of DVT, and per her report had recent us noting no evidence of dvt at [facility]. She also had arterial studies done. I had previously seen her in the past in regards to left buttock thigh wound, which started as abscess. Pt works at [employer] in [facility] and is on her feet all day.     22  Reflux study - if significant findings will schedule for fu  Continue compression therapy [facility] bo per podiatry with aquacell dressing  Elevation  She does not have significant arterial occlusive disease  22  Patient has asked to continuing following with me going forward because of our previous relationship  She is going to let Dr. Guillermo Neumann office know  She tolerated unna boot and aquacell - some improvement  216/22  Appearance improved, slightly larger  Consider drawtek next week but overall drainage seems reasonably managed as periwound appears ok  2/23/2022  The wound, has some exudate, no recent cultures were done, will do wound cultures today  3/2/22  Reflux study reviewed - no significant reflux  Will treat culture - augmentin 875 mg bid x 10 days - script sent  More drainage - stable size  3/9/22  Wound appearance improved, stable in size  drawtek  3/16/22  Wound slightly larger in size, new wound of ankle  Continue Drawtek, change to profore  Avoid shoes which will contact ankle area  3/23/22  Wounds stable  Overall appearance improved  Will have pt see ID re cultures - disc with Dr Barak Renae  3/30/22  Much improved appearance  On oral abx per ID - concerns re pt ability to work while on IV - will see how her wound progresses  4/6/22  Appearance improved but size not much different  Finished oral abx  Will re culture next week if no significant size change - possible advance skin therapy  4/20/2022  Ulcer on the medial aspect, fairly clean and granulating, ulcer of the lateral aspect, has some exudate, debrided  4/27/22  Wounds stable   Hypergranulation tissue removed  Culture done - possible graft in future  5/4/22  Wound stable  Disc culture with Dr Barak Renae who would like to start her on iv abx  5/11/22  Wound stable  Iv abx have not been started yet - spoke with Dr Barak Renae - spoke with pt she will call his office  She had spoke with [facility] but there were some issues  5/18/22  Calf stable, ankle improved  Iv abx to start this week after picc placement  On exam   L dp 2+, PT triphasic - with compression of dp - PT becomes weakly biphasic  Concern that PT though triphasic is not getting inline flow and as a result isn't healing in the calf distribution because of occlusive disease  We discussed angiogram - will schedule  I reviewed the procedure with the patient and family as available. I discussed the procedure, risks, benefits, complications, and alternatives of the procedure. They understand and consent.   All questions were answered  Script for percocet 5/325 mg #28 prn q6 hrs - given, oarrs run  5/24/22  Angio, L PT and peroneal plasty  5/25/22  On iv abx  Wound appearance better  R groin no hematoma, L DP 2+, PT triphasic   6/1/22  Wound size and appearance better  6/8/22  Wound size and appearance better  Discussed with Dr Humphries Files - he will stop abx  6/15/22  Wound size slightly improvement, appearance better  6/22/22  Wounds sizes smaller  6/29/22  Wounds stable   Hypergranulation tissue present throughout wound beds    Continue drawtex, foam, ABD and profore   7/6/22  Wounds slightly improved  7/13/22  Both wounds slightly larger  Hyperkeratotic tissue debrided back  7/14/22  Patient came in due to drainage through her wrap  Dressing noted to have large amounts of yellow/green drainage throughout  Cultures obtained    7/20/22  Culture reviewed large growth S aureus and Pseudomonas  Disc with Dr Hannon - will start clindamycin 600 mg tid for staph  He will see her in office in regards to IV for pseudomonas  Wounds stable in size  Change to aquacell ag  7/27/22  Dr Yandel Mendes to see  Medial slightly larger, lateral slightly smaller  8/3/22  Dr Yandel Mendes saw her felt wound appearance better - will hold off on iv for now  Medial slightly improved, lateral stable  8/10/2022  Wounds stable  8/17/22  Wound stable, more pain with debridement  Discussed OR for debridement and possible advanced skin therapy - pt agreeable    Wound/Ulcer Pain Timing/Severity: constant, moderate  Quality of pain: aching, throbbing, tender  Severity:  6/ 10   Modifying Factors: Pain worsens with dressing changes, debridement  Associated Signs/Symptoms: edema, drainage and pain    Ulcer Identification:  Ulcer Type: venous  Contributing Factors: edema and venous stasis    Diabetic/Pressure/Non Pressure Ulcers only:  Ulcer: Non-Pressure ulcer, fat layer exposed        PAST MEDICAL HISTORY      Diagnosis Date    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 ([facility] [address] 5/18/2022    Dizziness - light-headed     GERD (gastroesophageal reflux disease)     Herpes dermatitis 4/27/2017    Insomnia secondary to anxiety 4/6/2018    Lightheadedness     Migraine     Skin ulcer of buttock with fat layer exposed ([facility] [address] 7/20/2016    Venous stasis ulcer of left ankle with fat layer exposed with varicose veins ([facility] [address] 2/2/2022     Past Surgical History:   Procedure Laterality Date    CHOLECYSTECTOMY, LAPAROSCOPIC  04/08/2014    TONSILLECTOMY  1960    1960s    UPPER GASTROINTESTINAL ENDOSCOPY  12/13/2013    mild gastritis and small hiatal hernia, Dr Keena Coleman, [address]  2015    GERD, Dr. Asif Arellano, office     Family History   Problem Relation Age of Onset    Diabetes Mother     Hypertension Mother     Heart Disease Father     Heart Attack Father     Heart Surgery Father         angioplasty    Stroke Father     High Cholesterol Father     Heart Attack Maternal Grandfather      Social History     Tobacco Use    Smoking status: Never    Smokeless tobacco: Never   Vaping Use    Vaping Use: Never used   Substance Use Topics    Alcohol use: No    Drug use: No     Allergies   Allergen Reactions    Bee Pollen Anaphylaxis    Tetracyclines & Related Hives     Current Outpatient Medications on File Prior to Encounter   Medication Sig Dispense Refill    oxyCODONE-acetaminophen (PERCOCET) 5-325 MG per tablet Take 1 tablet by mouth every 4 hours as needed for Pain.       butalbital-acetaminophen-caffeine (FIORICET, ESGIC) -40 MG per tablet Take 1 tablet by mouth 3 times daily as needed for Headaches or Migraine Indications: Cluster Headache 90 tablet 5    omeprazole (PRILOSEC) 40 MG delayed release capsule Take 1 capsule by mouth daily 90 capsule 3    hydrOXYzine (ATARAX) 25 MG tablet take 1 tablet BID 60 tablet 5    aspirin-acetaminophen-caffeine (EXCEDRIN MIGRAINE) 250-250-65 MG per tablet Take 1 tablet by mouth as needed for Headaches 300 tablet 3    EPINEPHrine (EPIPEN) 0.3 MG/0.3ML SOAJ injection Use as directed for allergic reaction 1 each 5     No current facility-administered medications on file prior to encounter. REVIEW OF SYSTEMS See HPI    Objective:    /72   Pulse 70   Temp (!) 96.5 °F (35.8 °C) (Temporal)   Resp 18   Wt Readings from Last 3 Encounters:   08/10/22 160 lb (72.6 kg)   07/27/22 160 lb (72.6 kg)   07/20/22 160 lb (72.6 kg)     PHYSICAL EXAM  CONSTITUTIONAL:   Awake, alert, cooperative   EYES:  lids and lashes normal   ENT: external ears and nose without lesions   NECK:  supple, symmetrical, trachea midline   SKIN:  Open wound Present    Assessment:     Problem List Items Addressed This Visit       Venous stasis ulcer of left ankle with fat layer exposed with varicose veins (HCC) - Primary (Chronic)    Relevant Orders    Initiate Outpatient Wound Care Protocol    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 ([address] (Chronic)    Relevant Orders    Initiate Outpatient Wound Care Protocol       Pre Debridement Measurements:  Are located in the [place]  Documentation Flow Sheet  Post Debridement Measurements:  Wound/Ulcer Descriptions are Pre Debridement except measurements:     Wound 08/10/16 Other (Comment) Buttocks Left;Distal #2 acq 8/4/16 (Active)   Number of days: 2197       Wound 08/31/16 Buttocks Left;Proximal #3 aquired 8-27-26 (Active)   Number of days: 2176       Incision 04/08/14 Abdomen (Active)   Number of days: 7251       Wound 02/02/22 Ankle Left;Medial #1 (Active)   Wound Image   07/27/22 0751   Dressing Status New dressing applied 08/10/22 0929   Wound Cleansed Cleansed with saline 08/10/22 0929   Dressing/Treatment ABD; Alginate with Ag 08/10/22 0929   Offloading for Diabetic Foot Ulcers Offloading not required 08/10/22 0929   Wound Length (cm) 5.7 cm 08/17/22 0742   Wound Width (cm) 4.5 cm 08/17/22 0742   Wound Depth (cm) 0.1 cm 08/17/22 0742   Wound Surface Area (cm^2) 25.65 cm^2 08/17/22 0742   Change in Wound Size % (l*w) 5.21 08/17/22 0742   Wound Volume (cm^3) 2.565 cm^3 08/17/22 0742   Wound Healing % 5 08/17/22 0742   Post-Procedure Length (cm) 5.8 cm 08/17/22 0814   Post-Procedure Width (cm) 4.7 cm 08/17/22 0814   Post-Procedure Depth (cm) 0.1 cm 08/17/22 0814   Post-Procedure Surface Area (cm^2) 27.26 cm^2 08/17/22 0814   Post-Procedure Volume (cm^3) 2.726 cm^3 08/17/22 0814   Wound Assessment Granulation tissue;Fibrin 08/17/22 0742   Drainage Amount Moderate 08/17/22 0742   Drainage Description Green;Yellow; Thick 08/17/22 0742   Odor None 08/17/22 0742   Melany-wound Assessment Intact;Dry/flaky 08/17/22 0742   Number of days: 195       Wound 03/16/22 Ankle Posterior; Left #2 (Active)   Wound Image   07/27/22 0751   Dressing Status New dressing applied 08/10/22 0929   Wound Cleansed Cleansed with saline 08/10/22 0929   Dressing/Treatment ABD; Alginate with Ag; Foam 08/10/22 0929   Offloading for Diabetic Foot Ulcers Offloading not required 08/10/22 0929   Wound Length (cm) 2.2 cm 08/17/22 0742   Wound Width (cm) 2.2 cm 08/17/22 0742   Wound Depth (cm) 0.2 cm 08/17/22 0742   Wound Surface Area (cm^2) 4.84 cm^2 08/17/22 0742   Change in Wound Size % (l*w) -93.6 08/17/22 0742   Wound Volume (cm^3) 0.968 cm^3 08/17/22 0742   Wound Healing % -287 08/17/22 0742   Post-Procedure Length (cm) 2.3 cm 08/17/22 0814   Post-Procedure Width (cm) 2.4 cm 08/17/22 0814   Post-Procedure Depth (cm) 0.2 cm 08/17/22 0814   Post-Procedure Surface Area (cm^2) 5.52 cm^2 08/17/22 0814   Post-Procedure Volume (cm^3) 1.104 cm^3 08/17/22 0814   Wound Assessment Granulation tissue;Fibrin 08/17/22 0742   Drainage Amount Moderate 08/17/22 0742   Drainage Description Yellow 08/17/22 0742   Odor None 08/17/22 0742   Melany-wound Assessment Dry/flaky 08/17/22 0742   Number of days: 154 Procedure Note  Indications:  Based on my examination of this patient's wound(s)/ulcer(s) today, debridement is required to promote healing and evaluate the wound base. Performed by: Lita Kenyon MD    Consent obtained:  Yes    Time out taken:  Yes    Pain Control: Anesthetic  Anesthetic: 4% Lidocaine Liquid Topical     Debridement:Excisional Debridement    Using curette the wound(s)/ulcer(s) was/were sharply debrided down through and including the removal of epidermis, dermis, and subcutaneous tissue. Devitalized Tissue Debrided:  fibrin, biofilm, slough, and exudate to stimulate bleeding to promote healing, post debridement good bleeding base and wound edges noted    Wound/Ulcer #: 1 and 2    Percent of Wound/Ulcer Debrided: 70%    Total Surface Area Debrided:  20 sq cm     Estimated Blood Loss:  Minimal  Hemostasis Achieved:  by pressure    Procedural Pain:  7 / 10   Post Procedural Pain:  6 / 10     Response to treatment:  Well tolerated by patient. Plan:   Treatment Note please see attached Discharge Instructions    Written patient dismissal instructions given to patient and signed by patient or POA. Discharge Instructions         Visit Discharge/Physician Orders     Discharge condition: Stable     Assessment of pain at discharge: yes     Anesthetic used: 4% lidocaine solution     Discharge to: Home     Left via:Private automobile     Accompanied by:self     ECF/HHA: n/a     Dressing Orders:LEFT MEDIAL and LATERAL LOWER LEG-Cleanse with normal saline, apply aquacel ag,  foam around site, ABD pad and profore wrap. Change weekly. Treatment Orders: Eat a diet high in protein and vitamin C. Take a multiple vitamin daily unless contraindicated. To see Dr. Kristi Marmolejo as scheduled      Must wear slip on shoe to work due to wrap on leg!        [facility] followup visit : 2 weeks D/T out patient surgical debridement_____________________________  (Please note your next appointment above and if you are unable to keep, kindly give a 24 hour notice. Thank you.)     Physician signature:__________________________     If you experience any of the following, please call the [facility] during business hours:     * Increase in Pain  * Temperature over 101  * Increase in drainage from your wound  * Drainage with a foul odor  * Bleeding  * Increase in swelling  * Need for compression bandage changes due to slippage, breakthrough drainage. If you need medical attention outside of the business hours of the [facility] please contact your PCP or go to the nearest emergency room.                                                               Electronically signed by Lyudmila Sotelo MD on 8/17/2022 at 8:22 AM

## 2022-08-17 NOTE — TELEPHONE ENCOUNTER
Scheduled debridement (L) LE with possible advanced skin therapy, possible Unna boot at [facility]) 8/24 at 12:00 pm, pt notified.

## 2022-08-19 RX ORDER — ACYCLOVIR 200 MG/1
CAPSULE ORAL DAILY
COMMUNITY
End: 2022-08-31 | Stop reason: SDUPTHER

## 2022-08-19 NOTE — PROGRESS NOTES
[address] [facility] PRE-ADMISSION TESTING GENERAL INSTRUCTIONS- [facility]-phone number:[phone]    GENERAL INSTRUCTIONS  [x] Antibacterial Soap shower Night before and/or AM of Surgery  [] Rey wipe instruction sheet and wipes given. [x] Nothing by mouth after midnight, including gum, candy, mints, or water. [x] You may brush your teeth, gargle, but do NOT swallow water. []Hibiclens shower  the night before and the morning of surgery. Do not use             Hibiclens on your face or head. [x]No smoking, chewing tobacco, illegal drugs, or alcohol within 24 hours of your surgery. [x] Jewelry, valuables or body piercing's should not be brought to the hospital. All body and/or tongue piercing's must be removed prior to arriving to hospital.  ALL hair pins must be removed. [x] Do not wear makeup, lotions, powders, deodorant. Nail polish as directed by the nurse. [x] Arrange transportation with a responsible adult  to and from the hospital. If you do not have a responsible adult  to transport you, you will need to make arrangements with a medical transportation company (i.e. [employer]. A Uber/taxi/bus is not appropriate unless you are accompanied by a responsible adult ). Arrange for someone to be with you for the remainder of the day and for 24 hours after your procedure due to having had anesthesia. Who will be your  for transportation?________david__________   Who will be staying with you for 24 hrs after your procedure?___________david_______  [x] Bring insurance card and photo ID.  [] Transfusion Bracelet: Please bring with you to hospital, day of surgery  [] Bring urine specimen day of surgery. Any small container is acceptable. [] Use inhalers the morning of surgery and bring with you to hospital.  [] Bring copy of living will or healthcare power of  papers to be placed in your electronic record.   [] CPAP/BI-PAP: Please bring your machine if you are to spend the night in the hospital.     PARKING INSTRUCTIONS:   [x] Arrival Time:_____1000________  [x] Parking lot '\"I\"  is located on [facility] (the corner of [facility] and [facility]). To enter, press the button and the gate will lift. A free token will be provided to exit the lot. One car per patient is allowed to park in this lot. All other cars are to park on [address] [place] [address] either in the parking garage or the handicap lot. [] To reach the [facility] lobby from [address] [place] [address], upon entering the hospital, take elevator B to the 3rd floor. EDUCATION INSTRUCTIONS:      [] Knee or hip replacement booklet & exercise pamphlets given. [] [address] placed in chart. [] Pre-admission Testing educational folder given  [] Incentive Spirometry,coughing & deep breathing exercises reviewed. []Medication information sheet(s)   []Fluoroscopy-Xray used in surgery reviewed with patient. Educational pamphlet placed in chart. []Pain: Post-op pain is normal and to be expected. You will be asked to rate your pain from 0-10(a zero is not acceptable-education is needed). Your post-op pain goal is:  [] Ask your nurse for your pain medication. [] Joint camp offered. [] Joint replacement booklets given. [] Other:___________________________    MEDICATION INSTRUCTIONS:   [x]Bring a complete list of your medications, please write the last time you took the medicine, give this list to the nurse. [x] Take the following medications the morning of surgery with 1-2 ounces of water: see med sheet  [] Stop herbal supplements and vitamins 5 days before your surgery. [] DO NOT take any diabetic medicine the morning of surgery. Follow instructions for insulin the day before surgery. [] If you are diabetic and your blood sugar is low or you feel symptomatic, you may drink 1-2 ounces of apple juice or take a glucose tablet.   The morning of your procedure, you may call the pre-op area if you have concerns about your blood sugar [phone]. [] Use your inhalers the morning of surgery. Bring your emergency inhaler with you day of surgery. [] Follow physician instructions regarding any blood thinners you may be taking. WHAT TO EXPECT:  [x] The day of surgery you will be greeted and checked in by the [employer].  In addition, you will be registered in the [place] by a Patient Access Representative. Please bring your photo ID and insurance card. A nurse will greet you in accordance to the time you are needed in the pre-op area to prepare you for surgery. Please do not be discouraged if you are not greeted in the order you arrive as there are many variables that are involved in patient preparation. Your patience is greatly appreciated as you wait for your nurse. Please bring in items such as: books, magazines, newspapers, electronics, or any other items  to occupy your time in the waiting area. []  Delays may occur with surgery and staff will make a sincere effort to keep you informed of delays. If any delays occur with your procedure, we apologize ahead of time for your inconvenience as we recognize the value of your time.

## 2022-08-24 ENCOUNTER — ANESTHESIA (OUTPATIENT)
Dept: OPERATING ROOM | Age: 67
End: 2022-08-24
Payer: MEDICAID

## 2022-08-24 ENCOUNTER — ANESTHESIA EVENT (OUTPATIENT)
Dept: OPERATING ROOM | Age: 67
End: 2022-08-24
Payer: MEDICAID

## 2022-08-24 ENCOUNTER — HOSPITAL ENCOUNTER (OUTPATIENT)
Age: 67
Setting detail: OUTPATIENT SURGERY
Discharge: HOME | End: 2022-08-24
Attending: SURGERY | Admitting: SURGERY
Payer: MEDICAID

## 2022-08-24 VITALS
RESPIRATION RATE: 18 BRPM | OXYGEN SATURATION: 95 % | SYSTOLIC BLOOD PRESSURE: 181 MMHG | TEMPERATURE: 49.3 F | HEIGHT: 68 IN | HEART RATE: 64 BPM | WEIGHT: 170 LBS | BODY MASS INDEX: 25.76 KG/M2 | DIASTOLIC BLOOD PRESSURE: 86 MMHG

## 2022-08-24 LAB
HCT VFR BLD CALC: 28 % (ref 34–48)
HEMOGLOBIN: 8.2 G/DL (ref 11.5–15.5)
INR BLD: 1.1
MCH RBC QN AUTO: 21.5 PG (ref 26–35)
MCHC RBC AUTO-ENTMCNC: 29.3 % (ref 32–34.5)
MCV RBC AUTO: 73.3 FL (ref 80–99.9)
PDW BLD-RTO: 19.3 FL (ref 11.5–15)
PLATELET # BLD: 350 E9/L (ref 130–450)
PMV BLD AUTO: 10.6 FL (ref 7–12)
PROTHROMBIN TIME: 11.9 SEC (ref 9.3–12.4)
RBC # BLD: 3.82 E12/L (ref 3.5–5.5)
WBC # BLD: 6.9 E9/L (ref 4.5–11.5)

## 2022-08-24 PROCEDURE — 2580000003 HC RX 258: Performed by: PHYSICIAN ASSISTANT

## 2022-08-24 PROCEDURE — 6370000000 HC RX 637 (ALT 250 FOR IP)

## 2022-08-24 PROCEDURE — 3600000012 HC SURGERY LEVEL 2 ADDTL 15MIN: Performed by: SURGERY

## 2022-08-24 PROCEDURE — 3700000001 HC ADD 15 MINUTES (ANESTHESIA): Performed by: SURGERY

## 2022-08-24 PROCEDURE — 6360000002 HC RX W HCPCS: Performed by: PHYSICIAN ASSISTANT

## 2022-08-24 PROCEDURE — 2580000003 HC RX 258: Performed by: NURSE ANESTHETIST, CERTIFIED REGISTERED

## 2022-08-24 PROCEDURE — 6360000002 HC RX W HCPCS: Performed by: NURSE ANESTHETIST, CERTIFIED REGISTERED

## 2022-08-24 PROCEDURE — 2709999900 HC NON-CHARGEABLE SUPPLY: Performed by: SURGERY

## 2022-08-24 PROCEDURE — 3600000002 HC SURGERY LEVEL 2 BASE: Performed by: SURGERY

## 2022-08-24 PROCEDURE — 36415 COLL VENOUS BLD VENIPUNCTURE: CPT

## 2022-08-24 PROCEDURE — 15271 SKIN SUB GRAFT TRNK/ARM/LEG: CPT | Performed by: SURGERY

## 2022-08-24 PROCEDURE — 7100000011 HC PHASE II RECOVERY - ADDTL 15 MIN: Performed by: SURGERY

## 2022-08-24 PROCEDURE — 85027 COMPLETE CBC AUTOMATED: CPT

## 2022-08-24 PROCEDURE — 7100000010 HC PHASE II RECOVERY - FIRST 15 MIN: Performed by: SURGERY

## 2022-08-24 PROCEDURE — 85610 PROTHROMBIN TIME: CPT

## 2022-08-24 PROCEDURE — 3700000000 HC ANESTHESIA ATTENDED CARE: Performed by: SURGERY

## 2022-08-24 RX ORDER — FENTANYL CITRATE 50 UG/ML
INJECTION, SOLUTION INTRAMUSCULAR; INTRAVENOUS PRN
Status: DISCONTINUED | OUTPATIENT
Start: 2022-08-24 | End: 2022-08-24 | Stop reason: SDUPTHER

## 2022-08-24 RX ORDER — SODIUM CHLORIDE 0.9 % (FLUSH) 0.9 %
5-40 SYRINGE (ML) INJECTION EVERY 12 HOURS SCHEDULED
Status: DISCONTINUED | OUTPATIENT
Start: 2022-08-24 | End: 2022-08-24 | Stop reason: HOSPADM

## 2022-08-24 RX ORDER — HYDROMORPHONE HCL 110MG/55ML
PATIENT CONTROLLED ANALGESIA SYRINGE INTRAVENOUS PRN
Status: DISCONTINUED | OUTPATIENT
Start: 2022-08-24 | End: 2022-08-24 | Stop reason: SDUPTHER

## 2022-08-24 RX ORDER — OXYCODONE HYDROCHLORIDE AND ACETAMINOPHEN 5; 325 MG/1; MG/1
TABLET ORAL
Status: COMPLETED
Start: 2022-08-24 | End: 2022-08-24

## 2022-08-24 RX ORDER — SODIUM CHLORIDE 0.9 % (FLUSH) 0.9 %
5-40 SYRINGE (ML) INJECTION PRN
Status: DISCONTINUED | OUTPATIENT
Start: 2022-08-24 | End: 2022-08-24 | Stop reason: HOSPADM

## 2022-08-24 RX ORDER — SODIUM CHLORIDE 9 MG/ML
INJECTION, SOLUTION INTRAVENOUS PRN
Status: DISCONTINUED | OUTPATIENT
Start: 2022-08-24 | End: 2022-08-24 | Stop reason: HOSPADM

## 2022-08-24 RX ORDER — SODIUM CHLORIDE 9 MG/ML
INJECTION, SOLUTION INTRAVENOUS CONTINUOUS PRN
Status: DISCONTINUED | OUTPATIENT
Start: 2022-08-24 | End: 2022-08-24 | Stop reason: SDUPTHER

## 2022-08-24 RX ORDER — MIDAZOLAM HYDROCHLORIDE 1 MG/ML
INJECTION INTRAMUSCULAR; INTRAVENOUS PRN
Status: DISCONTINUED | OUTPATIENT
Start: 2022-08-24 | End: 2022-08-24 | Stop reason: SDUPTHER

## 2022-08-24 RX ORDER — OXYCODONE HYDROCHLORIDE AND ACETAMINOPHEN 5; 325 MG/1; MG/1
1 TABLET ORAL
Status: DISCONTINUED | OUTPATIENT
Start: 2022-08-24 | End: 2022-08-24 | Stop reason: HOSPADM

## 2022-08-24 RX ORDER — SODIUM CHLORIDE 9 MG/ML
INJECTION, SOLUTION INTRAVENOUS CONTINUOUS
Status: DISCONTINUED | OUTPATIENT
Start: 2022-08-24 | End: 2022-08-24 | Stop reason: HOSPADM

## 2022-08-24 RX ORDER — PROPOFOL 10 MG/ML
INJECTION, EMULSION INTRAVENOUS CONTINUOUS PRN
Status: DISCONTINUED | OUTPATIENT
Start: 2022-08-24 | End: 2022-08-24 | Stop reason: SDUPTHER

## 2022-08-24 RX ADMIN — SODIUM CHLORIDE: 9 INJECTION, SOLUTION INTRAVENOUS at 11:08

## 2022-08-24 RX ADMIN — OXYCODONE AND ACETAMINOPHEN 1 TABLET: 5; 325 TABLET ORAL at 13:55

## 2022-08-24 RX ADMIN — MIDAZOLAM 2 MG: 1 INJECTION INTRAMUSCULAR; INTRAVENOUS at 12:13

## 2022-08-24 RX ADMIN — PROPOFOL 50 MG: 10 INJECTION, EMULSION INTRAVENOUS at 12:35

## 2022-08-24 RX ADMIN — CEFAZOLIN 2000 MG: 2 INJECTION, POWDER, FOR SOLUTION INTRAMUSCULAR; INTRAVENOUS at 12:07

## 2022-08-24 RX ADMIN — FENTANYL CITRATE 50 MCG: 50 INJECTION, SOLUTION INTRAMUSCULAR; INTRAVENOUS at 12:09

## 2022-08-24 RX ADMIN — PROPOFOL 50 MG: 10 INJECTION, EMULSION INTRAVENOUS at 12:38

## 2022-08-24 RX ADMIN — HYDROMORPHONE HYDROCHLORIDE 1 MG: 2 INJECTION, SOLUTION INTRAMUSCULAR; INTRAVENOUS; SUBCUTANEOUS at 13:01

## 2022-08-24 RX ADMIN — FENTANYL CITRATE 50 MCG: 50 INJECTION, SOLUTION INTRAMUSCULAR; INTRAVENOUS at 12:04

## 2022-08-24 RX ADMIN — SODIUM CHLORIDE: 9 INJECTION, SOLUTION INTRAVENOUS at 11:59

## 2022-08-24 RX ADMIN — PROPOFOL 100 MCG/KG/MIN: 10 INJECTION, EMULSION INTRAVENOUS at 12:13

## 2022-08-24 RX ADMIN — PROPOFOL 50 MG: 10 INJECTION, EMULSION INTRAVENOUS at 12:44

## 2022-08-24 RX ADMIN — MIDAZOLAM 2 MG: 1 INJECTION INTRAMUSCULAR; INTRAVENOUS at 12:00

## 2022-08-24 ASSESSMENT — PAIN - FUNCTIONAL ASSESSMENT
PAIN_FUNCTIONAL_ASSESSMENT: PREVENTS OR INTERFERES SOME ACTIVE ACTIVITIES AND ADLS
PAIN_FUNCTIONAL_ASSESSMENT: 0-10

## 2022-08-24 ASSESSMENT — PAIN DESCRIPTION - ORIENTATION
ORIENTATION: LEFT
ORIENTATION: LEFT;LOWER

## 2022-08-24 ASSESSMENT — PAIN SCALES - GENERAL
PAINLEVEL_OUTOF10: 6
PAINLEVEL_OUTOF10: 3

## 2022-08-24 ASSESSMENT — PAIN DESCRIPTION - LOCATION
LOCATION: LEG
LOCATION: LEG

## 2022-08-24 ASSESSMENT — PAIN DESCRIPTION - ONSET: ONSET: ON-GOING

## 2022-08-24 ASSESSMENT — PAIN DESCRIPTION - DESCRIPTORS
DESCRIPTORS: DISCOMFORT;STABBING
DESCRIPTORS: STABBING

## 2022-08-24 ASSESSMENT — PAIN DESCRIPTION - FREQUENCY: FREQUENCY: CONTINUOUS

## 2022-08-24 NOTE — OP NOTE
Operative Note      Patient: Savi Daly  YOB: 1955  MRN: [id number]    Date of Procedure: 8/24/2022    Pre-Op Diagnosis: L LE wound    Post-Op Diagnosis: Same       Procedures :   L LE excisional debridement with unna boot application  Application of amniofix 2x12    Calf wounds   Width (cm) Length (cm) Depth (cm)   L medial calf 6 4 0.5   L lateral calf 2 2 0.5           Surgeon(s):  Spenser Beaver MD    Assistant:   * No surgical staff found *    Anesthesia: Monitor Anesthesia Care    Estimated Blood Loss (mL): Minimal    Complications: None    DESCRIPTION OF PROCEDURE: The patient was identified and the procedure was confirmed. The wound and surrounding area was cleaned, and draped. Lidocaine gel soaked gauze was applied. It was subsequently removed. With the patient in supine position, the wound was debrided sharply of fibrotic, necrotic, and hyperkeratotic tissue, including a layer of surrounding healthy tissue using a curette for a total surface area of > 20 cm2. The skin was excised through the level of the subcutaneous tissue. 100%% of the wound was debrided. Wound was copiously irrigated with normal saline solution. There was minimal bleeding that was controlled with pressure. The wound was deemed appropriate for amniofirx and it was prepped per 's instructions. The product was applied to the wound and than secure with mepitel one, steri srips, aquacell, abdominal pad. 100% of the graft was used. Unna boot and than [place] was applied. Needle, sponge and instrument counts were reported as correct x2. The patient tolerated the procedure and was transferred to the recovery area in satisfactory condition.       Electronically signed by Spenser Beaver MD on 8/24/2022 at 1:18 PM

## 2022-08-24 NOTE — H&P
Bag at 08/24/22 1108    sodium chloride flush 0.9 % injection 5-40 mL, 5-40 mL, IntraVENous, 2 times per day, Concepcion Alfred PA-C    sodium chloride flush 0.9 % injection 5-40 mL, 5-40 mL, IntraVENous, PRN, Concepcion Alfred PA-C    0.9 % sodium chloride infusion, , IntraVENous, PRN, Concepcion Alfred PA-C    ceFAZolin (ANCEF) 2,000 mg in sterile water 20 mL IV syringe, 2,000 mg, IntraVENous, On Call to OR, Concepcion Alfred PA-C  Allergies:  Bee pollen and Tetracyclines & related  Social History     Socioeconomic History    Marital status: Single     Spouse name: Not on file    Number of children: Not on file    Years of education: Not on file    Highest education level: Not on file   Occupational History    Not on file   Tobacco Use    Smoking status: Never    Smokeless tobacco: Never   Vaping Use    Vaping Use: Never used   Substance and Sexual Activity    Alcohol use: No    Drug use: No    Sexual activity: Not on file   Other Topics Concern    Not on file   Social History Narrative    Not on file     Social Determinants of Health     Financial Resource Strain: Low Risk     Difficulty of Paying Living Expenses: Not hard at all   Food Insecurity: No Food Insecurity    Worried About Running Out of Food in the Last Year: Never true    Ran Out of Food in the Last Year: Never true   Transportation Needs: Not on file   Physical Activity: Not on file   Stress: Not on file   Social Connections: Not on file   Intimate Partner Violence: Not on file   Housing Stability: Not on file     Family History   Problem Relation Age of Onset    Diabetes Mother     Hypertension Mother     Heart Disease Father     Heart Attack Father     Heart Surgery Father         angioplasty    Stroke Father     High Cholesterol Father     Heart Attack Maternal Grandfather        REVIEW OF SYSTEMS:  The chart was reviewed.     PHYSICAL EXAM:    Vitals:    08/24/22 1047   BP: (!) 175/81   Pulse: 76   Resp: 20   Temp: 98.4 °F (36.9 °C)   SpO2: 99% CONSTITUTIONAL:  awake, alert, cooperative, no apparent distress, and appears stated age  NECK:  supple, symmetrical, trachea midline  LUNGS:  no increased work of breathing, good resp excursion  CARDIOVASCULAR:  S1S2  ABDOMEN:  soft, non-distended and non-tender  Chronic wound of the l eft lower extrmeity    LABS:    Lab Results   Component Value Date    WBC 6.9 08/24/2022    HGB 8.2 (L) 08/24/2022    HCT 28.0 (L) 08/24/2022     08/24/2022    PROTIME 11.9 08/24/2022    INR 1.1 08/24/2022    APTT 31.7 11/14/2012    K 4.3 06/01/2022    BUN 13 06/01/2022    CREATININE 0.7 06/01/2022       RADIOLOGY:    Miguel Horvath MD

## 2022-08-24 NOTE — DISCHARGE INSTRUCTIONS
Return 8/26 to work Friday    Local Wound Care Instructions  Keep unna boot in place until seen in wound center       [facility] may be drowsy or lightheaded after receiving sedation or anesthesia. A responsible person should be with you for the next 24 hours. Please follow the instructions checked below:    DIET INSTRUCTIONS:  [x]Start with light diet and progress to your normal diet as you feel like eating. If you experience nausea or repeated episodes of vomiting which persist beyond 12-24 hours, notify your doctor. []Other     ACTIVITY INSTRUCTIONS:  [x]Rest today. Increase activity as tolerated    []Elevate operative limb   [x]No heavy lifting or strenuous activity     [x]No driving  []Other     WOUND/DRESSING INSTRUCTIONS:  Always ensure you and your care giver clean hands before and after caring for the wound. []May shower      []May bathe      []Other         MEDICATION INSTRUCTIONS:    []Prescriptions sent with you. Use as directed. When taking pain medications, you may experience dizziness or drowsiness. Do not drink alcohol or drive when taking these medications. []You may take a non-prescription headache remedy, preferably one that does not contain aspirin. Other Instructions:      FOLLOW-UP CARE:  []Call the office at [phone] fo  Call physician if they or any other problems occur:  Fever over 101°    Redness, swelling, hardness or warmth at the operative site  Unrelieved nausea    Foul smelling or cloudy drainage at the operative site   Unrelieved pain    Blood soaked dressing.  (Some oozing may be normal)

## 2022-08-24 NOTE — ANESTHESIA POSTPROCEDURE EVALUATION
Department of Anesthesiology  Postprocedure Note    Patient: Olivia Chatterjee  MRN: [id number]  YOB: 1955  Date of evaluation: 8/24/2022      Procedure Summary     Date: 08/24/22 Room / Location[place] [facility]    Anesthesia Start: 4189 Anesthesia Stop: 3056    Procedure: LEFT LOWER EXTREMITY DEBRIDMENT WITH POSSIBLE ADVANCED SKIN THERAPY, POSSIBLE Norbert Levo (Left) Diagnosis:       Atherosclerosis of native artery of lower extremity with ulceration of calf, unspecified laterality ([facility] [address]      (LOWER EXTREMITY ATHEROSCLEROSIS)    Surgeons: Nadine Nicole MD Responsible Provider: Katarzyna Noel MD    Anesthesia Type: MAC ASA Status: 3          Anesthesia Type: No value filed.     Gamaliel Phase I: Gamaliel Score: 10    Gamaliel Phase II: Gamaliel Score: 10      Anesthesia Post Evaluation    Patient location during evaluation: PACU  Patient participation: complete - patient participated  Level of consciousness: awake and alert  Airway patency: patent  Nausea & Vomiting: no nausea and no vomiting  Complications: no  Cardiovascular status: hemodynamically stable  Respiratory status: acceptable  Hydration status: euvolemic  Multimodal analgesia pain management approach

## 2022-08-24 NOTE — ANESTHESIA PRE PROCEDURE
Department of Anesthesiology  Preprocedure Note       Name:  Rosalba Koroma   Age:  61 y.o.  :  1955                                          MRN:  [id number]         Date:  2022      Surgeon: Valentina Worthington):  Clau Nath MD    Procedure: Procedure(s):  LEFT LOWER EXTREMITY DEBRIDMENT WITH POSSIBLE ADVANCED SKIN THERAPY, POSSIBLE UNNA BOOT    Medications prior to admission:   Prior to Admission medications    Medication Sig Start Date End Date Taking? Authorizing Provider   acyclovir (ZOVIRAX) 200 MG capsule Take by mouth daily   Yes Historical Provider, MD   oxyCODONE-acetaminophen (PERCOCET) 5-325 MG per tablet Take 1 tablet by mouth every 4 hours as needed for Pain.     Historical Provider, MD   butalbital-acetaminophen-caffeine (FIORICET, ESGIC) -05 MG per tablet Take 1 tablet by mouth 3 times daily as needed for Headaches or Migraine Indications: Cluster Headache 3/2/22 9/2/22  Elaine Al MD   omeprazole (PRILOSEC) 40 MG delayed release capsule Take 1 capsule by mouth daily 3/2/22 3/2/23  Elaine Al MD   hydrOXYzine (ATARAX) 25 MG tablet take 1 tablet BID 3/2/22 9/2/22  Elaine Al MD   aspirin-acetaminophen-caffeine (EXCEDRIN MIGRAINE) 174-333-92 MG per tablet Take 1 tablet by mouth as needed for Headaches 3/2/22   Elaine Al MD   EPINEPHrine [facility]) 0.3 MG/0.3ML SOAJ injection Use as directed for allergic reaction 21   Elaine Al MD       Current medications:    Current Facility-Administered Medications   Medication Dose Route Frequency Provider Last Rate Last Admin    0.9 % sodium chloride infusion   IntraVENous Continuous Concepcion Alfred PA-C 75 mL/hr at 22 1108 New Bag at 22 1108    sodium chloride flush 0.9 % injection 5-40 mL  5-40 mL IntraVENous 2 times per day Concepcion Alfred PA-C        sodium chloride flush 0.9 % injection 5-40 mL  5-40 mL IntraVENous PRN Lakeshia Adams PA-C  0.9 % sodium chloride infusion   IntraVENous PRN Concepcion lAfred PA-C        ceFAZolin (ANCEF) 2,000 mg in sterile water 20 mL IV syringe  2,000 mg IntraVENous On Call to [facility] MALATHI Rodriguez           Allergies:     Allergies   Allergen Reactions    Bee Pollen Anaphylaxis    Tetracyclines & Related Hives       Problem List:    Patient Active Problem List   Diagnosis Code    Chronic cluster headache, not intractable G44.029    Migraine G43.909    GERD (gastroesophageal reflux disease) K21.9    H/O peptic ulcer Z87.11    Herpes dermatitis B00.89    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, F51.05    Encounter for monitoring long-term proton pump inhibitor therapy Z51.81, Z79.899    Herpes suppression B00.9    Venous stasis ulcer of left ankle with fat layer exposed with varicose veins (HCC) I83.023, L97.322    Encounter for medication refill Z76.0    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) I70.25    PVD (peripheral vascular disease) (HCC) I73.9       Past Medical History:        Diagnosis Date    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) 5/18/2022    Dizziness - light-headed     GERD (gastroesophageal reflux disease)     Herpes dermatitis 4/27/2017    Insomnia secondary to anxiety 4/6/2018    Lightheadedness     Migraine     Skin ulcer of buttock with fat layer exposed ([address] 7/20/2016    Venous stasis ulcer of left ankle with fat layer exposed with varicose veins ([facility] [address] 2/2/2022       Past Surgical History:        Procedure Laterality Date    CHOLECYSTECTOMY, LAPAROSCOPIC  04/08/2014    TONSILLECTOMY  1960    1960s    UPPER GASTROINTESTINAL ENDOSCOPY  12/13/2013    mild gastritis and small hiatal hernia, Dr Denise Tovar, [address]  2015    GERD, Dr. Mookie Nuno, office       Social History:    Social History     Tobacco Use    Smoking status: Never    Smokeless tobacco: Never   Substance Use Topics    Alcohol use: No                                Counseling given: Not Answered      Vital Signs (Current):   Vitals:    08/19/22 1116 08/24/22 1047   BP:  (!) 175/81   Pulse:  76   Resp:  20   Temp:  36.9 °C (98.4 °F)   TempSrc:  Temporal   SpO2:  99%   Weight: 170 lb (77.1 kg) 170 lb (77.1 kg)   Height: 5' 8\" (1.727 m) 5' 8\" (1.727 m)                                              BP Readings from Last 3 Encounters:   08/24/22 (!) 175/81   08/17/22 122/72   08/10/22 (!) 146/76       NPO Status: Time of last liquid consumption: 2100                        Time of last solid consumption: 1700                        Date of last liquid consumption: 08/23/22                        Date of last solid food consumption: 08/23/22    BMI:   Wt Readings from Last 3 Encounters:   08/24/22 170 lb (77.1 kg)   08/10/22 160 lb (72.6 kg)   07/27/22 160 lb (72.6 kg)     Body mass index is 25.85 kg/m². CBC:   Lab Results   Component Value Date/Time    WBC 6.0 06/01/2022 09:50 AM    RBC 3.12 06/01/2022 09:50 AM    HGB 6.8 06/01/2022 09:50 AM    HCT 24.1 06/01/2022 09:50 AM    MCV 77.2 06/01/2022 09:50 AM    RDW 17.9 06/01/2022 09:50 AM     06/01/2022 09:50 AM       CMP:   Lab Results   Component Value Date/Time     06/01/2022 09:50 AM    K 4.3 06/01/2022 09:50 AM    K 4.0 05/24/2022 12:25 PM     06/01/2022 09:50 AM    CO2 24 06/01/2022 09:50 AM    BUN 13 06/01/2022 09:50 AM    CREATININE 0.7 06/01/2022 09:50 AM    GFRAA >60 06/01/2022 09:50 AM    LABGLOM >60 06/01/2022 09:50 AM    GLUCOSE 90 06/01/2022 09:50 AM    PROT 7.0 06/01/2022 09:50 AM    CALCIUM 8.3 06/01/2022 09:50 AM    BILITOT <0.2 06/01/2022 09:50 AM    ALKPHOS 88 06/01/2022 09:50 AM    AST 20 06/01/2022 09:50 AM    ALT 13 06/01/2022 09:50 AM       POC Tests: No results for input(s): POCGLU, POCNA, POCK, POCCL, POCBUN, POCHEMO, POCHCT in the last 72 hours.     Coags:   Lab Results   Component Value Date/Time    PROTIME 11.9 05/24/2022 12:25 PM    INR 1.1 05/24/2022 12:25 PM    APTT 31.7 11/14/2012 12:01 AM       HCG (If Applicable):   Lab Results   Component Value Date    PREGTESTUR NEGATIVE 11/17/2013        ABGs: No results found for: PHART, PO2ART, HCE4YLH, WJR7KCN, BEART, O9KZTEWC     Type & Screen (If Applicable):  No results found for: LABABO, LABRH    Drug/Infectious Status (If Applicable):  No results found for: HIV, HEPCAB    COVID-19 Screening (If Applicable): No results found for: COVID19        Anesthesia Evaluation  Patient summary reviewed and Nursing notes reviewed  Airway: Mallampati: II  TM distance: >3 FB   Neck ROM: full  Mouth opening: > = 3 FB   Dental:    (+) upper dentures      Pulmonary:Negative Pulmonary ROS and normal exam  breath sounds clear to auscultation                             Cardiovascular:Negative CV ROS                      Neuro/Psych:               GI/Hepatic/Renal:        GERD:  pt takes omeprazole, well controlled. Endo/Other: Negative Endo/Other ROS                    Abdominal:             Vascular: Other Findings:           Anesthesia Plan      MAC     ASA 3             Anesthetic plan and risks discussed with patient. Plan discussed with CRNA and attending.                     [address], APRN - CRNA   8/24/2022

## 2022-08-31 ENCOUNTER — OFFICE VISIT (OUTPATIENT)
Dept: FAMILY MEDICINE CLINIC | Age: 67
End: 2022-08-31
Payer: MEDICAID

## 2022-08-31 ENCOUNTER — HOSPITAL ENCOUNTER (OUTPATIENT)
Dept: WOUND CARE | Age: 67
Discharge: HOME | End: 2022-08-31
Payer: MEDICAID

## 2022-08-31 VITALS
SYSTOLIC BLOOD PRESSURE: 140 MMHG | WEIGHT: 180.6 LBS | HEIGHT: 68 IN | BODY MASS INDEX: 27.37 KG/M2 | DIASTOLIC BLOOD PRESSURE: 76 MMHG | RESPIRATION RATE: 16 BRPM | TEMPERATURE: 97 F | HEART RATE: 53 BPM | OXYGEN SATURATION: 92 %

## 2022-08-31 VITALS
RESPIRATION RATE: 18 BRPM | WEIGHT: 170 LBS | BODY MASS INDEX: 25.76 KG/M2 | SYSTOLIC BLOOD PRESSURE: 160 MMHG | DIASTOLIC BLOOD PRESSURE: 78 MMHG | HEART RATE: 74 BPM | TEMPERATURE: 97.5 F | HEIGHT: 68 IN

## 2022-08-31 DIAGNOSIS — I73.9 PVD (PERIPHERAL VASCULAR DISEASE) (HCC): ICD-10-CM

## 2022-08-31 DIAGNOSIS — G44.029 CHRONIC CLUSTER HEADACHE, NOT INTRACTABLE: ICD-10-CM

## 2022-08-31 DIAGNOSIS — Z13.1 DIABETES MELLITUS SCREENING: ICD-10-CM

## 2022-08-31 DIAGNOSIS — F51.05 INSOMNIA SECONDARY TO ANXIETY: ICD-10-CM

## 2022-08-31 DIAGNOSIS — Z76.0 ENCOUNTER FOR MEDICATION REFILL: ICD-10-CM

## 2022-08-31 DIAGNOSIS — K21.00 GASTROESOPHAGEAL REFLUX DISEASE WITH ESOPHAGITIS WITHOUT HEMORRHAGE: Primary | ICD-10-CM

## 2022-08-31 DIAGNOSIS — L97.322 VENOUS STASIS ULCER OF LEFT ANKLE WITH FAT LAYER EXPOSED WITH VARICOSE VEINS (HCC): ICD-10-CM

## 2022-08-31 DIAGNOSIS — T63.441A ALLERGIC REACTION TO BEE STING: ICD-10-CM

## 2022-08-31 DIAGNOSIS — B00.89 HERPES DERMATITIS: ICD-10-CM

## 2022-08-31 DIAGNOSIS — G43.909 MIGRAINE WITHOUT STATUS MIGRAINOSUS, NOT INTRACTABLE, UNSPECIFIED MIGRAINE TYPE: ICD-10-CM

## 2022-08-31 DIAGNOSIS — F41.9 INSOMNIA SECONDARY TO ANXIETY: ICD-10-CM

## 2022-08-31 DIAGNOSIS — I83.023 VENOUS STASIS ULCER OF LEFT ANKLE WITH FAT LAYER EXPOSED WITH VARICOSE VEINS (HCC): ICD-10-CM

## 2022-08-31 DIAGNOSIS — B00.9 HERPES: ICD-10-CM

## 2022-08-31 DIAGNOSIS — I70.242 ATHEROSCLEROSIS OF NATIVE ARTERY OF LEFT LOWER EXTREMITY WITH ULCERATION OF CALF (HCC): Primary | ICD-10-CM

## 2022-08-31 LAB — HBA1C MFR BLD: 5.1 %

## 2022-08-31 PROCEDURE — G8427 DOCREV CUR MEDS BY ELIG CLIN: HCPCS | Performed by: FAMILY MEDICINE

## 2022-08-31 PROCEDURE — 83036 HEMOGLOBIN GLYCOSYLATED A1C: CPT | Performed by: FAMILY MEDICINE

## 2022-08-31 PROCEDURE — G8419 CALC BMI OUT NRM PARAM NOF/U: HCPCS | Performed by: FAMILY MEDICINE

## 2022-08-31 PROCEDURE — 1036F TOBACCO NON-USER: CPT | Performed by: FAMILY MEDICINE

## 2022-08-31 PROCEDURE — 99213 OFFICE O/P EST LOW 20 MIN: CPT | Performed by: SURGERY

## 2022-08-31 PROCEDURE — 3017F COLORECTAL CA SCREEN DOC REV: CPT | Performed by: FAMILY MEDICINE

## 2022-08-31 PROCEDURE — 99213 OFFICE O/P EST LOW 20 MIN: CPT

## 2022-08-31 PROCEDURE — 99214 OFFICE O/P EST MOD 30 MIN: CPT | Performed by: FAMILY MEDICINE

## 2022-08-31 RX ORDER — GINSENG 100 MG
CAPSULE ORAL ONCE
Status: CANCELLED | OUTPATIENT
Start: 2022-08-31 | End: 2022-08-31

## 2022-08-31 RX ORDER — BACITRACIN ZINC AND POLYMYXIN B SULFATE 500; 1000 [USP'U]/G; [USP'U]/G
OINTMENT TOPICAL ONCE
Status: CANCELLED | OUTPATIENT
Start: 2022-08-31 | End: 2022-08-31

## 2022-08-31 RX ORDER — LIDOCAINE 50 MG/G
OINTMENT TOPICAL ONCE
Status: CANCELLED | OUTPATIENT
Start: 2022-08-31 | End: 2022-08-31

## 2022-08-31 RX ORDER — LIDOCAINE HYDROCHLORIDE 40 MG/ML
SOLUTION TOPICAL ONCE
Status: CANCELLED | OUTPATIENT
Start: 2022-08-31 | End: 2022-08-31

## 2022-08-31 RX ORDER — LIDOCAINE 40 MG/G
CREAM TOPICAL ONCE
Status: CANCELLED | OUTPATIENT
Start: 2022-08-31 | End: 2022-08-31

## 2022-08-31 RX ORDER — PANTOPRAZOLE SODIUM 40 MG/1
40 TABLET, DELAYED RELEASE ORAL
Qty: 90 TABLET | Refills: 3 | Status: SHIPPED | OUTPATIENT
Start: 2022-08-31 | End: 2023-08-31

## 2022-08-31 RX ORDER — GENTAMICIN SULFATE 1 MG/G
OINTMENT TOPICAL ONCE
Status: CANCELLED | OUTPATIENT
Start: 2022-08-31 | End: 2022-08-31

## 2022-08-31 RX ORDER — CLOBETASOL PROPIONATE 0.5 MG/G
OINTMENT TOPICAL ONCE
Status: CANCELLED | OUTPATIENT
Start: 2022-08-31 | End: 2022-08-31

## 2022-08-31 RX ORDER — BACITRACIN, NEOMYCIN, POLYMYXIN B 400; 3.5; 5 [USP'U]/G; MG/G; [USP'U]/G
OINTMENT TOPICAL ONCE
Status: CANCELLED | OUTPATIENT
Start: 2022-08-31 | End: 2022-08-31

## 2022-08-31 RX ORDER — LIDOCAINE HYDROCHLORIDE 20 MG/ML
JELLY TOPICAL ONCE
Status: CANCELLED | OUTPATIENT
Start: 2022-08-31 | End: 2022-08-31

## 2022-08-31 RX ORDER — LIDOCAINE HYDROCHLORIDE 40 MG/ML
SOLUTION TOPICAL ONCE
Status: COMPLETED | OUTPATIENT
Start: 2022-08-31 | End: 2022-08-31

## 2022-08-31 RX ORDER — BETAMETHASONE DIPROPIONATE 0.05 %
OINTMENT (GRAM) TOPICAL ONCE
Status: CANCELLED | OUTPATIENT
Start: 2022-08-31 | End: 2022-08-31

## 2022-08-31 RX ORDER — BUTALBITAL, ACETAMINOPHEN AND CAFFEINE 50; 325; 40 MG/1; MG/1; MG/1
1 TABLET ORAL 3 TIMES DAILY PRN
Qty: 90 TABLET | Refills: 5 | Status: SHIPPED | OUTPATIENT
Start: 2022-08-31 | End: 2023-03-02

## 2022-08-31 RX ORDER — ACYCLOVIR 200 MG/1
200 CAPSULE ORAL DAILY
Qty: 90 CAPSULE | Refills: 3 | Status: SHIPPED
Start: 2022-08-31 | End: 2022-09-02 | Stop reason: DRUGHIGH

## 2022-08-31 RX ORDER — EPINEPHRINE 0.3 MG/.3ML
INJECTION SUBCUTANEOUS
Qty: 1 EACH | Refills: 5 | Status: SHIPPED | OUTPATIENT
Start: 2022-08-31 | End: 2023-03-02

## 2022-08-31 RX ORDER — HYDROXYZINE HYDROCHLORIDE 25 MG/1
TABLET, FILM COATED ORAL
Qty: 60 TABLET | Refills: 5 | Status: SHIPPED | OUTPATIENT
Start: 2022-08-31 | End: 2023-03-02

## 2022-08-31 RX ADMIN — LIDOCAINE HYDROCHLORIDE: 40 SOLUTION TOPICAL at 08:08

## 2022-08-31 SDOH — ECONOMIC STABILITY: FOOD INSECURITY: WITHIN THE PAST 12 MONTHS, THE FOOD YOU BOUGHT JUST DIDN'T LAST AND YOU DIDN'T HAVE MONEY TO GET MORE.: NEVER TRUE

## 2022-08-31 SDOH — ECONOMIC STABILITY: FOOD INSECURITY: WITHIN THE PAST 12 MONTHS, YOU WORRIED THAT YOUR FOOD WOULD RUN OUT BEFORE YOU GOT MONEY TO BUY MORE.: NEVER TRUE

## 2022-08-31 ASSESSMENT — ENCOUNTER SYMPTOMS
SHORTNESS OF BREATH: 0
VOMITING: 0
COUGH: 0
WHEEZING: 0
DIARRHEA: 0
SORE THROAT: 0
SPUTUM PRODUCTION: 0
NAUSEA: 0
ABDOMINAL PAIN: 0
BACK PAIN: 0
ORTHOPNEA: 0
DOUBLE VISION: 0
CONSTIPATION: 0
BLOOD IN STOOL: 0
HEARTBURN: 0
BLURRED VISION: 0

## 2022-08-31 ASSESSMENT — PAIN DESCRIPTION - FREQUENCY: FREQUENCY: CONTINUOUS

## 2022-08-31 ASSESSMENT — LIFESTYLE VARIABLES
HOW OFTEN DO YOU HAVE A DRINK CONTAINING ALCOHOL: NEVER
HOW MANY STANDARD DRINKS CONTAINING ALCOHOL DO YOU HAVE ON A TYPICAL DAY: PATIENT DOES NOT DRINK

## 2022-08-31 ASSESSMENT — PAIN DESCRIPTION - ONSET: ONSET: ON-GOING

## 2022-08-31 ASSESSMENT — PATIENT HEALTH QUESTIONNAIRE - PHQ9
SUM OF ALL RESPONSES TO PHQ QUESTIONS 1-9: 0
SUM OF ALL RESPONSES TO PHQ QUESTIONS 1-9: 0
2. FEELING DOWN, DEPRESSED OR HOPELESS: 0
SUM OF ALL RESPONSES TO PHQ9 QUESTIONS 1 & 2: 0
1. LITTLE INTEREST OR PLEASURE IN DOING THINGS: 0
SUM OF ALL RESPONSES TO PHQ QUESTIONS 1-9: 0
SUM OF ALL RESPONSES TO PHQ QUESTIONS 1-9: 0

## 2022-08-31 ASSESSMENT — PAIN SCALES - GENERAL: PAINLEVEL_OUTOF10: 5

## 2022-08-31 ASSESSMENT — PAIN DESCRIPTION - PAIN TYPE: TYPE: CHRONIC PAIN

## 2022-08-31 ASSESSMENT — SOCIAL DETERMINANTS OF HEALTH (SDOH): HOW HARD IS IT FOR YOU TO PAY FOR THE VERY BASICS LIKE FOOD, HOUSING, MEDICAL CARE, AND HEATING?: NOT HARD AT ALL

## 2022-08-31 ASSESSMENT — PAIN DESCRIPTION - ORIENTATION: ORIENTATION: LEFT

## 2022-08-31 ASSESSMENT — PAIN DESCRIPTION - DESCRIPTORS: DESCRIPTORS: ACHING;STABBING

## 2022-08-31 ASSESSMENT — PAIN DESCRIPTION - LOCATION: LOCATION: LEG

## 2022-08-31 NOTE — PROGRESS NOTES
Terrell Ortiz is a 61 y.o. female who presents today for     Chief Complaint   Patient presents with    Medication Refill     Follow up        She has been treated for GERD and migraine/cluster headaches. She continues to experience anxiety frequently; hydroxyzine helps. Since last visit, she has developed vascular insufficiency ulcers requiring debridement surgically with skin grafting (8/24/22). Under the care of Vascular (Dr. Lo Carty). Wounds had been infected that she required a protracted course of IV antibiotics via PICC line. Onset of symptoms started 18-24 months ago. Because of all these vascular issues, patient inquired about being tested for diabetes. She denies signs or symptoms of hyperglycemia or hypoglycemia. She reports that distant family members were diagnosed and treated for diabetes in the past.      Headache  Patient with a history of  headache. Symptoms began about several years ago. Generally, the headaches last about several hours and occur several times per week. The headaches do not seem to be related to any time of day or year. The headaches are usually moderate, dull, sharp and throbbing and are located in global scalp/head. The patient rates her most severe headaches a 8 on a scale from 1 to 10. Recently, the headaches have been stable. Work attendance or other daily activities are not affected by the headaches. Precipitating factors include: cold temperatures, light, stress and weather changes. The headaches are usually not preceded by an aura. Associated neurologic symptoms: vomiting in the early morning. The patient denies decreased physical activity, depression, dizziness, loss of balance, muscle weakness, numbness of extremities, speech difficulties, vision problems and worsening school/work performance.  Home treatment has included acetaminophen, amitriptyline, fioricet, Imitrex oral and Tylenol with codeine, darkening the room, resting and sleeping with no improvement. Other history includes: headaches of unknown type diagnosed in the past. Family history includes no known family members with significant headaches. Fioricet provides the most relief from this medication as opposed to the multiple trial of other medication in the past.  Maximum use would be TID. States she takes it couple times a week. KIT Carmona complains of heartburn. This has been associated with abdominal bloating, bilious reflux, early satiety, midespigastric pain, upper abdominal discomfort and vomiting. She denies belching, belching and eructation, chest pain, choking on food, cough, difficulty swallowing, dysphagia, heartburn, hematemesis, hoarseness, laryngitis, melena, need to clear throat frequently, nocturnal burning, odynophagia, shortness of breath, symptoms primarily relate to meals, and lying down after meals, unexpected weight loss and wheezing. Symptoms have been present for several years. She denies dysphagia. She denies melena, hematochezia, hematemesis, and coffee ground emesis. Medical therapy in the past has included antacids, H2 antagonists and proton pump inhibitors. As long as she is mindful of what/how she eats, she is fine    She is on long term PPI. Herpes Suppression:  On acyclovir. Still taking daily without any recent flare ups. Anxiety:  Main symptom is anxious mood and difficulty sleeping. She has had a lot of stress, including illness and passing of her mother and recent severe illness of her boyfriend, who is still in subacute rehab. She reports that hydroxyzine does seem to work well. After further discussion, agreed to take hydroxyzine 25 mg twice daily as needed anxiety    Health Maintenance:  Yari Rowley is due for breast cancer screening.   Still has not scheduled her mammogram.  Got 1 dose of the J&J since last visit    [facility]:  Patient's past medical, surgical, social and/or family history reviewed, updated in chart, and are icterus. Right eye: No discharge. Conjunctiva/sclera: Conjunctivae normal.      Pupils: Pupils are equal, round, and reactive to light. Neck:      Thyroid: No thyromegaly. Cardiovascular:      Rate and Rhythm: Normal rate and regular rhythm. Heart sounds: Normal heart sounds. No murmur heard. Pulmonary:      Effort: Pulmonary effort is normal. No respiratory distress. Breath sounds: No stridor. No wheezing or rales. Chest:      Chest wall: No tenderness. Abdominal:      General: Bowel sounds are normal. There is no distension. Palpations: Abdomen is soft. There is no mass. Tenderness: There is no abdominal tenderness. There is no guarding. Musculoskeletal:         General: No tenderness. Normal range of motion. Cervical back: Normal range of motion and neck supple. Lymphadenopathy:      Cervical: No cervical adenopathy. Skin:     General: Skin is warm and dry. Coloration: Skin is not pale. Findings: No erythema or rash. Neurological:      Mental Status: She is alert and oriented to person, place, and time. Psychiatric:         Behavior: Behavior normal.         Thought Content: Thought content normal.       Labs:  Lab Results   Component Value Date/Time     06/01/2022 09:50 AM    K 4.3 06/01/2022 09:50 AM    K 4.0 05/24/2022 12:25 PM     06/01/2022 09:50 AM    CO2 24 06/01/2022 09:50 AM    BUN 13 06/01/2022 09:50 AM    CREATININE 0.7 06/01/2022 09:50 AM    PROT 7.0 06/01/2022 09:50 AM    LABALBU 3.7 06/01/2022 09:50 AM    CALCIUM 8.3 06/01/2022 09:50 AM    GFRAA >60 06/01/2022 09:50 AM    LABGLOM >60 06/01/2022 09:50 AM    GLUCOSE 90 06/01/2022 09:50 AM    AST 20 06/01/2022 09:50 AM    ALT 13 06/01/2022 09:50 AM    ALKPHOS 88 06/01/2022 09:50 AM    BILITOT <0.2 06/01/2022 09:50 AM         Assessment / Plan:      Marlo Baker was seen today for medication refill.     Diagnoses and all orders for this visit:    Gastroesophageal reflux disease with esophagitis without hemorrhage: Suboptimal symptom control. Patient reports that omeprazole is no longer effective        -     Discontinue omeprazole  -     Trial pantoprazole (PROTONIX) 40 MG tablet; Take 1 tablet by mouth every morning (before breakfast)    Chronic cluster headache, not intractable: Medication refill  -     butalbital-acetaminophen-caffeine (FIORICET, ESGIC) -40 MG per tablet; Take 1 tablet by mouth 3 times daily as needed for Headaches or Migraine Indications: Cluster Headache    Migraine without status migrainosus, not intractable, unspecified migraine type: Medication refill  -     butalbital-acetaminophen-caffeine (FIORICET, ESGIC) -40 MG per tablet; Take 1 tablet by mouth 3 times daily as needed for Headaches or Migraine Indications: Cluster Headache    PVD (peripheral vascular disease) ([facility]): Under the care of vascular and previously under care of ID. Issues are currently active. Herpes dermatitis: Stable and well-controlled medication refill  -     acyclovir (ZOVIRAX) 200 MG capsule; Take 1 capsule by mouth daily    Herpes suppression: Stable and well-controlled. Medication refill  -     acyclovir (ZOVIRAX) 200 MG capsule; Take 1 capsule by mouth daily    Insomnia secondary to anxiety: Stable and well-controlled. Medication refill  -     hydrOXYzine HCl (ATARAX) 25 MG tablet; take 1 tablet BID    Encounter for medication refill  -     butalbital-acetaminophen-caffeine (FIORICET, ESGIC) -40 MG per tablet; Take 1 tablet by mouth 3 times daily as needed for Headaches or Migraine Indications: Cluster Headache  -     EPINEPHrine (EPIPEN) 0.3 MG/0.3ML SOAJ injection; Use as directed for allergic reaction  -     hydrOXYzine HCl (ATARAX) 25 MG tablet; take 1 tablet BID  -     pantoprazole (PROTONIX) 40 MG tablet; Take 1 tablet by mouth every morning (before breakfast)  -     acyclovir (ZOVIRAX) 200 MG capsule;  Take 1 capsule by mouth daily    Allergic reaction to bee sting  -     EPINEPHrine (EPIPEN) 0.3 MG/0.3ML SOAJ injection; Use as directed for allergic reaction    Diabetes mellitus screening: Per patient request because of ongoing peripheral vascular issues. -     POCT glycosylated hemoglobin (Hb A1C)      Call or go to ED immediately if symptoms worsen or persist.    Follow Up:  Return F/U 6- weeks, for to assess response of symptoms (GERD) to new treatment (pantoprazole). , or sooner if necessary. Educational materials and/or home exercises printed for patient's review and were included in patient instructions on his/her After Visit Summary and given to patient at the end of visit. Counseled regarding above diagnosis, including possible risks and complications,  especially if left uncontrolled. Counseled regarding the possible side effects, risks, benefits and alternatives to treatment; patient and/or guardian verbalizes understanding, agrees, feels comfortable with and wishes to proceed with above treatment plan. Advised patient to call with any new medication issues, and read all Rx info from pharmacy to assure aware of all possible risks and side effects of medication before taking. Reviewed age and gender appropriate health screening exams and vaccinations. Advised patient regarding importance of keeping up with recommended health maintenance and to schedule as soon as possible if overdue, as this is important in assessing for undiagnosed pathology, especially cancer, as well as protecting against potentially harmful/life threatening disease. Patient and/or guardian verbalizes understanding and agrees with above counseling, assessment and plan. All questions answered.     Darris Severs, MD

## 2022-09-01 NOTE — PROGRESS NOTES
Wound Healing Center Followup Visit Note    Referring Physician : Hilda Durham MD  [address] [place] [address] RECORD NUMBER:  [id number]  AGE: 61 y.o. GENDER: female  : 1955  EPISODE DATE:  2022    Subjective:     Chief Complaint   Patient presents with    Wound Check      HISTORY of PRESENT ILLNESS HPI   Elizabeth Villalba is a 61 y.o. female who presents today in regards to follow up evaluation and treatment of wound/ulcer. That patient's past medical, family and social hx were reviewed and changes were made if present. History of Wound Context:  The patient has had a wound of her left ankle/calf which was first noted approximately 2021. This has been treated local wound care. On their initial visit to the wound healing center, 22,  the patient has noted that the wound has been improving. The patient has not had similar previous wounds in the past.      She started seeing Dr. Rodrigo Low in 2021 and than Dr. Arnie Boudreaux. She was started in [facility] ~ 2021. She has noticed some improvement since [facility]. She is currently following with Dr. Debora Butt. Pt is not on abx at time of initial visit, but has been treated with previously by podiatry. She is not a DM. She is not a smoker. She denies hx of DVT, and per her report had recent us noting no evidence of dvt at [facility]. She also had arterial studies done. I had previously seen her in the past in regards to left buttock thigh wound, which started as abscess. Pt works at [employer] in [employer] and is on her feet all day.     22  Reflux study - if significant findings will schedule for fu  Continue compression therapy [facility] boot per podiatry with aquacell dressing  Elevation  She does not have significant arterial occlusive disease  22  Patient has asked to continuing following with me going forward because of our previous relationship  She is going to let Dr. Nellie Andino office know  She tolerated unna boot and faiza - some improvement  216/22  Appearance improved, slightly larger  Consider drawtek next week but overall drainage seems reasonably managed as periwound appears ok  2/23/2022  The wound, has some exudate, no recent cultures were done, will do wound cultures today  3/2/22  Reflux study reviewed - no significant reflux  Will treat culture - augmentin 875 mg bid x 10 days - script sent  More drainage - stable size  3/9/22  Wound appearance improved, stable in size  drawtek  3/16/22  Wound slightly larger in size, new wound of ankle  Continue Drawtek, change to profore  Avoid shoes which will contact ankle area  3/23/22  Wounds stable  Overall appearance improved  Will have pt see ID re cultures - disc with Dr Krupa Godinez  3/30/22  Much improved appearance  On oral abx per ID - concerns re pt ability to work while on IV - will see how her wound progresses  4/6/22  Appearance improved but size not much different  Finished oral abx  Will re culture next week if no significant size change - possible advance skin therapy  4/20/2022  Ulcer on the medial aspect, fairly clean and granulating, ulcer of the lateral aspect, has some exudate, debrided  4/27/22  Wounds stable   Hypergranulation tissue removed  Culture done - possible graft in future  5/4/22  Wound stable  Disc culture with Dr Krupa Godinez who would like to start her on iv abx  5/11/22  Wound stable  Iv abx have not been started yet - spoke with Dr Krupa Godinez - spoke with pt she will call his office  She had spoke with [facility] but there were some issues  5/18/22  Calf stable, ankle improved  Iv abx to start this week after picc placement  On exam   L dp 2+, PT triphasic - with compression of dp - PT becomes weakly biphasic  Concern that PT though triphasic is not getting inline flow and as a result isn't healing in the calf distribution because of occlusive disease  We discussed angiogram - will schedule  I reviewed the procedure with the patient and family as available. I discussed the procedure, risks, benefits, complications, and alternatives of the procedure. They understand and consent.   All questions were answered  Script for percocet 5/325 mg #28 prn q6 hrs - given, oarrs run  5/24/22  Angio, L PT and peroneal plasty  5/25/22  On iv abx  Wound appearance better  R groin no hematoma, L DP 2+, PT triphasic   6/1/22  Wound size and appearance better  6/8/22  Wound size and appearance better  Discussed with Dr Ermelinda Gauthier - he will stop abx  6/15/22  Wound size slightly improvement, appearance better  6/22/22  Wounds sizes smaller  6/29/22  Wounds stable   Hypergranulation tissue present throughout wound beds    Continue drawtex, foam, ABD and profore   7/6/22  Wounds slightly improved  7/13/22  Both wounds slightly larger  Hyperkeratotic tissue debrided back  7/14/22  Patient came in due to drainage through her wrap  Dressing noted to have large amounts of yellow/green drainage throughout  Cultures obtained    7/20/22  Culture reviewed large growth S aureus and Pseudomonas  Disc with Dr Hannon - will start clindamycin 600 mg tid for staph  He will see her in office in regards to IV for pseudomonas  Wounds stable in size  Change to aquacell ag  7/27/22  Dr Ermelinda Gauthier to see  Medial slightly larger, lateral slightly smaller  8/3/22  Dr Ermelinda Gauthier saw her felt wound appearance better - will hold off on iv for now  Medial slightly improved, lateral stable  8/10/2022  Wounds stable  8/17/22  Wound stable, more pain with debridement  Discussed OR for debridement and possible advanced skin therapy - pt agreeable  8/24/22  Debridement, kerecise application  5/71/73  Wound appearance improved  Medial wound improved, lateral stable    Wound/Ulcer Pain Timing/Severity: constant, moderate  Quality of pain: aching, throbbing, tender  Severity:  6/ 10   Modifying Factors: Pain worsens with dressing changes, debridement  Associated Signs/Symptoms: edema, drainage and pain    Ulcer Identification:  Ulcer Type: venous  Contributing Factors: edema and venous stasis    Diabetic/Pressure/Non Pressure Ulcers only:  Ulcer: Non-Pressure ulcer, fat layer exposed        PAST MEDICAL HISTORY      Diagnosis Date    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 ([facility] [address] 5/18/2022    Dizziness - light-headed     GERD (gastroesophageal reflux disease)     Herpes dermatitis 4/27/2017    Insomnia secondary to anxiety 4/6/2018    Lightheadedness     Migraine     Skin ulcer of buttock with fat layer exposed ([address] 7/20/2016    Venous stasis ulcer of left ankle with fat layer exposed with varicose veins ([address] 2/2/2022     Past Surgical History:   Procedure Laterality Date    CHOLECYSTECTOMY, LAPAROSCOPIC  04/08/2014    LEG SURGERY Left 8/24/2022    LEFT LOWER EXTREMITY DEBRIDMENT WITH POSSIBLE ADVANCED SKIN THERAPY, POSSIBLE Maggy Zayas performed by Mary Lou Caicedo MD at [facility] ENDOSCOPY  12/13/2013    mild gastritis and small hiatal hernia, Dr Zulay Helm, [place] [id number]  2015    GERD, Dr. Lili Almazan, office     Family History   Problem Relation Age of Onset    Diabetes Mother     Hypertension Mother     Heart Disease Father     Heart Attack Father     Heart Surgery Father         angioplasty    Stroke Father     High Cholesterol Father     Heart Attack Maternal Grandfather      Social History     Tobacco Use    Smoking status: Never    Smokeless tobacco: Never   Vaping Use    Vaping Use: Never used   Substance Use Topics    Alcohol use: No    Drug use: No     Allergies   Allergen Reactions    Bee Pollen Anaphylaxis    Tetracyclines & Related Hives     Current Outpatient Medications on File Prior to Encounter   Medication Sig Dispense Refill    oxyCODONE-acetaminophen (PERCOCET) 5-325 MG per tablet Take 1 tablet by mouth every 4 hours as needed for Pain.       aspirin-acetaminophen-caffeine (EXCEDRIN MIGRAINE) 250-250-65 MG per tablet Take 1 tablet by mouth as needed for Headaches 300 tablet 3     No current facility-administered medications on file prior to encounter. REVIEW OF SYSTEMS See HPI    Objective:    BP (!) 160/78   Pulse 74   Temp 97.5 °F (36.4 °C)   Resp 18   Ht 5' 8\" (1.727 m)   Wt 170 lb (77.1 kg)   BMI 25.85 kg/m²   Wt Readings from Last 3 Encounters:   08/31/22 180 lb 9.6 oz (81.9 kg)   08/31/22 170 lb (77.1 kg)   08/24/22 170 lb (77.1 kg)     PHYSICAL EXAM  CONSTITUTIONAL:   Awake, alert, cooperative   EYES:  lids and lashes normal   ENT: external ears and nose without lesions   NECK:  supple, symmetrical, trachea midline   SKIN:  Open wound Present    Assessment:     Problem List Items Addressed This Visit       Venous stasis ulcer of left ankle with fat layer exposed with varicose veins (HCC) (Chronic)    Relevant Orders    Initiate Outpatient Wound Care Protocol    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 ([facility] [address] - Primary (Chronic)    Relevant Orders    Initiate Outpatient Wound Care Protocol       Pre Debridement Measurements:  Are located in the [place]  Documentation Flow Sheet  Post Debridement Measurements:  Wound/Ulcer Descriptions are Pre Debridement except measurements:     Wound 08/10/16 Other (Comment) Buttocks Left;Distal #2 acq 8/4/16 (Active)   Number of days: 8428       Wound 08/31/16 Buttocks Left;Proximal #3 aquired 8-27-26 (Active)   Number of days: 2191       Wound 02/02/22 Ankle Left;Medial #1 (Active)   Wound Image   07/27/22 0751   Dressing Status New dressing applied 08/17/22 0828   Wound Cleansed Cleansed with saline 08/17/22 0828   Dressing/Treatment ABD; Alginate with Ag 08/17/22 0828   Offloading for Diabetic Foot Ulcers Offloading not required 08/17/22 0828   Wound Length (cm) 5.5 cm 08/31/22 0742   Wound Width (cm) 3.5 cm 08/31/22 0742   Wound Depth (cm) 0.1 cm 08/31/22 0742   Wound Surface Area (cm^2) 19.25 cm^2 08/31/22 0742   Change in Wound Size % (l*w) 28.86 08/31/22 7516 Wound Volume (cm^3) 1.925 cm^3 08/31/22 0742   Wound Healing % 29 08/31/22 0742   Post-Procedure Length (cm) 5.8 cm 08/17/22 0814   Post-Procedure Width (cm) 4.7 cm 08/17/22 0814   Post-Procedure Depth (cm) 0.1 cm 08/17/22 0814   Post-Procedure Surface Area (cm^2) 27.26 cm^2 08/17/22 0814   Post-Procedure Volume (cm^3) 2.726 cm^3 08/17/22 0814   Wound Assessment Granulation tissue;Fibrin 08/31/22 0742   Drainage Amount Moderate 08/31/22 0742   Drainage Description Green;Yellow; Thick 08/31/22 0742   Odor None 08/31/22 0742   Melany-wound Assessment Intact;Dry/flaky 08/31/22 0742   Number of days: 210       Wound 03/16/22 Ankle Posterior; Left #2 (Active)   Wound Image   07/27/22 0751   Dressing Status New dressing applied 08/17/22 0828   Wound Cleansed Cleansed with saline 08/17/22 0828   Dressing/Treatment ABD; Alginate with Ag; Foam 08/17/22 0828   Offloading for Diabetic Foot Ulcers Offloading not required 08/17/22 0828   Wound Length (cm) 2.2 cm 08/17/22 0742   Wound Width (cm) 2.2 cm 08/17/22 0742   Wound Depth (cm) 0.2 cm 08/17/22 0742   Wound Surface Area (cm^2) 4.84 cm^2 08/17/22 0742   Change in Wound Size % (l*w) -93.6 08/17/22 0742   Wound Volume (cm^3) 0.968 cm^3 08/17/22 0742   Wound Healing % -287 08/17/22 0742   Post-Procedure Length (cm) 2.3 cm 08/17/22 0814   Post-Procedure Width (cm) 2.4 cm 08/17/22 0814   Post-Procedure Depth (cm) 0.2 cm 08/17/22 0814   Post-Procedure Surface Area (cm^2) 5.52 cm^2 08/17/22 0814   Post-Procedure Volume (cm^3) 1.104 cm^3 08/17/22 0814   Wound Assessment Granulation tissue;Fibrin 08/17/22 0742   Drainage Amount Moderate 08/17/22 0742   Drainage Description Yellow 08/17/22 0742   Odor None 08/17/22 0742   Melany-wound Assessment Dry/flaky 08/17/22 0742   Number of days: 168     No debridement    Plan:   Treatment Note please see attached Discharge Instructions    Written patient dismissal instructions given to patient and signed by patient or POA.          Discharge Instructions         Visit Discharge/Physician Orders     Discharge condition: Stable     Assessment of pain at discharge: yes     Anesthetic used: 4% lidocaine solution     Discharge to: Home     Left via:Private automobile     Accompanied by:self     ECF/HHA: n/a     Dressing Orders:LEFT MEDIAL and LATERAL LOWER LEG-Cleanse with normal saline, apply aquacel ag,  foam around site, ABD pad and unna boot and coban. Change weekly. Treatment Orders: Eat a diet high in protein and vitamin C. Take a multiple vitamin daily unless contraindicated. To see Dr. Yecenia Garvin as scheduled      Must wear slip on shoe to work due to wrap on leg! [address] [facility] followup visit : 1 week_____________________________  (Please note your next appointment above and if you are unable to keep, kindly give a 24 hour notice. Thank you.)     Physician signature:__________________________     If you experience any of the following, please call the [facility] during business hours:     * Increase in Pain  * Temperature over 101  * Increase in drainage from your wound  * Drainage with a foul odor  * Bleeding  * Increase in swelling  * Need for compression bandage changes due to slippage, breakthrough drainage. If you need medical attention outside of the business hours of the [facility] please contact your PCP or go to the nearest emergency room.      Electronically signed by Elizabeth Jiang MD

## 2022-09-02 ENCOUNTER — TELEPHONE (OUTPATIENT)
Dept: FAMILY MEDICINE CLINIC | Age: 67
End: 2022-09-02

## 2022-09-02 DIAGNOSIS — B00.9 HERPES: ICD-10-CM

## 2022-09-02 RX ORDER — ACYCLOVIR 400 MG/1
TABLET ORAL
Qty: 90 TABLET | Refills: 3 | Status: SHIPPED | OUTPATIENT
Start: 2022-09-02 | End: 2023-09-02

## 2022-09-02 NOTE — TELEPHONE ENCOUNTER
I called patient and informed, as noted by Dr. Zora Up. Patient verbalized understanding and was agreeable.

## 2022-09-02 NOTE — TELEPHONE ENCOUNTER
Per Dr. Buckner Risk -     Chart reviewed and prescription changed to acyclovir 400 mg/day. She can double up on the 200 mg dosage until it is time to get the other filled.

## 2022-09-02 NOTE — TELEPHONE ENCOUNTER
Patient called stating she's been on Acyclovir 400 mg and Dr. Riccardo Escudero sent RX for Acyclovir 200 mg which she picked up yesterday. Patient asked why dose was decreased and if she can take Acyclovir 200 mg, 2 tablets daily. I informed patient that Dr. Riccardo Escudero is not in the ofc.     Last seen 8/31/2022  Next appt 10/12/2022  [facility]

## 2022-09-06 NOTE — DISCHARGE INSTRUCTIONS
Visit Discharge/Physician Orders     Discharge condition: Stable     Assessment of pain at discharge: yes     Anesthetic used: 4% lidocaine solution     Discharge to: Home     Left via:Private automobile     Accompanied by:self     ECF/HHA: n/a     Dressing Orders:LEFT MEDIAL and LATERAL LOWER LEG-Cleanse with normal saline, apply aquacel ag,  foam around site, ABD pad and unna boot and coban. Change weekly. Treatment Orders: Eat a diet high in protein and vitamin C. Take a multiple vitamin daily unless contraindicated. To see Dr. Herminia Jean as scheduled      Must wear slip on shoe to work due to wrap on leg! [facility] followup visit : 1 week_____________________________  (Please note your next appointment above and if you are unable to keep, kindly give a 24 hour notice. Thank you.)     Physician signature:__________________________     If you experience any of the following, please call the [facility] during business hours:     * Increase in Pain  * Temperature over 101  * Increase in drainage from your wound  * Drainage with a foul odor  * Bleeding  * Increase in swelling  * Need for compression bandage changes due to slippage, breakthrough drainage. If you need medical attention outside of the business hours of the [employer] [facility] [employer] please contact your PCP or go to the nearest emergency room.

## 2022-09-07 ENCOUNTER — HOSPITAL ENCOUNTER (OUTPATIENT)
Dept: WOUND CARE | Age: 67
Discharge: HOME | End: 2022-09-07
Payer: MEDICAID

## 2022-09-07 VITALS
SYSTOLIC BLOOD PRESSURE: 142 MMHG | RESPIRATION RATE: 16 BRPM | DIASTOLIC BLOOD PRESSURE: 70 MMHG | HEART RATE: 60 BPM | TEMPERATURE: 96.5 F

## 2022-09-07 DIAGNOSIS — I70.242 ATHEROSCLEROSIS OF NATIVE ARTERY OF LEFT LOWER EXTREMITY WITH ULCERATION OF CALF (HCC): ICD-10-CM

## 2022-09-07 DIAGNOSIS — I83.023 VENOUS STASIS ULCER OF LEFT ANKLE WITH FAT LAYER EXPOSED WITH VARICOSE VEINS (HCC): Primary | ICD-10-CM

## 2022-09-07 DIAGNOSIS — I70.243 ATHEROSCLEROSIS OF NATIVE ARTERY OF LEFT LOWER EXTREMITY WITH ULCERATION OF ANKLE (HCC): ICD-10-CM

## 2022-09-07 DIAGNOSIS — L97.322 VENOUS STASIS ULCER OF LEFT ANKLE WITH FAT LAYER EXPOSED WITH VARICOSE VEINS (HCC): Primary | ICD-10-CM

## 2022-09-07 PROCEDURE — 11042 DBRDMT SUBQ TIS 1ST 20SQCM/<: CPT | Performed by: SURGERY

## 2022-09-07 PROCEDURE — 11042 DBRDMT SUBQ TIS 1ST 20SQCM/<: CPT

## 2022-09-07 RX ORDER — LIDOCAINE 50 MG/G
OINTMENT TOPICAL ONCE
Status: CANCELLED | OUTPATIENT
Start: 2022-09-07 | End: 2022-09-07

## 2022-09-07 RX ORDER — LIDOCAINE 40 MG/G
CREAM TOPICAL ONCE
Status: CANCELLED | OUTPATIENT
Start: 2022-09-07 | End: 2022-09-07

## 2022-09-07 RX ORDER — CLOBETASOL PROPIONATE 0.5 MG/G
OINTMENT TOPICAL ONCE
Status: CANCELLED | OUTPATIENT
Start: 2022-09-07 | End: 2022-09-07

## 2022-09-07 RX ORDER — LIDOCAINE HYDROCHLORIDE 20 MG/ML
JELLY TOPICAL ONCE
Status: CANCELLED | OUTPATIENT
Start: 2022-09-07 | End: 2022-09-07

## 2022-09-07 RX ORDER — LIDOCAINE HYDROCHLORIDE 40 MG/ML
SOLUTION TOPICAL ONCE
Status: CANCELLED | OUTPATIENT
Start: 2022-09-07 | End: 2022-09-07

## 2022-09-07 RX ORDER — BETAMETHASONE DIPROPIONATE 0.05 %
OINTMENT (GRAM) TOPICAL ONCE
Status: CANCELLED | OUTPATIENT
Start: 2022-09-07 | End: 2022-09-07

## 2022-09-07 RX ORDER — GINSENG 100 MG
CAPSULE ORAL ONCE
Status: CANCELLED | OUTPATIENT
Start: 2022-09-07 | End: 2022-09-07

## 2022-09-07 RX ORDER — LIDOCAINE HYDROCHLORIDE 40 MG/ML
SOLUTION TOPICAL ONCE
Status: COMPLETED | OUTPATIENT
Start: 2022-09-07 | End: 2022-09-07

## 2022-09-07 RX ORDER — GENTAMICIN SULFATE 1 MG/G
OINTMENT TOPICAL ONCE
Status: CANCELLED | OUTPATIENT
Start: 2022-09-07 | End: 2022-09-07

## 2022-09-07 RX ORDER — BACITRACIN ZINC AND POLYMYXIN B SULFATE 500; 1000 [USP'U]/G; [USP'U]/G
OINTMENT TOPICAL ONCE
Status: CANCELLED | OUTPATIENT
Start: 2022-09-07 | End: 2022-09-07

## 2022-09-07 RX ORDER — BACITRACIN, NEOMYCIN, POLYMYXIN B 400; 3.5; 5 [USP'U]/G; MG/G; [USP'U]/G
OINTMENT TOPICAL ONCE
Status: CANCELLED | OUTPATIENT
Start: 2022-09-07 | End: 2022-09-07

## 2022-09-07 RX ADMIN — LIDOCAINE HYDROCHLORIDE: 40 SOLUTION TOPICAL at 07:57

## 2022-09-07 ASSESSMENT — PAIN - FUNCTIONAL ASSESSMENT: PAIN_FUNCTIONAL_ASSESSMENT: PREVENTS OR INTERFERES SOME ACTIVE ACTIVITIES AND ADLS

## 2022-09-07 ASSESSMENT — PAIN DESCRIPTION - DESCRIPTORS: DESCRIPTORS: ACHING;BURNING

## 2022-09-07 ASSESSMENT — PAIN SCALES - GENERAL: PAINLEVEL_OUTOF10: 3

## 2022-09-07 ASSESSMENT — PAIN DESCRIPTION - ORIENTATION: ORIENTATION: LEFT

## 2022-09-07 ASSESSMENT — PAIN DESCRIPTION - LOCATION: LOCATION: LEG

## 2022-09-07 NOTE — PROGRESS NOTES
Wound Healing Center Followup Visit Note    Referring Physician : Guru Li MD  [address] [place] [address] RECORD NUMBER:  [id number]  AGE: 61 y.o. GENDER: female  : 1955  EPISODE DATE:  2022    Subjective:     Chief Complaint   Patient presents with    Wound Check     Left lower leg      HISTORY of PRESENT ILLNESS HPI   Jaimie Hands is a 61 y.o. female who presents today in regards to follow up evaluation and treatment of wound/ulcer. That patient's past medical, family and social hx were reviewed and changes were made if present. History of Wound Context:  The patient has had a wound of her left ankle/calf which was first noted approximately 2021. This has been treated local wound care. On their initial visit to the wound healing center, 22,  the patient has noted that the wound has been improving. The patient has not had similar previous wounds in the past.      She started seeing Dr. Mk Yoder in 2021 and than Dr. Ras Cedeno. She was started in [facility] ~ 2021. She has noticed some improvement since starting [facility] boot. She is currently following with Dr. Cleo Singh. Pt is not on abx at time of initial visit, but has been treated with previously by podiatry. She is not a DM. She is not a smoker. She denies hx of DVT, and per her report had recent us noting no evidence of dvt at [facility]. She also had arterial studies done. I had previously seen her in the past in regards to left buttock thigh wound, which started as abscess. Pt works at [employer] in [facility] and is on her feet all day.     22  Reflux study - if significant findings will schedule for fu  Continue compression therapy [facility] bo per podiatry with aquacell dressing  Elevation  She does not have significant arterial occlusive disease  22  Patient has asked to continuing following with me going forward because of our previous relationship  She is going to let Dr. Guillermo Neumann office know  She tolerated unna boot and aquacell - some improvement  216/22  Appearance improved, slightly larger  Consider drawtek next week but overall drainage seems reasonably managed as periwound appears ok  2/23/2022  The wound, has some exudate, no recent cultures were done, will do wound cultures today  3/2/22  Reflux study reviewed - no significant reflux  Will treat culture - augmentin 875 mg bid x 10 days - script sent  More drainage - stable size  3/9/22  Wound appearance improved, stable in size  drawtek  3/16/22  Wound slightly larger in size, new wound of ankle  Continue Drawtek, change to profore  Avoid shoes which will contact ankle area  3/23/22  Wounds stable  Overall appearance improved  Will have pt see ID re cultures - disc with Dr Luis Sarabia  3/30/22  Much improved appearance  On oral abx per ID - concerns re pt ability to work while on IV - will see how her wound progresses  4/6/22  Appearance improved but size not much different  Finished oral abx  Will re culture next week if no significant size change - possible advance skin therapy  4/20/2022  Ulcer on the medial aspect, fairly clean and granulating, ulcer of the lateral aspect, has some exudate, debrided  4/27/22  Wounds stable   Hypergranulation tissue removed  Culture done - possible graft in future  5/4/22  Wound stable  Disc culture with Dr Luis Sarabia who would like to start her on iv abx  5/11/22  Wound stable  Iv abx have not been started yet - spoke with Dr Luis Sarabia - spoke with pt she will call his office  She had spoke with [facility] but there were some issues  5/18/22  Calf stable, ankle improved  Iv abx to start this week after picc placement  On exam   L dp 2+, PT triphasic - with compression of dp - PT becomes weakly biphasic  Concern that PT though triphasic is not getting inline flow and as a result isn't healing in the calf distribution because of occlusive disease  We discussed angiogram - will schedule  I reviewed the procedure with the patient and family as available. I discussed the procedure, risks, benefits, complications, and alternatives of the procedure. They understand and consent.   All questions were answered  Script for percocet 5/325 mg #28 prn q6 hrs - given, oarrs run  5/24/22  Angio, L PT and peroneal plasty  5/25/22  On iv abx  Wound appearance better  R groin no hematoma, L DP 2+, PT triphasic   6/1/22  Wound size and appearance better  6/8/22  Wound size and appearance better  Discussed with Dr Fritz Son - he will stop abx  6/15/22  Wound size slightly improvement, appearance better  6/22/22  Wounds sizes smaller  6/29/22  Wounds stable   Hypergranulation tissue present throughout wound beds    Continue drawtex, foam, ABD and profore   7/6/22  Wounds slightly improved  7/13/22  Both wounds slightly larger  Hyperkeratotic tissue debrided back  7/14/22  Patient came in due to drainage through her wrap  Dressing noted to have large amounts of yellow/green drainage throughout  Cultures obtained    7/20/22  Culture reviewed large growth S aureus and Pseudomonas  Disc with Dr Hannon - will start clindamycin 600 mg tid for staph  He will see her in office in regards to IV for pseudomonas  Wounds stable in size  Change to [employer] ag  7/27/22  Dr Fritz Son to see  Medial slightly larger, lateral slightly smaller  8/3/22  Dr Fritz Son saw her felt wound appearance better - will hold off on iv for now  Medial slightly improved, lateral stable  8/10/2022  Wounds stable  8/17/22  Wound stable, more pain with debridement  Discussed OR for debridement and possible advanced skin therapy - pt agreeable  8/24/22  Debridement, kerecise application  0/45/80  Wound appearance improved  Medial wound improved, lateral stable  9/7/22  Wounds are smaller    Wound/Ulcer Pain Timing/Severity: constant, moderate  Quality of pain: aching, throbbing, tender  Severity:  6/ 10   Modifying Factors: Pain worsens with dressing changes, debridement  Associated Signs/Symptoms: edema, drainage and pain    Ulcer Identification:  Ulcer Type: venous  Contributing Factors: edema and venous stasis    Diabetic/Pressure/Non Pressure Ulcers only:  Ulcer: Non-Pressure ulcer, fat layer exposed        PAST MEDICAL HISTORY      Diagnosis Date    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 ([facility] 5/18/2022    Dizziness - light-headed     GERD (gastroesophageal reflux disease)     Herpes dermatitis 4/27/2017    Insomnia secondary to anxiety 4/6/2018    Lightheadedness     Migraine     Skin ulcer of buttock with fat layer exposed ([address] 7/20/2016    Venous stasis ulcer of left ankle with fat layer exposed with varicose veins ([address] 2/2/2022     Past Surgical History:   Procedure Laterality Date    CHOLECYSTECTOMY, LAPAROSCOPIC  04/08/2014    LEG SURGERY Left 8/24/2022    LEFT LOWER EXTREMITY DEBRIDMENT WITH POSSIBLE ADVANCED SKIN THERAPY, POSSIBLE Dorothye Ban performed by Binta Felipe MD at [facility] ENDOSCOPY  12/13/2013    mild gastritis and small hiatal hernia, Dr Antoine Bergman, [place] [id number]  2015    GERD, Dr. Yousif Alvarado, office     Family History   Problem Relation Age of Onset    Diabetes Mother     Hypertension Mother     Heart Disease Father     Heart Attack Father     Heart Surgery Father         angioplasty    Stroke Father     High Cholesterol Father     Heart Attack Maternal Grandfather      Social History     Tobacco Use    Smoking status: Never    Smokeless tobacco: Never   Vaping Use    Vaping Use: Never used   Substance Use Topics    Alcohol use: No    Drug use: No     Allergies   Allergen Reactions    Bee Pollen Anaphylaxis    Tetracyclines & Related Hives     Current Outpatient Medications on File Prior to Encounter   Medication Sig Dispense Refill    acyclovir (ZOVIRAX) 400 MG tablet take 1 tablet by mouth once daily 90 tablet 3    butalbital-acetaminophen-caffeine (FIORICET, ESGIC) -40 MG per tablet Take 1 tablet by mouth 3 times daily as needed for Headaches or Migraine Indications: Cluster Headache 90 tablet 5    EPINEPHrine (EPIPEN) 0.3 MG/0.3ML SOAJ injection Use as directed for allergic reaction 1 each 5    hydrOXYzine HCl (ATARAX) 25 MG tablet take 1 tablet BID 60 tablet 5    pantoprazole (PROTONIX) 40 MG tablet Take 1 tablet by mouth every morning (before breakfast) 90 tablet 3    oxyCODONE-acetaminophen (PERCOCET) 5-325 MG per tablet Take 1 tablet by mouth every 4 hours as needed for Pain. aspirin-acetaminophen-caffeine (EXCEDRIN MIGRAINE) 250-250-65 MG per tablet Take 1 tablet by mouth as needed for Headaches 300 tablet 3     No current facility-administered medications on file prior to encounter.        REVIEW OF SYSTEMS See HPI    Objective:    BP (!) 142/70   Pulse 60   Temp (!) 96.5 °F (35.8 °C) (Temporal)   Resp 16   Wt Readings from Last 3 Encounters:   08/31/22 170 lb (77.1 kg)   08/31/22 180 lb 9.6 oz (81.9 kg)   08/24/22 170 lb (77.1 kg)     PHYSICAL EXAM  CONSTITUTIONAL:   Awake, alert, cooperative   EYES:  lids and lashes normal   ENT: external ears and nose without lesions   NECK:  supple, symmetrical, trachea midline   SKIN:  Open wound Present    Assessment:     Problem List Items Addressed This Visit       Venous stasis ulcer of left ankle with fat layer exposed with varicose veins (HCC) - Primary (Chronic)    Relevant Orders    Initiate Outpatient Wound Care Protocol    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 ([address] (Chronic)    Relevant Orders    Initiate Outpatient Wound Care Protocol       Pre Debridement Measurements:  Are located in the [facility]  Documentation Flow Sheet  Post Debridement Measurements:  Wound/Ulcer Descriptions are Pre Debridement except measurements:     Wound 08/10/16 Other (Comment) Buttocks Left;Distal #2 acq 8/4/16 (Active)   Number of days: 0231       Wound 08/31/16 Buttocks Left;Proximal #3 aquired 8-27-26 (Active) Number of days: 2197       Wound 02/02/22 Ankle Left;Medial #1 (Active)   Wound Image   09/07/22 0747   Dressing Status New dressing applied 08/17/22 0828   Wound Cleansed Cleansed with saline 08/17/22 0828   Dressing/Treatment ABD; Alginate with Ag 08/17/22 0828   Offloading for Diabetic Foot Ulcers Offloading not required 08/17/22 0828   Wound Length (cm) 5.2 cm 09/07/22 0747   Wound Width (cm) 3.4 cm 09/07/22 0747   Wound Depth (cm) 0.1 cm 09/07/22 0747   Wound Surface Area (cm^2) 17.68 cm^2 09/07/22 0747   Change in Wound Size % (l*w) 34.66 09/07/22 0747   Wound Volume (cm^3) 1.768 cm^3 09/07/22 0747   Wound Healing % 35 09/07/22 0747   Post-Procedure Length (cm) 5.4 cm 09/07/22 0818   Post-Procedure Width (cm) 3.4 cm 09/07/22 0818   Post-Procedure Depth (cm) 0.1 cm 09/07/22 0818   Post-Procedure Surface Area (cm^2) 18.36 cm^2 09/07/22 0818   Post-Procedure Volume (cm^3) 1.836 cm^3 09/07/22 0818   Wound Assessment Granulation tissue;Fibrin 09/07/22 0747   Drainage Amount Moderate 09/07/22 0747   Drainage Description Brown;Yellow 09/07/22 0747   Odor None 09/07/22 0747   Melany-wound Assessment Maceration;Blanchable erythema 09/07/22 0747   Number of days: 216       Wound 03/16/22 Ankle Posterior; Left #2 (Active)   Wound Image   09/07/22 0747   Dressing Status New dressing applied;Clean;Dry; Intact 09/07/22 0843   Wound Cleansed Cleansed with saline 09/07/22 0843   Dressing/Treatment ABD; Alginate with Ag; Foam 09/07/22 0843   Offloading for Diabetic Foot Ulcers Offloading not required 08/17/22 0828   Wound Length (cm) 2.5 cm 09/07/22 0747   Wound Width (cm) 2.2 cm 09/07/22 0747   Wound Depth (cm) 0.2 cm 09/07/22 0747   Wound Surface Area (cm^2) 5.5 cm^2 09/07/22 0747   Change in Wound Size % (l*w) -120 09/07/22 0747   Wound Volume (cm^3) 1.1 cm^3 09/07/22 0747   Wound Healing % -340 09/07/22 0747   Post-Procedure Length (cm) 2.6 cm 09/07/22 0818   Post-Procedure Width (cm) 2.4 cm 09/07/22 0818   Post-Procedure Depth (cm) 0.2 cm 09/07/22 0818   Post-Procedure Surface Area (cm^2) 6.24 cm^2 09/07/22 0818   Post-Procedure Volume (cm^3) 1.248 cm^3 09/07/22 0818   Wound Assessment Granulation tissue;Fibrin 09/07/22 0747   Drainage Amount Moderate 09/07/22 0747   Drainage Description Yellow;Brown 09/07/22 0747   Odor None 09/07/22 0747   Melany-wound Assessment Dry/flaky 09/07/22 0747   Number of days: 175     Incision 08/24/22 Leg Left (Active)   Dressing Status Clean;Dry; Intact 08/24/22 1305   Dressing/Treatment ABD pad;Steri-strips;Coban/self-adherent bandages 08/24/22 1305   Drainage Amount None 08/24/22 1305   Number of days: 14       Procedure Note  Indications:  Based on my examination of this patient's wound(s)/ulcer(s) today, debridement is required to promote healing and evaluate the wound base. Performed by: Roger Blue MD    Consent obtained:  Yes    Time out taken:  Yes    Pain Control: Anesthetic  Anesthetic: 4% Lidocaine Liquid Topical     Debridement:Excisional Debridement    Using curette the wound(s)/ulcer(s) was/were sharply debrided down through and including the removal of epidermis, dermis, and subcutaneous tissue. Devitalized Tissue Debrided:  fibrin, biofilm, slough, and exudate to stimulate bleeding to promote healing, post debridement good bleeding base and wound edges noted    Wound/Ulcer #: 1 and 2    Percent of Wound/Ulcer Debrided: 80%    Total Surface Area Debrided:  20 sq cm     Estimated Blood Loss:  Minimal  Hemostasis Achieved:  by pressure    Procedural Pain:  6  / 10   Post Procedural Pain:  5 / 10     Response to treatment:  Well tolerated by patient. Plan:   Treatment Note please see attached Discharge Instructions    Written patient dismissal instructions given to patient and signed by patient or POA.          Discharge Instructions         Visit Discharge/Physician Orders     Discharge condition: Stable     Assessment of pain at discharge: yes     Anesthetic used: 4% lidocaine solution     Discharge to: Home     Left via:Private automobile     Accompanied by:self     ECF/HHA: n/a     Dressing Orders:LEFT MEDIAL and LATERAL LOWER LEG-Cleanse with normal saline, apply aquacel ag,  foam around site, ABD pad and unna boot and coban. Change weekly. Treatment Orders: Eat a diet high in protein and vitamin C. Take a multiple vitamin daily unless contraindicated. To see Dr. Diamond Keyes as scheduled      Must wear slip on shoe to work due to wrap on leg! [facility] followup visit : 1 week_____________________________  (Please note your next appointment above and if you are unable to keep, kindly give a 24 hour notice. Thank you.)     Physician signature:__________________________     If you experience any of the following, please call the [employer] during business hours:     * Increase in Pain  * Temperature over 101  * Increase in drainage from your wound  * Drainage with a foul odor  * Bleeding  * Increase in swelling  * Need for compression bandage changes due to slippage, breakthrough drainage. If you need medical attention outside of the business hours of the [employer] please contact your PCP or go to the nearest emergency room.                    Electronically signed by Skylar Rao MD

## 2022-09-13 NOTE — DISCHARGE INSTRUCTIONS
Visit Discharge/Physician Orders     Discharge condition: Stable     Assessment of pain at discharge: yes     Anesthetic used: 4% lidocaine solution     Discharge to: Home     Left via:Private automobile     Accompanied by:self     ECF/HHA: n/a     Dressing Orders:LEFT MEDIAL and LATERAL LOWER LEG-Cleanse with normal saline, apply endoform, drawtex,  ABD pad and unna boot and coban. Change weekly. Treatment Orders: Eat a diet high in protein and vitamin C. Take a multiple vitamin daily unless contraindicated. To see Dr. Keven Sloan as scheduled      Must wear slip on shoe to work due to wrap on leg! [facility] followup visit : 1 week_____________________________  (Please note your next appointment above and if you are unable to keep, kindly give a 24 hour notice. Thank you.)     Physician signature:__________________________     If you experience any of the following, please call the [employer] during business hours:     * Increase in Pain  * Temperature over 101  * Increase in drainage from your wound  * Drainage with a foul odor  * Bleeding  * Increase in swelling  * Need for compression bandage changes due to slippage, breakthrough drainage. If you need medical attention outside of the business hours of the [employer] please contact your PCP or go to the nearest emergency room.

## 2022-09-14 ENCOUNTER — HOSPITAL ENCOUNTER (OUTPATIENT)
Dept: WOUND CARE | Age: 67
Discharge: HOME | End: 2022-09-14
Payer: MEDICAID

## 2022-09-14 VITALS
TEMPERATURE: 97.2 F | DIASTOLIC BLOOD PRESSURE: 66 MMHG | SYSTOLIC BLOOD PRESSURE: 136 MMHG | RESPIRATION RATE: 16 BRPM | HEART RATE: 76 BPM

## 2022-09-14 DIAGNOSIS — L97.322 VENOUS STASIS ULCER OF LEFT ANKLE WITH FAT LAYER EXPOSED WITH VARICOSE VEINS (HCC): Primary | ICD-10-CM

## 2022-09-14 DIAGNOSIS — I83.023 VENOUS STASIS ULCER OF LEFT ANKLE WITH FAT LAYER EXPOSED WITH VARICOSE VEINS (HCC): Primary | ICD-10-CM

## 2022-09-14 DIAGNOSIS — I70.242 ATHEROSCLEROSIS OF NATIVE ARTERY OF LEFT LOWER EXTREMITY WITH ULCERATION OF CALF (HCC): ICD-10-CM

## 2022-09-14 PROCEDURE — 11042 DBRDMT SUBQ TIS 1ST 20SQCM/<: CPT

## 2022-09-14 PROCEDURE — 11042 DBRDMT SUBQ TIS 1ST 20SQCM/<: CPT | Performed by: SURGERY

## 2022-09-14 RX ORDER — LIDOCAINE 50 MG/G
OINTMENT TOPICAL ONCE
Status: CANCELLED | OUTPATIENT
Start: 2022-09-14 | End: 2022-09-14

## 2022-09-14 RX ORDER — LIDOCAINE HYDROCHLORIDE 40 MG/ML
SOLUTION TOPICAL ONCE
Status: COMPLETED | OUTPATIENT
Start: 2022-09-14 | End: 2022-09-14

## 2022-09-14 RX ORDER — BETAMETHASONE DIPROPIONATE 0.05 %
OINTMENT (GRAM) TOPICAL ONCE
Status: CANCELLED | OUTPATIENT
Start: 2022-09-14 | End: 2022-09-14

## 2022-09-14 RX ORDER — LIDOCAINE HYDROCHLORIDE 40 MG/ML
SOLUTION TOPICAL ONCE
Status: CANCELLED | OUTPATIENT
Start: 2022-09-14 | End: 2022-09-14

## 2022-09-14 RX ORDER — GENTAMICIN SULFATE 1 MG/G
OINTMENT TOPICAL ONCE
Status: CANCELLED | OUTPATIENT
Start: 2022-09-14 | End: 2022-09-14

## 2022-09-14 RX ORDER — LIDOCAINE HYDROCHLORIDE 20 MG/ML
JELLY TOPICAL ONCE
Status: CANCELLED | OUTPATIENT
Start: 2022-09-14 | End: 2022-09-14

## 2022-09-14 RX ORDER — BACITRACIN ZINC AND POLYMYXIN B SULFATE 500; 1000 [USP'U]/G; [USP'U]/G
OINTMENT TOPICAL ONCE
Status: CANCELLED | OUTPATIENT
Start: 2022-09-14 | End: 2022-09-14

## 2022-09-14 RX ORDER — CLOBETASOL PROPIONATE 0.5 MG/G
OINTMENT TOPICAL ONCE
Status: CANCELLED | OUTPATIENT
Start: 2022-09-14 | End: 2022-09-14

## 2022-09-14 RX ORDER — LIDOCAINE 40 MG/G
CREAM TOPICAL ONCE
Status: CANCELLED | OUTPATIENT
Start: 2022-09-14 | End: 2022-09-14

## 2022-09-14 RX ORDER — BACITRACIN, NEOMYCIN, POLYMYXIN B 400; 3.5; 5 [USP'U]/G; MG/G; [USP'U]/G
OINTMENT TOPICAL ONCE
Status: CANCELLED | OUTPATIENT
Start: 2022-09-14 | End: 2022-09-14

## 2022-09-14 RX ORDER — GINSENG 100 MG
CAPSULE ORAL ONCE
Status: CANCELLED | OUTPATIENT
Start: 2022-09-14 | End: 2022-09-14

## 2022-09-14 RX ADMIN — LIDOCAINE HYDROCHLORIDE: 40 SOLUTION TOPICAL at 07:55

## 2022-09-14 ASSESSMENT — PAIN DESCRIPTION - FREQUENCY: FREQUENCY: INTERMITTENT

## 2022-09-14 ASSESSMENT — PAIN DESCRIPTION - ORIENTATION: ORIENTATION: LEFT

## 2022-09-14 ASSESSMENT — PAIN DESCRIPTION - PAIN TYPE: TYPE: CHRONIC PAIN

## 2022-09-14 ASSESSMENT — PAIN - FUNCTIONAL ASSESSMENT: PAIN_FUNCTIONAL_ASSESSMENT: PREVENTS OR INTERFERES SOME ACTIVE ACTIVITIES AND ADLS

## 2022-09-14 ASSESSMENT — PAIN SCALES - GENERAL: PAINLEVEL_OUTOF10: 4

## 2022-09-14 ASSESSMENT — PAIN DESCRIPTION - DESCRIPTORS: DESCRIPTORS: STABBING;THROBBING

## 2022-09-14 ASSESSMENT — PAIN DESCRIPTION - LOCATION: LOCATION: LEG

## 2022-09-14 NOTE — PROGRESS NOTES
Wound Healing Center Followup Visit Note    Referring Physician : Susan Pierce MD  [address] [place] [address] RECORD NUMBER:  [id number]  AGE: 61 y.o. GENDER: female  : 1955  EPISODE DATE:  2022    Subjective:     Chief Complaint   Patient presents with    Wound Check     Left lower leg      HISTORY of PRESENT ILLNESS HPI   Dorinda Allen is a 61 y.o. female who presents today in regards to follow up evaluation and treatment of wound/ulcer. That patient's past medical, family and social hx were reviewed and changes were made if present. History of Wound Context:  The patient has had a wound of her left ankle/calf which was first noted approximately 2021. This has been treated local wound care. On their initial visit to the wound healing center, 22,  the patient has noted that the wound has been improving. The patient has not had similar previous wounds in the past.      She started seeing Dr. Viri Schulte in 2021 and than Dr. Kashif Núñez. She was started in [facility] ~ 2021. She has noticed some improvement since starting unna boot. She is currently following with Dr. Lb Dorman. Pt is not on abx at time of initial visit, but has been treated with previously by podiatry. She is not a DM. She is not a smoker. She denies hx of DVT, and per her report had recent us noting no evidence of dvt at [facility]. She also had arterial studies done. I had previously seen her in the past in regards to left buttock thigh wound, which started as abscess. Pt works at [employer] in [employer] ([place], [place]) and is on her feet all day.     22  Reflux study - if significant findings will schedule for fu  [place] [facility]iatry with aquacell dressing  Elevation  She does not have significant arterial occlusive disease  22  Patient has asked to continuing following with me going forward because of our previous relationship  She is going to let Dr. Chong Para office know  She tolerated unna boot and aquacell - some improvement  216/22  Appearance improved, slightly larger  Consider drawtek next week but overall drainage seems reasonably managed as periwound appears ok  2/23/2022  The wound, has some exudate, no recent cultures were done, will do wound cultures today  3/2/22  Reflux study reviewed - no significant reflux  Will treat culture - augmentin 875 mg bid x 10 days - script sent  More drainage - stable size  3/9/22  Wound appearance improved, stable in size  drawtek  3/16/22  Wound slightly larger in size, new wound of ankle  Continue Drawtek, change to profore  Avoid shoes which will contact ankle area  3/23/22  Wounds stable  Overall appearance improved  Will have pt see ID re cultures - disc with Dr Yael Ross  3/30/22  Much improved appearance  On oral abx per ID - concerns re pt ability to work while on IV - will see how her wound progresses  4/6/22  Appearance improved but size not much different  Finished oral abx  Will re culture next week if no significant size change - possible advance skin therapy  4/20/2022  Ulcer on the medial aspect, fairly clean and granulating, ulcer of the lateral aspect, has some exudate, debrided  4/27/22  Wounds stable   Hypergranulation tissue removed  Culture done - possible graft in future  5/4/22  Wound stable  Disc culture with Dr Yael Ross who would like to start her on iv abx  5/11/22  Wound stable  Iv abx have not been started yet - spoke with Dr Yael Ross - spoke with pt she will call his office  She had spoke with [facility] but there were some issues  5/18/22  Calf stable, ankle improved  Iv abx to start this week after picc placement  On exam   L dp 2+, PT triphasic - with compression of dp - PT becomes weakly biphasic  Concern that PT though triphasic is not getting inline flow and as a result isn't healing in the calf distribution because of occlusive disease  We discussed angiogram - will schedule  I reviewed the procedure with the patient and family as available. I discussed the procedure, risks, benefits, complications, and alternatives of the procedure. They understand and consent.   All questions were answered  Script for percocet 5/325 mg #28 prn q6 hrs - given, oarrs run  5/24/22  Angio, L PT and peroneal plasty  5/25/22  On iv abx  Wound appearance better  R groin no hematoma, L DP 2+, PT triphasic   6/1/22  Wound size and appearance better  6/8/22  Wound size and appearance better  Discussed with Dr Kristi Marmolejo - he will stop abx  6/15/22  Wound size slightly improvement, appearance better  6/22/22  Wounds sizes smaller  6/29/22  Wounds stable   Hypergranulation tissue present throughout wound beds    Continue drawtex, foam, ABD and profore   7/6/22  Wounds slightly improved  7/13/22  Both wounds slightly larger  Hyperkeratotic tissue debrided back  7/14/22  Patient came in due to drainage through her wrap  Dressing noted to have large amounts of yellow/green drainage throughout  Cultures obtained    7/20/22  Culture reviewed large growth S aureus and Pseudomonas  Disc with Dr Hannon - will start clindamycin 600 mg tid for staph  He will see her in office in regards to IV for pseudomonas  Wounds stable in size  Change to Turbulenzell ag  7/27/22  Dr Kristi Marmolejo to see  Medial slightly larger, lateral slightly smaller  8/3/22  Dr Kristi Marmolejo saw her felt wound appearance better - will hold off on iv for now  Medial slightly improved, lateral stable  8/10/2022  Wounds stable  8/17/22  Wound stable, more pain with debridement  Discussed OR for debridement and possible advanced skin therapy - pt agreeable  8/24/22  Debridement, kerecise application  2/51/95  Wound appearance improved  Medial wound improved, lateral stable  9/7/22  Wounds are smaller  9/14/22  Wound stable  Enodfrom and drawtek    Wound/Ulcer Pain Timing/Severity: constant, moderate  Quality of pain: aching, throbbing, tender  Severity:  6/ 10   Modifying Factors: Pain worsens with dressing changes, debridement  Associated Signs/Symptoms: edema, drainage and pain    Ulcer Identification:  Ulcer Type: venous  Contributing Factors: edema and venous stasis    Diabetic/Pressure/Non Pressure Ulcers only:  Ulcer: Non-Pressure ulcer, fat layer exposed        PAST MEDICAL HISTORY      Diagnosis Date    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 ([address] 5/18/2022    Dizziness - light-headed     GERD (gastroesophageal reflux disease)     Herpes dermatitis 4/27/2017    Insomnia secondary to anxiety 4/6/2018    Lightheadedness     Migraine     Skin ulcer of buttock with fat layer exposed ([address] 7/20/2016    Venous stasis ulcer of left ankle with fat layer exposed with varicose veins ([address] 2/2/2022     Past Surgical History:   Procedure Laterality Date    CHOLECYSTECTOMY, LAPAROSCOPIC  04/08/2014    LEG SURGERY Left 8/24/2022    LEFT LOWER EXTREMITY DEBRIDMENT WITH POSSIBLE ADVANCED SKIN THERAPY, POSSIBLE Norbert Levo performed by Nadine Nicole MD at [facility] ENDOSCOPY  12/13/2013    mild gastritis and small hiatal hernia, Dr Shadia Pavon, [place] [address]  2015    GERD, Dr. Harleen Frey, office     Family History   Problem Relation Age of Onset    Diabetes Mother     Hypertension Mother     Heart Disease Father     Heart Attack Father     Heart Surgery Father         angioplasty    Stroke Father     High Cholesterol Father     Heart Attack Maternal Grandfather      Social History     Tobacco Use    Smoking status: Never    Smokeless tobacco: Never   Vaping Use    Vaping Use: Never used   Substance Use Topics    Alcohol use: No    Drug use: No     Allergies   Allergen Reactions    Bee Pollen Anaphylaxis    Tetracyclines & Related Hives     Current Outpatient Medications on File Prior to Encounter   Medication Sig Dispense Refill    acyclovir (ZOVIRAX) 400 MG tablet take 1 tablet by mouth once daily 90 tablet 3 butalbital-acetaminophen-caffeine (FIORICET, ESGIC) -40 MG per tablet Take 1 tablet by mouth 3 times daily as needed for Headaches or Migraine Indications: Cluster Headache 90 tablet 5    EPINEPHrine (EPIPEN) 0.3 MG/0.3ML SOAJ injection Use as directed for allergic reaction 1 each 5    hydrOXYzine HCl (ATARAX) 25 MG tablet take 1 tablet BID 60 tablet 5    pantoprazole (PROTONIX) 40 MG tablet Take 1 tablet by mouth every morning (before breakfast) 90 tablet 3    oxyCODONE-acetaminophen (PERCOCET) 5-325 MG per tablet Take 1 tablet by mouth every 4 hours as needed for Pain. aspirin-acetaminophen-caffeine (EXCEDRIN MIGRAINE) 250-250-65 MG per tablet Take 1 tablet by mouth as needed for Headaches 300 tablet 3     No current facility-administered medications on file prior to encounter.        REVIEW OF SYSTEMS See HPI    Objective:    /66   Pulse 76   Temp 97.2 °F (36.2 °C) (Temporal)   Resp 16   Wt Readings from Last 3 Encounters:   08/31/22 170 lb (77.1 kg)   08/31/22 180 lb 9.6 oz (81.9 kg)   08/24/22 170 lb (77.1 kg)     PHYSICAL EXAM  CONSTITUTIONAL:   Awake, alert, cooperative   EYES:  lids and lashes normal   ENT: external ears and nose without lesions   NECK:  supple, symmetrical, trachea midline   SKIN:  Open wound Present    Assessment:     Problem List Items Addressed This Visit       Venous stasis ulcer of left ankle with fat layer exposed with varicose veins (HCC) - Primary (Chronic)    Relevant Orders    Initiate Outpatient Wound Care Protocol    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 ([address] (Chronic)    Relevant Orders    Initiate Outpatient Wound Care Protocol       Pre Debridement Measurements:  Are located in the [facility]  Documentation Flow Sheet  Post Debridement Measurements:  Wound/Ulcer Descriptions are Pre Debridement except measurements:     Wound 08/10/16 Other (Comment) Buttocks Left;Distal #2 acq 8/4/16 (Active)   Number of days: 1156       Wound 08/31/16 Buttocks Left;Proximal #3 aquired 8-27-26 (Active)   Number of days: 2262       Wound 02/02/22 Ankle Left;Medial #1 (Active)   Wound Image   09/07/22 0747   Dressing Status New dressing applied 08/17/22 0828   Wound Cleansed Cleansed with saline 08/17/22 0828   Dressing/Treatment ABD; Alginate with Ag 08/17/22 0828   Offloading for Diabetic Foot Ulcers Offloading not required 08/17/22 0828   Wound Length (cm) 5 cm 09/14/22 0746   Wound Width (cm) 4 cm 09/14/22 0746   Wound Depth (cm) 0.1 cm 09/14/22 0746   Wound Surface Area (cm^2) 20 cm^2 09/14/22 0746   Change in Wound Size % (l*w) 26.09 09/14/22 0746   Wound Volume (cm^3) 2 cm^3 09/14/22 0746   Wound Healing % 26 09/14/22 0746   Post-Procedure Length (cm) 5.2 cm 09/14/22 0814   Post-Procedure Width (cm) 4 cm 09/14/22 0814   Post-Procedure Depth (cm) 0.1 cm 09/14/22 0814   Post-Procedure Surface Area (cm^2) 20.8 cm^2 09/14/22 0814   Post-Procedure Volume (cm^3) 2.08 cm^3 09/14/22 0814   Wound Assessment Granulation tissue;Fibrin 09/14/22 0746   Drainage Amount Moderate 09/14/22 0746   Drainage Description Yellow;Green;Serosanguinous 09/14/22 0746   Odor None 09/14/22 0746   Melany-wound Assessment Maceration;Blanchable erythema 09/14/22 0746   Number of days: 223       Wound 03/16/22 Ankle Posterior; Left #2 (Active)   Wound Image   09/07/22 0747   Dressing Status New dressing applied;Clean;Dry; Intact 09/07/22 0843   Wound Cleansed Cleansed with saline 09/07/22 0843   Dressing/Treatment ABD; Alginate with Ag; Foam 09/07/22 0843   Offloading for Diabetic Foot Ulcers Offloading not required 08/17/22 0828   Wound Length (cm) 3 cm 09/14/22 0746   Wound Width (cm) 2.2 cm 09/14/22 0746   Wound Depth (cm) 0.2 cm 09/14/22 0746   Wound Surface Area (cm^2) 6.6 cm^2 09/14/22 0746   Change in Wound Size % (l*w) -164 09/14/22 0746   Wound Volume (cm^3) 1.32 cm^3 09/14/22 0746   Wound Healing % -428 09/14/22 0746   Post-Procedure Length (cm) 3.2 cm 09/14/22 0814 Stable     Assessment of pain at discharge: yes     Anesthetic used: 4% lidocaine solution     Discharge to: Home     Left via:Private automobile     Accompanied by:self     ECF/HHA: n/a     Dressing Orders:LEFT MEDIAL and LATERAL LOWER LEG-Cleanse with normal saline, apply endoform, drawtex,  ABD pad and unna boot and coban. Change weekly. Treatment Orders: Eat a diet high in protein and vitamin C. Take a multiple vitamin daily unless contraindicated. To see Dr. Yamile Granados as scheduled      Must wear slip on shoe to work due to wrap on leg! [address] [place] [address],[address] followup visit : 1 week_____________________________  (Please note your next appointment above and if you are unable to keep, kindly give a 24 hour notice. Thank you.)     Physician signature:__________________________     If you experience any of the following, please call the [facility] during business hours:     * Increase in Pain  * Temperature over 101  * Increase in drainage from your wound  * Drainage with a foul odor  * Bleeding  * Increase in swelling  * Need for compression bandage changes due to slippage, breakthrough drainage. If you need medical attention outside of the business hours of the [facility] please contact your PCP or go to the nearest emergency room.                        Electronically signed by Roger Blue MD

## 2022-09-19 NOTE — DISCHARGE INSTRUCTIONS
Visit Discharge/Physician Orders     Discharge condition: Stable     Assessment of pain at discharge: yes     Anesthetic used: 4% lidocaine solution     Discharge to: Home     Left via:Private automobile     Accompanied by:self     ECF/HHA: n/a     Dressing Orders:LEFT MEDIAL and LATERAL LOWER LEG-Cleanse with normal saline, apply endoform, drawtex,  ABD pad and unna boot and coban. Change weekly. Treatment Orders: Eat a diet high in protein and vitamin C. Take a multiple vitamin daily unless contraindicated. To see Dr. Yael Ross as scheduled      Must wear slip on shoe to work due to wrap on leg! [facility] followup visit : 1 week_____________________________  (Please note your next appointment above and if you are unable to keep, kindly give a 24 hour notice. Thank you.)     Physician signature:__________________________     If you experience any of the following, please call the [facility] during business hours:     * Increase in Pain  * Temperature over 101  * Increase in drainage from your wound  * Drainage with a foul odor  * Bleeding  * Increase in swelling  * Need for compression bandage changes due to slippage, breakthrough drainage. If you need medical attention outside of the business hours of the [facility] please contact your PCP or go to the nearest emergency room.

## 2022-09-21 ENCOUNTER — HOSPITAL ENCOUNTER (OUTPATIENT)
Dept: WOUND CARE | Age: 67
Discharge: HOME | End: 2022-09-21
Payer: MEDICAID

## 2022-09-21 VITALS
SYSTOLIC BLOOD PRESSURE: 128 MMHG | RESPIRATION RATE: 16 BRPM | DIASTOLIC BLOOD PRESSURE: 68 MMHG | TEMPERATURE: 98.1 F | HEART RATE: 80 BPM

## 2022-09-21 DIAGNOSIS — I83.023 VENOUS STASIS ULCER OF LEFT ANKLE WITH FAT LAYER EXPOSED WITH VARICOSE VEINS (HCC): Primary | ICD-10-CM

## 2022-09-21 DIAGNOSIS — L97.322 VENOUS STASIS ULCER OF LEFT ANKLE WITH FAT LAYER EXPOSED WITH VARICOSE VEINS (HCC): Primary | ICD-10-CM

## 2022-09-21 DIAGNOSIS — I70.243 ATHEROSCLEROSIS OF NATIVE ARTERY OF LEFT LOWER EXTREMITY WITH ULCERATION OF ANKLE (HCC): ICD-10-CM

## 2022-09-21 DIAGNOSIS — I70.242 ATHEROSCLEROSIS OF NATIVE ARTERY OF LEFT LOWER EXTREMITY WITH ULCERATION OF CALF (HCC): ICD-10-CM

## 2022-09-21 PROCEDURE — 11042 DBRDMT SUBQ TIS 1ST 20SQCM/<: CPT

## 2022-09-21 PROCEDURE — 11042 DBRDMT SUBQ TIS 1ST 20SQCM/<: CPT | Performed by: SURGERY

## 2022-09-21 RX ORDER — LIDOCAINE 40 MG/G
CREAM TOPICAL ONCE
Status: CANCELLED | OUTPATIENT
Start: 2022-09-21 | End: 2022-09-21

## 2022-09-21 RX ORDER — CLOBETASOL PROPIONATE 0.5 MG/G
OINTMENT TOPICAL ONCE
Status: CANCELLED | OUTPATIENT
Start: 2022-09-21 | End: 2022-09-21

## 2022-09-21 RX ORDER — BACITRACIN ZINC AND POLYMYXIN B SULFATE 500; 1000 [USP'U]/G; [USP'U]/G
OINTMENT TOPICAL ONCE
Status: CANCELLED | OUTPATIENT
Start: 2022-09-21 | End: 2022-09-21

## 2022-09-21 RX ORDER — LIDOCAINE HYDROCHLORIDE 40 MG/ML
SOLUTION TOPICAL ONCE
Status: COMPLETED | OUTPATIENT
Start: 2022-09-21 | End: 2022-09-21

## 2022-09-21 RX ORDER — LIDOCAINE HYDROCHLORIDE 20 MG/ML
JELLY TOPICAL ONCE
Status: CANCELLED | OUTPATIENT
Start: 2022-09-21 | End: 2022-09-21

## 2022-09-21 RX ORDER — LIDOCAINE 50 MG/G
OINTMENT TOPICAL ONCE
Status: CANCELLED | OUTPATIENT
Start: 2022-09-21 | End: 2022-09-21

## 2022-09-21 RX ORDER — LIDOCAINE HYDROCHLORIDE 40 MG/ML
SOLUTION TOPICAL ONCE
Status: CANCELLED | OUTPATIENT
Start: 2022-09-21 | End: 2022-09-21

## 2022-09-21 RX ORDER — BACITRACIN, NEOMYCIN, POLYMYXIN B 400; 3.5; 5 [USP'U]/G; MG/G; [USP'U]/G
OINTMENT TOPICAL ONCE
Status: CANCELLED | OUTPATIENT
Start: 2022-09-21 | End: 2022-09-21

## 2022-09-21 RX ORDER — BETAMETHASONE DIPROPIONATE 0.05 %
OINTMENT (GRAM) TOPICAL ONCE
Status: CANCELLED | OUTPATIENT
Start: 2022-09-21 | End: 2022-09-21

## 2022-09-21 RX ORDER — GENTAMICIN SULFATE 1 MG/G
OINTMENT TOPICAL ONCE
Status: CANCELLED | OUTPATIENT
Start: 2022-09-21 | End: 2022-09-21

## 2022-09-21 RX ORDER — GINSENG 100 MG
CAPSULE ORAL ONCE
Status: CANCELLED | OUTPATIENT
Start: 2022-09-21 | End: 2022-09-21

## 2022-09-21 RX ADMIN — LIDOCAINE HYDROCHLORIDE: 40 SOLUTION TOPICAL at 07:54

## 2022-09-21 ASSESSMENT — PAIN - FUNCTIONAL ASSESSMENT: PAIN_FUNCTIONAL_ASSESSMENT: PREVENTS OR INTERFERES SOME ACTIVE ACTIVITIES AND ADLS

## 2022-09-21 ASSESSMENT — PAIN DESCRIPTION - DESCRIPTORS: DESCRIPTORS: STABBING

## 2022-09-21 ASSESSMENT — PAIN DESCRIPTION - LOCATION: LOCATION: LEG

## 2022-09-21 ASSESSMENT — PAIN DESCRIPTION - ORIENTATION: ORIENTATION: LEFT

## 2022-09-21 ASSESSMENT — PAIN DESCRIPTION - PAIN TYPE: TYPE: CHRONIC PAIN

## 2022-09-21 ASSESSMENT — PAIN SCALES - GENERAL: PAINLEVEL_OUTOF10: 3

## 2022-09-21 NOTE — PROGRESS NOTES
Wound Healing Center Followup Visit Note    Referring Physician : Reid Santos MD  [address] [place] [address] RECORD NUMBER:  [id number]  AGE: 61 y.o. GENDER: female  : 1955  EPISODE DATE:  2022    Subjective:     Chief Complaint   Patient presents with    Wound Check     Leg left lower      HISTORY of PRESENT ILLNESS HPI   Simona Leary is a 61 y.o. female who presents today in regards to follow up evaluation and treatment of wound/ulcer. That patient's past medical, family and social hx were reviewed and changes were made if present. History of Wound Context:  The patient has had a wound of her left ankle/calf which was first noted approximately 2021. This has been treated local wound care. On their initial visit to the wound healing center, 22,  the patient has noted that the wound has been improving. The patient has not had similar previous wounds in the past.      She started seeing Dr. Neri Shepherd in 2021 and than Dr. Jackelin Sharp. She was started in [facility] ~ 2021. She has noticed some improvement since starting unna boot. She is currently following with Dr. Khari Choi. Pt is not on abx at time of initial visit, but has been treated with previously by podiatry. She is not a DM. She is not a smoker. She denies hx of DVT, and per her report had recent us noting no evidence of dvt at [facility]. She also had arterial studies done. I had previously seen her in the past in regards to left buttock thigh wound, which started as abscess. Pt works at [employer] in [employer] [facility] [employer] and is on her feet all day.     22  Reflux study - if significant findings will schedule for fu  Continue compression therapy [facility] per podiatry with aquacell dressing  Elevation  She does not have significant arterial occlusive disease  22  Patient has asked to continuing following with me going forward because of our previous relationship  She is going to let Dr. Chester Wilcox office know  She tolerated unna boot and aquacell - some improvement  216/22  Appearance improved, slightly larger  Consider drawtek next week but overall drainage seems reasonably managed as periwound appears ok  2/23/2022  The wound, has some exudate, no recent cultures were done, will do wound cultures today  3/2/22  Reflux study reviewed - no significant reflux  Will treat culture - augmentin 875 mg bid x 10 days - script sent  More drainage - stable size  3/9/22  Wound appearance improved, stable in size  drawtek  3/16/22  Wound slightly larger in size, new wound of ankle  Continue Drawtek, change to profore  Avoid shoes which will contact ankle area  3/23/22  Wounds stable  Overall appearance improved  Will have pt see ID re cultures - disc with Dr Bolivar Garcia  3/30/22  Much improved appearance  On oral abx per ID - concerns re pt ability to work while on IV - will see how her wound progresses  4/6/22  Appearance improved but size not much different  Finished oral abx  Will re culture next week if no significant size change - possible advance skin therapy  4/20/2022  Ulcer on the medial aspect, fairly clean and granulating, ulcer of the lateral aspect, has some exudate, debrided  4/27/22  Wounds stable   Hypergranulation tissue removed  Culture done - possible graft in future  5/4/22  Wound stable  Disc culture with Dr Bolivar Garcia who would like to start her on iv abx  5/11/22  Wound stable  Iv abx have not been started yet - spoke with Dr Bolivar Garcia - spoke with pt she will call his office  She had spoke with [facility] but there were some issues  5/18/22  Calf stable, ankle improved  Iv abx to start this week after picc placement  On exam   L dp 2+, PT triphasic - with compression of dp - PT becomes weakly biphasic  Concern that PT though triphasic is not getting inline flow and as a result isn't healing in the calf distribution because of occlusive disease  We discussed angiogram - will schedule  I reviewed the procedure with the patient and family as available. I discussed the procedure, risks, benefits, complications, and alternatives of the procedure. They understand and consent.   All questions were answered  Script for percocet 5/325 mg #28 prn q6 hrs - given, oarrs run  5/24/22  Angio, L PT and peroneal plasty  5/25/22  On iv abx  Wound appearance better  R groin no hematoma, L DP 2+, PT triphasic   6/1/22  Wound size and appearance better  6/8/22  Wound size and appearance better  Discussed with Dr Victor Manuel Hernandez - he will stop abx  6/15/22  Wound size slightly improvement, appearance better  6/22/22  Wounds sizes smaller  6/29/22  Wounds stable   Hypergranulation tissue present throughout wound beds    Continue drawtex, foam, ABD and profore   7/6/22  Wounds slightly improved  7/13/22  Both wounds slightly larger  Hyperkeratotic tissue debrided back  7/14/22  Patient came in due to drainage through her wrap  Dressing noted to have large amounts of yellow/green drainage throughout  Cultures obtained    7/20/22  Culture reviewed large growth S aureus and Pseudomonas  Disc with Dr Hannon - will start clindamycin 600 mg tid for staph  He will see her in office in regards to IV for pseudomonas  Wounds stable in size  Change to [employer] ag  7/27/22  Dr Victor Manuel Hernandez to see  Medial slightly larger, lateral slightly smaller  8/3/22  Dr Victor Manuel Hernandez saw her felt wound appearance better - will hold off on iv for now  Medial slightly improved, lateral stable  8/10/2022  Wounds stable  8/17/22  Wound stable, more pain with debridement  Discussed OR for debridement and possible advanced skin therapy - pt agreeable  8/24/22  Debridement, kerecise application  0/72/28  Wound appearance improved  Medial wound improved, lateral stable  9/7/22  Wounds are smaller  9/14/22  Wound stable  Enodfrom and drawtek  9/21/2022  Wound debrided, stable according to the measurements  Wound/Ulcer Pain Timing/Severity: constant, moderate  Quality of pain: aching, throbbing, tender  Severity:  6/ 10   Modifying Factors: Pain worsens with dressing changes, debridement  Associated Signs/Symptoms: edema, drainage and pain    Ulcer Identification:  Ulcer Type: venous  Contributing Factors: edema and venous stasis    Diabetic/Pressure/Non Pressure Ulcers only:  Ulcer: Non-Pressure ulcer, fat layer exposed        PAST MEDICAL HISTORY      Diagnosis Date    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 ([facility] [address] 5/18/2022    Dizziness - light-headed     GERD (gastroesophageal reflux disease)     Herpes dermatitis 4/27/2017    Insomnia secondary to anxiety 4/6/2018    Lightheadedness     Migraine     Skin ulcer of buttock with fat layer exposed ([address] 7/20/2016    Venous stasis ulcer of left ankle with fat layer exposed with varicose veins ([facility] [address] 2/2/2022     Past Surgical History:   Procedure Laterality Date    CHOLECYSTECTOMY, LAPAROSCOPIC  04/08/2014    LEG SURGERY Left 8/24/2022    LEFT LOWER EXTREMITY DEBRIDMENT WITH POSSIBLE ADVANCED SKIN THERAPY, POSSIBLE Archana Galloway performed by Jaleesa Vera MD at [facility] ENDOSCOPY  12/13/2013    mild gastritis and small hiatal hernia, Dr Soren Boucher, [place] [address]  2015    GERD, Dr. Shan Young, office     Family History   Problem Relation Age of Onset    Diabetes Mother     Hypertension Mother     Heart Disease Father     Heart Attack Father     Heart Surgery Father         angioplasty    Stroke Father     High Cholesterol Father     Heart Attack Maternal Grandfather      Social History     Tobacco Use    Smoking status: Never    Smokeless tobacco: Never   Vaping Use    Vaping Use: Never used   Substance Use Topics    Alcohol use: No    Drug use: No     Allergies   Allergen Reactions    Bee Pollen Anaphylaxis    Tetracyclines & Related Hives     Current Outpatient Medications on File Prior to Encounter   Medication Sig Dispense Refill    acyclovir (ZOVIRAX) 400 MG tablet take 1 tablet by mouth once daily 90 tablet 3    butalbital-acetaminophen-caffeine (FIORICET, ESGIC) -40 MG per tablet Take 1 tablet by mouth 3 times daily as needed for Headaches or Migraine Indications: Cluster Headache 90 tablet 5    EPINEPHrine (EPIPEN) 0.3 MG/0.3ML SOAJ injection Use as directed for allergic reaction 1 each 5    hydrOXYzine HCl (ATARAX) 25 MG tablet take 1 tablet BID 60 tablet 5    pantoprazole (PROTONIX) 40 MG tablet Take 1 tablet by mouth every morning (before breakfast) 90 tablet 3    oxyCODONE-acetaminophen (PERCOCET) 5-325 MG per tablet Take 1 tablet by mouth every 4 hours as needed for Pain. aspirin-acetaminophen-caffeine (EXCEDRIN MIGRAINE) 250-250-65 MG per tablet Take 1 tablet by mouth as needed for Headaches 300 tablet 3     No current facility-administered medications on file prior to encounter.        REVIEW OF SYSTEMS See HPI    Objective:    /68   Pulse 80   Temp 98.1 °F (36.7 °C) (Temporal)   Resp 16   Wt Readings from Last 3 Encounters:   08/31/22 170 lb (77.1 kg)   08/31/22 180 lb 9.6 oz (81.9 kg)   08/24/22 170 lb (77.1 kg)     PHYSICAL EXAM  CONSTITUTIONAL:   Awake, alert, cooperative   EYES:  lids and lashes normal   ENT: external ears and nose without lesions   NECK:  supple, symmetrical, trachea midline   SKIN:  Open wound Present    Assessment:     Problem List Items Addressed This Visit          High    Venous stasis ulcer of left ankle with fat layer exposed with varicose veins (HCC) - Primary (Chronic)    Relevant Orders    Initiate Outpatient Wound Care Protocol       Medium    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) (Chronic)    Relevant Orders    Initiate Outpatient Wound Care Protocol       Pre Debridement Measurements:  Are located in the [place]  Documentation Flow Sheet  Post Debridement Measurements:  Wound/Ulcer Descriptions are Pre Debridement except measurements:     Wound 08/10/16 Other (Comment) Buttocks 0745   Post-Procedure Length (cm) 3.2 cm 09/14/22 0814   Post-Procedure Width (cm) 2.2 cm 09/14/22 0814   Post-Procedure Depth (cm) 0.2 cm 09/14/22 0814   Post-Procedure Surface Area (cm^2) 7.04 cm^2 09/14/22 0814   Post-Procedure Volume (cm^3) 1.408 cm^3 09/14/22 0814   Wound Assessment Granulation tissue;Fibrin 09/21/22 0745   Drainage Amount Moderate 09/21/22 0745   Drainage Description Yellow 09/21/22 0745   Odor None 09/21/22 0745   Melany-wound Assessment Maceration;Fragile 09/21/22 0745   Number of days: 189     Incision 08/24/22 Leg Left (Active)   Dressing Status Clean;Dry; Intact 08/24/22 1305   Dressing/Treatment ABD pad;Steri-strips;Coban/self-adherent bandages 08/24/22 1305   Drainage Amount None 08/24/22 1305   Number of days: 28       Procedure Note  Indications:  Based on my examination of this patient's wound(s)/ulcer(s) today, debridement is required to promote healing and evaluate the wound base. Performed by: Luis M Arroyo MD    Consent obtained:  Yes    Time out taken:  Yes    Pain Control: Anesthetic  Anesthetic: 4% Lidocaine Liquid Topical     Debridement:Excisional Debridement    Using curette the wound(s)/ulcer(s) was/were sharply debrided down through and including the removal of epidermis, dermis, and subcutaneous tissue. Devitalized Tissue Debrided:  fibrin, biofilm, slough, and exudate to stimulate bleeding to promote healing, post debridement good bleeding base and wound edges noted    Wound/Ulcer #: 1 and 2    Percent of Wound/Ulcer Debrided: 80%    Total Surface Area Debrided:  20 sq cm     Estimated Blood Loss:  Minimal  Hemostasis Achieved:  by pressure    Procedural Pain:  6  / 10   Post Procedural Pain:  5 / 10     Response to treatment:  Well tolerated by patient. Plan:   Treatment Note please see attached Discharge Instructions    Written patient dismissal instructions given to patient and signed by patient or POA.          Discharge Instructions         Visit Discharge/Physician Orders     Discharge condition: Stable     Assessment of pain at discharge: yes     Anesthetic used: 4% lidocaine solution     Discharge to: Home     Left via:Private automobile     Accompanied by:self     ECF/HHA: n/a     Dressing Orders:LEFT MEDIAL and LATERAL LOWER LEG-Cleanse with normal saline, apply endoform, drawtex,  ABD pad and unna boot and coban. Change weekly. Treatment Orders: Eat a diet high in protein and vitamin C. Take a multiple vitamin daily unless contraindicated. To see Dr. Dre Dior as scheduled      Must wear slip on shoe to work due to wrap on leg! [address] [place] [address],[address] followup visit : 1 week_____________________________  (Please note your next appointment above and if you are unable to keep, kindly give a 24 hour notice. Thank you.)     Physician signature:__________________________     If you experience any of the following, please call the [facility] during business hours:     * Increase in Pain  * Temperature over 101  * Increase in drainage from your wound  * Drainage with a foul odor  * Bleeding  * Increase in swelling  * Need for compression bandage changes due to slippage, breakthrough drainage. If you need medical attention outside of the business hours of the [facility] please contact your PCP or go to the nearest emergency room.                                      Electronically signed by Danny Lipscomb MD

## 2022-09-27 NOTE — DISCHARGE INSTRUCTIONS
Visit Discharge/Physician Orders     Discharge condition: Stable     Assessment of pain at discharge: yes     Anesthetic used: 4% lidocaine solution     Discharge to: Home     Left via:Private automobile     Accompanied by:self     ECF/HHA: n/a     Dressing Orders:LEFT MEDIAL and LATERAL LOWER LEG-Cleanse with normal saline, aquacel AG,  ABD pad and unna boot and coban. Change weekly. Treatment Orders: Eat a diet high in protein and vitamin C. Take a multiple vitamin daily unless contraindicated. To see Dr. Ermelinda Gauthier as scheduled      Must wear slip on shoe to work due to wrap on leg! C&S taken at [facility] 9/28       [facility] followup visit : 1 week_____________________________  (Please note your next appointment above and if you are unable to keep, kindly give a 24 hour notice. Thank you.)     Physician signature:__________________________     If you experience any of the following, please call the [facility] during business hours:     * Increase in Pain  * Temperature over 101  * Increase in drainage from your wound  * Drainage with a foul odor  * Bleeding  * Increase in swelling  * Need for compression bandage changes due to slippage, breakthrough drainage. If you need medical attention outside of the business hours of the [facility] please contact your PCP or go to the nearest emergency room.

## 2022-09-28 ENCOUNTER — HOSPITAL ENCOUNTER (OUTPATIENT)
Dept: WOUND CARE | Age: 67
Discharge: HOME | End: 2022-09-28
Payer: MEDICAID

## 2022-09-28 VITALS
RESPIRATION RATE: 18 BRPM | TEMPERATURE: 97.1 F | HEART RATE: 78 BPM | SYSTOLIC BLOOD PRESSURE: 146 MMHG | DIASTOLIC BLOOD PRESSURE: 88 MMHG

## 2022-09-28 DIAGNOSIS — L97.322 VENOUS STASIS ULCER OF LEFT ANKLE WITH FAT LAYER EXPOSED WITH VARICOSE VEINS (HCC): ICD-10-CM

## 2022-09-28 DIAGNOSIS — I83.023 VENOUS STASIS ULCER OF LEFT ANKLE WITH FAT LAYER EXPOSED WITH VARICOSE VEINS (HCC): ICD-10-CM

## 2022-09-28 DIAGNOSIS — I70.242 ATHEROSCLEROSIS OF NATIVE ARTERY OF LEFT LOWER EXTREMITY WITH ULCERATION OF CALF (HCC): Primary | ICD-10-CM

## 2022-09-28 PROCEDURE — 87075 CULTR BACTERIA EXCEPT BLOOD: CPT

## 2022-09-28 PROCEDURE — 6370000000 HC RX 637 (ALT 250 FOR IP): Performed by: SURGERY

## 2022-09-28 PROCEDURE — 11042 DBRDMT SUBQ TIS 1ST 20SQCM/<: CPT | Performed by: SURGERY

## 2022-09-28 PROCEDURE — 87070 CULTURE OTHR SPECIMN AEROBIC: CPT

## 2022-09-28 PROCEDURE — 87205 SMEAR GRAM STAIN: CPT

## 2022-09-28 PROCEDURE — 87077 CULTURE AEROBIC IDENTIFY: CPT

## 2022-09-28 PROCEDURE — 87186 SC STD MICRODIL/AGAR DIL: CPT

## 2022-09-28 PROCEDURE — 11042 DBRDMT SUBQ TIS 1ST 20SQCM/<: CPT

## 2022-09-28 RX ORDER — LIDOCAINE HYDROCHLORIDE 20 MG/ML
JELLY TOPICAL ONCE
Status: CANCELLED | OUTPATIENT
Start: 2022-09-28 | End: 2022-09-28

## 2022-09-28 RX ORDER — BACITRACIN, NEOMYCIN, POLYMYXIN B 400; 3.5; 5 [USP'U]/G; MG/G; [USP'U]/G
OINTMENT TOPICAL ONCE
Status: CANCELLED | OUTPATIENT
Start: 2022-09-28 | End: 2022-09-28

## 2022-09-28 RX ORDER — LIDOCAINE 50 MG/G
OINTMENT TOPICAL ONCE
Status: CANCELLED | OUTPATIENT
Start: 2022-09-28 | End: 2022-09-28

## 2022-09-28 RX ORDER — BACITRACIN ZINC AND POLYMYXIN B SULFATE 500; 1000 [USP'U]/G; [USP'U]/G
OINTMENT TOPICAL ONCE
Status: CANCELLED | OUTPATIENT
Start: 2022-09-28 | End: 2022-09-28

## 2022-09-28 RX ORDER — LIDOCAINE 40 MG/G
CREAM TOPICAL ONCE
Status: CANCELLED | OUTPATIENT
Start: 2022-09-28 | End: 2022-09-28

## 2022-09-28 RX ORDER — LIDOCAINE HYDROCHLORIDE 40 MG/ML
SOLUTION TOPICAL ONCE
Status: CANCELLED | OUTPATIENT
Start: 2022-09-28 | End: 2022-09-28

## 2022-09-28 RX ORDER — GENTAMICIN SULFATE 1 MG/G
OINTMENT TOPICAL ONCE
Status: CANCELLED | OUTPATIENT
Start: 2022-09-28 | End: 2022-09-28

## 2022-09-28 RX ORDER — CLOBETASOL PROPIONATE 0.5 MG/G
OINTMENT TOPICAL ONCE
Status: CANCELLED | OUTPATIENT
Start: 2022-09-28 | End: 2022-09-28

## 2022-09-28 RX ORDER — GINSENG 100 MG
CAPSULE ORAL ONCE
Status: CANCELLED | OUTPATIENT
Start: 2022-09-28 | End: 2022-09-28

## 2022-09-28 RX ORDER — BETAMETHASONE DIPROPIONATE 0.05 %
OINTMENT (GRAM) TOPICAL ONCE
Status: CANCELLED | OUTPATIENT
Start: 2022-09-28 | End: 2022-09-28

## 2022-09-28 RX ORDER — LIDOCAINE HYDROCHLORIDE 40 MG/ML
SOLUTION TOPICAL ONCE
Status: COMPLETED | OUTPATIENT
Start: 2022-09-28 | End: 2022-09-28

## 2022-09-28 RX ADMIN — LIDOCAINE HYDROCHLORIDE 10 ML: 40 SOLUTION TOPICAL at 08:02

## 2022-09-28 NOTE — PROGRESS NOTES
Wound Healing Center Followup Visit Note    Referring Physician : Janina Farris MD  [address] [place] [address] RECORD NUMBER:  [id number]  AGE: 61 y.o. GENDER: female  : 1955  EPISODE DATE:  2022    Subjective:     Chief Complaint   Patient presents with    Wound Check     Left leg        HISTORY of PRESENT ILLNESS HPI   Oli Cummings is a 61 y.o. female who presents today in regards to follow up evaluation and treatment of wound/ulcer. That patient's past medical, family and social hx were reviewed and changes were made if present. History of Wound Context:  The patient has had a wound of her left ankle/calf which was first noted approximately 2021. This has been treated local wound care. On their initial visit to the wound healing center, 22,  the patient has noted that the wound has been improving. The patient has not had similar previous wounds in the past.      She started seeing Dr. Ramy Glover in 2021 and than Dr. Lindsey Oleary. She was started in [facility] ~ 2021. She has noticed some improvement since starting unna boot. She is currently following with Dr. Kevin Lorenzo. Pt is not on abx at time of initial visit, but has been treated with previously by podiatry. She is not a DM. She is not a smoker. She denies hx of DVT, and per her report had recent us noting no evidence of dvt at [facility]. She also had arterial studies done. I had previously seen her in the past in regards to left buttock thigh wound, which started as abscess. Pt works at [employer] in [place] [facility] [place] and is on her feet all day.     22  Reflux study - if significant findings will schedule for fu  Continue compression therapy [facility] per podiatry with aquacell dressing  Elevation  She does not have significant arterial occlusive disease  22  Patient has asked to continuing following with me going forward because of our previous relationship  She is going to let Dr. Audelia Meigs office know  She tolerated unna boot and aquacell - some improvement  216/22  Appearance improved, slightly larger  Consider drawtek next week but overall drainage seems reasonably managed as periwound appears ok  2/23/2022  The wound, has some exudate, no recent cultures were done, will do wound cultures today  3/2/22  Reflux study reviewed - no significant reflux  Will treat culture - augmentin 875 mg bid x 10 days - script sent  More drainage - stable size  3/9/22  Wound appearance improved, stable in size  drawtek  3/16/22  Wound slightly larger in size, new wound of ankle  Continue Drawtek, change to profore  Avoid shoes which will contact ankle area  3/23/22  Wounds stable  Overall appearance improved  Will have pt see ID re cultures - disc with Dr Yoni Mireles  3/30/22  Much improved appearance  On oral abx per ID - concerns re pt ability to work while on IV - will see how her wound progresses  4/6/22  Appearance improved but size not much different  Finished oral abx  Will re culture next week if no significant size change - possible advance skin therapy  4/20/2022  Ulcer on the medial aspect, fairly clean and granulating, ulcer of the lateral aspect, has some exudate, debrided  4/27/22  Wounds stable   Hypergranulation tissue removed  Culture done - possible graft in future  5/4/22  Wound stable  Disc culture with Dr Yoni Mireles who would like to start her on iv abx  5/11/22  Wound stable  Iv abx have not been started yet - spoke with Dr Yoni Mireles - spoke with pt she will call his office  She had spoke with [facility] but there were some issues  5/18/22  Calf stable, ankle improved  Iv abx to start this week after picc placement  On exam   L dp 2+, PT triphasic - with compression of dp - PT becomes weakly biphasic  Concern that PT though triphasic is not getting inline flow and as a result isn't healing in the calf distribution because of occlusive disease  We discussed angiogram - will schedule  I reviewed the procedure with the patient and family as available. I discussed the procedure, risks, benefits, complications, and alternatives of the procedure. They understand and consent.   All questions were answered  Script for percocet 5/325 mg #28 prn q6 hrs - given, oarrs run  5/24/22  Angio, L PT and peroneal plasty  5/25/22  On iv abx  Wound appearance better  R groin no hematoma, L DP 2+, PT triphasic   6/1/22  Wound size and appearance better  6/8/22  Wound size and appearance better  Discussed with Dr Kristi Marmolejo - he will stop abx  6/15/22  Wound size slightly improvement, appearance better  6/22/22  Wounds sizes smaller  6/29/22  Wounds stable   Hypergranulation tissue present throughout wound beds    Continue drawtex, foam, ABD and profore   7/6/22  Wounds slightly improved  7/13/22  Both wounds slightly larger  Hyperkeratotic tissue debrided back  7/14/22  Patient came in due to drainage through her wrap  Dressing noted to have large amounts of yellow/green drainage throughout  Cultures obtained    7/20/22  Culture reviewed large growth S aureus and Pseudomonas  Disc with Dr Hannon - will start clindamycin 600 mg tid for staph  He will see her in office in regards to IV for pseudomonas  Wounds stable in size  Change to aquacell ag  7/27/22  Dr Kristi Marmolejo to see  Medial slightly larger, lateral slightly smaller  8/3/22  Dr Kristi Marmolejo saw her felt wound appearance better - will hold off on iv for now  Medial slightly improved, lateral stable  8/10/2022  Wounds stable  8/17/22  Wound stable, more pain with debridement  Discussed OR for debridement and possible advanced skin therapy - pt agreeable  8/24/22  Debridement, kerecise application  2/34/39  Wound appearance improved  Medial wound improved, lateral stable  9/7/22  Wounds are smaller  9/14/22  Wound stable  Enodfrom and drawtek  9/21/2022  Wound debrided, stable according to the measurements  9/28/22  Wound larger  Culture done  Discussed OR  Aquacell ag change     Wound/Ulcer Pain Timing/Severity: constant, moderate  Quality of pain: aching, throbbing, tender  Severity:  6/ 10   Modifying Factors: Pain worsens with dressing changes, debridement  Associated Signs/Symptoms: edema, drainage and pain    Ulcer Identification:  Ulcer Type: venous  Contributing Factors: edema and venous stasis    Diabetic/Pressure/Non Pressure Ulcers only:  Ulcer: Non-Pressure ulcer, fat layer exposed        PAST MEDICAL HISTORY      Diagnosis Date    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 ([address] 5/18/2022    Dizziness - light-headed     GERD (gastroesophageal reflux disease)     Herpes dermatitis 4/27/2017    Insomnia secondary to anxiety 4/6/2018    Lightheadedness     Migraine     Skin ulcer of buttock with fat layer exposed ([address] 7/20/2016    Venous stasis ulcer of left ankle with fat layer exposed with varicose veins ([address] 2/2/2022     Past Surgical History:   Procedure Laterality Date    CHOLECYSTECTOMY, LAPAROSCOPIC  04/08/2014    LEG SURGERY Left 8/24/2022    LEFT LOWER EXTREMITY DEBRIDMENT WITH POSSIBLE ADVANCED SKIN THERAPY, POSSIBLE Adam Halt performed by Roger Blue MD at [address] [place] [address] ENDOSCOPY  12/13/2013    mild gastritis and small hiatal hernia, Dr Keena Coleman, [address]  2015    GERD, Dr. Asif Arellano, office     Family History   Problem Relation Age of Onset    Diabetes Mother     Hypertension Mother     Heart Disease Father     Heart Attack Father     Heart Surgery Father         angioplasty    Stroke Father     High Cholesterol Father     Heart Attack Maternal Grandfather      Social History     Tobacco Use    Smoking status: Never    Smokeless tobacco: Never   Vaping Use    Vaping Use: Never used   Substance Use Topics    Alcohol use: No    Drug use: No     Allergies   Allergen Reactions    Bee Pollen Anaphylaxis    Tetracyclines & Related Hives     Current Outpatient Medications on File Prior to Encounter Medication Sig Dispense Refill    acyclovir (ZOVIRAX) 400 MG tablet take 1 tablet by mouth once daily 90 tablet 3    butalbital-acetaminophen-caffeine (FIORICET, ESGIC) -40 MG per tablet Take 1 tablet by mouth 3 times daily as needed for Headaches or Migraine Indications: Cluster Headache 90 tablet 5    EPINEPHrine (EPIPEN) 0.3 MG/0.3ML SOAJ injection Use as directed for allergic reaction 1 each 5    hydrOXYzine HCl (ATARAX) 25 MG tablet take 1 tablet BID 60 tablet 5    pantoprazole (PROTONIX) 40 MG tablet Take 1 tablet by mouth every morning (before breakfast) 90 tablet 3    oxyCODONE-acetaminophen (PERCOCET) 5-325 MG per tablet Take 1 tablet by mouth every 4 hours as needed for Pain. aspirin-acetaminophen-caffeine (EXCEDRIN MIGRAINE) 250-250-65 MG per tablet Take 1 tablet by mouth as needed for Headaches 300 tablet 3     No current facility-administered medications on file prior to encounter.        REVIEW OF SYSTEMS See HPI    Objective:    BP (!) 146/88   Pulse 78   Temp 97.1 °F (36.2 °C) (Temporal)   Resp 18   Wt Readings from Last 3 Encounters:   08/31/22 170 lb (77.1 kg)   08/31/22 180 lb 9.6 oz (81.9 kg)   08/24/22 170 lb (77.1 kg)     PHYSICAL EXAM  CONSTITUTIONAL:   Awake, alert, cooperative   EYES:  lids and lashes normal   ENT: external ears and nose without lesions   NECK:  supple, symmetrical, trachea midline   SKIN:  Open wound Present    Assessment:     Problem List Items Addressed This Visit       Venous stasis ulcer of left ankle with fat layer exposed with varicose veins (HCC) (Chronic)    Relevant Orders    Initiate Outpatient Wound Care Protocol    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 ([facility] - Primary (Chronic)    Relevant Orders    Initiate Outpatient Wound Care Protocol       Pre Debridement Measurements:  Are located in the Katherin [facility]add  Documentation Flow Sheet  Post Debridement Measurements:  Wound/Ulcer Descriptions are Pre Debridement except measurements:     Wound 08/10/16 Other (Comment) Buttocks Left;Distal #2 acq 8/4/16 (Active)   Number of days: 2239       Wound 08/31/16 Buttocks Left;Proximal #3 aquired 8-27-26 (Active)   Number of days: 2218       Wound 02/02/22 Ankle Left;Medial #1 (Active)   Wound Image   09/07/22 0747   Dressing Status New dressing applied 08/17/22 0828   Wound Cleansed Cleansed with saline 08/17/22 0828   Dressing/Treatment ABD; Alginate with Ag 08/17/22 0828   Offloading for Diabetic Foot Ulcers Offloading not required 08/17/22 0828   Wound Length (cm) 8.1 cm 09/28/22 0752   Wound Width (cm) 4.5 cm 09/28/22 0752   Wound Depth (cm) 0.2 cm 09/28/22 0752   Wound Surface Area (cm^2) 36.45 cm^2 09/28/22 0752   Change in Wound Size % (l*w) -34.7 09/28/22 0752   Wound Volume (cm^3) 7.29 cm^3 09/28/22 0752   Wound Healing % -169 09/28/22 0752   Post-Procedure Length (cm) 8.2 cm 09/28/22 0814   Post-Procedure Width (cm) 4.5 cm 09/28/22 0814   Post-Procedure Depth (cm) 0.2 cm 09/28/22 0814   Post-Procedure Surface Area (cm^2) 36.9 cm^2 09/28/22 0814   Post-Procedure Volume (cm^3) 7.38 cm^3 09/28/22 0814   Wound Assessment Pink/red;Fibrin 09/28/22 0752   Drainage Amount Moderate 09/28/22 0752   Drainage Description Yellow 09/28/22 0752   Odor None 09/28/22 0752   Melany-wound Assessment Blanchable erythema;Fragile 09/28/22 0752   Number of days: 237       Wound 03/16/22 Ankle Posterior; Left #2 (Active)   Wound Image   09/07/22 0747   Dressing Status New dressing applied;Clean;Dry; Intact 09/21/22 0831   Wound Cleansed Cleansed with saline 09/21/22 0831   Dressing/Treatment ABD 09/21/22 0831   Offloading for Diabetic Foot Ulcers Offloading not required 09/21/22 0831   Wound Length (cm) 4.5 cm 09/28/22 0752   Wound Width (cm) 2.3 cm 09/28/22 0752   Wound Depth (cm) 0.2 cm 09/28/22 0752   Wound Surface Area (cm^2) 10.35 cm^2 09/28/22 0752   Change in Wound Size % (l*w) -314 09/28/22 0752   Wound Volume (cm^3) 2.07 cm^3 09/28/22 0752   Wound Healing % -728 09/28/22 0752   Post-Procedure Length (cm) 4.6 cm 09/28/22 0814   Post-Procedure Width (cm) 2.4 cm 09/28/22 0814   Post-Procedure Depth (cm) 0.2 cm 09/28/22 0814   Post-Procedure Surface Area (cm^2) 11.04 cm^2 09/28/22 0814   Post-Procedure Volume (cm^3) 2. 208 cm^3 09/28/22 0814   Wound Assessment Fibrin;Pink/red 09/28/22 0752   Drainage Amount Moderate 09/28/22 0752   Drainage Description Yellow 09/28/22 0752   Odor None 09/28/22 0752   Melany-wound Assessment Blanchable erythema;Fragile 09/28/22 0752   Number of days: 196     Incision 08/24/22 Leg Left (Active)   Number of days: 35       Procedure Note  Indications:  Based on my examination of this patient's wound(s)/ulcer(s) today, debridement is required to promote healing and evaluate the wound base. Performed by: Simona Palacios MD    Consent obtained:  Yes    Time out taken:  Yes    Pain Control: Anesthetic  Anesthetic: 4% Lidocaine Liquid Topical     Debridement:Excisional Debridement    Using curette the wound(s)/ulcer(s) was/were sharply debrided down through and including the removal of epidermis, dermis, and subcutaneous tissue. Devitalized Tissue Debrided:  fibrin, biofilm, slough, and exudate to stimulate bleeding to promote healing, post debridement good bleeding base and wound edges noted    Wound/Ulcer #: 1 and 2    Percent of Wound/Ulcer Debrided: 60%    Total Surface Area Debrided:  20 sq cm     Estimated Blood Loss:  Minimal  Hemostasis Achieved:  by pressure    Procedural Pain:  8  / 10   Post Procedural Pain:  7 / 10     Response to treatment:  Well tolerated by patient. Plan:   Treatment Note please see attached Discharge Instructions    Written patient dismissal instructions given to patient and signed by patient or POA.          Discharge Instructions         Visit Discharge/Physician Orders     Discharge condition: Stable     Assessment of pain at discharge: yes     Anesthetic used: 4% lidocaine solution Discharge to: Home     Left via:Private automobile     Accompanied by:self     ECF/HHA: n/a     Dressing Orders:LEFT MEDIAL and LATERAL LOWER LEG-Cleanse with normal saline, aquacel AG,  ABD pad and unna boot and coban. Change weekly. Treatment Orders: Eat a diet high in protein and vitamin C. Take a multiple vitamin daily unless contraindicated. To see Dr. Yecenia Garvin as scheduled      Must wear slip on shoe to work due to wrap on leg! C&S taken at [facility] 9/28       [facility] followup visit : 1 week_____________________________  (Please note your next appointment above and if you are unable to keep, kindly give a 24 hour notice. Thank you.)     Physician signature:__________________________     If you experience any of the following, please call the [address] [place] [address] [facility] during business hours:     * Increase in Pain  * Temperature over 101  * Increase in drainage from your wound  * Drainage with a foul odor  * Bleeding  * Increase in swelling  * Need for compression bandage changes due to slippage, breakthrough drainage. If you need medical attention outside of the business hours of the [facility] [facility] [address] please contact your PCP or go to the nearest emergency room.     Electronically signed by Elizabeth Jiang MD

## 2022-10-01 LAB — ANAEROBIC CULTURE: NORMAL

## 2022-10-03 LAB
GRAM STAIN RESULT: ABNORMAL
ORGANISM: ABNORMAL
WOUND/ABSCESS: ABNORMAL

## 2022-10-03 NOTE — DISCHARGE INSTRUCTIONS
Visit Discharge/Physician Orders     Discharge condition: Stable     Assessment of pain at discharge: yes     Anesthetic used: 4% lidocaine solution     Discharge to: Home     Left via:Private automobile     Accompanied by:self     ECF/HHA: n/a     Dressing Orders:LEFT MEDIAL and LATERAL LOWER LEG-Cleanse with normal saline, aquacel AG,  ABD pad and unna boot and coban. Change weekly. Treatment Orders: Eat a diet high in protein and vitamin C. Take a multiple vitamin daily unless contraindicated. To see Dr. Cristhian Casarez as scheduled      Must wear slip on shoe to work due to wrap on leg! [facility] followup visit : 1 week_____________________________  (Please note your next appointment above and if you are unable to keep, kindly give a 24 hour notice. Thank you.)     Physician signature:__________________________     If you experience any of the following, please call the [facility] during business hours:     * Increase in Pain  * Temperature over 101  * Increase in drainage from your wound  * Drainage with a foul odor  * Bleeding  * Increase in swelling  * Need for compression bandage changes due to slippage, breakthrough drainage. If you need medical attention outside of the business hours of the [facility] please contact your PCP or go to the nearest emergency room.

## 2022-10-05 ENCOUNTER — HOSPITAL ENCOUNTER (OUTPATIENT)
Dept: WOUND CARE | Age: 67
Discharge: HOME | End: 2022-10-05
Payer: MEDICAID

## 2022-10-05 VITALS
TEMPERATURE: 98.1 F | SYSTOLIC BLOOD PRESSURE: 142 MMHG | DIASTOLIC BLOOD PRESSURE: 72 MMHG | HEART RATE: 80 BPM | RESPIRATION RATE: 18 BRPM

## 2022-10-05 DIAGNOSIS — I83.023 VENOUS STASIS ULCER OF LEFT ANKLE WITH FAT LAYER EXPOSED WITH VARICOSE VEINS (HCC): Primary | ICD-10-CM

## 2022-10-05 DIAGNOSIS — L97.322 VENOUS STASIS ULCER OF LEFT ANKLE WITH FAT LAYER EXPOSED WITH VARICOSE VEINS (HCC): Primary | ICD-10-CM

## 2022-10-05 DIAGNOSIS — I70.243 ATHEROSCLEROSIS OF NATIVE ARTERY OF LEFT LOWER EXTREMITY WITH ULCERATION OF ANKLE (HCC): ICD-10-CM

## 2022-10-05 DIAGNOSIS — I70.242 ATHEROSCLEROSIS OF NATIVE ARTERY OF LEFT LOWER EXTREMITY WITH ULCERATION OF CALF (HCC): ICD-10-CM

## 2022-10-05 PROCEDURE — 11042 DBRDMT SUBQ TIS 1ST 20SQCM/<: CPT | Performed by: SURGERY

## 2022-10-05 PROCEDURE — 11042 DBRDMT SUBQ TIS 1ST 20SQCM/<: CPT

## 2022-10-05 RX ORDER — BETAMETHASONE DIPROPIONATE 0.05 %
OINTMENT (GRAM) TOPICAL ONCE
Status: CANCELLED | OUTPATIENT
Start: 2022-10-05 | End: 2022-10-05

## 2022-10-05 RX ORDER — LIDOCAINE HYDROCHLORIDE 20 MG/ML
JELLY TOPICAL ONCE
Status: CANCELLED | OUTPATIENT
Start: 2022-10-05 | End: 2022-10-05

## 2022-10-05 RX ORDER — GENTAMICIN SULFATE 1 MG/G
OINTMENT TOPICAL ONCE
Status: CANCELLED | OUTPATIENT
Start: 2022-10-05 | End: 2022-10-05

## 2022-10-05 RX ORDER — OXYCODONE HYDROCHLORIDE AND ACETAMINOPHEN 5; 325 MG/1; MG/1
1 TABLET ORAL EVERY 6 HOURS PRN
Qty: 25 TABLET | Refills: 0 | Status: SHIPPED | OUTPATIENT
Start: 2022-10-05 | End: 2022-10-19

## 2022-10-05 RX ORDER — GINSENG 100 MG
CAPSULE ORAL ONCE
Status: CANCELLED | OUTPATIENT
Start: 2022-10-05 | End: 2022-10-05

## 2022-10-05 RX ORDER — LIDOCAINE HYDROCHLORIDE 40 MG/ML
SOLUTION TOPICAL ONCE
Status: COMPLETED | OUTPATIENT
Start: 2022-10-05 | End: 2022-10-05

## 2022-10-05 RX ORDER — LIDOCAINE HYDROCHLORIDE 40 MG/ML
SOLUTION TOPICAL ONCE
Status: CANCELLED | OUTPATIENT
Start: 2022-10-05 | End: 2022-10-05

## 2022-10-05 RX ORDER — LIDOCAINE 40 MG/G
CREAM TOPICAL ONCE
Status: CANCELLED | OUTPATIENT
Start: 2022-10-05 | End: 2022-10-05

## 2022-10-05 RX ORDER — CLOBETASOL PROPIONATE 0.5 MG/G
OINTMENT TOPICAL ONCE
Status: CANCELLED | OUTPATIENT
Start: 2022-10-05 | End: 2022-10-05

## 2022-10-05 RX ORDER — BACITRACIN ZINC AND POLYMYXIN B SULFATE 500; 1000 [USP'U]/G; [USP'U]/G
OINTMENT TOPICAL ONCE
Status: CANCELLED | OUTPATIENT
Start: 2022-10-05 | End: 2022-10-05

## 2022-10-05 RX ORDER — BACITRACIN, NEOMYCIN, POLYMYXIN B 400; 3.5; 5 [USP'U]/G; MG/G; [USP'U]/G
OINTMENT TOPICAL ONCE
Status: CANCELLED | OUTPATIENT
Start: 2022-10-05 | End: 2022-10-05

## 2022-10-05 RX ORDER — LIDOCAINE 50 MG/G
OINTMENT TOPICAL ONCE
Status: CANCELLED | OUTPATIENT
Start: 2022-10-05 | End: 2022-10-05

## 2022-10-05 RX ADMIN — LIDOCAINE HYDROCHLORIDE: 40 SOLUTION TOPICAL at 08:01

## 2022-10-05 ASSESSMENT — PAIN DESCRIPTION - FREQUENCY: FREQUENCY: CONTINUOUS

## 2022-10-05 ASSESSMENT — PAIN - FUNCTIONAL ASSESSMENT: PAIN_FUNCTIONAL_ASSESSMENT: PREVENTS OR INTERFERES SOME ACTIVE ACTIVITIES AND ADLS

## 2022-10-05 ASSESSMENT — PAIN DESCRIPTION - ONSET: ONSET: ON-GOING

## 2022-10-05 ASSESSMENT — PAIN DESCRIPTION - DESCRIPTORS: DESCRIPTORS: STABBING

## 2022-10-05 ASSESSMENT — PAIN DESCRIPTION - ORIENTATION: ORIENTATION: LEFT

## 2022-10-05 ASSESSMENT — PAIN DESCRIPTION - LOCATION: LOCATION: LEG

## 2022-10-05 ASSESSMENT — PAIN SCALES - GENERAL: PAINLEVEL_OUTOF10: 5

## 2022-10-05 ASSESSMENT — PAIN DESCRIPTION - PAIN TYPE: TYPE: CHRONIC PAIN

## 2022-10-05 NOTE — PROGRESS NOTES
know  She tolerated unna boot and aquacell - some improvement  216/22  Appearance improved, slightly larger  Consider drawtek next week but overall drainage seems reasonably managed as periwound appears ok  2/23/2022  The wound, has some exudate, no recent cultures were done, will do wound cultures today  3/2/22  Reflux study reviewed - no significant reflux  Will treat culture - augmentin 875 mg bid x 10 days - script sent  More drainage - stable size  3/9/22  Wound appearance improved, stable in size  drawtek  3/16/22  Wound slightly larger in size, new wound of ankle  Continue Drawtek, change to profore  Avoid shoes which will contact ankle area  3/23/22  Wounds stable  Overall appearance improved  Will have pt see ID re cultures - disc with Dr Cristhian Casarez  3/30/22  Much improved appearance  On oral abx per ID - concerns re pt ability to work while on IV - will see how her wound progresses  4/6/22  Appearance improved but size not much different  Finished oral abx  Will re culture next week if no significant size change - possible advance skin therapy  4/20/2022  Ulcer on the medial aspect, fairly clean and granulating, ulcer of the lateral aspect, has some exudate, debrided  4/27/22  Wounds stable   Hypergranulation tissue removed  Culture done - possible graft in future  5/4/22  Wound stable  Disc culture with Dr Cristhian Casarez who would like to start her on iv abx  5/11/22  Wound stable  Iv abx have not been started yet - spoke with Dr Cristhian Casarez - spoke with pt she will call his office  She had spoke with [facility] but there were some issues  5/18/22  Calf stable, ankle improved  Iv abx to start this week after picc placement  On exam   L dp 2+, PT triphasic - with compression of dp - PT becomes weakly biphasic  Concern that PT though triphasic is not getting inline flow and as a result isn't healing in the calf distribution because of occlusive disease  We discussed angiogram - will schedule  I reviewed the procedure with the patient and family as available. I discussed the procedure, risks, benefits, complications, and alternatives of the procedure. They understand and consent.   All questions were answered  Script for percocet 5/325 mg #28 prn q6 hrs - given, oarrs run  5/24/22  Angio, L PT and peroneal plasty  5/25/22  On iv abx  Wound appearance better  R groin no hematoma, L DP 2+, PT triphasic   6/1/22  Wound size and appearance better  6/8/22  Wound size and appearance better  Discussed with Dr Celia Holley - he will stop abx  6/15/22  Wound size slightly improvement, appearance better  6/22/22  Wounds sizes smaller  6/29/22  Wounds stable   Hypergranulation tissue present throughout wound beds    Continue drawtex, foam, ABD and profore   7/6/22  Wounds slightly improved  7/13/22  Both wounds slightly larger  Hyperkeratotic tissue debrided back  7/14/22  Patient came in due to drainage through her wrap  Dressing noted to have large amounts of yellow/green drainage throughout  Cultures obtained    7/20/22  Culture reviewed large growth S aureus and Pseudomonas  Disc with Dr Hannon - will start clindamycin 600 mg tid for staph  He will see her in office in regards to IV for pseudomonas  Wounds stable in size  Change to aquacell ag  7/27/22  Dr Celia Holley to see  Medial slightly larger, lateral slightly smaller  8/3/22  Dr Celia Holley saw her felt wound appearance better - will hold off on iv for now  Medial slightly improved, lateral stable  8/10/2022  Wounds stable  8/17/22  Wound stable, more pain with debridement  Discussed OR for debridement and possible advanced skin therapy - pt agreeable  8/24/22  Debridement, kerecise application  6/62/76  Wound appearance improved  Medial wound improved, lateral stable  9/7/22  Wounds are smaller  9/14/22  Wound stable  Enodfrom and drawtek  9/21/2022  Wound debrided, stable according to the measurements  9/28/22  Wound larger  Culture done  Discussed OR  Aquacell ag change   10/5/22  Wound slightly larger  Percocet 5/325 mg #25 - script given, oarrs reviewed  Cx reviewed - will disc with Dr Ferreira Prom - all light growth   Not interested in OR at this time    Wound/Ulcer Pain Timing/Severity: constant, moderate  Quality of pain: aching, throbbing, tender  Severity:  6/ 10   Modifying Factors: Pain worsens with dressing changes, debridement  Associated Signs/Symptoms: edema, drainage and pain    Ulcer Identification:  Ulcer Type: venous  Contributing Factors: edema and venous stasis    Diabetic/Pressure/Non Pressure Ulcers only:  Ulcer: Non-Pressure ulcer, fat layer exposed        PAST MEDICAL HISTORY      Diagnosis Date    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 ([facility] [address] 5/18/2022    Dizziness - light-headed     GERD (gastroesophageal reflux disease)     Herpes dermatitis 4/27/2017    Insomnia secondary to anxiety 4/6/2018    Lightheadedness     Migraine     Skin ulcer of buttock with fat layer exposed ([address] 7/20/2016    Venous stasis ulcer of left ankle with fat layer exposed with varicose veins ([facility] [address] 2/2/2022     Past Surgical History:   Procedure Laterality Date    CHOLECYSTECTOMY, LAPAROSCOPIC  04/08/2014    LEG SURGERY Left 8/24/2022    LEFT LOWER EXTREMITY DEBRIDMENT WITH POSSIBLE ADVANCED SKIN THERAPY, POSSIBLE Teresa Griffes performed by Uvaldo Bustillo MD at [facility] ENDOSCOPY  12/13/2013    mild gastritis and small hiatal her[place] [id number]  2015    GERD, Dr. Evita Gauthier, office     Family History   Problem Relation Age of Onset    Diabetes Mother     Hypertension Mother     Heart Disease Father     Heart Attack Father     Heart Surgery Father         angioplasty    Stroke Father     High Cholesterol Father     Heart Attack Maternal Grandfather      Social History     Tobacco Use    Smoking status: Never    Smokeless tobacco: Never   Vaping Use    Vaping Use: Never used   Substance Use Topics    Alcohol use: No    Drug use: No     Allergies   Allergen Reactions    Bee Pollen Anaphylaxis    Tetracyclines & Related Hives     Current Outpatient Medications on File Prior to Encounter   Medication Sig Dispense Refill    acyclovir (ZOVIRAX) 400 MG tablet take 1 tablet by mouth once daily 90 tablet 3    butalbital-acetaminophen-caffeine (FIORICET, ESGIC) -40 MG per tablet Take 1 tablet by mouth 3 times daily as needed for Headaches or Migraine Indications: Cluster Headache 90 tablet 5    EPINEPHrine (EPIPEN) 0.3 MG/0.3ML SOAJ injection Use as directed for allergic reaction 1 each 5    hydrOXYzine HCl (ATARAX) 25 MG tablet take 1 tablet BID 60 tablet 5    pantoprazole (PROTONIX) 40 MG tablet Take 1 tablet by mouth every morning (before breakfast) 90 tablet 3    oxyCODONE-acetaminophen (PERCOCET) 5-325 MG per tablet Take 1 tablet by mouth every 4 hours as needed for Pain. aspirin-acetaminophen-caffeine (EXCEDRIN MIGRAINE) 250-250-65 MG per tablet Take 1 tablet by mouth as needed for Headaches 300 tablet 3     No current facility-administered medications on file prior to encounter.        REVIEW OF SYSTEMS See HPI    Objective:    BP (!) 142/72   Pulse 80   Temp 98.1 °F (36.7 °C) (Temporal)   Resp 18   Wt Readings from Last 3 Encounters:   08/31/22 170 lb (77.1 kg)   08/31/22 180 lb 9.6 oz (81.9 kg)   08/24/22 170 lb (77.1 kg)     PHYSICAL EXAM  CONSTITUTIONAL:   Awake, alert, cooperative   EYES:  lids and lashes normal   ENT: external ears and nose without lesions   NECK:  supple, symmetrical, trachea midline   SKIN:  Open wound Present    Assessment:     Problem List Items Addressed This Visit       Venous stasis ulcer of left ankle with fat layer exposed with varicose veins (HCC) - Primary (Chronic)    Relevant Medications    oxyCODONE-acetaminophen (PERCOCET) 5-325 MG per tablet    Other Relevant Orders    Initiate Outpatient Wound Care Protocol    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 [facility]) (Chronic)    Relevant Orders    Initiate Outpatient Wound Care Protocol       Pre Debridement Measurements:  Are located in the [facility]  Documentation Flow Sheet  Post Debridement Measurements:  Wound/Ulcer Descriptions are Pre Debridement except measurements:     Wound 08/10/16 Other (Comment) Buttocks Left;Distal #2 acq 8/4/16 (Active)   Number of days: 5484       Wound 08/31/16 Buttocks Left;Proximal #3 aquired 8-27-26 (Active)   Number of days: 2226       Wound 02/02/22 Ankle Left;Medial #1 (Active)   Wound Image   10/05/22 0748   Dressing Status New dressing applied 09/28/22 0835   Wound Cleansed Cleansed with saline 09/28/22 0835   Dressing/Treatment ABD; Alginate with Ag 09/28/22 0835   Offloading for Diabetic Foot Ulcers Offloading not required 09/28/22 0835   Wound Length (cm) 7.9 cm 10/05/22 0748   Wound Width (cm) 4.5 cm 10/05/22 0748   Wound Depth (cm) 0.2 cm 10/05/22 0748   Wound Surface Area (cm^2) 35.55 cm^2 10/05/22 0748   Change in Wound Size % (l*w) -31.37 10/05/22 0748   Wound Volume (cm^3) 7.11 cm^3 10/05/22 0748   Wound Healing % -163 10/05/22 0748   Post-Procedure Length (cm) 8 cm 10/05/22 0816   Post-Procedure Width (cm) 4.6 cm 10/05/22 0816   Post-Procedure Depth (cm) 0.2 cm 10/05/22 0816   Post-Procedure Surface Area (cm^2) 36.8 cm^2 10/05/22 0816   Post-Procedure Volume (cm^3) 7.36 cm^3 10/05/22 0816   Wound Assessment Pink/red;Fibrin 10/05/22 0748   Drainage Amount Moderate 10/05/22 0748   Drainage Description Yellow;Brown 10/05/22 0748   Odor Mild 10/05/22 0748   Melany-wound Assessment Blanchable erythema;Fragile 10/05/22 0748   Number of days: 245       Wound 03/16/22 Ankle Posterior; Left #2 (Active)   Wound Image   10/05/22 0748   Dressing Status New dressing applied;Clean;Dry; Intact 10/05/22 0841   Wound Cleansed Cleansed with saline 10/05/22 0841   Dressing/Treatment Alginate with Ag;ABD; Foam 10/05/22 0841   Offloading for Diabetic Foot Ulcers Offloading not required 09/28/22 9665   Wound Length (cm) 4.5 cm 10/05/22 0748   Wound Width (cm) 2.6 cm 10/05/22 0748   Wound Depth (cm) 0.2 cm 10/05/22 0748   Wound Surface Area (cm^2) 11.7 cm^2 10/05/22 0748   Change in Wound Size % (l*w) -368 10/05/22 0748   Wound Volume (cm^3) 2.34 cm^3 10/05/22 0748   Wound Healing % -836 10/05/22 0748   Post-Procedure Length (cm) 4.7 cm 10/05/22 0816   Post-Procedure Width (cm) 2.6 cm 10/05/22 0816   Post-Procedure Depth (cm) 0.2 cm 10/05/22 0816   Post-Procedure Surface Area (cm^2) 12.22 cm^2 10/05/22 0816   Post-Procedure Volume (cm^3) 2.444 cm^3 10/05/22 0816   Wound Assessment Fibrin;Pink/red 10/05/22 0748   Drainage Amount Moderate 10/05/22 0748   Drainage Description Yellow;Brown 10/05/22 0748   Odor None 10/05/22 0748   Melany-wound Assessment Blanchable erythema;Fragile 10/05/22 0748   Number of days: 203     Incision 08/24/22 Leg Left (Active)   Number of days: 42       Procedure Note  Indications:  Based on my examination of this patient's wound(s)/ulcer(s) today, debridement is required to promote healing and evaluate the wound base. Performed by: Benja Augustin MD    Consent obtained:  Yes    Time out taken:  Yes    Pain Control: Anesthetic  Anesthetic: 4% Lidocaine Liquid Topical     Debridement:Excisional Debridement    Using curette the wound(s)/ulcer(s) was/were sharply debrided down through and including the removal of epidermis, dermis, and subcutaneous tissue. Devitalized Tissue Debrided:  fibrin, biofilm, slough, and exudate to stimulate bleeding to promote healing, post debridement good bleeding base and wound edges noted    Wound/Ulcer #: 1 and 2    Percent of Wound/Ulcer Debrided: 60%    Total Surface Area Debrided:  20 sq cm     Estimated Blood Loss:  Minimal  Hemostasis Achieved:  by pressure    Procedural Pain:  8 / 10   Post Procedural Pain:  7 / 10     Response to treatment:  Well tolerated by patient.      Plan:   Treatment Note please see attached Discharge Instructions    Written patient dismissal instructions given to patient and signed by patient or POA. Discharge Instructions         Visit Discharge/Physician Orders     Discharge condition: Stable     Assessment of pain at discharge: yes     Anesthetic used: 4% lidocaine solution     Discharge to: Home     Left via:Private automobile     Accompanied by:self     ECF/HHA: n/a     Dressing Orders:LEFT MEDIAL and LATERAL LOWER LEG-Cleanse with normal saline, aquacel AG,  ABD pad and unna boot and coban. Change weekly. Treatment Orders: Eat a diet high in protein and vitamin C. Take a multiple vitamin daily unless contraindicated. To see Dr. Haresh Phan as scheduled      Must wear slip on shoe to work due to wrap on leg! [facility] followup visit : 1 week_____________________________  (Please note your next appointment above and if you are unable to keep, kindly give a 24 hour notice. Thank you.)     Physician signature:__________________________     If you experience any of the following, please call the [facility] during business hours:     * Increase in Pain  * Temperature over 101  * Increase in drainage from your wound  * Drainage with a foul odor  * Bleeding  * Increase in swelling  * Need for compression bandage changes due to slippage, breakthrough drainage. If you need medical attention outside of the business hours of the [facility] please contact your PCP or go to the nearest emergency room.                   Electronically signed by Sandra Saleh MD

## 2022-10-12 ENCOUNTER — HOSPITAL ENCOUNTER (OUTPATIENT)
Dept: WOUND CARE | Age: 67
Discharge: HOME | End: 2022-10-12
Payer: MEDICAID

## 2022-10-12 VITALS — TEMPERATURE: 98 F | HEART RATE: 76 BPM | SYSTOLIC BLOOD PRESSURE: 167 MMHG | DIASTOLIC BLOOD PRESSURE: 72 MMHG

## 2022-10-12 DIAGNOSIS — I70.243 ATHEROSCLEROSIS OF NATIVE ARTERY OF LEFT LOWER EXTREMITY WITH ULCERATION OF ANKLE (HCC): ICD-10-CM

## 2022-10-12 DIAGNOSIS — I70.242 ATHEROSCLEROSIS OF NATIVE ARTERY OF LEFT LOWER EXTREMITY WITH ULCERATION OF CALF (HCC): ICD-10-CM

## 2022-10-12 DIAGNOSIS — I83.023 VENOUS STASIS ULCER OF LEFT ANKLE WITH FAT LAYER EXPOSED WITH VARICOSE VEINS (HCC): Primary | ICD-10-CM

## 2022-10-12 DIAGNOSIS — L97.322 VENOUS STASIS ULCER OF LEFT ANKLE WITH FAT LAYER EXPOSED WITH VARICOSE VEINS (HCC): Primary | ICD-10-CM

## 2022-10-12 PROCEDURE — 11042 DBRDMT SUBQ TIS 1ST 20SQCM/<: CPT

## 2022-10-12 PROCEDURE — 11042 DBRDMT SUBQ TIS 1ST 20SQCM/<: CPT | Performed by: SURGERY

## 2022-10-12 RX ORDER — BACITRACIN ZINC AND POLYMYXIN B SULFATE 500; 1000 [USP'U]/G; [USP'U]/G
OINTMENT TOPICAL ONCE
Status: CANCELLED | OUTPATIENT
Start: 2022-10-12 | End: 2022-10-12

## 2022-10-12 RX ORDER — LIDOCAINE HYDROCHLORIDE 40 MG/ML
SOLUTION TOPICAL ONCE
Status: COMPLETED | OUTPATIENT
Start: 2022-10-12 | End: 2022-10-12

## 2022-10-12 RX ORDER — LIDOCAINE HYDROCHLORIDE 40 MG/ML
SOLUTION TOPICAL ONCE
Status: CANCELLED | OUTPATIENT
Start: 2022-10-12 | End: 2022-10-12

## 2022-10-12 RX ORDER — BETAMETHASONE DIPROPIONATE 0.05 %
OINTMENT (GRAM) TOPICAL ONCE
Status: CANCELLED | OUTPATIENT
Start: 2022-10-12 | End: 2022-10-12

## 2022-10-12 RX ORDER — GENTAMICIN SULFATE 1 MG/G
OINTMENT TOPICAL ONCE
Status: CANCELLED | OUTPATIENT
Start: 2022-10-12 | End: 2022-10-12

## 2022-10-12 RX ORDER — GINSENG 100 MG
CAPSULE ORAL ONCE
Status: CANCELLED | OUTPATIENT
Start: 2022-10-12 | End: 2022-10-12

## 2022-10-12 RX ORDER — CLOBETASOL PROPIONATE 0.5 MG/G
OINTMENT TOPICAL ONCE
Status: CANCELLED | OUTPATIENT
Start: 2022-10-12 | End: 2022-10-12

## 2022-10-12 RX ORDER — LIDOCAINE 50 MG/G
OINTMENT TOPICAL ONCE
Status: CANCELLED | OUTPATIENT
Start: 2022-10-12 | End: 2022-10-12

## 2022-10-12 RX ORDER — BACITRACIN, NEOMYCIN, POLYMYXIN B 400; 3.5; 5 [USP'U]/G; MG/G; [USP'U]/G
OINTMENT TOPICAL ONCE
Status: CANCELLED | OUTPATIENT
Start: 2022-10-12 | End: 2022-10-12

## 2022-10-12 RX ORDER — LIDOCAINE HYDROCHLORIDE 20 MG/ML
JELLY TOPICAL ONCE
Status: CANCELLED | OUTPATIENT
Start: 2022-10-12 | End: 2022-10-12

## 2022-10-12 RX ORDER — LIDOCAINE 40 MG/G
CREAM TOPICAL ONCE
Status: CANCELLED | OUTPATIENT
Start: 2022-10-12 | End: 2022-10-12

## 2022-10-12 RX ADMIN — LIDOCAINE HYDROCHLORIDE: 40 SOLUTION TOPICAL at 07:44

## 2022-10-12 ASSESSMENT — PAIN DESCRIPTION - DESCRIPTORS: DESCRIPTORS: STABBING

## 2022-10-12 ASSESSMENT — PAIN SCALES - GENERAL: PAINLEVEL_OUTOF10: 5

## 2022-10-12 ASSESSMENT — PAIN DESCRIPTION - ORIENTATION: ORIENTATION: LEFT

## 2022-10-12 ASSESSMENT — PAIN DESCRIPTION - PAIN TYPE: TYPE: CHRONIC PAIN

## 2022-10-12 ASSESSMENT — PAIN DESCRIPTION - LOCATION: LOCATION: LEG

## 2022-10-17 NOTE — DISCHARGE INSTRUCTIONS
Visit Discharge/Physician Orders     Discharge condition: Stable     Assessment of pain at discharge: yes     Anesthetic used: 4% lidocaine solution(none 10-19-22)     Discharge to: Home     Left via:Private automobile     Accompanied by:self     ECF/HHA: n/a     Dressing Orders:LEFT MEDIAL and LATERAL LOWER LEG-Cleanse with normal saline, apply zinc to fiona wound, aquacel AG,  ABD pad , unna boot and coban. Change weekly. Treatment Orders: Eat a diet high in protein and vitamin C. Take a multiple vitamin daily unless contraindicated. To see Dr. Ferreira Prom as scheduled      Must wear slip on shoe to work due to wrap on leg! Out patient surgical debridement in future- his office will call with date and instructions. [address] [place] [address],[address] followup visit : 1 week_____________________________  (Please note your next appointment above and if you are unable to keep, kindly give a 24 hour notice. Thank you.)     Physician signature:__________________________     If you experience any of the following, please call the [facility] during business hours:     * Increase in Pain  * Temperature over 101  * Increase in drainage from your wound  * Drainage with a foul odor  * Bleeding  * Increase in swelling  * Need for compression bandage changes due to slippage, breakthrough drainage. If you need medical attention outside of the business hours of the [facility] please contact your PCP or go to the nearest emergency room.

## 2022-10-19 ENCOUNTER — PREP FOR PROCEDURE (OUTPATIENT)
Dept: VASCULAR SURGERY | Age: 67
End: 2022-10-19

## 2022-10-19 ENCOUNTER — TELEPHONE (OUTPATIENT)
Dept: VASCULAR SURGERY | Age: 67
End: 2022-10-19

## 2022-10-19 ENCOUNTER — HOSPITAL ENCOUNTER (OUTPATIENT)
Dept: WOUND CARE | Age: 67
Discharge: HOME | End: 2022-10-19
Payer: MEDICAID

## 2022-10-19 VITALS
DIASTOLIC BLOOD PRESSURE: 72 MMHG | WEIGHT: 170 LBS | RESPIRATION RATE: 18 BRPM | HEIGHT: 68 IN | HEART RATE: 80 BPM | SYSTOLIC BLOOD PRESSURE: 142 MMHG | TEMPERATURE: 99.3 F | BODY MASS INDEX: 25.76 KG/M2

## 2022-10-19 DIAGNOSIS — I70.243 ATHEROSCLEROSIS OF NATIVE ARTERY OF LEFT LOWER EXTREMITY WITH ULCERATION OF ANKLE (HCC): ICD-10-CM

## 2022-10-19 DIAGNOSIS — L97.322 VENOUS STASIS ULCER OF LEFT ANKLE WITH FAT LAYER EXPOSED WITH VARICOSE VEINS (HCC): Primary | ICD-10-CM

## 2022-10-19 DIAGNOSIS — I70.242 ATHEROSCLEROSIS OF NATIVE ARTERY OF LEFT LOWER EXTREMITY WITH ULCERATION OF CALF (HCC): ICD-10-CM

## 2022-10-19 DIAGNOSIS — I83.023 VENOUS STASIS ULCER OF LEFT ANKLE WITH FAT LAYER EXPOSED WITH VARICOSE VEINS (HCC): Primary | ICD-10-CM

## 2022-10-19 PROCEDURE — 99213 OFFICE O/P EST LOW 20 MIN: CPT

## 2022-10-19 PROCEDURE — 99213 OFFICE O/P EST LOW 20 MIN: CPT | Performed by: SURGERY

## 2022-10-19 RX ORDER — BACITRACIN, NEOMYCIN, POLYMYXIN B 400; 3.5; 5 [USP'U]/G; MG/G; [USP'U]/G
OINTMENT TOPICAL ONCE
Status: CANCELLED | OUTPATIENT
Start: 2022-10-19 | End: 2022-10-19

## 2022-10-19 RX ORDER — GINSENG 100 MG
CAPSULE ORAL ONCE
Status: CANCELLED | OUTPATIENT
Start: 2022-10-19 | End: 2022-10-19

## 2022-10-19 RX ORDER — BACITRACIN ZINC AND POLYMYXIN B SULFATE 500; 1000 [USP'U]/G; [USP'U]/G
OINTMENT TOPICAL ONCE
Status: CANCELLED | OUTPATIENT
Start: 2022-10-19 | End: 2022-10-19

## 2022-10-19 RX ORDER — GENTAMICIN SULFATE 1 MG/G
OINTMENT TOPICAL ONCE
Status: CANCELLED | OUTPATIENT
Start: 2022-10-19 | End: 2022-10-19

## 2022-10-19 RX ORDER — LIDOCAINE 50 MG/G
OINTMENT TOPICAL ONCE
Status: CANCELLED | OUTPATIENT
Start: 2022-10-19 | End: 2022-10-19

## 2022-10-19 RX ORDER — LIDOCAINE 40 MG/G
CREAM TOPICAL ONCE
Status: CANCELLED | OUTPATIENT
Start: 2022-10-19 | End: 2022-10-19

## 2022-10-19 RX ORDER — LIDOCAINE HYDROCHLORIDE 20 MG/ML
JELLY TOPICAL ONCE
Status: CANCELLED | OUTPATIENT
Start: 2022-10-19 | End: 2022-10-19

## 2022-10-19 RX ORDER — CLOBETASOL PROPIONATE 0.5 MG/G
OINTMENT TOPICAL ONCE
Status: CANCELLED | OUTPATIENT
Start: 2022-10-19 | End: 2022-10-19

## 2022-10-19 RX ORDER — LIDOCAINE HYDROCHLORIDE 40 MG/ML
SOLUTION TOPICAL ONCE
Status: CANCELLED | OUTPATIENT
Start: 2022-10-19 | End: 2022-10-19

## 2022-10-19 RX ORDER — BETAMETHASONE DIPROPIONATE 0.05 %
OINTMENT (GRAM) TOPICAL ONCE
Status: CANCELLED | OUTPATIENT
Start: 2022-10-19 | End: 2022-10-19

## 2022-10-19 ASSESSMENT — PAIN DESCRIPTION - FREQUENCY: FREQUENCY: CONTINUOUS

## 2022-10-19 ASSESSMENT — PAIN DESCRIPTION - LOCATION: LOCATION: LEG

## 2022-10-19 ASSESSMENT — PAIN DESCRIPTION - ORIENTATION: ORIENTATION: LEFT

## 2022-10-19 ASSESSMENT — PAIN - FUNCTIONAL ASSESSMENT: PAIN_FUNCTIONAL_ASSESSMENT: PREVENTS OR INTERFERES SOME ACTIVE ACTIVITIES AND ADLS

## 2022-10-19 ASSESSMENT — PAIN DESCRIPTION - DESCRIPTORS: DESCRIPTORS: STABBING

## 2022-10-19 ASSESSMENT — PAIN SCALES - GENERAL: PAINLEVEL_OUTOF10: 5

## 2022-10-19 ASSESSMENT — PAIN DESCRIPTION - ONSET: ONSET: ON-GOING

## 2022-10-19 ASSESSMENT — PAIN DESCRIPTION - PROGRESSION: CLINICAL_PROGRESSION: NOT CHANGED

## 2022-10-19 ASSESSMENT — PAIN DESCRIPTION - PAIN TYPE: TYPE: CHRONIC PAIN

## 2022-10-19 NOTE — TELEPHONE ENCOUNTER
Scheduled (L) LE debridement, possible advanced skin therapy, possible Unna boot 10/25 at [facility] ([facility]). Message left on pt's voicemail to report at 9:00 a.m, NPO after midnight except heart and/or BP meds in a.m with sips of water.

## 2022-10-19 NOTE — PROGRESS NOTES
Wound Healing Center Followup Visit Note    Referring Physician : Stephane Peck MD  [address] [place] [address] RECORD NUMBER:  [id number]  AGE: 61 y.o. GENDER: female  : 1955  EPISODE DATE:  10/19/2022    Subjective:     Chief Complaint   Patient presents with    Wound Check     Left lower leg      HISTORY of PRESENT ILLNESS AMELIA Lagunas is a 61 y.o. female who presents today in regards to follow up evaluation and treatment of wound/ulcer. That patient's past medical, family and social hx were reviewed and changes were made if present. History of Wound Context:  The patient has had a wound of her left ankle/calf which was first noted approximately 2021. This has been treated local wound care. On their initial visit to the wound healing center, 22,  the patient has noted that the wound has been improving. The patient has not had similar previous wounds in the past.      She started seeing Dr. Janelle Brown in 2021 and than Dr. Lobo Gonzalez. She was started in [facility] ~ 2021. She has noticed some improvement since starting unna boot. She is currently following with Dr. Priyanka Celestin. Pt is not on abx at time of initial visit, but has been treated with previously by podiatry. She is not a DM. She is not a smoker. She denies hx of DVT, and per her report had recent us noting no evidence of dvt at [facility]. She also had arterial studies done. I had previously seen her in the past in regards to left buttock thigh wound, which started as abscess. Pt works at [employer] in [facility] and is on her feet all day.     22  Reflux study - if significant findings will schedule for fu  Continue compression therapy [facility] per podiatry with aquacell dressing  Elevation  She does not have significant arterial occlusive disease  22  Patient has asked to continuing following with me going forward because of our previous relationship  She is going to let Dr. Koko Richmond office know  She tolerated unna boot and aquacell - some improvement  216/22  Appearance improved, slightly larger  Consider drawtek next week but overall drainage seems reasonably managed as periwound appears ok  2/23/2022  The wound, has some exudate, no recent cultures were done, will do wound cultures today  3/2/22  Reflux study reviewed - no significant reflux  Will treat culture - augmentin 875 mg bid x 10 days - script sent  More drainage - stable size  3/9/22  Wound appearance improved, stable in size  drawtek  3/16/22  Wound slightly larger in size, new wound of ankle  Continue Drawtek, change to profore  Avoid shoes which will contact ankle area  3/23/22  Wounds stable  Overall appearance improved  Will have pt see ID re cultures - disc with Dr Dea Parrish  3/30/22  Much improved appearance  On oral abx per ID - concerns re pt ability to work while on IV - will see how her wound progresses  4/6/22  Appearance improved but size not much different  Finished oral abx  Will re culture next week if no significant size change - possible advance skin therapy  4/20/2022  Ulcer on the medial aspect, fairly clean and granulating, ulcer of the lateral aspect, has some exudate, debrided  4/27/22  Wounds stable   Hypergranulation tissue removed  Culture done - possible graft in future  5/4/22  Wound stable  Disc culture with Dr Dea Parrish who would like to start her on iv abx  5/11/22  Wound stable  Iv abx have not been started yet - spoke with Dr Dea Parrish - spoke with pt she will call his office  She had spoke with [facility] but there were some issues  5/18/22  Calf stable, ankle improved  Iv abx to start this week after picc placement  On exam   L dp 2+, PT triphasic - with compression of dp - PT becomes weakly biphasic  Concern that PT though triphasic is not getting inline flow and as a result isn't healing in the calf distribution because of occlusive disease  We discussed angiogram - will schedule  I reviewed the procedure with the patient and family as available. I discussed the procedure, risks, benefits, complications, and alternatives of the procedure. They understand and consent.   All questions were answered  Script for percocet 5/325 mg #28 prn q6 hrs - given, oarrs run  5/24/22  Angio, L PT and peroneal plasty  5/25/22  On iv abx  Wound appearance better  R groin no hematoma, L DP 2+, PT triphasic   6/1/22  Wound size and appearance better  6/8/22  Wound size and appearance better  Discussed with Dr Ayesha Alba - he will stop abx  6/15/22  Wound size slightly improvement, appearance better  6/22/22  Wounds sizes smaller  6/29/22  Wounds stable   Hypergranulation tissue present throughout wound beds    Continue drawtex, foam, ABD and profore   7/6/22  Wounds slightly improved  7/13/22  Both wounds slightly larger  Hyperkeratotic tissue debrided back  7/14/22  Patient came in due to drainage through her wrap  Dressing noted to have large amounts of yellow/green drainage throughout  Cultures obtained    7/20/22  Culture reviewed large growth S aureus and Pseudomonas  Disc with Dr Hannon - will start clindamycin 600 mg tid for staph  He will see her in office in regards to IV for pseudomonas  Wounds stable in size  Change to aquacell ag  7/27/22  Dr Ayesha Alba to see  Medial slightly larger, lateral slightly smaller  8/3/22  Dr Ayesha Alba saw her felt wound appearance better - will hold off on iv for now  Medial slightly improved, lateral stable  8/10/2022  Wounds stable  8/17/22  Wound stable, more pain with debridement  Discussed OR for debridement and possible advanced skin therapy - pt agreeable  8/24/22  Debridement, kerecise application  2/66/70  Wound appearance improved  Medial wound improved, lateral stable  9/7/22  Wounds are smaller  9/14/22  Wound stable  Enodfrom and drawtek  9/21/2022  Wound debrided, stable according to the measurements  9/28/22  Wound larger  Culture done  Discussed OR  Aquacell ag change   10/5/22  Wound slightly larger  Percocet 5/325 mg #25 - script given, oarrs reviewed  Cx reviewed - will disc with Dr Carmela Chauhan - all light growth   Not interested in OR at this time  10/12/22  Wound stable  Hold on cx tx  10/19/22  Wound stable  Ok for surgical debridement will schedule  Declined debridement today    Wound/Ulcer Pain Timing/Severity: constant, moderate  Quality of pain: aching, throbbing, tender  Severity:  6/ 10   Modifying Factors: Pain worsens with dressing changes, debridement  Associated Signs/Symptoms: edema, drainage and pain    Ulcer Identification:  Ulcer Type: venous  Contributing Factors: edema and venous stasis    Diabetic/Pressure/Non Pressure Ulcers only:  Ulcer: Non-Pressure ulcer, fat layer exposed        PAST MEDICAL HISTORY      Diagnosis Date    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 ([address] 5/18/2022    Dizziness - light-headed     GERD (gastroesophageal reflux disease)     Herpes dermatitis 4/27/2017    Insomnia secondary to anxiety 4/6/2018    Lightheadedness     Migraine     Skin ulcer of buttock with fat layer exposed ([address] 7/20/2016    Venous stasis ulcer of left ankle with fat layer exposed with varicose veins ([address] 2/2/2022     Past Surgical History:   Procedure Laterality Date    CHOLECYSTECTOMY, LAPAROSCOPIC  04/08/2014    LEG SURGERY Left 8/24/2022    LEFT LOWER EXTREMITY DEBRIDMENT WITH POSSIBLE ADVANCED SKIN THERAPY, POSSIBLE Sravan Mooney performed by Geno Bautista MD at [facility] ENDOSCOPY  12/13/2013    mild gastritis and small hiatal hernia, Dr Krystian Matos, Oriana Oquendo  2015    GERD, Dr. Noe Adan, office     Family History   Problem Relation Age of Onset    Diabetes Mother     Hypertension Mother     Heart Disease Father     Heart Attack Father     Heart Surgery Father         angioplasty    Stroke Father     High Cholesterol Father     Heart Attack Maternal Grandfather      Social History     Tobacco Use    Smoking status: Never    Smokeless tobacco: Never   Vaping Use    Vaping Use: Never used   Substance Use Topics    Alcohol use: No    Drug use: No     Allergies   Allergen Reactions    Bee Pollen Anaphylaxis    Tetracyclines & Related Hives     Current Outpatient Medications on File Prior to Encounter   Medication Sig Dispense Refill    oxyCODONE-acetaminophen (PERCOCET) 5-325 MG per tablet Take 1 tablet by mouth every 6 hours as needed for Pain for up to 14 days. 25 tablet 0    acyclovir (ZOVIRAX) 400 MG tablet take 1 tablet by mouth once daily 90 tablet 3    butalbital-acetaminophen-caffeine (FIORICET, ESGIC) -40 MG per tablet Take 1 tablet by mouth 3 times daily as needed for Headaches or Migraine Indications: Cluster Headache 90 tablet 5    EPINEPHrine (EPIPEN) 0.3 MG/0.3ML SOAJ injection Use as directed for allergic reaction 1 each 5    hydrOXYzine HCl (ATARAX) 25 MG tablet take 1 tablet BID 60 tablet 5    pantoprazole (PROTONIX) 40 MG tablet Take 1 tablet by mouth every morning (before breakfast) 90 tablet 3    aspirin-acetaminophen-caffeine (EXCEDRIN MIGRAINE) 250-250-65 MG per tablet Take 1 tablet by mouth as needed for Headaches 300 tablet 3     No current facility-administered medications on file prior to encounter.        REVIEW OF SYSTEMS See HPI    Objective:    BP (!) 142/72   Pulse 80   Temp 99.3 °F (37.4 °C) (Temporal)   Resp 18   Ht 5' 8\" (1.727 m)   Wt 170 lb (77.1 kg)   BMI 25.85 kg/m²   Wt Readings from Last 3 Encounters:   10/19/22 170 lb (77.1 kg)   08/31/22 170 lb (77.1 kg)   08/31/22 180 lb 9.6 oz (81.9 kg)     PHYSICAL EXAM  CONSTITUTIONAL:   Awake, alert, cooperative   EYES:  lids and lashes normal   ENT: external ears and nose without lesions   NECK:  supple, symmetrical, trachea midline   SKIN:  Open wound Present    Assessment:     Problem List Items Addressed This Visit       Venous stasis ulcer of left ankle with fat layer exposed with varicose veins ([facility] - Primary (Chronic)    Relevant Orders    Initiate Outpatient Wound Care Protocol    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) (Chronic)    Relevant Orders    Initiate Outpatient Wound Care Protocol       Pre Debridement Measurements:  Are located in the [facility]  Documentation Flow Sheet  Post Debridement Measurements:  Wound/Ulcer Descriptions are Pre Debridement except measurements:     Wound 08/10/16 Other (Comment) Buttocks Left;Distal #2 acq 8/4/16 (Active)   Number of days: 3386       Wound 08/31/16 Buttocks Left;Proximal #3 aquired 8-27-26 (Active)   Number of days: 2240       Wound 02/02/22 Ankle Left;Medial #1 (Active)   Wound Image   10/05/22 0748   Dressing Status New dressing applied 09/28/22 0835   Wound Cleansed Cleansed with saline 09/28/22 0835   Dressing/Treatment ABD; Alginate with Ag 09/28/22 0835   Offloading for Diabetic Foot Ulcers Offloading not required 09/28/22 0835   Wound Length (cm) 8.4 cm 10/19/22 0802   Wound Width (cm) 5.1 cm 10/19/22 0802   Wound Depth (cm) 0.3 cm 10/19/22 0802   Wound Surface Area (cm^2) 42.84 cm^2 10/19/22 0802   Change in Wound Size % (l*w) -58.31 10/19/22 0802   Wound Volume (cm^3) 12.852 cm^3 10/19/22 0802   Wound Healing % -375 10/19/22 0802   Post-Procedure Length (cm) 8 cm 10/05/22 0816   Post-Procedure Width (cm) 4.6 cm 10/05/22 0816   Post-Procedure Depth (cm) 0.2 cm 10/05/22 0816   Post-Procedure Surface Area (cm^2) 36.8 cm^2 10/05/22 0816   Post-Procedure Volume (cm^3) 7.36 cm^3 10/05/22 0816   Wound Assessment Pink/red;Fibrin 10/19/22 0802   Drainage Amount Large 10/19/22 0802   Drainage Description Serous; Yellow 10/19/22 0802   Odor None 10/19/22 0802   Melany-wound Assessment Dry/flaky 10/19/22 0802   Number of days: 259       Wound 03/16/22 Ankle Posterior; Left #2 (Active)   Wound Image   10/05/22 0748   Dressing Status New dressing applied;Clean;Dry; Intact 10/19/22 0904   Wound Cleansed Cleansed with saline 10/19/22 0955 Dressing/Treatment Alginate with Ag;ABD; Foam 10/19/22 0904   Offloading for Diabetic Foot Ulcers Offloading not required 10/19/22 0904   Wound Length (cm) 4.7 cm 10/19/22 0802   Wound Width (cm) 3.1 cm 10/19/22 0802   Wound Depth (cm) 0.3 cm 10/19/22 0802   Wound Surface Area (cm^2) 14.57 cm^2 10/19/22 0802   Change in Wound Size % (l*w) -482.8 10/19/22 0802   Wound Volume (cm^3) 4.371 cm^3 10/19/22 0802   Wound Healing % -1648 10/19/22 0802   Post-Procedure Length (cm) 4.7 cm 10/05/22 0816   Post-Procedure Width (cm) 2.6 cm 10/05/22 0816   Post-Procedure Depth (cm) 0.2 cm 10/05/22 0816   Post-Procedure Surface Area (cm^2) 12.22 cm^2 10/05/22 0816   Post-Procedure Volume (cm^3) 2.444 cm^3 10/05/22 0816   Wound Assessment Pink/red;Fibrin 10/19/22 0802   Drainage Amount Large 10/19/22 0802   Drainage Description Serosanguinous; Yellow 10/19/22 0802   Odor None 10/19/22 0802   Fiona-wound Assessment Dry/flaky 10/19/22 0802   Number of days: 217     Incision 08/24/22 Leg Left (Active)   Number of days: 56     No debridement    Plan:   Treatment Note please see attached Discharge Instructions    Written patient dismissal instructions given to patient and signed by patient or POA. Discharge Instructions         Visit Discharge/Physician Orders     Discharge condition: Stable     Assessment of pain at discharge: yes     Anesthetic used: 4% lidocaine solution(none 10-19-22)     Discharge to: Home     Left via:Private automobile     Accompanied by:self     ECF/HHA: n/a     Dressing Orders:LEFT MEDIAL and LATERAL LOWER LEG-Cleanse with normal saline, apply zinc to fiona wound, aquacel AG,  ABD pad , unna boot and coban. Change weekly. Treatment Orders: Eat a diet high in protein and vitamin C. Take a multiple vitamin daily unless contraindicated. To see Dr. Carleen Stovall as scheduled      Must wear slip on shoe to work due to wrap on leg!      Out patient surgical debridement in future- his office will call with date and instructions. [facility] followup visit : 1 week_____________________________  (Please note your next appointment above and if you are unable to keep, kindly give a 24 hour notice. Thank you.)     Physician signature:__________________________     If you experience any of the following, please call the [address] [place] [address] Road during business hours:     * Increase in Pain  * Temperature over 101  * Increase in drainage from your wound  * Drainage with a foul odor  * Bleeding  * Increase in swelling  * Need for compression bandage changes due to slippage, breakthrough drainage. If you need medical attention outside of the business hours of the [address] [place] [address] Road please contact your PCP or go to the nearest emergency room.     Electronically signed by Bruno Vang MD

## 2022-10-19 NOTE — PROGRESS NOTES
Wound Healing Center Followup Visit Note    Referring Physician : Rik Castleman, MD  [address] [place] [address] RECORD NUMBER:  [id number]  AGE: 61 y.o. GENDER: female  : 1955  EPISODE DATE:  10/12/2022    Subjective:     Chief Complaint   Patient presents with    Wound Check     Leg left      HISTORY of PRESENT ILLNESS AMELIA Callahan is a 61 y.o. female who presents today in regards to follow up evaluation and treatment of wound/ulcer. That patient's past medical, family and social hx were reviewed and changes were made if present. History of Wound Context:  The patient has had a wound of her left ankle/calf which was first noted approximately 2021. This has been treated local wound care. On their initial visit to the wound healing center, 22,  the patient has noted that the wound has been improving. The patient has not had similar previous wounds in the past.      She started seeing Dr. Cosmo Martin in 2021 and than Dr. Joseluis Saravia. She was started in [facility] ~ 2021. She has noticed some improvement since starting unna boot. She is currently following with Dr. Brittany Pink. Pt is not on abx at time of initial visit, but has been treated with previously by podiatry. She is not a DM. She is not a smoker. She denies hx of DVT, and per her report had recent us noting no evidence of dvt at [facility]. She also had arterial studies done. I had previously seen her in the past in regards to left buttock thigh wound, which started as abscess. Pt works at [employer] in [place] and is on her feet all day.     22  Reflux study - if significant findings will schedule for fu  Continue compression therapy [facility] per podiatry with aquacell dressing  Elevation  She does not have significant arterial occlusive disease  22  Patient has asked to continuing following with me going forward because of our previous relationship  She is going to let Dr. Lucero Otero office know  She tolerated unna boot and aquacell - some improvement  216/22  Appearance improved, slightly larger  Consider drawtek next week but overall drainage seems reasonably managed as periwound appears ok  2/23/2022  The wound, has some exudate, no recent cultures were done, will do wound cultures today  3/2/22  Reflux study reviewed - no significant reflux  Will treat culture - augmentin 875 mg bid x 10 days - script sent  More drainage - stable size  3/9/22  Wound appearance improved, stable in size  drawtek  3/16/22  Wound slightly larger in size, new wound of ankle  Continue Drawtek, change to profore  Avoid shoes which will contact ankle area  3/23/22  Wounds stable  Overall appearance improved  Will have pt see ID re cultures - disc with Dr Reyna Hardy  3/30/22  Much improved appearance  On oral abx per ID - concerns re pt ability to work while on IV - will see how her wound progresses  4/6/22  Appearance improved but size not much different  Finished oral abx  Will re culture next week if no significant size change - possible advance skin therapy  4/20/2022  Ulcer on the medial aspect, fairly clean and granulating, ulcer of the lateral aspect, has some exudate, debrided  4/27/22  Wounds stable   Hypergranulation tissue removed  Culture done - possible graft in future  5/4/22  Wound stable  Disc culture with Dr Reyna Hardy who would like to start her on iv abx  5/11/22  Wound stable  Iv abx have not been started yet - spoke with Dr Reyna Hardy - spoke with pt she will call his office  She had spoke with [facility] but there were some issues  5/18/22  Calf stable, ankle improved  Iv abx to start this week after picc placement  On exam   L dp 2+, PT triphasic - with compression of dp - PT becomes weakly biphasic  Concern that PT though triphasic is not getting inline flow and as a result isn't healing in the calf distribution because of occlusive disease  We discussed angiogram - will schedule  I reviewed the procedure with the patient and family as available. I discussed the procedure, risks, benefits, complications, and alternatives of the procedure. They understand and consent.   All questions were answered  Script for percocet 5/325 mg #28 prn q6 hrs - given, oarrs run  5/24/22  Angio, L PT and peroneal plasty  5/25/22  On iv abx  Wound appearance better  R groin no hematoma, L DP 2+, PT triphasic   6/1/22  Wound size and appearance better  6/8/22  Wound size and appearance better  Discussed with Dr Mohsen Goetz - he will stop abx  6/15/22  Wound size slightly improvement, appearance better  6/22/22  Wounds sizes smaller  6/29/22  Wounds stable   Hypergranulation tissue present throughout wound beds    Continue drawtex, foam, ABD and profore   7/6/22  Wounds slightly improved  7/13/22  Both wounds slightly larger  Hyperkeratotic tissue debrided back  7/14/22  Patient came in due to drainage through her wrap  Dressing noted to have large amounts of yellow/green drainage throughout  Cultures obtained    7/20/22  Culture reviewed large growth S aureus and Pseudomonas  Disc with Dr Hannon - will start clindamycin 600 mg tid for staph  He will see her in office in regards to IV for pseudomonas  Wounds stable in size  Change to aquacell ag  7/27/22  Dr Mohsen Goetz to see  Medial slightly larger, lateral slightly smaller  8/3/22  Dr Mohsen Goetz saw her felt wound appearance better - will hold off on iv for now  Medial slightly improved, lateral stable  8/10/2022  Wounds stable  8/17/22  Wound stable, more pain with debridement  Discussed OR for debridement and possible advanced skin therapy - pt agreeable  8/24/22  Debridement, kerecise application  4/27/42  Wound appearance improved  Medial wound improved, lateral stable  9/7/22  Wounds are smaller  9/14/22  Wound stable  Enodfrom and drawtek  9/21/2022  Wound debrided, stable according to the measurements  9/28/22  Wound larger  Culture done  Discussed OR  Aquacell ag change   10/5/22  Wound slightly larger  Percocet 5/325 mg #25 - script given, oarrs reviewed  Cx reviewed - will disc with Dr Caryl Barthel - all light growth   Not interested in OR at this time  10/12/22  Wound stable  Discussed or - not interested    Wound/Ulcer Pain Timing/Severity: constant, moderate  Quality of pain: aching, throbbing, tender  Severity:  6/ 10   Modifying Factors: Pain worsens with dressing changes, debridement  Associated Signs/Symptoms: edema, drainage and pain    Ulcer Identification:  Ulcer Type: venous  Contributing Factors: edema and venous stasis    Diabetic/Pressure/Non Pressure Ulcers only:  Ulcer: Non-Pressure ulcer, fat layer exposed        PAST MEDICAL HISTORY      Diagnosis Date    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 ([address] 5/18/2022    Dizziness - light-headed     GERD (gastroesophageal reflux disease)     Herpes dermatitis 4/27/2017    Insomnia secondary to anxiety 4/6/2018    Lightheadedness     Migraine     Skin ulcer of buttock with fat layer exposed ([address] 7/20/2016    Venous stasis ulcer of left ankle with fat layer exposed with varicose veins ([address] 2/2/2022     Past Surgical History:   Procedure Laterality Date    CHOLECYSTECTOMY, LAPAROSCOPIC  04/08/2014    LEG SURGERY Left 8/24/2022    LEFT LOWER EXTREMITY DEBRIDMENT WITH POSSIBLE ADVANCED SKIN THERAPY, POSSIBLE Carloz Henle performed by Kiran Begum MD at [facility] ENDOSCOPY  12/13/2013    mild gastritis and small hiatal hernia, Dr David Juares, [place] [address]  2015    GERD, Dr. Luisa Diallo, office     Family History   Problem Relation Age of Onset    Diabetes Mother     Hypertension Mother     Heart Disease Father     Heart Attack Father     Heart Surgery Father         angioplasty    Stroke Father     High Cholesterol Father     Heart Attack Maternal Grandfather      Social History     Tobacco Use    Smoking status: Never    Smokeless tobacco: Never   Vaping Use    Vaping Use: Never used   Substance Use Topics    Alcohol use: No    Drug use: No     Allergies   Allergen Reactions    Bee Pollen Anaphylaxis    Tetracyclines & Related Hives     Current Outpatient Medications on File Prior to Encounter   Medication Sig Dispense Refill    oxyCODONE-acetaminophen (PERCOCET) 5-325 MG per tablet Take 1 tablet by mouth every 6 hours as needed for Pain for up to 14 days. 25 tablet 0    acyclovir (ZOVIRAX) 400 MG tablet take 1 tablet by mouth once daily 90 tablet 3    butalbital-acetaminophen-caffeine (FIORICET, ESGIC) -40 MG per tablet Take 1 tablet by mouth 3 times daily as needed for Headaches or Migraine Indications: Cluster Headache 90 tablet 5    EPINEPHrine (EPIPEN) 0.3 MG/0.3ML SOAJ injection Use as directed for allergic reaction 1 each 5    hydrOXYzine HCl (ATARAX) 25 MG tablet take 1 tablet BID 60 tablet 5    pantoprazole (PROTONIX) 40 MG tablet Take 1 tablet by mouth every morning (before breakfast) 90 tablet 3    aspirin-acetaminophen-caffeine (EXCEDRIN MIGRAINE) 250-250-65 MG per tablet Take 1 tablet by mouth as needed for Headaches 300 tablet 3     No current facility-administered medications on file prior to encounter.        REVIEW OF SYSTEMS See HPI    Objective:    BP (!) 167/72   Pulse 76   Temp 98 °F (36.7 °C)   Wt Readings from Last 3 Encounters:   10/19/22 170 lb (77.1 kg)   08/31/22 170 lb (77.1 kg)   08/31/22 180 lb 9.6 oz (81.9 kg)     PHYSICAL EXAM  CONSTITUTIONAL:   Awake, alert, cooperative   EYES:  lids and lashes normal   ENT: external ears and nose without lesions   NECK:  supple, symmetrical, trachea midline   SKIN:  Open wound Present    Assessment:     Problem List Items Addressed This Visit       Venous stasis ulcer of left ankle with fat layer exposed with varicose veins (HCC) - Primary (Chronic)    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) (Chronic)       Pre Debridement Measurements:  Are located in the [place]  Documentation Flow Sheet  Post Debridement Measurements:  Wound/Ulcer Descriptions are Pre Debridement except measurements:     Wound 08/10/16 Other (Comment) Buttocks Left;Distal #2 acq 8/4/16 (Active)   Number of days: 8040       Wound 08/31/16 Buttocks Left;Proximal #3 aquired 8-27-26 (Active)   Number of days: 2240       Wound 02/02/22 Ankle Left;Medial #1 (Active)   Wound Image   10/05/22 0748   Dressing Status New dressing applied 09/28/22 0835   Wound Cleansed Cleansed with saline 09/28/22 0835   Dressing/Treatment ABD; Alginate with Ag 09/28/22 0835   Offloading for Diabetic Foot Ulcers Offloading not required 09/28/22 0835   Wound Length (cm) 8.4 cm 10/19/22 0802   Wound Width (cm) 5.1 cm 10/19/22 0802   Wound Depth (cm) 0.3 cm 10/19/22 0802   Wound Surface Area (cm^2) 42.84 cm^2 10/19/22 0802   Change in Wound Size % (l*w) -58.31 10/19/22 0802   Wound Volume (cm^3) 12.852 cm^3 10/19/22 0802   Wound Healing % -375 10/19/22 0802   Post-Procedure Length (cm) 8 cm 10/05/22 0816   Post-Procedure Width (cm) 4.6 cm 10/05/22 0816   Post-Procedure Depth (cm) 0.2 cm 10/05/22 0816   Post-Procedure Surface Area (cm^2) 36.8 cm^2 10/05/22 0816   Post-Procedure Volume (cm^3) 7.36 cm^3 10/05/22 0816   Wound Assessment Pink/red;Fibrin 10/19/22 0802   Drainage Amount Large 10/19/22 0802   Drainage Description Serous; Yellow 10/19/22 0802   Odor None 10/19/22 0802   Melany-wound Assessment Dry/flaky 10/19/22 0802   Number of days: 259       Wound 03/16/22 Ankle Posterior; Left #2 (Active)   Wound Image   10/05/22 0748   Dressing Status New dressing applied;Clean;Dry; Intact 10/19/22 0904   Wound Cleansed Cleansed with saline 10/19/22 0904   Dressing/Treatment Alginate with Ag;ABD; Foam 10/19/22 0904   Offloading for Diabetic Foot Ulcers Offloading not required 10/19/22 0904   Wound Length (cm) 4.7 cm 10/19/22 0802   Wound Width (cm) 3.1 cm 10/19/22 0802   Wound Depth (cm) 0.3 cm 10/19/22 0802   Wound Surface Area (cm^2) 14.57 cm^2 10/19/22 0802   Change in Wound Size % (l*w) -482.8 10/19/22 0802   Wound Volume (cm^3) 4.371 cm^3 10/19/22 0802   Wound Healing % -1648 10/19/22 0802   Post-Procedure Length (cm) 4.7 cm 10/05/22 0816   Post-Procedure Width (cm) 2.6 cm 10/05/22 0816   Post-Procedure Depth (cm) 0.2 cm 10/05/22 0816   Post-Procedure Surface Area (cm^2) 12.22 cm^2 10/05/22 0816   Post-Procedure Volume (cm^3) 2.444 cm^3 10/05/22 0816   Wound Assessment Pink/red;Fibrin 10/19/22 0802   Drainage Amount Large 10/19/22 0802   Drainage Description Serosanguinous; Yellow 10/19/22 0802   Odor None 10/19/22 0802   Melany-wound Assessment Dry/flaky 10/19/22 0802   Number of days: 217     Incision 08/24/22 Leg Left (Active)   Number of days: 56       Procedure Note  Indications:  Based on my examination of this patient's wound(s)/ulcer(s) today, debridement is required to promote healing and evaluate the wound base. Performed by: Cooper Mcfadden MD    Consent obtained:  Yes    Time out taken:  Yes    Pain Control: Anesthetic  Anesthetic: 4% Lidocaine Liquid Topical     Debridement:Excisional Debridement    Using curette the wound(s)/ulcer(s) was/were sharply debrided down through and including the removal of epidermis, dermis, and subcutaneous tissue. Devitalized Tissue Debrided:  fibrin, biofilm, slough, and exudate to stimulate bleeding to promote healing, post debridement good bleeding base and wound edges noted    Wound/Ulcer #: 1 and 2    Percent of Wound/Ulcer Debrided: 60%    Total Surface Area Debrided:  20 sq cm     Estimated Blood Loss:  Minimal  Hemostasis Achieved:  by pressure    Procedural Pain:  8  / 10   Post Procedural Pain:  7 / 10     Response to treatment:  Well tolerated by patient. Plan:   Treatment Note please see attached Discharge Instructions    Written patient dismissal instructions given to patient and signed by patient or POA.          Discharge Instructions         Visit Discharge/Physician Orders Discharge condition: Stable     Assessment of pain at discharge: yes     Anesthetic used: 4% lidocaine solution     Discharge to: Home     Left via:Private automobile     Accompanied by:self     ECF/HHA: n/a     Dressing Orders:LEFT MEDIAL and LATERAL LOWER LEG-Cleanse with normal saline, aquacel AG,  ABD pad and dry dressing. Change weekly. Apply spandagrip. On in am and off in pm. Elevate legs as much as possible above level of the heart. Treatment Orders: Eat a diet high in protein and vitamin C. Take a multiple vitamin daily unless contraindicated. To see Dr. Stephanie Victor as scheduled      Must wear slip on shoe to work due to wrap on leg! [address] [place] [address],[address] followup visit : 1 week_____________________________  (Please note your next appointment above and if you are unable to keep, kindly give a 24 hour notice. Thank you.)     Physician signature:__________________________     If you experience any of the following, please call the [facility] during business hours:     * Increase in Pain  * Temperature over 101  * Increase in drainage from your wound  * Drainage with a foul odor  * Bleeding  * Increase in swelling  * Need for compression bandage changes due to slippage, breakthrough drainage. If you need medical attention outside of the business hours of the [facility] please contact your PCP or go to the nearest emergency room.                       Electronically signed by Hue Chahal MD

## 2022-10-19 NOTE — DISCHARGE INSTR - COC
Continuity of Care Form    Patient Name: Demi Nails   :  1955  MRN:  [id number]    Admit date:  10/19/2022  Discharge date:  ***    Code Status Order: Prior   Advance Directives:     Admitting Physician:  No admitting provider for patient encounter. PCP: Kimmy Cortez MD    Discharging Nurse: [facility] Unit/Room#: No information available for this encounter. Discharging Unit Phone Number: ***    Emergency Contact:   Extended Emergency Contact Information  Primary Emergency Contact: Colby Camacho  Mobile Phone: [phone]  Relation: Domestic Partner  Preferred language: English   needed?  No    Past Surgical History:  Past Surgical History:   Procedure Laterality Date    CHOLECYSTECTOMY, LAPAROSCOPIC  2014    LEG SURGERY Left 2022    LEFT LOWER EXTREMITY DEBRIDMENT WITH POSSIBLE ADVANCED SKIN THERAPY, POSSIBLE Mittie Rank performed by Coleen Blanchard MD at [facility] ENDOSCOPY  2013    mild gastritis and small hiatal hernia, Dr Carlene Alvarez, [place] [address]      GERD, Dr. Vivien Serna, office       Immunization History:   Immunization History   Administered Date(s) Administered    COVID-19, [employer], (age 18y+), IM, 0.5 mL 10/18/2021    Influenza Virus Vaccine 2017, 08/15/2019       Active Problems:  Patient Active Problem List   Diagnosis Code    Chronic cluster headache, not intractable G44.029    Migraine G43.909    GERD (gastroesophageal reflux disease) K21.9    H/O peptic ulcer Z87.11    Herpes dermatitis B00.89    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, F51.05    Encounter for monitoring long-term proton pump inhibitor therapy Z51.81, Z79.899    Herpes suppression B00.9    Venous stasis ulcer of left ankle with fat layer exposed with varicose veins ([facility] I83.023, L97.322    Encounter for medication refill Z76.0    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 ([facility]) I70.25    PVD (peripheral vascular disease) ([facility]) I73.9       Isolation/Infection:   Isolation            No Isolation          Patient Infection Status       None to display            Nurse Assessment:  Last Vital Signs: There were no vitals taken for this visit. Last documented pain score (0-10 scale):    Last Weight:   Wt Readings from Last 1 Encounters:   08/31/22 170 lb (77.1 kg)     Mental Status:  {IP PT MENTAL STATUS:70732}    IV Access:  { EDDIE IV ACCESS:934987586}    Nursing Mobility/ADLs:  Walking   {P DME JVME:383164033}  Transfer  {CHP DME QHHE:678219458}  Bathing  {CHP DME NRPS:725681614}  Dressing  {CHP DME YMEA:830112572}  Toileting  {CHP DME EVMH:175485864}  Feeding  {P DME GEUF:599297231}  Med Admin  {P DME TWAO:857239076}  Med Delivery   { EDDIE MED Delivery:438191549}    Wound Care Documentation and Therapy:  Wound 08/10/16 Other (Comment) Buttocks Left;Distal #2 acq 8/4/16 (Active)   Number of days: 2260       Wound 08/31/16 Buttocks Left;Proximal #3 aquired 8-27-26 (Active)   Number of days: 2239       Wound 02/02/22 Ankle Left;Medial #1 (Active)   Wound Image   10/05/22 0748   Dressing Status New dressing applied 09/28/22 0835   Wound Cleansed Cleansed with saline 09/28/22 0835   Dressing/Treatment ABD; Alginate with Ag 09/28/22 0835   Offloading for Diabetic Foot Ulcers Offloading not required 09/28/22 0835   Wound Length (cm) 8.5 cm 10/12/22 0739   Wound Width (cm) 5.5 cm 10/12/22 0739   Wound Depth (cm) 0.3 cm 10/12/22 0739   Wound Surface Area (cm^2) 46.75 cm^2 10/12/22 0739   Change in Wound Size % (l*w) -72.76 10/12/22 0739   Wound Volume (cm^3) 14. 025 cm^3 10/12/22 0739   Wound Healing % -418 10/12/22 0739   Post-Procedure Length (cm) 8 cm 10/05/22 0816   Post-Procedure Width (cm) 4.6 cm 10/05/22 0816   Post-Procedure Depth (cm) 0.2 cm 10/05/22 0816   Post-Procedure Surface Area (cm^2) 36.8 cm^2 10/05/22 0816   Post-Procedure Volume (cm^3) 7.36 cm^3 10/05/22 9363   Wound Assessment Pink/red;Fibrin;Pale granulation tissue 10/12/22 0739   Drainage Amount Large 10/12/22 0739   Drainage Description Yellow;Green 10/12/22 0739   Odor Mild 10/12/22 0739   Melany-wound Assessment Blanchable erythema;Fragile 10/12/22 0739   Number of days: 258       Wound 03/16/22 Ankle Posterior; Left #2 (Active)   Wound Image   10/05/22 0748   Dressing Status New dressing applied;Clean;Dry; Intact 10/12/22 0824   Wound Cleansed Cleansed with saline 10/12/22 0824   Dressing/Treatment Alginate with Ag;ABD; Foam 10/12/22 0824   Offloading for Diabetic Foot Ulcers Offloading not required 10/12/22 0824   Wound Length (cm) 4.8 cm 10/12/22 0739   Wound Width (cm) 3 cm 10/12/22 0739   Wound Depth (cm) 0.3 cm 10/12/22 0739   Wound Surface Area (cm^2) 14.4 cm^2 10/12/22 0739   Change in Wound Size % (l*w) -476 10/12/22 0739   Wound Volume (cm^3) 4.32 cm^3 10/12/22 0739   Wound Healing % -1628 10/12/22 0739   Post-Procedure Length (cm) 4.7 cm 10/05/22 0816   Post-Procedure Width (cm) 2.6 cm 10/05/22 0816   Post-Procedure Depth (cm) 0.2 cm 10/05/22 0816   Post-Procedure Surface Area (cm^2) 12.22 cm^2 10/05/22 0816   Post-Procedure Volume (cm^3) 2.444 cm^3 10/05/22 0816   Wound Assessment Fibrin;Pink/red 10/12/22 0739   Drainage Amount Moderate 10/12/22 0739   Drainage Description Yellow;Green 10/12/22 0739   Odor None 10/12/22 0739   Melany-wound Assessment Blanchable erythema;Fragile; Maceration 10/12/22 0739   Number of days: 216       Incision 08/24/22 Leg Left (Active)   Number of days: 56        Elimination:  Continence: Bowel: {YES / AGUSTINA:72975}  Bladder: {YES / NK:72832}  Urinary Catheter: {Urinary Catheter:312454204}   Colostomy/Ileostomy/Ileal Conduit: {YES / PD:38820}       Date of Last BM: ***  No intake or output data in the 24 hours ending 10/19/22 0755  No intake/output data recorded.     Safety Concerns:     [address] [facility] Safety Concerns:808397741}    Impairments/Disabilities:      [address] MORGAN Impairments/Disabilities:943707800}    Nutrition Therapy:  Current Nutrition Therapy:   [address] EDDIE Diet List:600898461}    Routes of Feeding: {CHP DME Other Feedings:819833059}  Liquids: {Slp liquid thickness:71289}  Daily Fluid Restriction: {CHP DME Yes amt example:075093703}  Last Modified Barium Swallow with Video (Video Swallowing Test): {Done Not Done HWST:606054134}    Treatments at the Time of Hospital Discharge:   Respiratory Treatments: ***  Oxygen Therapy:  {Therapy; copd oxygen:54372}  Ventilator:    {[facility] Vent YCHN:461421124}    Rehab Therapies: {THERAPEUTIC INTERVENTION:4502407553}  Weight Bearing Status/Restrictions: {[facility] Weight Bearin}  Other Medical Equipment (for information only, NOT a DME order):  {EQUIPMENT:338677724}  Other Treatments: ***    Patient's personal belongings (please select all that are sent with patient):  {[facility] DME Belongings:754206984}    RN SIGNATURE:  {Esignature:135048947}    CASE MANAGEMENT/SOCIAL WORK SECTION    Inpatient Status Date: ***    Readmission Risk Assessment Score:  Readmission Risk              Risk of Unplanned Readmission:  0           Discharging to Facility/ Agency   Name:   Address:  Phone:  Fax:    Dialysis Facility (if applicable)   Name:  Address:  Dialysis Schedule:  Phone:  Fax:    / signature: {Esignature:406722447}    PHYSICIAN SECTION    Prognosis: {Prognosis:1651964774}    Condition at Discharge: [address] Patient Condition:207088910}    Rehab Potential (if transferring to Rehab): {Prognosis:2478305552}    Recommended Labs or Other Treatments After Discharge: ***    Physician Certification: I certify the above information and transfer of Gissel Diane  is necessary for the continuing treatment of the diagnosis listed and that she requires {Admit to Appropriate Level of Care:43169} for {GREATER/LESS:451225475} 30 days.      Update Admission H&P: {CHP DME Changes in SNZSI:810751228}    PHYSICIAN SIGNATURE: {[facility]:471369215}

## 2022-10-20 NOTE — PROGRESS NOTES
[address]   PRE-ADMISSION TESTING GENERAL INSTRUCTIONS  PAT Phone Number: [phone]      GENERAL INSTRUCTIONS:    [x] Antibacterial Soap Shower Night before and/or AM of surgery. [] CHG Wipes instruction sheet and wipes given. [] Hibiclens shower the night before and the morning of surgery.   -Do not use Hibiclens on your face or head. [x] Do not wear makeup, lotions, powders, deodorant. [x] Nothing by mouth after midnight; including gum, candy, mints, or water. [x] You may brush your teeth, gargle, but do NOT swallow water. [x] No tobacco products, illegal drugs, or alcohol within 24 hours of your surgery. [x] Jewelry or valuables should not be brought to the hospital. All body and/or tongue piercing's must be removed prior to arriving to hospital. No contact lens or hair pins. [x] Arrange transportation with a responsible adult  to and from the hospital. Arrange for someone to be with you for the remainder of the day and for 24 hours after your procedure due to having had anesthesia. -Who will be your  for transportation?___Colby_         -Who will be staying with you for 24 hrs after your procedure? Colby__  [x] Bring insurance card and photo ID.  [] Bring copy of living will or healthcare power of  papers to be placed in your electronic record. [] Urine Pregnancy test will be preformed the day of surgery. A specimen sample may be brought from home. [] Transfusion Bracelet: Please bring with you to hospital, day of surgery. PARKING INSTRUCTIONS:     [x] ARRIVAL DATE & TIME:  10/25  @  0900  [x] Enter into the [employer]. Two people may accompany you. Masks are not required but are recommended. [x] Parking Lot \"I\" is where you will park. It is located on the corner of [facility] and [facility]. The entrance is on [facility].    Upon entering the parking lot, a voucher ticket will print    EDUCATION INSTRUCTIONS: [] Knee or Hip replacement booklet & exercise pamphlets given. [] [address] placed in chart. [] Pre-admission Testing educational folder given  [] Incentive Spirometry,coughing & deep breathing exercises reviewed. [] Medication information sheet(s)   [] Fluoroscopy-Xray used in surgery reviewed with patient. Educational pamphlet placed in chart. [] Pain: Post-op pain is normal and to be expected. You will be asked to rate your pain from 0-10. [] Joint camp offered. [] Joint replacement booklets given.  [] Spine Navigator to see in PAT. [] Other:___________________________    MEDICATION INSTRUCTIONS:    [x] Bring a complete list of your medications, please write the last time you took the medicine, give this list to the nurse in Pre-Op. [x] Take only the following medications the morning of surgery with 1-2 ounces of water: atarax, protonix if needed   [x] Stop all herbal supplements and vitamins 5 days before surgery. Stop NSAIDS 7 days before surgery. [] DO NOT take any diabetic medicine the morning of surgery. Follow instructions for insulin the day before surgery. [] If you are diabetic and your blood sugar is low or you feel symptomatic, you may drink 1-2 ounces of apple juice or take a glucose tablet.            -The morning of your procedure, you may call the pre-op area if you have concerns about your blood sugar [phone]. [] Use your inhalers the morning of surgery. Bring your emergency inhaler with you day of surgery. [x] Follow physician instructions regarding any blood thinners you may be taking. WHAT TO EXPECT:    [x] The day of surgery you will be greeted and checked in by the [employer].  In addition, you will be registered in the [place] by a Patient Access Representative. Please bring your photo ID and insurance card.  A nurse will greet you in accordance to the time you are needed in the pre-op area to prepare you for surgery. Please do not be discouraged if you are not greeted in the order you arrive as there are many variables that are involved in patient preparation. Your patience is greatly appreciated as you wait for your nurse. Please bring in items such as: books, magazines, newspapers, electronics, or any other items  to occupy your time in the waiting area. [x]  Delays may occur with surgery and staff will make a sincere effort to keep you informed of delays. If any delays occur with your procedure, we apologize ahead of time for your inconvenience as we recognize the value of your time.

## 2022-10-25 ENCOUNTER — ANESTHESIA (OUTPATIENT)
Dept: OPERATING ROOM | Age: 67
End: 2022-10-25
Payer: MEDICAID

## 2022-10-25 ENCOUNTER — ANESTHESIA EVENT (OUTPATIENT)
Dept: OPERATING ROOM | Age: 67
End: 2022-10-25
Payer: MEDICAID

## 2022-10-25 ENCOUNTER — HOSPITAL ENCOUNTER (OUTPATIENT)
Age: 67
Setting detail: OUTPATIENT SURGERY
Discharge: HOME | End: 2022-10-25
Attending: SURGERY | Admitting: SURGERY
Payer: MEDICAID

## 2022-10-25 VITALS
HEIGHT: 68 IN | BODY MASS INDEX: 25.01 KG/M2 | RESPIRATION RATE: 20 BRPM | HEART RATE: 73 BPM | OXYGEN SATURATION: 98 % | DIASTOLIC BLOOD PRESSURE: 84 MMHG | SYSTOLIC BLOOD PRESSURE: 176 MMHG | WEIGHT: 165 LBS | TEMPERATURE: 98 F

## 2022-10-25 DIAGNOSIS — L97.322 NON-PRESSURE CHRONIC ULCER OF LEFT ANKLE WITH FAT LAYER EXPOSED (HCC): ICD-10-CM

## 2022-10-25 LAB
HCT VFR BLD CALC: 28 % (ref 34–48)
HEMOGLOBIN: 8.2 G/DL (ref 11.5–15.5)
INR BLD: 1.2
MCH RBC QN AUTO: 21.9 PG (ref 26–35)
MCHC RBC AUTO-ENTMCNC: 29.3 % (ref 32–34.5)
MCV RBC AUTO: 74.9 FL (ref 80–99.9)
PDW BLD-RTO: 18.7 FL (ref 11.5–15)
PLATELET # BLD: 386 E9/L (ref 130–450)
PMV BLD AUTO: 10.8 FL (ref 7–12)
PROTHROMBIN TIME: 12.6 SEC (ref 9.3–12.4)
RBC # BLD: 3.74 E12/L (ref 3.5–5.5)
WBC # BLD: 13 E9/L (ref 4.5–11.5)

## 2022-10-25 PROCEDURE — 3700000001 HC ADD 15 MINUTES (ANESTHESIA): Performed by: SURGERY

## 2022-10-25 PROCEDURE — 2709999900 HC NON-CHARGEABLE SUPPLY: Performed by: SURGERY

## 2022-10-25 PROCEDURE — 2580000003 HC RX 258: Performed by: NURSE PRACTITIONER

## 2022-10-25 PROCEDURE — 6370000000 HC RX 637 (ALT 250 FOR IP): Performed by: ANESTHESIOLOGY

## 2022-10-25 PROCEDURE — 85610 PROTHROMBIN TIME: CPT

## 2022-10-25 PROCEDURE — 6360000002 HC RX W HCPCS: Performed by: NURSE PRACTITIONER

## 2022-10-25 PROCEDURE — 15271 SKIN SUB GRAFT TRNK/ARM/LEG: CPT | Performed by: SURGERY

## 2022-10-25 PROCEDURE — 6370000000 HC RX 637 (ALT 250 FOR IP): Performed by: SURGERY

## 2022-10-25 PROCEDURE — 3700000000 HC ANESTHESIA ATTENDED CARE: Performed by: SURGERY

## 2022-10-25 PROCEDURE — 36415 COLL VENOUS BLD VENIPUNCTURE: CPT

## 2022-10-25 PROCEDURE — 3600000002 HC SURGERY LEVEL 2 BASE: Performed by: SURGERY

## 2022-10-25 PROCEDURE — 7100000011 HC PHASE II RECOVERY - ADDTL 15 MIN: Performed by: SURGERY

## 2022-10-25 PROCEDURE — 3600000012 HC SURGERY LEVEL 2 ADDTL 15MIN: Performed by: SURGERY

## 2022-10-25 PROCEDURE — 7100000010 HC PHASE II RECOVERY - FIRST 15 MIN: Performed by: SURGERY

## 2022-10-25 PROCEDURE — 85027 COMPLETE CBC AUTOMATED: CPT

## 2022-10-25 PROCEDURE — 6360000002 HC RX W HCPCS: Performed by: ANESTHESIOLOGIST ASSISTANT

## 2022-10-25 DEVICE — DRESSING WND MICRONIZED PARTIC 500 MG MATRISTEM MICROMATRIX: Type: IMPLANTABLE DEVICE | Site: LEG | Status: FUNCTIONAL

## 2022-10-25 RX ORDER — LIDOCAINE HYDROCHLORIDE 20 MG/ML
JELLY TOPICAL PRN
Status: DISCONTINUED | OUTPATIENT
Start: 2022-10-25 | End: 2022-10-25 | Stop reason: ALTCHOICE

## 2022-10-25 RX ORDER — KETOROLAC TROMETHAMINE 30 MG/ML
INJECTION, SOLUTION INTRAMUSCULAR; INTRAVENOUS PRN
Status: DISCONTINUED | OUTPATIENT
Start: 2022-10-25 | End: 2022-10-25 | Stop reason: SDUPTHER

## 2022-10-25 RX ORDER — OSTOMY ADHESIVE
STRIP MISCELLANEOUS PRN
Status: DISCONTINUED | OUTPATIENT
Start: 2022-10-25 | End: 2022-10-25 | Stop reason: ALTCHOICE

## 2022-10-25 RX ORDER — PROPOFOL 10 MG/ML
INJECTION, EMULSION INTRAVENOUS CONTINUOUS PRN
Status: DISCONTINUED | OUTPATIENT
Start: 2022-10-25 | End: 2022-10-25 | Stop reason: SDUPTHER

## 2022-10-25 RX ORDER — SODIUM CHLORIDE 0.9 % (FLUSH) 0.9 %
5-40 SYRINGE (ML) INJECTION EVERY 12 HOURS SCHEDULED
Status: DISCONTINUED | OUTPATIENT
Start: 2022-10-25 | End: 2022-10-25 | Stop reason: HOSPADM

## 2022-10-25 RX ORDER — MIDAZOLAM HYDROCHLORIDE 1 MG/ML
INJECTION INTRAMUSCULAR; INTRAVENOUS PRN
Status: DISCONTINUED | OUTPATIENT
Start: 2022-10-25 | End: 2022-10-25 | Stop reason: SDUPTHER

## 2022-10-25 RX ORDER — SODIUM CHLORIDE 9 MG/ML
INJECTION, SOLUTION INTRAVENOUS CONTINUOUS
Status: DISCONTINUED | OUTPATIENT
Start: 2022-10-25 | End: 2022-10-25 | Stop reason: HOSPADM

## 2022-10-25 RX ORDER — FENTANYL CITRATE 50 UG/ML
INJECTION, SOLUTION INTRAMUSCULAR; INTRAVENOUS PRN
Status: DISCONTINUED | OUTPATIENT
Start: 2022-10-25 | End: 2022-10-25 | Stop reason: SDUPTHER

## 2022-10-25 RX ORDER — SODIUM CHLORIDE 0.9 % (FLUSH) 0.9 %
5-40 SYRINGE (ML) INJECTION PRN
Status: DISCONTINUED | OUTPATIENT
Start: 2022-10-25 | End: 2022-10-25 | Stop reason: HOSPADM

## 2022-10-25 RX ORDER — SODIUM CHLORIDE 9 MG/ML
INJECTION, SOLUTION INTRAVENOUS PRN
Status: DISCONTINUED | OUTPATIENT
Start: 2022-10-25 | End: 2022-10-25 | Stop reason: HOSPADM

## 2022-10-25 RX ORDER — LIDOCAINE HYDROCHLORIDE 20 MG/ML
INJECTION, SOLUTION INTRAVENOUS PRN
Status: DISCONTINUED | OUTPATIENT
Start: 2022-10-25 | End: 2022-10-25 | Stop reason: SDUPTHER

## 2022-10-25 RX ORDER — OXYCODONE HYDROCHLORIDE AND ACETAMINOPHEN 5; 325 MG/1; MG/1
1 TABLET ORAL ONCE
Status: COMPLETED | OUTPATIENT
Start: 2022-10-25 | End: 2022-10-25

## 2022-10-25 RX ORDER — ONDANSETRON 2 MG/ML
INJECTION INTRAMUSCULAR; INTRAVENOUS PRN
Status: DISCONTINUED | OUTPATIENT
Start: 2022-10-25 | End: 2022-10-25 | Stop reason: SDUPTHER

## 2022-10-25 RX ORDER — DEXAMETHASONE SODIUM PHOSPHATE 10 MG/ML
INJECTION, EMULSION INTRAMUSCULAR; INTRAVENOUS PRN
Status: DISCONTINUED | OUTPATIENT
Start: 2022-10-25 | End: 2022-10-25 | Stop reason: SDUPTHER

## 2022-10-25 RX ADMIN — SODIUM CHLORIDE: 9 INJECTION, SOLUTION INTRAVENOUS at 10:11

## 2022-10-25 RX ADMIN — LIDOCAINE HYDROCHLORIDE 80 MG: 20 INJECTION, SOLUTION INTRAVENOUS at 11:36

## 2022-10-25 RX ADMIN — CEFAZOLIN 2000 MG: 2 INJECTION, POWDER, FOR SOLUTION INTRAMUSCULAR; INTRAVENOUS at 11:37

## 2022-10-25 RX ADMIN — DEXAMETHASONE SODIUM PHOSPHATE 10 MG: 10 INJECTION, EMULSION INTRAMUSCULAR; INTRAVENOUS at 11:37

## 2022-10-25 RX ADMIN — FENTANYL CITRATE 50 MCG: 50 INJECTION, SOLUTION INTRAMUSCULAR; INTRAVENOUS at 11:50

## 2022-10-25 RX ADMIN — FENTANYL CITRATE 50 MCG: 50 INJECTION, SOLUTION INTRAMUSCULAR; INTRAVENOUS at 11:25

## 2022-10-25 RX ADMIN — PROPOFOL 100 MCG/KG/MIN: 10 INJECTION, EMULSION INTRAVENOUS at 11:36

## 2022-10-25 RX ADMIN — FENTANYL CITRATE 50 MCG: 50 INJECTION, SOLUTION INTRAMUSCULAR; INTRAVENOUS at 11:44

## 2022-10-25 RX ADMIN — SODIUM CHLORIDE: 9 INJECTION, SOLUTION INTRAVENOUS at 11:05

## 2022-10-25 RX ADMIN — FENTANYL CITRATE 50 MCG: 50 INJECTION, SOLUTION INTRAMUSCULAR; INTRAVENOUS at 11:52

## 2022-10-25 RX ADMIN — MIDAZOLAM 2 MG: 1 INJECTION INTRAMUSCULAR; INTRAVENOUS at 11:25

## 2022-10-25 RX ADMIN — ONDANSETRON HYDROCHLORIDE 4 MG: 2 SOLUTION INTRAMUSCULAR; INTRAVENOUS at 11:55

## 2022-10-25 RX ADMIN — KETOROLAC TROMETHAMINE 15 MG: 30 INJECTION, SOLUTION INTRAMUSCULAR; INTRAVENOUS at 11:55

## 2022-10-25 RX ADMIN — FENTANYL CITRATE 50 MCG: 50 INJECTION, SOLUTION INTRAMUSCULAR; INTRAVENOUS at 11:41

## 2022-10-25 RX ADMIN — OXYCODONE AND ACETAMINOPHEN 1 TABLET: 5; 325 TABLET ORAL at 12:57

## 2022-10-25 RX ADMIN — MIDAZOLAM 2 MG: 1 INJECTION INTRAMUSCULAR; INTRAVENOUS at 11:36

## 2022-10-25 RX ADMIN — FENTANYL CITRATE 50 MCG: 50 INJECTION, SOLUTION INTRAMUSCULAR; INTRAVENOUS at 11:36

## 2022-10-25 ASSESSMENT — PAIN DESCRIPTION - LOCATION: LOCATION: LEG

## 2022-10-25 ASSESSMENT — PAIN SCALES - GENERAL: PAINLEVEL_OUTOF10: 7

## 2022-10-25 ASSESSMENT — PAIN DESCRIPTION - ORIENTATION: ORIENTATION: LEFT

## 2022-10-25 ASSESSMENT — PAIN DESCRIPTION - DESCRIPTORS: DESCRIPTORS: THROBBING

## 2022-10-25 ASSESSMENT — PAIN - FUNCTIONAL ASSESSMENT: PAIN_FUNCTIONAL_ASSESSMENT: 0-10

## 2022-10-25 NOTE — ANESTHESIA POSTPROCEDURE EVALUATION
Department of Anesthesiology  Postprocedure Note    Patient: Hasmukh Izquierdo  MRN: [id number]  YOB: 1955  Date of evaluation: 10/25/2022      Procedure Summary     Date: 10/25/22 Room / Location: [facility] OR 03 / [facility]    Anesthesia Start: 1105 Anesthesia Stop: 1219    Procedure: DEBRIDEMENT LEFT LEG, POSSIBLE ADVANCED SKIN THERAPY with acell micromatrix application , UNNA BOOT (Left) Diagnosis:       Stasis ulcer of ankle, left (HCC)      Non-pressure chronic ulcer of left ankle with fat layer exposed ([facility])      (Stasis ulcer of ankle, left ([facility]) [I83.023, L97.329])      (Non-pressure chronic ulcer of left ankle with fat layer exposed ([facility]) [M93.971])    Surgeons: Bradford Garcia MD Responsible Provider: Antwon Pereira MD    Anesthesia Type: MAC ASA Status: 3          Anesthesia Type: No value filed.     Gamaliel Phase I: Gamaliel Score: 10    Gamaliel Phase II: Gamaliel Score: 8      Anesthesia Post Evaluation    Patient location during evaluation: PACU  Patient participation: complete - patient participated  Level of consciousness: awake and alert  Airway patency: patent  Nausea & Vomiting: no nausea and no vomiting  Complications: no  Cardiovascular status: blood pressure returned to baseline and hemodynamically stable  Respiratory status: acceptable and spontaneous ventilation  Hydration status: euvolemic  Multimodal analgesia pain management approach

## 2022-10-25 NOTE — ANESTHESIA PRE PROCEDURE
Department of Anesthesiology  Preprocedure Note       Name:  Aliyah Ferris   Age:  61 y.o.  :  1955                                          MRN:  [id number]         Date:  10/25/2022      Surgeon: Deanna Mustafa):  Mele Sebastian MD    Procedure: Procedure(s):  DEBRIDEMENT LEFT LEG, POSSIBLE ADVANCED SKIN THERAPY, POSSIBLE UNNA BOOT    Medications prior to admission:   Prior to Admission medications    Medication Sig Start Date End Date Taking?  Authorizing Provider   acyclovir (ZOVIRAX) 400 MG tablet take 1 tablet by mouth once daily 22  Casey Donovan MD   butalbital-acetaminophen-caffeine (FIORICET, ESGIC) -73 MG per tablet Take 1 tablet by mouth 3 times daily as needed for Headaches or Migraine Indications: Cluster Headache 8/31/22 3/2/23  Casey Donovan MD   EPINEPHrine [facility]) 0.3 MG/0.3ML SOAJ injection Use as directed for allergic reaction 8/31/22 3/2/23  Casey Donovan MD   hydrOXYzine HCl (ATARAX) 25 MG tablet take 1 tablet BID 8/31/22 3/2/23  Casey Donovan MD   pantoprazole (PROTONIX) 40 MG tablet Take 1 tablet by mouth every morning (before breakfast) 22  Casey Donovan MD   aspirin-acetaminophen-caffeine (EXCEDRIN MIGRAINE) 537-027-79 MG per tablet Take 1 tablet by mouth as needed for Headaches 3/2/22   Casey Donovan MD       Current medications:    Current Facility-Administered Medications   Medication Dose Route Frequency Provider Last Rate Last Admin    0.9 % sodium chloride infusion   IntraVENous Continuous LISA Nava CNP 75 mL/hr at 10/25/22 1011 New Bag at 10/25/22 1011    sodium chloride flush 0.9 % injection 5-40 mL  5-40 mL IntraVENous 2 times per day LISA Nava - CNP        sodium chloride flush 0.9 % injection 5-40 mL  5-40 mL IntraVENous PRN LISA Nava - CNP        0.9 % sodium chloride infusion   IntraVENous PRN LISA Nava CNP        Dr. Duglas PANTOJA, office       Social History:    Social History     Tobacco Use    Smoking status: Never    Smokeless tobacco: Never   Substance Use Topics    Alcohol use: No                                Counseling given: Not Answered      Vital Signs (Current):   Vitals:    10/20/22 1231 10/25/22 0944 10/25/22 1011   BP:  (!) 192/86 (!) 185/86   Pulse:  80    Resp:  18    Temp:  98.2 °F (36.8 °C)    TempSrc:  Temporal    SpO2:  100%    Weight: 165 lb (74.8 kg) 165 lb (74.8 kg)    Height: 5' 8\" (1.727 m) 5' 8\" (1.727 m)                                               BP Readings from Last 3 Encounters:   10/25/22 (!) 185/86   10/19/22 (!) 142/72   10/12/22 (!) 167/72       NPO Status: Time of last liquid consumption: 2100                        Time of last solid consumption: 1600                        Date of last liquid consumption: 10/24/22                        Date of last solid food consumption: 10/24/22    BMI:   Wt Readings from Last 3 Encounters:   10/25/22 165 lb (74.8 kg)   10/19/22 170 lb (77.1 kg)   08/31/22 170 lb (77.1 kg)     Body mass index is 25.09 kg/m².     CBC:   Lab Results   Component Value Date/Time    WBC 6.9 08/24/2022 11:05 AM    RBC 3.82 08/24/2022 11:05 AM    HGB 8.2 08/24/2022 11:05 AM    HCT 28.0 08/24/2022 11:05 AM    MCV 73.3 08/24/2022 11:05 AM    RDW 19.3 08/24/2022 11:05 AM     08/24/2022 11:05 AM       CMP:   Lab Results   Component Value Date/Time     06/01/2022 09:50 AM    K 4.3 06/01/2022 09:50 AM    K 4.0 05/24/2022 12:25 PM     06/01/2022 09:50 AM    CO2 24 06/01/2022 09:50 AM    BUN 13 06/01/2022 09:50 AM    CREATININE 0.7 06/01/2022 09:50 AM    GFRAA >60 06/01/2022 09:50 AM    LABGLOM >60 06/01/2022 09:50 AM    GLUCOSE 90 06/01/2022 09:50 AM    PROT 7.0 06/01/2022 09:50 AM    CALCIUM 8.3 06/01/2022 09:50 AM    BILITOT <0.2 06/01/2022 09:50 AM    ALKPHOS 88 06/01/2022 09:50 AM    AST 20 06/01/2022 09:50 AM    ALT 13 06/01/2022 09:50 AM       POC Tests: No results for input(s): POCGLU, POCNA, POCK, POCCL, POCBUN, POCHEMO, POCHCT in the last 72 hours. Coags:   Lab Results   Component Value Date/Time    PROTIME 11.9 08/24/2022 11:05 AM    INR 1.1 08/24/2022 11:05 AM    APTT 31.7 11/14/2012 12:01 AM       HCG (If Applicable):   Lab Results   Component Value Date    PREGTESTUR NEGATIVE 11/17/2013        ABGs: No results found for: PHART, PO2ART, QRU0RTR, OUU7SKZ, BEART, N7VKROUE     Type & Screen (If Applicable):  No results found for: LABABO, LABRH    Drug/Infectious Status (If Applicable):  No results found for: HIV, HEPCAB    COVID-19 Screening (If Applicable): No results found for: COVID19        Anesthesia Evaluation  Patient summary reviewed and Nursing notes reviewed   history of anesthetic complications: PONV. Airway: Mallampati: II  TM distance: >3 FB   Neck ROM: full  Mouth opening: > = 3 FB   Dental:    (+) upper dentures      Pulmonary:Negative Pulmonary ROS and normal exam  breath sounds clear to auscultation                             Cardiovascular:Negative CV ROS                      Neuro/Psych:               GI/Hepatic/Renal:   (+) GERD ( pt takes omeprazole, well controlled):,           Endo/Other:    (+) blood dyscrasia: anemia:., .                 Abdominal:             Vascular: Other Findings:             Anesthesia Plan      MAC     ASA 3             Anesthetic plan and risks discussed with patient. Plan discussed with CRNA and attending. DOS STAFF ADDENDUM:    Patient seen and chart reviewed. Physical exam and history updated as indicated. NPO status confirmed. Anesthesia options and plan discussed including risks benefits with patient/legal guardian and family as available. Concerns and questions addressed. Consent verbalized to proceed.   Anesthesia plan, options and intraoperative/postoperative concerns discussed with care team.    PONV after last MAC procedure (given propofol, midazolam, fentanyl and dilaudid). She was given percocet po before discharge and nausea started after that. Will add toradol, decadron and zofran intraoperative. Hold percocet on NPO patient.       Kadi Bermeo MD, MD  10/25/2022  10:32 AM        Kadi Bermeo MD   10/25/2022

## 2022-10-25 NOTE — DISCHARGE INSTRUCTIONS
Local Wound Care Instructions  Leave wrap in place  [place] may be drowsy or lightheaded after receiving sedation or anesthesia. A responsible person should be with you for the next 24 hours. Please follow the instructions checked below:    DIET INSTRUCTIONS:  [x]Start with light diet and progress to your normal diet as you feel like eating. If you experience nausea or repeated episodes of vomiting which persist beyond 12-24 hours, notify your doctor. []Other     ACTIVITY INSTRUCTIONS:  [x]Rest today. Increase activity as tolerated    []Elevate operative limb   [x]No heavy lifting or strenuous activity     [x]No driving  []Other     WOUND/DRESSING INSTRUCTIONS:  Always ensure you and your care giver clean hands before and after caring for the wound. []May shower      []May bathe      []Other         MEDICATION INSTRUCTIONS:    []Prescriptions sent with you. Use as directed. When taking pain medications, you may experience dizziness or drowsiness. Do not drink alcohol or drive when taking these medications. []You may take a non-prescription headache remedy, preferably one that does not contain aspirin. Other Instructions:      FOLLOW-UP CARE:  []Call the office at [phone] for follow-up appointment     Call physician if they or any other problems occur:  Fever over 101°    Redness, swelling, hardness or warmth at the operative site  Unrelieved nausea    Foul smelling or cloudy drainage at the operative site   Unrelieved pain    Blood soaked dressing.  (Some oozing may be normal)

## 2022-10-25 NOTE — H&P
Vascular Surgery History & Physical Exam      Chief Complaint: Chronic wound of the L LE    HISTORY OF PRESENT ILLNESS:                The patient is a 61 y.o. female who presents to the hospital for elective debridement of chronic wound of the L LE. IMPRESSION:  Chronic wound of the L LE    PLAN:  Debridement of chronic wound of the L LE. Possible wound vac, possible advanced skin therapy. I reviewed the procedure with the patient and family as available. I discussed the procedure, risks, benefits, complications, and alternatives of the procedure. They understand and consent.   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 ([address] 5/18/2022    Dizziness - light-headed     GERD (gastroesophageal reflux disease)     Herpes dermatitis 4/27/2017    Insomnia secondary to anxiety 4/6/2018    Lightheadedness     Migraine     Skin ulcer of buttock with fat layer exposed ([address] 7/20/2016    Venous stasis ulcer of left ankle with fat layer exposed with varicose veins ([address] 2/2/2022     Past Surgical History:   Procedure Laterality Date    CHOLECYSTECTOMY, LAPAROSCOPIC  04/08/2014    LEG SURGERY Left 8/24/2022    LEFT LOWER EXTREMITY DEBRIDMENT WITH POSSIBLE ADVANCED SKIN THERAPY, POSSIBLE UNNA BOOT performed by Stephen Peterson MD at [address] [place] [address]  12/13/2013    m[facility] gastritis and [facility]atal [facility]    GERD, Dr. Nurys Nicole, office     Current Medications: Current Facility-Administered Medications:     0.9 % sodium chloride infusion, , IntraVENous, Continuous, LISA Mccarty - CNP, Last Rate: 75 mL/hr at 10/25/22 1011, New Bag at 10/25/22 1011    sodium chloride flush 0.9 % injection 5-40 mL, 5-40 mL, IntraVENous, 2 times per day, Daljittine Sylvia APRN - CNP    sodium chloride flush 0.9 % injection 5-40 mL, 5-40 mL, IntraVENous, PRN, Gustavo Ward APRN - CNP    0.9 % sodium chloride infusion, , IntraVENous, PRN, Daljittine Sylvia, APRN - CNP    ceFAZolin (ANCEF) 2,000 mg in sterile water 20 mL IV syringe, 2,000 mg, IntraVENous, On Call to OR, LISA Mccarty CNP  Allergies:  Bee pollen and Tetracyclines & related  Social History     Socioeconomic History    Marital status: Single     Spouse name: Not on file    Number of children: Not on file    Years of education: Not on file    Highest education level: Not on file   Occupational History    Not on file   Tobacco Use    Smoking status: Never    Smokeless tobacco: Never   Vaping Use    Vaping Use: Never used   Substance and Sexual Activity    Alcohol use: No    Drug use: No    Sexual activity: Not on file   Other Topics Concern    Not on file   Social History Narrative    Not on file     Social Determinants of Health     Financial Resource Strain: Low Risk     Difficulty of Paying Living Expenses: Not hard at all   Food Insecurity: No Food Insecurity    Worried About Running Out of Food in the Last Year: Never true    Ran Out of Food in the Last Year: Never true   Transportation Needs: Not on file   Physical Activity: Not on file   Stress: Not on file   Social Connections: Not on file   Intimate Partner Violence: Not on file   Housing Stability: Not on file     Family History   Problem Relation Age of Onset    Diabetes Mother     Hypertension Mother     Heart Disease Father     Heart Attack Father     Heart Surgery Father         angioplasty    Stroke Father     High Cholesterol Father     Heart Attack Maternal Grandfather        REVIEW OF SYSTEMS:  The chart was reviewed.     PHYSICAL EXAM:    Vitals:    10/25/22 1011   BP: (!) 185/86   Pulse:    Resp:    Temp:    SpO2:      CONSTITUTIONAL:  awake, alert, cooperative, no apparent distress, and appears stated age  NECK:  supple, symmetrical, trachea midline  LUNGS:  no increased work of breathing, good resp excursion  CARDIOVASCULAR:  S1S2  ABDOMEN:  soft, non-distended and non-tender  Chronic wound of the l eft lower extrmeity    LABS:    Lab Results   Component Value Date    WBC 13.0 (H) 10/25/2022    HGB 8.2 (L) 10/25/2022    HCT 28.0 (L) 10/25/2022     10/25/2022    PROTIME 12.6 (H) 10/25/2022    INR 1.2 10/25/2022    APTT 31.7 11/14/2012    K 4.3 06/01/2022    BUN 13 06/01/2022    CREATININE 0.7 06/01/2022       RADIOLOGY:    Dominique Lopez MD

## 2022-10-25 NOTE — OP NOTE
Alix Lenz  1955      DATE OF PROCEDURE: 10/25/2022     SURGEON: Sandra Saleh M.D. Assistant:   Surgical Assistant: Leticia Garcia  Resident: Viki John MD     PREOPERATIVE DIAGNOSIS: Chronic wound of the L LE Venous stasis ulceration     POSTOPERATIVE DIAGNOSIS: Same    OPERATION: Irrigation and excisional debridement of left medial and lateral ankle wound, Application of 966 mcg micromatrix acel  Application of unna boot    Wound  L Lateral ankle 3.5 cm width x 5 cm length x 0.5 cm depth  L Medial ankle 5.5 cm width x 8 cm length x 0.5 cm depth     ANESTHESIA: Lidocaine Jelly, LMAC     ESTIMATED BLOOD LOSS: Minimal     COMPLICATIONS: None    DESCRIPTION OF PROCEDURE: The patient was identified and the procedure was confirmed. The wound and surrounding area was cleaned, and draped. Lidocaine gel soaked gauze was applied. It was subsequently removed. With the patient in supine position, the wound was debrided sharply of fibrotic, necrotic, and hyperkeratotic tissue, including a layer of surrounding healthy tissue using a curette for a total surface area of > 20 cm2. The skin was excised through the level of the subcutaneous tissue. 100%% of the wound was debrided. Wound was copiously irrigated with normal saline solution. There was minimal bleeding that was controlled with pressure. The wound was deemed appropriate for microtmatrix and it was prepped per 's instructions. The product was applied to the medial and lateral ankle wound and than secure with mepitel one, steristrips, aquacell, abdominal pads, unna boot and koban. Needle, sponge and instrument counts were reported as correct x2. The patient tolerated the procedure and was transferred to the recovery area in satisfactory condition. Implants:  Implant Name Type Inv.  Item Serial No.  Lot No. LRB No. Used Action   DRESSING WND MICRONIZED PARTIC 500 MG [employer] MI[employer] - [id number]  DRESSING WND MICRONIZED PARTIC 500 MG Valencia [place] [id number] [facility]- [id number] Left 1 Implanted     Electronically signed by Elizabeth Mcintyre MD on 10/25/2022 at 12:25 PM

## 2022-10-26 NOTE — DISCHARGE INSTRUCTIONS
Visit Discharge/Physician Orders     Discharge condition: Stable     Assessment of pain at discharge: yes     Anesthetic used: 4% lidocaine solution(none 10-19-22)     Discharge to: Home     Left via:Private automobile     Accompanied by:self     ECF/HHA: n/a     Dressing Orders:LEFT MEDIAL and LATERAL LOWER LEG-Cleanse with normal saline, apply zinc to fiona wound, aquacel AG,  ABD pad , unna boot and coban. Change weekly. Treatment Orders: Eat a diet high in protein and vitamin C. Take a multiple vitamin daily unless contraindicated. To see Dr. Kayleigh Dobson as scheduled      Must wear slip on shoe to work due to wrap on leg! [facility] followup visit : 1 week_____________________________  (Please note your next appointment above and if you are unable to keep, kindly give a 24 hour notice. Thank you.)     Physician signature:__________________________     If you experience any of the following, please call the [facility] during business hours:     * Increase in Pain  * Temperature over 101  * Increase in drainage from your wound  * Drainage with a foul odor  * Bleeding  * Increase in swelling  * Need for compression bandage changes due to slippage, breakthrough drainage. If you need medical attention outside of the business hours of the [facility] please contact your PCP or go to the nearest emergency room.

## 2022-11-02 ENCOUNTER — HOSPITAL ENCOUNTER (OUTPATIENT)
Dept: WOUND CARE | Age: 67
Discharge: HOME | End: 2022-11-02
Payer: MEDICARE

## 2022-11-02 VITALS
RESPIRATION RATE: 18 BRPM | HEART RATE: 72 BPM | TEMPERATURE: 97.9 F | DIASTOLIC BLOOD PRESSURE: 64 MMHG | SYSTOLIC BLOOD PRESSURE: 180 MMHG

## 2022-11-02 DIAGNOSIS — I83.023 VENOUS STASIS ULCER OF LEFT ANKLE WITH FAT LAYER EXPOSED WITH VARICOSE VEINS (HCC): Primary | ICD-10-CM

## 2022-11-02 DIAGNOSIS — L97.322 VENOUS STASIS ULCER OF LEFT ANKLE WITH FAT LAYER EXPOSED WITH VARICOSE VEINS (HCC): Primary | ICD-10-CM

## 2022-11-02 DIAGNOSIS — I70.242 ATHEROSCLEROSIS OF NATIVE ARTERY OF LEFT LOWER EXTREMITY WITH ULCERATION OF CALF (HCC): ICD-10-CM

## 2022-11-02 PROCEDURE — 99213 OFFICE O/P EST LOW 20 MIN: CPT | Performed by: SURGERY

## 2022-11-02 PROCEDURE — 99213 OFFICE O/P EST LOW 20 MIN: CPT

## 2022-11-02 RX ORDER — BACITRACIN, NEOMYCIN, POLYMYXIN B 400; 3.5; 5 [USP'U]/G; MG/G; [USP'U]/G
OINTMENT TOPICAL ONCE
Status: CANCELLED | OUTPATIENT
Start: 2022-11-02 | End: 2022-11-02

## 2022-11-02 RX ORDER — CLOBETASOL PROPIONATE 0.5 MG/G
OINTMENT TOPICAL ONCE
Status: CANCELLED | OUTPATIENT
Start: 2022-11-02 | End: 2022-11-02

## 2022-11-02 RX ORDER — LIDOCAINE 50 MG/G
OINTMENT TOPICAL ONCE
Status: CANCELLED | OUTPATIENT
Start: 2022-11-02 | End: 2022-11-02

## 2022-11-02 RX ORDER — LIDOCAINE HYDROCHLORIDE 20 MG/ML
JELLY TOPICAL ONCE
Status: CANCELLED | OUTPATIENT
Start: 2022-11-02 | End: 2022-11-02

## 2022-11-02 RX ORDER — BETAMETHASONE DIPROPIONATE 0.05 %
OINTMENT (GRAM) TOPICAL ONCE
Status: CANCELLED | OUTPATIENT
Start: 2022-11-02 | End: 2022-11-02

## 2022-11-02 RX ORDER — LIDOCAINE 40 MG/G
CREAM TOPICAL ONCE
Status: CANCELLED | OUTPATIENT
Start: 2022-11-02 | End: 2022-11-02

## 2022-11-02 RX ORDER — GENTAMICIN SULFATE 1 MG/G
OINTMENT TOPICAL ONCE
Status: CANCELLED | OUTPATIENT
Start: 2022-11-02 | End: 2022-11-02

## 2022-11-02 RX ORDER — GINSENG 100 MG
CAPSULE ORAL ONCE
Status: CANCELLED | OUTPATIENT
Start: 2022-11-02 | End: 2022-11-02

## 2022-11-02 RX ORDER — LIDOCAINE HYDROCHLORIDE 40 MG/ML
SOLUTION TOPICAL ONCE
Status: CANCELLED | OUTPATIENT
Start: 2022-11-02 | End: 2022-11-02

## 2022-11-02 RX ORDER — BACITRACIN ZINC AND POLYMYXIN B SULFATE 500; 1000 [USP'U]/G; [USP'U]/G
OINTMENT TOPICAL ONCE
Status: CANCELLED | OUTPATIENT
Start: 2022-11-02 | End: 2022-11-02

## 2022-11-02 ASSESSMENT — PAIN SCALES - GENERAL: PAINLEVEL_OUTOF10: 5

## 2022-11-02 ASSESSMENT — PAIN DESCRIPTION - DESCRIPTORS: DESCRIPTORS: ACHING

## 2022-11-02 ASSESSMENT — PAIN DESCRIPTION - LOCATION: LOCATION: LEG

## 2022-11-02 ASSESSMENT — PAIN DESCRIPTION - ORIENTATION: ORIENTATION: LEFT

## 2022-11-02 NOTE — PROGRESS NOTES
Wound Healing Center Followup Visit Note    Referring Physician : William Lawrence MD  [address] [place] [address] RECORD NUMBER:  [id number]  AGE: 61 y.o. GENDER: female  : 1955  EPISODE DATE:  2022    Subjective:     Chief Complaint   Patient presents with    Wound Check     Left leg      HISTORY of PRESENT ILLNESS HPI   Cindy Moeller is a 61 y.o. female who presents today in regards to follow up evaluation and treatment of wound/ulcer. That patient's past medical, family and social hx were reviewed and changes were made if present. History of Wound Context:  The patient has had a wound of her left ankle/calf which was first noted approximately 2021. This has been treated local wound care. On their initial visit to the wound healing center, 22,  the patient has noted that the wound has been improving. The patient has not had similar previous wounds in the past.      She started seeing Dr. Qasim Beebe in 2021 and than Dr. Marybel Jarrett. She was started in [facility] ~ 2021. She has noticed some improvement since starting [facility] boot. She is currently following with Dr. Wang Mc. Pt is not on abx at time of initial visit, but has been treated with previously by podiatry. She is not a DM. She is not a smoker. She denies hx of DVT, and per her report had recent us noting no evidence of dvt at [facility]. She also had arterial studies done. I had previously seen her in the past in regards to left buttock thigh wound, which started as abscess. Pt works at [employer] in [facility] [employer] [facility] and is on her feet all day.     22  Reflux study - if significant findings will schedule for fu  Continue compression therapy [facility] bo per podiatry with aquacell dressing  Elevation  She does not have significant arterial occlusive disease  22  Patient has asked to continuing following with me going forward because of our previous relationship  She is going to let Dr. Chema Cho office know  She tolerated unna boot and aquacell - some improvement  216/22  Appearance improved, slightly larger  Consider drawtek next week but overall drainage seems reasonably managed as periwound appears ok  2/23/2022  The wound, has some exudate, no recent cultures were done, will do wound cultures today  3/2/22  Reflux study reviewed - no significant reflux  Will treat culture - augmentin 875 mg bid x 10 days - script sent  More drainage - stable size  3/9/22  Wound appearance improved, stable in size  drawtek  3/16/22  Wound slightly larger in size, new wound of ankle  Continue Drawtek, change to profore  Avoid shoes which will contact ankle area  3/23/22  Wounds stable  Overall appearance improved  Will have pt see ID re cultures - disc with Dr Ivette Salmon  3/30/22  Much improved appearance  On oral abx per ID - concerns re pt ability to work while on IV - will see how her wound progresses  4/6/22  Appearance improved but size not much different  Finished oral abx  Will re culture next week if no significant size change - possible advance skin therapy  4/20/2022  Ulcer on the medial aspect, fairly clean and granulating, ulcer of the lateral aspect, has some exudate, debrided  4/27/22  Wounds stable   Hypergranulation tissue removed  Culture done - possible graft in future  5/4/22  Wound stable  Disc culture with Dr Ivette Salmon who would like to start her on iv abx  5/11/22  Wound stable  Iv abx have not been started yet - spoke with Dr Ivette Salmon - spoke with pt she will call his office  She had spoke with [facility] but there were some issues  5/18/22  Calf stable, ankle improved  Iv abx to start this week after picc placement  On exam   L dp 2+, PT triphasic - with compression of dp - PT becomes weakly biphasic  Concern that PT though triphasic is not getting inline flow and as a result isn't healing in the calf distribution because of occlusive disease  We discussed angiogram - will schedule  I reviewed the procedure with the patient and family as available. I discussed the procedure, risks, benefits, complications, and alternatives of the procedure. They understand and consent.   All questions were answered  Script for percocet 5/325 mg #28 prn q6 hrs - given, oarrs run  5/24/22  Angio, L PT and peroneal plasty  5/25/22  On iv abx  Wound appearance better  R groin no hematoma, L DP 2+, PT triphasic   6/1/22  Wound size and appearance better  6/8/22  Wound size and appearance better  Discussed with Dr Gi Garcia - he will stop abx  6/15/22  Wound size slightly improvement, appearance better  6/22/22  Wounds sizes smaller  6/29/22  Wounds stable   Hypergranulation tissue present throughout wound beds    Continue drawtex, foam, ABD and profore   7/6/22  Wounds slightly improved  7/13/22  Both wounds slightly larger  Hyperkeratotic tissue debrided back  7/14/22  Patient came in due to drainage through her wrap  Dressing noted to have large amounts of yellow/green drainage throughout  Cultures obtained    7/20/22  Culture reviewed large growth S aureus and Pseudomonas  Disc with Dr Hannon - will start clindamycin 600 mg tid for staph  He will see her in office in regards to IV for pseudomonas  Wounds stable in size  Change to aquacell ag  7/27/22  Dr Gi Garcia to see  Medial slightly larger, lateral slightly smaller  8/3/22  Dr Gi Garcia saw her felt wound appearance better - will hold off on iv for now  Medial slightly improved, lateral stable  8/10/2022  Wounds stable  8/17/22  Wound stable, more pain with debridement  Discussed OR for debridement and possible advanced skin therapy - pt agreeable  8/24/22  Debridement, kerecise application  0/34/04  Wound appearance improved  Medial wound improved, lateral stable  9/7/22  Wounds are smaller  9/14/22  Wound stable  Enodfrom and drawtek  9/21/2022  Wound debrided, stable according to the measurements  9/28/22  Wound larger  Culture done  Discussed OR  Aquacell ag change   10/5/22  Wound slightly larger  Percocet 5/325 mg #25 - script given, oarrs reviewed  Cx reviewed - will disc with Dr Germain Mitchell - all light growth   Not interested in OR at this time  10/12/22  Wound stable  Hold on cx tx  10/19/22  Wound stable  Ok for surgical debridement will schedule  Declined debridement today  10/25/22  Irrigation and excisional debridement of left medial and lateral ankle wound, Application of 231 mcg micromatrix acel  Application of unna boot  Lateral 15.5 sq cm, Medial 40.5 sq cm  11/2/22  Appearance improved  Measuring larger  No debridement    Wound/Ulcer Pain Timing/Severity: constant, moderate  Quality of pain: aching, throbbing, tender  Severity:  6/ 10   Modifying Factors: Pain worsens with dressing changes, debridement  Associated Signs/Symptoms: edema, drainage and pain    Ulcer Identification:  Ulcer Type: venous  Contributing Factors: edema and venous stasis    Diabetic/Pressure/Non Pressure Ulcers only:  Ulcer: Non-Pressure ulcer, fat layer exposed        PAST MEDICAL HISTORY      Diagnosis Date    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 ([address] 5/18/2022    Dizziness - light-headed     GERD (gastroesophageal reflux disease)     Herpes dermatitis 4/27/2017    Insomnia secondary to anxiety 4/6/2018    Lightheadedness     Migraine     Skin ulcer of buttock with fat layer exposed ([address] 7/20/2016    Venous stasis ulcer of left ankle with fat layer exposed with varicose veins ([address] 2/2/2022     Past Surgical History:   Procedure Laterality Date    CHOLECYSTECTOMY, LAPAROSCOPIC  04/08/2014    LEG SURGERY Left 8/24/2022    LEFT LOWER EXTREMITY DEBRIDMENT WITH POSSIBLE ADVANCED SKIN THERAPY, POSSIBLE UNNA BOOT performed by Tom Loya MD at [facility] Left 10/25/2022    DEBRIDEMENT LEFT LEG, POSSIBLE ADVANCED SKIN THERAPY with acell micromatrix application , Tashi Price performed by Tom Loya MD at [address] [place] [address] 12/13/2013    mild gastritis and small hiatal hernia, Dr Jone Pepe, [facility]  2015    GERD, Dr. Penny Pfeiffer, office     Family History   Problem Relation Age of Onset    Diabetes Mother     Hypertension Mother     Heart Disease Father     Heart Attack Father     Heart Surgery Father         angioplasty    Stroke Father     High Cholesterol Father     Heart Attack Maternal Grandfather      Social History     Tobacco Use    Smoking status: Never    Smokeless tobacco: Never   Vaping Use    Vaping Use: Never used   Substance Use Topics    Alcohol use: No    Drug use: No     Allergies   Allergen Reactions    Bee Pollen Anaphylaxis    Tetracyclines & Related Hives     Current Outpatient Medications on File Prior to Encounter   Medication Sig Dispense Refill    acyclovir (ZOVIRAX) 400 MG tablet take 1 tablet by mouth once daily 90 tablet 3    butalbital-acetaminophen-caffeine (FIORICET, ESGIC) -40 MG per tablet Take 1 tablet by mouth 3 times daily as needed for Headaches or Migraine Indications: Cluster Headache 90 tablet 5    EPINEPHrine (EPIPEN) 0.3 MG/0.3ML SOAJ injection Use as directed for allergic reaction 1 each 5    hydrOXYzine HCl (ATARAX) 25 MG tablet take 1 tablet BID 60 tablet 5    pantoprazole (PROTONIX) 40 MG tablet Take 1 tablet by mouth every morning (before breakfast) 90 tablet 3    aspirin-acetaminophen-caffeine (EXCEDRIN MIGRAINE) 250-250-65 MG per tablet Take 1 tablet by mouth as needed for Headaches 300 tablet 3     No current facility-administered medications on file prior to encounter.        REVIEW OF SYSTEMS See HPI    Objective:    BP (!) 180/64   Pulse 72   Temp 97.9 °F (36.6 °C) (Temporal)   Resp 18   Wt Readings from Last 3 Encounters:   10/25/22 165 lb (74.8 kg)   10/19/22 170 lb (77.1 kg)   08/31/22 170 lb (77.1 kg)     PHYSICAL EXAM  CONSTITUTIONAL:   Awake, alert, cooperative   EYES:  lids and lashes normal   ENT: external ears and nose without lesions NECK:  supple, symmetrical, trachea midline   SKIN:  Open wound Present    Assessment:     Problem List Items Addressed This Visit       Venous stasis ulcer of left ankle with fat layer exposed with varicose veins (HCC) - Primary (Chronic)    Relevant Orders    Initiate Outpatient Wound Care Protocol    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 ([address] (Chronic)    Relevant Orders    Initiate Outpatient Wound Care Protocol       Pre Debridement Measurements:  Are located in the [place]  Documentation Flow Sheet  Post Debridement Measurements:  Wound/Ulcer Descriptions are Pre Debridement except measurements:     Wound 08/10/16 Other (Comment) Buttocks Left;Distal #2 acq 8/4/16 (Active)   Number of days: 2274       Wound 08/31/16 Buttocks Left;Proximal #3 aquired 8-27-26 (Active)   Number of days: 7340       Wound 02/02/22 Ankle Left;Medial #1 (Active)   Wound Image   11/02/22 0754   Dressing Status New dressing applied;Clean;Dry; Intact 11/02/22 0908   Wound Cleansed Cleansed with saline 11/02/22 0908   Dressing/Treatment ABD; Alginate with Ag; Foam 11/02/22 0908   Offloading for Diabetic Foot Ulcers Offloading not required 09/28/22 0835   Wound Length (cm) 9 cm 11/02/22 0754   Wound Width (cm) 6.4 cm 11/02/22 0754   Wound Depth (cm) 0.3 cm 11/02/22 0754   Wound Surface Area (cm^2) 57.6 cm^2 11/02/22 0754   Change in Wound Size % (l*w) -112.86 11/02/22 0754   Wound Volume (cm^3) 17.28 cm^3 11/02/22 0754   Wound Healing % -539 11/02/22 0754   Post-Procedure Length (cm) 8 cm 10/05/22 0816   Post-Procedure Width (cm) 4.6 cm 10/05/22 0816   Post-Procedure Depth (cm) 0.2 cm 10/05/22 0816   Post-Procedure Surface Area (cm^2) 36.8 cm^2 10/05/22 0816   Post-Procedure Volume (cm^3) 7.36 cm^3 10/05/22 0816   Wound Assessment Pink/red;Fibrin 11/02/22 0754   Drainage Amount Large 11/02/22 0754   Drainage Description Yellow;Serosanguinous 11/02/22 0754   Odor None 11/02/22 0754   Melany-wound Assessment Maceration;Blanchable erythema 11/02/22 0754   Number of days: 273       Wound 03/16/22 Ankle Posterior; Left #2 (Active)   Wound Image   11/02/22 0754   Dressing Status New dressing applied;Clean;Dry; Intact 11/02/22 0908   Wound Cleansed Cleansed with saline 11/02/22 0908   Dressing/Treatment Alginate with Ag;ABD; Foam 11/02/22 0908   Offloading for Diabetic Foot Ulcers Offloading not required 10/19/22 0904   Wound Length (cm) 7 cm 11/02/22 0754   Wound Width (cm) 5.3 cm 11/02/22 0754   Wound Depth (cm) 0.3 cm 11/02/22 0754   Wound Surface Area (cm^2) 37.1 cm^2 11/02/22 0754   Change in Wound Size % (l*w) -1384 11/02/22 0754   Wound Volume (cm^3) 11.13 cm^3 11/02/22 0754   Wound Healing % -4352 11/02/22 0754   Post-Procedure Length (cm) 4.7 cm 10/05/22 0816   Post-Procedure Width (cm) 2.6 cm 10/05/22 0816   Post-Procedure Depth (cm) 0.2 cm 10/05/22 0816   Post-Procedure Surface Area (cm^2) 12.22 cm^2 10/05/22 0816   Post-Procedure Volume (cm^3) 2.444 cm^3 10/05/22 0816   Wound Assessment Pink/red;Fibrin;Slough 11/02/22 0754   Drainage Amount Large 11/02/22 0754   Drainage Description Serosanguinous; Yellow 11/02/22 0754   Odor None 11/02/22 0754   Melany-wound Assessment Maceration;Fragile 11/02/22 0754   Number of days: 231       Wound 10/25/22 Leg Left (Active)   Number of days: 7     Incision 08/24/22 Leg Left (Active)   Dressing Status Clean;Dry; Intact 10/25/22 1220   Incision Cleansed Cleansed with saline 10/25/22 1208   Dressing/Treatment Other (comment) 10/25/22 1208   Drainage Amount None 10/25/22 1220   Number of days: 70     No debridement    Plan:   Treatment Note please see attached Discharge Instructions    Written patient dismissal instructions given to patient and signed by patient or POA.          Discharge Instructions         Visit Discharge/Physician Orders     Discharge condition: Stable     Assessment of pain at discharge: yes     Anesthetic used: 4% lidocaine solution(none 10-19-22)     Discharge to: Home     Left via:Private automobile     Accompanied by:self     ECF/HHA: n/a     Dressing Orders:LEFT MEDIAL and LATERAL LOWER LEG-Cleanse with normal saline, apply zinc to fiona wound, aquacel AG,  ABD pad , unna boot and coban. Change weekly. Treatment Orders: Eat a diet high in protein and vitamin C. Take a multiple vitamin daily unless contraindicated. To see Dr. Ayesha Alba as scheduled      Must wear slip on shoe to work due to wrap on leg! [address] [place] [address],[address] followup visit : 1 week_____________________________  (Please note your next appointment above and if you are unable to keep, kindly give a 24 hour notice. Thank you.)     Physician signature:__________________________     If you experience any of the following, please call the [facility] during business hours:     * Increase in Pain  * Temperature over 101  * Increase in drainage from your wound  * Drainage with a foul odor  * Bleeding  * Increase in swelling  * Need for compression bandage changes due to slippage, breakthrough drainage. If you need medical attention outside of the business hours of the [facility] [address] please contact your PCP or go to the nearest emergency room.     Electronically signed by Pamella Morgan MD

## 2022-11-02 NOTE — PLAN OF CARE
Problem: Pain  Goal: Verbalizes/displays adequate comfort level or baseline comfort level  Outcome: Progressing     Problem: Wound:  Goal: Will show signs of wound healing; wound closure and no evidence of infection  Description: Will show signs of wound healing; wound closure and no evidence of infection  Outcome: Progressing     Problem: Venous:  Goal: Signs of wound healing will improve  Description: Signs of wound healing will improve  Outcome: Adequate for Discharge     Problem: Compression therapy:  Goal: Will be free from complications associated with compression therapy  Description: Will be free from complications associated with compression therapy  Outcome: Progressing

## 2022-11-03 NOTE — DISCHARGE INSTRUCTIONS
Visit Discharge/Physician Orders     Discharge condition: Stable     Assessment of pain at discharge: yes     Anesthetic used: 4% lidocaine solution(none 10-19-22)     Discharge to: Home     Left via:Private automobile     Accompanied by:self     ECF/HHA: n/a     Dressing Orders:LEFT MEDIAL and LATERAL LOWER LEG-Cleanse with normal saline, apply zinc to fiona wound, aquacel AG, foam dressing, ABD pad , unna boot and coban. Change weekly. Treatment Orders: Eat a diet high in protein and vitamin C. Take a multiple vitamin daily unless contraindicated. To see Dr. Kathia Burroughs as scheduled      Must wear slip on shoe to work due to wrap on leg! [facility] followup visit : 1 week_____________________________  (Please note your next appointment above and if you are unable to keep, kindly give a 24 hour notice. Thank you.)     Physician signature:__________________________     If you experience any of the following, please call the [facility] during business hours:     * Increase in Pain  * Temperature over 101  * Increase in drainage from your wound  * Drainage with a foul odor  * Bleeding  * Increase in swelling  * Need for compression bandage changes due to slippage, breakthrough drainage. If you need medical attention outside of the business hours of the [facility] [facility] please contact your PCP or go to the nearest emergency room.

## 2022-11-09 ENCOUNTER — HOSPITAL ENCOUNTER (OUTPATIENT)
Dept: WOUND CARE | Age: 67
Discharge: HOME | End: 2022-11-09
Payer: MEDICARE

## 2022-11-09 VITALS
HEIGHT: 68 IN | RESPIRATION RATE: 18 BRPM | TEMPERATURE: 98 F | SYSTOLIC BLOOD PRESSURE: 162 MMHG | BODY MASS INDEX: 25.01 KG/M2 | DIASTOLIC BLOOD PRESSURE: 56 MMHG | HEART RATE: 83 BPM | WEIGHT: 165 LBS

## 2022-11-09 DIAGNOSIS — I83.023 VENOUS STASIS ULCER OF LEFT ANKLE WITH FAT LAYER EXPOSED WITH VARICOSE VEINS (HCC): Primary | ICD-10-CM

## 2022-11-09 DIAGNOSIS — I70.243 ATHEROSCLEROSIS OF NATIVE ARTERY OF LEFT LOWER EXTREMITY WITH ULCERATION OF ANKLE (HCC): ICD-10-CM

## 2022-11-09 DIAGNOSIS — L97.322 VENOUS STASIS ULCER OF LEFT ANKLE WITH FAT LAYER EXPOSED WITH VARICOSE VEINS (HCC): Primary | ICD-10-CM

## 2022-11-09 DIAGNOSIS — I70.242 ATHEROSCLEROSIS OF NATIVE ARTERY OF LEFT LOWER EXTREMITY WITH ULCERATION OF CALF (HCC): ICD-10-CM

## 2022-11-09 PROCEDURE — 11042 DBRDMT SUBQ TIS 1ST 20SQCM/<: CPT | Performed by: SURGERY

## 2022-11-09 PROCEDURE — 11045 DBRDMT SUBQ TISS EACH ADDL: CPT | Performed by: SURGERY

## 2022-11-09 PROCEDURE — 11042 DBRDMT SUBQ TIS 1ST 20SQCM/<: CPT

## 2022-11-09 PROCEDURE — 11045 DBRDMT SUBQ TISS EACH ADDL: CPT

## 2022-11-09 RX ORDER — LIDOCAINE HYDROCHLORIDE 40 MG/ML
SOLUTION TOPICAL ONCE
Status: COMPLETED | OUTPATIENT
Start: 2022-11-09 | End: 2022-11-09

## 2022-11-09 RX ORDER — GINSENG 100 MG
CAPSULE ORAL ONCE
Status: CANCELLED | OUTPATIENT
Start: 2022-11-09 | End: 2022-11-09

## 2022-11-09 RX ORDER — BACITRACIN ZINC AND POLYMYXIN B SULFATE 500; 1000 [USP'U]/G; [USP'U]/G
OINTMENT TOPICAL ONCE
Status: CANCELLED | OUTPATIENT
Start: 2022-11-09 | End: 2022-11-09

## 2022-11-09 RX ORDER — LIDOCAINE 50 MG/G
OINTMENT TOPICAL ONCE
Status: CANCELLED | OUTPATIENT
Start: 2022-11-09 | End: 2022-11-09

## 2022-11-09 RX ORDER — CLOBETASOL PROPIONATE 0.5 MG/G
OINTMENT TOPICAL ONCE
Status: CANCELLED | OUTPATIENT
Start: 2022-11-09 | End: 2022-11-09

## 2022-11-09 RX ORDER — BETAMETHASONE DIPROPIONATE 0.05 %
OINTMENT (GRAM) TOPICAL ONCE
Status: CANCELLED | OUTPATIENT
Start: 2022-11-09 | End: 2022-11-09

## 2022-11-09 RX ORDER — BACITRACIN, NEOMYCIN, POLYMYXIN B 400; 3.5; 5 [USP'U]/G; MG/G; [USP'U]/G
OINTMENT TOPICAL ONCE
Status: CANCELLED | OUTPATIENT
Start: 2022-11-09 | End: 2022-11-09

## 2022-11-09 RX ORDER — LIDOCAINE HYDROCHLORIDE 20 MG/ML
JELLY TOPICAL ONCE
Status: CANCELLED | OUTPATIENT
Start: 2022-11-09 | End: 2022-11-09

## 2022-11-09 RX ORDER — GENTAMICIN SULFATE 1 MG/G
OINTMENT TOPICAL ONCE
Status: CANCELLED | OUTPATIENT
Start: 2022-11-09 | End: 2022-11-09

## 2022-11-09 RX ORDER — LIDOCAINE HYDROCHLORIDE 40 MG/ML
SOLUTION TOPICAL ONCE
Status: CANCELLED | OUTPATIENT
Start: 2022-11-09 | End: 2022-11-09

## 2022-11-09 RX ORDER — LIDOCAINE 40 MG/G
CREAM TOPICAL ONCE
Status: CANCELLED | OUTPATIENT
Start: 2022-11-09 | End: 2022-11-09

## 2022-11-09 RX ADMIN — LIDOCAINE HYDROCHLORIDE 10 ML: 40 SOLUTION TOPICAL at 08:02

## 2022-11-09 ASSESSMENT — PAIN DESCRIPTION - FREQUENCY: FREQUENCY: CONTINUOUS

## 2022-11-09 ASSESSMENT — PAIN DESCRIPTION - PAIN TYPE: TYPE: CHRONIC PAIN

## 2022-11-09 ASSESSMENT — PAIN DESCRIPTION - LOCATION: LOCATION: LEG

## 2022-11-09 ASSESSMENT — PAIN DESCRIPTION - ONSET: ONSET: ON-GOING

## 2022-11-09 ASSESSMENT — PAIN DESCRIPTION - ORIENTATION: ORIENTATION: LEFT

## 2022-11-09 ASSESSMENT — PAIN SCALES - GENERAL: PAINLEVEL_OUTOF10: 5

## 2022-11-09 ASSESSMENT — PAIN DESCRIPTION - DESCRIPTORS: DESCRIPTORS: ACHING

## 2022-11-09 ASSESSMENT — PAIN - FUNCTIONAL ASSESSMENT: PAIN_FUNCTIONAL_ASSESSMENT: PREVENTS OR INTERFERES SOME ACTIVE ACTIVITIES AND ADLS

## 2022-11-09 NOTE — PROGRESS NOTES
Wound Healing Center Followup Visit Note    Referring Physician : Lynne Moran MD  [address] [place] [address] RECORD NUMBER:  [id number]  AGE: 61 y.o. GENDER: female  : 1955  EPISODE DATE:  2022    Subjective:     Chief Complaint   Patient presents with    Wound Check     Left ankle      HISTORY of PRESENT ILLNESS HPI   Avis Orozco is a 61 y.o. female who presents today in regards to follow up evaluation and treatment of wound/ulcer. That patient's past medical, family and social hx were reviewed and changes were made if present. History of Wound Context:  The patient has had a wound of her left ankle/calf which was first noted approximately 2021. This has been treated local wound care. On their initial visit to the wound healing center, 22,  the patient has noted that the wound has been improving. The patient has not had similar previous wounds in the past.      She started seeing Dr. Paul Francis in 2021 and than Dr. Debbi Strickland. She was started in [facility] ~ 2021. She has noticed some improvement since starting unna boot. She is currently following with Dr. Gabriela Escoto. Pt is not on abx at time of initial visit, but has been treated with previously by podiatry. She is not a DM. She is not a smoker. She denies hx of DVT, and per her report had recent us noting no evidence of dvt at [facility]. She also had arterial studies done. I had previously seen her in the past in regards to left buttock thigh wound, which started as abscess. Pt works at [employer] in [place] [employer] and is on her feet all day.     22  Reflux study - if significant findings will schedule for fu  Continue compression therapy [facility] per podiatry with aquacell dressing  Elevation  She does not have significant arterial occlusive disease  22  Patient has asked to continuing following with me going forward because of our previous relationship  She is going to let Dr. Vale Altman office know  She tolerated unna boot and aquacell - some improvement  216/22  Appearance improved, slightly larger  Consider drawtek next week but overall drainage seems reasonably managed as periwound appears ok  2/23/2022  The wound, has some exudate, no recent cultures were done, will do wound cultures today  3/2/22  Reflux study reviewed - no significant reflux  Will treat culture - augmentin 875 mg bid x 10 days - script sent  More drainage - stable size  3/9/22  Wound appearance improved, stable in size  drawtek  3/16/22  Wound slightly larger in size, new wound of ankle  Continue Drawtek, change to profore  Avoid shoes which will contact ankle area  3/23/22  Wounds stable  Overall appearance improved  Will have pt see ID re cultures - disc with Dr Miriam Vitale  3/30/22  Much improved appearance  On oral abx per ID - concerns re pt ability to work while on IV - will see how her wound progresses  4/6/22  Appearance improved but size not much different  Finished oral abx  Will re culture next week if no significant size change - possible advance skin therapy  4/20/2022  Ulcer on the medial aspect, fairly clean and granulating, ulcer of the lateral aspect, has some exudate, debrided  4/27/22  Wounds stable   Hypergranulation tissue removed  Culture done - possible graft in future  5/4/22  Wound stable  Disc culture with Dr Miriam Vitale who would like to start her on iv abx  5/11/22  Wound stable  Iv abx have not been started yet - spoke with Dr Miriam Vitale - spoke with pt she will call his office  She had spoke with [facility] but there were some issues  5/18/22  Calf stable, ankle improved  Iv abx to start this week after picc placement  On exam   L dp 2+, PT triphasic - with compression of dp - PT becomes weakly biphasic  Concern that PT though triphasic is not getting inline flow and as a result isn't healing in the calf distribution because of occlusive disease  We discussed angiogram - will schedule  I reviewed the procedure with the patient and family as available. I discussed the procedure, risks, benefits, complications, and alternatives of the procedure. They understand and consent.   All questions were answered  Script for percocet 5/325 mg #28 prn q6 hrs - given, oarrs run  5/24/22  Angio, L PT and peroneal plasty  5/25/22  On iv abx  Wound appearance better  R groin no hematoma, L DP 2+, PT triphasic   6/1/22  Wound size and appearance better  6/8/22  Wound size and appearance better  Discussed with Dr Kristie Nichols - he will stop abx  6/15/22  Wound size slightly improvement, appearance better  6/22/22  Wounds sizes smaller  6/29/22  Wounds stable   Hypergranulation tissue present throughout wound beds    Continue drawtex, foam, ABD and profore   7/6/22  Wounds slightly improved  7/13/22  Both wounds slightly larger  Hyperkeratotic tissue debrided back  7/14/22  Patient came in due to drainage through her wrap  Dressing noted to have large amounts of yellow/green drainage throughout  Cultures obtained    7/20/22  Culture reviewed large growth S aureus and Pseudomonas  Disc with Dr Hannon - will start clindamycin 600 mg tid for staph  He will see her in office in regards to IV for pseudomonas  Wounds stable in size  Change to aquacell ag  7/27/22  Dr Kristie Nichols to see  Medial slightly larger, lateral slightly smaller  8/3/22  Dr Kristie Nichols saw her felt wound appearance better - will hold off on iv for now  Medial slightly improved, lateral stable  8/10/2022  Wounds stable  8/17/22  Wound stable, more pain with debridement  Discussed OR for debridement and possible advanced skin therapy - pt agreeable  8/24/22  Debridement, kerecise application  3/47/40  Wound appearance improved  Medial wound improved, lateral stable  9/7/22  Wounds are smaller  9/14/22  Wound stable  Enodfrom and drawtek  9/21/2022  Wound debrided, stable according to the measurements  9/28/22  Wound larger  Culture done  Discussed OR  Aquacell ag change   10/5/22  Wound slightly larger  Percocet 5/325 mg #25 - script given, oarrs reviewed  Cx reviewed - will disc with Dr Carleen Stovall - all light growth   Not interested in OR at this time  10/12/22  Wound stable  Hold on cx tx  10/19/22  Wound stable  Ok for surgical debridement will schedule  Declined debridement today  10/25/22  Irrigation and excisional debridement of left medial and lateral ankle wound, Application of 011 mcg micromatrix acel  Application of unna boot  Lateral 15.5 sq cm, Medial 40.5 sq cm  11/2/22  Appearance improved  Measuring larger  No debridement  11/9/22  Appearance improved  Medial 62 (57), Lateral 18 (37)  Aquacell ag and wraps    Wound/Ulcer Pain Timing/Severity: constant, moderate  Quality of pain: aching, throbbing, tender  Severity:  6/ 10   Modifying Factors: Pain worsens with dressing changes, debridement  Associated Signs/Symptoms: edema, drainage and pain    Ulcer Identification:  Ulcer Type: venous  Contributing Factors: edema and venous stasis    Diabetic/Pressure/Non Pressure Ulcers only:  Ulcer: Non-Pressure ulcer, fat layer exposed        PAST MEDICAL HISTORY      Diagnosis Date    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 ([address] 5/18/2022    Dizziness - light-headed     GERD (gastroesophageal reflux disease)     Herpes dermatitis 4/27/2017    Insomnia secondary to anxiety 4/6/2018    Lightheadedness     Migraine     Skin ulcer of buttock with fat layer exposed ([address] 7/20/2016    Venous stasis ulcer of left ankle with fat layer exposed with varicose veins ([address] 2/2/2022     Past Surgical History:   Procedure Laterality Date    CHOLECYSTECTOMY, LAPAROSCOPIC  04/08/2014    LEG SURGERY Left 8/24/2022    LEFT LOWER EXTREMITY DEBRIDMENT WITH POSSIBLE ADVANCED SKIN THERAPY, POSSIBLE UNNA BOOT performed by Timo Worthy MD at [address] [place] [address] Left 10/25/2022    DEBRIDEMENT LEFT LEG, POSSIBLE ADVANCED SKIN THERAPY with acell micromatrix application , UNNA BOOT performed by Timo Worthy MD at [facility] ENDOSCOPY  12/13/2013    mild gastritis and small hiatal hernia, Dr Jesus Warren, [place] [address]  2015    GERD, Dr. Eileen Magdaleno, office     Family History   Problem Relation Age of Onset    Diabetes Mother     Hypertension Mother     Heart Disease Father     Heart Attack Father     Heart Surgery Father         angioplasty    Stroke Father     High Cholesterol Father     Heart Attack Maternal Grandfather      Social History     Tobacco Use    Smoking status: Never    Smokeless tobacco: Never   Vaping Use    Vaping Use: Never used   Substance Use Topics    Alcohol use: No    Drug use: No     Allergies   Allergen Reactions    Bee Pollen Anaphylaxis    Tetracyclines & Related Hives     Current Outpatient Medications on File Prior to Encounter   Medication Sig Dispense Refill    acyclovir (ZOVIRAX) 400 MG tablet take 1 tablet by mouth once daily 90 tablet 3    butalbital-acetaminophen-caffeine (FIORICET, ESGIC) -40 MG per tablet Take 1 tablet by mouth 3 times daily as needed for Headaches or Migraine Indications: Cluster Headache 90 tablet 5    EPINEPHrine (EPIPEN) 0.3 MG/0.3ML SOAJ injection Use as directed for allergic reaction 1 each 5    hydrOXYzine HCl (ATARAX) 25 MG tablet take 1 tablet BID 60 tablet 5    pantoprazole (PROTONIX) 40 MG tablet Take 1 tablet by mouth every morning (before breakfast) 90 tablet 3    aspirin-acetaminophen-caffeine (EXCEDRIN MIGRAINE) 250-250-65 MG per tablet Take 1 tablet by mouth as needed for Headaches 300 tablet 3     No current facility-administered medications on file prior to encounter.        REVIEW OF SYSTEMS See HPI    Objective:    BP (!) 162/56   Pulse 83   Temp 98 °F (36.7 °C) (Tympanic)   Resp 18   Ht 5' 8\" (1.727 m)   Wt 165 lb (74.8 kg)   BMI 25.09 kg/m²   Wt Readings from Last 3 Encounters:   11/09/22 165 lb (74.8 kg)   10/25/22 165 lb (74.8 kg)   10/19/22 170 lb (77.1 kg) PHYSICAL EXAM  CONSTITUTIONAL:   Awake, alert, cooperative   EYES:  lids and lashes normal   ENT: external ears and nose without lesions   NECK:  supple, symmetrical, trachea midline   SKIN:  Open wound Present    Assessment:     Problem List Items Addressed This Visit       Venous stasis ulcer of left ankle with fat layer exposed with varicose veins (HCC) - Primary (Chronic)    Relevant Orders    Initiate Outpatient Wound Care Protocol    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 ([address] (Chronic)    Relevant Orders    Initiate Outpatient Wound Care Protocol       Pre Debridement Measurements:  Are located in the [place]  Documentation Flow Sheet  Post Debridement Measurements:  Wound/Ulcer Descriptions are Pre Debridement except measurements:     Wound 08/10/16 Other (Comment) Buttocks Left;Distal #2 acq 8/4/16 (Active)   Number of days: 6085       Wound 08/31/16 Buttocks Left;Proximal #3 aquired 8-27-26 (Active)   Number of days: 2557       Wound 02/02/22 Ankle Left;Medial #1 (Active)   Wound Image   11/02/22 0754   Dressing Status New dressing applied;Clean;Dry; Intact 11/02/22 0908   Wound Cleansed Cleansed with saline 11/02/22 0908   Dressing/Treatment ABD; Alginate with Ag; Foam 11/02/22 0908   Offloading for Diabetic Foot Ulcers Offloading not required 09/28/22 0835   Wound Length (cm) 9.2 cm 11/09/22 0755   Wound Width (cm) 6.8 cm 11/09/22 0755   Wound Depth (cm) 0.3 cm 11/09/22 0755   Wound Surface Area (cm^2) 62.56 cm^2 11/09/22 0755   Change in Wound Size % (l*w) -131.19 11/09/22 0755   Wound Volume (cm^3) 18.768 cm^3 11/09/22 0755   Wound Healing % -594 11/09/22 0755   Post-Procedure Length (cm) 9.4 cm 11/09/22 0905   Post-Procedure Width (cm) 6.9 cm 11/09/22 0905   Post-Procedure Depth (cm) 0.3 cm 11/09/22 0905   Post-Procedure Surface Area (cm^2) 64.86 cm^2 11/09/22 0905   Post-Procedure Volume (cm^3) 19.458 cm^3 11/09/22 0905   Wound Assessment Pink/red;Fibrin 11/09/22 8128 Drainage Amount Large 11/09/22 0755   Drainage Description Yellow;Green;Serosanguinous 11/09/22 0755   Odor None 11/09/22 0755   Melany-wound Assessment Blanchable erythema 11/09/22 0755   Number of days: 280       Wound 03/16/22 Ankle Posterior; Left #2 (Active)   Wound Image   11/02/22 0754   Dressing Status New dressing applied;Clean;Dry; Intact 11/02/22 0908   Wound Cleansed Cleansed with saline 11/02/22 0908   Dressing/Treatment Alginate with Ag;ABD; Foam 11/02/22 0908   Offloading for Diabetic Foot Ulcers Offloading not required 10/19/22 0904   Wound Length (cm) 5.3 cm 11/09/22 0755   Wound Width (cm) 3.5 cm 11/09/22 0755   Wound Depth (cm) 0.3 cm 11/09/22 0755   Wound Surface Area (cm^2) 18.55 cm^2 11/09/22 0755   Change in Wound Size % (l*w) -642 11/09/22 0755   Wound Volume (cm^3) 5.565 cm^3 11/09/22 0755   Wound Healing % -2126 11/09/22 0755   Post-Procedure Length (cm) 5.6 cm 11/09/22 0905   Post-Procedure Width (cm) 3.6 cm 11/09/22 0905   Post-Procedure Depth (cm) 0.3 cm 11/09/22 0905   Post-Procedure Surface Area (cm^2) 20.16 cm^2 11/09/22 0905   Post-Procedure Volume (cm^3) 6.048 cm^3 11/09/22 0905   Wound Assessment Pink/red;Fibrin 11/09/22 0755   Drainage Amount Large 11/09/22 0755   Drainage Description Yellow;Green;Serosanguinous 11/09/22 0755   Odor None 11/09/22 0755   Melany-wound Assessment Blanchable erythema 11/09/22 0755   Number of days: 238     Incision 08/24/22 Leg Left (Active)   Dressing Status Clean;Dry; Intact 10/25/22 1220   Incision Cleansed Cleansed with saline 10/25/22 1208   Dressing/Treatment Other (comment) 10/25/22 1208   Drainage Amount None 10/25/22 1220   Number of days: 77       Procedure Note  Indications:  Based on my examination of this patient's wound(s)/ulcer(s) today, debridement is required to promote healing and evaluate the wound base.     Performed by: Ning Bee MD    Consent obtained:  Yes    Time out taken:  Yes    Pain Control: Anesthetic  Anesthetic: 4% Lidocaine Liquid Topical     Debridement:Excisional Debridement    Using curette the wound(s)/ulcer(s) was/were sharply debrided down through and including the removal of epidermis, dermis, and subcutaneous tissue. Devitalized Tissue Debrided:  fibrin, biofilm, slough, and exudate to stimulate bleeding to promote healing, post debridement good bleeding base and wound edges noted    Wound/Ulcer #: 1 and 2    Percent of Wound/Ulcer Debrided: 50%    Total Surface Area Debrided:  40 sq cm     Estimated Blood Loss:  Minimal  Hemostasis Achieved:  by pressure    Procedural Pain:  8  / 10   Post Procedural Pain:  7 / 10     Response to treatment:  With complaints of pain. Plan:   Treatment Note please see attached Discharge Instructions    Written patient dismissal instructions given to patient and signed by patient or POA. Discharge Instructions         Visit Discharge/Physician Orders     Discharge condition: Stable     Assessment of pain at discharge: yes     Anesthetic used: 4% lidocaine solution(none 10-19-22)     Discharge to: Home     Left via:Private automobile     Accompanied by:self     ECF/HHA: n/a     Dressing Orders:LEFT MEDIAL and LATERAL LOWER LEG-Cleanse with normal saline, apply zinc to fiona wound, aquacel AG, foam dressing, ABD pad , unna boot and coban. Change weekly. Treatment Orders: Eat a diet high in protein and vitamin C. Take a multiple vitamin daily unless contraindicated. To see Dr. Cristhian Casarez as scheduled      Must wear slip on shoe to work due to wrap on leg! [address] [place] [address],[address] followup visit : 1 week_____________________________  (Please note your next appointment above and if you are unable to keep, kindly give a 24 hour notice.  Thank you.)     Physician signature:__________________________     If you experience any of the following, please call the [facility] during business hours:     * Increase in Pain  * Temperature over 101  * Increase in drainage from your wound  * Drainage with a foul odor  * Bleeding  * Increase in swelling  * Need for compression bandage changes due to slippage, breakthrough drainage. If you need medical attention outside of the business hours of the [address] [place] [address] Road please contact your PCP or go to the nearest emergency room.     Electronically signed by Yoselin Garcia MD

## 2022-11-09 NOTE — DISCHARGE INSTRUCTIONS
Visit Discharge/Physician Orders     Discharge condition: Stable     Assessment of pain at discharge: yes     Anesthetic used: 4% lidocaine solution(none 10-19-22)     Discharge to: Home     Left via:Private automobile     Accompanied by:self     ECF/HHA: n/a     Dressing Orders:LEFT MEDIAL and LATERAL LOWER LEG-Cleanse with normal saline, apply zinc to fiona wound, aquacel AG and drawtex, dressing, ABD pad , unna boot and coban. Change weekly. Treatment Orders: Eat a diet high in protein and vitamin C. Take a multiple vitamin daily unless contraindicated. To see Dr. Carmela Chauhan as scheduled      Must wear slip on shoe to work due to wrap on leg! [facility] followup visit : 1 week_____________________________  (Please note your next appointment above and if you are unable to keep, kindly give a 24 hour notice.  Thank you.)     Physician signature:__________________________     If you experience any of the following, [facility] during business hours:

## 2022-11-16 ENCOUNTER — HOSPITAL ENCOUNTER (OUTPATIENT)
Dept: WOUND CARE | Age: 67
Discharge: HOME | End: 2022-11-16
Payer: MEDICARE

## 2022-11-16 VITALS — WEIGHT: 165 LBS | RESPIRATION RATE: 18 BRPM | TEMPERATURE: 96.4 F | BODY MASS INDEX: 25.09 KG/M2 | HEART RATE: 84 BPM

## 2022-11-16 DIAGNOSIS — I83.023 VENOUS STASIS ULCER OF LEFT ANKLE WITH FAT LAYER EXPOSED WITH VARICOSE VEINS (HCC): Primary | ICD-10-CM

## 2022-11-16 DIAGNOSIS — L97.322 VENOUS STASIS ULCER OF LEFT ANKLE WITH FAT LAYER EXPOSED WITH VARICOSE VEINS (HCC): Primary | ICD-10-CM

## 2022-11-16 DIAGNOSIS — I70.242 ATHEROSCLEROSIS OF NATIVE ARTERY OF LEFT LOWER EXTREMITY WITH ULCERATION OF CALF (HCC): ICD-10-CM

## 2022-11-16 DIAGNOSIS — I70.243 ATHEROSCLEROSIS OF NATIVE ARTERY OF LEFT LOWER EXTREMITY WITH ULCERATION OF ANKLE (HCC): ICD-10-CM

## 2022-11-16 PROCEDURE — 11042 DBRDMT SUBQ TIS 1ST 20SQCM/<: CPT | Performed by: SURGERY

## 2022-11-16 PROCEDURE — 11045 DBRDMT SUBQ TISS EACH ADDL: CPT

## 2022-11-16 PROCEDURE — 11045 DBRDMT SUBQ TISS EACH ADDL: CPT | Performed by: SURGERY

## 2022-11-16 PROCEDURE — 11042 DBRDMT SUBQ TIS 1ST 20SQCM/<: CPT

## 2022-11-16 RX ORDER — LIDOCAINE 40 MG/G
CREAM TOPICAL ONCE
Status: CANCELLED | OUTPATIENT
Start: 2022-11-16 | End: 2022-11-16

## 2022-11-16 RX ORDER — BACITRACIN, NEOMYCIN, POLYMYXIN B 400; 3.5; 5 [USP'U]/G; MG/G; [USP'U]/G
OINTMENT TOPICAL ONCE
Status: CANCELLED | OUTPATIENT
Start: 2022-11-16 | End: 2022-11-16

## 2022-11-16 RX ORDER — BACITRACIN ZINC AND POLYMYXIN B SULFATE 500; 1000 [USP'U]/G; [USP'U]/G
OINTMENT TOPICAL ONCE
Status: CANCELLED | OUTPATIENT
Start: 2022-11-16 | End: 2022-11-16

## 2022-11-16 RX ORDER — LIDOCAINE HYDROCHLORIDE 20 MG/ML
JELLY TOPICAL ONCE
Status: CANCELLED | OUTPATIENT
Start: 2022-11-16 | End: 2022-11-16

## 2022-11-16 RX ORDER — LIDOCAINE HYDROCHLORIDE 40 MG/ML
SOLUTION TOPICAL ONCE
Status: COMPLETED | OUTPATIENT
Start: 2022-11-16 | End: 2022-11-16

## 2022-11-16 RX ORDER — CLOBETASOL PROPIONATE 0.5 MG/G
OINTMENT TOPICAL ONCE
Status: CANCELLED | OUTPATIENT
Start: 2022-11-16 | End: 2022-11-16

## 2022-11-16 RX ORDER — GINSENG 100 MG
CAPSULE ORAL ONCE
Status: CANCELLED | OUTPATIENT
Start: 2022-11-16 | End: 2022-11-16

## 2022-11-16 RX ORDER — LIDOCAINE HYDROCHLORIDE 40 MG/ML
SOLUTION TOPICAL ONCE
Status: CANCELLED | OUTPATIENT
Start: 2022-11-16 | End: 2022-11-16

## 2022-11-16 RX ORDER — GENTAMICIN SULFATE 1 MG/G
OINTMENT TOPICAL ONCE
Status: CANCELLED | OUTPATIENT
Start: 2022-11-16 | End: 2022-11-16

## 2022-11-16 RX ORDER — BETAMETHASONE DIPROPIONATE 0.05 %
OINTMENT (GRAM) TOPICAL ONCE
Status: CANCELLED | OUTPATIENT
Start: 2022-11-16 | End: 2022-11-16

## 2022-11-16 RX ORDER — LIDOCAINE 50 MG/G
OINTMENT TOPICAL ONCE
Status: CANCELLED | OUTPATIENT
Start: 2022-11-16 | End: 2022-11-16

## 2022-11-16 RX ORDER — OXYCODONE HYDROCHLORIDE AND ACETAMINOPHEN 5; 325 MG/1; MG/1
1 TABLET ORAL EVERY 6 HOURS PRN
Qty: 25 TABLET | Refills: 0 | Status: SHIPPED | OUTPATIENT
Start: 2022-11-16 | End: 2022-11-30

## 2022-11-16 RX ADMIN — LIDOCAINE HYDROCHLORIDE 10 ML: 40 SOLUTION TOPICAL at 07:42

## 2022-11-16 ASSESSMENT — PAIN DESCRIPTION - DESCRIPTORS: DESCRIPTORS: ACHING

## 2022-11-16 ASSESSMENT — PAIN DESCRIPTION - ORIENTATION: ORIENTATION: LEFT

## 2022-11-16 ASSESSMENT — PAIN SCALES - GENERAL: PAINLEVEL_OUTOF10: 5

## 2022-11-16 ASSESSMENT — PAIN - FUNCTIONAL ASSESSMENT: PAIN_FUNCTIONAL_ASSESSMENT: ACTIVITIES ARE NOT PREVENTED

## 2022-11-16 ASSESSMENT — PAIN DESCRIPTION - LOCATION: LOCATION: LEG

## 2022-11-16 NOTE — PROGRESS NOTES
Wound Healing Center Followup Visit Note    Referring Physician : Jerry Guzman MD  [address] [place] [address] RECORD NUMBER:  [id number]  AGE: 61 y.o. GENDER: female  : 1955  EPISODE DATE:  2022    Subjective:     Chief Complaint   Patient presents with    Wound Check     Left leg      HISTORY of PRESENT ILLNESS HPI   Gissel Diane is a 61 y.o. female who presents today in regards to follow up evaluation and treatment of wound/ulcer. That patient's past medical, family and social hx were reviewed and changes were made if present. History of Wound Context:  The patient has had a wound of her left ankle/calf which was first noted approximately 2021. This has been treated local wound care. On their initial visit to the wound healing center, 22,  the patient has noted that the wound has been improving. The patient has not had similar previous wounds in the past.      She started seeing Dr. Abrahan Morataya in 2021 and than Dr. Zuleyka Myrick. She was started in [facility] ~ 2021. She has noticed some improvement since starting unna boot. She is currently following with Dr. Susan Rivera. Pt is not on abx at time of initial visit, but has been treated with previously by podiatry. She is not a DM. She is not a smoker. She denies hx of DVT, and per her report had recent us noting no evidence of dvt at [facility]. She also had arterial studies done. I had previously seen her in the past in regards to left buttock thigh wound, which started as abscess. Pt works at [employer] in [facility] and is on her feet all day.     22  Reflux study - if significant findings will schedule for fu  Continue compression therapy [facility] per podiatry with aquacell dressing  Elevation  She does not have significant arterial occlusive disease  22  Patient has asked to continuing following with me going forward because of our previous relationship  She is going to let Dr. Luz Lee office know  She tolerated unna boot and aquacell - some improvement  216/22  Appearance improved, slightly larger  Consider drawtek next week but overall drainage seems reasonably managed as periwound appears ok  2/23/2022  The wound, has some exudate, no recent cultures were done, will do wound cultures today  3/2/22  Reflux study reviewed - no significant reflux  Will treat culture - augmentin 875 mg bid x 10 days - script sent  More drainage - stable size  3/9/22  Wound appearance improved, stable in size  drawtek  3/16/22  Wound slightly larger in size, new wound of ankle  Continue Drawtek, change to profore  Avoid shoes which will contact ankle area  3/23/22  Wounds stable  Overall appearance improved  Will have pt see ID re cultures - disc with Dr Janessa Pereyra  3/30/22  Much improved appearance  On oral abx per ID - concerns re pt ability to work while on IV - will see how her wound progresses  4/6/22  Appearance improved but size not much different  Finished oral abx  Will re culture next week if no significant size change - possible advance skin therapy  4/20/2022  Ulcer on the medial aspect, fairly clean and granulating, ulcer of the lateral aspect, has some exudate, debrided  4/27/22  Wounds stable   Hypergranulation tissue removed  Culture done - possible graft in future  5/4/22  Wound stable  Disc culture with Dr Janessa Pereyra who would like to start her on iv abx  5/11/22  Wound stable  Iv abx have not been started yet - spoke with Dr Janessa Pereyra - spoke with pt she will call his office  She had spoke with [facility] but there were some issues  5/18/22  Calf stable, ankle improved  Iv abx to start this week after picc placement  On exam   L dp 2+, PT triphasic - with compression of dp - PT becomes weakly biphasic  Concern that PT though triphasic is not getting inline flow and as a result isn't healing in the calf distribution because of occlusive disease  We discussed angiogram - will schedule  I reviewed the procedure with the patient and family as available. I discussed the procedure, risks, benefits, complications, and alternatives of the procedure. They understand and consent.   All questions were answered  Script for percocet 5/325 mg #28 prn q6 hrs - given, oarrs run  5/24/22  Angio, L PT and peroneal plasty  5/25/22  On iv abx  Wound appearance better  R groin no hematoma, L DP 2+, PT triphasic   6/1/22  Wound size and appearance better  6/8/22  Wound size and appearance better  Discussed with Dr Miriam Vitale - he will stop abx  6/15/22  Wound size slightly improvement, appearance better  6/22/22  Wounds sizes smaller  6/29/22  Wounds stable   Hypergranulation tissue present throughout wound beds    Continue drawtex, foam, ABD and profore   7/6/22  Wounds slightly improved  7/13/22  Both wounds slightly larger  Hyperkeratotic tissue debrided back  7/14/22  Patient came in due to drainage through her wrap  Dressing noted to have large amounts of yellow/green drainage throughout  Cultures obtained    7/20/22  Culture reviewed large growth S aureus and Pseudomonas  Disc with Dr Hannon - will start clindamycin 600 mg tid for staph  He will see her in office in regards to IV for pseudomonas  Wounds stable in size  Change to aquacell ag  7/27/22  Dr Miriam Vitale to see  Medial slightly larger, lateral slightly smaller  8/3/22  Dr Miriam Vitale saw her felt wound appearance better - will hold off on iv for now  Medial slightly improved, lateral stable  8/10/2022  Wounds stable  8/17/22  Wound stable, more pain with debridement  Discussed OR for debridement and possible advanced skin therapy - pt agreeable  8/24/22  Debridement, kerecise application  5/02/38  Wound appearance improved  Medial wound improved, lateral stable  9/7/22  Wounds are smaller  9/14/22  Wound stable  Enodfrom and drawtek  9/21/2022  Wound debrided, stable according to the measurements  9/28/22  Wound larger  Culture done  Discussed OR  Aquacell ag change   10/5/22  Wound slightly larger  Percocet 5/325 mg #25 - script given, oarrs reviewed  Cx reviewed - will disc with Dr Qasim Hemphill - all light growth   Not interested in OR at this time  10/12/22  Wound stable  Hold on cx tx  10/19/22  Wound stable  Ok for surgical debridement will schedule  Declined debridement today  10/25/22  Irrigation and excisional debridement of left medial and lateral ankle wound, Application of 350 mcg micromatrix acel  Application of unna boot  Lateral 15.5 sq cm, Medial 40.5 sq cm  11/2/22  Appearance improved  Measuring larger  No debridement  11/9/22  Appearance improved  Medial 62 (57), Lateral 18 (37)  Aquacell ag and wraps  11/16/22  Both appearance much improved  Medial 58 (62) Lateral 18 (18)  Aquacell ag , dratwek over top due to increased drainage  Percocet 5/325 mg #28 oarrs reviewed    Wound/Ulcer Pain Timing/Severity: constant, moderate  Quality of pain: aching, throbbing, tender  Severity:  6/ 10   Modifying Factors: Pain worsens with dressing changes, debridement  Associated Signs/Symptoms: edema, drainage and pain    Ulcer Identification:  Ulcer Type: venous  Contributing Factors: edema and venous stasis    Diabetic/Pressure/Non Pressure Ulcers only:  Ulcer: Non-Pressure ulcer, fat layer exposed        PAST MEDICAL HISTORY      Diagnosis Date    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 ([address] 5/18/2022    Dizziness - light-headed     GERD (gastroesophageal reflux disease)     Herpes dermatitis 4/27/2017    Insomnia secondary to anxiety 4/6/2018    Lightheadedness     Migraine     Skin ulcer of buttock with fat layer exposed ([address] 7/20/2016    Venous stasis ulcer of left ankle with fat layer exposed with varicose veins ([address] 2/2/2022     Past Surgical History:   Procedure Laterality Date    CHOLECYSTECTOMY, LAPAROSCOPIC  04/08/2014    LEG SURGERY Left 8/24/2022    LEFT LOWER EXTREMITY DEBRIDMENT WITH POSSIBLE ADVANCED SKIN THERAPY, POSSIBLE UNNA BOOT performed by Stephen Peterson MD at [address] [place] [address] LEG SURGERY Left 10/25/2022    DEBRIDEMENT LEFT LEG, POSSIBLE ADVANCED SKIN THERAPY with acell micromatrix application , Emily Scrape performed by Yoselin Garcia MD at [address] [facility] ENDOSCOPY  12/13/2013    mild gastritis and small hiatal hernia, Dr Ruslan Mcdaniel, [place]  2015    GERD, Dr. Jumana Bunn, office     Family History   Problem Relation Age of Onset    Diabetes Mother     Hypertension Mother     Heart Disease Father     Heart Attack Father     Heart Surgery Father         angioplasty    Stroke Father     High Cholesterol Father     Heart Attack Maternal Grandfather      Social History     Tobacco Use    Smoking status: Never    Smokeless tobacco: Never   Vaping Use    Vaping Use: Never used   Substance Use Topics    Alcohol use: No    Drug use: No     Allergies   Allergen Reactions    Bee Pollen Anaphylaxis    Tetracyclines & Related Hives     Current Outpatient Medications on File Prior to Encounter   Medication Sig Dispense Refill    acyclovir (ZOVIRAX) 400 MG tablet take 1 tablet by mouth once daily 90 tablet 3    butalbital-acetaminophen-caffeine (FIORICET, ESGIC) -40 MG per tablet Take 1 tablet by mouth 3 times daily as needed for Headaches or Migraine Indications: Cluster Headache 90 tablet 5    EPINEPHrine (EPIPEN) 0.3 MG/0.3ML SOAJ injection Use as directed for allergic reaction 1 each 5    hydrOXYzine HCl (ATARAX) 25 MG tablet take 1 tablet BID 60 tablet 5    pantoprazole (PROTONIX) 40 MG tablet Take 1 tablet by mouth every morning (before breakfast) 90 tablet 3    aspirin-acetaminophen-caffeine (EXCEDRIN MIGRAINE) 250-250-65 MG per tablet Take 1 tablet by mouth as needed for Headaches 300 tablet 3     No current facility-administered medications on file prior to encounter.        REVIEW OF SYSTEMS See HPI    Objective:    Pulse 84   Temp (!) 96.4 °F (35.8 °C) (Tympanic)   Resp 18   Wt 165 lb (74.8 kg) BMI 25.09 kg/m²   Wt Readings from Last 3 Encounters:   11/16/22 165 lb (74.8 kg)   11/09/22 165 lb (74.8 kg)   10/25/22 165 lb (74.8 kg)     PHYSICAL EXAM  CONSTITUTIONAL:   Awake, alert, cooperative   EYES:  lids and lashes normal   ENT: external ears and nose without lesions   NECK:  supple, symmetrical, trachea midline   SKIN:  Open wound Present    Assessment:     Problem List Items Addressed This Visit       Venous stasis ulcer of left ankle with fat layer exposed with varicose veins (HCC) - Primary (Chronic)    Relevant Medications    oxyCODONE-acetaminophen (PERCOCET) 5-325 MG per tablet    Other Relevant Orders    Initiate Outpatient Wound Care Protocol    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 ([address] (Chronic)    Relevant Orders    Initiate Outpatient Wound Care Protocol       Pre Debridement Measurements:  Are located in the [place]  Documentation Flow Sheet  Post Debridement Measurements:  Wound/Ulcer Descriptions are Pre Debridement except measurements:     Wound 08/10/16 Other (Comment) Buttocks Left;Distal #2 acq 8/4/16 (Active)   Number of days: 6410       Wound 08/31/16 Buttocks Left;Proximal #3 aquired 8-27-26 (Active)   Number of days: 2623       Wound 02/02/22 Ankle Left;Medial #1 (Active)   Wound Image   11/02/22 0754   Dressing Status New dressing applied;Clean;Dry; Intact 11/02/22 0908   Wound Cleansed Cleansed with saline 11/02/22 0908   Dressing/Treatment ABD; Alginate with Ag; Foam 11/02/22 0908   Offloading for Diabetic Foot Ulcers Offloading not required 09/28/22 0835   Wound Length (cm) 9 cm 11/16/22 0739   Wound Width (cm) 6.5 cm 11/16/22 0739   Wound Depth (cm) 0.3 cm 11/16/22 0739   Wound Surface Area (cm^2) 58.5 cm^2 11/16/22 0739   Change in Wound Size % (l*w) -116.19 11/16/22 0739   Wound Volume (cm^3) 17.55 cm^3 11/16/22 0739   Wound Healing % -549 11/16/22 0739   Post-Procedure Length (cm) 9.4 cm 11/16/22 0829   Post-Procedure Width (cm) 6.9 cm 11/16/22 0873 Post-Procedure Depth (cm) 0.3 cm 11/16/22 0829   Post-Procedure Surface Area (cm^2) 64.86 cm^2 11/16/22 0829   Post-Procedure Volume (cm^3) 19.458 cm^3 11/16/22 0829   Wound Assessment Pink/red;Fibrin 11/16/22 0739   Drainage Amount Copious 11/16/22 0739   Drainage Description Green;Yellow;Brown 11/16/22 0739   Odor Mild 11/16/22 0739   Melany-wound Assessment Blanchable erythema;Fragile 11/16/22 0739   Number of days: 287       Wound 03/16/22 Ankle Posterior; Left #2 (Active)   Wound Image   11/02/22 0754   Dressing Status New dressing applied;Clean;Dry; Intact 11/02/22 0908   Wound Cleansed Cleansed with saline 11/02/22 0908   Dressing/Treatment Alginate with Ag;ABD; Foam 11/02/22 0908   Offloading for Diabetic Foot Ulcers Offloading not required 10/19/22 0904   Wound Length (cm) 5.4 cm 11/16/22 0739   Wound Width (cm) 3.5 cm 11/16/22 0739   Wound Depth (cm) 0.2 cm 11/16/22 0739   Wound Surface Area (cm^2) 18.9 cm^2 11/16/22 0739   Change in Wound Size % (l*w) -656 11/16/22 0739   Wound Volume (cm^3) 3.78 cm^3 11/16/22 0739   Wound Healing % -1412 11/16/22 0739   Post-Procedure Length (cm) 5.5 cm 11/16/22 0829   Post-Procedure Width (cm) 3.7 cm 11/16/22 0829   Post-Procedure Depth (cm) 0.2 cm 11/16/22 0829   Post-Procedure Surface Area (cm^2) 20.35 cm^2 11/16/22 0829   Post-Procedure Volume (cm^3) 4.07 cm^3 11/16/22 0829   Wound Assessment Pink/red;Fibrin 11/16/22 0739   Drainage Amount Large 11/16/22 0739   Drainage Description Green;Yellow;Brown 11/16/22 0739   Odor Mild 11/16/22 0739   Melany-wound Assessment Blanchable erythema;Fragile 11/16/22 0739   Number of days: 245            Procedure Note  Indications:  Based on my examination of this patient's wound(s)/ulcer(s) today, debridement is required to promote healing and evaluate the wound base.     Performed by: Marian Montes MD    Consent obtained:  Yes    Time out taken:  Yes    Pain Control: Anesthetic  Anesthetic: 4% Lidocaine Liquid Topical Debridement:Excisional Debridement    Using curette the wound(s)/ulcer(s) was/were sharply debrided down through and including the removal of epidermis, dermis, and subcutaneous tissue. Devitalized Tissue Debrided:  fibrin, biofilm, slough, and exudate to stimulate bleeding to promote healing, post debridement good bleeding base and wound edges noted    Wound/Ulcer #: 1 and 2    Percent of Wound/Ulcer Debrided: 50%    Total Surface Area Debrided:  40 sq cm     Estimated Blood Loss:  Minimal  Hemostasis Achieved:  by pressure    Procedural Pain:  8  / 10   Post Procedural Pain:  7 / 10     Response to treatment:  With complaints of pain. Plan:   Treatment Note please see attached Discharge Instructions    Written patient dismissal instructions given to patient and signed by patient or POA. Discharge Instructions         Visit Discharge/Physician Orders     Discharge condition: Stable     Assessment of pain at discharge: yes     Anesthetic used: 4% lidocaine solution(none 10-19-22)     Discharge to: Home     Left via:Private automobile     Accompanied by:self     ECF/HHA: n/a     Dressing Orders:LEFT MEDIAL and LATERAL LOWER LEG-Cleanse with normal saline, apply zinc to fiona wound, aquacel AG and drawtex, dressing, ABD pad , unna boot and coban. Change weekly. Treatment Orders: Eat a diet high in protein and vitamin C. Take a multiple vitamin daily unless contraindicated. To see Dr. Stephanie Victor as scheduled      Must wear slip on shoe to work due to wrap on leg! [facility] followup visit : 1 week_____________________________  (Please note your next appointment above and if you are unable to keep, kindly give a 24 hour notice.  Thank you.)     Physician signature:__________________________     If you experience any of the following, please call the [facility] [address] during business hours:  Electronically signed by Hue Chahal MD

## 2022-11-17 NOTE — DISCHARGE INSTRUCTIONS
Visit Discharge/Physician Orders     Discharge condition: Stable     Assessment of pain at discharge: yes     Anesthetic used: 4% lidocaine solution(none 10-19-22)     Discharge to: Home     Left via:Private automobile     Accompanied by:self     ECF/HHA: n/a     Dressing Orders:LEFT MEDIAL and LATERAL LOWER LEG-Cleanse with normal saline, apply zinc to fiona wound, aquacel AG and drawtex, dressing, ABD pad , unna boot and coban. Change weekly. Treatment Orders: Eat a diet high in protein and vitamin C. Take a multiple vitamin daily unless contraindicated. To see Dr. Laxmi Carr as scheduled      Must wear slip on shoe to work due to wrap on leg! Venous reflux study in Dr. Karsten Ulloa office Tuesday 11-29-22     [address] [place] [address],[address] followup visit : 1 week_____________________________  (Please note your next appointment above and if you are unable to keep, kindly give a 24 hour notice.  Thank you.)     Physician signature:__________________________     If you experience any of the following, please call the [facility] [address] during business hours:

## 2022-11-23 ENCOUNTER — HOSPITAL ENCOUNTER (OUTPATIENT)
Dept: WOUND CARE | Age: 67
Discharge: HOME | End: 2022-11-23
Payer: MEDICARE

## 2022-11-23 ENCOUNTER — OFFICE VISIT (OUTPATIENT)
Dept: FAMILY MEDICINE CLINIC | Age: 67
End: 2022-11-23
Payer: MEDICARE

## 2022-11-23 VITALS
DIASTOLIC BLOOD PRESSURE: 64 MMHG | WEIGHT: 165 LBS | SYSTOLIC BLOOD PRESSURE: 160 MMHG | HEART RATE: 80 BPM | TEMPERATURE: 97.6 F | RESPIRATION RATE: 18 BRPM | BODY MASS INDEX: 25.09 KG/M2

## 2022-11-23 VITALS
SYSTOLIC BLOOD PRESSURE: 152 MMHG | BODY MASS INDEX: 26.46 KG/M2 | DIASTOLIC BLOOD PRESSURE: 84 MMHG | OXYGEN SATURATION: 97 % | HEIGHT: 68 IN | HEART RATE: 70 BPM | WEIGHT: 174.6 LBS | TEMPERATURE: 97.5 F

## 2022-11-23 DIAGNOSIS — I70.242 ATHEROSCLEROSIS OF NATIVE ARTERY OF LEFT LOWER EXTREMITY WITH ULCERATION OF CALF (HCC): ICD-10-CM

## 2022-11-23 DIAGNOSIS — L97.322 VENOUS STASIS ULCER OF LEFT ANKLE WITH FAT LAYER EXPOSED WITH VARICOSE VEINS (HCC): Primary | ICD-10-CM

## 2022-11-23 DIAGNOSIS — I70.243 ATHEROSCLEROSIS OF NATIVE ARTERY OF LEFT LOWER EXTREMITY WITH ULCERATION OF ANKLE (HCC): ICD-10-CM

## 2022-11-23 DIAGNOSIS — J01.90 ACUTE NON-RECURRENT SINUSITIS, UNSPECIFIED LOCATION: Primary | ICD-10-CM

## 2022-11-23 DIAGNOSIS — I83.023 VENOUS STASIS ULCER OF LEFT ANKLE WITH FAT LAYER EXPOSED WITH VARICOSE VEINS (HCC): Primary | ICD-10-CM

## 2022-11-23 PROCEDURE — 99213 OFFICE O/P EST LOW 20 MIN: CPT | Performed by: NURSE PRACTITIONER

## 2022-11-23 PROCEDURE — G8427 DOCREV CUR MEDS BY ELIG CLIN: HCPCS | Performed by: NURSE PRACTITIONER

## 2022-11-23 PROCEDURE — 1036F TOBACCO NON-USER: CPT | Performed by: NURSE PRACTITIONER

## 2022-11-23 PROCEDURE — 3017F COLORECTAL CA SCREEN DOC REV: CPT | Performed by: NURSE PRACTITIONER

## 2022-11-23 PROCEDURE — G8419 CALC BMI OUT NRM PARAM NOF/U: HCPCS | Performed by: NURSE PRACTITIONER

## 2022-11-23 PROCEDURE — G8484 FLU IMMUNIZE NO ADMIN: HCPCS | Performed by: NURSE PRACTITIONER

## 2022-11-23 PROCEDURE — 99213 OFFICE O/P EST LOW 20 MIN: CPT

## 2022-11-23 RX ORDER — LIDOCAINE HYDROCHLORIDE 40 MG/ML
SOLUTION TOPICAL ONCE
Status: COMPLETED | OUTPATIENT
Start: 2022-11-23 | End: 2022-11-23

## 2022-11-23 RX ORDER — BACITRACIN ZINC AND POLYMYXIN B SULFATE 500; 1000 [USP'U]/G; [USP'U]/G
OINTMENT TOPICAL ONCE
OUTPATIENT
Start: 2022-11-23 | End: 2022-11-23

## 2022-11-23 RX ORDER — GENTAMICIN SULFATE 1 MG/G
OINTMENT TOPICAL ONCE
OUTPATIENT
Start: 2022-11-23 | End: 2022-11-23

## 2022-11-23 RX ORDER — AZITHROMYCIN 250 MG/1
250 TABLET, FILM COATED ORAL SEE ADMIN INSTRUCTIONS
Qty: 6 TABLET | Refills: 0 | Status: SHIPPED | OUTPATIENT
Start: 2022-11-23 | End: 2022-11-28

## 2022-11-23 RX ORDER — LIDOCAINE HYDROCHLORIDE 20 MG/ML
JELLY TOPICAL ONCE
OUTPATIENT
Start: 2022-11-23 | End: 2022-11-23

## 2022-11-23 RX ORDER — LIDOCAINE 40 MG/G
CREAM TOPICAL ONCE
OUTPATIENT
Start: 2022-11-23 | End: 2022-11-23

## 2022-11-23 RX ORDER — BENZONATATE 100 MG/1
100 CAPSULE ORAL 3 TIMES DAILY PRN
Qty: 30 CAPSULE | Refills: 0 | Status: SHIPPED | OUTPATIENT
Start: 2022-11-23 | End: 2022-12-03

## 2022-11-23 RX ORDER — BETAMETHASONE DIPROPIONATE 0.05 %
OINTMENT (GRAM) TOPICAL ONCE
OUTPATIENT
Start: 2022-11-23 | End: 2022-11-23

## 2022-11-23 RX ORDER — LIDOCAINE HYDROCHLORIDE 40 MG/ML
SOLUTION TOPICAL ONCE
OUTPATIENT
Start: 2022-11-23 | End: 2022-11-23

## 2022-11-23 RX ORDER — BACITRACIN, NEOMYCIN, POLYMYXIN B 400; 3.5; 5 [USP'U]/G; MG/G; [USP'U]/G
OINTMENT TOPICAL ONCE
OUTPATIENT
Start: 2022-11-23 | End: 2022-11-23

## 2022-11-23 RX ORDER — LIDOCAINE 50 MG/G
OINTMENT TOPICAL ONCE
OUTPATIENT
Start: 2022-11-23 | End: 2022-11-23

## 2022-11-23 RX ORDER — GINSENG 100 MG
CAPSULE ORAL ONCE
OUTPATIENT
Start: 2022-11-23 | End: 2022-11-23

## 2022-11-23 RX ORDER — CLOBETASOL PROPIONATE 0.5 MG/G
OINTMENT TOPICAL ONCE
OUTPATIENT
Start: 2022-11-23 | End: 2022-11-23

## 2022-11-23 RX ADMIN — LIDOCAINE HYDROCHLORIDE 10 ML: 40 SOLUTION TOPICAL at 07:40

## 2022-11-23 ASSESSMENT — PAIN - FUNCTIONAL ASSESSMENT: PAIN_FUNCTIONAL_ASSESSMENT: ACTIVITIES ARE NOT PREVENTED

## 2022-11-23 ASSESSMENT — PAIN DESCRIPTION - DESCRIPTORS: DESCRIPTORS: ACHING

## 2022-11-23 ASSESSMENT — PAIN DESCRIPTION - LOCATION: LOCATION: LEG

## 2022-11-23 ASSESSMENT — PAIN SCALES - GENERAL: PAINLEVEL_OUTOF10: 3

## 2022-11-23 ASSESSMENT — PAIN DESCRIPTION - ORIENTATION: ORIENTATION: LEFT

## 2022-11-23 NOTE — PROGRESS NOTES
Wound Healing Center Followup Visit Note    Referring Physician : Anirudh Shelton MD  [address] [place] [address] RECORD NUMBER:  [id number]  AGE: 61 y.o. GENDER: female  : 1955  EPISODE DATE:  2022    Subjective:     Chief Complaint   Patient presents with    Wound Check     Left leg      HISTORY of PRESENT ILLNESS HPI   Danna Krueger is a 61 y.o. female who presents today in regards to follow up evaluation and treatment of wound/ulcer. That patient's past medical, family and social hx were reviewed and changes were made if present. History of Wound Context:  The patient has had a wound of her left ankle/calf which was first noted approximately 2021. This has been treated local wound care. On their initial visit to the wound healing center, 22,  the patient has noted that the wound has been improving. The patient has not had similar previous wounds in the past.      She started seeing Dr. Joshua Viera in 2021 and than Dr. Marian Haas. She was started in [facility] ~ 2021. She has noticed some improvement since starting unna boot. She is currently following with Dr. Lucille Lamas. Pt is not on abx at time of initial visit, but has been treated with previously by podiatry. She is not a DM. She is not a smoker. She denies hx of DVT, and per her report had recent us noting no evidence of dvt at [facility]. She also had arterial studies done. I had previously seen her in the past in regards to left buttock thigh wound, which started as abscess. Pt works at [employer] in [facility] and is on her feet all day.     22  Reflux study - if significant findings will schedule for fu  Continue compression therapy [facility] per podiatry with aquacell dressing  Elevation  She does not have significant arterial occlusive disease  22  Patient has asked to continuing following with me going forward because of our previous relationship  She is going to let Dr. Pamela Painting office know  She tolerated unna boot and aquacell - some improvement  216/22  Appearance improved, slightly larger  Consider drawtek next week but overall drainage seems reasonably managed as periwound appears ok  2/23/2022  The wound, has some exudate, no recent cultures were done, will do wound cultures today  3/2/22  Reflux study reviewed - no significant reflux  Will treat culture - augmentin 875 mg bid x 10 days - script sent  More drainage - stable size  3/9/22  Wound appearance improved, stable in size  drawtek  3/16/22  Wound slightly larger in size, new wound of ankle  Continue Drawtek, change to profore  Avoid shoes which will contact ankle area  3/23/22  Wounds stable  Overall appearance improved  Will have pt see ID re cultures - disc with Dr Liliana Villalba  3/30/22  Much improved appearance  On oral abx per ID - concerns re pt ability to work while on IV - will see how her wound progresses  4/6/22  Appearance improved but size not much different  Finished oral abx  Will re culture next week if no significant size change - possible advance skin therapy  4/20/2022  Ulcer on the medial aspect, fairly clean and granulating, ulcer of the lateral aspect, has some exudate, debrided  4/27/22  Wounds stable   Hypergranulation tissue removed  Culture done - possible graft in future  5/4/22  Wound stable  Disc culture with Dr Liliana Villalba who would like to start her on iv abx  5/11/22  Wound stable  Iv abx have not been started yet - spoke with Dr Liliana Villalba - spoke with pt she will call his office  She had spoke with [facility] but there were some issues  5/18/22  Calf stable, ankle improved  Iv abx to start this week after picc placement  On exam   L dp 2+, PT triphasic - with compression of dp - PT becomes weakly biphasic  Concern that PT though triphasic is not getting inline flow and as a result isn't healing in the calf distribution because of occlusive disease  We discussed angiogram - will schedule  I reviewed the procedure with the patient and family as available. I discussed the procedure, risks, benefits, complications, and alternatives of the procedure. They understand and consent.   All questions were answered  Script for percocet 5/325 mg #28 prn q6 hrs - given, oarrs run  5/24/22  Angio, L PT and peroneal plasty  5/25/22  On iv abx  Wound appearance better  R groin no hematoma, L DP 2+, PT triphasic   6/1/22  Wound size and appearance better  6/8/22  Wound size and appearance better  Discussed with Dr Gonzalo Sarabia - he will stop abx  6/15/22  Wound size slightly improvement, appearance better  6/22/22  Wounds sizes smaller  6/29/22  Wounds stable   Hypergranulation tissue present throughout wound beds    Continue drawtex, foam, ABD and profore   7/6/22  Wounds slightly improved  7/13/22  Both wounds slightly larger  Hyperkeratotic tissue debrided back  7/14/22  Patient came in due to drainage through her wrap  Dressing noted to have large amounts of yellow/green drainage throughout  Cultures obtained    7/20/22  Culture reviewed large growth S aureus and Pseudomonas  Disc with Dr Hannon - will start clindamycin 600 mg tid for staph  He will see her in office in regards to IV for pseudomonas  Wounds stable in size  Change to aquacell ag  7/27/22  Dr Gonzalo Sarabia to see  Medial slightly larger, lateral slightly smaller  8/3/22  Dr Gonzalo Sarabia saw her felt wound appearance better - will hold off on iv for now  Medial slightly improved, lateral stable  8/10/2022  Wounds stable  8/17/22  Wound stable, more pain with debridement  Discussed OR for debridement and possible advanced skin therapy - pt agreeable  8/24/22  Debridement, kerecise application  7/72/52  Wound appearance improved  Medial wound improved, lateral stable  9/7/22  Wounds are smaller  9/14/22  Wound stable  Enodfrom and drawtek  9/21/2022  Wound debrided, stable according to the measurements  9/28/22  Wound larger  Culture done  Discussed OR  Aquacell ag change   10/5/22  Wound slightly larger  Percocet 5/325 mg #25 - script given, oarrs reviewed  Cx reviewed - will disc with Dr Gonzalo Sarabia - all light growth   Not interested in OR at this time  10/12/22  Wound stable  Hold on cx tx  10/19/22  Wound stable  Ok for surgical debridement will schedule  Declined debridement today  10/25/22  Irrigation and excisional debridement of left medial and lateral ankle wound, Application of 471 mcg micromatrix acel  Application of unna boot  Lateral 15.5 sq cm, Medial 40.5 sq cm  11/2/22  Appearance improved  Measuring larger  No debridement  11/9/22  Appearance improved  Medial 62 (57), Lateral 18 (37)  Aquacell ag and wraps  11/16/22  Both appearance much improved  Medial 58 (62) Lateral 18 (18)  Aquacell ag , dratwek over top due to increased drainage  Percocet 5/325 mg #28 oarrs reviewed  11/23/22  Stable in size and appearance  New venous reflux study - Tuesday 11/29 at 1230 at my office  Will give her dressings to replace wrap after it is cut off for study  Refusing debridement due to pain    Wound/Ulcer Pain Timing/Severity: constant, moderate  Quality of pain: aching, throbbing, tender  Severity:  8/ 10   Modifying Factors: Pain worsens with dressing changes, debridement  Associated Signs/Symptoms: edema, drainage and pain    Ulcer Identification:  Ulcer Type: venous  Contributing Factors: edema and venous stasis    Diabetic/Pressure/Non Pressure Ulcers only:  Ulcer: Non-Pressure ulcer, fat layer exposed        PAST MEDICAL HISTORY      Diagnosis Date    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 ([address] 5/18/2022    Dizziness - light-headed     GERD (gastroesophageal reflux disease)     Herpes dermatitis 4/27/2017    Insomnia secondary to anxiety 4/6/2018    Lightheadedness     Migraine     Skin ulcer of buttock with fat layer exposed ([address] 7/20/2016    Venous stasis ulcer of left ankle with fat layer exposed with varicose veins ([address] 2/2/2022     Past Surgical History:   Procedure Laterality Date CHOLECYSTECTOMY, LAPAROSCOPIC  04/08/2014    LEG SURGERY Left 8/24/2022    LEFT LOWER EXTREMITY DEBRIDMENT WITH POSSIBLE ADVANCED SKIN THERAPY, POSSIBLE Teresa Cash performed by Uvaldo Bustillo MD at [address] Left 10/25/2022    DEBRIDEMENT LEFT LEG, POSSIBLE ADVANCED SKIN THERAPY with acell micromatrix application , Teresa Cash performed by Uvaldo Bustillo MD at [address] [facility] ENDOSCOPY  12/13/2013    mild gastritis and small hiatal hernia, Dr Yarelis Blandon, [place] [address]    GERD, Dr. Evita Gauthier, office     Family History   Problem Relation Age of Onset    Diabetes Mother     Hypertension Mother     Heart Disease Father     Heart Attack Father     Heart Surgery Father         angioplasty    Stroke Father     High Cholesterol Father     Heart Attack Maternal Grandfather      Social History     Tobacco Use    Smoking status: Never    Smokeless tobacco: Never   Vaping Use    Vaping Use: Never used   Substance Use Topics    Alcohol use: No    Drug use: No     Allergies   Allergen Reactions    Bee Pollen Anaphylaxis    Tetracyclines & Related Hives     Current Outpatient Medications on File Prior to Encounter   Medication Sig Dispense Refill    oxyCODONE-acetaminophen (PERCOCET) 5-325 MG per tablet Take 1 tablet by mouth every 6 hours as needed for Pain for up to 14 days.  25 tablet 0    acyclovir (ZOVIRAX) 400 MG tablet take 1 tablet by mouth once daily 90 tablet 3    butalbital-acetaminophen-caffeine (FIORICET, ESGIC) -40 MG per tablet Take 1 tablet by mouth 3 times daily as needed for Headaches or Migraine Indications: Cluster Headache 90 tablet 5    EPINEPHrine (EPIPEN) 0.3 MG/0.3ML SOAJ injection Use as directed for allergic reaction 1 each 5    hydrOXYzine HCl (ATARAX) 25 MG tablet take 1 tablet BID 60 tablet 5    pantoprazole (PROTONIX) 40 MG tablet Take 1 tablet by mouth every morning (before breakfast) 90 tablet 3    aspirin-acetaminophen-caffeine (EXCEDRIN MIGRAINE) 411-889-44 MG per tablet Take 1 tablet by mouth as needed for Headaches 300 tablet 3     No current facility-administered medications on file prior to encounter. REVIEW OF SYSTEMS See HPI    Objective:    BP (!) 160/64   Pulse 80   Temp 97.6 °F (36.4 °C) (Tympanic)   Resp 18   Wt 165 lb (74.8 kg)   BMI 25.09 kg/m²   Wt Readings from Last 3 Encounters:   11/23/22 165 lb (74.8 kg)   11/16/22 165 lb (74.8 kg)   11/09/22 165 lb (74.8 kg)     PHYSICAL EXAM  CONSTITUTIONAL:   Awake, alert, cooperative   EYES:  lids and lashes normal   ENT: external ears and nose without lesions   NECK:  supple, symmetrical, trachea midline   SKIN:  Open wound Present    Assessment:     Problem List Items Addressed This Visit       Venous stasis ulcer of left ankle with fat layer exposed with varicose veins (HCC) - Primary (Chronic)    Relevant Orders    Initiate Outpatient Wound Care Protocol    Reflux study/Reflux Venous Insufficiency Unilateral    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) (Chronic)    Relevant Orders    Initiate Outpatient Wound Care Protocol       Pre Debridement Measurements:  Are located in the [place]  Documentation Flow Sheet  Post Debridement Measurements:  Wound/Ulcer Descriptions are Pre Debridement except measurements:     Wound 08/10/16 Other (Comment) Buttocks Left;Distal #2 acq 8/4/16 (Active)   Number of days: 2654       Wound 08/31/16 Buttocks Left;Proximal #3 aquired 8-27-26 (Active)   Number of days: 4466       Wound 02/02/22 Ankle Left;Medial #1 (Active)   Wound Image   11/02/22 0754   Dressing Status New dressing applied;Clean;Dry; Intact 11/16/22 0840   Wound Cleansed Cleansed with saline 11/16/22 0840   Dressing/Treatment ABD; Alginate with Ag 11/16/22 0840   Offloading for Diabetic Foot Ulcers Offloading not required 09/28/22 0835   Wound Length (cm) 9 cm 11/23/22 0737   Wound Width (cm) 6.5 cm 11/23/22 0737   Wound Depth (cm) 0.2 cm 11/23/22 0737   Wound Surface Area (cm^2) 58.5 cm^2 11/23/22 0737   Change in Wound Size % (l*w) -116.19 11/23/22 0737   Wound Volume (cm^3) 11.7 cm^3 11/23/22 0737   Wound Healing % -332 11/23/22 0737   Post-Procedure Length (cm) 9.4 cm 11/16/22 0829   Post-Procedure Width (cm) 6.9 cm 11/16/22 0829   Post-Procedure Depth (cm) 0.3 cm 11/16/22 0829   Post-Procedure Surface Area (cm^2) 64.86 cm^2 11/16/22 0829   Post-Procedure Volume (cm^3) 19.458 cm^3 11/16/22 0829   Wound Assessment Pink/red;Fibrin 11/23/22 0737   Drainage Amount Copious 11/23/22 0737   Drainage Description Green;Yellow;Brown 11/23/22 0737   Odor None 11/23/22 0737   Melany-wound Assessment Maceration 11/23/22 0737   Number of days: 294       Wound 03/16/22 Ankle Posterior; Left #2 (Active)   Wound Image   11/02/22 0754   Dressing Status New dressing applied;Clean;Dry; Intact 11/16/22 0840   Wound Cleansed Cleansed with saline 11/16/22 0840   Dressing/Treatment Alginate with Ag;ABD 11/16/22 0840   Offloading for Diabetic Foot Ulcers Offloading not required 10/19/22 0904   Wound Length (cm) 5.5 cm 11/23/22 0737   Wound Width (cm) 3.6 cm 11/23/22 0737   Wound Depth (cm) 0.2 cm 11/23/22 0737   Wound Surface Area (cm^2) 19.8 cm^2 11/23/22 0737   Change in Wound Size % (l*w) -692 11/23/22 0737   Wound Volume (cm^3) 3.96 cm^3 11/23/22 0737   Wound Healing % -1484 11/23/22 0737   Post-Procedure Length (cm) 5.5 cm 11/16/22 0829   Post-Procedure Width (cm) 3.7 cm 11/16/22 0829   Post-Procedure Depth (cm) 0.2 cm 11/16/22 0829   Post-Procedure Surface Area (cm^2) 20.35 cm^2 11/16/22 0829   Post-Procedure Volume (cm^3) 4.07 cm^3 11/16/22 0829   Wound Assessment Pink/red;Fibrin 11/23/22 0737   Drainage Amount Moderate 11/23/22 0737   Drainage Description Green;Yellow;Brown 11/23/22 0737   Odor None 11/23/22 0737   Melany-wound Assessment Maceration 11/23/22 0737   Number of days: 252          Plan:   Treatment Note please see attached Discharge Instructions    Written patient dismissal instructions given to patient and signed by patient or POA. Discharge Instructions         Visit Discharge/Physician Orders     Discharge condition: Stable     Assessment of pain at discharge: yes     Anesthetic used: 4% lidocaine solution(none 10-19-22)     Discharge to: Home     Left via:Private automobile     Accompanied by:self     ECF/HHA: n/a     Dressing Orders:LEFT MEDIAL and LATERAL LOWER LEG-Cleanse with normal saline, apply zinc to fiona wound, aquacel AG and drawtex, dressing, ABD pad , unna boot and coban. Change weekly. Treatment Orders: Eat a diet high in protein and vitamin C. Take a multiple vitamin daily unless contraindicated. To see Dr. Kristie Nichols as scheduled      Must wear slip on shoe to work due to wrap on leg! Venous reflux study in Dr. Wick [place] office Tuesday 11-29-22     [address] [place] [address],[address] followup visit : 1 week_____________________________  (Please note your next appointment above and if you are unable to keep, kindly give a 24 hour notice.  Thank you.)     Physician signature:__________________________     If you experience any of the following, please call the [facility] during business hours:  Electronically signed by Cooper Mcfadden MD

## 2022-11-23 NOTE — PROGRESS NOTES
Chief Complaint   Cough (X2-3 days. Declined testing ), Congestion, and Drainage      HPI   Source of history provided by: patient. Cindy Goldsmith is a 61 y.o. old female who presents to walk-in care for evaluation of nasal congestion X 3 days. Associated symptoms include sore throat, nasal congestion, rhinorrhea, and cough. Since onset symptoms have been about the same. The patient is fully vaccinated. Has taken nothing at home with some symptomatic relief. Denies fever, chills, headache, chest congestion, chest pain, shortness of breath, nausea, vomiting, lethargy, body aches, otalgia, and malaise. Pertinent PMH of: PMHpositive: nothing respiratory. Denies any PMH of URIhistory: COPD, asthma, recurrent bronchitis, and pneumonia. The patient has no history of tobacco abuse. ROS   Pertinent positives and negatives are stated within HPI, all other systems reviewed and are negative. Past Medical History:  has a past medical history of 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 ([address] Dizziness - light-headed, GERD (gastroesophageal reflux disease), Herpes dermatitis, Insomnia secondary to anxiety, Lightheadedness, Migraine, Skin ulcer of buttock with fat layer exposed ([address] and Venous stasis ulcer of left ankle with fat layer exposed with varicose veins ([address] Surgical History:  has a past surgical history that includes Tonsillectomy (1960); Cholecystectomy, laparoscopic (04/08/2014); Upper gastrointestinal endoscopy (12/13/2013); Upper gastrointestinal endoscopy (2015); Leg Surgery (Left, 8/24/2022); and Leg Surgery (Left, 10/25/2022). Social History:  reports that she has never smoked. She has never used smokeless tobacco. She reports that she does not drink alcohol and does not use drugs.   Family History: family history includes Diabetes in her mother; Heart Attack in her father and maternal grandfather; Heart Disease in her father; Heart Surgery in her father; High Cholesterol in her father; Hypertension in her mother; Stroke in her father. Allergies: Bee pollen and Tetracyclines & related    Physical Exam      VS:  BP (!) 152/84   Pulse 70   Temp 97.5 °F (36.4 °C)   Ht 5' 8\" (1.727 m)   Wt 174 lb 9.6 oz (79.2 kg)   SpO2 97%   BMI 26.55 kg/m²    Oxygen Saturation Interpretation: Normal.    Physical Exam  Vitals and nursing note reviewed. Constitutional:       Appearance: Normal appearance. She is normal weight. HENT:      Head: Normocephalic and atraumatic. Right Ear: Ear canal and external ear normal. No middle ear effusion. Left Ear: Ear canal and external ear normal.  No middle ear effusion. Nose: Congestion and rhinorrhea present. Right Turbinates: Not swollen or pale. Left Turbinates: Not swollen or pale. Right Sinus: Frontal sinus tenderness present. No maxillary sinus tenderness. Left Sinus: Frontal sinus tenderness present. No maxillary sinus tenderness. Comments: Clear post nasal drip     Mouth/Throat:      Mouth: Mucous membranes are moist.      Pharynx: Oropharynx is clear. Eyes:      Extraocular Movements: Extraocular movements intact. Conjunctiva/sclera: Conjunctivae normal.      Pupils: Pupils are equal, round, and reactive to light. Cardiovascular:      Rate and Rhythm: Normal rate and regular rhythm. Pulses: Normal pulses. Heart sounds: Normal heart sounds. Pulmonary:      Effort: Pulmonary effort is normal.      Breath sounds: Normal breath sounds. No wheezing, rhonchi or rales. Abdominal:      General: Bowel sounds are normal.      Palpations: Abdomen is soft. Tenderness: There is no abdominal tenderness. Musculoskeletal:         General: Normal range of motion. Cervical back: Normal range of motion and neck supple. Skin:     General: Skin is warm and dry. Capillary Refill: Capillary refill takes less than 2 seconds.    Neurological:      General: No focal deficit present. Mental Status: She is alert and oriented to person, place, and time. Psychiatric:         Mood and Affect: Mood normal.         Behavior: Behavior normal.         Thought Content: Thought content normal.         Judgment: Judgment normal.         Lab / Imaging Results   (All laboratory and radiology results have been personally reviewed by myself)  Labs:  No results found for this visit on 11/23/22. Imaging: All Radiology results interpreted by Radiologist unless otherwise noted. No results found. Assessment/Plan  Ave Canavan was seen today for cough, congestion and drainage. Diagnoses and all orders for this visit:    Acute non-recurrent sinusitis, unspecified location  -     azithromycin (ZITHROMAX) 250 MG tablet; Take 1 tablet by mouth See Admin Instructions for 5 days 500mg on day 1 followed by 250mg on days 2 - 5    Other orders  -     benzonatate (TESSALON) 100 MG capsule; Take 1 capsule by mouth 3 times daily as needed for Cough      Increase fluids and rest.   Other symptomatic relief discussed including Tylenol prn pain/fever. Schedule f/u with PCP in 7-10 days if symptoms persist.  Go to ED sooner if symptoms worsen or change. ED immediately with high or refractory fever, progressive SOB, dyspnea, CP, calf pain/swelling, shaking chills, vomiting, abdominal pain, lethargy, flank pain, or decreased urinary output. Pt verbalizes understanding and is in agreement with plan of care. All questions answered. LISA Brasher NP    *NOTE: This report was transcribed using voice recognition software. Every effort was made to ensure accuracy; however, inadvertent computerized transcription errors may be present.

## 2022-11-28 NOTE — DISCHARGE INSTRUCTIONS
Visit Discharge/Physician Orders     Discharge condition: Stable     Assessment of pain at discharge: yes     Anesthetic used: 4% lidocaine solution(none 10-19-22)     Discharge to: Home     Left via:Private automobile     Accompanied by:self     ECF/HHA: n/a     Dressing Orders:LEFT MEDIAL and LATERAL LOWER LEG-Cleanse with normal saline, apply zinc to fiona wound, aquacel AG and drawtex, dressing, ABD pad , unna boot and coban. Change weekly. Treatment Orders: Eat a diet high in protein and vitamin C. Take a multiple vitamin daily unless contraindicated. To see Dr. Stephanie Victor as scheduled      Must wear slip on shoe to work due to wrap on leg! Venous reflux study in Dr. Hailee Tapia office Tuesday 11-29-22     [address] [place] [address],[address] followup visit : 1 week_____________________________  (Please note your next appointment above and if you are unable to keep, kindly give a 24 hour notice. Thank you.)     Physician signature:__________________________     If you experience any of the following, please call the [facility] [address] during business hours:    * Increase in Pain  * Temperature over 101  * Increase in drainage from your wound  * Drainage with a foul odor  * Bleeding  * Increase in swelling  * Need for compression bandage changes due to slippage, breakthrough drainage. If you need medical attention outside of the business hours of the [facility] please contact your PCP or go to the nearest emergency room.

## 2022-11-29 ENCOUNTER — OFFICE VISIT (OUTPATIENT)
Dept: FAMILY MEDICINE CLINIC | Age: 67
End: 2022-11-29
Payer: MEDICARE

## 2022-11-29 VITALS
DIASTOLIC BLOOD PRESSURE: 68 MMHG | BODY MASS INDEX: 26.12 KG/M2 | SYSTOLIC BLOOD PRESSURE: 140 MMHG | TEMPERATURE: 98.5 F | OXYGEN SATURATION: 97 % | HEART RATE: 89 BPM | WEIGHT: 171.8 LBS

## 2022-11-29 DIAGNOSIS — J10.1 INFLUENZA A: Primary | ICD-10-CM

## 2022-11-29 DIAGNOSIS — R05.9 COUGH, UNSPECIFIED TYPE: ICD-10-CM

## 2022-11-29 LAB
INFLUENZA A ANTIBODY: POSITIVE
INFLUENZA B ANTIBODY: NEGATIVE
Lab: NORMAL
QC PASS/FAIL: NORMAL
SARS-COV-2 RDRP RESP QL NAA+PROBE: NEGATIVE

## 2022-11-29 PROCEDURE — G8427 DOCREV CUR MEDS BY ELIG CLIN: HCPCS | Performed by: PHYSICIAN ASSISTANT

## 2022-11-29 PROCEDURE — 3006F CXR DOC REV: CPT | Performed by: PHYSICIAN ASSISTANT

## 2022-11-29 PROCEDURE — G8484 FLU IMMUNIZE NO ADMIN: HCPCS | Performed by: PHYSICIAN ASSISTANT

## 2022-11-29 PROCEDURE — 87635 SARS-COV-2 COVID-19 AMP PRB: CPT | Performed by: PHYSICIAN ASSISTANT

## 2022-11-29 PROCEDURE — 87804 INFLUENZA ASSAY W/OPTIC: CPT | Performed by: PHYSICIAN ASSISTANT

## 2022-11-29 PROCEDURE — 99204 OFFICE O/P NEW MOD 45 MIN: CPT | Performed by: PHYSICIAN ASSISTANT

## 2022-11-29 PROCEDURE — 3017F COLORECTAL CA SCREEN DOC REV: CPT | Performed by: PHYSICIAN ASSISTANT

## 2022-11-29 PROCEDURE — G8419 CALC BMI OUT NRM PARAM NOF/U: HCPCS | Performed by: PHYSICIAN ASSISTANT

## 2022-11-29 PROCEDURE — 1036F TOBACCO NON-USER: CPT | Performed by: PHYSICIAN ASSISTANT

## 2022-11-29 RX ORDER — OSELTAMIVIR PHOSPHATE 75 MG/1
75 CAPSULE ORAL 2 TIMES DAILY
Qty: 10 CAPSULE | Refills: 0 | Status: SHIPPED | OUTPATIENT
Start: 2022-11-29 | End: 2022-12-04

## 2022-11-29 RX ORDER — METHYLPREDNISOLONE 4 MG/1
TABLET ORAL
Qty: 1 KIT | Refills: 0 | Status: SHIPPED | OUTPATIENT
Start: 2022-11-29

## 2022-11-29 RX ORDER — DEXTROMETHORPHAN HYDROBROMIDE AND PROMETHAZINE HYDROCHLORIDE 15; 6.25 MG/5ML; MG/5ML
5 SYRUP ORAL 4 TIMES DAILY PRN
Qty: 120 ML | Refills: 0 | Status: SHIPPED | OUTPATIENT
Start: 2022-11-29 | End: 2022-12-06

## 2022-11-29 NOTE — PROGRESS NOTES
22  Saundra Ribera : 1955 Sex: female  Age 61 y.o. Subjective:  Chief Complaint   Patient presents with    Headache    Congestion    Cough    Fatigue         HPI:   Saundra Ribera , 61 y.o. female presents to express care for evaluation of cough, congestion, drainage, fatigue    HPI  74-year-old female presents to express care for evaluation cough, congestion, drainage, fatigue and headache. The patient was seen and evaluated last week and placed on azithromycin. The patient was feeling much better while on the medication and then on  the symptoms seem to return and worsen. The patient has had some generalized myalgias. The patient has had a headache. The patient is coughing up occasional sputum. The patient is not any hemoptysis. The patient any fever, chills. No longer on any antibiotics. ROS:   Unless otherwise stated in this report the patient's positive and negative responses for review of systems for constitutional, eyes, ENT, cardiovascular, respiratory, gastrointestinal, neurological, , musculoskeletal, and integument systems and related systems to the presenting problem are either stated in the history of present illness or were not pertinent or were negative for the symptoms and/or complaints related to the presenting medical problem. Positives and pertinent negatives as per HPI. All others reviewed and are negative.       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 ([address] 2022    Dizziness - light-headed     GERD (gastroesophageal reflux disease)     Herpes dermatitis 2017    Insomnia secondary to anxiety 2018    Lightheadedness     Migraine     Skin ulcer of buttock with fat layer exposed ([address] 2016    Venous stasis ulcer of left ankle with fat layer exposed with varicose veins ([address] 2022       Past Surgical History:   Procedure Laterality Date    CHOLECYSTECTOMY, LAPAROSCOPIC  04/08/2014    LEG SURGERY Left 8/24/2022    LEFT LOWER EXTREMITY DEBRIDMENT WITH POSSIBLE ADVANCED SKIN THERAPY, POSSIBLE Renell Lions performed by Patrice Galarza MD at [address] [address] Left 10/25/2022    DEBRIDEMENT LEFT LEG, POSSIBLE ADVANCED SKIN THERAPY with acell micromatrix application , Renell Lions performed by Patrice Galarza MD at [address] [facility] ENDOSCOPY  12/13/2013    mild gastritis and small hiatal hernia, Dr Pastor Buckley, [facility]  2015    GERD, Dr. Kush Bullard, office       Family History   Problem Relation Age of Onset    Diabetes Mother     Hypertension Mother     Heart Disease Father     Heart Attack Father     Heart Surgery Father         angioplasty    Stroke Father     High Cholesterol Father     Heart Attack Maternal Grandfather        Medications:     Current Outpatient Medications:     oseltamivir (TAMIFLU) 75 MG capsule, Take 1 capsule by mouth 2 times daily for 5 days, Disp: 10 capsule, Rfl: 0    promethazine-dextromethorphan (PROMETHAZINE-DM) 6.25-15 MG/5ML syrup, Take 5 mLs by mouth 4 times daily as needed for Cough, Disp: 120 mL, Rfl: 0    methylPREDNISolone (MEDROL DOSEPACK) 4 MG tablet, Take by mouth., Disp: 1 kit, Rfl: 0    benzonatate (TESSALON) 100 MG capsule, Take 1 capsule by mouth 3 times daily as needed for Cough, Disp: 30 capsule, Rfl: 0    oxyCODONE-acetaminophen (PERCOCET) 5-325 MG per tablet, Take 1 tablet by mouth every 6 hours as needed for Pain for up to 14 days. , Disp: 25 tablet, Rfl: 0    acyclovir (ZOVIRAX) 400 MG tablet, take 1 tablet by mouth once daily, Disp: 90 tablet, Rfl: 3    butalbital-acetaminophen-caffeine (FIORICET, ESGIC) -40 MG per tablet, Take 1 tablet by mouth 3 times daily as needed for Headaches or Migraine Indications: Cluster Headache, Disp: 90 tablet, Rfl: 5    EPINEPHrine (EPIPEN) 0.3 MG/0.3ML SOAJ injection, Use as directed for allergic reaction, Disp: 1 each, Rfl: 5    hydrOXYzine HCl (ATARAX) 25 MG tablet, take 1 tablet BID, Disp: 60 tablet, Rfl: 5    pantoprazole (PROTONIX) 40 MG tablet, Take 1 tablet by mouth every morning (before breakfast), Disp: 90 tablet, Rfl: 3    aspirin-acetaminophen-caffeine (EXCEDRIN MIGRAINE) 250-250-65 MG per tablet, Take 1 tablet by mouth as needed for Headaches, Disp: 300 tablet, Rfl: 3    Allergies: Allergies   Allergen Reactions    Bee Pollen Anaphylaxis    Tetracyclines & Related Hives       Social History:     Social History     Tobacco Use    Smoking status: Never    Smokeless tobacco: Never   Vaping Use    Vaping Use: Never used   Substance Use Topics    Alcohol use: No    Drug use: No       Patient lives at home. Physical Exam:     Vitals:    11/29/22 1116   BP: (!) 140/68   Site: Right Upper Arm   Position: Sitting   Pulse: 89   Temp: 98.5 °F (36.9 °C)   TempSrc: Temporal   SpO2: 97%   Weight: 171 lb 12.8 oz (77.9 kg)       Exam:  Physical Exam  Nurse's notes and vital signs reviewed. The patient is not hypoxic. ? General: Alert, no acute distress, patient resting comfortably Patient is not toxic or lethargic. Skin: Warm, intact, no pallor noted. There is no evidence of rash at this time. Head: Normocephalic, atraumatic  Eye: Normal conjunctiva  Ears, Nose, Throat: Right tympanic membrane clear, left tympanic membrane clear. No drainage or discharge noted. No pre- or post-auricular tenderness, erythema, or swelling noted. No rhinorrhea or congestion noted. Posterior oropharynx shows no erythema, tonsillar hypertrophy, or exudate. the uvula is midline. No trismus or drooling is noted. Moist mucous membranes. Neck: No anterior/posterior lymphadenopathy noted. No erythema, no masses, no fluctuance or induration noted. No meningeal signs. Cardiovascular: Regular Rate and Rhythm  Respiratory: No acute distress, diminished lung sounds bilaterally but  no rhonchi, wheezing or crackles noted.  No stridor or retractions are noted. Neurological: A&O x4, normal speech  Psychiatric: Cooperative       Testing:     Results for orders placed or performed in visit on 11/29/22   POCT Influenza A/B   Result Value Ref Range    Influenza A Ab Positive (A)     Influenza B Ab Negative    POCT COVID-19 Rapid, NAAT   Result Value Ref Range    SARS-COV-2, RdRp gene Negative Negative    Lot Number [id number]     QC Pass/Fail pass        No results found. Medical Decision Making:     Vital signs reviewed    Past medical history reviewed. Allergies reviewed. Medications reviewed. Patient on arrival does not appear to be in any apparent distress or discomfort. The patient has been seen and evaluated. The patient does not appear to be toxic or lethargic.     the patient had COVID, influenza and a chest x-ray performed. Influenza positive for influenza A. Influenza B negative. The patient had COVID that was negative. Chest x-ray did not show any definite acute cardiopulmonary process the patient will be treated with Tamiflu, Medrol Dosepak and Promethazine DM. The patient was educated on the proper dosage of motrin and tylenol and the appropriate intervals of each. The patient is to increase fluid intake over the next several days. The patient is to use OTC decongestant as needed. The patient is to return to express care or go directly to the emergency department should any of the signs or symptoms worsen. The patient is to followup with primary care physician in 2-3 days for repeat evaluation. The patient has no other questions or concerns at this time the patient will be discharged home. Clinical Impression:   Angelita Whitmore was seen today for headache, congestion, cough and fatigue. Diagnoses and all orders for this visit:    Influenza A    Cough, unspecified type  -     XR CHEST STANDARD (2 VW);  Future  -     POCT Influenza A/B  -     POCT COVID-19 Rapid, NAAT    Other orders  -     oseltamivir (TAMIFLU) 75 MG capsule; Take 1 capsule by mouth 2 times daily for 5 days  -     promethazine-dextromethorphan (PROMETHAZINE-DM) 6.25-15 MG/5ML syrup; Take 5 mLs by mouth 4 times daily as needed for Cough  -     methylPREDNISolone (MEDROL DOSEPACK) 4 MG tablet; Take by mouth. The patient is to call for any concerns or return if any of the signs or symptoms worsen. The patient is to follow-up with PCP in the next 2-3 days for repeat evaluation repeat assessment or go directly to the emergency department.      SIGNATURE: Chadd Corado III, PA-C

## 2022-11-30 ENCOUNTER — HOSPITAL ENCOUNTER (OUTPATIENT)
Dept: WOUND CARE | Age: 67
Discharge: HOME | End: 2022-11-30

## 2022-12-01 NOTE — DISCHARGE INSTRUCTIONS
Visit Discharge/Physician Orders     Discharge condition: Stable     Assessment of pain at discharge: yes     Anesthetic used: 4% lidocaine solution(none 10-19-22)     Discharge to: Home     Left via:Private automobile     Accompanied by:self     ECF/HHA: n/a     Dressing Orders:LEFT MEDIAL and LATERAL LOWER LEG-Cleanse with normal saline, apply zinc to fiona wound, aquacel AG and drawtex, dressing, ABD pad , unna boot and coban. Change weekly. Treatment Orders: Eat a diet high in protein and vitamin C. Take a multiple vitamin daily unless contraindicated. To see Dr. Dougie Gardiner as scheduled      Must wear slip on shoe to work due to wrap on leg! Venous reflux study in Dr. Rishi Villagomez office Tuesday 12-20-22     [facility] followup visit : 1 week_____________________________  (Please note your next appointment above and if you are unable to keep, kindly give a 24 hour notice. Thank you.)     Physician signature:__________________________     If you experience any of the following, please call the [facility] during business hours:     * Increase in Pain  * Temperature over 101  * Increase in drainage from your wound  * Drainage with a foul odor  * Bleeding  * Increase in swelling  * Need for compression bandage changes due to slippage, breakthrough drainage. If you need medical attention outside of the business hours of the [facility] please contact your PCP or go to the nearest emergency room.

## 2022-12-07 ENCOUNTER — HOSPITAL ENCOUNTER (OUTPATIENT)
Dept: WOUND CARE | Age: 67
Discharge: HOME | End: 2022-12-07
Payer: MEDICARE

## 2022-12-07 VITALS
HEART RATE: 80 BPM | DIASTOLIC BLOOD PRESSURE: 69 MMHG | TEMPERATURE: 97.5 F | RESPIRATION RATE: 18 BRPM | SYSTOLIC BLOOD PRESSURE: 152 MMHG

## 2022-12-07 DIAGNOSIS — M79.89 LEG SWELLING: Primary | ICD-10-CM

## 2022-12-07 DIAGNOSIS — L97.322 VENOUS STASIS ULCER OF LEFT ANKLE WITH FAT LAYER EXPOSED WITH VARICOSE VEINS (HCC): Primary | ICD-10-CM

## 2022-12-07 DIAGNOSIS — I83.023 VENOUS STASIS ULCER OF LEFT ANKLE WITH FAT LAYER EXPOSED WITH VARICOSE VEINS (HCC): Primary | ICD-10-CM

## 2022-12-07 DIAGNOSIS — I70.242 ATHEROSCLEROSIS OF NATIVE ARTERY OF LEFT LOWER EXTREMITY WITH ULCERATION OF CALF (HCC): ICD-10-CM

## 2022-12-07 PROCEDURE — 11042 DBRDMT SUBQ TIS 1ST 20SQCM/<: CPT

## 2022-12-07 PROCEDURE — 11045 DBRDMT SUBQ TISS EACH ADDL: CPT

## 2022-12-07 RX ORDER — GINSENG 100 MG
CAPSULE ORAL ONCE
OUTPATIENT
Start: 2022-12-07 | End: 2022-12-07

## 2022-12-07 RX ORDER — LIDOCAINE HYDROCHLORIDE 20 MG/ML
JELLY TOPICAL ONCE
OUTPATIENT
Start: 2022-12-07 | End: 2022-12-07

## 2022-12-07 RX ORDER — LIDOCAINE 40 MG/G
CREAM TOPICAL ONCE
OUTPATIENT
Start: 2022-12-07 | End: 2022-12-07

## 2022-12-07 RX ORDER — BETAMETHASONE DIPROPIONATE 0.05 %
OINTMENT (GRAM) TOPICAL ONCE
OUTPATIENT
Start: 2022-12-07 | End: 2022-12-07

## 2022-12-07 RX ORDER — LIDOCAINE HYDROCHLORIDE 40 MG/ML
SOLUTION TOPICAL ONCE
Status: COMPLETED | OUTPATIENT
Start: 2022-12-07 | End: 2022-12-07

## 2022-12-07 RX ORDER — CLOBETASOL PROPIONATE 0.5 MG/G
OINTMENT TOPICAL ONCE
OUTPATIENT
Start: 2022-12-07 | End: 2022-12-07

## 2022-12-07 RX ORDER — GENTAMICIN SULFATE 1 MG/G
OINTMENT TOPICAL ONCE
OUTPATIENT
Start: 2022-12-07 | End: 2022-12-07

## 2022-12-07 RX ORDER — LIDOCAINE 50 MG/G
OINTMENT TOPICAL ONCE
OUTPATIENT
Start: 2022-12-07 | End: 2022-12-07

## 2022-12-07 RX ORDER — BACITRACIN, NEOMYCIN, POLYMYXIN B 400; 3.5; 5 [USP'U]/G; MG/G; [USP'U]/G
OINTMENT TOPICAL ONCE
OUTPATIENT
Start: 2022-12-07 | End: 2022-12-07

## 2022-12-07 RX ORDER — LIDOCAINE HYDROCHLORIDE 40 MG/ML
SOLUTION TOPICAL ONCE
OUTPATIENT
Start: 2022-12-07 | End: 2022-12-07

## 2022-12-07 RX ORDER — BACITRACIN ZINC AND POLYMYXIN B SULFATE 500; 1000 [USP'U]/G; [USP'U]/G
OINTMENT TOPICAL ONCE
OUTPATIENT
Start: 2022-12-07 | End: 2022-12-07

## 2022-12-07 RX ADMIN — LIDOCAINE HYDROCHLORIDE 5 ML: 40 SOLUTION TOPICAL at 07:45

## 2022-12-07 NOTE — PLAN OF CARE
Problem: Wound:  Goal: Will show signs of wound healing; wound closure and no evidence of infection  Description: Will show signs of wound healing; wound closure and no evidence of infection  Outcome: Progressing     Problem: Venous:  Goal: Signs of wound healing will improve  Description: Signs of wound healing will improve  Outcome: Progressing     Problem: Compression therapy:  Goal: Will be free from complications associated with compression therapy  Description: Will be free from complications associated with compression therapy  Outcome: Progressing

## 2022-12-07 NOTE — PROGRESS NOTES
Wound Healing Center Followup Visit Note    Referring Physician : Kiley Helton MD  [address] [place] [address] RECORD NUMBER:  [id number]  AGE: 74 y.o. GENDER: female  : 1955  EPISODE DATE:  2022    Subjective:     Chief Complaint   Patient presents with    Wound Check     Left leg        HISTORY of PRESENT ILLNESS HPI   Zabrina Velazquez is a 74 y.o. female who presents today in regards to follow up evaluation and treatment of wound/ulcer. That patient's past medical, family and social hx were reviewed and changes were made if present. History of Wound Context:  The patient has had a wound of her left ankle/calf which was first noted approximately 2021. This has been treated local wound care. On their initial visit to the wound healing center, 22,  the patient has noted that the wound has been improving. The patient has not had similar previous wounds in the past.      She started seeing Dr. Aixa Pa in 2021 and than Dr. Jose Maria Sanders. She was started in [facility] ~ 2021. She has noticed some improvement since starting unna boot. She is currently following with Dr. Vladimir Manuel. Pt is not on abx at time of initial visit, but has been treated with previously by podiatry. She is not a DM. She is not a smoker. She denies hx of DVT, and per her report had recent us noting no evidence of dvt at [facility]. She also had arterial studies done. I had previously seen her in the past in regards to left buttock thigh wound, which started as abscess. Pt works at [employer] in [facility] and is on her feet all day.     22  Reflux study - if significant findings will schedule for fu  Continue compression therapy [facility] per podiatry with aquacell dressing  Elevation  She does not have significant arterial occlusive disease  22  Patient has asked to continuing following with me going forward because of our previous relationship  She is going to let Dr. Lamb [place] office know  She tolerated unna boot and aquacell - some improvement  216/22  Appearance improved, slightly larger  Consider drawtek next week but overall drainage seems reasonably managed as periwound appears ok  2/23/2022  The wound, has some exudate, no recent cultures were done, will do wound cultures today  3/2/22  Reflux study reviewed - no significant reflux  Will treat culture - augmentin 875 mg bid x 10 days - script sent  More drainage - stable size  3/9/22  Wound appearance improved, stable in size  drawtek  3/16/22  Wound slightly larger in size, new wound of ankle  Continue Drawtek, change to profore  Avoid shoes which will contact ankle area  3/23/22  Wounds stable  Overall appearance improved  Will have pt see ID re cultures - disc with Dr Sandra Boykin  3/30/22  Much improved appearance  On oral abx per ID - concerns re pt ability to work while on IV - will see how her wound progresses  4/6/22  Appearance improved but size not much different  Finished oral abx  Will re culture next week if no significant size change - possible advance skin therapy  4/20/2022  Ulcer on the medial aspect, fairly clean and granulating, ulcer of the lateral aspect, has some exudate, debrided  4/27/22  Wounds stable   Hypergranulation tissue removed  Culture done - possible graft in future  5/4/22  Wound stable  Disc culture with Dr Sandra Boykin who would like to start her on iv abx  5/11/22  Wound stable  Iv abx have not been started yet - spoke with Dr Sandra Boykin - spoke with pt she will call his office  She had spoke with [facility] but there were some issues  5/18/22  Calf stable, ankle improved  Iv abx to start this week after picc placement  On exam   L dp 2+, PT triphasic - with compression of dp - PT becomes weakly biphasic  Concern that PT though triphasic is not getting inline flow and as a result isn't healing in the calf distribution because of occlusive disease  We discussed angiogram - will schedule  I reviewed the procedure with the patient and family as available. I discussed the procedure, risks, benefits, complications, and alternatives of the procedure. They understand and consent.   All questions were answered  Script for percocet 5/325 mg #28 prn q6 hrs - given, oarrs run  5/24/22  Angio, L PT and peroneal plasty  5/25/22  On iv abx  Wound appearance better  R groin no hematoma, L DP 2+, PT triphasic   6/1/22  Wound size and appearance better  6/8/22  Wound size and appearance better  Discussed with Dr Gi Garcia - he will stop abx  6/15/22  Wound size slightly improvement, appearance better  6/22/22  Wounds sizes smaller  6/29/22  Wounds stable   Hypergranulation tissue present throughout wound beds    Continue drawtex, foam, ABD and profore   7/6/22  Wounds slightly improved  7/13/22  Both wounds slightly larger  Hyperkeratotic tissue debrided back  7/14/22  Patient came in due to drainage through her wrap  Dressing noted to have large amounts of yellow/green drainage throughout  Cultures obtained    7/20/22  Culture reviewed large growth S aureus and Pseudomonas  Disc with Dr Hannon - will start clindamycin 600 mg tid for staph  He will see her in office in regards to IV for pseudomonas  Wounds stable in size  Change to aquacell ag  7/27/22  Dr Gi Garcia to see  Medial slightly larger, lateral slightly smaller  8/3/22  Dr Gi Garcia saw her felt wound appearance better - will hold off on iv for now  Medial slightly improved, lateral stable  8/10/2022  Wounds stable  8/17/22  Wound stable, more pain with debridement  Discussed OR for debridement and possible advanced skin therapy - pt agreeable  8/24/22  Debridement, kerecise application  9/59/83  Wound appearance improved  Medial wound improved, lateral stable  9/7/22  Wounds are smaller  9/14/22  Wound stable  Enodfrom and drawtek  9/21/2022  Wound debrided, stable according to the measurements  9/28/22  Wound larger  Culture done  Discussed OR  Aquacell ag change   10/5/22  Wound slightly larger  Percocet 5/325 mg #25 - script given, oarrs reviewed  Cx reviewed - will disc with Dr Uziel Negrete - all light growth   Not interested in OR at this time  10/12/22  Wound stable  Hold on cx tx  10/19/22  Wound stable  Ok for surgical debridement will schedule  Declined debridement today  10/25/22  Irrigation and excisional debridement of left medial and lateral ankle wound, Application of 102 mcg micromatrix acel  Application of unna boot  Lateral 15.5 sq cm, Medial 40.5 sq cm  11/2/22  Appearance improved  Measuring larger  No debridement  11/9/22  Appearance improved  Medial 62 (57), Lateral 18 (37)  Aquacell ag and wraps  11/16/22  Both appearance much improved  Medial 58 (62) Lateral 18 (18)  Aquacell ag , dratwek over top due to increased drainage  Percocet 5/325 mg #28 oarrs reviewed  11/23/22  Stable in size and appearance  New venous reflux study - Tuesday 11/29 at 1230 at my office  Will give her dressings to replace wrap after it is cut off for study  Refusing debridement due to pain  12/7/22  Missed last week due to influenza  Improved size  Tolerated debridement a little better than usual allowing slough to be removed especially around edges  Had to be rescheduled for venous US when office US is working again    [employer] Pain Timing/Severity: constant, moderate  Quality of pain: aching, throbbing, tender  Severity:  8/ 10   Modifying Factors: Pain worsens with dressing changes, debridement  Associated Signs/Symptoms: edema, drainage and pain    Ulcer Identification:  Ulcer Type: venous  Contributing Factors: edema and venous stasis    Diabetic/Pressure/Non Pressure Ulcers only:  Ulcer: Non-Pressure ulcer, fat layer exposed        PAST MEDICAL HISTORY      Diagnosis Date    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 ([facility] [address] 5/18/2022    Dizziness - light-headed     GERD (gastroesophageal reflux disease)     Herpes dermatitis 4/27/2017    Insomnia secondary to anxiety 4/6/2018    Lightheadedness Migraine     Skin ulcer of buttock with fat layer exposed ([facility] [address] 7/20/2016    Venous stasis ulcer of left ankle with fat layer exposed with varicose veins ([facility] [address] 2/2/2022     Past Surgical History:   Procedure Laterality Date    CHOLECYSTECTOMY, LAPAROSCOPIC  04/08/2014    LEG SURGERY Left 8/24/2022    LEFT LOWER EXTREMITY DEBRIDMENT WITH POSSIBLE ADVANCED SKIN THERAPY, POSSIBLE Jarrett Bakes performed by Boo Haines MD at [address] Left 10/25/2022    DEBRIDEMENT LEFT LEG, POSSIBLE ADVANCED SKIN THERAPY with acell micromatrix application , Jarrett Bakes performed by Boo Haines MD at [address] [facility] ENDOSCOPY  12/13/2013    mild gastritis and small hiatal hernia[place] [address]  2015    GERD, Dr. Lianna Smith, office     Family History   Problem Relation Age of Onset    Diabetes Mother     Hypertension Mother     Heart Disease Father     Heart Attack Father     Heart Surgery Father         angioplasty    Stroke Father     High Cholesterol Father     Heart Attack Maternal Grandfather      Social History     Tobacco Use    Smoking status: Never    Smokeless tobacco: Never   Vaping Use    Vaping Use: Never used   Substance Use Topics    Alcohol use: No    Drug use: No     Allergies   Allergen Reactions    Bee Pollen Anaphylaxis    Tetracyclines & Related Hives     Current Outpatient Medications on File Prior to Encounter   Medication Sig Dispense Refill    methylPREDNISolone (MEDROL DOSEPACK) 4 MG tablet Take by mouth.  1 kit 0    acyclovir (ZOVIRAX) 400 MG tablet take 1 tablet by mouth once daily 90 tablet 3    butalbital-acetaminophen-caffeine (FIORICET, ESGIC) -40 MG per tablet Take 1 tablet by mouth 3 times daily as needed for Headaches or Migraine Indications: Cluster Headache 90 tablet 5    EPINEPHrine (EPIPEN) 0.3 MG/0.3ML SOAJ injection Use as directed for allergic reaction 1 each 5    hydrOXYzine HCl (ATARAX) 25 MG tablet take 1 tablet BID 60 tablet 5    pantoprazole (PROTONIX) 40 MG tablet Take 1 tablet by mouth every morning (before breakfast) 90 tablet 3    aspirin-acetaminophen-caffeine (EXCEDRIN MIGRAINE) 250-250-65 MG per tablet Take 1 tablet by mouth as needed for Headaches 300 tablet 3     No current facility-administered medications on file prior to encounter.        REVIEW OF SYSTEMS See HPI    Objective:    BP (!) 152/69   Pulse 80   Temp 97.5 °F (36.4 °C) (Temporal)   Resp 18   Wt Readings from Last 3 Encounters:   11/29/22 171 lb 12.8 oz (77.9 kg)   11/23/22 165 lb (74.8 kg)   11/23/22 174 lb 9.6 oz (79.2 kg)     PHYSICAL EXAM  CONSTITUTIONAL:   Awake, alert, cooperative   EYES:  lids and lashes normal   ENT: external ears and nose without lesions   NECK:  supple, symmetrical, trachea midline   SKIN:  Open wound Present    Assessment:     Problem List Items Addressed This Visit          Circulatory    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 ([address] (Chronic)    Relevant Orders    Initiate Outpatient Wound Care Protocol    * (Principal) Venous stasis ulcer of left ankle with fat layer exposed with varicose veins (HCC) - Primary (Chronic)    Relevant Orders    Initiate Outpatient Wound Care Protocol       Pre Debridement Measurements:  Are located in the [facility]  Documentation Flow Sheet  Post Debridement Measurements:  Wound/Ulcer Descriptions are Pre Debridement except measurements:     Wound 08/10/16 Other (Comment) Buttocks Left;Distal #2 acq 8/4/16 (Active)   Number of days: 0816       Wound 08/31/16 Buttocks Left;Proximal #3 aquired 8-27-26 (Active)   Number of days: 7166       Wound 02/02/22 Ankle Left;Medial #1 (Active)   Wound Image   11/02/22 0754   Dressing Status New dressing applied 11/23/22 0820   Wound Cleansed Cleansed with saline 11/23/22 0820   Dressing/Treatment ABD 11/23/22 0820   Offloading for Diabetic Foot Ulcers Offloading not required 11/23/22 0820 Wound Length (cm) 7.5 cm 12/07/22 0746   Wound Width (cm) 6.9 cm 12/07/22 0746   Wound Depth (cm) 0.1 cm 12/07/22 0746   Wound Surface Area (cm^2) 51.75 cm^2 12/07/22 0746   Change in Wound Size % (l*w) -91.24 12/07/22 0746   Wound Volume (cm^3) 5.175 cm^3 12/07/22 0746   Wound Healing % -91 12/07/22 0746   Post-Procedure Length (cm) 7.7 cm 12/07/22 0817   Post-Procedure Width (cm) 7 cm 12/07/22 0817   Post-Procedure Depth (cm) 0.1 cm 12/07/22 0817   Post-Procedure Surface Area (cm^2) 53.9 cm^2 12/07/22 0817   Post-Procedure Volume (cm^3) 5.39 cm^3 12/07/22 0817   Wound Assessment Pink/red;Fibrin;Pale granulation tissue 12/07/22 0746   Drainage Amount Large 12/07/22 0746   Drainage Description Green;Yellow;Brown 12/07/22 0746   Odor None 12/07/22 0746   Melany-wound Assessment Fragile 12/07/22 0746   Number of days: 308       Wound 03/16/22 Ankle Posterior; Left #2 (Active)   Wound Image   11/02/22 0754   Dressing Status New dressing applied 11/23/22 0820   Wound Cleansed Cleansed with saline 11/23/22 0820   Dressing/Treatment ABD 11/23/22 0820   Offloading for Diabetic Foot Ulcers Offloading not required 11/23/22 0820   Wound Length (cm) 4.8 cm 12/07/22 0746   Wound Width (cm) 3.5 cm 12/07/22 0746   Wound Depth (cm) 0.1 cm 12/07/22 0746   Wound Surface Area (cm^2) 16.8 cm^2 12/07/22 0746   Change in Wound Size % (l*w) -572 12/07/22 0746   Wound Volume (cm^3) 1.68 cm^3 12/07/22 0746   Wound Healing % -572 12/07/22 0746   Post-Procedure Length (cm) 4.9 cm 12/07/22 0817   Post-Procedure Width (cm) 3.6 cm 12/07/22 0817   Post-Procedure Depth (cm) 0.1 cm 12/07/22 0817   Post-Procedure Surface Area (cm^2) 17.64 cm^2 12/07/22 0817   Post-Procedure Volume (cm^3) 1.764 cm^3 12/07/22 0817   Wound Assessment Pink/red;Fibrin 12/07/22 0746   Drainage Amount Large 12/07/22 0746   Drainage Description Green;Yellow;Brown 12/07/22 0746   Odor None 12/07/22 0746   Melany-wound Assessment Fragile 12/07/22 0746   Number of days: 195 Procedure Note  Indications:  Based on my examination of this patient's wound(s)/ulcer(s) today, debridement is required to promote healing and evaluate the wound base. Performed by: Senia Kenyon MD     Consent obtained:  Yes     Time out taken:  Yes     Pain Control: Anesthetic  Anesthetic: 4% Lidocaine Liquid Topical      Debridement:Excisional Debridement     Using curette the wound(s)/ulcer(s) was/were sharply debrided down through and including the removal of epidermis, dermis, and subcutaneous tissue. Devitalized Tissue Debrided:  fibrin, biofilm, slough, and exudate to stimulate bleeding to promote healing, post debridement good bleeding base and wound edges noted     Wound/Ulcer #: 1 and 2     Percent of Wound/Ulcer Debrided: 88%     Total Surface Area Debrided:  60 sq cm      Estimated Blood Loss:  Minimal  Hemostasis Achieved:  by pressure     Procedural Pain:  8  / 10   Post Procedural Pain:  7 / 10      Response to treatment:  With complaints of pain. Plan:   Treatment Note please see attached Discharge Instructions    Written patient dismissal instructions given to patient and signed by patient or POA. Discharge Instructions         Visit Discharge/Physician Orders     Discharge condition: Stable     Assessment of pain at discharge: yes     Anesthetic used: 4% lidocaine solution(none 10-19-22)     Discharge to: Home     Left via:Private automobile     Accompanied by:self     ECF/HHA: n/a     Dressing Orders:LEFT MEDIAL and LATERAL LOWER LEG-Cleanse with normal saline, apply zinc to fiona wound, aquacel AG and drawtex, dressing, ABD pad , unna boot and coban. Change weekly. Treatment Orders: Eat a diet high in protein and vitamin C. Take a multiple vitamin daily unless contraindicated. To see Dr. Laxmi Carr as scheduled      Must wear slip on shoe to work due to wrap on leg!        Venous reflux study in Dr. Karsten Ulloa office Tuesday 12-20-22     [address] [place] [address] followup visit : 1 week_____________________________  (Please note your next appointment above and if you are unable to keep, kindly give a 24 hour notice. Thank you.)     Physician signature:__________________________     If you experience any of the following, please call the [facility] during business hours:     * Increase in Pain  * Temperature over 101  * Increase in drainage from your wound  * Drainage with a foul odor  * Bleeding  * Increase in swelling  * Need for compression bandage changes due to slippage, breakthrough drainage. If you need medical attention outside of the business hours of the [facility] [address] please contact your PCP or go to the nearest emergency room.    Electronically signed by Kanika Dailey PA-C

## 2022-12-08 NOTE — DISCHARGE INSTRUCTIONS
Visit Discharge/Physician Orders     Discharge condition: Stable     Assessment of pain at discharge: yes     Anesthetic used: 4% lidocaine solution(none 10-19-22)     Discharge to: Home     Left via:Private automobile     Accompanied by:self     ECF/HHA: n/a     Dressing Orders:LEFT MEDIAL and LATERAL LOWER LEG-Cleanse with normal saline, apply zinc to fiona wound, aquacel AG and drawtex, dressing, ABD pad , unna boot and coban. Change weekly. Treatment Orders: Eat a diet high in protein and vitamin C. Take a multiple vitamin daily unless contraindicated. To see Dr. Martinez Walker as scheduled      Must wear slip on shoe to work due to wrap on leg! Venous reflux study in Dr. Omar Brumfield office Tuesday 12-20-22     [address] [place] [address],[address] followup visit : 1 week_____________________________  (Please note your next appointment above and if you are unable to keep, kindly give a 24 hour notice. Thank you.)     Physician signature:__________________________     If you experience any of the following, please call the [facility] [address] during business hours:     * Increase in Pain  * Temperature over 101  * Increase in drainage from your wound  * Drainage with a foul odor  * Bleeding  * Increase in swelling  * Need for compression bandage changes due to slippage, breakthrough drainage. If you need medical attention outside of the business hours of the [facility] [address] please contact your PCP or go to the nearest emergency room.

## 2022-12-14 ENCOUNTER — HOSPITAL ENCOUNTER (OUTPATIENT)
Dept: WOUND CARE | Age: 67
Discharge: HOME | End: 2022-12-14
Payer: MEDICARE

## 2022-12-14 VITALS
HEART RATE: 78 BPM | RESPIRATION RATE: 18 BRPM | DIASTOLIC BLOOD PRESSURE: 85 MMHG | SYSTOLIC BLOOD PRESSURE: 165 MMHG | TEMPERATURE: 98.2 F

## 2022-12-14 DIAGNOSIS — I70.242 ATHEROSCLEROSIS OF NATIVE ARTERY OF LEFT LOWER EXTREMITY WITH ULCERATION OF CALF (HCC): ICD-10-CM

## 2022-12-14 DIAGNOSIS — L97.322 VENOUS STASIS ULCER OF LEFT ANKLE WITH FAT LAYER EXPOSED WITH VARICOSE VEINS (HCC): Primary | ICD-10-CM

## 2022-12-14 DIAGNOSIS — I83.023 VENOUS STASIS ULCER OF LEFT ANKLE WITH FAT LAYER EXPOSED WITH VARICOSE VEINS (HCC): Primary | ICD-10-CM

## 2022-12-14 DIAGNOSIS — I70.243 ATHEROSCLEROSIS OF NATIVE ARTERY OF LEFT LOWER EXTREMITY WITH ULCERATION OF ANKLE (HCC): ICD-10-CM

## 2022-12-14 PROCEDURE — 11042 DBRDMT SUBQ TIS 1ST 20SQCM/<: CPT

## 2022-12-14 RX ORDER — BETAMETHASONE DIPROPIONATE 0.05 %
OINTMENT (GRAM) TOPICAL ONCE
OUTPATIENT
Start: 2022-12-14 | End: 2022-12-14

## 2022-12-14 RX ORDER — LIDOCAINE 40 MG/G
CREAM TOPICAL ONCE
OUTPATIENT
Start: 2022-12-14 | End: 2022-12-14

## 2022-12-14 RX ORDER — LIDOCAINE HYDROCHLORIDE 20 MG/ML
JELLY TOPICAL ONCE
Status: CANCELLED | OUTPATIENT
Start: 2022-12-14 | End: 2022-12-14

## 2022-12-14 RX ORDER — BETAMETHASONE DIPROPIONATE 0.05 %
OINTMENT (GRAM) TOPICAL ONCE
Status: CANCELLED | OUTPATIENT
Start: 2022-12-14 | End: 2022-12-14

## 2022-12-14 RX ORDER — LIDOCAINE 50 MG/G
OINTMENT TOPICAL ONCE
OUTPATIENT
Start: 2022-12-14 | End: 2022-12-14

## 2022-12-14 RX ORDER — LIDOCAINE HYDROCHLORIDE 40 MG/ML
SOLUTION TOPICAL ONCE
Status: COMPLETED | OUTPATIENT
Start: 2022-12-14 | End: 2022-12-14

## 2022-12-14 RX ORDER — LIDOCAINE HYDROCHLORIDE 20 MG/ML
JELLY TOPICAL ONCE
OUTPATIENT
Start: 2022-12-14 | End: 2022-12-14

## 2022-12-14 RX ORDER — LIDOCAINE 50 MG/G
OINTMENT TOPICAL ONCE
Status: CANCELLED | OUTPATIENT
Start: 2022-12-14 | End: 2022-12-14

## 2022-12-14 RX ORDER — BACITRACIN, NEOMYCIN, POLYMYXIN B 400; 3.5; 5 [USP'U]/G; MG/G; [USP'U]/G
OINTMENT TOPICAL ONCE
Status: CANCELLED | OUTPATIENT
Start: 2022-12-14 | End: 2022-12-14

## 2022-12-14 RX ORDER — LIDOCAINE HYDROCHLORIDE 40 MG/ML
SOLUTION TOPICAL ONCE
OUTPATIENT
Start: 2022-12-14 | End: 2022-12-14

## 2022-12-14 RX ORDER — GINSENG 100 MG
CAPSULE ORAL ONCE
OUTPATIENT
Start: 2022-12-14 | End: 2022-12-14

## 2022-12-14 RX ORDER — LIDOCAINE HYDROCHLORIDE 40 MG/ML
SOLUTION TOPICAL ONCE
Status: CANCELLED | OUTPATIENT
Start: 2022-12-14 | End: 2022-12-14

## 2022-12-14 RX ORDER — LIDOCAINE 40 MG/G
CREAM TOPICAL ONCE
Status: CANCELLED | OUTPATIENT
Start: 2022-12-14 | End: 2022-12-14

## 2022-12-14 RX ORDER — CLOBETASOL PROPIONATE 0.5 MG/G
OINTMENT TOPICAL ONCE
OUTPATIENT
Start: 2022-12-14 | End: 2022-12-14

## 2022-12-14 RX ORDER — GENTAMICIN SULFATE 1 MG/G
OINTMENT TOPICAL ONCE
OUTPATIENT
Start: 2022-12-14 | End: 2022-12-14

## 2022-12-14 RX ORDER — GENTAMICIN SULFATE 1 MG/G
OINTMENT TOPICAL ONCE
Status: CANCELLED | OUTPATIENT
Start: 2022-12-14 | End: 2022-12-14

## 2022-12-14 RX ORDER — CLOBETASOL PROPIONATE 0.5 MG/G
OINTMENT TOPICAL ONCE
Status: CANCELLED | OUTPATIENT
Start: 2022-12-14 | End: 2022-12-14

## 2022-12-14 RX ORDER — BACITRACIN ZINC AND POLYMYXIN B SULFATE 500; 1000 [USP'U]/G; [USP'U]/G
OINTMENT TOPICAL ONCE
Status: CANCELLED | OUTPATIENT
Start: 2022-12-14 | End: 2022-12-14

## 2022-12-14 RX ORDER — BACITRACIN ZINC AND POLYMYXIN B SULFATE 500; 1000 [USP'U]/G; [USP'U]/G
OINTMENT TOPICAL ONCE
OUTPATIENT
Start: 2022-12-14 | End: 2022-12-14

## 2022-12-14 RX ORDER — GINSENG 100 MG
CAPSULE ORAL ONCE
Status: CANCELLED | OUTPATIENT
Start: 2022-12-14 | End: 2022-12-14

## 2022-12-14 RX ORDER — BACITRACIN, NEOMYCIN, POLYMYXIN B 400; 3.5; 5 [USP'U]/G; MG/G; [USP'U]/G
OINTMENT TOPICAL ONCE
OUTPATIENT
Start: 2022-12-14 | End: 2022-12-14

## 2022-12-14 RX ADMIN — LIDOCAINE HYDROCHLORIDE 10 ML: 40 SOLUTION TOPICAL at 07:48

## 2022-12-14 ASSESSMENT — PAIN - FUNCTIONAL ASSESSMENT: PAIN_FUNCTIONAL_ASSESSMENT: ACTIVITIES ARE NOT PREVENTED

## 2022-12-14 ASSESSMENT — PAIN SCALES - GENERAL: PAINLEVEL_OUTOF10: 8

## 2022-12-14 ASSESSMENT — PAIN DESCRIPTION - DESCRIPTORS: DESCRIPTORS: DISCOMFORT;NAGGING

## 2022-12-14 ASSESSMENT — PAIN DESCRIPTION - LOCATION: LOCATION: LEG

## 2022-12-14 ASSESSMENT — PAIN DESCRIPTION - ORIENTATION: ORIENTATION: LEFT

## 2022-12-14 NOTE — PROGRESS NOTES
Wound Healing Center Followup Visit Note    Referring Physician : Heidi Everett MD  [address] [place] [address] RECORD NUMBER:  [id number]  AGE: 74 y.o. GENDER: female  : 1955  EPISODE DATE:  2022    Subjective:     Chief Complaint   Patient presents with    Wound Check     Left leg      HISTORY of PRESENT ILLNESS HPI   Christopher Amaral is a 74 y.o. female who presents today in regards to follow up evaluation and treatment of wound/ulcer. That patient's past medical, family and social hx were reviewed and changes were made if present. History of Wound Context:  The patient has had a wound of her left ankle/calf which was first noted approximately 2021. This has been treated local wound care. On their initial visit to the wound healing center, 22,  the patient has noted that the wound has been improving. The patient has not had similar previous wounds in the past.      She started seeing Dr. Omid Monzon in 2021 and than Dr. Aristides Marques. She was started in [facility] ~ 2021. She has noticed some improvement since starting unna boot. She is currently following with Dr. Hortencia Hoang. Pt is not on abx at time of initial visit, but has been treated with previously by podiatry. She is not a DM. She is not a smoker. She denies hx of DVT, and per her report had recent us noting no evidence of dvt at [facility]. She also had arterial studies done. I had previously seen her in the past in regards to left buttock thigh wound, which started as abscess. Pt works at [employer] in [facility] and is on her feet all day.     22  Reflux study - if significant findings will schedule for fu  Continue compression therapy [facility] per podiatry with aquacell dressing  Elevation  She does not have significant arterial occlusive disease  22  Patient has asked to continuing following with me going forward because of our previous relationship  She is going to let Dr. Marquis Knowles office know  She tolerated unna boot and aquacell - some improvement  216/22  Appearance improved, slightly larger  Consider drawtek next week but overall drainage seems reasonably managed as periwound appears ok  2/23/2022  The wound, has some exudate, no recent cultures were done, will do wound cultures today  3/2/22  Reflux study reviewed - no significant reflux  Will treat culture - augmentin 875 mg bid x 10 days - script sent  More drainage - stable size  3/9/22  Wound appearance improved, stable in size  drawtek  3/16/22  Wound slightly larger in size, new wound of ankle  Continue Drawtek, change to profore  Avoid shoes which will contact ankle area  3/23/22  Wounds stable  Overall appearance improved  Will have pt see ID re cultures - disc with Dr Qasim Hemphill  3/30/22  Much improved appearance  On oral abx per ID - concerns re pt ability to work while on IV - will see how her wound progresses  4/6/22  Appearance improved but size not much different  Finished oral abx  Will re culture next week if no significant size change - possible advance skin therapy  4/20/2022  Ulcer on the medial aspect, fairly clean and granulating, ulcer of the lateral aspect, has some exudate, debrided  4/27/22  Wounds stable   Hypergranulation tissue removed  Culture done - possible graft in future  5/4/22  Wound stable  Disc culture with Dr Qasim Hemphill who would like to start her on iv abx  5/11/22  Wound stable  Iv abx have not been started yet - spoke with Dr Qasim Hemphill - spoke with pt she will call his office  She had spoke with [facility] but there were some issues  5/18/22  Calf stable, ankle improved  Iv abx to start this week after picc placement  On exam   L dp 2+, PT triphasic - with compression of dp - PT becomes weakly biphasic  Concern that PT though triphasic is not getting inline flow and as a result isn't healing in the calf distribution because of occlusive disease  We discussed angiogram - will schedule  I reviewed the procedure with the patient and family as available. I discussed the procedure, risks, benefits, complications, and alternatives of the procedure. They understand and consent.   All questions were answered  Script for percocet 5/325 mg #28 prn q6 hrs - given, oarrs run  5/24/22  Angio, L PT and peroneal plasty  5/25/22  On iv abx  Wound appearance better  R groin no hematoma, L DP 2+, PT triphasic   6/1/22  Wound size and appearance better  6/8/22  Wound size and appearance better  Discussed with Dr Carmela Chauhan - he will stop abx  6/15/22  Wound size slightly improvement, appearance better  6/22/22  Wounds sizes smaller  6/29/22  Wounds stable   Hypergranulation tissue present throughout wound beds    Continue drawtex, foam, ABD and profore   7/6/22  Wounds slightly improved  7/13/22  Both wounds slightly larger  Hyperkeratotic tissue debrided back  7/14/22  Patient came in due to drainage through her wrap  Dressing noted to have large amounts of yellow/green drainage throughout  Cultures obtained    7/20/22  Culture reviewed large growth S aureus and Pseudomonas  Disc with Dr Hannon - will start clindamycin 600 mg tid for staph  He will see her in office in regards to IV for pseudomonas  Wounds stable in size  Change to aquacell ag  7/27/22  Dr Carmela Chauhan to see  Medial slightly larger, lateral slightly smaller  8/3/22  Dr Carmela Chauhan saw her felt wound appearance better - will hold off on iv for now  Medial slightly improved, lateral stable  8/10/2022  Wounds stable  8/17/22  Wound stable, more pain with debridement  Discussed OR for debridement and possible advanced skin therapy - pt agreeable  8/24/22  Debridement, kerecise application  6/62/80  Wound appearance improved  Medial wound improved, lateral stable  9/7/22  Wounds are smaller  9/14/22  Wound stable  Enodfrom and drawtek  9/21/2022  Wound debrided, stable according to the measurements  9/28/22  Wound larger  Culture done  Discussed OR  Aquacell ag change   10/5/22  Wound slightly larger  Percocet 5/325 mg #25 - script given, oarrs reviewed  Cx reviewed - will disc with Dr Yang Human - all light growth   Not interested in OR at this time  10/12/22  Wound stable  Hold on cx tx  10/19/22  Wound stable  Ok for surgical debridement will schedule  Declined debridement today  10/25/22  Irrigation and excisional debridement of left medial and lateral ankle wound, Application of 311 mcg micromatrix acel  Application of unna boot  Lateral 15.5 sq cm, Medial 40.5 sq cm  11/2/22  Appearance improved  Measuring larger  No debridement  11/9/22  Appearance improved  Medial 62 (57), Lateral 18 (37)  Aquacell ag and wraps  11/16/22  Both appearance much improved  Medial 58 (62) Lateral 18 (18)  Aquacell ag , dratwek over top due to increased drainage  Percocet 5/325 mg #28 oarrs reviewed  11/23/22  Stable in size and appearance  New venous reflux study - Tuesday 11/29 at 1230 at my office  Will give her dressings to replace wrap after it is cut off for study  Refusing debridement due to pain  12/7/22  Missed last week due to influenza  Improved size  Tolerated debridement a little better than usual allowing slough to be removed especially around edges  Had to be rescheduled for venous US when office US is working again  12/14/22  Medial calf slightly larger but appearance improved 54 sq cm  Lateral calf slightly larger - more fibrinous exudate - 17 sq cm  Pt would only allow lateral calf debridement  Us 12/20 rescheduled    Wound/Ulcer Pain Timing/Severity: constant, moderate  Quality of pain: aching, throbbing, tender  Severity:  8/ 10   Modifying Factors: Pain worsens with dressing changes, debridement  Associated Signs/Symptoms: edema, drainage and pain    Ulcer Identification:  Ulcer Type: venous  Contributing Factors: edema and venous stasis    Diabetic/Pressure/Non Pressure Ulcers only:  Ulcer: Non-Pressure ulcer, fat layer exposed        PAST MEDICAL HISTORY      Diagnosis Date    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 ([facility] 5/18/2022    Dizziness - light-headed     GERD (gastroesophageal reflux disease)     Herpes dermatitis 4/27/2017    Insomnia secondary to anxiety 4/6/2018    Lightheadedness     Migraine     Skin ulcer of buttock with fat layer exposed ([address] 7/20/2016    Venous stasis ulcer of left ankle with fat layer exposed with varicose veins ([facility] [address] 2/2/2022     Past Surgical History:   Procedure Laterality Date    CHOLECYSTECTOMY, LAPAROSCOPIC  04/08/2014    LEG SURGERY Left 8/24/2022    LEFT LOWER EXTREMITY DEBRIDMENT WITH POSSIBLE ADVANCED SKIN THERAPY, POSSIBLE Trula Aaron performed by Stephen Peterson MD at [address] Left 10/25/2022    DEBRIDEMENT LEFT LEG, POSSIBLE ADVANCED SKIN THERAPY with acell micromatrix application , Trula Aaron performed by Stephen Peterson MD at [address] [facility] ENDOSCOPY  12/13/2013    mild gastritis and small hiatal hernia, Dr Gustabo Rodriguez, [place] [address]  2015    GERD, Dr. Nurys Nicole, office     Family History   Problem Relation Age of Onset    Diabetes Mother     Hypertension Mother     Heart Disease Father     Heart Attack Father     Heart Surgery Father         angioplasty    Stroke Father     High Cholesterol Father     Heart Attack Maternal Grandfather      Social History     Tobacco Use    Smoking status: Never    Smokeless tobacco: Never   Vaping Use    Vaping Use: Never used   Substance Use Topics    Alcohol use: No    Drug use: No     Allergies   Allergen Reactions    Bee Pollen Anaphylaxis    Tetracyclines & Related Hives     Current Outpatient Medications on File Prior to Encounter   Medication Sig Dispense Refill    methylPREDNISolone (MEDROL DOSEPACK) 4 MG tablet Take by mouth.  (Patient not taking: Reported on 12/14/2022) 1 kit 0    acyclovir (ZOVIRAX) 400 MG tablet take 1 tablet by mouth once daily 90 tablet 3    butalbital-acetaminophen-caffeine (FIORICET, ESGIC) -40 MG per tablet Take 1 tablet by mouth 3 times daily as needed for Headaches or Migraine Indications: Cluster Headache 90 tablet 5    EPINEPHrine (EPIPEN) 0.3 MG/0.3ML SOAJ injection Use as directed for allergic reaction 1 each 5    hydrOXYzine HCl (ATARAX) 25 MG tablet take 1 tablet BID 60 tablet 5    pantoprazole (PROTONIX) 40 MG tablet Take 1 tablet by mouth every morning (before breakfast) 90 tablet 3    aspirin-acetaminophen-caffeine (EXCEDRIN MIGRAINE) 250-250-65 MG per tablet Take 1 tablet by mouth as needed for Headaches 300 tablet 3     No current facility-administered medications on file prior to encounter.        REVIEW OF SYSTEMS See HPI    Objective:    BP (!) 165/85   Pulse 78   Temp 98.2 °F (36.8 °C) (Temporal)   Resp 18   Wt Readings from Last 3 Encounters:   11/29/22 171 lb 12.8 oz (77.9 kg)   11/23/22 165 lb (74.8 kg)   11/23/22 174 lb 9.6 oz (79.2 kg)     PHYSICAL EXAM  CONSTITUTIONAL:   Awake, alert, cooperative   EYES:  lids and lashes normal   ENT: external ears and nose without lesions   NECK:  supple, symmetrical, trachea midline   SKIN:  Open wound Present    Assessment:     Problem List Items Addressed This Visit       Venous stasis ulcer of left ankle with fat layer exposed with varicose veins (HCC) - Primary (Chronic)    Relevant Orders    Initiate Outpatient Wound Care Protocol    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 ([facility] (Chronic)    Relevant Orders    Initiate Outpatient Wound Care Protocol       Pre Debridement Measurements:  Are located in the [place]  Documentation Flow Sheet  Post Debridement Measurements:  Wound/Ulcer Descriptions are Pre Debridement except measurements:     Wound 08/10/16 Other (Comment) Buttocks Left;Distal #2 acq 8/4/16 (Active)   Number of days: 4912       Wound 08/31/16 Buttocks Left;Proximal #3 aquired 8-27-26 (Active)   Number of days: 8302       Wound 02/02/22 Ankle Left;Medial #1 (Active)   Wound Image   12/14/22 3985   Dressing Status New dressing applied 12/14/22 0852   Wound Cleansed Cleansed with saline 12/14/22 0852   Dressing/Treatment ABD; Alginate with Ag 12/14/22 0852   Offloading for Diabetic Foot Ulcers Offloading not required 12/14/22 0852   Wound Length (cm) 9 cm 12/14/22 0742   Wound Width (cm) 6 cm 12/14/22 0742   Wound Depth (cm) 0.1 cm 12/14/22 0742   Wound Surface Area (cm^2) 54 cm^2 12/14/22 0742   Change in Wound Size % (l*w) -99.56 12/14/22 0742   Wound Volume (cm^3) 5.4 cm^3 12/14/22 0742   Wound Healing % -100 12/14/22 0742   Post-Procedure Length (cm) 9 cm 12/14/22 0830   Post-Procedure Width (cm) 6 cm 12/14/22 0830   Post-Procedure Depth (cm) 0.1 cm 12/14/22 0830   Post-Procedure Surface Area (cm^2) 54 cm^2 12/14/22 0830   Post-Procedure Volume (cm^3) 5.4 cm^3 12/14/22 0830   Wound Assessment Pink/red;Fibrin;Pale granulation tissue 12/14/22 0742   Drainage Amount Moderate 12/14/22 0742   Drainage Description Serosanguinous;Brown;Yellow 12/14/22 0742   Odor None 12/14/22 0742   Melany-wound Assessment Fragile; Maceration 12/14/22 0742   Number of days: 315       Wound 03/16/22 Ankle Posterior; Left #2 (Active)   Wound Image   12/14/22 0742   Dressing Status New dressing applied 12/14/22 0852   Wound Cleansed Cleansed with saline 12/14/22 0852   Dressing/Treatment Alginate with Ag;ABD 12/14/22 0852   Offloading for Diabetic Foot Ulcers Offloading not required 11/23/22 0820   Wound Length (cm) 5.6 cm 12/14/22 0742   Wound Width (cm) 3.2 cm 12/14/22 0742   Wound Depth (cm) 0.2 cm 12/14/22 0742   Wound Surface Area (cm^2) 17.92 cm^2 12/14/22 0742   Change in Wound Size % (l*w) -616.8 12/14/22 0742   Wound Volume (cm^3) 3.584 cm^3 12/14/22 0742   Wound Healing % -1334 12/14/22 0742   Post-Procedure Length (cm) 5.8 cm 12/14/22 0830   Post-Procedure Width (cm) 3.2 cm 12/14/22 0830   Post-Procedure Depth (cm) 0.2 cm 12/14/22 0830   Post-Procedure Surface Area (cm^2) 18.56 cm^2 12/14/22 0830   Post-Procedure Volume (cm^3) 3.712 cm^3 12/14/22 0830   Wound Assessment Pink/red;Fibrin 12/14/22 0742   Drainage Amount Moderate 12/14/22 0742   Drainage Description Serosanguinous; Yellow;Brown 12/14/22 0742   Odor None 12/14/22 0742   Fiona-wound Assessment Fragile; Maceration 12/14/22 0742   Number of days: 273         Procedure Note  Indications:  Based on my examination of this patient's wound(s)/ulcer(s) today, debridement is required to promote healing and evaluate the wound base. Performed by: Elizabeth Mcintyre MD     Consent obtained:  Yes     Time out taken:  Yes     Pain Control: Anesthetic  Anesthetic: 4% Lidocaine Liquid Topical      Debridement:Excisional Debridement     Using curette the wound(s)/ulcer(s) was/were sharply debrided down through and including the removal of epidermis, dermis, and subcutaneous tissue. Devitalized Tissue Debrided:  fibrin, biofilm, slough, and exudate to stimulate bleeding to promote healing, post debridement good bleeding base and wound edges noted     Wound/Ulcer #:  2     Percent of Wound/Ulcer Debrided: 100%     Total Surface Area Debrided:  17.92 sq cm      Estimated Blood Loss:  Minimal  Hemostasis Achieved:  by pressure     Procedural Pain:  9  / 10   Post Procedural Pain:  8 / 10      Response to treatment:  With complaints of pain. Plan:   Treatment Note please see attached Discharge Instructions    Written patient dismissal instructions given to patient and signed by patient or POA. Discharge Instructions         Visit Discharge/Physician Orders     Discharge condition: Stable     Assessment of pain at discharge: yes     Anesthetic used: 4% lidocaine solution(none 10-19-22)     Discharge to: Home     Left via:Private automobile     Accompanied by:self     ECF/HHA: n/a     Dressing Orders:LEFT MEDIAL and LATERAL LOWER LEG-Cleanse with normal saline, apply zinc to fiona wound, aquacel AG and drawtex, dressing, ABD pad , unna boot and coban.  Change weekly. Treatment Orders: Eat a diet high in protein and vitamin C. Take a multiple vitamin daily unless contraindicated. To see Dr. Sandy Cardoso as scheduled      Must wear slip on shoe to work due to wrap on leg! Venous reflux study in Dr. Jessee Chandler office Tuesday 12-20-22     [address] [place] [address],[address] followup visit : 1 week_____________________________  (Please note your next appointment above and if you are unable to keep, kindly give a 24 hour notice. Thank you.)     Physician signature:__________________________     If you experience any of the following, please call the [facility] during business hours:     * Increase in Pain  * Temperature over 101  * Increase in drainage from your wound  * Drainage with a foul odor  * Bleeding  * Increase in swelling  * Need for compression bandage changes due to slippage, breakthrough drainage. If you need medical attention outside of the business hours of the [facility] please contact your PCP or go to the nearest emergency room.    Electronically signed by Mireille Pillai MD

## 2022-12-15 NOTE — DISCHARGE INSTRUCTIONS
Visit Discharge/Physician Orders     Discharge condition: Stable     Assessment of pain at discharge: yes     Anesthetic used: 4% lidocaine solution(none 10-19-22)     Discharge to: Home     Left via:Private automobile     Accompanied by:self     ECF/HHA: n/a     Dressing Orders:LEFT MEDIAL and LATERAL LOWER LEG-Cleanse with normal saline, apply zinc to fiona wound, aquacel AG and drawtex, dressing, ABD pad , unna boot and coban. Change weekly. Treatment Orders: Eat a diet high in protein and vitamin C. Take a multiple vitamin daily unless contraindicated. To see Dr. Caryl Barthel as scheduled      Must wear slip on shoe to work due to wrap on leg! [facility] followup visit : 1 week_____________________________  (Please note your next appointment above and if you are unable to keep, kindly give a 24 hour notice. Thank you.)     Physician signature:__________________________     If you experience any of the following, please call the [facility] during business hours:     * Increase in Pain  * Temperature over 101  * Increase in drainage from your wound  * Drainage with a foul odor  * Bleeding  * Increase in swelling  * Need for compression bandage changes due to slippage, breakthrough drainage. If you need medical attention outside of the business hours of the [facility] please contact your PCP or go to the nearest emergency room.

## 2022-12-19 ENCOUNTER — TELEPHONE (OUTPATIENT)
Dept: VASCULAR SURGERY | Age: 67
End: 2022-12-19

## 2022-12-20 ENCOUNTER — HOSPITAL ENCOUNTER (OUTPATIENT)
Dept: CARDIOLOGY | Age: 67
Discharge: HOME | End: 2022-12-20
Payer: MEDICARE

## 2022-12-20 DIAGNOSIS — M79.89 LEG SWELLING: ICD-10-CM

## 2022-12-20 PROCEDURE — 93971 EXTREMITY STUDY: CPT

## 2022-12-20 NOTE — PROGRESS NOTES
[facility] - [facility]    This is a pre read worksheet - prior to official physician interpretation    Cris Jainu  1955  Date of study: 12/20/22    Indication for study:  Leg pain and swelling, varicose veins  Study : Left Lower Extremity Venous Duplex and Reflux Examination      Duplex examination of the common, deep, superficial femoral, and the popliteal veins of the LEFT lower extremity identifies spontaneous flow. All scanned veins are compressible and free of echogenic densities. There was no evidence of reflux in the left greater saphenous vein. The left greater saphenous vein measured 0.6 x 0.6 cm. Additional comments: Negative DVT. Slightly limited d/t patient condition and machine. There was evidence of reflux in the left lesser saphenous vein lasting lasting 3623 ms. The left lesser saphenous vein measured 0.9 x 0.9 cm. Incompetent perforators visualized at the levels of Denise and mid- calf Cocket.

## 2022-12-21 ENCOUNTER — HOSPITAL ENCOUNTER (OUTPATIENT)
Dept: WOUND CARE | Age: 67
Discharge: HOME | End: 2022-12-21
Payer: MEDICARE

## 2022-12-21 VITALS
RESPIRATION RATE: 18 BRPM | TEMPERATURE: 96.6 F | HEART RATE: 86 BPM | DIASTOLIC BLOOD PRESSURE: 85 MMHG | SYSTOLIC BLOOD PRESSURE: 152 MMHG

## 2022-12-21 DIAGNOSIS — I83.023 VENOUS STASIS ULCER OF LEFT ANKLE WITH FAT LAYER EXPOSED WITH VARICOSE VEINS (HCC): ICD-10-CM

## 2022-12-21 DIAGNOSIS — L97.322 VENOUS STASIS ULCER OF LEFT ANKLE WITH FAT LAYER EXPOSED WITH VARICOSE VEINS (HCC): ICD-10-CM

## 2022-12-21 DIAGNOSIS — I70.242 ATHEROSCLEROSIS OF NATIVE ARTERY OF LEFT LOWER EXTREMITY WITH ULCERATION OF CALF (HCC): Primary | ICD-10-CM

## 2022-12-21 PROCEDURE — 11042 DBRDMT SUBQ TIS 1ST 20SQCM/<: CPT

## 2022-12-21 RX ORDER — GINSENG 100 MG
CAPSULE ORAL ONCE
OUTPATIENT
Start: 2022-12-21 | End: 2022-12-21

## 2022-12-21 RX ORDER — LIDOCAINE HYDROCHLORIDE 40 MG/ML
SOLUTION TOPICAL ONCE
OUTPATIENT
Start: 2022-12-21 | End: 2022-12-21

## 2022-12-21 RX ORDER — LIDOCAINE HYDROCHLORIDE 40 MG/ML
SOLUTION TOPICAL ONCE
Status: COMPLETED | OUTPATIENT
Start: 2022-12-21 | End: 2022-12-21

## 2022-12-21 RX ORDER — LIDOCAINE 40 MG/G
CREAM TOPICAL ONCE
OUTPATIENT
Start: 2022-12-21 | End: 2022-12-21

## 2022-12-21 RX ORDER — CLOBETASOL PROPIONATE 0.5 MG/G
OINTMENT TOPICAL ONCE
OUTPATIENT
Start: 2022-12-21 | End: 2022-12-21

## 2022-12-21 RX ORDER — LIDOCAINE HYDROCHLORIDE 20 MG/ML
JELLY TOPICAL ONCE
OUTPATIENT
Start: 2022-12-21 | End: 2022-12-21

## 2022-12-21 RX ORDER — BACITRACIN ZINC AND POLYMYXIN B SULFATE 500; 1000 [USP'U]/G; [USP'U]/G
OINTMENT TOPICAL ONCE
OUTPATIENT
Start: 2022-12-21 | End: 2022-12-21

## 2022-12-21 RX ORDER — LIDOCAINE 50 MG/G
OINTMENT TOPICAL ONCE
OUTPATIENT
Start: 2022-12-21 | End: 2022-12-21

## 2022-12-21 RX ORDER — GENTAMICIN SULFATE 1 MG/G
OINTMENT TOPICAL ONCE
OUTPATIENT
Start: 2022-12-21 | End: 2022-12-21

## 2022-12-21 RX ORDER — BETAMETHASONE DIPROPIONATE 0.05 %
OINTMENT (GRAM) TOPICAL ONCE
OUTPATIENT
Start: 2022-12-21 | End: 2022-12-21

## 2022-12-21 RX ORDER — BACITRACIN, NEOMYCIN, POLYMYXIN B 400; 3.5; 5 [USP'U]/G; MG/G; [USP'U]/G
OINTMENT TOPICAL ONCE
OUTPATIENT
Start: 2022-12-21 | End: 2022-12-21

## 2022-12-21 RX ADMIN — LIDOCAINE HYDROCHLORIDE 5 ML: 40 SOLUTION TOPICAL at 07:45

## 2022-12-21 ASSESSMENT — PAIN DESCRIPTION - ORIENTATION: ORIENTATION: LEFT

## 2022-12-21 ASSESSMENT — PAIN SCALES - GENERAL: PAINLEVEL_OUTOF10: 9

## 2022-12-21 ASSESSMENT — PAIN DESCRIPTION - DESCRIPTORS: DESCRIPTORS: TENDER

## 2022-12-21 ASSESSMENT — PAIN DESCRIPTION - LOCATION: LOCATION: LEG

## 2022-12-21 NOTE — PROGRESS NOTES
Wound Healing Center Followup Visit Note    Referring Physician : Radha Sol MD  [address] [place] [address] RECORD NUMBER:  [id number]  AGE: 74 y.o. GENDER: female  : 1955  EPISODE DATE:  2022    Subjective:     Chief Complaint   Patient presents with    Wound Check     Left leg        HISTORY of PRESENT ILLNESS HPI   Sruthi Dexter is a 74 y.o. female who presents today in regards to follow up evaluation and treatment of wound/ulcer. That patient's past medical, family and social hx were reviewed and changes were made if present. History of Wound Context:  The patient has had a wound of her left ankle/calf which was first noted approximately 2021. This has been treated local wound care. On their initial visit to the wound healing center, 22,  the patient has noted that the wound has been improving. The patient has not had similar previous wounds in the past.      She started seeing Dr. Lenore Aldrich in 2021 and than Dr. Mary Whiteside. She was started in [facility] ~ 2021. She has noticed some improvement since starting unna boot. She is currently following with Dr. Renetta Gay. Pt is not on abx at time of initial visit, but has been treated with previously by podiatry. She is not a DM. She is not a smoker. She denies hx of DVT, and per her report had recent us noting no evidence of dvt at [facility]. She also had arterial studies done. I had previously seen her in the past in regards to left buttock thigh wound, which started as abscess. Pt works at [employer] in [facility] and is on her feet all day.     22  Reflux study - if significant findings will schedule for fu  Continue compression therapy [facility] bo per podiatry with aquacell dressing  Elevation  She does not have significant arterial occlusive disease  22  Patient has asked to continuing following with me going forward because of our previous relationship  She is going to let Dr. Lovey Ganser office know  She tolerated unna boot and aquacell - some improvement  216/22  Appearance improved, slightly larger  Consider drawtek next week but overall drainage seems reasonably managed as periwound appears ok  2/23/2022  The wound, has some exudate, no recent cultures were done, will do wound cultures today  3/2/22  Reflux study reviewed - no significant reflux  Will treat culture - augmentin 875 mg bid x 10 days - script sent  More drainage - stable size  3/9/22  Wound appearance improved, stable in size  drawtek  3/16/22  Wound slightly larger in size, new wound of ankle  Continue Drawtek, change to profore  Avoid shoes which will contact ankle area  3/23/22  Wounds stable  Overall appearance improved  Will have pt see ID re cultures - disc with Dr Mohsen Goetz  3/30/22  Much improved appearance  On oral abx per ID - concerns re pt ability to work while on IV - will see how her wound progresses  4/6/22  Appearance improved but size not much different  Finished oral abx  Will re culture next week if no significant size change - possible advance skin therapy  4/20/2022  Ulcer on the medial aspect, fairly clean and granulating, ulcer of the lateral aspect, has some exudate, debrided  4/27/22  Wounds stable   Hypergranulation tissue removed  Culture done - possible graft in future  5/4/22  Wound stable  Disc culture with Dr Mohsen Goetz who would like to start her on iv abx  5/11/22  Wound stable  Iv abx have not been started yet - spoke with Dr Mohsen Goetz - spoke with pt she will call his office  She had spoke with [facility] but there were some issues  5/18/22  Calf stable, ankle improved  Iv abx to start this week after picc placement  On exam   L dp 2+, PT triphasic - with compression of dp - PT becomes weakly biphasic  Concern that PT though triphasic is not getting inline flow and as a result isn't healing in the calf distribution because of occlusive disease  We discussed angiogram - will schedule  I reviewed the procedure with the patient and family as available. I discussed the procedure, risks, benefits, complications, and alternatives of the procedure. They understand and consent.   All questions were answered  Script for percocet 5/325 mg #28 prn q6 hrs - given, oarrs run  5/24/22  Angio, L PT and peroneal plasty  5/25/22  On iv abx  Wound appearance better  R groin no hematoma, L DP 2+, PT triphasic   6/1/22  Wound size and appearance better  6/8/22  Wound size and appearance better  Discussed with Dr Nguyen Henson - he will stop abx  6/15/22  Wound size slightly improvement, appearance better  6/22/22  Wounds sizes smaller  6/29/22  Wounds stable   Hypergranulation tissue present throughout wound beds    Continue drawtex, foam, ABD and profore   7/6/22  Wounds slightly improved  7/13/22  Both wounds slightly larger  Hyperkeratotic tissue debrided back  7/14/22  Patient came in due to drainage through her wrap  Dressing noted to have large amounts of yellow/green drainage throughout  Cultures obtained    7/20/22  Culture reviewed large growth S aureus and Pseudomonas  Disc with Dr Hannon - will start clindamycin 600 mg tid for staph  He will see her in office in regards to IV for pseudomonas  Wounds stable in size  Change to aquacell ag  7/27/22  Dr Nguyen Henson to see  Medial slightly larger, lateral slightly smaller  8/3/22  Dr Nguyen Henson saw her felt wound appearance better - will hold off on iv for now  Medial slightly improved, lateral stable  8/10/2022  Wounds stable  8/17/22  Wound stable, more pain with debridement  Discussed OR for debridement and possible advanced skin therapy - pt agreeable  8/24/22  Debridement, kerecise application  4/53/00  Wound appearance improved  Medial wound improved, lateral stable  9/7/22  Wounds are smaller  9/14/22  Wound stable  Enodfrom and drawtek  9/21/2022  Wound debrided, stable according to the measurements  9/28/22  Wound larger  Culture done  Discussed OR  Aquacell ag change   10/5/22  Wound slightly larger  Percocet 5/325 mg #25 - script given, oarrs reviewed  Cx reviewed - will disc with Dr Stormy Osgood - all light growth   Not interested in OR at this time  10/12/22  Wound stable  Hold on cx tx  10/19/22  Wound stable  Ok for surgical debridement will schedule  Declined debridement today  10/25/22  Irrigation and excisional debridement of left medial and lateral ankle wound, Application of 426 mcg micromatrix acel  Application of unna boot  Lateral 15.5 sq cm, Medial 40.5 sq cm  11/2/22  Appearance improved  Measuring larger  No debridement  11/9/22  Appearance improved  Medial 62 (57), Lateral 18 (37)  Aquacell ag and wraps  11/16/22  Both appearance much improved  Medial 58 (62) Lateral 18 (18)  Aquacell ag , dratwek over top due to increased drainage  Percocet 5/325 mg #28 oarrs reviewed  11/23/22  Stable in size and appearance  New venous reflux study - Tuesday 11/29 at 1230 at my office  Will give her dressings to replace wrap after it is cut off for study  Refusing debridement due to pain  12/7/22  Missed last week due to influenza  Improved size  Tolerated debridement a little better than usual allowing slough to be removed especially around edges  Had to be rescheduled for venous US when office US is working again  12/14/22  Medial calf slightly larger but appearance improved 54 sq cm  Lateral calf slightly larger - more fibrinous exudate - 17 sq cm  Pt would only allow lateral calf debridement  Us 12/20 rescheduled  12/21/22  Minimal debridement allowed to both wounds  Venous reflux study reviewed - will discuss with Dr. Ronda Oquendo  Medial and lateral calf stable in size with fibrinous exudate    Wound/Ulcer Pain Timing/Severity: constant, moderate  Quality of pain: aching, throbbing, tender  Severity:  8/ 10   Modifying Factors: Pain worsens with dressing changes, debridement  Associated Signs/Symptoms: edema, drainage and pain    Ulcer Identification:  Ulcer Type: venous  Contributing Factors: edema and venous stasis    Diabetic/Pressure/Non Pressure Ulcers only:  Ulcer: Non-Pressure ulcer, fat layer exposed        PAST MEDICAL HISTORY      Diagnosis Date    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 ([facility] [address] 5/18/2022    Dizziness - light-headed     GERD (gastroesophageal reflux disease)     Herpes dermatitis 4/27/2017    Insomnia secondary to anxiety 4/6/2018    Lightheadedness     Migraine     Skin ulcer of buttock with fat layer exposed ([address] 7/20/2016    Venous stasis ulcer of left ankle with fat layer exposed with varicose veins ([address] 2/2/2022     Past Surgical History:   Procedure Laterality Date    CHOLECYSTECTOMY, LAPAROSCOPIC  04/08/2014    LEG SURGERY Left 8/24/2022    LEFT LOWER EXTREMITY DEBRIDMENT WITH POSSIBLE ADVANCED SKIN THERAPY, POSSIBLE Theodora Nanas performed by Nancy Feliz MD at [address] [place] [address] Left 10/25/2022    DEBRIDEMENT LEFT LEG, POSSIBLE ADVANCED SKIN THERAPY with acell micromatrix application , Theodora Curtis performed by Nancy Feliz MD at [address] [place] [address] ENDOSCOPY  12/13/2013    mild gastritis and small hiatal hernia, Dr Floresita Ch, [address]  2015    GERD, Dr. Aliya Sampson, office     Family History   Problem Relation Age of Onset    Diabetes Mother     Hypertension Mother     Heart Disease Father     Heart Attack Father     Heart Surgery Father         angioplasty    Stroke Father     High Cholesterol Father     Heart Attack Maternal Grandfather      Social History     Tobacco Use    Smoking status: Never    Smokeless tobacco: Never   Vaping Use    Vaping Use: Never used   Substance Use Topics    Alcohol use: No    Drug use: No     Allergies   Allergen Reactions    Bee Pollen Anaphylaxis    Tetracyclines & Related Hives     Current Outpatient Medications on File Prior to Encounter   Medication Sig Dispense Refill    acyclovir (ZOVIRAX) 400 MG tablet take 1 tablet by mouth once daily 90 tablet 3    butalbital-acetaminophen-caffeine (FIORICET, ESGIC) -40 MG per tablet Take 1 tablet by mouth 3 times daily as needed for Headaches or Migraine Indications: Cluster Headache 90 tablet 5    EPINEPHrine (EPIPEN) 0.3 MG/0.3ML SOAJ injection Use as directed for allergic reaction 1 each 5    hydrOXYzine HCl (ATARAX) 25 MG tablet take 1 tablet BID 60 tablet 5    pantoprazole (PROTONIX) 40 MG tablet Take 1 tablet by mouth every morning (before breakfast) 90 tablet 3    aspirin-acetaminophen-caffeine (EXCEDRIN MIGRAINE) 250-250-65 MG per tablet Take 1 tablet by mouth as needed for Headaches 300 tablet 3     No current facility-administered medications on file prior to encounter.        REVIEW OF SYSTEMS See HPI    Objective:    BP (!) 152/85   Pulse 86   Temp (!) 96.6 °F (35.9 °C) (Temporal)   Resp 18   Wt Readings from Last 3 Encounters:   11/29/22 171 lb 12.8 oz (77.9 kg)   11/23/22 165 lb (74.8 kg)   11/23/22 174 lb 9.6 oz (79.2 kg)     PHYSICAL EXAM  CONSTITUTIONAL:   Awake, alert, cooperative   EYES:  lids and lashes normal   ENT: external ears and nose without lesions   NECK:  supple, symmetrical, trachea midline   SKIN:  Open wound Present    Assessment:     Problem List Items Addressed This Visit          Circulatory    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 ([facility] [address] - Primary (Chronic)    Relevant Medications    lidocaine (XYLOCAINE) 4 % external solution (Start on 12/21/2022  8:15 AM)    Other Relevant Orders    Initiate Outpatient Wound Care Protocol    * (Principal) Venous stasis ulcer of left ankle with fat layer exposed with varicose veins (HCC) (Chronic)    Relevant Medications    lidocaine (XYLOCAINE) 4 % external solution (Start on 12/21/2022  8:15 AM)    Other Relevant Orders    Initiate Outpatient Wound Care Protocol       Pre Debridement Measurements:  Are located in the [facility]  Documentation Flow Sheet  Post Debridement Measurements:  Wound/Ulcer Descriptions are Pre Debridement except measurements:     Wound 08/10/16 Other (Comment) Buttocks Left;Distal #2 acq 8/4/16 (Active)   Number of days: 0790       Wound 08/31/16 Buttocks Left;Proximal #3 aquired 8-27-26 (Active)   Number of days: 6740       Wound 02/02/22 Ankle Left;Medial #1 (Active)   Wound Image   12/14/22 0742   Dressing Status New dressing applied 12/14/22 0852   Wound Cleansed Cleansed with saline 12/14/22 0852   Dressing/Treatment ABD; Alginate with Ag 12/14/22 0852   Offloading for Diabetic Foot Ulcers Offloading not required 12/14/22 0852   Wound Length (cm) 7.9 cm 12/21/22 0747   Wound Width (cm) 6.1 cm 12/21/22 0747   Wound Depth (cm) 0.1 cm 12/21/22 0747   Wound Surface Area (cm^2) 48.19 cm^2 12/21/22 0747   Change in Wound Size % (l*w) -78.09 12/21/22 0747   Wound Volume (cm^3) 4.819 cm^3 12/21/22 0747   Wound Healing % -78 12/21/22 0747   Post-Procedure Length (cm) 9 cm 12/14/22 0830   Post-Procedure Width (cm) 6 cm 12/14/22 0830   Post-Procedure Depth (cm) 0.1 cm 12/14/22 0830   Post-Procedure Surface Area (cm^2) 54 cm^2 12/14/22 0830   Post-Procedure Volume (cm^3) 5.4 cm^3 12/14/22 0830   Wound Assessment Pink/red;Fibrin;Pale granulation tissue 12/21/22 0747   Drainage Amount Large 12/21/22 0747   Drainage Description Serosanguinous;Brown;Yellow 12/21/22 0747   Odor None 12/21/22 0747   Melany-wound Assessment Fragile; Maceration 12/21/22 0747   Number of days: 322       Wound 03/16/22 Ankle Posterior; Left #2 (Active)   Wound Image   12/14/22 0742   Dressing Status New dressing applied 12/14/22 0852   Wound Cleansed Cleansed with saline 12/14/22 0852   Dressing/Treatment Alginate with Ag;ABD 12/14/22 0852   Offloading for Diabetic Foot Ulcers Offloading not required 11/23/22 0820   Wound Length (cm) 5.2 cm 12/21/22 0747   Wound Width (cm) 3.7 cm 12/21/22 0747   Wound Depth (cm) 0.1 cm 12/21/22 0747   Wound Surface Area (cm^2) 19.24 cm^2 12/21/22 0747   Change in Wound Size % (l*w) -669.6 12/21/22 0747   Wound Volume (cm^3) 1.924 cm^3 12/21/22 0747   Wound Healing % -670 12/21/22 0747   Post-Procedure Length (cm) 5.8 cm 12/14/22 0830   Post-Procedure Width (cm) 3.2 cm 12/14/22 0830   Post-Procedure Depth (cm) 0.2 cm 12/14/22 0830   Post-Procedure Surface Area (cm^2) 18.56 cm^2 12/14/22 0830   Post-Procedure Volume (cm^3) 3.712 cm^3 12/14/22 0830   Wound Assessment Pink/red;Fibrin;Pale granulation tissue 12/21/22 0747   Drainage Amount Large 12/21/22 0747   Drainage Description Serosanguinous; Yellow;Brown 12/21/22 0747   Odor None 12/21/22 0747   Melany-wound Assessment Fragile; Maceration 12/21/22 0747   Number of days: 280         Procedure Note  Indications:  Based on my examination of this patient's wound(s)/ulcer(s) today, debridement is required to promote healing and evaluate the wound base. Performed by: Ning Bee MD     Consent obtained:  Yes     Time out taken:  Yes     Pain Control: Anesthetic  Anesthetic: 4% Lidocaine Liquid Topical      Debridement:Excisional Debridement     Using curette the wound(s)/ulcer(s) was/were sharply debrided down through and including the removal of epidermis, dermis, and subcutaneous tissue. Devitalized Tissue Debrided:  fibrin, biofilm, slough, and exudate to stimulate bleeding to promote healing, post debridement good bleeding base and wound edges noted     Wound/Ulcer #:  1, 2     Percent of Wound/Ulcer Debrided: 10%     Total Surface Area Debrided:  7 sq cm      Estimated Blood Loss:  Minimal  Hemostasis Achieved:  by pressure     Procedural Pain:  9  / 10   Post Procedural Pain:  8 / 10      Response to treatment:  With complaints of pain. Plan:   Treatment Note please see attached Discharge Instructions    Written patient dismissal instructions given to patient and signed by patient or POA.          Discharge Instructions         Visit Discharge/Physician Orders     Discharge condition: Stable     Assessment of pain at discharge: yes     Anesthetic used: 4% lidocaine solution(none 10-19-22)     Discharge to: Home     Left via:Private automobile     Accompanied by:self     ECF/HHA: n/a     Dressing Orders:LEFT MEDIAL and LATERAL LOWER LEG-Cleanse with normal saline, apply zinc to fiona wound, aquacel AG and drawtex, dressing, ABD pad , unna boot and coban. Change weekly. Treatment Orders: Eat a diet high in protein and vitamin C. Take a multiple vitamin daily unless contraindicated. To see Dr. Uziel Negrete as scheduled      Must wear slip on shoe to work due to wrap on leg! Venous reflux study in Dr. Davis [place] office Tuesday 12-20-22     [facility] followup visit : 1 week_____________________________  (Please note your next appointment above and if you are unable to keep, kindly give a 24 hour notice. Thank you.)     Physician signature:__________________________     If you experience any of the following, please call the [facility] during business hours:     * Increase in Pain  * Temperature over 101  * Increase in drainage from your wound  * Drainage with a foul odor  * Bleeding  * Increase in swelling  * Need for compression bandage changes due to slippage, breakthrough drainage. If you need medical attention outside of the business hours of the [facility] [employer] please contact your PCP or go to the nearest emergency room.    Electronically signed by LISA Alcantara CNP

## 2022-12-22 NOTE — DISCHARGE INSTRUCTIONS
Visit Discharge/Physician Orders     Discharge condition: Stable     Assessment of pain at discharge: yes     Anesthetic used: 4% lidocaine solution(none 10-19-22)     Discharge to: Home     Left via:Private automobile     Accompanied by:self     ECF/HHA: n/a     Dressing Orders:LEFT MEDIAL and LATERAL LOWER LEG-Cleanse with normal saline, apply zinc to fiona wound, aquacel AG and drawtex, dressing, ABD pad , unna boot and coban. Change weekly. Treatment Orders: Eat a diet high in protein and vitamin C. Take a multiple vitamin daily unless contraindicated. To see Dr. Qasim Hemphill as scheduled      Must wear slip on shoe to work due to wrap on leg! To have vein surgery-Dr. HERNANDEZ's office will call with date and instructions     [address] [place] [address] followup visit : 1 week_____________________________  (Please note your next appointment above and if you are unable to keep, kindly give a 24 hour notice. Thank you.)     Physician signature:__________________________     If you experience any of the following, please call the [facility] during business hours:     * Increase in Pain  * Temperature over 101  * Increase in drainage from your wound  * Drainage with a foul odor  * Bleeding  * Increase in swelling  * Need for compression bandage changes due to slippage, breakthrough drainage. If you need medical attention outside of the business hours of the [facility] please contact your PCP or go to the nearest emergency room.

## 2022-12-28 ENCOUNTER — PREP FOR PROCEDURE (OUTPATIENT)
Dept: VASCULAR SURGERY | Age: 67
End: 2022-12-28

## 2022-12-28 ENCOUNTER — HOSPITAL ENCOUNTER (OUTPATIENT)
Dept: WOUND CARE | Age: 67
Discharge: HOME | End: 2022-12-28
Payer: MEDICARE

## 2022-12-28 VITALS
HEART RATE: 77 BPM | DIASTOLIC BLOOD PRESSURE: 82 MMHG | BODY MASS INDEX: 25.91 KG/M2 | HEIGHT: 68 IN | TEMPERATURE: 97.2 F | WEIGHT: 171 LBS | SYSTOLIC BLOOD PRESSURE: 178 MMHG | RESPIRATION RATE: 18 BRPM

## 2022-12-28 DIAGNOSIS — I70.242 ATHEROSCLEROSIS OF NATIVE ARTERY OF LEFT LOWER EXTREMITY WITH ULCERATION OF CALF (HCC): ICD-10-CM

## 2022-12-28 DIAGNOSIS — I70.243 ATHEROSCLEROSIS OF NATIVE ARTERY OF LEFT LOWER EXTREMITY WITH ULCERATION OF ANKLE (HCC): ICD-10-CM

## 2022-12-28 DIAGNOSIS — I83.023 VENOUS STASIS ULCER OF LEFT ANKLE WITH FAT LAYER EXPOSED WITH VARICOSE VEINS (HCC): Primary | ICD-10-CM

## 2022-12-28 DIAGNOSIS — L97.322 VENOUS STASIS ULCER OF LEFT ANKLE WITH FAT LAYER EXPOSED WITH VARICOSE VEINS (HCC): Primary | ICD-10-CM

## 2022-12-28 PROCEDURE — 11042 DBRDMT SUBQ TIS 1ST 20SQCM/<: CPT

## 2022-12-28 PROCEDURE — 11042 DBRDMT SUBQ TIS 1ST 20SQCM/<: CPT | Performed by: SURGERY

## 2022-12-28 RX ORDER — LIDOCAINE HYDROCHLORIDE 20 MG/ML
JELLY TOPICAL ONCE
OUTPATIENT
Start: 2022-12-28 | End: 2022-12-28

## 2022-12-28 RX ORDER — OXYCODONE HYDROCHLORIDE AND ACETAMINOPHEN 5; 325 MG/1; MG/1
1 TABLET ORAL EVERY 6 HOURS PRN
Qty: 28 TABLET | Refills: 0 | Status: SHIPPED | OUTPATIENT
Start: 2022-12-28 | End: 2023-01-04

## 2022-12-28 RX ORDER — CLOBETASOL PROPIONATE 0.5 MG/G
OINTMENT TOPICAL ONCE
OUTPATIENT
Start: 2022-12-28 | End: 2022-12-28

## 2022-12-28 RX ORDER — LIDOCAINE HYDROCHLORIDE 40 MG/ML
SOLUTION TOPICAL ONCE
Status: COMPLETED | OUTPATIENT
Start: 2022-12-28 | End: 2022-12-28

## 2022-12-28 RX ORDER — BETAMETHASONE DIPROPIONATE 0.05 %
OINTMENT (GRAM) TOPICAL ONCE
OUTPATIENT
Start: 2022-12-28 | End: 2022-12-28

## 2022-12-28 RX ORDER — GINSENG 100 MG
CAPSULE ORAL ONCE
OUTPATIENT
Start: 2022-12-28 | End: 2022-12-28

## 2022-12-28 RX ORDER — LIDOCAINE HYDROCHLORIDE 40 MG/ML
SOLUTION TOPICAL ONCE
OUTPATIENT
Start: 2022-12-28 | End: 2022-12-28

## 2022-12-28 RX ORDER — BACITRACIN, NEOMYCIN, POLYMYXIN B 400; 3.5; 5 [USP'U]/G; MG/G; [USP'U]/G
OINTMENT TOPICAL ONCE
OUTPATIENT
Start: 2022-12-28 | End: 2022-12-28

## 2022-12-28 RX ORDER — LIDOCAINE 40 MG/G
CREAM TOPICAL ONCE
OUTPATIENT
Start: 2022-12-28 | End: 2022-12-28

## 2022-12-28 RX ORDER — GENTAMICIN SULFATE 1 MG/G
OINTMENT TOPICAL ONCE
OUTPATIENT
Start: 2022-12-28 | End: 2022-12-28

## 2022-12-28 RX ORDER — BACITRACIN ZINC AND POLYMYXIN B SULFATE 500; 1000 [USP'U]/G; [USP'U]/G
OINTMENT TOPICAL ONCE
OUTPATIENT
Start: 2022-12-28 | End: 2022-12-28

## 2022-12-28 RX ORDER — LIDOCAINE 50 MG/G
OINTMENT TOPICAL ONCE
OUTPATIENT
Start: 2022-12-28 | End: 2022-12-28

## 2022-12-28 RX ADMIN — LIDOCAINE HYDROCHLORIDE 10 ML: 40 SOLUTION TOPICAL at 07:52

## 2022-12-28 ASSESSMENT — PAIN SCALES - GENERAL: PAINLEVEL_OUTOF10: 5

## 2022-12-28 ASSESSMENT — PAIN - FUNCTIONAL ASSESSMENT: PAIN_FUNCTIONAL_ASSESSMENT: ACTIVITIES ARE NOT PREVENTED

## 2022-12-28 ASSESSMENT — PAIN DESCRIPTION - ORIENTATION: ORIENTATION: LEFT

## 2022-12-28 ASSESSMENT — PAIN DESCRIPTION - LOCATION: LOCATION: LEG

## 2022-12-28 ASSESSMENT — PAIN DESCRIPTION - FREQUENCY: FREQUENCY: CONTINUOUS

## 2022-12-28 ASSESSMENT — PAIN DESCRIPTION - DESCRIPTORS: DESCRIPTORS: THROBBING

## 2022-12-28 NOTE — PROGRESS NOTES
know  She tolerated unna boot and aquacell - some improvement  216/22  Appearance improved, slightly larger  Consider drawtek next week but overall drainage seems reasonably managed as periwound appears ok  2/23/2022  The wound, has some exudate, no recent cultures were done, will do wound cultures today  3/2/22  Reflux study reviewed - no significant reflux  Will treat culture - augmentin 875 mg bid x 10 days - script sent  More drainage - stable size  3/9/22  Wound appearance improved, stable in size  drawtek  3/16/22  Wound slightly larger in size, new wound of ankle  Continue Drawtek, change to profore  Avoid shoes which will contact ankle area  3/23/22  Wounds stable  Overall appearance improved  Will have pt see ID re cultures - disc with Dr Sandy Cardoso  3/30/22  Much improved appearance  On oral abx per ID - concerns re pt ability to work while on IV - will see how her wound progresses  4/6/22  Appearance improved but size not much different  Finished oral abx  Will re culture next week if no significant size change - possible advance skin therapy  4/20/2022  Ulcer on the medial aspect, fairly clean and granulating, ulcer of the lateral aspect, has some exudate, debrided  4/27/22  Wounds stable   Hypergranulation tissue removed  Culture done - possible graft in future  5/4/22  Wound stable  Disc culture with Dr Sandy Cardoso who would like to start her on iv abx  5/11/22  Wound stable  Iv abx have not been started yet - spoke with Dr Sandy Cardoso - spoke with pt she will call his office  She had spoke with [facility] but there were some issues  5/18/22  Calf stable, ankle improved  Iv abx to start this week after picc placement  On exam   L dp 2+, PT triphasic - with compression of dp - PT becomes weakly biphasic  Concern that PT though triphasic is not getting inline flow and as a result isn't healing in the calf distribution because of occlusive disease  We discussed angiogram - will schedule  I reviewed the procedure with the patient and family as available. I discussed the procedure, risks, benefits, complications, and alternatives of the procedure. They understand and consent.   All questions were answered  Script for percocet 5/325 mg #28 prn q6 hrs - given, oarrs run  5/24/22  Angio, L PT and peroneal plasty  5/25/22  On iv abx  Wound appearance better  R groin no hematoma, L DP 2+, PT triphasic   6/1/22  Wound size and appearance better  6/8/22  Wound size and appearance better  Discussed with Dr Mohsen Goetz - he will stop abx  6/15/22  Wound size slightly improvement, appearance better  6/22/22  Wounds sizes smaller  6/29/22  Wounds stable   Hypergranulation tissue present throughout wound beds    Continue drawtex, foam, ABD and profore   7/6/22  Wounds slightly improved  7/13/22  Both wounds slightly larger  Hyperkeratotic tissue debrided back  7/14/22  Patient came in due to drainage through her wrap  Dressing noted to have large amounts of yellow/green drainage throughout  Cultures obtained    7/20/22  Culture reviewed large growth S aureus and Pseudomonas  Disc with Dr Hannon - will start clindamycin 600 mg tid for staph  He will see her in office in regards to IV for pseudomonas  Wounds stable in size  Change to aquacell ag  7/27/22  Dr Mohsen Goetz to see  Medial slightly larger, lateral slightly smaller  8/3/22  Dr Mohsen Goetz saw her felt wound appearance better - will hold off on iv for now  Medial slightly improved, lateral stable  8/10/2022  Wounds stable  8/17/22  Wound stable, more pain with debridement  Discussed OR for debridement and possible advanced skin therapy - pt agreeable  8/24/22  Debridement, kerecise application  5/39/91  Wound appearance improved  Medial wound improved, lateral stable  9/7/22  Wounds are smaller  9/14/22  Wound stable  Enodfrom and drawtek  9/21/2022  Wound debrided, stable according to the measurements  9/28/22  Wound larger  Culture done  Discussed OR  Aquacell ag change   10/5/22  Wound slightly larger  Percocet 5/325 mg #25 - script given, oarrs reviewed  Cx reviewed - will disc with Dr Reyna Hardy - all light growth   Not interested in OR at this time  10/12/22  Wound stable  Hold on cx tx  10/19/22  Wound stable  Ok for surgical debridement will schedule  Declined debridement today  10/25/22  Irrigation and excisional debridement of left medial and lateral ankle wound, Application of 606 mcg micromatrix acel  Application of unna boot  Lateral 15.5 sq cm, Medial 40.5 sq cm  11/2/22  Appearance improved  Measuring larger  No debridement  11/9/22  Appearance improved  Medial 62 (57), Lateral 18 (37)  Aquacell ag and wraps  11/16/22  Both appearance much improved  Medial 58 (62) Lateral 18 (18)  Aquacell ag , dratwek over top due to increased drainage  Percocet 5/325 mg #28 oarrs reviewed  11/23/22  Stable in size and appearance  New venous reflux study - Tuesday 11/29 at 1230 at my office  Will give her dressings to replace wrap after it is cut off for study  Refusing debridement due to pain  12/7/22  Missed last week due to influenza  Improved size  Tolerated debridement a little better than usual allowing slough to be removed especially around edges  Had to be rescheduled for venous US when office US is working again  12/14/22  Medial calf slightly larger but appearance improved 54 sq cm  Lateral calf slightly larger - more fibrinous exudate - 17 sq cm  Pt would only allow lateral calf debridement  Us 12/20 rescheduled  12/21/22  Minimal debridement allowed to both wounds  Venous reflux study reviewed - will discuss with Dr. Michelle Valladares  Medial and lateral calf stable in size with fibrinous exudate  12/28/22  Debrided medial  Medial 52 increased (48)  Lateral 18 decreased (19)  Plan for lesser saphenous vein ablation  Oarrs reviewed - Percocet 5/325 mg #28    Wound/Ulcer Pain Timing/Severity: constant, moderate  Quality of pain: aching, throbbing, tender  Severity:  8/ 10   Modifying Factors: Pain worsens with dressing changes, debridement  Associated Signs/Symptoms: edema, drainage and pain    Ulcer Identification:  Ulcer Type: venous  Contributing Factors: edema and venous stasis    Diabetic/Pressure/Non Pressure Ulcers only:  Ulcer: Non-Pressure ulcer, fat layer exposed        PAST MEDICAL HISTORY      Diagnosis Date    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 ([address] 5/18/2022    Dizziness - light-headed     GERD (gastroesophageal reflux disease)     Herpes dermatitis 4/27/2017    Insomnia secondary to anxiety 4/6/2018    Lightheadedness     Migraine     Skin ulcer of buttock with fat layer exposed ([address] 7/20/2016    Venous stasis ulcer of left ankle with fat layer exposed with varicose veins ([address] 2/2/2022     Past Surgical History:   Procedure Laterality Date    CHOLECYSTECTOMY, LAPAROSCOPIC  04/08/2014    LEG SURGERY Left 8/24/2022    LEFT LOWER EXTREMITY DEBRIDMENT WITH POSSIBLE ADVANCED SKIN THERAPY, POSSIBLE [place] Neighbours performed by Mireille Pillai MD at [address] [facility] Left 10/25/2022    DEBRIDEMENT LEFT LEG, POSSIBLE ADVANCED SKIN THERAPY with acell micromatrix application , Guerrero Neighbours performed by Mireille Pillai MD at [address] [place] [address]  12/13/2013    mild gastritis and small hiatal hernia, Dr Ry Ledesma, [facility]  2015    GERD, Dr. Marjorie Avila, office     Family History   Problem Relation Age of Onset    Diabetes Mother     Hypertension Mother     Heart Disease Father     Heart Attack Father     Heart Surgery Father         angioplasty    Stroke Father     High Cholesterol Father     Heart Attack Maternal Grandfather      Social History     Tobacco Use    Smoking status: Never    Smokeless tobacco: Never   Vaping Use    Vaping Use: Never used   Substance Use Topics    Alcohol use: No    Drug use: No     Allergies   Allergen Reactions    Bee Pollen Anaphylaxis    Tetracyclines & Related Hives     Current Outpatient Medications on File Prior to Encounter   Medication Sig Dispense Refill    acyclovir (ZOVIRAX) 400 MG tablet take 1 tablet by mouth once daily 90 tablet 3    butalbital-acetaminophen-caffeine (FIORICET, ESGIC) -40 MG per tablet Take 1 tablet by mouth 3 times daily as needed for Headaches or Migraine Indications: Cluster Headache 90 tablet 5    EPINEPHrine (EPIPEN) 0.3 MG/0.3ML SOAJ injection Use as directed for allergic reaction 1 each 5    hydrOXYzine HCl (ATARAX) 25 MG tablet take 1 tablet BID 60 tablet 5    pantoprazole (PROTONIX) 40 MG tablet Take 1 tablet by mouth every morning (before breakfast) 90 tablet 3    aspirin-acetaminophen-caffeine (EXCEDRIN MIGRAINE) 250-250-65 MG per tablet Take 1 tablet by mouth as needed for Headaches 300 tablet 3     No current facility-administered medications on file prior to encounter.        REVIEW OF SYSTEMS See HPI    Objective:    BP (!) 178/82   Pulse 77   Temp 97.2 °F (36.2 °C) (Temporal)   Resp 18   Ht 5' 8\" (1.727 m)   Wt 171 lb (77.6 kg)   BMI 26.00 kg/m²   Wt Readings from Last 3 Encounters:   12/28/22 171 lb (77.6 kg)   11/29/22 171 lb 12.8 oz (77.9 kg)   11/23/22 165 lb (74.8 kg)     PHYSICAL EXAM  CONSTITUTIONAL:   Awake, alert, cooperative   EYES:  lids and lashes normal   ENT: external ears and nose without lesions   NECK:  supple, symmetrical, trachea midline   SKIN:  Open wound Present    Assessment:     Problem List Items Addressed This Visit       Venous stasis ulcer of left ankle with fat layer exposed with varicose veins (HCC) - Primary (Chronic)    Relevant Medications    oxyCODONE-acetaminophen (PERCOCET) 5-325 MG per tablet    Other Relevant Orders    Initiate Outpatient Wound Care Protocol    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) (Chronic)    Relevant Orders    Initiate Outpatient Wound Care Protocol       Pre Debridement Measurements:  Are located in the [facility]  Documentation Flow Sheet  Post Debridement Measurements:  Wound/Ulcer Descriptions are Pre Debridement except measurements:     Wound 08/10/16 Other (Comment) Buttocks Left;Distal #2 acq 8/4/16 (Active)   Number of days: 8091       Wound 08/31/16 Buttocks Left;Proximal #3 aquired 8-27-26 (Active)   Number of days: 2310       Wound 02/02/22 Ankle Left;Medial #1 (Active)   Wound Image   12/14/22 0742   Dressing Status New dressing applied 12/28/22 0828   Wound Cleansed Cleansed with saline 12/28/22 0828   Dressing/Treatment ABD; Alginate with Ag 12/28/22 0828   Offloading for Diabetic Foot Ulcers Offloading not required 12/21/22 0837   Wound Length (cm) 8.2 cm 12/28/22 0747   Wound Width (cm) 6.4 cm 12/28/22 0747   Wound Depth (cm) 0.1 cm 12/28/22 0747   Wound Surface Area (cm^2) 52.48 cm^2 12/28/22 0747   Change in Wound Size % (l*w) -93.94 12/28/22 0747   Wound Volume (cm^3) 5.248 cm^3 12/28/22 0747   Wound Healing % -94 12/28/22 0747   Post-Procedure Length (cm) 8.4 cm 12/28/22 0813   Post-Procedure Width (cm) 6.5 cm 12/28/22 0813   Post-Procedure Depth (cm) 0.1 cm 12/28/22 0813   Post-Procedure Surface Area (cm^2) 54.6 cm^2 12/28/22 0813   Post-Procedure Volume (cm^3) 5.46 cm^3 12/28/22 0813   Wound Assessment Pink/red;Fibrin;Pale granulation tissue 12/28/22 0747   Drainage Amount Large 12/28/22 0747   Drainage Description Serosanguinous;Brown;Yellow 12/28/22 0747   Odor None 12/28/22 0747   Melany-wound Assessment Fragile; Maceration 12/28/22 0747   Number of days: 329       Wound 03/16/22 Ankle Posterior; Left #2 (Active)   Wound Image   12/14/22 0742   Dressing Status New dressing applied 12/28/22 0828   Wound Cleansed Cleansed with saline 12/28/22 0828   Dressing/Treatment Alginate with Ag;ABD 12/28/22 0828   Offloading for Diabetic Foot Ulcers Offloading not required 12/21/22 0837   Wound Length (cm) 4.7 cm 12/28/22 0747   Wound Width (cm) 4 cm 12/28/22 0747   Wound Depth (cm) 0.2 cm 12/28/22 0747   Wound Surface Area (cm^2) 18.8 cm^2 12/28/22 0747   Change in Wound Size % (l*w) -652 12/28/22 0747   Wound Volume (cm^3) 3.76 cm^3 12/28/22 0747   Wound Healing % -1404 12/28/22 0747   Post-Procedure Length (cm) 5.3 cm 12/21/22 0818   Post-Procedure Width (cm) 3.7 cm 12/21/22 0818   Post-Procedure Depth (cm) 0.1 cm 12/21/22 0818   Post-Procedure Surface Area (cm^2) 19.61 cm^2 12/21/22 0818   Post-Procedure Volume (cm^3) 1.961 cm^3 12/21/22 0818   Wound Assessment Pink/red;Fibrin;Pale granulation tissue 12/28/22 0747   Drainage Amount Large 12/28/22 0747   Drainage Description Serosanguinous; Yellow;Brown 12/28/22 0747   Odor None 12/28/22 0747   Melany-wound Assessment Fragile; Maceration 12/28/22 0747   Number of days: 287         Procedure Note  Indications:  Based on my examination of this patient's wound(s)/ulcer(s) today, debridement is required to promote healing and evaluate the wound base. Performed by: Coleen Blanchard MD     Consent obtained:  Yes     Time out taken:  Yes     Pain Control: Anesthetic  Anesthetic: 4% Lidocaine Liquid Topical      Debridement:Excisional Debridement     Using curette the wound(s)/ulcer(s) was/were sharply debrided down through and including the removal of epidermis, dermis, and subcutaneous tissue. Devitalized Tissue Debrided:  fibrin, biofilm, slough, and exudate to stimulate bleeding to promote healing, post debridement good bleeding base and wound edges noted     Wound/Ulcer #:  1     Percent of Wound/Ulcer Debrided: 30%     Total Surface Area Debrided:  20 sq cm      Estimated Blood Loss:  Minimal  Hemostasis Achieved:  by pressure     Procedural Pain:  9  / 10   Post Procedural Pain:  8 / 10      Response to treatment:  With complaints of pain. Plan:   Treatment Note please see attached Discharge Instructions    Written patient dismissal instructions given to patient and signed by patient or POA.          Discharge Instructions         Visit Discharge/Physician Orders     Discharge condition: Stable     Assessment of pain at discharge: yes     Anesthetic used: 4% lidocaine solution(none 10-19-22)     Discharge to: Home     Left via:Private automobile     Accompanied by:self     ECF/HHA: n/a     Dressing Orders:LEFT MEDIAL and LATERAL LOWER LEG-Cleanse with normal saline, apply zinc to fiona wound, aquacel AG and drawtex, dressing, ABD pad , unna boot and coban. Change weekly. Treatment Orders: Eat a diet high in protein and vitamin C. Take a multiple vitamin daily unless contraindicated. To see Dr. Miriam Vitale as scheduled      Must wear slip on shoe to work due to wrap on leg! To have vein surgery-Dr. HERNANDEZ's office will call with date and instructions     [facility] followup visit : 1 week_____________________________  (Please note your next appointment above and if you are unable to keep, kindly give a 24 hour notice. Thank you.)     Physician signature:__________________________     If you experience any of the following, please call the [facility] during business hours:     * Increase in Pain  * Temperature over 101  * Increase in drainage from your wound  * Drainage with a foul odor  * Bleeding  * Increase in swelling  * Need for compression bandage changes due to slippage, breakthrough drainage. If you need medical attention outside of the business hours of the [facility] please contact your PCP or go to the nearest emergency room.    Electronically signed by Bradford Garcia MD

## 2022-12-28 NOTE — PLAN OF CARE
Problem: Pain  Goal: Verbalizes/displays adequate comfort level or baseline comfort level  Outcome: Progressing     Problem: Wound:  Goal: Will show signs of wound healing; wound closure and no evidence of infection  Description: Will show signs of wound healing; wound closure and no evidence of infection  Outcome: Progressing     Problem: Venous:  Goal: Signs of wound healing will improve  Description: Signs of wound healing will improve  Outcome: Progressing     Problem: Compression therapy:  Goal: Will be free from complications associated with compression therapy  Description: Will be free from complications associated with compression therapy  Outcome: Progressing

## 2022-12-29 ENCOUNTER — TELEPHONE (OUTPATIENT)
Dept: VASCULAR SURGERY | Age: 67
End: 2022-12-29

## 2022-12-29 NOTE — TELEPHONE ENCOUNTER
Scheduled radiofrequency ablation of (L) lesser saphenous vein with stab phlebectomies with Dr. Hart Homans 1/12/23, message left on pt's voicemail.

## 2023-01-04 ENCOUNTER — HOSPITAL ENCOUNTER (OUTPATIENT)
Dept: WOUND CARE | Age: 68
Discharge: HOME | End: 2023-01-04
Payer: MEDICARE

## 2023-01-04 ENCOUNTER — TELEPHONE (OUTPATIENT)
Dept: VASCULAR SURGERY | Age: 68
End: 2023-01-04

## 2023-01-04 VITALS
TEMPERATURE: 96.8 F | HEART RATE: 97 BPM | RESPIRATION RATE: 18 BRPM | SYSTOLIC BLOOD PRESSURE: 155 MMHG | DIASTOLIC BLOOD PRESSURE: 70 MMHG

## 2023-01-04 DIAGNOSIS — I83.023 VENOUS STASIS ULCER OF LEFT ANKLE WITH FAT LAYER EXPOSED WITH VARICOSE VEINS (HCC): ICD-10-CM

## 2023-01-04 DIAGNOSIS — I70.242 ATHEROSCLEROSIS OF NATIVE ARTERY OF LEFT LOWER EXTREMITY WITH ULCERATION OF CALF (HCC): Primary | ICD-10-CM

## 2023-01-04 DIAGNOSIS — L97.322 VENOUS STASIS ULCER OF LEFT ANKLE WITH FAT LAYER EXPOSED WITH VARICOSE VEINS (HCC): ICD-10-CM

## 2023-01-04 PROCEDURE — 11042 DBRDMT SUBQ TIS 1ST 20SQCM/<: CPT

## 2023-01-04 PROCEDURE — 11042 DBRDMT SUBQ TIS 1ST 20SQCM/<: CPT | Performed by: SURGERY

## 2023-01-04 RX ORDER — BACITRACIN ZINC AND POLYMYXIN B SULFATE 500; 1000 [USP'U]/G; [USP'U]/G
OINTMENT TOPICAL ONCE
OUTPATIENT
Start: 2023-01-04 | End: 2023-01-04

## 2023-01-04 RX ORDER — LIDOCAINE 50 MG/G
OINTMENT TOPICAL ONCE
OUTPATIENT
Start: 2023-01-04 | End: 2023-01-04

## 2023-01-04 RX ORDER — LIDOCAINE HYDROCHLORIDE 20 MG/ML
JELLY TOPICAL ONCE
OUTPATIENT
Start: 2023-01-04 | End: 2023-01-04

## 2023-01-04 RX ORDER — CLOBETASOL PROPIONATE 0.5 MG/G
OINTMENT TOPICAL ONCE
OUTPATIENT
Start: 2023-01-04 | End: 2023-01-04

## 2023-01-04 RX ORDER — BETAMETHASONE DIPROPIONATE 0.05 %
OINTMENT (GRAM) TOPICAL ONCE
OUTPATIENT
Start: 2023-01-04 | End: 2023-01-04

## 2023-01-04 RX ORDER — LIDOCAINE HYDROCHLORIDE 40 MG/ML
SOLUTION TOPICAL ONCE
OUTPATIENT
Start: 2023-01-04 | End: 2023-01-04

## 2023-01-04 RX ORDER — LIDOCAINE 40 MG/G
CREAM TOPICAL ONCE
OUTPATIENT
Start: 2023-01-04 | End: 2023-01-04

## 2023-01-04 RX ORDER — GINSENG 100 MG
CAPSULE ORAL ONCE
OUTPATIENT
Start: 2023-01-04 | End: 2023-01-04

## 2023-01-04 RX ORDER — GENTAMICIN SULFATE 1 MG/G
OINTMENT TOPICAL ONCE
OUTPATIENT
Start: 2023-01-04 | End: 2023-01-04

## 2023-01-04 RX ORDER — LIDOCAINE HYDROCHLORIDE 40 MG/ML
SOLUTION TOPICAL ONCE
Status: COMPLETED | OUTPATIENT
Start: 2023-01-04 | End: 2023-01-04

## 2023-01-04 RX ORDER — BACITRACIN, NEOMYCIN, POLYMYXIN B 400; 3.5; 5 [USP'U]/G; MG/G; [USP'U]/G
OINTMENT TOPICAL ONCE
OUTPATIENT
Start: 2023-01-04 | End: 2023-01-04

## 2023-01-04 RX ADMIN — LIDOCAINE HYDROCHLORIDE 5 ML: 40 SOLUTION TOPICAL at 07:44

## 2023-01-04 NOTE — PROGRESS NOTES
Wound Healing Center Followup Visit Note    Referring Physician : Darleene Goodpasture, MD  [address] [place] [address] RECORD NUMBER:  [id number]  AGE: 74 y.o. GENDER: female  : 1955  EPISODE DATE:  2023    Subjective:     Chief Complaint   Patient presents with    Wound Check      HISTORY of PRESENT ILLNESS HPI   Clarke Garcia is a 74 y.o. female who presents today in regards to follow up evaluation and treatment of wound/ulcer. That patient's past medical, family and social hx were reviewed and changes were made if present. History of Wound Context:  The patient has had a wound of her left ankle/calf which was first noted approximately 2021. This has been treated local wound care. On their initial visit to the wound healing center, 22,  the patient has noted that the wound has been improving. The patient has not had similar previous wounds in the past.      She started seeing Dr. Sue Rudolph in 2021 and than Dr. Alejandro Maxwell. She was started in [facility] ~ 2021. She has noticed some improvement since starting [facility] boot. She is currently following with Dr. Apolinar Klinefelter. Pt is not on abx at time of initial visit, but has been treated with previously by podiatry. She is not a DM. She is not a smoker. She denies hx of DVT, and per her report had recent us noting no evidence of dvt at [place] [facility]. She also had arterial studies done. I had previously seen her in the past in regards to left buttock thigh wound, which started as abscess. Pt works at [employer] in [place] and is on her feet all day.     22  Reflux study - if significant findings will schedule for fu  Continue compression therapy [facility] boot per podiatry with aquacell dressing  Elevation  She does not have significant arterial occlusive disease  22  Patient has asked to continuing following with me going forward because of our previous relationship  She is going to let Dr. Luciana De Souza office know  She tolerated unna boot and faiza - some improvement  216/22  Appearance improved, slightly larger  Consider drawtek next week but overall drainage seems reasonably managed as periwound appears ok  2/23/2022  The wound, has some exudate, no recent cultures were done, will do wound cultures today  3/2/22  Reflux study reviewed - no significant reflux  Will treat culture - augmentin 875 mg bid x 10 days - script sent  More drainage - stable size  3/9/22  Wound appearance improved, stable in size  drawtek  3/16/22  Wound slightly larger in size, new wound of ankle  Continue Drawtek, change to profore  Avoid shoes which will contact ankle area  3/23/22  Wounds stable  Overall appearance improved  Will have pt see ID re cultures - disc with Dr Yoni Mireles  3/30/22  Much improved appearance  On oral abx per ID - concerns re pt ability to work while on IV - will see how her wound progresses  4/6/22  Appearance improved but size not much different  Finished oral abx  Will re culture next week if no significant size change - possible advance skin therapy  4/20/2022  Ulcer on the medial aspect, fairly clean and granulating, ulcer of the lateral aspect, has some exudate, debrided  4/27/22  Wounds stable   Hypergranulation tissue removed  Culture done - possible graft in future  5/4/22  Wound stable  Disc culture with Dr Yoni Mireles who would like to start her on iv abx  5/11/22  Wound stable  Iv abx have not been started yet - spoke with Dr Yoni Mireles - spoke with pt she will call his office  She had spoke with [facility] but there were some issues  5/18/22  Calf stable, ankle improved  Iv abx to start this week after picc placement  On exam   L dp 2+, PT triphasic - with compression of dp - PT becomes weakly biphasic  Concern that PT though triphasic is not getting inline flow and as a result isn't healing in the calf distribution because of occlusive disease  We discussed angiogram - will schedule  I reviewed the procedure with the patient and family as available. I discussed the procedure, risks, benefits, complications, and alternatives of the procedure. They understand and consent.   All questions were answered  Script for percocet 5/325 mg #28 prn q6 hrs - given, oarrs run  5/24/22  Angio, L PT and peroneal plasty  5/25/22  On iv abx  Wound appearance better  R groin no hematoma, L DP 2+, PT triphasic   6/1/22  Wound size and appearance better  6/8/22  Wound size and appearance better  Discussed with Dr aYel Ross - he will stop abx  6/15/22  Wound size slightly improvement, appearance better  6/22/22  Wounds sizes smaller  6/29/22  Wounds stable   Hypergranulation tissue present throughout wound beds    Continue drawtex, foam, ABD and profore   7/6/22  Wounds slightly improved  7/13/22  Both wounds slightly larger  Hyperkeratotic tissue debrided back  7/14/22  Patient came in due to drainage through her wrap  Dressing noted to have large amounts of yellow/green drainage throughout  Cultures obtained    7/20/22  Culture reviewed large growth S aureus and Pseudomonas  Disc with Dr Hannon - will start clindamycin 600 mg tid for staph  He will see her in office in regards to IV for pseudomonas  Wounds stable in size  Change to Quickshift ag  7/27/22  Dr Yael Ross to see  Medial slightly larger, lateral slightly smaller  8/3/22  Dr Yael Ross saw her felt wound appearance better - will hold off on iv for now  Medial slightly improved, lateral stable  8/10/2022  Wounds stable  8/17/22  Wound stable, more pain with debridement  Discussed OR for debridement and possible advanced skin therapy - pt agreeable  8/24/22  Debridement, kerecise application  7/13/36  Wound appearance improved  Medial wound improved, lateral stable  9/7/22  Wounds are smaller  9/14/22  Wound stable  Enodfrom and drawtek  9/21/2022  Wound debrided, stable according to the measurements  9/28/22  Wound larger  Culture done  Discussed OR  Aquacell ag change   10/5/22  Wound slightly larger  Percocet 5/325 mg #25 - script given, oarrs reviewed  Cx reviewed - will disc with Dr Berry Call - all light growth   Not interested in OR at this time  10/12/22  Wound stable  Hold on cx tx  10/19/22  Wound stable  Ok for surgical debridement will schedule  Declined debridement today  10/25/22  Irrigation and excisional debridement of left medial and lateral ankle wound, Application of 020 mcg micromatrix acel  Application of unna boot  Lateral 15.5 sq cm, Medial 40.5 sq cm  11/2/22  Appearance improved  Measuring larger  No debridement  11/9/22  Appearance improved  Medial 62 (57), Lateral 18 (37)  Aquacell ag and wraps  11/16/22  Both appearance much improved  Medial 58 (62) Lateral 18 (18)  Aquacell ag , dratwek over top due to increased drainage  Percocet 5/325 mg #28 oarrs reviewed  11/23/22  Stable in size and appearance  New venous reflux study - Tuesday 11/29 at 1230 at my office  Will give her dressings to replace wrap after it is cut off for study  Refusing debridement due to pain  12/7/22  Missed last week due to influenza  Improved size  Tolerated debridement a little better than usual allowing slough to be removed especially around edges  Had to be rescheduled for venous US when office US is working again  12/14/22  Medial calf slightly larger but appearance improved 54 sq cm  Lateral calf slightly larger - more fibrinous exudate - 17 sq cm  Pt would only allow lateral calf debridement  Us 12/20 rescheduled  12/21/22  Minimal debridement allowed to both wounds  Venous reflux study reviewed - will discuss with Dr. Trav Sethi  Medial and lateral calf stable in size with fibrinous exudate  12/28/22  Debrided medial  Medial 52 increased (48)  Lateral 18 decreased (19)  Plan for lesser saphenous vein ablation  Oarrs reviewed - Percocet 5/325 mg #28  1/4/23  Slightly larger  Short term disability paperwork filled out   Surgery 1/12/23 - off till 2/6/23    Wound/Ulcer Pain Timing/Severity: constant, moderate  Quality of pain: aching, throbbing, tender  Severity:  8/ 10   Modifying Factors: Pain worsens with dressing changes, debridement  Associated Signs/Symptoms: edema, drainage and pain    Ulcer Identification:  Ulcer Type: venous  Contributing Factors: edema and venous stasis    Diabetic/Pressure/Non Pressure Ulcers only:  Ulcer: Non-Pressure ulcer, fat layer exposed        PAST MEDICAL HISTORY      Diagnosis Date    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 ([address] 5/18/2022    Dizziness - light-headed     GERD (gastroesophageal reflux disease)     Herpes dermatitis 4/27/2017    Insomnia secondary to anxiety 4/6/2018    Lightheadedness     Migraine     Skin ulcer of buttock with fat layer exposed ([address] 7/20/2016    Venous stasis ulcer of left ankle with fat layer exposed with varicose veins ([address] 2/2/2022     Past Surgical History:   Procedure Laterality Date    CHOLECYSTECTOMY, LAPAROSCOPIC  04/08/2014    LEG SURGERY Left 8/24/2022    LEFT LOWER EXTREMITY DEBRIDMENT WITH POSSIBLE ADVANCED SKIN THERAPY, POSSIBLE Abimbola Houston performed by Skylar Rao MD at [address] [place] [address] Left 10/25/2022    DEBRIDEMENT LEFT LEG, POSSIBLE ADVANCED SKIN THERAPY with acell micromatrix application , Abimbola Houston performed by Skylar Rao MD at [address] ENDOSCOPY  12/13/2013    mild gastritis and small hiatal hernia, Dr Siobhan Roth, [address]  2015    GERD, Dr. Chip Scanlon, office     Family History   Problem Relation Age of Onset    Diabetes Mother     Hypertension Mother     Heart Disease Father     Heart Attack Father     Heart Surgery Father         angioplasty    Stroke Father     High Cholesterol Father     Heart Attack Maternal Grandfather      Social History     Tobacco Use    Smoking status: Never    Smokeless tobacco: Never   Vaping Use    Vaping Use: Never used   Substance Use Topics    Alcohol use: No    Drug use: No     Allergies Allergen Reactions    Bee Pollen Anaphylaxis    Tetracyclines & Related Hives     Current Outpatient Medications on File Prior to Encounter   Medication Sig Dispense Refill    oxyCODONE-acetaminophen (PERCOCET) 5-325 MG per tablet Take 1 tablet by mouth every 6 hours as needed for Pain for up to 7 days. Intended supply: 7 days. Take lowest dose possible to manage pain Max Daily Amount: 4 tablets 28 tablet 0    acyclovir (ZOVIRAX) 400 MG tablet take 1 tablet by mouth once daily 90 tablet 3    butalbital-acetaminophen-caffeine (FIORICET, ESGIC) -40 MG per tablet Take 1 tablet by mouth 3 times daily as needed for Headaches or Migraine Indications: Cluster Headache 90 tablet 5    EPINEPHrine (EPIPEN) 0.3 MG/0.3ML SOAJ injection Use as directed for allergic reaction 1 each 5    hydrOXYzine HCl (ATARAX) 25 MG tablet take 1 tablet BID 60 tablet 5    pantoprazole (PROTONIX) 40 MG tablet Take 1 tablet by mouth every morning (before breakfast) 90 tablet 3    aspirin-acetaminophen-caffeine (EXCEDRIN MIGRAINE) 250-250-65 MG per tablet Take 1 tablet by mouth as needed for Headaches 300 tablet 3     No current facility-administered medications on file prior to encounter.        REVIEW OF SYSTEMS See HPI    Objective:    BP (!) 155/70   Pulse 97   Temp 96.8 °F (36 °C) (Temporal)   Resp 18   Wt Readings from Last 3 Encounters:   12/28/22 171 lb (77.6 kg)   11/29/22 171 lb 12.8 oz (77.9 kg)   11/23/22 165 lb (74.8 kg)     PHYSICAL EXAM  CONSTITUTIONAL:   Awake, alert, cooperative   EYES:  lids and lashes normal   ENT: external ears and nose without lesions   NECK:  supple, symmetrical, trachea midline   SKIN:  Open wound Present    Assessment:     Problem List Items Addressed This Visit       Venous stasis ulcer of left ankle with fat layer exposed with varicose veins (HCC) (Chronic)    Relevant Orders    Initiate Outpatient Wound Care Protocol    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 ([facility] - Primary (Chronic)    Relevant Orders    Initiate Outpatient Wound Care Protocol       Pre Debridement Measurements:  Are located in the [place]  Documentation Flow Sheet  Post Debridement Measurements:  Wound/Ulcer Descriptions are Pre Debridement except measurements:     Wound 08/10/16 Other (Comment) Buttocks Left;Distal #2 acq 8/4/16 (Active)   Number of days: 2337       Wound 08/31/16 Buttocks Left;Proximal #3 aquired 8-27-26 (Active)   Number of days: 2317       Wound 02/02/22 Ankle Left;Medial #1 (Active)   Wound Image   12/14/22 0742   Dressing Status New dressing applied 12/28/22 0828   Wound Cleansed Cleansed with saline 12/28/22 0828   Dressing/Treatment ABD; Alginate with Ag 12/28/22 0828   Offloading for Diabetic Foot Ulcers Offloading not required 12/21/22 0837   Wound Length (cm) 9.1 cm 01/04/23 0744   Wound Width (cm) 6.7 cm 01/04/23 0744   Wound Depth (cm) 0.1 cm 01/04/23 0744   Wound Surface Area (cm^2) 60.97 cm^2 01/04/23 0744   Change in Wound Size % (l*w) -125.31 01/04/23 0744   Wound Volume (cm^3) 6.097 cm^3 01/04/23 0744   Wound Healing % -125 01/04/23 0744   Post-Procedure Length (cm) 9.32 cm 01/04/23 0821   Post-Procedure Width (cm) 6.8 cm 01/04/23 0821   Post-Procedure Depth (cm) 0.1 cm 01/04/23 0821   Post-Procedure Surface Area (cm^2) 63.376 cm^2 01/04/23 0821   Post-Procedure Volume (cm^3) 6.3376 cm^3 01/04/23 0821   Wound Assessment Pink/red;Fibrin;Pale granulation tissue 01/04/23 0744   Drainage Amount Large 01/04/23 0744   Drainage Description Yellow 01/04/23 0744   Odor None 01/04/23 0744   Melany-wound Assessment Fragile; Maceration 01/04/23 0744   Number of days: 336       Wound 03/16/22 Ankle Posterior; Left #2 (Active)   Wound Image   12/14/22 0742   Dressing Status New dressing applied 12/28/22 0828   Wound Cleansed Cleansed with saline 12/28/22 0828   Dressing/Treatment Alginate with Ag;ABD 12/28/22 0828   Offloading for Diabetic Foot Ulcers Offloading not required 12/21/22 4456 Wound Length (cm) 5.2 cm 01/04/23 0744   Wound Width (cm) 4.4 cm 01/04/23 0744   Wound Depth (cm) 0.2 cm 01/04/23 0744   Wound Surface Area (cm^2) 22.88 cm^2 01/04/23 0744   Change in Wound Size % (l*w) -815.2 01/04/23 0744   Wound Volume (cm^3) 4.576 cm^3 01/04/23 0744   Wound Healing % -1730 01/04/23 0744   Post-Procedure Length (cm) 5.3 cm 12/21/22 0818   Post-Procedure Width (cm) 3.7 cm 12/21/22 0818   Post-Procedure Depth (cm) 0.1 cm 12/21/22 0818   Post-Procedure Surface Area (cm^2) 19.61 cm^2 12/21/22 0818   Post-Procedure Volume (cm^3) 1.961 cm^3 12/21/22 0818   Wound Assessment Pink/red;Fibrin 01/04/23 0744   Drainage Amount Large 01/04/23 0744   Drainage Description Yellow 01/04/23 0744   Odor None 01/04/23 0744   Melany-wound Assessment Maceration 01/04/23 0744   Number of days: 294         Procedure Note  Indications:  Based on my examination of this patient's wound(s)/ulcer(s) today, debridement is required to promote healing and evaluate the wound base. Performed by: Ubaldo Strauss MD     Consent obtained:  Yes     Time out taken:  Yes     Pain Control: Anesthetic  Anesthetic: 4% Lidocaine Liquid Topical      Debridement:Excisional Debridement     Using curette the wound(s)/ulcer(s) was/were sharply debrided down through and including the removal of epidermis, dermis, and subcutaneous tissue. Devitalized Tissue Debrided:  fibrin, biofilm, slough, and exudate to stimulate bleeding to promote healing, post debridement good bleeding base and wound edges noted     Wound/Ulcer #:  1     Percent of Wound/Ulcer Debrided: 30%     Total Surface Area Debrided:  20 sq cm      Estimated Blood Loss:  Minimal  Hemostasis Achieved:  by pressure     Procedural Pain:  9  / 10   Post Procedural Pain:  8 / 10      Response to treatment:  With complaints of pain.       Plan:   Treatment Note please see attached Discharge Instructions    Written patient dismissal instructions given to patient and signed by patient or POA. Discharge Instructions         Visit Discharge/Physician Orders     Discharge condition: Stable     Assessment of pain at discharge: yes     Anesthetic used: 4% lidocaine solution     Discharge to: Home     Left via:Private automobile     Accompanied by:self     ECF/HHA: n/a     Dressing Orders:LEFT MEDIAL and LATERAL LOWER LEG-Cleanse with normal saline, apply zinc to fiona wound, aquacel AG and drawtex, dressing, ABD pad , unna boot and coban. Change weekly. Treatment Orders: Eat a diet high in protein and vitamin C. Take a multiple vitamin daily unless contraindicated. To see Dr. Dre Dior as scheduled      Must wear slip on shoe to work due to wrap on leg! To have vein surgery 1-12-23     [address] [place] [address],[address] followup visit : 2 weeks_D/T surgery____________________________  (Please note your next appointment above and if you are unable to keep, kindly give a 24 hour notice. Thank you.)     Physician signature:__________________________     If you experience any of the following, please call the [facility] during business hours:     * Increase in Pain  * Temperature over 101  * Increase in drainage from your wound  * Drainage with a foul odor  * Bleeding  * Increase in swelling  * Need for compression bandage changes due to slippage, breakthrough drainage. If you need medical attention outside of the business hours of the [facility] please contact your PCP or go to the nearest emergency room.    Electronically signed by Lyudmila Sotelo MD

## 2023-01-04 NOTE — TELEPHONE ENCOUNTER
Spoke to pt on her information regarding her surgery for 1-. Report at noon to [facility] ramp  Time may change, will notify her. PAT will call couple days before to go over medications and instructions.

## 2023-01-04 NOTE — PLAN OF CARE
Problem: Wound:  Goal: Will show signs of wound healing; wound closure and no evidence of infection  Description: Will show signs of wound healing; wound closure and no evidence of infection  Outcome: Progressing     Problem: Venous:  Goal: Signs of wound healing will improve  Description: Signs of wound healing will improve  Outcome: Adequate for Discharge     Problem: Compression therapy:  Goal: Will be free from complications associated with compression therapy  Description: Will be free from complications associated with compression therapy  Outcome: Progressing

## 2023-01-06 RX ORDER — OXYCODONE HYDROCHLORIDE AND ACETAMINOPHEN 5; 325 MG/1; MG/1
1 TABLET ORAL EVERY 6 HOURS PRN
COMMUNITY
End: 2023-01-25

## 2023-01-06 NOTE — PROGRESS NOTES
[address]   PRE-ADMISSION TESTING GENERAL INSTRUCTIONS  PAT Phone Number: [phone]      GENERAL INSTRUCTIONS:    [x] Antibacterial Soap Shower Night before and/or AM of surgery. [] CHG Wipes instruction sheet and wipes given. [] Hibiclens shower the night before and the morning of surgery.   -Do not use Hibiclens on your face or head. [x] Do not wear makeup, lotions, powders, deodorant. [x] Nothing by mouth after midnight; including gum, candy, mints, or water. [x] You may brush your teeth, gargle, but do NOT swallow water. [x] No tobacco products, illegal drugs, or alcohol within 24 hours of your surgery. [x] Jewelry or valuables should not be brought to the hospital. All body and/or tongue piercing's must be removed prior to arriving to hospital. No contact lens or hair pins. [x] Arrange transportation with a responsible adult  to and from the hospital. Arrange for someone to be with you for the remainder of the day and for 24 hours after your procedure due to having had anesthesia. -Who will be your  for transportation?___boyfriend_______________         -Who will be staying with you for 24 hrs after your procedure?___boyfriend_______________  [x] Bring insurance card and photo ID.  [] Bring copy of living will or healthcare power of  papers to be placed in your electronic record. [] Urine Pregnancy test will be preformed the day of surgery. A specimen sample may be brought from home. [] Transfusion Bracelet: Please bring with you to hospital, day of surgery. PARKING INSTRUCTIONS:   [x] ARRIVAL DATE & TIME: 1/12 at 1245  [x] Enter into the [employer] [facility] [employer]. Two people may accompany you. Masks are not required but are recommended. [x] Parking Lot \"I\" is where you will park. It is located on the corner of [facility] and [facility]. The entrance is on [facility].    Upon entering the parking lot, a voucher ticket will print    EDUCATION INSTRUCTIONS:        [] Knee or Hip replacement booklet & exercise pamphlets given. [] [address] placed in chart. [] Pre-admission Testing educational folder given  [] Incentive Spirometry,coughing & deep breathing exercises reviewed. [] Medication information sheet(s)   [] Fluoroscopy-Xray used in surgery reviewed with patient. Educational pamphlet placed in chart. [x] Pain: Post-op pain is normal and to be expected. You will be asked to rate your pain from 0-10. [] Joint camp offered. [] Joint replacement booklets given.  [] Spine Navigator to see in PAT. [] Other:___________________________    MEDICATION INSTRUCTIONS:    [x] Bring a complete list of your medications, please write the last time you took the medicine, give this list to the nurse in Pre-Op. [x] Take only the following medications the morning of surgery with 1-2 ounces of water: hydroxyzine, protonix  [x] Stop all herbal supplements and vitamins 5 days before surgery. Stop NSAIDS 7 days before surgery. [] DO NOT take any diabetic medicine the morning of surgery. Follow instructions for insulin the day before surgery. [] If you are diabetic and your blood sugar is low or you feel symptomatic, you may drink 1-2 ounces of apple juice or take a glucose tablet.            -The morning of your procedure, you may call the pre-op area if you have concerns about your blood sugar [phone]. [] Use your inhalers the morning of surgery. Bring your emergency inhaler with you day of surgery. [x] Follow physician instructions regarding any blood thinners you may be taking. WHAT TO EXPECT:    [x] The day of surgery you will be greeted and checked in by the [employer].  In addition, you will be registered in the [place] by a Patient Access Representative. Please bring your photo ID and insurance card.  A nurse will greet you in accordance to the time you are needed in the pre-op area to prepare you for surgery. Please do not be discouraged if you are not greeted in the order you arrive as there are many variables that are involved in patient preparation. Your patience is greatly appreciated as you wait for your nurse. Please bring in items such as: books, magazines, newspapers, electronics, or any other items  to occupy your time in the waiting area. [x]  Delays may occur with surgery and staff will make a sincere effort to keep you informed of delays. If any delays occur with your procedure, we apologize ahead of time for your inconvenience as we recognize the value of your time.

## 2023-01-11 ENCOUNTER — ANESTHESIA EVENT (OUTPATIENT)
Dept: OPERATING ROOM | Age: 68
End: 2023-01-11
Payer: MEDICARE

## 2023-01-11 NOTE — PROGRESS NOTES
Patient notified of time change for surgery scheduled on 1/12. Scheduled at 9:54 am.  Arrival time 7:54 am.  Patient verbalized understanding.

## 2023-01-12 ENCOUNTER — TELEPHONE (OUTPATIENT)
Dept: VASCULAR SURGERY | Age: 68
End: 2023-01-12

## 2023-01-12 ENCOUNTER — ANESTHESIA (OUTPATIENT)
Dept: OPERATING ROOM | Age: 68
End: 2023-01-12
Payer: MEDICARE

## 2023-01-12 ENCOUNTER — APPOINTMENT (OUTPATIENT)
Dept: ULTRASOUND IMAGING | Age: 68
End: 2023-01-12
Attending: SURGERY
Payer: MEDICARE

## 2023-01-12 ENCOUNTER — HOSPITAL ENCOUNTER (OUTPATIENT)
Age: 68
Setting detail: OUTPATIENT SURGERY
Discharge: HOME | End: 2023-01-12
Attending: SURGERY | Admitting: SURGERY
Payer: MEDICARE

## 2023-01-12 VITALS
HEART RATE: 79 BPM | DIASTOLIC BLOOD PRESSURE: 61 MMHG | OXYGEN SATURATION: 94 % | SYSTOLIC BLOOD PRESSURE: 127 MMHG | TEMPERATURE: 97.8 F | BODY MASS INDEX: 25.01 KG/M2 | HEIGHT: 68 IN | WEIGHT: 165 LBS | RESPIRATION RATE: 17 BRPM

## 2023-01-12 DIAGNOSIS — R52 PAIN: ICD-10-CM

## 2023-01-12 DIAGNOSIS — L97.329 VARICOSE VEINS OF LEFT LOWER EXTREMITY WITH ULCER OF ANKLE (HCC): ICD-10-CM

## 2023-01-12 DIAGNOSIS — M79.89 LEG SWELLING: Primary | ICD-10-CM

## 2023-01-12 DIAGNOSIS — I83.023 VARICOSE VEINS OF LEFT LOWER EXTREMITY WITH ULCER OF ANKLE (HCC): ICD-10-CM

## 2023-01-12 PROBLEM — L97.322 VARICOSE VEINS OF LEFT LOWER EXTREMITY WITH ULCER OF ANKLE WITH FAT LAYER EXPOSED (HCC): Status: ACTIVE | Noted: 2022-02-02

## 2023-01-12 LAB
ABO/RH: NORMAL
ABO/RH: NORMAL
ANION GAP SERPL CALCULATED.3IONS-SCNC: 10 MMOL/L (ref 7–16)
ANTIBODY SCREEN: NORMAL
BUN BLDV-MCNC: 21 MG/DL (ref 6–23)
CALCIUM SERPL-MCNC: 9.3 MG/DL (ref 8.6–10.2)
CHLORIDE BLD-SCNC: 106 MMOL/L (ref 98–107)
CO2: 23 MMOL/L (ref 22–29)
CREAT SERPL-MCNC: 0.7 MG/DL (ref 0.5–1)
GFR SERPL CREATININE-BSD FRML MDRD: >60 ML/MIN/1.73
GLUCOSE BLD-MCNC: 91 MG/DL (ref 74–99)
HCT VFR BLD CALC: 26.8 % (ref 34–48)
HEMOGLOBIN: 8 G/DL (ref 11.5–15.5)
INR BLD: 1.1
MCH RBC QN AUTO: 21 PG (ref 26–35)
MCHC RBC AUTO-ENTMCNC: 29.9 % (ref 32–34.5)
MCV RBC AUTO: 70.3 FL (ref 80–99.9)
PDW BLD-RTO: 19.6 FL (ref 11.5–15)
PLATELET # BLD: 417 E9/L (ref 130–450)
PMV BLD AUTO: 9.9 FL (ref 7–12)
POTASSIUM REFLEX MAGNESIUM: 4.1 MMOL/L (ref 3.5–5)
PROTHROMBIN TIME: 11.8 SEC (ref 9.3–12.4)
RBC # BLD: 3.81 E12/L (ref 3.5–5.5)
SODIUM BLD-SCNC: 139 MMOL/L (ref 132–146)
WBC # BLD: 11.4 E9/L (ref 4.5–11.5)

## 2023-01-12 PROCEDURE — 6360000002 HC RX W HCPCS: Performed by: PHYSICIAN ASSISTANT

## 2023-01-12 PROCEDURE — 86901 BLOOD TYPING SEROLOGIC RH(D): CPT

## 2023-01-12 PROCEDURE — C1888 ENDOVAS NON-CARDIAC ABL CATH: HCPCS | Performed by: SURGERY

## 2023-01-12 PROCEDURE — 6360000002 HC RX W HCPCS: Performed by: NURSE ANESTHETIST, CERTIFIED REGISTERED

## 2023-01-12 PROCEDURE — 80048 BASIC METABOLIC PNL TOTAL CA: CPT

## 2023-01-12 PROCEDURE — 2500000003 HC RX 250 WO HCPCS: Performed by: SURGERY

## 2023-01-12 PROCEDURE — A4217 STERILE WATER/SALINE, 500 ML: HCPCS | Performed by: SURGERY

## 2023-01-12 PROCEDURE — 86850 RBC ANTIBODY SCREEN: CPT

## 2023-01-12 PROCEDURE — 11042 DBRDMT SUBQ TIS 1ST 20SQCM/<: CPT | Performed by: SURGERY

## 2023-01-12 PROCEDURE — 36475 ENDOVENOUS RF 1ST VEIN: CPT

## 2023-01-12 PROCEDURE — 7100000011 HC PHASE II RECOVERY - ADDTL 15 MIN: Performed by: SURGERY

## 2023-01-12 PROCEDURE — 3700000001 HC ADD 15 MINUTES (ANESTHESIA): Performed by: SURGERY

## 2023-01-12 PROCEDURE — C1894 INTRO/SHEATH, NON-LASER: HCPCS | Performed by: SURGERY

## 2023-01-12 PROCEDURE — 2580000003 HC RX 258: Performed by: PHYSICIAN ASSISTANT

## 2023-01-12 PROCEDURE — 3600000003 HC SURGERY LEVEL 3 BASE: Performed by: SURGERY

## 2023-01-12 PROCEDURE — 6370000000 HC RX 637 (ALT 250 FOR IP): Performed by: SURGERY

## 2023-01-12 PROCEDURE — 85027 COMPLETE CBC AUTOMATED: CPT

## 2023-01-12 PROCEDURE — 3600000013 HC SURGERY LEVEL 3 ADDTL 15MIN: Performed by: SURGERY

## 2023-01-12 PROCEDURE — 3700000000 HC ANESTHESIA ATTENDED CARE: Performed by: SURGERY

## 2023-01-12 PROCEDURE — 6360000002 HC RX W HCPCS: Performed by: SURGERY

## 2023-01-12 PROCEDURE — 36415 COLL VENOUS BLD VENIPUNCTURE: CPT

## 2023-01-12 PROCEDURE — 36475 ENDOVENOUS RF 1ST VEIN: CPT | Performed by: SURGERY

## 2023-01-12 PROCEDURE — 85610 PROTHROMBIN TIME: CPT

## 2023-01-12 PROCEDURE — 88304 TISSUE EXAM BY PATHOLOGIST: CPT

## 2023-01-12 PROCEDURE — 76998 US GUIDE INTRAOP: CPT

## 2023-01-12 PROCEDURE — 11045 DBRDMT SUBQ TISS EACH ADDL: CPT | Performed by: SURGERY

## 2023-01-12 PROCEDURE — 2580000003 HC RX 258: Performed by: SURGERY

## 2023-01-12 PROCEDURE — 2709999900 HC NON-CHARGEABLE SUPPLY: Performed by: SURGERY

## 2023-01-12 PROCEDURE — 86900 BLOOD TYPING SEROLOGIC ABO: CPT

## 2023-01-12 PROCEDURE — 2500000003 HC RX 250 WO HCPCS: Performed by: NURSE ANESTHETIST, CERTIFIED REGISTERED

## 2023-01-12 PROCEDURE — 37799 UNLISTED PX VASCULAR SURGERY: CPT | Performed by: SURGERY

## 2023-01-12 PROCEDURE — 7100000010 HC PHASE II RECOVERY - FIRST 15 MIN: Performed by: SURGERY

## 2023-01-12 PROCEDURE — 6370000000 HC RX 637 (ALT 250 FOR IP)

## 2023-01-12 RX ORDER — SODIUM CHLORIDE 0.9 % (FLUSH) 0.9 %
5-40 SYRINGE (ML) INJECTION PRN
Status: DISCONTINUED | OUTPATIENT
Start: 2023-01-12 | End: 2023-01-12 | Stop reason: HOSPADM

## 2023-01-12 RX ORDER — OXYCODONE HYDROCHLORIDE AND ACETAMINOPHEN 5; 325 MG/1; MG/1
1 TABLET ORAL
Status: COMPLETED | OUTPATIENT
Start: 2023-01-12 | End: 2023-01-12

## 2023-01-12 RX ORDER — SODIUM CHLORIDE 0.9 % (FLUSH) 0.9 %
5-40 SYRINGE (ML) INJECTION PRN
Status: CANCELLED | OUTPATIENT
Start: 2023-01-12

## 2023-01-12 RX ORDER — LIDOCAINE HYDROCHLORIDE 20 MG/ML
INJECTION, SOLUTION INTRAVENOUS PRN
Status: DISCONTINUED | OUTPATIENT
Start: 2023-01-12 | End: 2023-01-12 | Stop reason: SDUPTHER

## 2023-01-12 RX ORDER — DEXAMETHASONE SODIUM PHOSPHATE 10 MG/ML
INJECTION INTRAMUSCULAR; INTRAVENOUS PRN
Status: DISCONTINUED | OUTPATIENT
Start: 2023-01-12 | End: 2023-01-12 | Stop reason: SDUPTHER

## 2023-01-12 RX ORDER — GLYCOPYRROLATE 0.2 MG/ML
INJECTION INTRAMUSCULAR; INTRAVENOUS PRN
Status: DISCONTINUED | OUTPATIENT
Start: 2023-01-12 | End: 2023-01-12 | Stop reason: SDUPTHER

## 2023-01-12 RX ORDER — SODIUM CHLORIDE 0.9 % (FLUSH) 0.9 %
5-40 SYRINGE (ML) INJECTION EVERY 12 HOURS SCHEDULED
Status: DISCONTINUED | OUTPATIENT
Start: 2023-01-12 | End: 2023-01-12 | Stop reason: HOSPADM

## 2023-01-12 RX ORDER — ONDANSETRON 2 MG/ML
4 INJECTION INTRAMUSCULAR; INTRAVENOUS
Status: CANCELLED | OUTPATIENT
Start: 2023-01-12 | End: 2023-01-13

## 2023-01-12 RX ORDER — FENTANYL CITRATE 50 UG/ML
INJECTION, SOLUTION INTRAMUSCULAR; INTRAVENOUS PRN
Status: DISCONTINUED | OUTPATIENT
Start: 2023-01-12 | End: 2023-01-12 | Stop reason: SDUPTHER

## 2023-01-12 RX ORDER — KETOROLAC TROMETHAMINE 30 MG/ML
INJECTION, SOLUTION INTRAMUSCULAR; INTRAVENOUS PRN
Status: DISCONTINUED | OUTPATIENT
Start: 2023-01-12 | End: 2023-01-12 | Stop reason: SDUPTHER

## 2023-01-12 RX ORDER — HYDRALAZINE HYDROCHLORIDE 20 MG/ML
INJECTION INTRAMUSCULAR; INTRAVENOUS PRN
Status: DISCONTINUED | OUTPATIENT
Start: 2023-01-12 | End: 2023-01-12 | Stop reason: SDUPTHER

## 2023-01-12 RX ORDER — SODIUM CHLORIDE 9 MG/ML
INJECTION, SOLUTION INTRAVENOUS PRN
Status: CANCELLED | OUTPATIENT
Start: 2023-01-12

## 2023-01-12 RX ORDER — KETAMINE HCL IN NACL, ISO-OSM 100MG/10ML
SYRINGE (ML) INJECTION PRN
Status: DISCONTINUED | OUTPATIENT
Start: 2023-01-12 | End: 2023-01-12 | Stop reason: SDUPTHER

## 2023-01-12 RX ORDER — LABETALOL HYDROCHLORIDE 5 MG/ML
INJECTION, SOLUTION INTRAVENOUS PRN
Status: DISCONTINUED | OUTPATIENT
Start: 2023-01-12 | End: 2023-01-12 | Stop reason: SDUPTHER

## 2023-01-12 RX ORDER — OSTOMY ADHESIVE
STRIP MISCELLANEOUS PRN
Status: DISCONTINUED | OUTPATIENT
Start: 2023-01-12 | End: 2023-01-12 | Stop reason: ALTCHOICE

## 2023-01-12 RX ORDER — MEPERIDINE HYDROCHLORIDE 25 MG/ML
12.5 INJECTION INTRAMUSCULAR; INTRAVENOUS; SUBCUTANEOUS
Status: CANCELLED | OUTPATIENT
Start: 2023-01-12

## 2023-01-12 RX ORDER — PROPOFOL 10 MG/ML
INJECTION, EMULSION INTRAVENOUS CONTINUOUS PRN
Status: DISCONTINUED | OUTPATIENT
Start: 2023-01-12 | End: 2023-01-12 | Stop reason: SDUPTHER

## 2023-01-12 RX ORDER — SODIUM CHLORIDE 9 MG/ML
INJECTION, SOLUTION INTRAVENOUS PRN
Status: DISCONTINUED | OUTPATIENT
Start: 2023-01-12 | End: 2023-01-12 | Stop reason: HOSPADM

## 2023-01-12 RX ORDER — SODIUM CHLORIDE 0.9 % (FLUSH) 0.9 %
5-40 SYRINGE (ML) INJECTION EVERY 12 HOURS SCHEDULED
Status: CANCELLED | OUTPATIENT
Start: 2023-01-12

## 2023-01-12 RX ORDER — SODIUM CHLORIDE 9 MG/ML
INJECTION, SOLUTION INTRAVENOUS CONTINUOUS
Status: DISCONTINUED | OUTPATIENT
Start: 2023-01-12 | End: 2023-01-12 | Stop reason: HOSPADM

## 2023-01-12 RX ORDER — MIDAZOLAM HYDROCHLORIDE 1 MG/ML
INJECTION INTRAMUSCULAR; INTRAVENOUS PRN
Status: DISCONTINUED | OUTPATIENT
Start: 2023-01-12 | End: 2023-01-12 | Stop reason: SDUPTHER

## 2023-01-12 RX ORDER — ONDANSETRON 2 MG/ML
INJECTION INTRAMUSCULAR; INTRAVENOUS PRN
Status: DISCONTINUED | OUTPATIENT
Start: 2023-01-12 | End: 2023-01-12 | Stop reason: SDUPTHER

## 2023-01-12 RX ADMIN — LABETALOL HYDROCHLORIDE 5 MG: 5 INJECTION INTRAVENOUS at 10:59

## 2023-01-12 RX ADMIN — Medication 10 MG: at 10:51

## 2023-01-12 RX ADMIN — PROPOFOL 150 MCG/KG/MIN: 10 INJECTION, EMULSION INTRAVENOUS at 10:25

## 2023-01-12 RX ADMIN — SODIUM CHLORIDE: 9 INJECTION, SOLUTION INTRAVENOUS at 08:41

## 2023-01-12 RX ADMIN — HYDRALAZINE HYDROCHLORIDE 5 MG: 20 INJECTION INTRAMUSCULAR; INTRAVENOUS at 11:33

## 2023-01-12 RX ADMIN — LABETALOL HYDROCHLORIDE 10 MG: 5 INJECTION INTRAVENOUS at 11:17

## 2023-01-12 RX ADMIN — FENTANYL CITRATE 50 MCG: 50 INJECTION, SOLUTION INTRAMUSCULAR; INTRAVENOUS at 10:42

## 2023-01-12 RX ADMIN — Medication 10 MG: at 11:09

## 2023-01-12 RX ADMIN — GLYCOPYRROLATE 0.2 MG: 0.2 INJECTION INTRAMUSCULAR; INTRAVENOUS at 10:54

## 2023-01-12 RX ADMIN — MIDAZOLAM 2 MG: 1 INJECTION INTRAMUSCULAR; INTRAVENOUS at 10:15

## 2023-01-12 RX ADMIN — HYDRALAZINE HYDROCHLORIDE 5 MG: 20 INJECTION INTRAMUSCULAR; INTRAVENOUS at 11:23

## 2023-01-12 RX ADMIN — CEFAZOLIN 2000 MG: 2 INJECTION, POWDER, FOR SOLUTION INTRAMUSCULAR; INTRAVENOUS at 10:42

## 2023-01-12 RX ADMIN — OXYCODONE AND ACETAMINOPHEN 1 TABLET: 5; 325 TABLET ORAL at 12:33

## 2023-01-12 RX ADMIN — MIDAZOLAM 2 MG: 1 INJECTION INTRAMUSCULAR; INTRAVENOUS at 10:23

## 2023-01-12 RX ADMIN — FENTANYL CITRATE 50 MCG: 50 INJECTION, SOLUTION INTRAMUSCULAR; INTRAVENOUS at 10:25

## 2023-01-12 RX ADMIN — DEXAMETHASONE SODIUM PHOSPHATE 10 MG: 10 INJECTION INTRAMUSCULAR; INTRAVENOUS at 10:42

## 2023-01-12 RX ADMIN — KETOROLAC TROMETHAMINE 30 MG: 30 INJECTION, SOLUTION INTRAMUSCULAR; INTRAVENOUS at 11:46

## 2023-01-12 RX ADMIN — LIDOCAINE HYDROCHLORIDE 40 MG: 20 INJECTION, SOLUTION INTRAVENOUS at 10:25

## 2023-01-12 RX ADMIN — ONDANSETRON 4 MG: 2 INJECTION INTRAMUSCULAR; INTRAVENOUS at 10:36

## 2023-01-12 ASSESSMENT — PAIN DESCRIPTION - DESCRIPTORS
DESCRIPTORS: ACHING;BURNING;CRAMPING
DESCRIPTORS: ACHING;BURNING;CRAMPING

## 2023-01-12 ASSESSMENT — PAIN DESCRIPTION - ONSET: ONSET: ON-GOING

## 2023-01-12 ASSESSMENT — PAIN DESCRIPTION - FREQUENCY: FREQUENCY: CONTINUOUS

## 2023-01-12 ASSESSMENT — PAIN SCALES - GENERAL
PAINLEVEL_OUTOF10: 4
PAINLEVEL_OUTOF10: 4

## 2023-01-12 ASSESSMENT — PAIN DESCRIPTION - LOCATION
LOCATION: LEG
LOCATION: LEG

## 2023-01-12 ASSESSMENT — PAIN DESCRIPTION - PAIN TYPE
TYPE: SURGICAL PAIN
TYPE: SURGICAL PAIN

## 2023-01-12 ASSESSMENT — PAIN DESCRIPTION - ORIENTATION
ORIENTATION: LEFT
ORIENTATION: LEFT

## 2023-01-12 NOTE — ANESTHESIA PRE PROCEDURE
Department of Anesthesiology  Preprocedure Note       Name:  Licha Pereira   Age:  74 y.o.  :  1955                                          MRN:  [id number]         Date:  2023      Surgeon: Veronica Hill):  Boo Haines MD    Procedure: Procedure(s):  RADIOFREQUENCY ABLATION LEFT LESSER SAPHENOUS VEIN WITH STAB PHLEBECTOMIES    Medications prior to admission:   Prior to Admission medications    Medication Sig Start Date End Date Taking? Authorizing Provider   oxyCODONE-acetaminophen (PERCOCET) 5-325 MG per tablet Take 1 tablet by mouth every 6 hours as needed for Pain.    Yes Historical Provider, MD   acyclovir (ZOVIRAX) 400 MG tablet take 1 tablet by mouth once daily 22  Marsha Chauhan MD   butalbital-acetaminophen-caffeine (FIORICET, ESGIC) -30 MG per tablet Take 1 tablet by mouth 3 times daily as needed for Headaches or Migraine Indications: Cluster Headache 8/31/22 3/2/23  Marsha Chauhan MD   EPINEPHrine (EPIPEN) 0.3 MG/0.3ML SOAJ injection Use as directed for allergic reaction 8/31/22 3/2/23  Marsha Chauhan MD   hydrOXYzine HCl (ATARAX) 25 MG tablet take 1 tablet BID 8/31/22 3/2/23  Marsha Chauhan MD   pantoprazole (PROTONIX) 40 MG tablet Take 1 tablet by mouth every morning (before breakfast) 22  Marsha Chauhan MD   aspirin-acetaminophen-caffeine (EXCEDRIN MIGRAINE) 425-790-93 MG per tablet Take 1 tablet by mouth as needed for Headaches 3/2/22   Marsha Chauhan MD       Current medications:    Current Facility-Administered Medications   Medication Dose Route Frequency Provider Last Rate Last Admin    0.9 % sodium chloride infusion   IntraVENous Continuous Esther Serna PA-C 125 mL/hr at 23 0841 New Bag at 23 0841    sodium chloride flush 0.9 % injection 5-40 mL  5-40 mL IntraVENous 2 times per day Concepcion Alfred PA-C        sodium chloride flush 0.9 % injection 5-40 mL  5-40 mL IntraVENous PRN Concepcion Alfred PA-C        0.9 % sodium chloride infusion   IntraVENous PRN Concepcion Alfred PA-C        ceFAZolin (ANCEF) 2,000 mg in sterile water 20 mL IV syringe  2,000 mg IntraVENous On Call to [address] MALATHI Rodriguez           Allergies:     Allergies   Allergen Reactions    Bee Pollen Anaphylaxis    Tetracyclines & Related Hives       Problem List:    Patient Active Problem List   Diagnosis Code    Chronic cluster headache, not intractable G44.029    Migraine G43.909    GERD (gastroesophageal reflux disease) K21.9    H/O peptic ulcer Z87.11    Herpes dermatitis B00.89    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, F51.05    Encounter for monitoring long-term proton pump inhibitor therapy Z51.81, Z79.899    Herpes suppression B00.9    Venous stasis ulcer of left ankle with fat layer exposed with varicose veins ([facility]) I83.023, L97.322    Encounter for medication refill Z76.0    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 ([facility]) I70.25    PVD (peripheral vascular disease) ([facility]) I73.9    Non-pressure chronic ulcer of left ankle with fat layer exposed ([facility] L97.322       Past Medical History:        Diagnosis Date    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 ([address] 05/18/2022    Dizziness - light-headed     GERD (gastroesophageal reflux disease)     Herpes dermatitis 04/27/2017    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    Lightheadedness     Migraine     PONV (postoperative nausea and vomiting)     Skin ulcer of buttock with fat layer exposed ([address] 07/20/2016    Venous stasis ulcer of left ankle with fat layer exposed with varicose veins ([facility] 02/02/2022       Past Surgical History:        Procedure Laterality Date    CHOLECYSTECTOMY, LAPAROSCOPIC  04/08/2014    LEG SURGERY Left 8/24/2022    LEFT LOWER EXTREMITY DEBRIDMENT WITH POSSIBLE ADVANCED SKIN THERAPY, POSSIBLE UNNA BOOT performed by Kiran Begum MD at [facility] SURGERY Left 10/25/2022    DEBRIDEMENT LEFT LEG, POSSIBLE ADVANCED SKIN THERAPY with acell micromatrix application , Stuart Harris performed by Hebert Dasilva MD at [address] [facility] ENDOSCOPY  12/13/2013    mild gastritis and small hiatal hernia, Dr Omid Peterson, [facility] [address]  2015    GERD, Dr. Chaim Hanson, office       Social History:    Social History     Tobacco Use    Smoking status: Never    Smokeless tobacco: Never   Substance Use Topics    Alcohol use: No                                Counseling given: Not Answered      Vital Signs (Current):   Vitals:    01/06/23 1050 01/12/23 0840   BP:  (!) 176/79   Pulse:  78   Resp:  18   Temp:  98.3 °F (36.8 °C)   TempSrc:  Temporal   SpO2:  100%   Weight: 165 lb (74.8 kg) 165 lb (74.8 kg)   Height:  5' 8\" (1.727 m)                                              BP Readings from Last 3 Encounters:   01/12/23 (!) 176/79   01/04/23 (!) 155/70   12/28/22 (!) 178/82       NPO Status: Time of last liquid consumption: 2000>8hrs                        Time of last solid consumption: 2000                        Date of last liquid consumption: 01/11/23                        Date of last solid food consumption: 01/11/23    BMI:   Wt Readings from Last 3 Encounters:   01/12/23 165 lb (74.8 kg)   12/28/22 171 lb (77.6 kg)   11/29/22 171 lb 12.8 oz (77.9 kg)     Body mass index is 25.09 kg/m².     CBC:   Lab Results   Component Value Date/Time    WBC 13.0 10/25/2022 10:03 AM    RBC 3.74 10/25/2022 10:03 AM    HGB 8.2 10/25/2022 10:03 AM    HCT 28.0 10/25/2022 10:03 AM    MCV 74.9 10/25/2022 10:03 AM    RDW 18.7 10/25/2022 10:03 AM     10/25/2022 10:03 AM       CMP:   Lab Results   Component Value Date/Time     06/01/2022 09:50 AM    K 4.3 06/01/2022 09:50 AM    K 4.0 05/24/2022 12:25 PM     06/01/2022 09:50 AM    CO2 24 06/01/2022 09:50 AM    BUN 13 06/01/2022 09:50 AM    CREATININE 0.7 06/01/2022 09:50 AM    GFRAA >60 06/01/2022 09:50 AM    LABGLOM >60 06/01/2022 09:50 AM    GLUCOSE 90 06/01/2022 09:50 AM    PROT 7.0 06/01/2022 09:50 AM    CALCIUM 8.3 06/01/2022 09:50 AM    BILITOT <0.2 06/01/2022 09:50 AM    ALKPHOS 88 06/01/2022 09:50 AM    AST 20 06/01/2022 09:50 AM    ALT 13 06/01/2022 09:50 AM       POC Tests: No results for input(s): POCGLU, POCNA, POCK, POCCL, POCBUN, POCHEMO, POCHCT in the last 72 hours. Coags:   Lab Results   Component Value Date/Time    PROTIME 12.6 10/25/2022 10:03 AM    INR 1.2 10/25/2022 10:03 AM    APTT 31.7 11/14/2012 12:01 AM       HCG (If Applicable):   Lab Results   Component Value Date    PREGTESTUR NEGATIVE 11/17/2013        ABGs: No results found for: PHART, PO2ART, FCQ3LRX, FWA5UMR, BEART, M5QIHHLU     Type & Screen (If Applicable):  No results found for: LABABO, LABRH    Drug/Infectious Status (If Applicable):  No results found for: HIV, HEPCAB    COVID-19 Screening (If Applicable):   Lab Results   Component Value Date/Time    COVID19 Negative 11/29/2022 11:40 AM           Anesthesia Evaluation  Patient summary reviewed and Nursing notes reviewed   history of anesthetic complications: PONV. Airway: Mallampati: II  TM distance: >3 FB   Neck ROM: full  Mouth opening: > = 3 FB   Dental:    (+) upper dentures      Pulmonary:Negative Pulmonary ROS and normal exam  breath sounds clear to auscultation                             Cardiovascular:Negative CV ROS            Rhythm: regular  Rate: normal                    Neuro/Psych:   (+) headaches:, psychiatric history:            GI/Hepatic/Renal:   (+) GERD ( pt takes omeprazole, well controlled):,           Endo/Other:    (+) blood dyscrasia: anemia:., .                 Abdominal:             Vascular: Other Findings:             Anesthesia Plan      MAC     ASA 3       Induction: intravenous. MIPS: Prophylactic antiemetics administered. Anesthetic plan and risks discussed with patient.       Plan discussed with CRNA and attending. DOS STAFF ADDENDUM:    Patient seen and chart reviewed. Physical exam and history updated as indicated. NPO status confirmed. Anesthesia options and plan discussed including risks benefits with patient/legal guardian and family as available. Concerns and questions addressed. Consent verbalized to proceed. Anesthesia plan, options and intraoperative/postoperative concerns discussed with care team.    PONV after last MAC procedure (given propofol, midazolam, fentanyl and dilaudid). She was given percocet po before discharge and nausea started after that. Will add toradol, decadron and zofran intraoperative. Hold percocet on NPO patient.       Diane De Los Santos DO,   1/12/2023  8:55 AM        Diane De Los Santos DO   1/12/2023

## 2023-01-12 NOTE — TELEPHONE ENCOUNTER
Scheduled post-radiofrequency (L) LE venous u/s at SEY1/23/23 at 8:00 a.m, message left on pt's voicemail.

## 2023-01-12 NOTE — H&P
Vascular Surgery History & Physical Exam      Chief Complaint: Symptomatic varicose veins, Venous stasis ulcer    HISTORY OF PRESENT ILLNESS:                The patient is a 74 y.o. female who presents to the hospital for elective treatment of symptomatic varicose veins of the left lower extremity. The patient denies any problems since last seen in the office. IMPRESSION:  Symptomatic varicose veins of the left lower extremity, venous stasis ulcer    PLAN:  Radiofrequency ablation of the left lesser saphenous vein with stab phlebectomies of varicose branches. Debridement of L LE wound, possible advance skin therapy    I reviewed the procedure with the patient and family as available. I discussed the procedure, risks, benefits, complications, and alternatives of the procedure. They understand and consent.   All questions were answered    ROS : All others Negative if blank [], Positive if [x]  General   [] Fevers   [] Chills   [] Weight Loss   Skin   [x] Tissue Loss   Eyes   [x] Wears Glasses/Contacts   [] Vision Changes   Respiratory    [] Shortness of breath   Cardiovascular   [] Chest Pain   [] Shortness of breath with exertion   Gastrointestinal   [] Abdominal Pain     Past Medical History:   Diagnosis Date    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 ([address] 05/18/2022    Dizziness - light-headed     GERD (gastroesophageal reflux disease)     Herpes dermatitis 04/27/2017    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    Lightheadedness     Migraine     PONV (postoperative nausea and vomiting)     Skin ulcer of buttock with fat layer exposed ([address] 07/20/2016    Venous stasis ulcer of left ankle with fat layer exposed with varicose veins ([address] 02/02/2022     Past Surgical History:   Procedure Laterality Date    CHOLECYSTECTOMY, LAPAROSCOPIC  04/08/2014    LEG SURGERY Left 8/24/2022    LEFT LOWER EXTREMITY DEBRIDMENT WITH POSSIBLE ADVANCED SKIN THERAPY, POSSIBLE Kami Ebenezer performed by [employer] Cydney Mera MD at [address] [facility] Left 10/25/2022    DEBRIDEMENT LEFT LEG, POSSIBLE ADVANCED SKIN THERAPY with acell micromatrix application , Mittie Rank performed by Coleen Blanchard MD at [address] [facility]  12/13/2013    mild gastritis and small hiatal hernia, Dr Carlene Alvarez, [facility]  2015    GERD, Dr. Vivien Serna, office     Current Medications:     Current Facility-Administered Medications:     0.9 % sodium chloride infusion, , IntraVENous, Continuous, BIJU [address], Last Rate: 125 mL/hr at 01/12/23 0841, New Bag at 01/12/23 0841    sodium chloride flush 0.9 % injection 5-40 mL, 5-40 mL, IntraVENous, 2 times per day, Concepcion Wintersxmarian PA-C    sodium chloride flush 0.9 % injection 5-40 mL, 5-40 mL, IntraVENous, PRN, Concepcion Wintersxmarian PA-C    0.9 % sodium chloride infusion, , IntraVENous, PRN, Concepcion Wintersxmarian PA-C    ceFAZolin (ANCEF) 2,000 mg in sterile water 20 mL IV syringe, 2,000 mg, IntraVENous, On Call to OR, Concepcion Alfred PA-C  Allergies:  Bee pollen and Tetracyclines & related  Social History     Socioeconomic History    Marital status: Single     Spouse name: Not on file    Number of children: Not on file    Years of education: Not on file    Highest education level: Not on file   Occupational History    Not on file   Tobacco Use    Smoking status: Never    Smokeless tobacco: Never   Vaping Use    Vaping Use: Never used   Substance and Sexual Activity    Alcohol use: No    Drug use: No    Sexual activity: Not on file   Other Topics Concern    Not on file   Social History Narrative    Not on file     Social Determinants of Health     Financial Resource Strain: Low Risk     Difficulty of Paying Living Expenses: Not hard at all   Food Insecurity: No Food Insecurity    Worried About Running Out of Food in the Last Year: Never true    [address] in the Last Year: Never true   Transportation Needs: Not on file   Physical Activity: Not on file   Stress: Not on file   Social Connections: Not on file   Intimate Partner Violence: Not on file   Housing Stability: Not on file     Family History   Problem Relation Age of Onset    Diabetes Mother     Hypertension Mother     Heart Disease Father     Heart Attack Father     Heart Surgery Father         angioplasty    Stroke Father     High Cholesterol Father     Heart Attack Maternal Grandfather        REVIEW OF SYSTEMS:  The chart was reviewed. PHYSICAL EXAM:    Vitals:    01/12/23 0840   BP: (!) 176/79   Pulse: 78   Resp: 18   Temp: 98.3 °F (36.8 °C)   SpO2: 100%     CONSTITUTIONAL:  awake, alert, cooperative, no apparent distress, and appears stated age  NECK:  supple, symmetrical, trachea midline  LUNGS:  no increased work of breathing, good resp excursion  CARDIOVASCULAR:  S1S2  ABDOMEN:  soft, non-distended and non-tender  EXTREMITIES: left leg varicose vein branches marked.     LABS:    Lab Results   Component Value Date    WBC 11.4 01/12/2023    HGB 8.0 (L) 01/12/2023    HCT 26.8 (L) 01/12/2023     01/12/2023    PROTIME 11.8 01/12/2023    INR 1.1 01/12/2023    APTT 31.7 11/14/2012    K 4.1 01/12/2023    BUN 21 01/12/2023    CREATININE 0.7 01/12/2023       RADIOLOGY:    Nancy Feliz MD

## 2023-01-12 NOTE — DISCHARGE INSTRUCTIONS
Dressing Orders:LEFT MEDIAL and LATERAL LOWER LEG-Cleanse with normal saline, apply zinc to fiona wound, aquacel AG and drawtex, dressing, ABD pad , unna boot and coban. Change weekly. [place] may be drowsy or lightheaded after receiving sedation or anesthesia. A responsible person should be with you for the next 24 hours. Please follow the instructions checked below:    DIET INSTRUCTIONS:  [x]Start with light diet and progress to your normal diet as you feel like eating. If you experience nausea or repeated episodes of vomiting which persist beyond 12-24 hours, notify your doctor. []Other     ACTIVITY INSTRUCTIONS:  [x]Rest today. Increase activity as tolerated    [x]Elevate operative limb   [x]No heavy lifting or strenuous activity     [x]No driving for 2 days  []Other     WOUND/DRESSING INSTRUCTIONS:  Always ensure you and your care giver clean hands before and after caring for the wound. []May shower  - after bandage removed    []May bathe - after bandage reomved     []Derma bond dressing-Do not apply lotion, gel, or liquid to wound while the derma bond is in place. [x]Other   - Remove ACE bandage and gauze pads on left thigh 48 hrs after surgery. Leave Steri-strips on. Steri-strips will begin to fall off in 4-5 days. MEDICATION INSTRUCTIONS:    [x]Use pain medications as directed. When taking pain medications, you may experience dizziness or drowsiness. Do not drink alcohol or drive when taking these medications. [x]You may take a non-prescription headache remedy, preferably one that does not contain aspirin. Other Instructions:    FOLLOW-UP CARE:  [x]Go to wound care appointment next Wednesday. Watch for these significant complications.   Call physician if they or any other problems occur:  Fever over 101°    Redness, swelling, hardness or warmth at the operative site  Unrelieved nausea    Foul smelling or cloudy drainage at the operative site   Unrelieved pain    Blood soaked dressing.  (Some oozing may be normal)

## 2023-01-12 NOTE — ANESTHESIA POSTPROCEDURE EVALUATION
Department of Anesthesiology  Postprocedure Note    Patient: Demi Nails  MRN: [id number]  YOB: 1955  Date of evaluation: 1/12/2023      Procedure Summary     Date: 01/12/23 Room / Location: [facility] OR  / [facility]    Anesthesia Start: 7412 Anesthesia Stop: 1210    Procedure: RADIOFREQUENCY ABLATION LEFT LESSER SAPHENOUS VEIN WITH STAB PHLEBECTOMIES, LEFT LEG WOUND DEBRIDEMENT (Left: Leg Lower) Diagnosis:       Varicose veins of left lower extremity with ulcer of ankle ([address]      (Varicose veins of left lower extremity with ulcer of ankle ([address] [K04.187, L97.329])    Surgeons: Coleen Blanchard MD Responsible Provider: Vu Banks DO    Anesthesia Type: MAC ASA Status: 3          Anesthesia Type: No value filed.     Gamaliel Phase I: Gamaliel Score: 10    Gamaliel Phase II: Gamaliel Score: 10      Anesthesia Post Evaluation    Patient location during evaluation: bedside  Patient participation: complete - patient cannot participate  Level of consciousness: awake and alert  Airway patency: patent  Nausea & Vomiting: no nausea and no vomiting  Complications: no  Cardiovascular status: blood pressure returned to baseline  Respiratory status: acceptable  Hydration status: euvolemic  Multimodal analgesia pain management approach

## 2023-01-12 NOTE — OP NOTE
Operative Note      Patient: Amado Srinivasan  YOB: 1955  MRN: [id number]    Date of Procedure: 1/12/2023    Pre-Op Diagnosis: Varicose veins of left lower extremity with ulcer of ankle ([facility] [I83.023, L97.329]    Post-Op Diagnosis: Same       Procedure(s):  RADIOFREQUENCY ABLATION LEFT LESSER SAPHENOUS VEIN WITH STAB PHLEBECTOMIES x 4  Debridement of medial and lateral ankle wounds    Medial ankle 5 cm width x 4 cm length  Lateral ankle 5 cm width x 8 cm length     Surgeon(s):  Benja Augustin MD    Assistant:   First Assistant: Ottoniel Avendano APRN - CNP    Anesthesia: Monitor Anesthesia Care    Estimated Blood Loss (mL): Minimal    Complications: None    DESCRIPTION OF PROCEDURE: The patient was identified and the procedure was confirmed. The left leg was prepped and draped in the usual sterile fashion, patient was in prone position. Then, 1% lidocaine mixed with 0.25% Marcaine was used for local anesthesia. Under ultrasound guidance, the lesser saphenous vein was percutaneously entered at the level of the distal calf, and a 7-Danish sheath was inserted into the vein. The radiofrequency ablation catheter was inserted through the sheath and advanced through the vein. The tip was positioned 2.5 cm from the saphenopopliteal junction under ultrasound guidance. Tumescent anesthesia was then injected over the length of the vein to ensure 2 cm of distance from the vein to the skin. Radiofrequency ablation was then performed for a total of 5 cycles, 2 cycles in the first segment. The catheter and sheath were removed and pressure was held for hemostasis. Next, an 11-blade was used to make small incisions over varicose branches. The branches were avulsed with mosquito clamps and pressure was held for hemostasis. This was performed for 4 areas in the posterior calf. Steri-Strip were applied to the incisions.      Both medial and lateral ankle wounds were debrided with curette, subcutaneous tissue, 100% of the wound. Aquacell ag, abodminal pads, unna boot, koban were applied to the leg in the operating room. Needle, sponge, and instrument counts were reported as correct x2. The patient tolerated the procedure and was transferred to the recovery area in satisfactory condition. Marbinreyna Colby MCCORMICK was scrubbed and participated in the entire procedure including incision, exposure, anastomosis, and closure. There was no qualified general surgery resident available.    CC : Sayda Singh MD      Electronically signed by Donna Keith MD on 1/12/2023 at 11:44 AM

## 2023-01-16 NOTE — DISCHARGE INSTRUCTIONS
Visit Discharge/Physician Orders     Discharge condition: Stable     Assessment of pain at discharge: yes     Anesthetic used: 4% lidocaine solution     Discharge to: Home     Left via:Private automobile     Accompanied by:self     ECF/HHA: n/a     Dressing Orders:LEFT MEDIAL and LATERAL LOWER LEG-Cleanse with normal saline, apply zinc to fiona wound, aquacel AG and drawtex, dressing, ABD pad , unna boot and coban. Change weekly. Treatment Orders: Eat a diet high in protein and vitamin C. Take a multiple vitamin daily unless contraindicated. To see Dr. Priscila Felton as scheduled      Must wear slip on shoe to work due to wrap on leg! [facility] followup visit : 1 week____________________________  (Please note your next appointment above and if you are unable to keep, kindly give a 24 hour notice. Thank you.)     Physician signature:__________________________     If you experience any of the following, please call the [email or web address] during business hours:     * Increase in Pain  * Temperature over 101  * Increase in drainage from your wound  * Drainage with a foul odor  * Bleeding  * Increase in swelling  * Need for compression bandage changes due to slippage, breakthrough drainage. If you need medical attention outside of the business hours of the [email or web address] please contact your PCP or go to the nearest emergency room.

## 2023-01-18 ENCOUNTER — HOSPITAL ENCOUNTER (OUTPATIENT)
Dept: WOUND CARE | Age: 68
Discharge: HOME | End: 2023-01-18
Payer: MEDICARE

## 2023-01-18 VITALS
SYSTOLIC BLOOD PRESSURE: 146 MMHG | RESPIRATION RATE: 16 BRPM | DIASTOLIC BLOOD PRESSURE: 77 MMHG | HEART RATE: 78 BPM | TEMPERATURE: 97.1 F

## 2023-01-18 DIAGNOSIS — I83.023 VARICOSE VEINS OF LEFT LOWER EXTREMITY WITH ULCER OF ANKLE WITH FAT LAYER EXPOSED (HCC): ICD-10-CM

## 2023-01-18 DIAGNOSIS — I70.242 ATHEROSCLEROSIS OF NATIVE ARTERY OF LEFT LOWER EXTREMITY WITH ULCERATION OF CALF (HCC): Primary | ICD-10-CM

## 2023-01-18 DIAGNOSIS — L97.322 VARICOSE VEINS OF LEFT LOWER EXTREMITY WITH ULCER OF ANKLE WITH FAT LAYER EXPOSED (HCC): ICD-10-CM

## 2023-01-18 PROCEDURE — 11042 DBRDMT SUBQ TIS 1ST 20SQCM/<: CPT

## 2023-01-18 PROCEDURE — 11045 DBRDMT SUBQ TISS EACH ADDL: CPT

## 2023-01-18 RX ORDER — OXYCODONE HYDROCHLORIDE AND ACETAMINOPHEN 5; 325 MG/1; MG/1
1 TABLET ORAL EVERY 6 HOURS PRN
Qty: 28 TABLET | Refills: 0 | Status: SHIPPED | OUTPATIENT
Start: 2023-01-18 | End: 2023-01-25

## 2023-01-18 RX ORDER — LIDOCAINE HYDROCHLORIDE 20 MG/ML
JELLY TOPICAL ONCE
OUTPATIENT
Start: 2023-01-18 | End: 2023-01-18

## 2023-01-18 RX ORDER — GENTAMICIN SULFATE 1 MG/G
OINTMENT TOPICAL ONCE
OUTPATIENT
Start: 2023-01-18 | End: 2023-01-18

## 2023-01-18 RX ORDER — BACITRACIN ZINC AND POLYMYXIN B SULFATE 500; 1000 [USP'U]/G; [USP'U]/G
OINTMENT TOPICAL ONCE
OUTPATIENT
Start: 2023-01-18 | End: 2023-01-18

## 2023-01-18 RX ORDER — GINSENG 100 MG
CAPSULE ORAL ONCE
OUTPATIENT
Start: 2023-01-18 | End: 2023-01-18

## 2023-01-18 RX ORDER — LIDOCAINE 50 MG/G
OINTMENT TOPICAL ONCE
OUTPATIENT
Start: 2023-01-18 | End: 2023-01-18

## 2023-01-18 RX ORDER — LIDOCAINE 40 MG/G
CREAM TOPICAL ONCE
OUTPATIENT
Start: 2023-01-18 | End: 2023-01-18

## 2023-01-18 RX ORDER — LIDOCAINE HYDROCHLORIDE 40 MG/ML
SOLUTION TOPICAL ONCE
OUTPATIENT
Start: 2023-01-18 | End: 2023-01-18

## 2023-01-18 RX ORDER — BACITRACIN, NEOMYCIN, POLYMYXIN B 400; 3.5; 5 [USP'U]/G; MG/G; [USP'U]/G
OINTMENT TOPICAL ONCE
OUTPATIENT
Start: 2023-01-18 | End: 2023-01-18

## 2023-01-18 RX ORDER — CLOBETASOL PROPIONATE 0.5 MG/G
OINTMENT TOPICAL ONCE
OUTPATIENT
Start: 2023-01-18 | End: 2023-01-18

## 2023-01-18 RX ORDER — BETAMETHASONE DIPROPIONATE 0.05 %
OINTMENT (GRAM) TOPICAL ONCE
OUTPATIENT
Start: 2023-01-18 | End: 2023-01-18

## 2023-01-18 RX ORDER — LIDOCAINE HYDROCHLORIDE 40 MG/ML
SOLUTION TOPICAL ONCE
Status: COMPLETED | OUTPATIENT
Start: 2023-01-18 | End: 2023-01-18

## 2023-01-18 RX ADMIN — LIDOCAINE HYDROCHLORIDE 5 ML: 40 SOLUTION TOPICAL at 07:45

## 2023-01-18 ASSESSMENT — PAIN SCALES - GENERAL: PAINLEVEL_OUTOF10: 6

## 2023-01-18 ASSESSMENT — PAIN DESCRIPTION - LOCATION: LOCATION: LEG

## 2023-01-18 ASSESSMENT — PAIN DESCRIPTION - DESCRIPTORS: DESCRIPTORS: ACHING;SHOOTING

## 2023-01-18 ASSESSMENT — PAIN DESCRIPTION - ORIENTATION: ORIENTATION: LEFT

## 2023-01-18 NOTE — PROGRESS NOTES
Wound Healing Center Followup Visit Note    Referring Physician : May Milner MD  [address] [place] [address] RECORD NUMBER:  [id number]  AGE: 74 y.o. GENDER: female  : 1955  EPISODE DATE:  2023    Subjective:     Chief Complaint   Patient presents with    Wound Check     Left leg        HISTORY of PRESENT ILLNESS HPI   Maribel Arreguin is a 74 y.o. female who presents today in regards to follow up evaluation and treatment of wound/ulcer. That patient's past medical, family and social hx were reviewed and changes were made if present. History of Wound Context:  The patient has had a wound of her left ankle/calf which was first noted approximately 2021. This has been treated local wound care. On their initial visit to the wound healing center, 22,  the patient has noted that the wound has been improving. The patient has not had similar previous wounds in the past.      She started seeing Dr. Zaid Zavala in 2021 and than Dr. Samuel Ahumada. She was started in [facility] ~ 2021. She has noticed some improvement since starting unna boot. She is currently following with Dr. Qi Lakhani. Pt is not on abx at time of initial visit, but has been treated with previously by podiatry. She is not a DM. She is not a smoker. She denies hx of DVT, and per her report had recent us noting no evidence of dvt at [facility]. She also had arterial studies done. I had previously seen her in the past in regards to left buttock thigh wound, which started as abscess. Pt works at [employer] in [facility] and is on her feet all day.     22  Reflux study - if significant findings will schedule for fu  Continue compression therapy [facility] per podiatry with aquacell dressing  Elevation  She does not have significant arterial occlusive disease  22  Patient has asked to continuing following with me going forward because of our previous relationship  She is going to let Dr. Cody Mcrae office know  She tolerated unna boot and aquacell - some improvement  216/22  Appearance improved, slightly larger  Consider drawtek next week but overall drainage seems reasonably managed as periwound appears ok  2/23/2022  The wound, has some exudate, no recent cultures were done, will do wound cultures today  3/2/22  Reflux study reviewed - no significant reflux  Will treat culture - augmentin 875 mg bid x 10 days - script sent  More drainage - stable size  3/9/22  Wound appearance improved, stable in size  drawtek  3/16/22  Wound slightly larger in size, new wound of ankle  Continue Drawtek, change to profore  Avoid shoes which will contact ankle area  3/23/22  Wounds stable  Overall appearance improved  Will have pt see ID re cultures - disc with Dr Valentina Yeboah  3/30/22  Much improved appearance  On oral abx per ID - concerns re pt ability to work while on IV - will see how her wound progresses  4/6/22  Appearance improved but size not much different  Finished oral abx  Will re culture next week if no significant size change - possible advance skin therapy  4/20/2022  Ulcer on the medial aspect, fairly clean and granulating, ulcer of the lateral aspect, has some exudate, debrided  4/27/22  Wounds stable   Hypergranulation tissue removed  Culture done - possible graft in future  5/4/22  Wound stable  Disc culture with Dr Valentina Yeboah who would like to start her on iv abx  5/11/22  Wound stable  Iv abx have not been started yet - spoke with Dr Valentina Yeboah - spoke with pt she will call his office  She had spoke with [facility] but there were some issues  5/18/22  Calf stable, ankle improved  Iv abx to start this week after picc placement  On exam   L dp 2+, PT triphasic - with compression of dp - PT becomes weakly biphasic  Concern that PT though triphasic is not getting inline flow and as a result isn't healing in the calf distribution because of occlusive disease  We discussed angiogram - will schedule  I reviewed the procedure with the patient and family as available. I discussed the procedure, risks, benefits, complications, and alternatives of the procedure. They understand and consent.   All questions were answered  Script for percocet 5/325 mg #28 prn q6 hrs - given, oarrs run  5/24/22  Angio, L PT and peroneal plasty  5/25/22  On iv abx  Wound appearance better  R groin no hematoma, L DP 2+, PT triphasic   6/1/22  Wound size and appearance better  6/8/22  Wound size and appearance better  Discussed with Dr Hernan Woody - he will stop abx  6/15/22  Wound size slightly improvement, appearance better  6/22/22  Wounds sizes smaller  6/29/22  Wounds stable   Hypergranulation tissue present throughout wound beds    Continue drawtex, foam, ABD and profore   7/6/22  Wounds slightly improved  7/13/22  Both wounds slightly larger  Hyperkeratotic tissue debrided back  7/14/22  Patient came in due to drainage through her wrap  Dressing noted to have large amounts of yellow/green drainage throughout  Cultures obtained    7/20/22  Culture reviewed large growth S aureus and Pseudomonas  Disc with Dr Hannon - will start clindamycin 600 mg tid for staph  He will see her in office in regards to IV for pseudomonas  Wounds stable in size  Change to aquacell ag  7/27/22  Dr Hernan Woody to see  Medial slightly larger, lateral slightly smaller  8/3/22  Dr Hernan Woody saw her felt wound appearance better - will hold off on iv for now  Medial slightly improved, lateral stable  8/10/2022  Wounds stable  8/17/22  Wound stable, more pain with debridement  Discussed OR for debridement and possible advanced skin therapy - pt agreeable  8/24/22  Debridement, kerecise application  9/50/77  Wound appearance improved  Medial wound improved, lateral stable  9/7/22  Wounds are smaller  9/14/22  Wound stable  Enodfrom and drawtek  9/21/2022  Wound debrided, stable according to the measurements  9/28/22  Wound larger  Culture done  Discussed OR  Aquacell ag change   10/5/22  Wound slightly larger  Percocet 5/325 mg #25 - script given, oarrs reviewed  Cx reviewed - will disc with Dr Deneen Buck - all light growth   Not interested in OR at this time  10/12/22  Wound stable  Hold on cx tx  10/19/22  Wound stable  Ok for surgical debridement will schedule  Declined debridement today  10/25/22  Irrigation and excisional debridement of left medial and lateral ankle wound, Application of 206 mcg micromatrix acel  Application of unna boot  Lateral 15.5 sq cm, Medial 40.5 sq cm  11/2/22  Appearance improved  Measuring larger  No debridement  11/9/22  Appearance improved  Medial 62 (57), Lateral 18 (37)  Aquacell ag and wraps  11/16/22  Both appearance much improved  Medial 58 (62) Lateral 18 (18)  Aquacell ag , dratwek over top due to increased drainage  Percocet 5/325 mg #28 oarrs reviewed  11/23/22  Stable in size and appearance  New venous reflux study - Tuesday 11/29 at 1230 at my office  Will give her dressings to replace wrap after it is cut off for study  Refusing debridement due to pain  12/7/22  Missed last week due to influenza  Improved size  Tolerated debridement a little better than usual allowing slough to be removed especially around edges  Had to be rescheduled for venous US when office US is working again  12/14/22  Medial calf slightly larger but appearance improved 54 sq cm  Lateral calf slightly larger - more fibrinous exudate - 17 sq cm  Pt would only allow lateral calf debridement  Us 12/20 rescheduled  12/21/22  Minimal debridement allowed to both wounds  Venous reflux study reviewed - will discuss with Dr. Ta Valdez  Medial and lateral calf stable in size with fibrinous exudate  12/28/22  Debrided medial  Medial 52 increased (48)  Lateral 18 decreased (19)  Plan for lesser saphenous vein ablation  Oarrs reviewed - Percocet 5/325 mg #28  1/4/23  Slightly larger  Short term disability paperwork filled out   Surgery 1/12/23 - off till 2/6/23 1/12/23  Lesser saphenous vein ablation, stab phlebectomy  Wound debridement  1/18/23  Us rescheduled for 1/23  Wound appearance much improved, size stable  Oarrs reviewed - Percocet 5/325 mg #28    Wound/Ulcer Pain Timing/Severity: constant, moderate  Quality of pain: aching, throbbing, tender  Severity:  8/ 10   Modifying Factors: Pain worsens with dressing changes, debridement  Associated Signs/Symptoms: edema, drainage and pain    Ulcer Identification:  Ulcer Type: venous  Contributing Factors: edema and venous stasis    Diabetic/Pressure/Non Pressure Ulcers only:  Ulcer: Non-Pressure ulcer, fat layer exposed        PAST MEDICAL HISTORY      Diagnosis Date    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 ([facility] [address] 05/18/2022    Dizziness - light-headed     GERD (gastroesophageal reflux disease)     Herpes dermatitis 04/27/2017    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    Lightheadedness     Migraine     PONV (postoperative nausea and vomiting)     Skin ulcer of buttock with fat layer exposed ([address] 07/20/2016    Venous stasis ulcer of left ankle with fat layer exposed with varicose veins ([address] 02/02/2022     Past Surgical History:   Procedure Laterality Date    CHOLECYSTECTOMY, LAPAROSCOPIC  04/08/2014    LEG SURGERY Left 8/24/2022    LEFT LOWER EXTREMITY DEBRIDMENT WITH POSSIBLE ADVANCED SKIN THERAPY, POSSIBLE Don Pintos performed by Christa Du MD at [facility] Left 10/25/2022    DEBRIDEMENT LEFT LEG, POSSIBLE ADVANCED SKIN THERAPY with acell micromatrix application , Don Pintos [facility] 1/12/2023    RADIOFREQUENCY ABLATION LEFT LESSER SAPHENOUS VEIN WITH STAB PHLEBECTOMIES, LEFT LEG WOUND DEBRIDEMENT performed by Christa Du MD at [address] ENDOSCOPY  12/13/2013    mild gastritis and small hiatal hernia, Dr Pedro Perez, [facility]  2015    GERD, Dr. Janis Anthony, office     Family History   Problem Relation Age of Onset    Diabetes Mother     Hypertension Mother     Heart Disease Father     Heart Attack Father     Heart Surgery Father         angioplasty    Stroke Father     High Cholesterol Father     Heart Attack Maternal Grandfather      Social History     Tobacco Use    Smoking status: Never    Smokeless tobacco: Never   Vaping Use    Vaping Use: Never used   Substance Use Topics    Alcohol use: No    Drug use: No     Allergies   Allergen Reactions    Bee Pollen Anaphylaxis    Tetracyclines & Related Hives     Current Outpatient Medications on File Prior to Encounter   Medication Sig Dispense Refill    oxyCODONE-acetaminophen (PERCOCET) 5-325 MG per tablet Take 1 tablet by mouth every 6 hours as needed for Pain. acyclovir (ZOVIRAX) 400 MG tablet take 1 tablet by mouth once daily 90 tablet 3    butalbital-acetaminophen-caffeine (FIORICET, ESGIC) -40 MG per tablet Take 1 tablet by mouth 3 times daily as needed for Headaches or Migraine Indications: Cluster Headache 90 tablet 5    EPINEPHrine (EPIPEN) 0.3 MG/0.3ML SOAJ injection Use as directed for allergic reaction 1 each 5    hydrOXYzine HCl (ATARAX) 25 MG tablet take 1 tablet BID 60 tablet 5    pantoprazole (PROTONIX) 40 MG tablet Take 1 tablet by mouth every morning (before breakfast) 90 tablet 3    aspirin-acetaminophen-caffeine (EXCEDRIN MIGRAINE) 250-250-65 MG per tablet Take 1 tablet by mouth as needed for Headaches 300 tablet 3     No current facility-administered medications on file prior to encounter.        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 fat layer exposed ([address]    Relevant Medications    oxyCODONE-acetaminophen (PERCOCET) 5-325 MG per tablet    Other Relevant Orders    Initiate Outpatient Wound Care Protocol    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 ([address] - Primary (Chronic)    Relevant Orders    Initiate Outpatient Wound Care Protocol       Pre Debridement Measurements:  Are located in the [facility]  Documentation Flow Sheet  Post Debridement Measurements:  Wound/Ulcer Descriptions are Pre Debridement except measurements:     Wound 08/10/16 Other (Comment) Buttocks Left;Distal #2 acq 8/4/16 (Active)   Number of days: 3316       Wound 08/31/16 Buttocks Left;Proximal #3 aquired 8-27-26 (Active)   Number of days: 3279       Wound 02/02/22 Ankle Left;Medial #1 (Active)   Wound Image   01/18/23 0747   Dressing Status New dressing applied 01/18/23 0833   Wound Cleansed Cleansed with saline 01/18/23 0833   Dressing/Treatment ABD; Alginate with Ag 01/18/23 0833   Offloading for Diabetic Foot Ulcers Offloading not required 01/04/23 0841   Wound Length (cm) 9 cm 01/18/23 0747   Wound Width (cm) 5.8 cm 01/18/23 0747   Wound Depth (cm) 0.2 cm 01/18/23 0747   Wound Surface Area (cm^2) 52.2 cm^2 01/18/23 0747   Change in Wound Size % (l*w) -92.9 01/18/23 0747   Wound Volume (cm^3) 10.44 cm^3 01/18/23 0747   Wound Healing % -286 01/18/23 0747   Post-Procedure Length (cm) 9.1 cm 01/18/23 0812   Post-Procedure Width (cm) 6 cm 01/18/23 0812   Post-Procedure Depth (cm) 0.2 cm 01/18/23 0812   Post-Procedure Surface Area (cm^2) 54.6 cm^2 01/18/23 0812   Post-Procedure Volume (cm^3) 10.92 cm^3 01/18/23 0812   Wound Assessment Pink/red;Fibrin;Pale granulation tissue 01/18/23 0747   Drainage Amount Large 01/18/23 0747   Drainage Description Green;Yellow 01/18/23 0747   Odor None 01/18/23 0747   Melany-wound Assessment Fragile 01/18/23 0747   Number of days: 350       Wound 03/16/22 Ankle Posterior; Left #2 (Active)   Wound Image   01/18/23 0747   Dressing Status New dressing applied 01/18/23 0833   Wound Cleansed Cleansed with saline 01/18/23 0833   Dressing/Treatment Alginate with Ag;ABD 01/18/23 0833   Offloading for Diabetic Foot Ulcers Offloading not required 01/04/23 0841   Wound Length (cm) 5.5 cm 01/18/23 0747   Wound Width (cm) 3.6 cm 01/18/23 0747   Wound Depth (cm) 0.3 cm 01/18/23 0747   Wound Surface Area (cm^2) 19.8 cm^2 01/18/23 0747   Change in Wound Size % (l*w) -692 01/18/23 0747   Wound Volume (cm^3) 5.94 cm^3 01/18/23 0747   Wound Healing % -2276 01/18/23 0747   Post-Procedure Length (cm) 5.7 cm 01/18/23 0812   Post-Procedure Width (cm) 3.7 cm 01/18/23 0812   Post-Procedure Depth (cm) 0.3 cm 01/18/23 0812   Post-Procedure Surface Area (cm^2) 21.09 cm^2 01/18/23 0812   Post-Procedure Volume (cm^3) 6.327 cm^3 01/18/23 0812   Wound Assessment Pale granulation tissue;Fibrin 01/18/23 0747   Drainage Amount Large 01/18/23 0747   Drainage Description Yellow;Green 01/18/23 0747   Odor None 01/18/23 0747   Melany-wound Assessment Fragile 01/18/23 0747   Number of days: 308     Incision 01/12/23 Leg Left (Active)   Dressing Status Dry;Clean; Intact 01/12/23 1316   Dressing/Treatment Gauze dressing/dressing sponge; Ace wrap 01/12/23 1316   Number of days: 6       Incision 01/12/23 Ankle Left (Active)   Incision Cleansed Cleansed with saline 01/12/23 1316   Closure Other (Comment) 01/12/23 1316   Margins Other (Comment) 01/12/23 1316   Number of days: 6      Procedure Note  Indications:  Based on my examination of this patient's wound(s)/ulcer(s) today, debridement is required to promote healing and evaluate the wound base.      Performed by: Yaw James MD     Consent obtained:  Yes     Time out taken:  Yes     Pain Control: Anesthetic  Anesthetic: 4% Lidocaine Liquid Topical Debridement:Excisional Debridement     Using curette the wound(s)/ulcer(s) was/were sharply debrided down through and including the removal of epidermis, dermis, and subcutaneous tissue. Devitalized Tissue Debrided:  fibrin, biofilm, slough, and exudate to stimulate bleeding to promote healing, post debridement good bleeding base and wound edges noted     Wound/Ulcer #:  1     Percent of Wound/Ulcer Debrided: 70%     Total Surface Area Debrided: 40 sq cm      Estimated Blood Loss:  Minimal  Hemostasis Achieved:  by pressure     Procedural Pain:  9  / 10   Post Procedural Pain:  8 / 10      Response to treatment:  With complaints of pain. Plan:   Treatment Note please see attached Discharge Instructions    Written patient dismissal instructions given to patient and signed by patient or POA. Discharge Instructions                Visit Discharge/Physician Orders     Discharge condition: Stable     Assessment of pain at discharge: yes     Anesthetic used: 4% lidocaine solution     Discharge to: Home     Left via:Private automobile     Accompanied by:self     ECF/HHA: n/a     Dressing Orders:LEFT MEDIAL and LATERAL LOWER LEG-Cleanse with normal saline, apply zinc to fiona wound, aquacel AG and drawtex, dressing, ABD pad , unna boot and coban. Change weekly. Treatment Orders: Eat a diet high in protein and vitamin C. Take a multiple vitamin daily unless contraindicated. To see Dr. Kevin Dover as scheduled      Must wear slip on shoe to work due to wrap on leg! [address] [place] [address] followup visit : 1 week____________________________  (Please note your next appointment above and if you are unable to keep, kindly give a 24 hour notice.  Thank you.)     Physician signature:__________________________     If you experience any of the following, please call the [address] during business hours:     * Increase in Pain  * Temperature over 101  * Increase in drainage from your wound  * Drainage with a foul odor  * Bleeding  * Increase in swelling  * Need for compression bandage changes due to slippage, breakthrough drainage. If you need medical attention outside of the business hours of the [facility] [address] [facility] [address] please contact your PCP or go to the nearest emergency room.                  Electronically signed by Augustin Ramon MD

## 2023-01-23 ENCOUNTER — HOSPITAL ENCOUNTER (OUTPATIENT)
Dept: ULTRASOUND IMAGING | Age: 68
Discharge: HOME | End: 2023-01-25
Payer: MEDICARE

## 2023-01-23 DIAGNOSIS — M79.89 LEG SWELLING: ICD-10-CM

## 2023-01-23 DIAGNOSIS — I82.432 ACUTE DEEP VEIN THROMBOSIS (DVT) OF POPLITEAL VEIN OF LEFT LOWER EXTREMITY (HCC): Primary | ICD-10-CM

## 2023-01-23 PROCEDURE — 93971 EXTREMITY STUDY: CPT | Performed by: RADIOLOGY

## 2023-01-23 PROCEDURE — 93971 EXTREMITY STUDY: CPT

## 2023-01-23 NOTE — DISCHARGE INSTRUCTIONS
Visit Discharge/Physician Orders     Discharge condition: Stable     Assessment of pain at discharge: yes     Anesthetic used: 4% lidocaine solution     Discharge to: Home     Left via:Private automobile     Accompanied by:self     ECF/HHA: n/a     Dressing Orders:LEFT MEDIAL and LATERAL LOWER LEG-Cleanse with normal saline, apply zinc to fiona wound, aquacel AG and drawtex, dressing, ABD pad , unna boot and coban. Change weekly. Treatment Orders: Eat a diet high in protein and vitamin C. Take a multiple vitamin daily unless contraindicated. To see Dr. Victor Manuel Hernandez as scheduled      Must wear slip on shoe to work due to wrap on leg! [address] [facility] followup visit : 1 week____________________________  (Please note your next appointment above and if you are unable to keep, kindly give a 24 hour notice. Thank you.)     Physician signature:__________________________     If you experience any of the following, please call the [facility] during business hours:     * Increase in Pain  * Temperature over 101  * Increase in drainage from your wound  * Drainage with a foul odor  * Bleeding  * Increase in swelling  * Need for compression bandage changes due to slippage, breakthrough drainage. If you need medical attention outside of the business hours of the [facility] please contact your PCP or go to the nearest emergency room.

## 2023-01-23 NOTE — PROGRESS NOTES
Popliteal non occlusive dvt on us after lesser saphenous ablation    Will start eliquis  Pt understands    Denies sob or worsening swelling    Will see later this week    Shawn Busch MD

## 2023-01-25 ENCOUNTER — HOSPITAL ENCOUNTER (OUTPATIENT)
Dept: WOUND CARE | Age: 68
Discharge: HOME | End: 2023-01-25
Payer: MEDICARE

## 2023-01-25 VITALS
SYSTOLIC BLOOD PRESSURE: 170 MMHG | WEIGHT: 165 LBS | HEIGHT: 68 IN | DIASTOLIC BLOOD PRESSURE: 61 MMHG | BODY MASS INDEX: 25.01 KG/M2 | RESPIRATION RATE: 18 BRPM | HEART RATE: 86 BPM | TEMPERATURE: 97.4 F

## 2023-01-25 DIAGNOSIS — I70.243 ATHEROSCLEROSIS OF NATIVE ARTERY OF LEFT LOWER EXTREMITY WITH ULCERATION OF ANKLE (HCC): ICD-10-CM

## 2023-01-25 DIAGNOSIS — I83.023 VARICOSE VEINS OF LEFT LOWER EXTREMITY WITH ULCER OF ANKLE WITH FAT LAYER EXPOSED (HCC): Primary | ICD-10-CM

## 2023-01-25 DIAGNOSIS — L97.322 VARICOSE VEINS OF LEFT LOWER EXTREMITY WITH ULCER OF ANKLE WITH FAT LAYER EXPOSED (HCC): Primary | ICD-10-CM

## 2023-01-25 DIAGNOSIS — I70.242 ATHEROSCLEROSIS OF NATIVE ARTERY OF LEFT LOWER EXTREMITY WITH ULCERATION OF CALF (HCC): ICD-10-CM

## 2023-01-25 PROCEDURE — 11042 DBRDMT SUBQ TIS 1ST 20SQCM/<: CPT

## 2023-01-25 PROCEDURE — 11045 DBRDMT SUBQ TISS EACH ADDL: CPT

## 2023-01-25 RX ORDER — BETAMETHASONE DIPROPIONATE 0.05 %
OINTMENT (GRAM) TOPICAL ONCE
OUTPATIENT
Start: 2023-01-25 | End: 2023-01-25

## 2023-01-25 RX ORDER — GENTAMICIN SULFATE 1 MG/G
OINTMENT TOPICAL ONCE
OUTPATIENT
Start: 2023-01-25 | End: 2023-01-25

## 2023-01-25 RX ORDER — LIDOCAINE HYDROCHLORIDE 40 MG/ML
SOLUTION TOPICAL ONCE
Status: COMPLETED | OUTPATIENT
Start: 2023-01-25 | End: 2023-01-25

## 2023-01-25 RX ORDER — LIDOCAINE 40 MG/G
CREAM TOPICAL ONCE
OUTPATIENT
Start: 2023-01-25 | End: 2023-01-25

## 2023-01-25 RX ORDER — GINSENG 100 MG
CAPSULE ORAL ONCE
OUTPATIENT
Start: 2023-01-25 | End: 2023-01-25

## 2023-01-25 RX ORDER — LIDOCAINE 50 MG/G
OINTMENT TOPICAL ONCE
OUTPATIENT
Start: 2023-01-25 | End: 2023-01-25

## 2023-01-25 RX ORDER — BACITRACIN ZINC AND POLYMYXIN B SULFATE 500; 1000 [USP'U]/G; [USP'U]/G
OINTMENT TOPICAL ONCE
OUTPATIENT
Start: 2023-01-25 | End: 2023-01-25

## 2023-01-25 RX ORDER — LIDOCAINE HYDROCHLORIDE 20 MG/ML
JELLY TOPICAL ONCE
OUTPATIENT
Start: 2023-01-25 | End: 2023-01-25

## 2023-01-25 RX ORDER — BACITRACIN, NEOMYCIN, POLYMYXIN B 400; 3.5; 5 [USP'U]/G; MG/G; [USP'U]/G
OINTMENT TOPICAL ONCE
OUTPATIENT
Start: 2023-01-25 | End: 2023-01-25

## 2023-01-25 RX ORDER — LIDOCAINE HYDROCHLORIDE 40 MG/ML
SOLUTION TOPICAL ONCE
OUTPATIENT
Start: 2023-01-25 | End: 2023-01-25

## 2023-01-25 RX ORDER — CLOBETASOL PROPIONATE 0.5 MG/G
OINTMENT TOPICAL ONCE
OUTPATIENT
Start: 2023-01-25 | End: 2023-01-25

## 2023-01-25 RX ADMIN — LIDOCAINE HYDROCHLORIDE 10 ML: 40 SOLUTION TOPICAL at 08:30

## 2023-01-25 ASSESSMENT — PAIN SCALES - GENERAL: PAINLEVEL_OUTOF10: 5

## 2023-01-25 ASSESSMENT — PAIN DESCRIPTION - DESCRIPTORS: DESCRIPTORS: ACHING

## 2023-01-25 ASSESSMENT — PAIN DESCRIPTION - ONSET: ONSET: ON-GOING

## 2023-01-25 ASSESSMENT — PAIN DESCRIPTION - ORIENTATION: ORIENTATION: LEFT

## 2023-01-25 ASSESSMENT — PAIN - FUNCTIONAL ASSESSMENT: PAIN_FUNCTIONAL_ASSESSMENT: PREVENTS OR INTERFERES SOME ACTIVE ACTIVITIES AND ADLS

## 2023-01-25 ASSESSMENT — PAIN DESCRIPTION - PAIN TYPE: TYPE: CHRONIC PAIN

## 2023-01-25 ASSESSMENT — PAIN DESCRIPTION - FREQUENCY: FREQUENCY: CONTINUOUS

## 2023-01-25 ASSESSMENT — PAIN DESCRIPTION - LOCATION: LOCATION: LEG

## 2023-01-25 NOTE — PROGRESS NOTES
Wound Healing Center Followup Visit Note    Referring Physician : Noreen Beckford MD  [address] [place] [address] RECORD NUMBER:  [id number]  AGE: 74 y.o. GENDER: female  : 1955  EPISODE DATE:  2023    Subjective:     Chief Complaint   Patient presents with    Wound Check     Left leg      HISTORY of PRESENT ILLNESS HPI   Silvia Bruno is a 74 y.o. female who presents today in regards to follow up evaluation and treatment of wound/ulcer. That patient's past medical, family and social hx were reviewed and changes were made if present. History of Wound Context:  The patient has had a wound of her left ankle/calf which was first noted approximately 2021. This has been treated local wound care. On their initial visit to the wound healing center, 22,  the patient has noted that the wound has been improving. The patient has not had similar previous wounds in the past.      She started seeing Dr. Wally Reddy in 2021 and than Dr. Shanell Pat. She was started in [facility] ~ 2021. She has noticed some improvement since starting unna boot. She is currently following with Dr. Suhas Jackson. Pt is not on abx at time of initial visit, but has been treated with previously by podiatry. She is not a DM. She is not a smoker. She denies hx of DVT, and per her report had recent us noting no evidence of dvt at [facility]. She also had arterial studies done. I had previously seen her in the past in regards to left buttock thigh wound, which started as abscess. Pt works at [employer] in [facility] and is on her feet all day.     22  Reflux study - if significant findings will schedule for fu  Continue compression therapy [facility] per podiatry with aquacell dressing  Elevation  She does not have significant arterial occlusive disease  22  Patient has asked to continuing following with me going forward because of our previous relationship  She is going to let Dr. Francisco Leary office know  She tolerated unna boot and aquacell - some improvement  216/22  Appearance improved, slightly larger  Consider drawtek next week but overall drainage seems reasonably managed as periwound appears ok  2/23/2022  The wound, has some exudate, no recent cultures were done, will do wound cultures today  3/2/22  Reflux study reviewed - no significant reflux  Will treat culture - augmentin 875 mg bid x 10 days - script sent  More drainage - stable size  3/9/22  Wound appearance improved, stable in size  drawtek  3/16/22  Wound slightly larger in size, new wound of ankle  Continue Drawtek, change to profore  Avoid shoes which will contact ankle area  3/23/22  Wounds stable  Overall appearance improved  Will have pt see ID re cultures - disc with Dr Apolinar Tello  3/30/22  Much improved appearance  On oral abx per ID - concerns re pt ability to work while on IV - will see how her wound progresses  4/6/22  Appearance improved but size not much different  Finished oral abx  Will re culture next week if no significant size change - possible advance skin therapy  4/20/2022  Ulcer on the medial aspect, fairly clean and granulating, ulcer of the lateral aspect, has some exudate, debrided  4/27/22  Wounds stable   Hypergranulation tissue removed  Culture done - possible graft in future  5/4/22  Wound stable  Disc culture with Dr Apolinar Tello who would like to start her on iv abx  5/11/22  Wound stable  Iv abx have not been started yet - spoke with Dr Apolinar Tello - spoke with pt she will call his office  She had spoke with [facility] but there were some issues  5/18/22  Calf stable, ankle improved  Iv abx to start this week after picc placement  On exam   L dp 2+, PT triphasic - with compression of dp - PT becomes weakly biphasic  Concern that PT though triphasic is not getting inline flow and as a result isn't healing in the calf distribution because of occlusive disease  We discussed angiogram - will schedule  I reviewed the procedure with the patient and family as available. I discussed the procedure, risks, benefits, complications, and alternatives of the procedure. They understand and consent.   All questions were answered  Script for percocet 5/325 mg #28 prn q6 hrs - given, oarrs run  5/24/22  Angio, L PT and peroneal plasty  5/25/22  On iv abx  Wound appearance better  R groin no hematoma, L DP 2+, PT triphasic   6/1/22  Wound size and appearance better  6/8/22  Wound size and appearance better  Discussed with Dr Rodolfo Valiente - he will stop abx  6/15/22  Wound size slightly improvement, appearance better  6/22/22  Wounds sizes smaller  6/29/22  Wounds stable   Hypergranulation tissue present throughout wound beds    Continue drawtex, foam, ABD and profore   7/6/22  Wounds slightly improved  7/13/22  Both wounds slightly larger  Hyperkeratotic tissue debrided back  7/14/22  Patient came in due to drainage through her wrap  Dressing noted to have large amounts of yellow/green drainage throughout  Cultures obtained    7/20/22  Culture reviewed large growth S aureus and Pseudomonas  Disc with Dr Hannon - will start clindamycin 600 mg tid for staph  He will see her in office in regards to IV for pseudomonas  Wounds stable in size  Change to aquacell ag  7/27/22  Dr Rodolfo Valiente to see  Medial slightly larger, lateral slightly smaller  8/3/22  Dr Rodolfo Valiente saw her felt wound appearance better - will hold off on iv for now  Medial slightly improved, lateral stable  8/10/2022  Wounds stable  8/17/22  Wound stable, more pain with debridement  Discussed OR for debridement and possible advanced skin therapy - pt agreeable  8/24/22  Debridement, kerecise application  9/50/94  Wound appearance improved  Medial wound improved, lateral stable  9/7/22  Wounds are smaller  9/14/22  Wound stable  Enodfrom and drawtek  9/21/2022  Wound debrided, stable according to the measurements  9/28/22  Wound larger  Culture done  Discussed OR  Aquacell ag change   10/5/22  Wound slightly larger  Percocet 5/325 mg #25 - script given, oarrs reviewed  Cx reviewed - will disc with Dr Apolinar Tello - all light growth   Not interested in OR at this time  10/12/22  Wound stable  Hold on cx tx  10/19/22  Wound stable  Ok for surgical debridement will schedule  Declined debridement today  10/25/22  Irrigation and excisional debridement of left medial and lateral ankle wound, Application of 604 mcg micromatrix acel  Application of unna boot  Lateral 15.5 sq cm, Medial 40.5 sq cm  11/2/22  Appearance improved  Measuring larger  No debridement  11/9/22  Appearance improved  Medial 62 (57), Lateral 18 (37)  Aquacell ag and wraps  11/16/22  Both appearance much improved  Medial 58 (62) Lateral 18 (18)  Aquacell ag , dratwek over top due to increased drainage  Percocet 5/325 mg #28 oarrs reviewed  11/23/22  Stable in size and appearance  New venous reflux study - Tuesday 11/29 at 1230 at my office  Will give her dressings to replace wrap after it is cut off for study  Refusing debridement due to pain  12/7/22  Missed last week due to influenza  Improved size  Tolerated debridement a little better than usual allowing slough to be removed especially around edges  Had to be rescheduled for venous US when office US is working again  12/14/22  Medial calf slightly larger but appearance improved 54 sq cm  Lateral calf slightly larger - more fibrinous exudate - 17 sq cm  Pt would only allow lateral calf debridement  Us 12/20 rescheduled  12/21/22  Minimal debridement allowed to both wounds  Venous reflux study reviewed - will discuss with Dr. Blaise Conner  Medial and lateral calf stable in size with fibrinous exudate  12/28/22  Debrided medial  Medial 52 increased (48)  Lateral 18 decreased (19)  Plan for lesser saphenous vein ablation  Oarrs reviewed - Percocet 5/325 mg #28  1/4/23  Slightly larger  Short term disability paperwork filled out   Surgery 1/12/23 - off till 2/6/23 1/12/23  Lesser saphenous vein ablation, stab phlebectomy  Wound debridement  1/18/23  Us rescheduled for 1/23  Wound appearance much improved, size stable  Oarrs reviewed - Percocet 5/325 mg #28  1/25/23  Us noted popliteal vein dvt - started on eliquis  Wound appearance improved    Wound/Ulcer Pain Timing/Severity: constant, moderate  Quality of pain: aching, throbbing, tender  Severity:  8/ 10   Modifying Factors: Pain worsens with dressing changes, debridement  Associated Signs/Symptoms: edema, drainage and pain    Ulcer Identification:  Ulcer Type: venous  Contributing Factors: edema and venous stasis    Diabetic/Pressure/Non Pressure Ulcers only:  Ulcer: Non-Pressure ulcer, fat layer exposed        PAST MEDICAL HISTORY      Diagnosis Date    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 ([address] 05/18/2022    Dizziness - light-headed     GERD (gastroesophageal reflux disease)     Herpes dermatitis 04/27/2017    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    Lightheadedness     Migraine     PONV (postoperative nausea and vomiting)     Skin ulcer of buttock with fat layer exposed ([address] 07/20/2016    Venous stasis ulcer of left ankle with fat layer exposed with varicose veins ([address] 02/02/2022     Past Surgical History:   Procedure Laterality Date    CHOLECYSTECTOMY, LAPAROSCOPIC  04/08/2014    LEG SURGERY Left 8/24/2022    LEFT LOWER EXTREMITY DEBRIDMENT WITH POSSIBLE ADVANCED SKIN THERAPY, POSSIBLE Pooja Pace performed by Magdalena Luu MD at [address] [facility] Left 10/25/2022    DEBRIDEMENT LEFT LEG, POSSIBLE ADVANCED SKIN THERAPY with acell micromatrix application , Pooja Pace performed by Magdalena Luu MD at [facility] Left 1/12/2023    RADIOFREQUENCY ABLATION LEFT LESSER SAPHENOUS VEIN WITH STAB PHLEBECTOMIES, LEFT LEG WOUND DEBRIDEMENT performed by Magdalena Luu MD at [address] [facility] ENDOSCOPY  12/13/2013    mild gastritis and small hiatal hernia, Dr Desi Rizo, [facility]    UPPER GASTROINTESTINAL ENDOSCOPY  2015    GERD, Dr. Cayla Huffman, office     Family History   Problem Relation Age of Onset    Diabetes Mother     Hypertension Mother     Heart Disease Father     Heart Attack Father     Heart Surgery Father         angioplasty    Stroke Father     High Cholesterol Father     Heart Attack Maternal Grandfather      Social History     Tobacco Use    Smoking status: Never    Smokeless tobacco: Never   Vaping Use    Vaping Use: Never used   Substance Use Topics    Alcohol use: No    Drug use: No     Allergies   Allergen Reactions    Bee Pollen Anaphylaxis    Tetracyclines & Related Hives     Current Outpatient Medications on File Prior to Encounter   Medication Sig Dispense Refill    apixaban (ELIQUIS) 5 MG TABS tablet Take 2 tablets by mouth 2 times daily for 7 days 28 tablet 0    oxyCODONE-acetaminophen (PERCOCET) 5-325 MG per tablet Take 1 tablet by mouth every 6 hours as needed for Pain for up to 7 days. Intended supply: 7 days. Take lowest dose possible to manage pain Max Daily Amount: 4 tablets 28 tablet 0    acyclovir (ZOVIRAX) 400 MG tablet take 1 tablet by mouth once daily 90 tablet 3    butalbital-acetaminophen-caffeine (FIORICET, ESGIC) -40 MG per tablet Take 1 tablet by mouth 3 times daily as needed for Headaches or Migraine Indications: Cluster Headache 90 tablet 5    EPINEPHrine (EPIPEN) 0.3 MG/0.3ML SOAJ injection Use as directed for allergic reaction 1 each 5    hydrOXYzine HCl (ATARAX) 25 MG tablet take 1 tablet BID 60 tablet 5    pantoprazole (PROTONIX) 40 MG tablet Take 1 tablet by mouth every morning (before breakfast) 90 tablet 3    aspirin-acetaminophen-caffeine (EXCEDRIN MIGRAINE) 250-250-65 MG per tablet Take 1 tablet by mouth as needed for Headaches 300 tablet 3     No current facility-administered medications on file prior to encounter.        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 ([address] - Primary    Relevant Orders    Initiate Outpatient Wound Care Protocol    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 ([address] (Chronic)    Relevant Orders    Initiate Outpatient Wound Care Protocol       Pre Debridement Measurements:  Are located in the [place]  Documentation Flow Sheet  Post Debridement Measurements:  Wound/Ulcer Descriptions are Pre Debridement except measurements:     Wound 08/10/16 Other (Comment) Buttocks Left;Distal #2 acq 8/4/16 (Active)   Number of days: 6739       Wound 08/31/16 Buttocks Left;Proximal #3 aquired 8-27-26 (Active)   Number of days: 2338       Wound 02/02/22 Ankle Left;Medial #1 (Active)   Wound Image   01/18/23 0747   Dressing Status New dressing applied 01/25/23 0926   Wound Cleansed Cleansed with saline 01/25/23 0926   Dressing/Treatment ABD; Alginate with Ag; Other (comment) 01/25/23 0926   Offloading for Diabetic Foot Ulcers Offloading not required 01/04/23 0841   Wound Length (cm) 8.3 cm 01/25/23 0826   Wound Width (cm) 5 cm 01/25/23 0826   Wound Depth (cm) 0.1 cm 01/25/23 0826   Wound Surface Area (cm^2) 41.5 cm^2 01/25/23 0826   Change in Wound Size % (l*w) -53.36 01/25/23 0826   Wound Volume (cm^3) 4.15 cm^3 01/25/23 0826   Wound Healing % -53 01/25/23 0826   Post-Procedure Length (cm) 8.5 cm 01/25/23 0855   Post-Procedure Width (cm) 5.2 cm 01/25/23 0855   Post-Procedure Depth (cm) 0.1 cm 01/25/23 0855 Post-Procedure Surface Area (cm^2) 44.2 cm^2 01/25/23 0855   Post-Procedure Volume (cm^3) 4.42 cm^3 01/25/23 0855   Wound Assessment Pink/red;Fibrin;Pale granulation tissue 01/25/23 0826   Drainage Amount Large 01/25/23 0826   Drainage Description Yellow;Brown 01/25/23 0826   Odor None 01/25/23 0826   Melany-wound Assessment Intact 01/25/23 0826   Number of days: 357       Wound 03/16/22 Ankle Posterior; Left #2 (Active)   Wound Image   01/18/23 0747   Dressing Status New dressing applied 01/25/23 0926   Wound Cleansed Cleansed with saline 01/25/23 0926   Dressing/Treatment Alginate with Ag;ABD;Other (comment) 01/25/23 0926   Offloading for Diabetic Foot Ulcers Offloading not required 01/04/23 0841   Wound Length (cm) 5.3 cm 01/25/23 0826   Wound Width (cm) 3.2 cm 01/25/23 0826   Wound Depth (cm) 0.1 cm 01/25/23 0826   Wound Surface Area (cm^2) 16.96 cm^2 01/25/23 0826   Change in Wound Size % (l*w) -578.4 01/25/23 0826   Wound Volume (cm^3) 1.696 cm^3 01/25/23 0826   Wound Healing % -578 01/25/23 0826   Post-Procedure Length (cm) 5.3 cm 01/25/23 0855   Post-Procedure Width (cm) 3.3 cm 01/25/23 0855   Post-Procedure Depth (cm) 0.1 cm 01/25/23 0855   Post-Procedure Surface Area (cm^2) 17.49 cm^2 01/25/23 0855   Post-Procedure Volume (cm^3) 1.749 cm^3 01/25/23 0855   Wound Assessment Pink/red;Fibrin 01/25/23 0826   Drainage Amount Large 01/25/23 0826   Drainage Description Yellow;Brown 01/25/23 0826   Odor None 01/25/23 0826   Melany-wound Assessment Intact 01/25/23 0826   Number of days: 315           Procedure Note  Indications:  Based on my examination of this patient's wound(s)/ulcer(s) today, debridement is required to promote healing and evaluate the wound base.      Performed by: Eli Blancas MD     Consent obtained:  Yes     Time out taken:  Yes     Pain Control: Anesthetic  Anesthetic: 4% Lidocaine Liquid Topical      Debridement:Excisional Debridement     Using curette the wound(s)/ulcer(s) was/were sharply debrided down through and including the removal of epidermis, dermis, and subcutaneous tissue. Devitalized Tissue Debrided:  fibrin, biofilm, slough, and exudate to stimulate bleeding to promote healing, post debridement good bleeding base and wound edges noted     Wound/Ulcer #:  1     Percent of Wound/Ulcer Debrided: 90%     Total Surface Area Debrided: 60 sq cm      Estimated Blood Loss:  Minimal  Hemostasis Achieved:  by pressure     Procedural Pain:  9  / 10   Post Procedural Pain:  8 / 10      Response to treatment:  With complaints of pain. Plan:   Treatment Note please see attached Discharge Instructions    Written patient dismissal instructions given to patient and signed by patient or POA. Discharge Instructions         Visit Discharge/Physician Orders     Discharge condition: Stable     Assessment of pain at discharge: yes     Anesthetic used: 4% lidocaine solution     Discharge to: Home     Left via:Private automobile     Accompanied by:self     ECF/HHA: n/a     Dressing Orders:LEFT MEDIAL and LATERAL LOWER LEG-Cleanse with normal saline, apply zinc to fiona wound, aquacel AG and drawtex, dressing, ABD pad , unna boot and coban. Change weekly. Treatment Orders: Eat a diet high in protein and vitamin C. Take a multiple vitamin daily unless contraindicated. To see Dr. Dre Dior as scheduled      Must wear slip on shoe to work due to wrap on leg! [address] [facility] followup visit : 1 week____________________________  (Please note your next appointment above and if you are unable to keep, kindly give a 24 hour notice.  Thank you.)     Physician signature:__________________________     If you experience any of the following, please call the [address] [place] [address] during business hours:     * Increase in Pain  * Temperature over 101  * Increase in drainage from your wound  * Drainage with a foul odor  * Bleeding  * Increase in swelling  * Need for compression bandage changes due to slippage, breakthrough drainage. If you need medical attention outside of the business hours of the [facility] [address] please contact your PCP or go to the nearest emergency room.       Electronically signed by Maisha Stanley MD

## 2023-01-27 NOTE — DISCHARGE INSTRUCTIONS
Visit Discharge/Physician Orders     Discharge condition: Stable     Assessment of pain at discharge: yes     Anesthetic used: 4% lidocaine solution     Discharge to: Home     Left via:Private automobile     Accompanied by:self     ECF/HHA: n/a     Dressing Orders:LEFT MEDIAL and LATERAL LOWER LEG-Cleanse with normal saline, apply zinc to fioan wound, aquacel AG and drawtex, dressing, ABD pad , unna boot and coban. Change weekly. Treatment Orders: Eat a diet high in protein and vitamin C. Take a multiple vitamin daily unless contraindicated. To see Dr. Stormy Osgood as scheduled      Must wear slip on shoe to work due to wrap on leg! [address] [facility] followup visit : 1 week____________________________  (Please note your next appointment above and if you are unable to keep, kindly give a 24 hour notice. Thank you.)     Physician signature:__________________________     If you experience any of the following, please call the [facility] during business hours:     * Increase in Pain  * Temperature over 101  * Increase in drainage from your wound  * Drainage with a foul odor  * Bleeding  * Increase in swelling  * Need for compression bandage changes due to slippage, breakthrough drainage. If you need medical attention outside of the business hours of the [facility] please contact your PCP or go to the nearest emergency room.

## 2023-02-01 ENCOUNTER — HOSPITAL ENCOUNTER (OUTPATIENT)
Dept: WOUND CARE | Age: 68
Discharge: HOME | End: 2023-02-01
Payer: MEDICARE

## 2023-02-01 VITALS
WEIGHT: 165 LBS | RESPIRATION RATE: 18 BRPM | HEART RATE: 86 BPM | DIASTOLIC BLOOD PRESSURE: 65 MMHG | TEMPERATURE: 98 F | HEIGHT: 68 IN | SYSTOLIC BLOOD PRESSURE: 153 MMHG | BODY MASS INDEX: 25.01 KG/M2

## 2023-02-01 DIAGNOSIS — I70.242 ATHEROSCLEROSIS OF NATIVE ARTERY OF LEFT LOWER EXTREMITY WITH ULCERATION OF CALF (HCC): ICD-10-CM

## 2023-02-01 DIAGNOSIS — L97.322 VARICOSE VEINS OF LEFT LOWER EXTREMITY WITH ULCER OF ANKLE WITH FAT LAYER EXPOSED (HCC): Primary | ICD-10-CM

## 2023-02-01 DIAGNOSIS — I83.023 VARICOSE VEINS OF LEFT LOWER EXTREMITY WITH ULCER OF ANKLE WITH FAT LAYER EXPOSED (HCC): Primary | ICD-10-CM

## 2023-02-01 PROCEDURE — 11045 DBRDMT SUBQ TISS EACH ADDL: CPT

## 2023-02-01 PROCEDURE — 11042 DBRDMT SUBQ TIS 1ST 20SQCM/<: CPT

## 2023-02-01 RX ORDER — LIDOCAINE 50 MG/G
OINTMENT TOPICAL ONCE
OUTPATIENT
Start: 2023-02-01 | End: 2023-02-01

## 2023-02-01 RX ORDER — LIDOCAINE HYDROCHLORIDE 40 MG/ML
SOLUTION TOPICAL ONCE
Status: COMPLETED | OUTPATIENT
Start: 2023-02-01 | End: 2023-02-01

## 2023-02-01 RX ORDER — LIDOCAINE 40 MG/G
CREAM TOPICAL ONCE
OUTPATIENT
Start: 2023-02-01 | End: 2023-02-01

## 2023-02-01 RX ORDER — LIDOCAINE HYDROCHLORIDE 20 MG/ML
JELLY TOPICAL ONCE
OUTPATIENT
Start: 2023-02-01 | End: 2023-02-01

## 2023-02-01 RX ORDER — GENTAMICIN SULFATE 1 MG/G
OINTMENT TOPICAL ONCE
OUTPATIENT
Start: 2023-02-01 | End: 2023-02-01

## 2023-02-01 RX ORDER — BETAMETHASONE DIPROPIONATE 0.05 %
OINTMENT (GRAM) TOPICAL ONCE
OUTPATIENT
Start: 2023-02-01 | End: 2023-02-01

## 2023-02-01 RX ORDER — BACITRACIN, NEOMYCIN, POLYMYXIN B 400; 3.5; 5 [USP'U]/G; MG/G; [USP'U]/G
OINTMENT TOPICAL ONCE
OUTPATIENT
Start: 2023-02-01 | End: 2023-02-01

## 2023-02-01 RX ORDER — GINSENG 100 MG
CAPSULE ORAL ONCE
OUTPATIENT
Start: 2023-02-01 | End: 2023-02-01

## 2023-02-01 RX ORDER — BACITRACIN ZINC AND POLYMYXIN B SULFATE 500; 1000 [USP'U]/G; [USP'U]/G
OINTMENT TOPICAL ONCE
OUTPATIENT
Start: 2023-02-01 | End: 2023-02-01

## 2023-02-01 RX ORDER — LIDOCAINE HYDROCHLORIDE 40 MG/ML
SOLUTION TOPICAL ONCE
OUTPATIENT
Start: 2023-02-01 | End: 2023-02-01

## 2023-02-01 RX ORDER — CLOBETASOL PROPIONATE 0.5 MG/G
OINTMENT TOPICAL ONCE
OUTPATIENT
Start: 2023-02-01 | End: 2023-02-01

## 2023-02-01 RX ADMIN — LIDOCAINE HYDROCHLORIDE: 40 SOLUTION TOPICAL at 08:24

## 2023-02-01 ASSESSMENT — PAIN DESCRIPTION - LOCATION: LOCATION: LEG

## 2023-02-01 ASSESSMENT — PAIN SCALES - GENERAL: PAINLEVEL_OUTOF10: 8

## 2023-02-01 ASSESSMENT — PAIN DESCRIPTION - ONSET: ONSET: ON-GOING

## 2023-02-01 ASSESSMENT — PAIN DESCRIPTION - PAIN TYPE: TYPE: CHRONIC PAIN

## 2023-02-01 ASSESSMENT — PAIN DESCRIPTION - ORIENTATION: ORIENTATION: LEFT

## 2023-02-01 ASSESSMENT — PAIN DESCRIPTION - FREQUENCY: FREQUENCY: CONTINUOUS

## 2023-02-01 NOTE — PLAN OF CARE
Problem: Pain  Goal: Verbalizes/displays adequate comfort level or baseline comfort level  Outcome: Not Progressing     Problem: Wound:  Goal: Will show signs of wound healing; wound closure and no evidence of infection  Description: Will show signs of wound healing; wound closure and no evidence of infection  Outcome: Progressing     Problem: Venous:  Goal: Signs of wound healing will improve  Description: Signs of wound healing will improve  Outcome: Adequate for Discharge     Problem: Compression therapy:  Goal: Will be free from complications associated with compression therapy  Description: Will be free from complications associated with compression therapy  Outcome: Progressing     Problem: Pain  Goal: Verbalizes/displays adequate comfort level or baseline comfort level  Outcome: Not Progressing

## 2023-02-01 NOTE — PROGRESS NOTES
Wound Healing Center Followup Visit Note    Referring Physician : Lisa Muse MD  [address] [place] [address] RECORD NUMBER:  [id number]  AGE: 74 y.o. GENDER: female  : 1955  EPISODE DATE:  2023    Subjective:     Chief Complaint   Patient presents with    Wound Check     Left leg      HISTORY of PRESENT ILLNESS HPI   Brittney Diaz is a 74 y.o. female who presents today in regards to follow up evaluation and treatment of wound/ulcer. That patient's past medical, family and social hx were reviewed and changes were made if present. History of Wound Context:  The patient has had a wound of her left ankle/calf which was first noted approximately 2021. This has been treated local wound care. On their initial visit to the wound healing center, 22,  the patient has noted that the wound has been improving. The patient has not had similar previous wounds in the past.      She started seeing Dr. Shu Valdez in 2021 and than Dr. Hebert Hinojosa. She was started in [facility] ~ 2021. She has noticed some improvement since starting unna boot. She is currently following with Dr. Sagrario Arriaza. Pt is not on abx at time of initial visit, but has been treated with previously by podiatry. She is not a DM. She is not a smoker. She denies hx of DVT, and per her report had recent us noting no evidence of dvt at [facility]. She also had arterial studies done. I had previously seen her in the past in regards to left buttock thigh wound, which started as abscess. Pt works at [employer] in [facility] [facility] and is on her feet all day.     22  Reflux study - if significant findings will schedule for fu  Continue compression therapy [facility] per podiatry with aquacell dressing  Elevation  She does not have significant arterial occlusive disease  22  Patient has asked to continuing following with me going forward because of our previous relationship  She is going to let Dr. Miriam Forrest office know  She tolerated unna boot and aquacell - some improvement  216/22  Appearance improved, slightly larger  Consider drawtek next week but overall drainage seems reasonably managed as periwound appears ok  2/23/2022  The wound, has some exudate, no recent cultures were done, will do wound cultures today  3/2/22  Reflux study reviewed - no significant reflux  Will treat culture - augmentin 875 mg bid x 10 days - script sent  More drainage - stable size  3/9/22  Wound appearance improved, stable in size  drawtek  3/16/22  Wound slightly larger in size, new wound of ankle  Continue Drawtek, change to profore  Avoid shoes which will contact ankle area  3/23/22  Wounds stable  Overall appearance improved  Will have pt see ID re cultures - disc with Dr Kathia Burroughs  3/30/22  Much improved appearance  On oral abx per ID - concerns re pt ability to work while on IV - will see how her wound progresses  4/6/22  Appearance improved but size not much different  Finished oral abx  Will re culture next week if no significant size change - possible advance skin therapy  4/20/2022  Ulcer on the medial aspect, fairly clean and granulating, ulcer of the lateral aspect, has some exudate, debrided  4/27/22  Wounds stable   Hypergranulation tissue removed  Culture done - possible graft in future  5/4/22  Wound stable  Disc culture with Dr Kathia Burroughs who would like to start her on iv abx  5/11/22  Wound stable  Iv abx have not been started yet - spoke with Dr Kathia Burroughs - spoke with pt she will call his office  She had spoke with [facility] but there were some issues  5/18/22  Calf stable, ankle improved  Iv abx to start this week after picc placement  On exam   L dp 2+, PT triphasic - with compression of dp - PT becomes weakly biphasic  Concern that PT though triphasic is not getting inline flow and as a result isn't healing in the calf distribution because of occlusive disease  We discussed angiogram - will schedule  I reviewed the procedure with the patient and family as available. I discussed the procedure, risks, benefits, complications, and alternatives of the procedure. They understand and consent.   All questions were answered  Script for percocet 5/325 mg #28 prn q6 hrs - given, oarrs run  5/24/22  Angio, L PT and peroneal plasty  5/25/22  On iv abx  Wound appearance better  R groin no hematoma, L DP 2+, PT triphasic   6/1/22  Wound size and appearance better  6/8/22  Wound size and appearance better  Discussed with Dr Tapan Zavala - he will stop abx  6/15/22  Wound size slightly improvement, appearance better  6/22/22  Wounds sizes smaller  6/29/22  Wounds stable   Hypergranulation tissue present throughout wound beds    Continue drawtex, foam, ABD and profore   7/6/22  Wounds slightly improved  7/13/22  Both wounds slightly larger  Hyperkeratotic tissue debrided back  7/14/22  Patient came in due to drainage through her wrap  Dressing noted to have large amounts of yellow/green drainage throughout  Cultures obtained    7/20/22  Culture reviewed large growth S aureus and Pseudomonas  Disc with Dr Hannon - will start clindamycin 600 mg tid for staph  He will see her in office in regards to IV for pseudomonas  Wounds stable in size  Change to aquacell ag  7/27/22  Dr Tapan Zavala to see  Medial slightly larger, lateral slightly smaller  8/3/22  Dr Tapan Zavala saw her felt wound appearance better - will hold off on iv for now  Medial slightly improved, lateral stable  8/10/2022  Wounds stable  8/17/22  Wound stable, more pain with debridement  Discussed OR for debridement and possible advanced skin therapy - pt agreeable  8/24/22  Debridement, kerecise application  9/41/93  Wound appearance improved  Medial wound improved, lateral stable  9/7/22  Wounds are smaller  9/14/22  Wound stable  Enodfrom and drawtek  9/21/2022  Wound debrided, stable according to the measurements  9/28/22  Wound larger  Culture done  Discussed OR  Aquacell ag change   10/5/22  Wound slightly larger  Percocet 5/325 mg #25 - script given, oarrs reviewed  Cx reviewed - will disc with Dr Carleen Stovall - all light growth   Not interested in OR at this time  10/12/22  Wound stable  Hold on cx tx  10/19/22  Wound stable  Ok for surgical debridement will schedule  Declined debridement today  10/25/22  Irrigation and excisional debridement of left medial and lateral ankle wound, Application of 955 mcg micromatrix acel  Application of unna boot  Lateral 15.5 sq cm, Medial 40.5 sq cm  11/2/22  Appearance improved  Measuring larger  No debridement  11/9/22  Appearance improved  Medial 62 (57), Lateral 18 (37)  Aquacell ag and wraps  11/16/22  Both appearance much improved  Medial 58 (62) Lateral 18 (18)  Aquacell ag , dratwek over top due to increased drainage  Percocet 5/325 mg #28 oarrs reviewed  11/23/22  Stable in size and appearance  New venous reflux study - Tuesday 11/29 at 1230 at my office  Will give her dressings to replace wrap after it is cut off for study  Refusing debridement due to pain  12/7/22  Missed last week due to influenza  Improved size  Tolerated debridement a little better than usual allowing slough to be removed especially around edges  Had to be rescheduled for venous US when office US is working again  12/14/22  Medial calf slightly larger but appearance improved 54 sq cm  Lateral calf slightly larger - more fibrinous exudate - 17 sq cm  Pt would only allow lateral calf debridement  Us 12/20 rescheduled  12/21/22  Minimal debridement allowed to both wounds  Venous reflux study reviewed - will discuss with Dr. Shu Raymond  Medial and lateral calf stable in size with fibrinous exudate  12/28/22  Debrided medial  Medial 52 increased (48)  Lateral 18 decreased (19)  Plan for lesser saphenous vein ablation  Oarrs reviewed - Percocet 5/325 mg #28  1/4/23  Slightly larger  Short term disability paperwork filled out   Surgery 1/12/23 - off till 2/6/23 1/12/23  Lesser saphenous vein ablation, stab phlebectomy  Wound debridement  1/18/23  Us rescheduled for 1/23  Wound appearance much improved, size stable  Oarrs reviewed - Percocet 5/325 mg #28  1/25/23  Us noted popliteal vein dvt - started on eliquis  Wound appearance improved  2/1/23  Wounds improved  Will extend leave from work to 2/27/23    Wound/Ulcer Pain Timing/Severity: constant, moderate  Quality of pain: aching, throbbing, tender  Severity:  8/ 10   Modifying Factors: Pain worsens with dressing changes, debridement  Associated Signs/Symptoms: edema, drainage and pain    Ulcer Identification:  Ulcer Type: venous  Contributing Factors: edema and venous stasis    Diabetic/Pressure/Non Pressure Ulcers only:  Ulcer: Non-Pressure ulcer, fat layer exposed        PAST MEDICAL HISTORY      Diagnosis Date    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 ([address] 05/18/2022    Dizziness - light-headed     GERD (gastroesophageal reflux disease)     Herpes dermatitis 04/27/2017    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    Lightheadedness     Migraine     PONV (postoperative nausea and vomiting)     Skin ulcer of buttock with fat layer exposed ([address] 07/20/2016    Venous stasis ulcer of left ankle with fat layer exposed with varicose veins ([address] 02/02/2022     Past Surgical History:   Procedure Laterality Date    CHOLECYSTECTOMY, LAPAROSCOPIC  04/08/2014    LEG SURGERY Left 8/24/2022    LEFT LOWER EXTREMITY DEBRIDMENT WITH POSSIBLE ADVANCED SKIN THERAPY, POSSIBLE [place]ttie Notch performed by Katheryne Prader, MD at [facility] Left 10/25/2022    DEBRIDEMENT LEFT LEG, POSSIBLE ADVANCED SKIN THERAPY with acell micromatrix application , Ettie Notch [facility] 1/12/2023    RADIOFREQUENCY ABLATION LEFT LESSER SAPHENOUS VEIN WITH STAB PHLEBECTOMIES, LEFT LEG WOUND DEBRIDEMENT performed by Katheryne Prader, MD at [address] [place] [address] 12/13/2013    mild gastritis and small hiatal hernia, Dr Solange Henderson, [address] [facility]  2015    GERD, Dr. Cat Ling, office     Family History   Problem Relation Age of Onset    Diabetes Mother     Hypertension Mother     Heart Disease Father     Heart Attack Father     Heart Surgery Father         angioplasty    Stroke Father     High Cholesterol Father     Heart Attack Maternal Grandfather      Social History     Tobacco Use    Smoking status: Never    Smokeless tobacco: Never   Vaping Use    Vaping Use: Never used   Substance Use Topics    Alcohol use: No    Drug use: No     Allergies   Allergen Reactions    Bee Pollen Anaphylaxis    Tetracyclines & Related Hives     Current Outpatient Medications on File Prior to Encounter   Medication Sig Dispense Refill    apixaban (ELIQUIS) 5 MG TABS tablet Take 2 tablets by mouth 2 times daily for 7 days 28 tablet 0    acyclovir (ZOVIRAX) 400 MG tablet take 1 tablet by mouth once daily 90 tablet 3    butalbital-acetaminophen-caffeine (FIORICET, ESGIC) -40 MG per tablet Take 1 tablet by mouth 3 times daily as needed for Headaches or Migraine Indications: Cluster Headache 90 tablet 5    EPINEPHrine (EPIPEN) 0.3 MG/0.3ML SOAJ injection Use as directed for allergic reaction 1 each 5    hydrOXYzine HCl (ATARAX) 25 MG tablet take 1 tablet BID 60 tablet 5    pantoprazole (PROTONIX) 40 MG tablet Take 1 tablet by mouth every morning (before breakfast) 90 tablet 3    aspirin-acetaminophen-caffeine (EXCEDRIN MIGRAINE) 250-250-65 MG per tablet Take 1 tablet by mouth as needed for Headaches 300 tablet 3     No current facility-administered medications on file prior to encounter.        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 ([address] - Primary    Relevant Orders    Initiate Outpatient Wound Care Protocol    Diabetic Shoe    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) (Chronic)    Relevant Orders    Initiate Outpatient Wound Care Protocol       Pre Debridement Measurements:  Are located in the [facility]  Documentation Flow Sheet  Post Debridement Measurements:  Wound/Ulcer Descriptions are Pre Debridement except measurements:     Wound 08/10/16 Other (Comment) Buttocks Left;Distal #2 acq 8/4/16 (Active)   Number of days: 2366       Wound 08/31/16 Buttocks Left;Proximal #3 aquired 8-27-26 (Active)   Number of days: 0815       Wound 02/02/22 Ankle Left;Medial #1 (Active)   Wound Image   01/18/23 0747   Dressing Status New dressing applied 01/25/23 0926   Wound Cleansed Cleansed with saline 01/25/23 0926   Dressing/Treatment ABD; Alginate with Ag; Other (comment) 01/25/23 0926   Offloading for Diabetic Foot Ulcers Offloading not required 01/04/23 0841   Wound Length (cm) 8 cm 02/01/23 0811   Wound Width (cm) 5.3 cm 02/01/23 0811   Wound Depth (cm) 0.1 cm 02/01/23 0811   Wound Surface Area (cm^2) 42.4 cm^2 02/01/23 0811   Change in Wound Size % (l*w) -56.69 02/01/23 0811   Wound Volume (cm^3) 4.24 cm^3 02/01/23 0811   Wound Healing % -57 02/01/23 0811   Post-Procedure Length (cm) 8.2 cm 02/01/23 0830   Post-Procedure Width (cm) 5.4 cm 02/01/23 0830   Post-Procedure Depth (cm) 0.1 cm 02/01/23 0830   Post-Procedure Surface Area (cm^2) 44.28 cm^2 02/01/23 0830   Post-Procedure Volume (cm^3) 4.428 cm^3 02/01/23 0830   Wound Assessment Pale granulation tissue;Pink/red;Fibrin 02/01/23 0811   Drainage Amount Large 02/01/23 0811   Drainage Description Yellow;Brown 02/01/23 0811   Odor None 02/01/23 0811   Melany-wound Assessment Fragile; Excoriated 02/01/23 0811   Number of days: 364       Wound 03/16/22 Ankle Posterior; Left #2 (Active)   Wound Image   01/18/23 0747   Dressing Status New dressing applied;Clean;Dry; Intact 02/01/23 0858   Wound Cleansed Cleansed with saline 02/01/23 0858   Dressing/Treatment Alginate with Ag;ABD;Other (comment) 02/01/23 0858   Offloading for Diabetic Foot Ulcers Other (comment) 02/01/23 0858   Wound Length (cm) 4.5 cm 02/01/23 0811   Wound Width (cm) 3.3 cm 02/01/23 0811   Wound Depth (cm) 0.1 cm 02/01/23 0811   Wound Surface Area (cm^2) 14.85 cm^2 02/01/23 0811   Change in Wound Size % (l*w) -494 02/01/23 0811   Wound Volume (cm^3) 1.485 cm^3 02/01/23 0811   Wound Healing % -494 02/01/23 0811   Post-Procedure Length (cm) 4.7 cm 02/01/23 0830   Post-Procedure Width (cm) 3.3 cm 02/01/23 0830   Post-Procedure Depth (cm) 0.1 cm 02/01/23 0830   Post-Procedure Surface Area (cm^2) 15.51 cm^2 02/01/23 0830   Post-Procedure Volume (cm^3) 1.551 cm^3 02/01/23 0830   Wound Assessment Granulation tissue;Slough 02/01/23 0811   Drainage Amount Large 02/01/23 0811   Drainage Description Yellow;Brown 02/01/23 0811   Odor None 02/01/23 0811   Melany-wound Assessment Fragile 02/01/23 0811   Number of days: 322           Procedure Note  Indications:  Based on my examination of this patient's wound(s)/ulcer(s) today, debridement is required to promote healing and evaluate the wound base. Performed by: Patrice Galarza MD     Consent obtained:  Yes     Time out taken:  Yes     Pain Control: Anesthetic  Anesthetic: 4% Lidocaine Liquid Topical      Debridement:Excisional Debridement     Using curette the wound(s)/ulcer(s) was/were sharply debrided down through and including the removal of epidermis, dermis, and subcutaneous tissue.          Devitalized Tissue Debrided:  fibrin, biofilm, slough, and exudate to stimulate bleeding to promote healing, post debridement good bleeding base and wound edges noted     Wound/Ulcer #:  1, 2     Percent of Wound/Ulcer Debrided: 70%     Total Surface Area Debrided: 40 sq cm      Estimated Blood Loss:  Minimal  Hemostasis Achieved:  by pressure     Procedural Pain:  9  / 10   Post Procedural Pain:  8 / 10      Response to treatment:  With complaints of pain. Plan:   Treatment Note please see attached Discharge Instructions    Written patient dismissal instructions given to patient and signed by patient or POA. Discharge Instructions         Visit Discharge/Physician Orders     Discharge condition: Stable     Assessment of pain at discharge: yes     Anesthetic used: 4% lidocaine solution     Discharge to: Home     Left via:Private automobile     Accompanied by:self     ECF/HHA: n/a     Dressing Orders:LEFT MEDIAL and LATERAL LOWER LEG-Cleanse with normal saline, apply zinc to fiona wound, aquacel AG and drawtex, dressing, ABD pad , unna boot and coban. Change weekly. Treatment Orders: Eat a diet high in protein and vitamin C. Take a multiple vitamin daily unless contraindicated. To see Dr. Carmela Chauhan as scheduled      Must wear slip on shoe to work due to wrap on leg! [facility] followup visit : 1 week____________________________  (Please note your next appointment above and if you are unable to keep, kindly give a 24 hour notice. Thank you.)     Physician signature:__________________________     If you experience any of the following, please call the [facility] during business hours:     * Increase in Pain  * Temperature over 101  * Increase in drainage from your wound  * Drainage with a foul odor  * Bleeding  * Increase in swelling  * Need for compression bandage changes due to slippage, breakthrough drainage.      If you need medical attention outside of the business hours of the [facility] please contact your PCP or go to the nearest emergency room.       Electronically signed by Elizabeth Mcintyre MD

## 2023-02-06 NOTE — DISCHARGE INSTRUCTIONS
Visit Discharge/Physician Orders     Discharge condition: Stable     Assessment of pain at discharge: yes     Anesthetic used: 4% lidocaine solution     Discharge to: Home     Left via:Private automobile     Accompanied by:self     ECF/HHA: n/a     Dressing Orders:LEFT MEDIAL and LATERAL LOWER LEG-Cleanse with normal saline, apply zinc to fiona wound, aquacel AG and drawtex, dressing, ABD pad , unna boot and coban. Change weekly. Treatment Orders: Eat a diet high in protein and vitamin C. Take a multiple vitamin daily unless contraindicated. To see Dr. Cesar Del Cid as scheduled      Must wear slip on shoe to work due to wrap on leg! [facility] followup visit : 1 week____________________________  (Please note your next appointment above and if you are unable to keep, kindly give a 24 hour notice. Thank you.)     Physician signature:__________________________     If you experience any of the following, please call the [facility] during business hours:     * Increase in Pain  * Temperature over 101  * Increase in drainage from your wound  * Drainage with a foul odor  * Bleeding  * Increase in swelling  * Need for compression bandage changes due to slippage, breakthrough drainage. If you need medical attention outside of the business hours of the [facility] please contact your PCP or go to the nearest emergency room.

## 2023-02-08 ENCOUNTER — HOSPITAL ENCOUNTER (OUTPATIENT)
Dept: WOUND CARE | Age: 68
Discharge: HOME | End: 2023-02-08
Payer: MEDICARE

## 2023-02-08 VITALS
SYSTOLIC BLOOD PRESSURE: 118 MMHG | WEIGHT: 165 LBS | DIASTOLIC BLOOD PRESSURE: 48 MMHG | TEMPERATURE: 96 F | HEART RATE: 77 BPM | RESPIRATION RATE: 18 BRPM | BODY MASS INDEX: 25.09 KG/M2

## 2023-02-08 DIAGNOSIS — L97.322 VARICOSE VEINS OF LEFT LOWER EXTREMITY WITH ULCER OF ANKLE WITH FAT LAYER EXPOSED (HCC): Primary | ICD-10-CM

## 2023-02-08 DIAGNOSIS — I70.243 ATHEROSCLEROSIS OF NATIVE ARTERY OF LEFT LOWER EXTREMITY WITH ULCERATION OF ANKLE (HCC): ICD-10-CM

## 2023-02-08 DIAGNOSIS — I70.242 ATHEROSCLEROSIS OF NATIVE ARTERY OF LEFT LOWER EXTREMITY WITH ULCERATION OF CALF (HCC): ICD-10-CM

## 2023-02-08 DIAGNOSIS — I83.023 VARICOSE VEINS OF LEFT LOWER EXTREMITY WITH ULCER OF ANKLE WITH FAT LAYER EXPOSED (HCC): Primary | ICD-10-CM

## 2023-02-08 PROCEDURE — 11045 DBRDMT SUBQ TISS EACH ADDL: CPT

## 2023-02-08 PROCEDURE — 11042 DBRDMT SUBQ TIS 1ST 20SQCM/<: CPT

## 2023-02-08 RX ORDER — BACITRACIN ZINC AND POLYMYXIN B SULFATE 500; 1000 [USP'U]/G; [USP'U]/G
OINTMENT TOPICAL ONCE
OUTPATIENT
Start: 2023-02-08 | End: 2023-02-08

## 2023-02-08 RX ORDER — LIDOCAINE HYDROCHLORIDE 20 MG/ML
JELLY TOPICAL ONCE
OUTPATIENT
Start: 2023-02-08 | End: 2023-02-08

## 2023-02-08 RX ORDER — LIDOCAINE 50 MG/G
OINTMENT TOPICAL ONCE
OUTPATIENT
Start: 2023-02-08 | End: 2023-02-08

## 2023-02-08 RX ORDER — BETAMETHASONE DIPROPIONATE 0.05 %
OINTMENT (GRAM) TOPICAL ONCE
OUTPATIENT
Start: 2023-02-08 | End: 2023-02-08

## 2023-02-08 RX ORDER — LIDOCAINE HYDROCHLORIDE 40 MG/ML
SOLUTION TOPICAL ONCE
Status: COMPLETED | OUTPATIENT
Start: 2023-02-08 | End: 2023-02-08

## 2023-02-08 RX ORDER — LIDOCAINE 40 MG/G
CREAM TOPICAL ONCE
OUTPATIENT
Start: 2023-02-08 | End: 2023-02-08

## 2023-02-08 RX ORDER — CLOBETASOL PROPIONATE 0.5 MG/G
OINTMENT TOPICAL ONCE
OUTPATIENT
Start: 2023-02-08 | End: 2023-02-08

## 2023-02-08 RX ORDER — LIDOCAINE HYDROCHLORIDE 40 MG/ML
SOLUTION TOPICAL ONCE
OUTPATIENT
Start: 2023-02-08 | End: 2023-02-08

## 2023-02-08 RX ORDER — GENTAMICIN SULFATE 1 MG/G
OINTMENT TOPICAL ONCE
OUTPATIENT
Start: 2023-02-08 | End: 2023-02-08

## 2023-02-08 RX ORDER — GINSENG 100 MG
CAPSULE ORAL ONCE
OUTPATIENT
Start: 2023-02-08 | End: 2023-02-08

## 2023-02-08 RX ORDER — BACITRACIN, NEOMYCIN, POLYMYXIN B 400; 3.5; 5 [USP'U]/G; MG/G; [USP'U]/G
OINTMENT TOPICAL ONCE
OUTPATIENT
Start: 2023-02-08 | End: 2023-02-08

## 2023-02-08 RX ADMIN — LIDOCAINE HYDROCHLORIDE 10 ML: 40 SOLUTION TOPICAL at 08:11

## 2023-02-08 ASSESSMENT — PAIN SCALES - GENERAL: PAINLEVEL_OUTOF10: 5

## 2023-02-08 ASSESSMENT — PAIN DESCRIPTION - DESCRIPTORS: DESCRIPTORS: ACHING

## 2023-02-08 ASSESSMENT — PAIN - FUNCTIONAL ASSESSMENT: PAIN_FUNCTIONAL_ASSESSMENT: PREVENTS OR INTERFERES SOME ACTIVE ACTIVITIES AND ADLS

## 2023-02-08 ASSESSMENT — PAIN DESCRIPTION - ORIENTATION: ORIENTATION: LEFT

## 2023-02-08 ASSESSMENT — PAIN DESCRIPTION - LOCATION: LOCATION: LEG

## 2023-02-08 NOTE — PROGRESS NOTES
Wound Healing Center Followup Visit Note    Referring Physician : Chuck Redmond MD  [address] [place] [address] RECORD NUMBER:  [id number]  AGE: 74 y.o. GENDER: female  : 1955  EPISODE DATE:  2023    Subjective:     Chief Complaint   Patient presents with    Wound Check     Left leg      HISTORY of PRESENT ILLNESS HPI   Mago Pompa is a 74 y.o. female who presents today in regards to follow up evaluation and treatment of wound/ulcer. That patient's past medical, family and social hx were reviewed and changes were made if present. History of Wound Context:  The patient has had a wound of her left ankle/calf which was first noted approximately 2021. This has been treated local wound care. On their initial visit to the wound healing center, 22,  the patient has noted that the wound has been improving. The patient has not had similar previous wounds in the past.      She started seeing Dr. Giancarlo Gusman in 2021 and than Dr. Wicho Jones. She was started in [facility] ~ 2021. She has noticed some improvement since starting [facility] boot. She is currently following with Dr. Margaret Jacobsen. Pt is not on abx at time of initial visit, but has been treated with previously by podiatry. She is not a DM. She is not a smoker. She denies hx of DVT, and per her report had recent us noting no evidence of dvt at [facility]. She also had arterial studies done. I had previously seen her in the past in regards to left buttock thigh wound, which started as abscess. Pt works at [employer] in [place] [facility] [place] and is on her feet all day.     22  Reflux study - if significant findings will schedule for fu  Continue compression therapy [facility] boot per podiatry with aquacell dressing  Elevation  She does not have significant arterial occlusive disease  22  Patient has asked to continuing following with me going forward because of our previous relationship  She is going to let Dr. Yari Banks office know  She tolerated unna boot and aquacell - some improvement  216/22  Appearance improved, slightly larger  Consider drawtek next week but overall drainage seems reasonably managed as periwound appears ok  2/23/2022  The wound, has some exudate, no recent cultures were done, will do wound cultures today  3/2/22  Reflux study reviewed - no significant reflux  Will treat culture - augmentin 875 mg bid x 10 days - script sent  More drainage - stable size  3/9/22  Wound appearance improved, stable in size  drawtek  3/16/22  Wound slightly larger in size, new wound of ankle  Continue Drawtek, change to profore  Avoid shoes which will contact ankle area  3/23/22  Wounds stable  Overall appearance improved  Will have pt see ID re cultures - disc with Dr Liliam Kamara  3/30/22  Much improved appearance  On oral abx per ID - concerns re pt ability to work while on IV - will see how her wound progresses  4/6/22  Appearance improved but size not much different  Finished oral abx  Will re culture next week if no significant size change - possible advance skin therapy  4/20/2022  Ulcer on the medial aspect, fairly clean and granulating, ulcer of the lateral aspect, has some exudate, debrided  4/27/22  Wounds stable   Hypergranulation tissue removed  Culture done - possible graft in future  5/4/22  Wound stable  Disc culture with Dr Liliam Kamara who would like to start her on iv abx  5/11/22  Wound stable  Iv abx have not been started yet - spoke with Dr Liliam Kamara - spoke with pt she will call his office  She had spoke with [facility] but there were some issues  5/18/22  Calf stable, ankle improved  Iv abx to start this week after picc placement  On exam   L dp 2+, PT triphasic - with compression of dp - PT becomes weakly biphasic  Concern that PT though triphasic is not getting inline flow and as a result isn't healing in the calf distribution because of occlusive disease  We discussed angiogram - will schedule  I reviewed the procedure with the patient and family as available. I discussed the procedure, risks, benefits, complications, and alternatives of the procedure. They understand and consent.   All questions were answered  Script for percocet 5/325 mg #28 prn q6 hrs - given, oarrs run  5/24/22  Angio, L PT and peroneal plasty  5/25/22  On iv abx  Wound appearance better  R groin no hematoma, L DP 2+, PT triphasic   6/1/22  Wound size and appearance better  6/8/22  Wound size and appearance better  Discussed with Dr Jacob Colindres - he will stop abx  6/15/22  Wound size slightly improvement, appearance better  6/22/22  Wounds sizes smaller  6/29/22  Wounds stable   Hypergranulation tissue present throughout wound beds    Continue drawtex, foam, ABD and profore   7/6/22  Wounds slightly improved  7/13/22  Both wounds slightly larger  Hyperkeratotic tissue debrided back  7/14/22  Patient came in due to drainage through her wrap  Dressing noted to have large amounts of yellow/green drainage throughout  Cultures obtained    7/20/22  Culture reviewed large growth S aureus and Pseudomonas  Disc with Dr aHnnon - will start clindamycin 600 mg tid for staph  He will see her in office in regards to IV for pseudomonas  Wounds stable in size  Change to aquacell ag  7/27/22  Dr Jacob Colindres to see  Medial slightly larger, lateral slightly smaller  8/3/22  Dr Jacob Colindres saw her felt wound appearance better - will hold off on iv for now  Medial slightly improved, lateral stable  8/10/2022  Wounds stable  8/17/22  Wound stable, more pain with debridement  Discussed OR for debridement and possible advanced skin therapy - pt agreeable  8/24/22  Debridement, kerecise application  0/80/02  Wound appearance improved  Medial wound improved, lateral stable  9/7/22  Wounds are smaller  9/14/22  Wound stable  Enodfrom and drawtek  9/21/2022  Wound debrided, stable according to the measurements  9/28/22  Wound larger  Culture done  Discussed OR  Aquacell ag change   10/5/22  Wound slightly larger  Percocet 5/325 mg #25 - script given, oarrs reviewed  Cx reviewed - will disc with Dr Maryann Carney - all light growth   Not interested in OR at this time  10/12/22  Wound stable  Hold on cx tx  10/19/22  Wound stable  Ok for surgical debridement will schedule  Declined debridement today  10/25/22  Irrigation and excisional debridement of left medial and lateral ankle wound, Application of 052 mcg micromatrix acel  Application of unna boot  Lateral 15.5 sq cm, Medial 40.5 sq cm  11/2/22  Appearance improved  Measuring larger  No debridement  11/9/22  Appearance improved  Medial 62 (57), Lateral 18 (37)  Aquacell ag and wraps  11/16/22  Both appearance much improved  Medial 58 (62) Lateral 18 (18)  Aquacell ag , dratwek over top due to increased drainage  Percocet 5/325 mg #28 oarrs reviewed  11/23/22  Stable in size and appearance  New venous reflux study - Tuesday 11/29 at 1230 at my office  Will give her dressings to replace wrap after it is cut off for study  Refusing debridement due to pain  12/7/22  Missed last week due to influenza  Improved size  Tolerated debridement a little better than usual allowing slough to be removed especially around edges  Had to be rescheduled for venous US when office US is working again  12/14/22  Medial calf slightly larger but appearance improved 54 sq cm  Lateral calf slightly larger - more fibrinous exudate - 17 sq cm  Pt would only allow lateral calf debridement  Us 12/20 rescheduled  12/21/22  Minimal debridement allowed to both wounds  Venous reflux study reviewed - will discuss with Dr. Nichole Vásquez  Medial and lateral calf stable in size with fibrinous exudate  12/28/22  Debrided medial  Medial 52 increased (48)  Lateral 18 decreased (19)  Plan for lesser saphenous vein ablation  Oarrs reviewed - Percocet 5/325 mg #28  1/4/23  Slightly larger  Short term disability paperwork filled out   Surgery 1/12/23 - off till 2/6/23 1/12/23  Lesser saphenous vein ablation, stab phlebectomy  Wound debridement  1/18/23  Us rescheduled for 1/23  Wound appearance much improved, size stable  Oarrs reviewed - Percocet 5/325 mg #28  1/25/23  Us noted popliteal vein dvt - started on eliquis  Wound appearance improved  2/1/23  Wounds improved  Will extend leave from work to 2/27/23 2/8/23  Wounds improved  Medial 33, lateral 13    Wound/Ulcer Pain Timing/Severity: constant, moderate  Quality of pain: aching, throbbing, tender  Severity:  8/ 10   Modifying Factors: Pain worsens with dressing changes, debridement  Associated Signs/Symptoms: edema, drainage and pain    Ulcer Identification:  Ulcer Type: venous  Contributing Factors: edema and venous stasis    Diabetic/Pressure/Non Pressure Ulcers only:  Ulcer: Non-Pressure ulcer, fat layer exposed        PAST MEDICAL HISTORY      Diagnosis Date    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 ([address] 05/18/2022    Dizziness - light-headed     GERD (gastroesophageal reflux disease)     Herpes dermatitis 04/27/2017    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    Lightheadedness     Migraine     PONV (postoperative nausea and vomiting)     Skin ulcer of buttock with fat layer exposed ([address] 07/20/2016    Venous stasis ulcer of left ankle with fat layer exposed with varicose veins ([address] 02/02/2022     Past Surgical History:   Procedure Laterality Date    CHOLECYSTECTOMY, LAPAROSCOPIC  04/08/2014    LEG SURGERY Left 8/24/2022    LEFT LOWER EXTREMITY DEBRIDMENT WITH POSSIBLE ADVANCED SKIN THERAPY, POSSIBLE Malcolm Denise performed by Sai Smart MD at [facility] Left 10/25/2022    DEBRIDEMENT LEFT LEG, POSSIBLE ADVANCED SKIN THERAPY with acell micromatrix application , Delbert Denise [facility] 1/12/2023    RADIOFREQUENCY ABLATION LEFT LESSER SAPHENOUS VEIN WITH STAB PHLEBECTOMIES, LEFT LEG WOUND DEBRIDEMENT performed by Sai Smart MD at [address] [place] [address] 1960    1960s    UPPER GASTROINTESTINAL ENDOSCOPY  12/13/2013    mild gastritis and small hiatal hernia, Dr Anna Graves, [facility]  2015    GERD, Dr. Jorge Malloy, office     Family History   Problem Relation Age of Onset    Diabetes Mother     Hypertension Mother     Heart Disease Father     Heart Attack Father     Heart Surgery Father         angioplasty    Stroke Father     High Cholesterol Father     Heart Attack Maternal Grandfather      Social History     Tobacco Use    Smoking status: Never    Smokeless tobacco: Never   Vaping Use    Vaping Use: Never used   Substance Use Topics    Alcohol use: No    Drug use: No     Allergies   Allergen Reactions    Bee Pollen Anaphylaxis    Tetracyclines & Related Hives     Current Outpatient Medications on File Prior to Encounter   Medication Sig Dispense Refill    apixaban (ELIQUIS) 5 MG TABS tablet Take 2 tablets by mouth 2 times daily for 7 days 28 tablet 0    acyclovir (ZOVIRAX) 400 MG tablet take 1 tablet by mouth once daily 90 tablet 3    butalbital-acetaminophen-caffeine (FIORICET, ESGIC) -40 MG per tablet Take 1 tablet by mouth 3 times daily as needed for Headaches or Migraine Indications: Cluster Headache 90 tablet 5    EPINEPHrine (EPIPEN) 0.3 MG/0.3ML SOAJ injection Use as directed for allergic reaction 1 each 5    hydrOXYzine HCl (ATARAX) 25 MG tablet take 1 tablet BID 60 tablet 5    pantoprazole (PROTONIX) 40 MG tablet Take 1 tablet by mouth every morning (before breakfast) 90 tablet 3    aspirin-acetaminophen-caffeine (EXCEDRIN MIGRAINE) 250-250-65 MG per tablet Take 1 tablet by mouth as needed for Headaches 300 tablet 3     No current facility-administered medications on file prior to encounter.        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 fat layer exposed ([facility] - Primary    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 ([facility] (Chronic)       Pre Debridement Measurements:  Are located in the [place]  Documentation Flow Sheet  Post Debridement Measurements:  Wound/Ulcer Descriptions are Pre Debridement except measurements:     Wound 08/10/16 Other (Comment) Buttocks Left;Distal #2 acq 8/4/16 (Active)   Number of days: 4588       Wound 08/31/16 Buttocks Left;Proximal #3 aquired 8-27-26 (Active)   Number of days: 2352       Wound 02/02/22 Ankle Left;Medial #1 (Active)   Wound Image   01/18/23 0747   Dressing Status New dressing applied 02/08/23 0844   Wound Cleansed Cleansed with saline 02/08/23 0844   Dressing/Treatment Alginate with Ag;ABD 02/08/23 0844   Offloading for Diabetic Foot Ulcers Offloading not required 02/08/23 0844   Wound Length (cm) 7.4 cm 02/08/23 0808   Wound Width (cm) 4.5 cm 02/08/23 0808   Wound Depth (cm) 0.1 cm 02/08/23 0808   Wound Surface Area (cm^2) 33.3 cm^2 02/08/23 0808   Change in Wound Size % (l*w) -23.06 02/08/23 0808   Wound Volume (cm^3) 3.33 cm^3 02/08/23 0808   Wound Healing % -23 02/08/23 0808   Post-Procedure Length (cm) 7.5 cm 02/08/23 0824   Post-Procedure Width (cm) 4.6 cm 02/08/23 0824   Post-Procedure Depth (cm) 0.2 cm 02/08/23 0824   Post-Procedure Surface Area (cm^2) 34.5 cm^2 02/08/23 0824   Post-Procedure Volume (cm^3) 6.9 cm^3 02/08/23 0824   Wound Assessment Granulation tissue;Fibrin 02/08/23 0808   Drainage Amount Large 02/08/23 0808   Drainage Description Brown;Yellow 02/08/23 0808   Odor None 02/08/23 0808   Melany-wound Assessment Fragile 02/08/23 0808   Number of days: 371       Wound 03/16/22 Ankle Posterior; Left #2 (Active)   Wound Image   01/18/23 0747   Dressing Status New dressing applied 02/08/23 0844   Wound Cleansed Cleansed with saline 02/08/23 0844   Dressing/Treatment Alginate with Ag;ABD 02/08/23 0844   Offloading for Diabetic Foot Ulcers Offloading not required 02/08/23 0844   Wound Length (cm) 4.4 cm 02/08/23 0808   Wound Width (cm) 3.1 cm 02/08/23 0808   Wound Depth (cm) 0.1 cm 02/08/23 0808   Wound Surface Area (cm^2) 13.64 cm^2 02/08/23 0808   Change in Wound Size % (l*w) -445.6 02/08/23 0808   Wound Volume (cm^3) 1.364 cm^3 02/08/23 0808   Wound Healing % -446 02/08/23 0808   Post-Procedure Length (cm) 4.5 cm 02/08/23 0824   Post-Procedure Width (cm) 3.1 cm 02/08/23 0824   Post-Procedure Depth (cm) 0.2 cm 02/08/23 0824   Post-Procedure Surface Area (cm^2) 13.95 cm^2 02/08/23 0824   Post-Procedure Volume (cm^3) 2.79 cm^3 02/08/23 0824   Wound Assessment Granulation tissue 02/08/23 0808   Drainage Amount Large 02/08/23 0808   Drainage Description Brown;Yellow 02/08/23 0808   Odor None 02/08/23 0808   Melany-wound Assessment Fragile 02/08/23 0808   Number of days: 329           Procedure Note  Indications:  Based on my examination of this patient's wound(s)/ulcer(s) today, debridement is required to promote healing and evaluate the wound base. Performed by: Azam Simmons MD     Consent obtained:  Yes     Time out taken:  Yes     Pain Control: Anesthetic  Anesthetic: 4% Lidocaine Liquid Topical      Debridement:Excisional Debridement     Using curette the wound(s)/ulcer(s) was/were sharply debrided down through and including the removal of epidermis, dermis, and subcutaneous tissue.          Devitalized Tissue Debrided:  fibrin, biofilm, slough, and exudate to stimulate bleeding to promote healing, post debridement good bleeding base and wound edges noted     Wound/Ulcer #: 1, 2     Percent of Wound/Ulcer Debrided: 80%     Total Surface Area Debrided: 40 sq cm      Estimated Blood Loss:  Minimal  Hemostasis Achieved:  by pressure     Procedural Pain:  9 / 10   Post Procedural Pain:  8 / 10      Response to treatment:  With complaints of pain. Plan:   Treatment Note please see attached Discharge Instructions    Written patient dismissal instructions given to patient and signed by patient or POA. Discharge Instructions         Visit Discharge/Physician Orders     Discharge condition: Stable     Assessment of pain at discharge: yes     Anesthetic used: 4% lidocaine solution     Discharge to: Home     Left via:Private automobile     Accompanied by:self     ECF/HHA: n/a     Dressing Orders:LEFT MEDIAL and LATERAL LOWER LEG-Cleanse with normal saline, apply zinc to fiona wound, aquacel AG and drawtex, dressing, ABD pad , unna boot and coban. Change weekly. Treatment Orders: Eat a diet high in protein and vitamin C. Take a multiple vitamin daily unless contraindicated. To see Dr. Jermaine Maravilla as scheduled      Must wear slip on shoe to work due to wrap on leg! [facility] followup visit : 1 week____________________________  (Please note your next appointment above and if you are unable to keep, kindly give a 24 hour notice. Thank you.)     Physician signature:__________________________     If you experience any of the following, please call the [facility] during business hours:     * Increase in Pain  * Temperature over 101  * Increase in drainage from your wound  * Drainage with a foul odor  * Bleeding  * Increase in swelling  * Need for compression bandage changes due to slippage, breakthrough drainage. If you need medical attention outside of the business hours of the [address] [place] [address] Road please contact your PCP or go to the nearest emergency room.       Electronically signed by Rhoda Clements MD

## 2023-02-09 NOTE — DISCHARGE INSTRUCTIONS
Visit Discharge/Physician Orders     Discharge condition: Stable     Assessment of pain at discharge: yes     Anesthetic used: 4% lidocaine solution     Discharge to: Home     Left via:Private automobile     Accompanied by:self     ECF/HHA: n/a     Dressing Orders:LEFT MEDIAL and LATERAL LOWER LEG-Cleanse with normal saline, apply zinc to fiona wound, aquacel AG and drawtex, dressing, ABD pad , unna boot and coban. Change weekly. Treatment Orders: Eat a diet high in protein and vitamin C. Take a multiple vitamin daily unless contraindicated. To see Dr. Berry Call as scheduled      Must wear slip on shoe to work due to wrap on leg! [facility] followup visit : 1 week____________________________  (Please note your next appointment above and if you are unable to keep, kindly give a 24 hour notice. Thank you.)     Physician signature:__________________________     If you experience any of the following, please call the [facility] during business hours:     * Increase in Pain  * Temperature over 101  * Increase in drainage from your wound  * Drainage with a foul odor  * Bleeding  * Increase in swelling  * Need for compression bandage changes due to slippage, breakthrough drainage. If you need medical attention outside of the business hours of the [facility] please contact your PCP or go to the nearest emergency room.

## 2023-02-15 ENCOUNTER — HOSPITAL ENCOUNTER (OUTPATIENT)
Dept: WOUND CARE | Age: 68
Discharge: HOME | End: 2023-02-15
Payer: MEDICARE

## 2023-02-15 VITALS
TEMPERATURE: 97.5 F | HEART RATE: 88 BPM | SYSTOLIC BLOOD PRESSURE: 154 MMHG | RESPIRATION RATE: 18 BRPM | DIASTOLIC BLOOD PRESSURE: 56 MMHG

## 2023-02-15 DIAGNOSIS — I83.023 VARICOSE VEINS OF LEFT LOWER EXTREMITY WITH ULCER OF ANKLE WITH FAT LAYER EXPOSED (HCC): ICD-10-CM

## 2023-02-15 DIAGNOSIS — I70.242 ATHEROSCLEROSIS OF NATIVE ARTERY OF LEFT LOWER EXTREMITY WITH ULCERATION OF CALF (HCC): Primary | ICD-10-CM

## 2023-02-15 DIAGNOSIS — L97.322 VARICOSE VEINS OF LEFT LOWER EXTREMITY WITH ULCER OF ANKLE WITH FAT LAYER EXPOSED (HCC): ICD-10-CM

## 2023-02-15 PROCEDURE — 11045 DBRDMT SUBQ TISS EACH ADDL: CPT

## 2023-02-15 PROCEDURE — 11042 DBRDMT SUBQ TIS 1ST 20SQCM/<: CPT

## 2023-02-15 RX ORDER — LIDOCAINE HYDROCHLORIDE 20 MG/ML
JELLY TOPICAL ONCE
OUTPATIENT
Start: 2023-02-15 | End: 2023-02-15

## 2023-02-15 RX ORDER — BACITRACIN ZINC AND POLYMYXIN B SULFATE 500; 1000 [USP'U]/G; [USP'U]/G
OINTMENT TOPICAL ONCE
OUTPATIENT
Start: 2023-02-15 | End: 2023-02-15

## 2023-02-15 RX ORDER — GINSENG 100 MG
CAPSULE ORAL ONCE
OUTPATIENT
Start: 2023-02-15 | End: 2023-02-15

## 2023-02-15 RX ORDER — BETAMETHASONE DIPROPIONATE 0.05 %
OINTMENT (GRAM) TOPICAL ONCE
OUTPATIENT
Start: 2023-02-15 | End: 2023-02-15

## 2023-02-15 RX ORDER — LIDOCAINE HYDROCHLORIDE 40 MG/ML
SOLUTION TOPICAL ONCE
OUTPATIENT
Start: 2023-02-15 | End: 2023-02-15

## 2023-02-15 RX ORDER — CLOBETASOL PROPIONATE 0.5 MG/G
OINTMENT TOPICAL ONCE
OUTPATIENT
Start: 2023-02-15 | End: 2023-02-15

## 2023-02-15 RX ORDER — LIDOCAINE HYDROCHLORIDE 40 MG/ML
SOLUTION TOPICAL ONCE
Status: COMPLETED | OUTPATIENT
Start: 2023-02-15 | End: 2023-02-15

## 2023-02-15 RX ORDER — LIDOCAINE 50 MG/G
OINTMENT TOPICAL ONCE
OUTPATIENT
Start: 2023-02-15 | End: 2023-02-15

## 2023-02-15 RX ORDER — GENTAMICIN SULFATE 1 MG/G
OINTMENT TOPICAL ONCE
OUTPATIENT
Start: 2023-02-15 | End: 2023-02-15

## 2023-02-15 RX ORDER — BACITRACIN, NEOMYCIN, POLYMYXIN B 400; 3.5; 5 [USP'U]/G; MG/G; [USP'U]/G
OINTMENT TOPICAL ONCE
OUTPATIENT
Start: 2023-02-15 | End: 2023-02-15

## 2023-02-15 RX ORDER — LIDOCAINE 40 MG/G
CREAM TOPICAL ONCE
OUTPATIENT
Start: 2023-02-15 | End: 2023-02-15

## 2023-02-15 RX ADMIN — LIDOCAINE HYDROCHLORIDE 5 ML: 40 SOLUTION TOPICAL at 08:09

## 2023-02-15 NOTE — PROGRESS NOTES
Wound Healing Center Followup Visit Note    Referring Physician : Guerrero Lee MD  [address] [place] [address] RECORD NUMBER:  [id number]  AGE: 74 y.o. GENDER: female  : 1955  EPISODE DATE:  2/15/2023    Subjective:     Chief Complaint   Patient presents with    Wound Check     Left leg      HISTORY of PRESENT ILLNESS HPI   Wolfgang Babin is a 74 y.o. female who presents today in regards to follow up evaluation and treatment of wound/ulcer. That patient's past medical, family and social hx were reviewed and changes were made if present. History of Wound Context:  The patient has had a wound of her left ankle/calf which was first noted approximately 2021. This has been treated local wound care. On their initial visit to the wound healing center, 22,  the patient has noted that the wound has been improving. The patient has not had similar previous wounds in the past.      She started seeing Dr. Dougie Adam in 2021 and than Dr. Trell Porter. She was started in [facility] ~ 2021. She has noticed some improvement since starting unna boot. She is currently following with Dr. Ella Denton. Pt is not on abx at time of initial visit, but has been treated with previously by podiatry. She is not a DM. She is not a smoker. She denies hx of DVT, and per her report had recent us noting no evidence of dvt at [facility]. She also had arterial studies done. I had previously seen her in the past in regards to left buttock thigh wound, which started as abscess. Pt works at [employer] in [facility] and is on her feet all day.     22  Reflux study - if significant findings will schedule for fu  Continue compression therapy [facility] per podiatry with aquacell dressing  Elevation  She does not have significant arterial occlusive disease  22  Patient has asked to continuing following with me going forward because of our previous relationship  She is going to let Dr. Kathleen Duong office know  She tolerated unna boot and aquacell - some improvement  216/22  Appearance improved, slightly larger  Consider drawtek next week but overall drainage seems reasonably managed as periwound appears ok  2/23/2022  The wound, has some exudate, no recent cultures were done, will do wound cultures today  3/2/22  Reflux study reviewed - no significant reflux  Will treat culture - augmentin 875 mg bid x 10 days - script sent  More drainage - stable size  3/9/22  Wound appearance improved, stable in size  drawtek  3/16/22  Wound slightly larger in size, new wound of ankle  Continue Drawtek, change to profore  Avoid shoes which will contact ankle area  3/23/22  Wounds stable  Overall appearance improved  Will have pt see ID re cultures - disc with Dr Nestor Rodriguez  3/30/22  Much improved appearance  On oral abx per ID - concerns re pt ability to work while on IV - will see how her wound progresses  4/6/22  Appearance improved but size not much different  Finished oral abx  Will re culture next week if no significant size change - possible advance skin therapy  4/20/2022  Ulcer on the medial aspect, fairly clean and granulating, ulcer of the lateral aspect, has some exudate, debrided  4/27/22  Wounds stable   Hypergranulation tissue removed  Culture done - possible graft in future  5/4/22  Wound stable  Disc culture with Dr Nestor Rodriguez who would like to start her on iv abx  5/11/22  Wound stable  Iv abx have not been started yet - spoke with Dr Nestor Rodriguez - spoke with pt she will call his office  She had spoke with [facility] but there were some issues  5/18/22  Calf stable, ankle improved  Iv abx to start this week after picc placement  On exam   L dp 2+, PT triphasic - with compression of dp - PT becomes weakly biphasic  Concern that PT though triphasic is not getting inline flow and as a result isn't healing in the calf distribution because of occlusive disease  We discussed angiogram - will schedule  I reviewed the procedure with the patient and family as available. I discussed the procedure, risks, benefits, complications, and alternatives of the procedure. They understand and consent.   All questions were answered  Script for percocet 5/325 mg #28 prn q6 hrs - given, oarrs run  5/24/22  Angio, L PT and peroneal plasty  5/25/22  On iv abx  Wound appearance better  R groin no hematoma, L DP 2+, PT triphasic   6/1/22  Wound size and appearance better  6/8/22  Wound size and appearance better  Discussed with Dr Dre Dior - he will stop abx  6/15/22  Wound size slightly improvement, appearance better  6/22/22  Wounds sizes smaller  6/29/22  Wounds stable   Hypergranulation tissue present throughout wound beds    Continue drawtex, foam, ABD and profore   7/6/22  Wounds slightly improved  7/13/22  Both wounds slightly larger  Hyperkeratotic tissue debrided back  7/14/22  Patient came in due to drainage through her wrap  Dressing noted to have large amounts of yellow/green drainage throughout  Cultures obtained    7/20/22  Culture reviewed large growth S aureus and Pseudomonas  Disc with Dr Hannon - will start clindamycin 600 mg tid for staph  He will see her in office in regards to IV for pseudomonas  Wounds stable in size  Change to aquacell ag  7/27/22  Dr Dre Dior to see  Medial slightly larger, lateral slightly smaller  8/3/22  Dr Dre Dior saw her felt wound appearance better - will hold off on iv for now  Medial slightly improved, lateral stable  8/10/2022  Wounds stable  8/17/22  Wound stable, more pain with debridement  Discussed OR for debridement and possible advanced skin therapy - pt agreeable  8/24/22  Debridement, kerecise application  0/88/27  Wound appearance improved  Medial wound improved, lateral stable  9/7/22  Wounds are smaller  9/14/22  Wound stable  Enodfrom and drawtek  9/21/2022  Wound debrided, stable according to the measurements  9/28/22  Wound larger  Culture done  Discussed OR  Aquacell ag change   10/5/22  Wound slightly larger  Percocet 5/325 mg #25 - script given, oarrs reviewed  Cx reviewed - will disc with Dr Aliica Mary - all light growth   Not interested in OR at this time  10/12/22  Wound stable  Hold on cx tx  10/19/22  Wound stable  Ok for surgical debridement will schedule  Declined debridement today  10/25/22  Irrigation and excisional debridement of left medial and lateral ankle wound, Application of 429 mcg micromatrix acel  Application of unna boot  Lateral 15.5 sq cm, Medial 40.5 sq cm  11/2/22  Appearance improved  Measuring larger  No debridement  11/9/22  Appearance improved  Medial 62 (57), Lateral 18 (37)  Aquacell ag and wraps  11/16/22  Both appearance much improved  Medial 58 (62) Lateral 18 (18)  Aquacell ag , dratwek over top due to increased drainage  Percocet 5/325 mg #28 oarrs reviewed  11/23/22  Stable in size and appearance  New venous reflux study - Tuesday 11/29 at 1230 at my office  Will give her dressings to replace wrap after it is cut off for study  Refusing debridement due to pain  12/7/22  Missed last week due to influenza  Improved size  Tolerated debridement a little better than usual allowing slough to be removed especially around edges  Had to be rescheduled for venous US when office US is working again  12/14/22  Medial calf slightly larger but appearance improved 54 sq cm  Lateral calf slightly larger - more fibrinous exudate - 17 sq cm  Pt would only allow lateral calf debridement  Us 12/20 rescheduled  12/21/22  Minimal debridement allowed to both wounds  Venous reflux study reviewed - will discuss with Dr. Pink Has  Medial and lateral calf stable in size with fibrinous exudate  12/28/22  Debrided medial  Medial 52 increased (48)  Lateral 18 decreased (19)  Plan for lesser saphenous vein ablation  Oarrs reviewed - Percocet 5/325 mg #28  1/4/23  Slightly larger  Short term disability paperwork filled out   Surgery 1/12/23 - off till 2/6/23 1/12/23  Lesser saphenous vein ablation, stab phlebectomy  Wound debridement  1/18/23  Us rescheduled for 1/23  Wound appearance much improved, size stable  Oarrs reviewed - Percocet 5/325 mg #28  1/25/23  Us noted popliteal vein dvt - started on eliquis  Wound appearance improved  2/1/23  Wounds improved  Will extend leave from work to 2/27/23 2/8/23  Wounds improved  Medial 33, lateral 13  2/15/23  Size improved, pain improved  Discussed appropriate use of NSAIDs for breakthrough pain to begin weening off her percocet    Wound/Ulcer Pain Timing/Severity: constant, moderate  Quality of pain: aching, throbbing, tender  Severity:  8/ 10   Modifying Factors: Pain worsens with dressing changes, debridement  Associated Signs/Symptoms: edema, drainage and pain    Ulcer Identification:  Ulcer Type: venous  Contributing Factors: edema and venous stasis    Diabetic/Pressure/Non Pressure Ulcers only:  Ulcer: Non-Pressure ulcer, fat layer exposed        PAST MEDICAL HISTORY      Diagnosis Date    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 ([address] 05/18/2022    Dizziness - light-headed     GERD (gastroesophageal reflux disease)     Herpes dermatitis 04/27/2017    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    Lightheadedness     Migraine     PONV (postoperative nausea and vomiting)     Skin ulcer of buttock with fat layer exposed ([address] 07/20/2016    Venous stasis ulcer of left ankle with fat layer exposed with varicose veins ([address] 02/02/2022     Past Surgical History:   Procedure Laterality Date    CHOLECYSTECTOMY, LAPAROSCOPIC  04/08/2014    LEG SURGERY Left 8/24/2022    LEFT LOWER EXTREMITY DEBRIDMENT WITH POSSIBLE ADVANCED SKIN THERAPY, POSSIBLE UNNA BOOT performed by Israel Cummins MD at [facility] Left 10/25/2022    DEBRIDEMENT LEFT LEG, POSSIBLE ADVANCED SKIN THERAPY with acell micromatrix application , Rekha Learn [facility] [place] [facility] [facility] 1/12/2023    RADIOFREQUENCY ABLATION LEFT LESSER SAPHENOUS VEIN WITH STAB PHLEBECTOMIES, LEFT LEG WOUND DEBRIDEMENT performed by Amaryllis Prader, MD at [facility] ENDOSCOPY  12/13/2013    mild gastritis and small hiatal hernia, Dr Natasha Leong, [place] [address]    GERD, Dr. Shade Arvizu, office     Family History   Problem Relation Age of Onset    Diabetes Mother     Hypertension Mother     Heart Disease Father     Heart Attack Father     Heart Surgery Father         angioplasty    Stroke Father     High Cholesterol Father     Heart Attack Maternal Grandfather      Social History     Tobacco Use    Smoking status: Never    Smokeless tobacco: Never   Vaping Use    Vaping Use: Never used   Substance Use Topics    Alcohol use: No    Drug use: No     Allergies   Allergen Reactions    Bee Pollen Anaphylaxis    Tetracyclines & Related Hives     Current Outpatient Medications on File Prior to Encounter   Medication Sig Dispense Refill    apixaban (ELIQUIS) 5 MG TABS tablet Take 2 tablets by mouth 2 times daily for 7 days 28 tablet 0    acyclovir (ZOVIRAX) 400 MG tablet take 1 tablet by mouth once daily 90 tablet 3    butalbital-acetaminophen-caffeine (FIORICET, ESGIC) -40 MG per tablet Take 1 tablet by mouth 3 times daily as needed for Headaches or Migraine Indications: Cluster Headache 90 tablet 5    EPINEPHrine (EPIPEN) 0.3 MG/0.3ML SOAJ injection Use as directed for allergic reaction 1 each 5    hydrOXYzine HCl (ATARAX) 25 MG tablet take 1 tablet BID 60 tablet 5    pantoprazole (PROTONIX) 40 MG tablet Take 1 tablet by mouth every morning (before breakfast) 90 tablet 3    aspirin-acetaminophen-caffeine (EXCEDRIN MIGRAINE) 250-250-65 MG per tablet Take 1 tablet by mouth as needed for Headaches 300 tablet 3     No current facility-administered medications on file prior to encounter.        REVIEW OF SYSTEMS See HPI    Objective:    BP (!) 154/56   Pulse 88   Temp 97.5 °F (36.4 °C) (Temporal)   Resp 18   Wt Readings from Last 3 Encounters:   02/08/23 165 lb (74.8 kg)   02/01/23 165 lb (74.8 kg)   01/25/23 165 lb (74.8 kg)     PHYSICAL EXAM  CONSTITUTIONAL:   Awake, alert, cooperative   EYES:  lids and lashes normal   ENT: external ears and nose without lesions   NECK:  supple, symmetrical, trachea midline   SKIN:  Open wound Present    Assessment:     Problem List Items Addressed This Visit          Circulatory    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 ([facility] - Primary (Chronic)    Relevant Orders    Initiate Outpatient Wound Care Protocol    * (Principal) Varicose veins of left lower extremity with ulcer of ankle with fat layer exposed ([address]    Relevant Orders    Initiate Outpatient Wound Care Protocol       Pre Debridement Measurements:  Are located in the [place]  Documentation Flow Sheet  Post Debridement Measurements:  Wound/Ulcer Descriptions are Pre Debridement except measurements:     Wound 08/10/16 Other (Comment) Buttocks Left;Distal #2 acq 8/4/16 (Active)   Number of days: 9394       Wound 08/31/16 Buttocks Left;Proximal #3 aquired 8-27-26 (Active)   Number of days: 4395       Wound 02/02/22 Ankle Left;Medial #1 (Active)   Wound Image   02/15/23 0810   Dressing Status New dressing applied 02/15/23 0855   Wound Cleansed Cleansed with saline 02/15/23 0855   Dressing/Treatment Alginate with Ag;ABD 02/15/23 0855   Offloading for Diabetic Foot Ulcers Offloading not required 02/08/23 0844   Wound Length (cm) 6.3 cm 02/15/23 0810   Wound Width (cm) 4.3 cm 02/15/23 0810   Wound Depth (cm) 0.1 cm 02/15/23 0810   Wound Surface Area (cm^2) 27.09 cm^2 02/15/23 0810   Change in Wound Size % (l*w) -0.11 02/15/23 0810   Wound Volume (cm^3) 2.709 cm^3 02/15/23 0810   Wound Healing % 0 02/15/23 0810   Post-Procedure Length (cm) 6.4 cm 02/15/23 0829   Post-Procedure Width (cm) 4.5 cm 02/15/23 0829   Post-Procedure Depth (cm) 0.1 cm 02/15/23 0829   Post-Procedure Surface Area (cm^2) 28.8 cm^2 02/15/23 0829   Post-Procedure Volume (cm^3) 2.88 cm^3 02/15/23 0829   Wound Assessment Granulation tissue;Fibrin 02/15/23 0810   Drainage Amount Large 02/15/23 0810   Drainage Description Serosanguinous; Yellow 02/15/23 0810   Odor None 02/15/23 0810   Melany-wound Assessment Fragile 02/15/23 0810   Number of days: 378       Wound 03/16/22 Ankle Posterior; Left #2 (Active)   Wound Image   02/15/23 0810   Dressing Status New dressing applied;Clean;Dry; Intact 02/15/23 0855   Wound Cleansed Cleansed with saline 02/15/23 0855   Dressing/Treatment Alginate with Ag;Dry dressing 02/15/23 0855   Offloading for Diabetic Foot Ulcers Offloading not required 02/08/23 0844   Wound Length (cm) 4 cm 02/15/23 0810   Wound Width (cm) 2.96 cm 02/15/23 0810   Wound Depth (cm) 0.1 cm 02/15/23 0810   Wound Surface Area (cm^2) 11.84 cm^2 02/15/23 0810   Change in Wound Size % (l*w) -373.6 02/15/23 0810   Wound Volume (cm^3) 1.184 cm^3 02/15/23 0810   Wound Healing % -374 02/15/23 0810   Post-Procedure Length (cm) 4.2 cm 02/15/23 0829   Post-Procedure Width (cm) 3 cm 02/15/23 0829   Post-Procedure Depth (cm) 0.1 cm 02/15/23 0829   Post-Procedure Surface Area (cm^2) 12.6 cm^2 02/15/23 0829   Post-Procedure Volume (cm^3) 1.26 cm^3 02/15/23 0829   Wound Assessment Fibrin;Granulation tissue 02/15/23 0810   Drainage Amount Large 02/15/23 0810   Drainage Description Serosanguinous; Yellow 02/15/23 0810   Odor None 02/15/23 0810   Melany-wound Assessment Fragile 02/15/23 0810   Number of days: 336           Procedure Note  Indications:  Based on my examination of this patient's wound(s)/ulcer(s) today, debridement is required to promote healing and evaluate the wound base.      Performed by: Jo Ann Butt MD     Consent obtained:  Yes     Time out taken:  Yes     Pain Control: Anesthetic  Anesthetic: 4% Lidocaine Liquid Topical      Debridement:Excisional Debridement     Using curette the wound(s)/ulcer(s) was/were sharply debrided down through and including the removal of epidermis, dermis, and subcutaneous tissue. Devitalized Tissue Debrided:  fibrin, biofilm, slough, and exudate to stimulate bleeding to promote healing, post debridement good bleeding base and wound edges noted     Wound/Ulcer #:  1, 2     Percent of Wound/Ulcer Debrided: 100%     Total Surface Area Debrided: 38 sq cm      Estimated Blood Loss:  Minimal  Hemostasis Achieved:  by pressure     Procedural Pain:  8  / 10   Post Procedural Pain:  6 / 10      Response to treatment:  With complaints of pain. Plan:   Treatment Note please see attached Discharge Instructions    Written patient dismissal instructions given to patient and signed by patient or POA. Discharge Instructions         Visit Discharge/Physician Orders     Discharge condition: Stable     Assessment of pain at discharge: yes     Anesthetic used: 4% lidocaine solution     Discharge to: Home     Left via:Private automobile     Accompanied by:self     ECF/HHA: n/a     Dressing Orders:LEFT MEDIAL and LATERAL LOWER LEG-Cleanse with normal saline, apply zinc to finoa wound, aquacel AG and drawtex, dressing, ABD pad , unna boot and coban. Change weekly. Treatment Orders: Eat a diet high in protein and vitamin C. Take a multiple vitamin daily unless contraindicated. To see Dr. Krupa Godinez as scheduled      Must wear slip on shoe to work due to wrap on leg! [facility] followup visit : 1 week____________________________  (Please note your next appointment above and if you are unable to keep, kindly give a 24 hour notice.  Thank you.)     Physician signature:__________________________     If you experience any of the following, please call the [address] [place] [address] during business hours:     * Increase in Pain  * Temperature over 101  * Increase in drainage from your wound  * Drainage with a foul odor  * Bleeding  * Increase in swelling  * Need for compression bandage changes due to slippage, breakthrough drainage. If you need medical attention outside of the business hours of the [facility] [address] please contact your PCP or go to the nearest emergency room.       Electronically signed by Alex Valerio PA-C

## 2023-02-15 NOTE — LETTER
[facility]  Phone: Michelle Greene MD        February 15, 2023     Patient: Ceci Pino   YOB: 1955   Date of Visit: 2/15/2023       To Whom It May Concern: It is my medical opinion that Ceci Pino may return to work on 3/6/2023. If you have any questions or concerns, please don't hesitate to call.     Sincerely,        Yovany Reynoso MD

## 2023-02-16 NOTE — DISCHARGE INSTRUCTIONS
Visit Discharge/Physician Orders     Discharge condition: Stable     Assessment of pain at discharge: yes     Anesthetic used: 4% lidocaine solution     Discharge to: Home     Left via:Private automobile     Accompanied by:self     ECF/HHA: n/a     Dressing Orders:LEFT MEDIAL and LATERAL LOWER LEG-Cleanse with normal saline, apply zinc to fiona wound, aquacel AG and drawtex, dressing, ABD pad , unna boot and coban. Change weekly. Treatment Orders: Eat a diet high in protein and vitamin C. Take a multiple vitamin daily unless contraindicated. To see Dr. Miriam Vitale as scheduled      Must wear slip on shoe to work due to wrap on leg! [facility] followup visit : 1 week____________________________  (Please note your next appointment above and if you are unable to keep, kindly give a 24 hour notice. Thank you.)     Physician signature:__________________________     If you experience any of the following, please call the [facility] during business hours:     * Increase in Pain  * Temperature over 101  * Increase in drainage from your wound  * Drainage with a foul odor  * Bleeding  * Increase in swelling  * Need for compression bandage changes due to slippage, breakthrough drainage. If you need medical attention outside of the business hours of the [facility] please contact your PCP or go to the nearest emergency room.

## 2023-02-22 ENCOUNTER — HOSPITAL ENCOUNTER (OUTPATIENT)
Dept: WOUND CARE | Age: 68
Discharge: HOME | End: 2023-02-22
Payer: MEDICARE

## 2023-02-22 VITALS
DIASTOLIC BLOOD PRESSURE: 71 MMHG | BODY MASS INDEX: 25.01 KG/M2 | HEART RATE: 82 BPM | RESPIRATION RATE: 18 BRPM | TEMPERATURE: 97.2 F | SYSTOLIC BLOOD PRESSURE: 177 MMHG | WEIGHT: 165 LBS | HEIGHT: 68 IN

## 2023-02-22 DIAGNOSIS — I83.023 VARICOSE VEINS OF LEFT LOWER EXTREMITY WITH ULCER OF ANKLE WITH FAT LAYER EXPOSED (HCC): Primary | ICD-10-CM

## 2023-02-22 DIAGNOSIS — I70.243 ATHEROSCLEROSIS OF NATIVE ARTERY OF LEFT LOWER EXTREMITY WITH ULCERATION OF ANKLE (HCC): ICD-10-CM

## 2023-02-22 DIAGNOSIS — I70.242 ATHEROSCLEROSIS OF NATIVE ARTERY OF LEFT LOWER EXTREMITY WITH ULCERATION OF CALF (HCC): ICD-10-CM

## 2023-02-22 DIAGNOSIS — L97.322 VARICOSE VEINS OF LEFT LOWER EXTREMITY WITH ULCER OF ANKLE WITH FAT LAYER EXPOSED (HCC): Primary | ICD-10-CM

## 2023-02-22 PROCEDURE — 11042 DBRDMT SUBQ TIS 1ST 20SQCM/<: CPT

## 2023-02-22 PROCEDURE — 11045 DBRDMT SUBQ TISS EACH ADDL: CPT

## 2023-02-22 RX ORDER — GINSENG 100 MG
CAPSULE ORAL ONCE
OUTPATIENT
Start: 2023-02-22 | End: 2023-02-22

## 2023-02-22 RX ORDER — CLOBETASOL PROPIONATE 0.5 MG/G
OINTMENT TOPICAL ONCE
OUTPATIENT
Start: 2023-02-22 | End: 2023-02-22

## 2023-02-22 RX ORDER — LIDOCAINE 50 MG/G
OINTMENT TOPICAL ONCE
OUTPATIENT
Start: 2023-02-22 | End: 2023-02-22

## 2023-02-22 RX ORDER — BETAMETHASONE DIPROPIONATE 0.05 %
OINTMENT (GRAM) TOPICAL ONCE
OUTPATIENT
Start: 2023-02-22 | End: 2023-02-22

## 2023-02-22 RX ORDER — LIDOCAINE HYDROCHLORIDE 20 MG/ML
JELLY TOPICAL ONCE
OUTPATIENT
Start: 2023-02-22 | End: 2023-02-22

## 2023-02-22 RX ORDER — LIDOCAINE HYDROCHLORIDE 40 MG/ML
SOLUTION TOPICAL ONCE
OUTPATIENT
Start: 2023-02-22 | End: 2023-02-22

## 2023-02-22 RX ORDER — BACITRACIN ZINC AND POLYMYXIN B SULFATE 500; 1000 [USP'U]/G; [USP'U]/G
OINTMENT TOPICAL ONCE
OUTPATIENT
Start: 2023-02-22 | End: 2023-02-22

## 2023-02-22 RX ORDER — GENTAMICIN SULFATE 1 MG/G
OINTMENT TOPICAL ONCE
OUTPATIENT
Start: 2023-02-22 | End: 2023-02-22

## 2023-02-22 RX ORDER — BACITRACIN, NEOMYCIN, POLYMYXIN B 400; 3.5; 5 [USP'U]/G; MG/G; [USP'U]/G
OINTMENT TOPICAL ONCE
OUTPATIENT
Start: 2023-02-22 | End: 2023-02-22

## 2023-02-22 RX ORDER — LIDOCAINE HYDROCHLORIDE 40 MG/ML
SOLUTION TOPICAL ONCE
Status: COMPLETED | OUTPATIENT
Start: 2023-02-22 | End: 2023-02-22

## 2023-02-22 RX ORDER — LIDOCAINE 40 MG/G
CREAM TOPICAL ONCE
OUTPATIENT
Start: 2023-02-22 | End: 2023-02-22

## 2023-02-22 RX ORDER — OXYCODONE HYDROCHLORIDE AND ACETAMINOPHEN 5; 325 MG/1; MG/1
1 TABLET ORAL EVERY 8 HOURS PRN
Qty: 28 TABLET | Refills: 0 | Status: SHIPPED | OUTPATIENT
Start: 2023-02-22 | End: 2023-03-08

## 2023-02-22 RX ADMIN — LIDOCAINE HYDROCHLORIDE 10 ML: 40 SOLUTION TOPICAL at 07:42

## 2023-02-22 ASSESSMENT — PAIN DESCRIPTION - DESCRIPTORS: DESCRIPTORS: ACHING

## 2023-02-22 ASSESSMENT — PAIN DESCRIPTION - FREQUENCY: FREQUENCY: CONTINUOUS

## 2023-02-22 ASSESSMENT — PAIN DESCRIPTION - ONSET: ONSET: ON-GOING

## 2023-02-22 ASSESSMENT — PAIN DESCRIPTION - LOCATION: LOCATION: LEG

## 2023-02-22 ASSESSMENT — PAIN DESCRIPTION - ORIENTATION: ORIENTATION: LEFT

## 2023-02-22 ASSESSMENT — PAIN SCALES - GENERAL: PAINLEVEL_OUTOF10: 4

## 2023-02-22 ASSESSMENT — PAIN - FUNCTIONAL ASSESSMENT: PAIN_FUNCTIONAL_ASSESSMENT: PREVENTS OR INTERFERES SOME ACTIVE ACTIVITIES AND ADLS

## 2023-02-22 ASSESSMENT — PAIN DESCRIPTION - PAIN TYPE: TYPE: CHRONIC PAIN

## 2023-02-22 NOTE — PROGRESS NOTES
Wound Healing Center Followup Visit Note    Referring Physician : Rosenda Cormier MD  Amanda Ledesma  MEDICAL RECORD NUMBER:  [id number]  AGE: 67 y.o.   GENDER: female  : 1955  EPISODE DATE:  2023    Subjective:     Chief Complaint   Patient presents with    Wound Check     Left leg      HISTORY of PRESENT ILLNESS HPI   Amanda Ledesma is a 67 y.o. female who presents today in regards to follow up evaluation and treatment of wound/ulcer.  That patient's past medical, family and social hx were reviewed and changes were made if present.    History of Wound Context:  The patient has had a wound of her left ankle/calf which was first noted approximately 2021.  This has been treated local wound care. On their initial visit to the wound healing center, 22,  the patient has noted that the wound has been improving.  The patient has not had similar previous wounds in the past.      She started seeing Dr. Street in 2021 and than Dr. Gillis.  She was started in unna boot ~ 2021.  She has noticed some improvement since starting unna boot.  She is currently following with Dr. Haskins.      Pt is not on abx at time of initial visit, but has been treated with previously by podiatry.      She is not a DM.  She is not a smoker.  She denies hx of DVT, and per her report had recent us noting no evidence of dvt at [facility].  She also had arterial studies done.    I had previously seen her in the past in regards to left buttock thigh wound, which started as abscess.      Pt works at [employer] in the [employer] and is on her feet all day.    22  Reflux study - if significant findings will schedule for fu  Continue compression therapy [facility] per podiatry with aquacell dressing  Elevation  She does not have significant arterial occlusive disease  22  Patient has asked to continuing following with me going forward because of our previous relationship  She is going to let Dr. Schuler office know  She  tolerated unna boot and aquacell - some improvement  216/22  Appearance improved, slightly larger  Consider drawtek next week but overall drainage seems reasonably managed as periwound appears ok  2/23/2022  The wound, has some exudate, no recent cultures were done, will do wound cultures today  3/2/22  Reflux study reviewed - no significant reflux  Will treat culture - augmentin 875 mg bid x 10 days - script sent  More drainage - stable size  3/9/22  Wound appearance improved, stable in size  drawtek  3/16/22  Wound slightly larger in size, new wound of ankle  Continue Drawtek, change to profore  Avoid shoes which will contact ankle area  3/23/22  Wounds stable  Overall appearance improved  Will have pt see ID re cultures - disc with Dr Abbey Faria  3/30/22  Much improved appearance  On oral abx per ID - concerns re pt ability to work while on IV - will see how her wound progresses  4/6/22  Appearance improved but size not much different  Finished oral abx  Will re culture next week if no significant size change - possible advance skin therapy  4/20/2022  Ulcer on the medial aspect, fairly clean and granulating, ulcer of the lateral aspect, has some exudate, debrided  4/27/22  Wounds stable   Hypergranulation tissue removed  Culture done - possible graft in future  5/4/22  Wound stable  Disc culture with Dr Abbey Faria who would like to start her on iv abx  5/11/22  Wound stable  Iv abx have not been started yet - spoke with Dr Abbey Faria - spoke with pt she will call his office  She had spoke with [facility] but there were some issues  5/18/22  Calf stable, ankle improved  Iv abx to start this week after picc placement  On exam   L dp 2+, PT triphasic - with compression of dp - PT becomes weakly biphasic  Concern that PT though triphasic is not getting inline flow and as a result isn't healing in the calf distribution because of occlusive disease  We discussed angiogram - will schedule  I reviewed the procedure with the patient and family as available. I discussed the procedure, risks, benefits, complications, and alternatives of the procedure. They understand and consent.   All questions were answered  Script for percocet 5/325 mg #28 prn q6 hrs - given, oarrs run  5/24/22  Angio, L PT and peroneal plasty  5/25/22  On iv abx  Wound appearance better  R groin no hematoma, L DP 2+, PT triphasic   6/1/22  Wound size and appearance better  6/8/22  Wound size and appearance better  Discussed with Dr Dougie Gardiner - he will stop abx  6/15/22  Wound size slightly improvement, appearance better  6/22/22  Wounds sizes smaller  6/29/22  Wounds stable   Hypergranulation tissue present throughout wound beds    Continue drawtex, foam, ABD and profore   7/6/22  Wounds slightly improved  7/13/22  Both wounds slightly larger  Hyperkeratotic tissue debrided back  7/14/22  Patient came in due to drainage through her wrap  Dressing noted to have large amounts of yellow/green drainage throughout  Cultures obtained    7/20/22  Culture reviewed large growth S aureus and Pseudomonas  Disc with Dr Hannon - will start clindamycin 600 mg tid for staph  He will see her in office in regards to IV for pseudomonas  Wounds stable in size  Change to aquacell ag  7/27/22  Dr Dougie Gardiner to see  Medial slightly larger, lateral slightly smaller  8/3/22  Dr Dougie Gardiner saw her felt wound appearance better - will hold off on iv for now  Medial slightly improved, lateral stable  8/10/2022  Wounds stable  8/17/22  Wound stable, more pain with debridement  Discussed OR for debridement and possible advanced skin therapy - pt agreeable  8/24/22  Debridement, kerecise application  1[birth year]  Wound appearance improved  Medial wound improved, lateral stable  9/7/22  Wounds are smaller  9/14/22  Wound stable  Enodfrom and drawtek  9/21/2022  Wound debrided, stable according to the measurements  9/28/22  Wound larger  Culture done  Discussed OR  Aquacell ag change   10/5/22  Wound slightly larger  Percocet 5/325 mg #25 - script given, oarrs reviewed  Cx reviewed - will disc with Dr Dougie Gardiner - all light growth   Not interested in OR at this time  10/12/22  Wound stable  Hold on cx tx  10/19/22  Wound stable  Ok for surgical debridement will schedule  Declined debridement today  10/25/22  Irrigation and excisional debridement of left medial and lateral ankle wound, Application of 315 mcg micromatrix acel  Application of unna boot  Lateral 15.5 sq cm, Medial 40.5 sq cm  11/2/22  Appearance improved  Measuring larger  No debridement  11/9/22  Appearance improved  Medial 62 (57), Lateral 18 (37)  Aquacell ag and wraps  11/16/22  Both appearance much improved  Medial 58 (62) Lateral 18 (18)  Aquacell ag , dratwek over top due to increased drainage  Percocet 5/325 mg #28 oarrs reviewed  11/23/22  Stable in size and appearance  New venous reflux study - Tuesday 11/29 at 1230 at my office  Will give her dressings to replace wrap after it is cut off for study  Refusing debridement due to pain  12/7/22  Missed last week due to influenza  Improved size  Tolerated debridement a little better than usual allowing slough to be removed especially around edges  Had to be rescheduled for venous US when office US is working again  12/14/22  Medial calf slightly larger but appearance improved 54 sq cm  Lateral calf slightly larger - more fibrinous exudate - 17 sq cm  Pt would only allow lateral calf debridement  Us 12/20 rescheduled  12/21/22  Minimal debridement allowed to both wounds  Venous reflux study reviewed - will discuss with Dr. Allison Xie  Medial and lateral calf stable in size with fibrinous exudate  12/28/22  Debrided medial  Medial 52 increased (48)  Lateral 18 decreased (19)  Plan for lesser saphenous vein ablation  Oarrs reviewed - Percocet 5/325 mg #28  1/4/23  Slightly larger  Short term disability paperwork filled out   Surgery 1/12/23 - off till 2/6/23 1/12/23  Lesser saphenous vein ablation, stab phlebectomy  Wound debridement  1/18/23  Us rescheduled for 1/23  Wound appearance much improved, size stable  Oarrs reviewed - Percocet 5/325 mg #28  1/25/23  Us noted popliteal vein dvt - started on eliquis  Wound appearance improved  2/1/23  Wounds improved  Will extend leave from work to 2/27/23 2/8/23  Wounds improved  Medial 33, lateral 13  2/15/23  Size improved, pain improved  Discussed appropriate use of NSAIDs for breakthrough pain to begin weening off her percocet  2/22/23  Improved appearance  Oarrs reviewed - Percocet 5/325 mg #28    Wound/Ulcer Pain Timing/Severity: constant, moderate  Quality of pain: aching, throbbing, tender  Severity:  8/ 10   Modifying Factors: Pain worsens with dressing changes, debridement  Associated Signs/Symptoms: edema, drainage and pain    Ulcer Identification:  Ulcer Type: venous  Contributing Factors: edema and venous stasis    Diabetic/Pressure/Non Pressure Ulcers only:  Ulcer: Non-Pressure ulcer, fat layer exposed        PAST MEDICAL HISTORY      Diagnosis Date    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 ([address] 05/18/2022    Dizziness - light-headed     GERD (gastroesophageal reflux disease)     Herpes dermatitis 04/27/2017    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    Lightheadedness     Migraine     PONV (postoperative nausea and vomiting)     Skin ulcer of buttock with fat layer exposed ([address] 07/20/2016    Venous stasis ulcer of left ankle with fat layer exposed with varicose veins ([address] 02/02/2022     Past Surgical History:   Procedure Laterality Date    CHOLECYSTECTOMY, LAPAROSCOPIC  04/08/2014    LEG SURGERY Left 8/24/2022    LEFT LOWER EXTREMITY DEBRIDMENT WITH POSSIBLE ADVANCED SKIN THERAPY, POSSIBLE UNNA BOOT performed by Stephen Peterson MD at [facility] Left 10/25/2022    DEBRIDEMENT LEFT LEG, POSSIBLE ADVANCED SKIN THERAPY with acell micromatrix application , UNNA BOOT performed by Stephen Peterson MD at [facility]  ABLATION Left 1/12/2023    RADIOFREQUENCY ABLATION LEFT LESSER SAPHENOUS VEIN WITH STAB PHLEBECTOMIES, LEFT LEG WOUND DEBRIDEMENT performed by Jenae Marroquin MD at [facility] ENDOSCOPY  12/13/2013    mild gastritis and small hiatal hernia, Dr Tennille Kwon, [place] [address]  2015    GERD, Dr. Lulú Sales, office     Family History   Problem Relation Age of Onset    Diabetes Mother     Hypertension Mother     Heart Disease Father     Heart Attack Father     Heart Surgery Father         angioplasty    Stroke Father     High Cholesterol Father     Heart Attack Maternal Grandfather      Social History     Tobacco Use    Smoking status: Never    Smokeless tobacco: Never   Vaping Use    Vaping Use: Never used   Substance Use Topics    Alcohol use: No    Drug use: No     Allergies   Allergen Reactions    Bee Pollen Anaphylaxis    Tetracyclines & Related Hives     Current Outpatient Medications on File Prior to Encounter   Medication Sig Dispense Refill    apixaban (ELIQUIS) 5 MG TABS tablet Take 2 tablets by mouth 2 times daily for 7 days 28 tablet 0    acyclovir (ZOVIRAX) 400 MG tablet take 1 tablet by mouth once daily 90 tablet 3    butalbital-acetaminophen-caffeine (FIORICET, ESGIC) -40 MG per tablet Take 1 tablet by mouth 3 times daily as needed for Headaches or Migraine Indications: Cluster Headache 90 tablet 5    EPINEPHrine (EPIPEN) 0.3 MG/0.3ML SOAJ injection Use as directed for allergic reaction 1 each 5    hydrOXYzine HCl (ATARAX) 25 MG tablet take 1 tablet BID 60 tablet 5    pantoprazole (PROTONIX) 40 MG tablet Take 1 tablet by mouth every morning (before breakfast) 90 tablet 3    aspirin-acetaminophen-caffeine (EXCEDRIN MIGRAINE) 250-250-65 MG per tablet Take 1 tablet by mouth as needed for Headaches 300 tablet 3     No current facility-administered medications on file prior to encounter.        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 ([address] - Primary    Relevant Orders    Initiate Outpatient Wound Care Protocol    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 ([address] (Chronic)    Relevant Orders    Initiate Outpatient Wound Care Protocol       Pre Debridement Measurements:  Are located in the [facility]  Documentation Flow Sheet  Post Debridement Measurements:  Wound/Ulcer Descriptions are Pre Debridement except measurements:     Wound 08/10/16 Other (Comment) Buttocks Left;Distal #2 acq 8/4/16 (Active)   Number of days: 2387       Wound 08/31/16 Buttocks Left;Proximal #3 aquired 8-27-26 (Active)   Number of days: 2366       Wound 02/02/22 Ankle Left;Medial #1 (Active)   Wound Image   02/15/23 0810   Dressing Status New dressing applied 02/22/23 0814   Wound Cleansed Cleansed with saline 02/22/23 0814   Dressing/Treatment Alginate with Ag;ABD 02/22/23 0814   Offloading for Diabetic Foot Ulcers Offloading not required 02/22/23 0814   Wound Length (cm) 5.5 cm 02/22/23 0739   Wound Width (cm) 6 cm 02/22/23 0739   Wound Depth (cm) 0.1 cm 02/22/23 0739   Wound Surface Area (cm^2) 33 cm^2 02/22/23 0739   Change in Wound Size % (l*w) -21.95 02/22/23 0739   Wound Volume (cm^3) 3.3 cm^3 02/22/23 0739   Wound Healing % -22 02/22/23 0739   Post-Procedure Length (cm) 5.6 cm 02/22/23 0814   Post-Procedure Width (cm) 6.2 cm 02/22/23 0814   Post-Procedure Depth (cm) 0.1 cm 02/22/23 0814   Post-Procedure Surface Area (cm^2) 34.72 cm^2 02/22/23 0814   Post-Procedure Volume (cm^3) 3.472 cm^3 02/22/23 0814   Wound Assessment Granulation tissue;Fibrin 02/22/23 0739   Drainage Amount Large 02/22/23 0739   Drainage Description Serosanguinous; Yellow 02/22/23 0739   Odor None 02/22/23 0739   Melany-wound Assessment Maceration 02/22/23 0739   Number of days: 385       Wound 03/16/22 Ankle Posterior; Left #2 (Active)   Wound Image   02/15/23 0810   Dressing Status New dressing applied;Clean;Dry; Intact 02/22/23 0814   Wound Cleansed Cleansed with saline 02/22/23 0814   Dressing/Treatment Alginate with Ag;Dry dressing 02/22/23 0814   Offloading for Diabetic Foot Ulcers Offloading not required 02/08/23 0844   Wound Length (cm) 3.2 cm 02/22/23 0739   Wound Width (cm) 2.3 cm 02/22/23 0739   Wound Depth (cm) 0.1 cm 02/22/23 0739   Wound Surface Area (cm^2) 7.36 cm^2 02/22/23 0739   Change in Wound Size % (l*w) -194.4 02/22/23 0739   Wound Volume (cm^3) 0.736 cm^3 02/22/23 0739   Wound Healing % -194 02/22/23 0739   Post-Procedure Length (cm) 3.5 cm 02/22/23 0814   Post-Procedure Width (cm) 2.5 cm 02/22/23 0814   Post-Procedure Depth (cm) 0.1 cm 02/22/23 0814   Post-Procedure Surface Area (cm^2) 8.75 cm^2 02/22/23 0814   Post-Procedure Volume (cm^3) 0.875 cm^3 02/22/23 0814   Wound Assessment Fibrin;Granulation tissue 02/22/23 0739   Drainage Amount Moderate 02/22/23 0739   Drainage Description Serosanguinous; Yellow 02/22/23 0739   Odor None 02/22/23 0739   Melany-wound Assessment Maceration 02/22/23 0739   Number of days: 343           Procedure Note  Indications:  Based on my examination of this patient's wound(s)/ulcer(s) today, debridement is required to promote healing and evaluate the wound base.      Performed by: Hebert Dasilva MD     Consent obtained:  Yes     Time out taken:  Yes     Pain Control: Anesthetic  Anesthetic: 4% Lidocaine Liquid Topical      Debridement:Excisional Debridement     Using curette the wound(s)/ulcer(s) was/were sharply debrided down through and including the removal of epidermis, dermis, and subcutaneous tissue. Devitalized Tissue Debrided:  fibrin, biofilm, slough, and exudate to stimulate bleeding to promote healing, post debridement good bleeding base and wound edges noted     Wound/Ulcer #:  1, 2     Percent of Wound/Ulcer Debrided: 100%     Total Surface Area Debrided: 38 sq cm      Estimated Blood Loss:  Minimal  Hemostasis Achieved:  by pressure     Procedural Pain:  8 / 10   Post Procedural Pain:  6 / 10      Response to treatment:  With complaints of pain. Plan:   Treatment Note please see attached Discharge Instructions    Written patient dismissal instructions given to patient and signed by patient or POA. Discharge Instructions         Visit Discharge/Physician Orders     Discharge condition: Stable     Assessment of pain at discharge: yes     Anesthetic used: 4% lidocaine solution     Discharge to: Home     Left via:Private automobile     Accompanied by:self     ECF/HHA: n/a     Dressing Orders:LEFT MEDIAL and LATERAL LOWER LEG-Cleanse with normal saline, apply zinc to fiona wound, aquacel AG and drawtex, dressing, ABD pad , unna boot and coban. Change weekly. Treatment Orders: Eat a diet high in protein and vitamin C. Take a multiple vitamin daily unless contraindicated. To see Dr. Kathia Burroughs as scheduled      Must wear slip on shoe to work due to wrap on leg! [address] [place] [address],[address] followup visit : 1 week____________________________  (Please note your next appointment above and if you are unable to keep, kindly give a 24 hour notice.  Thank you.)     Physician signature:__________________________     If you experience any of the following, please call the [address] during business hours:     * Increase in Pain  * Temperature over 101  * Increase in drainage from your wound  * Drainage with a foul odor  * Bleeding  * Increase in swelling  * Need for compression bandage changes due to slippage, breakthrough drainage. If you need medical attention outside of the business hours of the [address] [place] [address] Road please contact your PCP or go to the nearest emergency room.       Electronically signed by Pamella Morgan MD

## 2023-02-24 NOTE — DISCHARGE INSTRUCTIONS
Visit Discharge/Physician Orders     Discharge condition: Stable     Assessment of pain at discharge: yes     Anesthetic used: 4% lidocaine solution     Discharge to: Home     Left via:Private automobile     Accompanied by:self     ECF/HHA: n/a     Dressing Orders:LEFT MEDIAL and LATERAL LOWER LEG-Cleanse with normal saline, apply zinc to fiona wound, aquacel AG and drawtex, dressing, ABD pad , unna boot and coban. Change weekly. Treatment Orders: Eat a diet high in protein and vitamin C. Take a multiple vitamin daily unless contraindicated. To see Dr. Sadaf Meeks as scheduled      Must wear slip on shoe to work due to wrap on leg! Ok to return to work Monday 3/6. [facility] followup visit : 1 week____________________________  (Please note your next appointment above and if you are unable to keep, kindly give a 24 hour notice. Thank you.)     Physician signature:__________________________     If you experience any of the following, please call the [facility] during business hours:     * Increase in Pain  * Temperature over 101  * Increase in drainage from your wound  * Drainage with a foul odor  * Bleeding  * Increase in swelling  * Need for compression bandage changes due to slippage, breakthrough drainage. If you need medical attention outside of the business hours of the [facility] please contact your PCP or go to the nearest emergency room.

## 2023-03-01 ENCOUNTER — HOSPITAL ENCOUNTER (OUTPATIENT)
Dept: WOUND CARE | Age: 68
Discharge: HOME | End: 2023-03-01
Payer: MEDICARE

## 2023-03-01 ENCOUNTER — OFFICE VISIT (OUTPATIENT)
Dept: FAMILY MEDICINE CLINIC | Age: 68
End: 2023-03-01
Payer: MEDICARE

## 2023-03-01 VITALS
TEMPERATURE: 98 F | WEIGHT: 171 LBS | RESPIRATION RATE: 16 BRPM | BODY MASS INDEX: 25.91 KG/M2 | HEIGHT: 68 IN | DIASTOLIC BLOOD PRESSURE: 78 MMHG | OXYGEN SATURATION: 98 % | HEART RATE: 78 BPM | SYSTOLIC BLOOD PRESSURE: 122 MMHG

## 2023-03-01 VITALS
TEMPERATURE: 97.6 F | SYSTOLIC BLOOD PRESSURE: 150 MMHG | BODY MASS INDEX: 25.09 KG/M2 | DIASTOLIC BLOOD PRESSURE: 82 MMHG | HEART RATE: 86 BPM | RESPIRATION RATE: 18 BRPM | WEIGHT: 165 LBS

## 2023-03-01 DIAGNOSIS — Z12.31 ENCOUNTER FOR SCREENING MAMMOGRAM FOR MALIGNANT NEOPLASM OF BREAST: ICD-10-CM

## 2023-03-01 DIAGNOSIS — Z76.0 ENCOUNTER FOR MEDICATION REFILL: Primary | ICD-10-CM

## 2023-03-01 DIAGNOSIS — G44.029 CHRONIC CLUSTER HEADACHE, NOT INTRACTABLE: ICD-10-CM

## 2023-03-01 DIAGNOSIS — G43.909 MIGRAINE WITHOUT STATUS MIGRAINOSUS, NOT INTRACTABLE, UNSPECIFIED MIGRAINE TYPE: ICD-10-CM

## 2023-03-01 DIAGNOSIS — I70.243 ATHEROSCLEROSIS OF NATIVE ARTERY OF LEFT LOWER EXTREMITY WITH ULCERATION OF ANKLE (HCC): ICD-10-CM

## 2023-03-01 DIAGNOSIS — K21.00 GASTROESOPHAGEAL REFLUX DISEASE WITH ESOPHAGITIS WITHOUT HEMORRHAGE: ICD-10-CM

## 2023-03-01 DIAGNOSIS — F51.05 INSOMNIA SECONDARY TO ANXIETY: ICD-10-CM

## 2023-03-01 DIAGNOSIS — I70.242 ATHEROSCLEROSIS OF NATIVE ARTERY OF LEFT LOWER EXTREMITY WITH ULCERATION OF CALF (HCC): ICD-10-CM

## 2023-03-01 DIAGNOSIS — I83.023 VARICOSE VEINS OF LEFT LOWER EXTREMITY WITH ULCER OF ANKLE WITH FAT LAYER EXPOSED (HCC): Primary | ICD-10-CM

## 2023-03-01 DIAGNOSIS — B00.9 HERPES: ICD-10-CM

## 2023-03-01 DIAGNOSIS — F41.9 INSOMNIA SECONDARY TO ANXIETY: ICD-10-CM

## 2023-03-01 DIAGNOSIS — L97.322 VARICOSE VEINS OF LEFT LOWER EXTREMITY WITH ULCER OF ANKLE WITH FAT LAYER EXPOSED (HCC): Primary | ICD-10-CM

## 2023-03-01 PROCEDURE — G8427 DOCREV CUR MEDS BY ELIG CLIN: HCPCS | Performed by: FAMILY MEDICINE

## 2023-03-01 PROCEDURE — G8400 PT W/DXA NO RESULTS DOC: HCPCS | Performed by: FAMILY MEDICINE

## 2023-03-01 PROCEDURE — 3017F COLORECTAL CA SCREEN DOC REV: CPT | Performed by: FAMILY MEDICINE

## 2023-03-01 PROCEDURE — 1090F PRES/ABSN URINE INCON ASSESS: CPT | Performed by: FAMILY MEDICINE

## 2023-03-01 PROCEDURE — 11042 DBRDMT SUBQ TIS 1ST 20SQCM/<: CPT

## 2023-03-01 PROCEDURE — G8417 CALC BMI ABV UP PARAM F/U: HCPCS | Performed by: FAMILY MEDICINE

## 2023-03-01 PROCEDURE — G8484 FLU IMMUNIZE NO ADMIN: HCPCS | Performed by: FAMILY MEDICINE

## 2023-03-01 PROCEDURE — 99214 OFFICE O/P EST MOD 30 MIN: CPT | Performed by: FAMILY MEDICINE

## 2023-03-01 PROCEDURE — 1124F ACP DISCUSS-NO DSCNMKR DOCD: CPT | Performed by: FAMILY MEDICINE

## 2023-03-01 PROCEDURE — 1036F TOBACCO NON-USER: CPT | Performed by: FAMILY MEDICINE

## 2023-03-01 RX ORDER — BACITRACIN ZINC AND POLYMYXIN B SULFATE 500; 1000 [USP'U]/G; [USP'U]/G
OINTMENT TOPICAL ONCE
OUTPATIENT
Start: 2023-03-01 | End: 2023-03-01

## 2023-03-01 RX ORDER — LIDOCAINE HYDROCHLORIDE 20 MG/ML
JELLY TOPICAL ONCE
OUTPATIENT
Start: 2023-03-01 | End: 2023-03-01

## 2023-03-01 RX ORDER — PANTOPRAZOLE SODIUM 40 MG/1
40 TABLET, DELAYED RELEASE ORAL
Qty: 90 TABLET | Refills: 1 | Status: SHIPPED | OUTPATIENT
Start: 2023-03-01 | End: 2024-02-29

## 2023-03-01 RX ORDER — LIDOCAINE 40 MG/G
CREAM TOPICAL ONCE
OUTPATIENT
Start: 2023-03-01 | End: 2023-03-01

## 2023-03-01 RX ORDER — LIDOCAINE HYDROCHLORIDE 40 MG/ML
SOLUTION TOPICAL ONCE
Status: COMPLETED | OUTPATIENT
Start: 2023-03-01 | End: 2023-03-01

## 2023-03-01 RX ORDER — BETAMETHASONE DIPROPIONATE 0.05 %
OINTMENT (GRAM) TOPICAL ONCE
OUTPATIENT
Start: 2023-03-01 | End: 2023-03-01

## 2023-03-01 RX ORDER — HYDROXYZINE HYDROCHLORIDE 25 MG/1
TABLET, FILM COATED ORAL
Qty: 180 TABLET | Refills: 1 | Status: SHIPPED | OUTPATIENT
Start: 2023-03-01 | End: 2023-08-31

## 2023-03-01 RX ORDER — GENTAMICIN SULFATE 1 MG/G
OINTMENT TOPICAL ONCE
OUTPATIENT
Start: 2023-03-01 | End: 2023-03-01

## 2023-03-01 RX ORDER — CLOBETASOL PROPIONATE 0.5 MG/G
OINTMENT TOPICAL ONCE
OUTPATIENT
Start: 2023-03-01 | End: 2023-03-01

## 2023-03-01 RX ORDER — BACITRACIN, NEOMYCIN, POLYMYXIN B 400; 3.5; 5 [USP'U]/G; MG/G; [USP'U]/G
OINTMENT TOPICAL ONCE
OUTPATIENT
Start: 2023-03-01 | End: 2023-03-01

## 2023-03-01 RX ORDER — ACYCLOVIR 200 MG/1
CAPSULE ORAL
COMMUNITY
Start: 2023-02-27

## 2023-03-01 RX ORDER — ACYCLOVIR 400 MG/1
TABLET ORAL
Qty: 90 TABLET | Refills: 1 | Status: SHIPPED | OUTPATIENT
Start: 2023-03-01 | End: 2023-09-01

## 2023-03-01 RX ORDER — LIDOCAINE 50 MG/G
OINTMENT TOPICAL ONCE
OUTPATIENT
Start: 2023-03-01 | End: 2023-03-01

## 2023-03-01 RX ORDER — BUTALBITAL, ACETAMINOPHEN AND CAFFEINE 50; 325; 40 MG/1; MG/1; MG/1
1 TABLET ORAL 3 TIMES DAILY PRN
Qty: 90 TABLET | Refills: 5 | Status: SHIPPED | OUTPATIENT
Start: 2023-03-01 | End: 2023-08-31

## 2023-03-01 RX ORDER — GINSENG 100 MG
CAPSULE ORAL ONCE
OUTPATIENT
Start: 2023-03-01 | End: 2023-03-01

## 2023-03-01 RX ORDER — LIDOCAINE HYDROCHLORIDE 40 MG/ML
SOLUTION TOPICAL ONCE
OUTPATIENT
Start: 2023-03-01 | End: 2023-03-01

## 2023-03-01 RX ADMIN — LIDOCAINE HYDROCHLORIDE 10 ML: 40 SOLUTION TOPICAL at 07:50

## 2023-03-01 SDOH — ECONOMIC STABILITY: HOUSING INSECURITY
IN THE LAST 12 MONTHS, WAS THERE A TIME WHEN YOU DID NOT HAVE A STEADY PLACE TO SLEEP OR SLEPT IN A SHELTER (INCLUDING NOW)?: NO

## 2023-03-01 SDOH — ECONOMIC STABILITY: INCOME INSECURITY: HOW HARD IS IT FOR YOU TO PAY FOR THE VERY BASICS LIKE FOOD, HOUSING, MEDICAL CARE, AND HEATING?: NOT HARD AT ALL

## 2023-03-01 SDOH — ECONOMIC STABILITY: FOOD INSECURITY: WITHIN THE PAST 12 MONTHS, THE FOOD YOU BOUGHT JUST DIDN'T LAST AND YOU DIDN'T HAVE MONEY TO GET MORE.: NEVER TRUE

## 2023-03-01 SDOH — ECONOMIC STABILITY: FOOD INSECURITY: WITHIN THE PAST 12 MONTHS, YOU WORRIED THAT YOUR FOOD WOULD RUN OUT BEFORE YOU GOT MONEY TO BUY MORE.: NEVER TRUE

## 2023-03-01 ASSESSMENT — ENCOUNTER SYMPTOMS
DOUBLE VISION: 0
SHORTNESS OF BREATH: 0
SORE THROAT: 0
NAUSEA: 0
HEARTBURN: 0
BLURRED VISION: 0
DIARRHEA: 0
ORTHOPNEA: 0
ABDOMINAL PAIN: 0
COUGH: 0
BLOOD IN STOOL: 0
BACK PAIN: 0
CONSTIPATION: 0
VOMITING: 0
SPUTUM PRODUCTION: 0
WHEEZING: 0

## 2023-03-01 ASSESSMENT — PATIENT HEALTH QUESTIONNAIRE - PHQ9
SUM OF ALL RESPONSES TO PHQ9 QUESTIONS 1 & 2: 0
2. FEELING DOWN, DEPRESSED OR HOPELESS: 0
SUM OF ALL RESPONSES TO PHQ QUESTIONS 1-9: 0
1. LITTLE INTEREST OR PLEASURE IN DOING THINGS: 0
SUM OF ALL RESPONSES TO PHQ QUESTIONS 1-9: 0

## 2023-03-01 ASSESSMENT — PAIN DESCRIPTION - LOCATION: LOCATION: LEG

## 2023-03-01 ASSESSMENT — PAIN DESCRIPTION - DESCRIPTORS: DESCRIPTORS: ACHING

## 2023-03-01 ASSESSMENT — PAIN DESCRIPTION - ORIENTATION: ORIENTATION: LEFT

## 2023-03-01 ASSESSMENT — PAIN SCALES - GENERAL: PAINLEVEL_OUTOF10: 4

## 2023-03-01 NOTE — PROGRESS NOTES
Wound Healing Center Followup Visit Note    Referring Physician : Alexandru Sal MD  [address] [place] [address] RECORD NUMBER:  [id number]  AGE: 74 y.o. GENDER: female  : 1955  EPISODE DATE:  3/1/2023    Subjective:     Chief Complaint   Patient presents with    Wound Check     Left leg      HISTORY of PRESENT ILLNESS HPI   Juliene Cooks is a 74 y.o. female who presents today in regards to follow up evaluation and treatment of wound/ulcer. That patient's past medical, family and social hx were reviewed and changes were made if present. History of Wound Context:  The patient has had a wound of her left ankle/calf which was first noted approximately 2021. This has been treated local wound care. On their initial visit to the wound healing center, 22,  the patient has noted that the wound has been improving. The patient has not had similar previous wounds in the past.      She started seeing Dr. Latha Paulson in 2021 and than Dr. Rosetta Alfredo. She was started in [facility] ~ 2021. She has noticed some improvement since starting unna boot. She is currently following with Dr. Di Haas. Pt is not on abx at time of initial visit, but has been treated with previously by podiatry. She is not a DM. She is not a smoker. She denies hx of DVT, and per her report had recent us noting no evidence of dvt at [facility]. She also had arterial studies done. I had previously seen her in the past in regards to left buttock thigh wound, which started as abscess. Pt works at [employer] in [facility] and is on her feet all day.     22  Reflux study - if significant findings will schedule for fu  Continue compression therapy [facility] per podiatry with aquacell dressing  Elevation  She does not have significant arterial occlusive disease  22  Patient has asked to continuing following with me going forward because of our previous relationship  She is going to let Dr. Roosevelt Dupree office know  She tolerated unna boot and aquacell - some improvement  216/22  Appearance improved, slightly larger  Consider drawtek next week but overall drainage seems reasonably managed as periwound appears ok  2/23/2022  The wound, has some exudate, no recent cultures were done, will do wound cultures today  3/2/22  Reflux study reviewed - no significant reflux  Will treat culture - augmentin 875 mg bid x 10 days - script sent  More drainage - stable size  3/9/22  Wound appearance improved, stable in size  drawtek  3/16/22  Wound slightly larger in size, new wound of ankle  Continue Drawtek, change to profore  Avoid shoes which will contact ankle area  3/23/22  Wounds stable  Overall appearance improved  Will have pt see ID re cultures - disc with Dr Vielka Higgins  3/30/22  Much improved appearance  On oral abx per ID - concerns re pt ability to work while on IV - will see how her wound progresses  4/6/22  Appearance improved but size not much different  Finished oral abx  Will re culture next week if no significant size change - possible advance skin therapy  4/20/2022  Ulcer on the medial aspect, fairly clean and granulating, ulcer of the lateral aspect, has some exudate, debrided  4/27/22  Wounds stable   Hypergranulation tissue removed  Culture done - possible graft in future  5/4/22  Wound stable  Disc culture with Dr Vielka Higgins who would like to start her on iv abx  5/11/22  Wound stable  Iv abx have not been started yet - spoke with Dr Vielka Higgins - spoke with pt she will call his office  She had spoke with [facility] but there were some issues  5/18/22  Calf stable, ankle improved  Iv abx to start this week after picc placement  On exam   L dp 2+, PT triphasic - with compression of dp - PT becomes weakly biphasic  Concern that PT though triphasic is not getting inline flow and as a result isn't healing in the calf distribution because of occlusive disease  We discussed angiogram - will schedule  I reviewed the procedure with the patient and family as available. I discussed the procedure, risks, benefits, complications, and alternatives of the procedure. They understand and consent.   All questions were answered  Script for percocet 5/325 mg #28 prn q6 hrs - given, oarrs run  5/24/22  Angio, L PT and peroneal plasty  5/25/22  On iv abx  Wound appearance better  R groin no hematoma, L DP 2+, PT triphasic   6/1/22  Wound size and appearance better  6/8/22  Wound size and appearance better  Discussed with Dr Kishore Loza - he will stop abx  6/15/22  Wound size slightly improvement, appearance better  6/22/22  Wounds sizes smaller  6/29/22  Wounds stable   Hypergranulation tissue present throughout wound beds    Continue drawtex, foam, ABD and profore   7/6/22  Wounds slightly improved  7/13/22  Both wounds slightly larger  Hyperkeratotic tissue debrided back  7/14/22  Patient came in due to drainage through her wrap  Dressing noted to have large amounts of yellow/green drainage throughout  Cultures obtained    7/20/22  Culture reviewed large growth S aureus and Pseudomonas  Disc with Dr Hannon - will start clindamycin 600 mg tid for staph  He will see her in office in regards to IV for pseudomonas  Wounds stable in size  Change to aquacell ag  7/27/22  Dr Kishore Loza to see  Medial slightly larger, lateral slightly smaller  8/3/22  Dr Kishore Loza saw her felt wound appearance better - will hold off on iv for now  Medial slightly improved, lateral stable  8/10/2022  Wounds stable  8/17/22  Wound stable, more pain with debridement  Discussed OR for debridement and possible advanced skin therapy - pt agreeable  8/24/22  Debridement, kerecise application  7/82/17  Wound appearance improved  Medial wound improved, lateral stable  9/7/22  Wounds are smaller  9/14/22  Wound stable  Enodfrom and drawtek  9/21/2022  Wound debrided, stable according to the measurements  9/28/22  Wound larger  Culture done  Discussed OR  Aquacell ag change   10/5/22  Wound slightly larger  Percocet 5/325 mg #25 - script given, oarrs reviewed  Cx reviewed - will disc with Dr Karli Saucedo - all light growth   Not interested in OR at this time  10/12/22  Wound stable  Hold on cx tx  10/19/22  Wound stable  Ok for surgical debridement will schedule  Declined debridement today  10/25/22  Irrigation and excisional debridement of left medial and lateral ankle wound, Application of 840 mcg micromatrix acel  Application of unna boot  Lateral 15.5 sq cm, Medial 40.5 sq cm  11/2/22  Appearance improved  Measuring larger  No debridement  11/9/22  Appearance improved  Medial 62 (57), Lateral 18 (37)  Aquacell ag and wraps  11/16/22  Both appearance much improved  Medial 58 (62) Lateral 18 (18)  Aquacell ag , dratwek over top due to increased drainage  Percocet 5/325 mg #28 oarrs reviewed  11/23/22  Stable in size and appearance  New venous reflux study - Tuesday 11/29 at 1230 at my office  Will give her dressings to replace wrap after it is cut off for study  Refusing debridement due to pain  12/7/22  Missed last week due to influenza  Improved size  Tolerated debridement a little better than usual allowing slough to be removed especially around edges  Had to be rescheduled for venous US when office US is working again  12/14/22  Medial calf slightly larger but appearance improved 54 sq cm  Lateral calf slightly larger - more fibrinous exudate - 17 sq cm  Pt would only allow lateral calf debridement  Us 12/20 rescheduled  12/21/22  Minimal debridement allowed to both wounds  Venous reflux study reviewed - will discuss with Dr. Irene Goldman  Medial and lateral calf stable in size with fibrinous exudate  12/28/22  Debrided medial  Medial 52 increased (48)  Lateral 18 decreased (19)  Plan for lesser saphenous vein ablation  Oarrs reviewed - Percocet 5/325 mg #28  1/4/23  Slightly larger  Short term disability paperwork filled out   Surgery 1/12/23 - off till 2/6/23 1/12/23  Lesser saphenous vein ablation, stab phlebectomy  Wound debridement  1/18/23  Us rescheduled for 1/23  Wound appearance much improved, size stable  Oarrs reviewed - Percocet 5/325 mg #28  1/25/23  Us noted popliteal vein dvt - started on eliquis  Wound appearance improved  2/1/23  Wounds improved  Will extend leave from work to 2/27/23 2/8/23  Wounds improved  Medial 33, lateral 13  2/15/23  Size improved, pain improved  Discussed appropriate use of NSAIDs for breakthrough pain to begin weening off her percocet  2/22/23  Improved appearance  Oarrs reviewed - Percocet 5/325 mg #28  3/1/23  Improved    Wound/Ulcer Pain Timing/Severity: constant, moderate  Quality of pain: aching, throbbing, tender  Severity:  8/ 10   Modifying Factors: Pain worsens with dressing changes, debridement  Associated Signs/Symptoms: edema, drainage and pain    Ulcer Identification:  Ulcer Type: venous  Contributing Factors: edema and venous stasis    Diabetic/Pressure/Non Pressure Ulcers only:  Ulcer: Non-Pressure ulcer, fat layer exposed        PAST MEDICAL HISTORY      Diagnosis Date    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 ([address] 05/18/2022    Dizziness - light-headed     GERD (gastroesophageal reflux disease)     Herpes dermatitis 04/27/2017    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    Lightheadedness     Migraine     PONV (postoperative nausea and vomiting)     Skin ulcer of buttock with fat layer exposed ([address] 07/20/2016    Venous stasis ulcer of left ankle with fat layer exposed with varicose veins ([address] 02/02/2022     Past Surgical History:   Procedure Laterality Date    CHOLECYSTECTOMY, LAPAROSCOPIC  04/08/2014    LEG SURGERY Left 8/24/2022    LEFT LOWER EXTREMITY DEBRIDMENT WITH POSSIBLE ADVANCED SKIN THERAPY, POSSIBLE UNNA BOOT performed by Essie Stapleton MD at [facility] Left 10/25/2022    DEBRIDEMENT LEFT LEG, POSSIBLE ADVANCED SKIN THERAPY with acell micromatrix application , UNNA BOOT performed by Essie Stapleton MD at [address] SAPHENOUS VEIN ABLATION Left 1/12/2023    RADIOFREQUENCY ABLATION LEFT LESSER SAPHENOUS VEIN WITH STAB PHLEBECTOMIES, LEFT LEG WOUND DEBRIDEMENT performed by Storm Fleischer, MD at [facility] ENDOSCOPY  12/13/2013    mild gastritis and small hiatal hernia, Dr Vladimir Marroquin, Leila Choi  2015    GERD, Dr. Valentin Watts, office     Family History   Problem Relation Age of Onset    Diabetes Mother     Hypertension Mother     Heart Disease Father     Heart Attack Father     Heart Surgery Father         angioplasty    Stroke Father     High Cholesterol Father     Heart Attack Maternal Grandfather      Social History     Tobacco Use    Smoking status: Never    Smokeless tobacco: Never   Vaping Use    Vaping Use: Never used   Substance Use Topics    Alcohol use: No    Drug use: No     Allergies   Allergen Reactions    Bee Pollen Anaphylaxis    Tetracyclines & Related Hives     Current Outpatient Medications on File Prior to Encounter   Medication Sig Dispense Refill    oxyCODONE-acetaminophen (PERCOCET) 5-325 MG per tablet Take 1 tablet by mouth every 8 hours as needed for Pain for up to 14 days. Intended supply: 7 days.  Take lowest dose possible to manage pain Max Daily Amount: 3 tablets 28 tablet 0    apixaban (ELIQUIS) 5 MG TABS tablet Take 2 tablets by mouth 2 times daily for 7 days 28 tablet 0    acyclovir (ZOVIRAX) 400 MG tablet take 1 tablet by mouth once daily 90 tablet 3    butalbital-acetaminophen-caffeine (FIORICET, ESGIC) -40 MG per tablet Take 1 tablet by mouth 3 times daily as needed for Headaches or Migraine Indications: Cluster Headache 90 tablet 5    EPINEPHrine (EPIPEN) 0.3 MG/0.3ML SOAJ injection Use as directed for allergic reaction 1 each 5    hydrOXYzine HCl (ATARAX) 25 MG tablet take 1 tablet BID 60 tablet 5    pantoprazole (PROTONIX) 40 MG tablet Take 1 tablet by mouth every morning (before breakfast) 90 tablet 3 aspirin-acetaminophen-caffeine (EXCEDRIN MIGRAINE) 250-250-65 MG per tablet Take 1 tablet by mouth as needed for Headaches 300 tablet 3     No current facility-administered medications on file prior to encounter.        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 ([address] - Primary    Relevant Orders    Initiate Outpatient Wound Care Protocol    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 ([address] (Chronic)    Relevant Orders    Initiate Outpatient Wound Care Protocol       Pre Debridement Measurements:  Are located in the [facility]  Documentation Flow Sheet  Post Debridement Measurements:  Wound/Ulcer Descriptions are Pre Debridement except measurements:     Wound 08/10/16 Other (Comment) Buttocks Left;Distal #2 acq 8/4/16 (Active)   Number of days: 2394       Wound 08/31/16 Buttocks Left;Proximal #3 aquired 8-27-26 (Active)   Number of days: 0467       Wound 02/02/22 Ankle Left;Medial #1 (Active)   Wound Image   02/15/23 0810   Dressing Status New dressing applied 03/01/23 0840   Wound Cleansed Cleansed with saline 03/01/23 0840   Dressing/Treatment Alginate with Ag;ABD 03/01/23 0840   Offloading for Diabetic Foot Ulcers Offloading not required 02/22/23 0814   Wound Length (cm) 5.8 cm 03/01/23 0748   Wound Width (cm) 4.8 cm 03/01/23 0748   Wound Depth (cm) 0.1 cm 03/01/23 0748   Wound Surface Area (cm^2) 27.84 cm^2 03/01/23 0748   Change in Wound Size % (l*w) -2.88 03/01/23 0748   Wound Volume (cm^3) 2.784 cm^3 03/01/23 0748   Wound Healing % -3 03/01/23 0748   Post-Procedure Length (cm) 5.9 cm 03/01/23 0817   Post-Procedure Width (cm) 4.8 cm 03/01/23 0817   Post-Procedure Depth (cm) 0.1 cm 03/01/23 0817   Post-Procedure Surface Area (cm^2) 28.32 cm^2 03/01/23 0817   Post-Procedure Volume (cm^3) 2.832 cm^3 03/01/23 0817   Wound Assessment Granulation tissue;Fibrin 03/01/23 0748   Drainage Amount Large 03/01/23 0748   Drainage Description Green;Brown 03/01/23 0748   Odor None 03/01/23 0748   Melany-wound Assessment Maceration; Excoriated 03/01/23 0748   Number of days: 392       Wound 03/16/22 Ankle Posterior; Left #2 (Active)   Wound Image   02/15/23 0810   Dressing Status New dressing applied;Clean;Dry; Intact 03/01/23 0840   Wound Cleansed Cleansed with saline 03/01/23 0840   Dressing/Treatment Alginate with Ag;Dry dressing 03/01/23 0840   Offloading for Diabetic Foot Ulcers Offloading not required 02/08/23 0844   Wound Length (cm) 2.2 cm 03/01/23 0748   Wound Width (cm) 2 cm 03/01/23 0748   Wound Depth (cm) 0.1 cm 03/01/23 0748   Wound Surface Area (cm^2) 4.4 cm^2 03/01/23 0748   Change in Wound Size % (l*w) -76 03/01/23 0748   Wound Volume (cm^3) 0.44 cm^3 03/01/23 0748   Wound Healing % -76 03/01/23 0748   Post-Procedure Length (cm) 2.3 cm 03/01/23 0817   Post-Procedure Width (cm) 2 cm 03/01/23 0817   Post-Procedure Depth (cm) 0.1 cm 03/01/23 0817   Post-Procedure Surface Area (cm^2) 4.6 cm^2 03/01/23 0817   Post-Procedure Volume (cm^3) 0.46 cm^3 03/01/23 0817   Wound Assessment Granulation tissue;Fibrin 03/01/23 0748   Drainage Amount Moderate 03/01/23 0748   Drainage Description Green;Brown 03/01/23 0748   Odor None 03/01/23 0748   Melany-wound Assessment Maceration; Excoriated 03/01/23 0748   Number of days: 350           Procedure Note  Indications:  Based on my examination of this patient's wound(s)/ulcer(s) today, debridement is required to promote healing and evaluate the wound base. Performed by: Christina Olsen MD     Consent obtained:  Yes     Time out taken:  Yes     Pain Control: Anesthetic  Anesthetic: 4% Lidocaine Liquid Topical      Debridement:Excisional Debridement     Using curette the wound(s)/ulcer(s) was/were sharply debrided down through and including the removal of epidermis, dermis, and subcutaneous tissue. Devitalized Tissue Debrided:  fibrin, biofilm, slough, and exudate to stimulate bleeding to promote healing, post debridement good bleeding base and wound edges noted     Wound/Ulcer #:  1, 2     Percent of Wound/Ulcer Debrided: 70%     Total Surface Area Debrided: 32 sq cm      Estimated Blood Loss:  Minimal  Hemostasis Achieved:  by pressure     Procedural Pain:  8  / 10   Post Procedural Pain:  6 / 10      Response to treatment:  With complaints of pain. Plan:   Treatment Note please see attached Discharge Instructions    Written patient dismissal instructions given to patient and signed by patient or POA. Discharge Instructions               Visit Discharge/Physician Orders     Discharge condition: Stable     Assessment of pain at discharge: yes     Anesthetic used: 4% lidocaine solution     Discharge to: Home     Left via:Private automobile     Accompanied by:self     ECF/HHA: n/a     Dressing Orders:LEFT MEDIAL and LATERAL LOWER LEG-Cleanse with normal saline, apply zinc to fiona wound, aquacel AG and drawtex, dressing, ABD pad , unna boot and coban. Change weekly. Treatment Orders: Eat a diet high in protein and vitamin C. Take a multiple vitamin daily unless contraindicated. To see Dr. Chris Perez as scheduled      Must wear slip on shoe to work due to wrap on leg! [address] [facility] followup visit : 1 week____________________________  (Please note your next appointment above and if you are unable to keep, kindly give a 24 hour notice.  Thank you.)     Physician signature:__________________________     If you experience any of the following, please call the [facility] [address] during business hours:     * Increase in Pain  * Temperature over 101  * Increase in drainage from your wound  * Drainage with a foul odor  * Bleeding  * Increase in swelling  * Need for compression bandage changes due to slippage, breakthrough drainage. If you need medical attention outside of the business hours of the [address] please contact your PCP or go to the nearest emergency room.    Electronically signed by Storm Fleischer, MD

## 2023-03-01 NOTE — PLAN OF CARE
Problem: Wound:  Goal: Will show signs of wound healing; wound closure and no evidence of infection  Description: Will show signs of wound healing; wound closure and no evidence of infection  Outcome: Progressing     Problem: Venous:  Goal: Signs of wound healing will improve  Description: Signs of wound healing will improve  Outcome: Progressing     Problem: Compression therapy:  Goal: Will be free from complications associated with compression therapy  Description: Will be free from complications associated with compression therapy  Outcome: Adequate for Discharge

## 2023-03-01 NOTE — PROGRESS NOTES
Anh Boland is a 74 y.o. female who presents today for     Chief Complaint   Patient presents with    Follow-up       She has been treated for GERD and migraine/cluster headaches. She continues to experience anxiety frequently; hydroxyzine helps. Varicose Veins: Vascular Insufficiency:  Since last visit she had radiofrequency of the veins of left leg with debridement of wounds. She remains under the care of Dr. Rivka Broussard, who is seeing her for wound debridement weekly. Dr. Rivka Broussard prescribes Eliquis in 1/23 for acute DVT; plan is for a total of three (3) months therapy      Headache  Patient with a history of  headache. Symptoms began about several years ago. Generally, the headaches last about several hours and occur several times per week. The headaches do not seem to be related to any time of day or year. The headaches are usually moderate, dull, sharp and throbbing and are located in global scalp/head. The patient rates her most severe headaches a 8 on a scale from 1 to 10. Recently, the headaches have been stable. Work attendance or other daily activities are not affected by the headaches. Precipitating factors include: cold temperatures, light, stress and weather changes. The headaches are usually not preceded by an aura. Associated neurologic symptoms: vomiting in the early morning. The patient denies decreased physical activity, depression, dizziness, loss of balance, muscle weakness, numbness of extremities, speech difficulties, vision problems and worsening school/work performance. Home treatment has included acetaminophen, amitriptyline, fioricet, Imitrex oral and Tylenol with codeine, darkening the room, resting and sleeping with no improvement. Other history includes: headaches of unknown type diagnosed in the past. Family history includes no known family members with significant headaches.     Fioricet provides the most relief from this medication as opposed to the multiple trial of other medication in the past.  Maximum use would be TID. States she takes it couple times a week. GERD  Kristine complains of heartburn. This has been associated with abdominal bloating, bilious reflux, early satiety, midespigastric pain, upper abdominal discomfort and vomiting. She denies belching, belching and eructation, chest pain, choking on food, cough, difficulty swallowing, dysphagia, heartburn, hematemesis, hoarseness, laryngitis, melena, need to clear throat frequently, nocturnal burning, odynophagia, shortness of breath, symptoms primarily relate to meals, and lying down after meals, unexpected weight loss and wheezing. Symptoms have been present for several years. She denies dysphagia. She denies melena, hematochezia, hematemesis, and coffee ground emesis. Medical therapy in the past has included antacids, H2 antagonists and proton pump inhibitors. As long as she is mindful of what/how she eats, she is fine    She is on long term PPI. Herpes Suppression:  On acyclovir. Still taking daily without any recent flare ups. Anxiety:  Main symptom is anxious mood and difficulty sleeping. She has had a lot of stress, including illness and passing of her mother and recent severe illness of her boyfriend, who is still in subacute rehab. She reports that hydroxyzine does seem to work well. After further discussion, agreed to take hydroxyzine 25 mg twice daily as needed anxiety    Health Maintenance:  Leeta Epley is due for Welcome to Medicare AWV (became eligible 11/22). She had Influenza A several weeks ago; did not get flu shot this season. Offered now; she declines at this time. [address]:  Patient's past medical, surgical, social and/or family history reviewed, updated in chart, and are non-contributory (unless otherwise stated). Medications and allergies also reviewed and updated in chart. Review of Systems  Review of Systems   Constitutional:  Negative for malaise/fatigue and weight loss. HENT:  Negative for congestion, ear pain and sore throat. Eyes:  Negative for blurred vision and double vision. Respiratory:  Negative for cough, sputum production, shortness of breath and wheezing. Cardiovascular:  Negative for chest pain, palpitations, orthopnea and leg swelling. Gastrointestinal:  Negative for abdominal pain, blood in stool, constipation, diarrhea, heartburn, nausea and vomiting. Genitourinary:  Negative for dysuria, frequency, hematuria and urgency. Musculoskeletal:  Negative for back pain, joint pain, myalgias and neck pain. 8/24/21: Actively treated for left LE venous insufficiency ulcer   Skin:  Negative for itching and rash. Neurological:  Positive for headaches. Negative for dizziness, tingling, focal weakness and weakness. Psychiatric/Behavioral:  Negative for depression, memory loss and substance abuse. The patient is not nervous/anxious and does not have insomnia. Physical Exam:    VS:    /78   Pulse 78   Temp 98 °F (36.7 °C) (Infrared)   Resp 16   Ht 5' 8\" (1.727 m)   Wt 171 lb (77.6 kg)   SpO2 98%   BMI 26.00 kg/m²   LAST WEIGHT:  Wt Readings from Last 3 Encounters:   03/01/23 171 lb (77.6 kg)   03/01/23 165 lb (74.8 kg)   02/22/23 165 lb (74.8 kg)     BMI Readings from Last 3 Encounters:   03/01/23 26.00 kg/m²   03/01/23 25.09 kg/m²   02/22/23 25.09 kg/m²       Physical Exam  Constitutional:       General: She is not in acute distress. Appearance: She is well-developed. She is not diaphoretic. HENT:      Head: Normocephalic and atraumatic. Right Ear: External ear normal.      Left Ear: External ear normal.      Mouth/Throat:      Pharynx: No oropharyngeal exudate. Eyes:      General: No scleral icterus. Right eye: No discharge. Conjunctiva/sclera: Conjunctivae normal.      Pupils: Pupils are equal, round, and reactive to light. Neck:      Thyroid: No thyromegaly.    Cardiovascular:      Rate and Rhythm: Normal rate and regular rhythm. Heart sounds: Normal heart sounds. No murmur heard. Pulmonary:      Effort: Pulmonary effort is normal. No respiratory distress. Breath sounds: No stridor. No wheezing or rales. Chest:      Chest wall: No tenderness. Abdominal:      General: Bowel sounds are normal. There is no distension. Palpations: Abdomen is soft. There is no mass. Tenderness: There is no abdominal tenderness. There is no guarding. Musculoskeletal:         General: No tenderness. Normal range of motion. Cervical back: Normal range of motion and neck supple. Lymphadenopathy:      Cervical: No cervical adenopathy. Skin:     General: Skin is warm and dry. Coloration: Skin is not pale. Findings: No erythema or rash. Neurological:      Mental Status: She is alert and oriented to person, place, and time. Psychiatric:         Behavior: Behavior normal.         Thought Content: Thought content normal.       Labs:  Lab Results   Component Value Date/Time     01/12/2023 08:36 AM    K 4.1 01/12/2023 08:36 AM     01/12/2023 08:36 AM    CO2 23 01/12/2023 08:36 AM    BUN 21 01/12/2023 08:36 AM    CREATININE 0.7 01/12/2023 08:36 AM    PROT 7.0 06/01/2022 09:50 AM    LABALBU 3.7 06/01/2022 09:50 AM    CALCIUM 9.3 01/12/2023 08:36 AM    GFRAA >60 06/01/2022 09:50 AM    LABGLOM >60 01/12/2023 08:36 AM    GLUCOSE 91 01/12/2023 08:36 AM    AST 20 06/01/2022 09:50 AM    ALT 13 06/01/2022 09:50 AM    ALKPHOS 88 06/01/2022 09:50 AM    BILITOT <0.2 06/01/2022 09:50 AM    LABA1C 5.1 08/31/2022 03:30 PM         Assessment / Plan:      Michael Basurto was seen today for follow-up. Diagnoses and all orders for this visit:    Encounter for medication refill  -     pantoprazole (PROTONIX) 40 MG tablet; Take 1 tablet by mouth every morning (before breakfast)  -     butalbital-acetaminophen-caffeine (FIORICET, ESGIC) -40 MG per tablet;  Take 1 tablet by mouth 3 times daily as needed for Headaches or Migraine Indications: Cluster Headache  -     hydrOXYzine HCl (ATARAX) 25 MG tablet; take 1 tablet BID    Gastroesophageal reflux disease with esophagitis without hemorrhage: Suboptimal symptom control. Patient reports that omeprazole is no longer effective        -     Discontinue omeprazole  -     Trial pantoprazole (PROTONIX) 40 MG tablet; Take 1 tablet by mouth every morning (before breakfast)    Chronic cluster headache, not intractable: Medication refill  -     butalbital-acetaminophen-caffeine (FIORICET, ESGIC) -40 MG per tablet; Take 1 tablet by mouth 3 times daily as needed for Headaches or Migraine Indications: Cluster Headache    Migraine without status migrainosus, not intractable, unspecified migraine type: Medication refill  -     butalbital-acetaminophen-caffeine (FIORICET, ESGIC) -40 MG per tablet; Take 1 tablet by mouth 3 times daily as needed for Headaches or Migraine Indications: Cluster Headache    PVD (peripheral vascular disease) ([facility] [address]): Under the care of vascular. Issues are currently active. Herpes dermatitis: Stable and well-controlled medication refill  -     acyclovir (ZOVIRAX) 200 MG capsule; Take 1 capsule by mouth daily    Herpes suppression: Stable and well-controlled. Medication refill  -     acyclovir (ZOVIRAX) 200 MG capsule; Take 1 capsule by mouth daily    Insomnia secondary to anxiety: Stable and well-controlled. Medication refill  -     hydrOXYzine HCl (ATARAX) 25 MG tablet; take 1 tablet BID    Encounter for screening mammogram for malignant neoplasm of breast: Patient encouraged to find any schedule her mammogram  -     [facility] STEV[facility] DIGITAL SCREEN BILATERAL; Future      Call or go to ED immediately if symptoms worsen or persist.    Follow Up:  Return 1) Schedule Welcome to Medicare AWV before 9/1/23, for 2) F/U 1 year check up, medication refills. , or sooner if necessary.       Educational materials and/or home exercises printed for patient's review and were included in patient instructions on his/her After Visit Summary and given to patient at the end of visit. Counseled regarding above diagnosis, including possible risks and complications,  especially if left uncontrolled. Counseled regarding the possible side effects, risks, benefits and alternatives to treatment; patient and/or guardian verbalizes understanding, agrees, feels comfortable with and wishes to proceed with above treatment plan. Advised patient to call with any new medication issues, and read all Rx info from pharmacy to assure aware of all possible risks and side effects of medication before taking. Reviewed age and gender appropriate health screening exams and vaccinations. Advised patient regarding importance of keeping up with recommended health maintenance and to schedule as soon as possible if overdue, as this is important in assessing for undiagnosed pathology, especially cancer, as well as protecting against potentially harmful/life threatening disease. Patient and/or guardian verbalizes understanding and agrees with above counseling, assessment and plan. All questions answered.     Remy Lenz MD

## 2023-03-06 NOTE — DISCHARGE INSTRUCTIONS
Visit Discharge/Physician Orders     Discharge condition: Stable     Assessment of pain at discharge: yes     Anesthetic used: 4% lidocaine solution     Discharge to: Home     Left via:Private automobile     Accompanied by:self     ECF/HHA: n/a     Dressing Orders:LEFT MEDIAL and LATERAL LOWER LEG-Cleanse with normal saline, apply zinc to fiona wound, aquacel AG and drawtex, dressing, ABD pad , unna boot and coban. Change weekly. Treatment Orders: Eat a diet high in protein and vitamin C. Take a multiple vitamin daily unless contraindicated. To see Dr. Noe Montez as scheduled      Must wear slip on shoe to work due to wrap on leg! Ok to return to work Monday 3/6. [facility] followup visit : 1 week____________________________  (Please note your next appointment above and if you are unable to keep, kindly give a 24 hour notice. Thank you.)     Physician signature:__________________________     If you experience any of the following, please call the [facility] during business hours:     * Increase in Pain  * Temperature over 101  * Increase in drainage from your wound  * Drainage with a foul odor  * Bleeding  * Increase in swelling  * Need for compression bandage changes due to slippage, breakthrough drainage. If you need medical attention outside of the business hours of the [facility] please contact your PCP or go to the nearest emergency room.

## 2023-03-08 ENCOUNTER — HOSPITAL ENCOUNTER (OUTPATIENT)
Dept: WOUND CARE | Age: 68
Discharge: HOME | End: 2023-03-08
Payer: MEDICARE

## 2023-03-08 VITALS
TEMPERATURE: 97.1 F | RESPIRATION RATE: 18 BRPM | HEIGHT: 68 IN | HEART RATE: 83 BPM | DIASTOLIC BLOOD PRESSURE: 55 MMHG | WEIGHT: 171 LBS | BODY MASS INDEX: 25.91 KG/M2 | SYSTOLIC BLOOD PRESSURE: 165 MMHG

## 2023-03-08 DIAGNOSIS — L97.322 VARICOSE VEINS OF LEFT LOWER EXTREMITY WITH ULCER OF ANKLE WITH FAT LAYER EXPOSED (HCC): Primary | ICD-10-CM

## 2023-03-08 DIAGNOSIS — I83.023 VARICOSE VEINS OF LEFT LOWER EXTREMITY WITH ULCER OF ANKLE WITH FAT LAYER EXPOSED (HCC): Primary | ICD-10-CM

## 2023-03-08 DIAGNOSIS — I70.242 ATHEROSCLEROSIS OF NATIVE ARTERY OF LEFT LOWER EXTREMITY WITH ULCERATION OF CALF (HCC): ICD-10-CM

## 2023-03-08 PROCEDURE — 11042 DBRDMT SUBQ TIS 1ST 20SQCM/<: CPT

## 2023-03-08 PROCEDURE — 11045 DBRDMT SUBQ TISS EACH ADDL: CPT

## 2023-03-08 RX ORDER — BACITRACIN, NEOMYCIN, POLYMYXIN B 400; 3.5; 5 [USP'U]/G; MG/G; [USP'U]/G
OINTMENT TOPICAL ONCE
OUTPATIENT
Start: 2023-03-08 | End: 2023-03-08

## 2023-03-08 RX ORDER — LIDOCAINE HYDROCHLORIDE 20 MG/ML
JELLY TOPICAL ONCE
OUTPATIENT
Start: 2023-03-08 | End: 2023-03-08

## 2023-03-08 RX ORDER — LIDOCAINE 40 MG/G
CREAM TOPICAL ONCE
OUTPATIENT
Start: 2023-03-08 | End: 2023-03-08

## 2023-03-08 RX ORDER — BACITRACIN ZINC AND POLYMYXIN B SULFATE 500; 1000 [USP'U]/G; [USP'U]/G
OINTMENT TOPICAL ONCE
OUTPATIENT
Start: 2023-03-08 | End: 2023-03-08

## 2023-03-08 RX ORDER — GENTAMICIN SULFATE 1 MG/G
OINTMENT TOPICAL ONCE
OUTPATIENT
Start: 2023-03-08 | End: 2023-03-08

## 2023-03-08 RX ORDER — GINSENG 100 MG
CAPSULE ORAL ONCE
OUTPATIENT
Start: 2023-03-08 | End: 2023-03-08

## 2023-03-08 RX ORDER — BETAMETHASONE DIPROPIONATE 0.05 %
OINTMENT (GRAM) TOPICAL ONCE
OUTPATIENT
Start: 2023-03-08 | End: 2023-03-08

## 2023-03-08 RX ORDER — LIDOCAINE 50 MG/G
OINTMENT TOPICAL ONCE
OUTPATIENT
Start: 2023-03-08 | End: 2023-03-08

## 2023-03-08 RX ORDER — LIDOCAINE HYDROCHLORIDE 40 MG/ML
SOLUTION TOPICAL ONCE
Status: COMPLETED | OUTPATIENT
Start: 2023-03-08 | End: 2023-03-08

## 2023-03-08 RX ORDER — LIDOCAINE HYDROCHLORIDE 40 MG/ML
SOLUTION TOPICAL ONCE
OUTPATIENT
Start: 2023-03-08 | End: 2023-03-08

## 2023-03-08 RX ORDER — CLOBETASOL PROPIONATE 0.5 MG/G
OINTMENT TOPICAL ONCE
OUTPATIENT
Start: 2023-03-08 | End: 2023-03-08

## 2023-03-08 RX ADMIN — LIDOCAINE HYDROCHLORIDE 10 ML: 40 SOLUTION TOPICAL at 07:52

## 2023-03-08 ASSESSMENT — PAIN DESCRIPTION - DESCRIPTORS: DESCRIPTORS: ACHING

## 2023-03-08 ASSESSMENT — PAIN - FUNCTIONAL ASSESSMENT: PAIN_FUNCTIONAL_ASSESSMENT: PREVENTS OR INTERFERES SOME ACTIVE ACTIVITIES AND ADLS

## 2023-03-08 ASSESSMENT — PAIN DESCRIPTION - PAIN TYPE: TYPE: CHRONIC PAIN

## 2023-03-08 ASSESSMENT — PAIN DESCRIPTION - LOCATION: LOCATION: LEG

## 2023-03-08 ASSESSMENT — PAIN DESCRIPTION - ONSET: ONSET: ON-GOING

## 2023-03-08 ASSESSMENT — PAIN SCALES - GENERAL: PAINLEVEL_OUTOF10: 5

## 2023-03-08 ASSESSMENT — PAIN DESCRIPTION - ORIENTATION: ORIENTATION: LEFT

## 2023-03-08 ASSESSMENT — PAIN DESCRIPTION - FREQUENCY: FREQUENCY: CONTINUOUS

## 2023-03-09 NOTE — PROGRESS NOTES
Wound Healing Center Followup Visit Note    Referring Physician : Janel Miranda MD  [address] [place] [address] RECORD NUMBER:  [id number]  AGE: 74 y.o. GENDER: female  : 1955  EPISODE DATE:  3/8/2023    Subjective:     Chief Complaint   Patient presents with    Wound Check     Left leg      HISTORY of PRESENT ILLNESS HPI   Amy Henry is a 74 y.o. female who presents today in regards to follow up evaluation and treatment of wound/ulcer. That patient's past medical, family and social hx were reviewed and changes were made if present. History of Wound Context:  The patient has had a wound of her left ankle/calf which was first noted approximately 2021. This has been treated local wound care. On their initial visit to the wound healing center, 22,  the patient has noted that the wound has been improving. The patient has not had similar previous wounds in the past.      She started seeing Dr. Lindsay Ospina in 2021 and than Dr. Ladi Jarrett. She was started in [facility] ~ 2021. She has noticed some improvement since starting unna boot. She is currently following with Dr. Mandie Manning. Pt is not on abx at time of initial visit, but has been treated with previously by podiatry. She is not a DM. She is not a smoker. She denies hx of DVT, and per her report had recent us noting no evidence of dvt at [facility]. She also had arterial studies done. I had previously seen her in the past in regards to left buttock thigh wound, which started as abscess. Pt works at [employer] in [facility] and is on her feet all day.     22  Reflux study - if significant findings will schedule for fu  Continue compression therapy [facility] per podiatry with aquacell dressing  Elevation  She does not have significant arterial occlusive disease  22  Patient has asked to continuing following with me going forward because of our previous relationship  She is going to let Dr. Joseph Henson office know  She tolerated unna boot and aquacell - some improvement  216/22  Appearance improved, slightly larger  Consider drawtek next week but overall drainage seems reasonably managed as periwound appears ok  2/23/2022  The wound, has some exudate, no recent cultures were done, will do wound cultures today  3/2/22  Reflux study reviewed - no significant reflux  Will treat culture - augmentin 875 mg bid x 10 days - script sent  More drainage - stable size  3/9/22  Wound appearance improved, stable in size  drawtek  3/16/22  Wound slightly larger in size, new wound of ankle  Continue Drawtek, change to profore  Avoid shoes which will contact ankle area  3/23/22  Wounds stable  Overall appearance improved  Will have pt see ID re cultures - disc with Dr Apolinar Tello  3/30/22  Much improved appearance  On oral abx per ID - concerns re pt ability to work while on IV - will see how her wound progresses  4/6/22  Appearance improved but size not much different  Finished oral abx  Will re culture next week if no significant size change - possible advance skin therapy  4/20/2022  Ulcer on the medial aspect, fairly clean and granulating, ulcer of the lateral aspect, has some exudate, debrided  4/27/22  Wounds stable   Hypergranulation tissue removed  Culture done - possible graft in future  5/4/22  Wound stable  Disc culture with Dr Apolinar Tello who would like to start her on iv abx  5/11/22  Wound stable  Iv abx have not been started yet - spoke with Dr Apolinar Tello - spoke with pt she will call his office  She had spoke with [facility] but there were some issues  5/18/22  Calf stable, ankle improved  Iv abx to start this week after picc placement  On exam   L dp 2+, PT triphasic - with compression of dp - PT becomes weakly biphasic  Concern that PT though triphasic is not getting inline flow and as a result isn't healing in the calf distribution because of occlusive disease  We discussed angiogram - will schedule  I reviewed the procedure with the patient and family as available. I discussed the procedure, risks, benefits, complications, and alternatives of the procedure. They understand and consent.   All questions were answered  Script for percocet 5/325 mg #28 prn q6 hrs - given, oarrs run  5/24/22  Angio, L PT and peroneal plasty  5/25/22  On iv abx  Wound appearance better  R groin no hematoma, L DP 2+, PT triphasic   6/1/22  Wound size and appearance better  6/8/22  Wound size and appearance better  Discussed with Dr Bolivar Garcia - he will stop abx  6/15/22  Wound size slightly improvement, appearance better  6/22/22  Wounds sizes smaller  6/29/22  Wounds stable   Hypergranulation tissue present throughout wound beds    Continue drawtex, foam, ABD and profore   7/6/22  Wounds slightly improved  7/13/22  Both wounds slightly larger  Hyperkeratotic tissue debrided back  7/14/22  Patient came in due to drainage through her wrap  Dressing noted to have large amounts of yellow/green drainage throughout  Cultures obtained    7/20/22  Culture reviewed large growth S aureus and Pseudomonas  Disc with Dr Hannon - will start clindamycin 600 mg tid for staph  He will see her in office in regards to IV for pseudomonas  Wounds stable in size  Change to aquacell ag  7/27/22  Dr Bolivar Garcia to see  Medial slightly larger, lateral slightly smaller  8/3/22  Dr Bolivar Garcia saw her felt wound appearance better - will hold off on iv for now  Medial slightly improved, lateral stable  8/10/2022  Wounds stable  8/17/22  Wound stable, more pain with debridement  Discussed OR for debridement and possible advanced skin therapy - pt agreeable  8/24/22  Debridement, kerecise application  5/42/05  Wound appearance improved  Medial wound improved, lateral stable  9/7/22  Wounds are smaller  9/14/22  Wound stable  Enodfrom and drawtek  9/21/2022  Wound debrided, stable according to the measurements  9/28/22  Wound larger  Culture done  Discussed OR  Aquacell ag change   10/5/22  Wound slightly larger  Percocet 5/325 mg #25 - script given, oarrs reviewed  Cx reviewed - will disc with Dr Alicia Mary - all light growth   Not interested in OR at this time  10/12/22  Wound stable  Hold on cx tx  10/19/22  Wound stable  Ok for surgical debridement will schedule  Declined debridement today  10/25/22  Irrigation and excisional debridement of left medial and lateral ankle wound, Application of 873 mcg micromatrix acel  Application of unna boot  Lateral 15.5 sq cm, Medial 40.5 sq cm  11/2/22  Appearance improved  Measuring larger  No debridement  11/9/22  Appearance improved  Medial 62 (57), Lateral 18 (37)  Aquacell ag and wraps  11/16/22  Both appearance much improved  Medial 58 (62) Lateral 18 (18)  Aquacell ag , dratwek over top due to increased drainage  Percocet 5/325 mg #28 oarrs reviewed  11/23/22  Stable in size and appearance  New venous reflux study - Tuesday 11/29 at 1230 at my office  Will give her dressings to replace wrap after it is cut off for study  Refusing debridement due to pain  12/7/22  Missed last week due to influenza  Improved size  Tolerated debridement a little better than usual allowing slough to be removed especially around edges  Had to be rescheduled for venous US when office US is working again  12/14/22  Medial calf slightly larger but appearance improved 54 sq cm  Lateral calf slightly larger - more fibrinous exudate - 17 sq cm  Pt would only allow lateral calf debridement  Us 12/20 rescheduled  12/21/22  Minimal debridement allowed to both wounds  Venous reflux study reviewed - will discuss with Dr. Pink Has  Medial and lateral calf stable in size with fibrinous exudate  12/28/22  Debrided medial  Medial 52 increased (48)  Lateral 18 decreased (19)  Plan for lesser saphenous vein ablation  Oarrs reviewed - Percocet 5/325 mg #28  1/4/23  Slightly larger  Short term disability paperwork filled out   Surgery 1/12/23 - off till 2/6/23 1/12/23  Lesser saphenous vein ablation, stab phlebectomy  Wound debridement  1/18/23  Us rescheduled for 1/23  Wound appearance much improved, size stable  Oarrs reviewed - Percocet 5/325 mg #28  1/25/23  Us noted popliteal vein dvt - started on eliquis  Wound appearance improved  2/1/23  Wounds improved  Will extend leave from work to 2/27/23 2/8/23  Wounds improved  Medial 33, lateral 13  2/15/23  Size improved, pain improved  Discussed appropriate use of NSAIDs for breakthrough pain to begin weening off her percocet  2/22/23  Improved appearance  Oarrs reviewed - Percocet 5/325 mg #28  3/1/23  Improved  3/8/23  Improved 28 sq cm   Wound/Ulcer Pain Timing/Severity: constant, moderate  Quality of pain: aching, throbbing, tender  Severity:  8/ 10   Modifying Factors: Pain worsens with dressing changes, debridement  Associated Signs/Symptoms: edema, drainage and pain    Ulcer Identification:  Ulcer Type: venous  Contributing Factors: edema and venous stasis    Diabetic/Pressure/Non Pressure Ulcers only:  Ulcer: Non-Pressure ulcer, fat layer exposed        PAST MEDICAL HISTORY      Diagnosis Date    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 ([address] 05/18/2022    Dizziness - light-headed     GERD (gastroesophageal reflux disease)     Herpes dermatitis 04/27/2017    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    Lightheadedness     Migraine     PONV (postoperative nausea and vomiting)     Skin ulcer of buttock with fat layer exposed ([address] 07/20/2016    Venous stasis ulcer of left ankle with fat layer exposed with varicose veins ([address] 02/02/2022     Past Surgical History:   Procedure Laterality Date    CHOLECYSTECTOMY, LAPAROSCOPIC  04/08/2014    LEG SURGERY Left 8/24/2022    LEFT LOWER EXTREMITY DEBRIDMENT WITH POSSIBLE ADVANCED SKIN THERAPY, POSSIBLE UNNA BOOT performed by Yovany Reynoso MD at [address] [place] [address] Left 10/25/2022    DEBRIDEMENT LEFT LEG, POSSIBLE ADVANCED SKIN THERAPY with acell micromatrix application , UNNA BOOT performed by Ria Phillips Ysabel Martin MD at [address] Left 1/12/2023    RADIOFREQUENCY ABLATION LEFT LESSER SAPHENOUS VEIN WITH STAB PHLEBECTOMIES, LEFT LEG WOUND DEBRIDEMENT performed by Amaryllis Prader, MD at [address] ENDOSCOPY  12/13/2013    mild gastritis and small hiatal hernia, Dr Natasha Leong, [place] [address]  2015    GERD, Dr. Shade Arvizu, office     Family History   Problem Relation Age of Onset    Diabetes Mother     Hypertension Mother     Heart Disease Father     Heart Attack Father     Heart Surgery Father         angioplasty    Stroke Father     High Cholesterol Father     Heart Attack Maternal Grandfather      Social History     Tobacco Use    Smoking status: Never    Smokeless tobacco: Never   Vaping Use    Vaping Use: Never used   Substance Use Topics    Alcohol use: No    Drug use: No     Allergies   Allergen Reactions    Bee Pollen Anaphylaxis    Tetracyclines & Related Hives     Current Outpatient Medications on File Prior to Encounter   Medication Sig Dispense Refill    apixaban (ELIQUIS) 5 MG TABS tablet Take 1 tablet by mouth 2 times daily 60 tablet 2    pantoprazole (PROTONIX) 40 MG tablet Take 1 tablet by mouth every morning (before breakfast) 90 tablet 1    butalbital-acetaminophen-caffeine (FIORICET, ESGIC) -40 MG per tablet Take 1 tablet by mouth 3 times daily as needed for Headaches or Migraine Indications: Cluster Headache 90 tablet 5    acyclovir (ZOVIRAX) 400 MG tablet take 1 tablet by mouth once daily 90 tablet 1    acyclovir (ZOVIRAX) 200 MG capsule       hydrOXYzine HCl (ATARAX) 25 MG tablet take 1 tablet  tablet 1    oxyCODONE-acetaminophen (PERCOCET) 5-325 MG per tablet Take 1 tablet by mouth every 8 hours as needed for Pain for up to 14 days. Intended supply: 7 days.  Take lowest dose possible to manage pain Max Daily Amount: 3 tablets 28 tablet 0    EPINEPHrine (EPIPEN) 0.3 MG/0.3ML SOAJ injection Use as directed for allergic reaction 1 each 5    aspirin-acetaminophen-caffeine (EXCEDRIN MIGRAINE) 250-250-65 MG per tablet Take 1 tablet by mouth as needed for Headaches 300 tablet 3     No current facility-administered medications on file prior to encounter.        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 ([address] - Primary    Relevant Orders    Initiate Outpatient Wound Care Protocol    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 ([address] (Chronic)    Relevant Orders    Initiate Outpatient Wound Care Protocol       Pre Debridement Measurements:  Are located in the [facility]  Documentation Flow Sheet  Post Debridement Measurements:  Wound/Ulcer Descriptions are Pre Debridement except measurements:     Wound 08/10/16 Other (Comment) Buttocks Left;Distal #2 acq 8/4/16 (Active)   Number of days: 9203       Wound 08/31/16 Buttocks Left;Proximal #3 aquired 8-27-26 (Active)   Number of days: 4808       Wound 02/02/22 Ankle Left;Medial #1 (Active)   Wound Image   02/15/23 0810   Dressing Status New dressing applied 03/08/23 0817   Wound Cleansed Cleansed with saline 03/08/23 0817   Dressing/Treatment Alginate with Ag;ABD 03/08/23 0817   Offloading for Diabetic Foot Ulcers Offloading not required 02/22/23 0814   Wound Length (cm) 6.2 cm 03/08/23 0749   Wound Width (cm) 4.1 cm 03/08/23 5216   Wound Depth (cm) 0.1 cm 03/08/23 0749   Wound Surface Area (cm^2) 25.42 cm^2 03/08/23 0749   Change in Wound Size % (l*w) 6.06 03/08/23 0749   Wound Volume (cm^3) 2.542 cm^3 03/08/23 0749   Wound Healing % 6 03/08/23 0749   Post-Procedure Length (cm) 6.3 cm 03/08/23 0758   Post-Procedure Width (cm) 4.3 cm 03/08/23 0758   Post-Procedure Depth (cm) 0.1 cm 03/08/23 0758   Post-Procedure Surface Area (cm^2) 27.09 cm^2 03/08/23 0758   Post-Procedure Volume (cm^3) 2.709 cm^3 03/08/23 0758   Wound Assessment Granulation tissue;Fibrin 03/08/23 0749   Drainage Amount Large 03/08/23 0749   Drainage Description Green;Brown 03/08/23 0749   Odor None 03/08/23 0749   Melany-wound Assessment Maceration; Excoriated 03/08/23 0749   Number of days: 399       Wound 03/16/22 Ankle Posterior; Left #2 (Active)   Wound Image   02/15/23 0810   Dressing Status New dressing applied;Clean;Dry; Intact 03/08/23 0817   Wound Cleansed Cleansed with saline 03/08/23 0817   Dressing/Treatment Alginate with Ag;Dry dressing 03/08/23 0817   Offloading for Diabetic Foot Ulcers Offloading not required 02/08/23 0844   Wound Length (cm) 2.7 cm 03/08/23 0749   Wound Width (cm) 1.4 cm 03/08/23 0749   Wound Depth (cm) 0.1 cm 03/08/23 0749   Wound Surface Area (cm^2) 3.78 cm^2 03/08/23 0749   Change in Wound Size % (l*w) -51.2 03/08/23 0749   Wound Volume (cm^3) 0.378 cm^3 03/08/23 0749   Wound Healing % -51 03/08/23 0749   Post-Procedure Length (cm) 2.8 cm 03/08/23 0758   Post-Procedure Width (cm) 1.5 cm 03/08/23 0758   Post-Procedure Depth (cm) 0.1 cm 03/08/23 0758   Post-Procedure Surface Area (cm^2) 4.2 cm^2 03/08/23 0758   Post-Procedure Volume (cm^3) 0.42 cm^3 03/08/23 0758   Wound Assessment Granulation tissue;Fibrin 03/08/23 0749   Drainage Amount Moderate 03/08/23 0749   Drainage Description Green;Brown 03/08/23 0749   Odor None 03/08/23 0749   Melany-wound Assessment Maceration; Excoriated 03/08/23 0749   Number of days: 357           Procedure Note  Indications:  Based on my examination of this patient's wound(s)/ulcer(s) today, debridement is required to promote healing and evaluate the wound base. Performed by: Eli Blancas MD     Consent obtained:  Yes     Time out taken:  Yes     Pain Control: Anesthetic  Anesthetic: 4% Lidocaine Liquid Topical      Debridement:Excisional Debridement     Using curette the wound(s)/ulcer(s) was/were sharply debrided down through and including the removal of epidermis, dermis, and subcutaneous tissue. Devitalized Tissue Debrided:  fibrin, biofilm, slough, and exudate to stimulate bleeding to promote healing, post debridement good bleeding base and wound edges noted     Wound/Ulcer #:  1, 2     Percent of Wound/Ulcer Debrided: 100%     Total Surface Area Debrided: 28 sq cm      Estimated Blood Loss:  Minimal  Hemostasis Achieved:  by pressure     Procedural Pain:  8  / 10   Post Procedural Pain:  6 / 10      Response to treatment:  With complaints of pain. Plan:   Treatment Note please see attached Discharge Instructions    Written patient dismissal instructions given to patient and signed by patient or POA. Discharge Instructions         Visit Discharge/Physician Orders     Discharge condition: Stable     Assessment of pain at discharge: yes     Anesthetic used: 4% lidocaine solution     Discharge to: Home     Left via:Private automobile     Accompanied by:self     ECF/HHA: n/a     Dressing Orders:LEFT MEDIAL and LATERAL LOWER LEG-Cleanse with normal saline, apply zinc to fiona wound, aquacel AG and drawtex, dressing, ABD pad , unna boot and coban. Change weekly. Treatment Orders: Eat a diet high in protein and vitamin C. Take a multiple vitamin daily unless contraindicated. To see Dr. Ta Morgan as scheduled      Must wear slip on shoe to work due to wrap on leg! Ok to return to work Monday 3/6.       [address] [place] [address],[address] followup visit : 1 week____________________________  (Please note your next appointment above and if you are unable to keep, kindly give a 24 hour notice. Thank you.)     Physician signature:__________________________     If you experience any of the following, please call the [facility] during business hours:     * Increase in Pain  * Temperature over 101  * Increase in drainage from your wound  * Drainage with a foul odor  * Bleeding  * Increase in swelling  * Need for compression bandage changes due to slippage, breakthrough drainage. If you need medical attention outside of the business hours of the [facility] please contact your PCP or go to the nearest emergency room.    Electronically signed by Maria L Nichols MD

## 2023-03-13 NOTE — DISCHARGE INSTRUCTIONS
Visit Discharge/Physician Orders     Discharge condition: Stable     Assessment of pain at discharge: yes     Anesthetic used: 4% lidocaine solution     Discharge to: Home     Left via:Private automobile     Accompanied by:self     ECF/HHA: n/a     Dressing Orders:LEFT MEDIAL and LATERAL LOWER LEG-Cleanse with normal saline, apply zinc to fiona wound, aquacel AG and drawtex, dressing, ABD pad , unna boot and coban. Change weekly. Treatment Orders: Eat a diet high in protein and vitamin C. Take a multiple vitamin daily unless contraindicated. To see Dr. Germain Mitchell as scheduled      Must wear slip on shoe to work due to wrap on leg! Ok to return to work Monday 3/6. [address] [place] [address],[address] followup visit : 1 week____________________________  (Please note your next appointment above and if you are unable to keep, kindly give a 24 hour notice. Thank you.)     Physician signature:__________________________     If you experience any of the following, please call the [facility] during business hours:     * Increase in Pain  * Temperature over 101  * Increase in drainage from your wound  * Drainage with a foul odor  * Bleeding  * Increase in swelling  * Need for compression bandage changes due to slippage, breakthrough drainage. If you need medical attention outside of the business hours of the [facility] please contact your PCP or go to the nearest emergency room.

## 2023-03-15 ENCOUNTER — HOSPITAL ENCOUNTER (OUTPATIENT)
Dept: WOUND CARE | Age: 68
Discharge: HOME | End: 2023-03-15
Payer: MEDICARE

## 2023-03-15 VITALS
HEART RATE: 81 BPM | SYSTOLIC BLOOD PRESSURE: 182 MMHG | RESPIRATION RATE: 18 BRPM | DIASTOLIC BLOOD PRESSURE: 61 MMHG | HEIGHT: 68 IN | WEIGHT: 171 LBS | TEMPERATURE: 98.2 F | BODY MASS INDEX: 25.91 KG/M2

## 2023-03-15 DIAGNOSIS — I70.242 ATHEROSCLEROSIS OF NATIVE ARTERY OF LEFT LOWER EXTREMITY WITH ULCERATION OF CALF (HCC): ICD-10-CM

## 2023-03-15 DIAGNOSIS — I83.023 VARICOSE VEINS OF LEFT LOWER EXTREMITY WITH ULCER OF ANKLE WITH FAT LAYER EXPOSED (HCC): Primary | ICD-10-CM

## 2023-03-15 DIAGNOSIS — L97.322 VARICOSE VEINS OF LEFT LOWER EXTREMITY WITH ULCER OF ANKLE WITH FAT LAYER EXPOSED (HCC): Primary | ICD-10-CM

## 2023-03-15 DIAGNOSIS — I70.243 ATHEROSCLEROSIS OF NATIVE ARTERY OF LEFT LOWER EXTREMITY WITH ULCERATION OF ANKLE (HCC): ICD-10-CM

## 2023-03-15 PROCEDURE — 11045 DBRDMT SUBQ TISS EACH ADDL: CPT

## 2023-03-15 PROCEDURE — 11042 DBRDMT SUBQ TIS 1ST 20SQCM/<: CPT

## 2023-03-15 RX ORDER — LIDOCAINE HYDROCHLORIDE 40 MG/ML
SOLUTION TOPICAL ONCE
Status: COMPLETED | OUTPATIENT
Start: 2023-03-15 | End: 2023-03-15

## 2023-03-15 RX ORDER — LIDOCAINE 40 MG/G
CREAM TOPICAL ONCE
OUTPATIENT
Start: 2023-03-15 | End: 2023-03-15

## 2023-03-15 RX ORDER — GENTAMICIN SULFATE 1 MG/G
OINTMENT TOPICAL ONCE
OUTPATIENT
Start: 2023-03-15 | End: 2023-03-15

## 2023-03-15 RX ORDER — BETAMETHASONE DIPROPIONATE 0.05 %
OINTMENT (GRAM) TOPICAL ONCE
OUTPATIENT
Start: 2023-03-15 | End: 2023-03-15

## 2023-03-15 RX ORDER — LIDOCAINE HYDROCHLORIDE 20 MG/ML
JELLY TOPICAL ONCE
OUTPATIENT
Start: 2023-03-15 | End: 2023-03-15

## 2023-03-15 RX ORDER — BACITRACIN ZINC AND POLYMYXIN B SULFATE 500; 1000 [USP'U]/G; [USP'U]/G
OINTMENT TOPICAL ONCE
OUTPATIENT
Start: 2023-03-15 | End: 2023-03-15

## 2023-03-15 RX ORDER — BACITRACIN, NEOMYCIN, POLYMYXIN B 400; 3.5; 5 [USP'U]/G; MG/G; [USP'U]/G
OINTMENT TOPICAL ONCE
OUTPATIENT
Start: 2023-03-15 | End: 2023-03-15

## 2023-03-15 RX ORDER — LIDOCAINE HYDROCHLORIDE 40 MG/ML
SOLUTION TOPICAL ONCE
OUTPATIENT
Start: 2023-03-15 | End: 2023-03-15

## 2023-03-15 RX ORDER — GINSENG 100 MG
CAPSULE ORAL ONCE
OUTPATIENT
Start: 2023-03-15 | End: 2023-03-15

## 2023-03-15 RX ORDER — OXYCODONE HYDROCHLORIDE AND ACETAMINOPHEN 5; 325 MG/1; MG/1
1 TABLET ORAL EVERY 6 HOURS PRN
Qty: 25 TABLET | Refills: 0 | Status: SHIPPED | OUTPATIENT
Start: 2023-03-15 | End: 2023-03-29

## 2023-03-15 RX ORDER — CLOBETASOL PROPIONATE 0.5 MG/G
OINTMENT TOPICAL ONCE
OUTPATIENT
Start: 2023-03-15 | End: 2023-03-15

## 2023-03-15 RX ORDER — LIDOCAINE 50 MG/G
OINTMENT TOPICAL ONCE
OUTPATIENT
Start: 2023-03-15 | End: 2023-03-15

## 2023-03-15 RX ADMIN — LIDOCAINE HYDROCHLORIDE 10 ML: 40 SOLUTION TOPICAL at 07:50

## 2023-03-15 ASSESSMENT — PAIN DESCRIPTION - ONSET: ONSET: ON-GOING

## 2023-03-15 ASSESSMENT — PAIN - FUNCTIONAL ASSESSMENT: PAIN_FUNCTIONAL_ASSESSMENT: PREVENTS OR INTERFERES SOME ACTIVE ACTIVITIES AND ADLS

## 2023-03-15 ASSESSMENT — PAIN DESCRIPTION - ORIENTATION: ORIENTATION: LEFT

## 2023-03-15 ASSESSMENT — PAIN DESCRIPTION - LOCATION: LOCATION: LEG

## 2023-03-15 ASSESSMENT — PAIN DESCRIPTION - PAIN TYPE: TYPE: CHRONIC PAIN

## 2023-03-15 ASSESSMENT — PAIN DESCRIPTION - FREQUENCY: FREQUENCY: CONTINUOUS

## 2023-03-15 ASSESSMENT — PAIN DESCRIPTION - DESCRIPTORS: DESCRIPTORS: ACHING

## 2023-03-15 ASSESSMENT — PAIN SCALES - GENERAL: PAINLEVEL_OUTOF10: 5

## 2023-03-15 NOTE — PROGRESS NOTES
Wound Healing Center Followup Visit Note    Referring Physician : Rosenda Cormier MD  Amanda Ledesma  MEDICAL RECORD NUMBER:  [id number]  AGE: 67 y.o.   GENDER: female  : 1955  EPISODE DATE:  3/15/2023    Subjective:     Chief Complaint   Patient presents with    Wound Check     Left leg        HISTORY of PRESENT ILLNESS HPI   Amanda Ledesma is a 67 y.o. female who presents today in regards to follow up evaluation and treatment of wound/ulcer.  That patient's past medical, family and social hx were reviewed and changes were made if present.    History of Wound Context:  The patient has had a wound of her left ankle/calf which was first noted approximately 2021.  This has been treated local wound care. On their initial visit to the wound healing center, 22,  the patient has noted that the wound has been improving.  The patient has not had similar previous wounds in the past.      She started seeing Dr. Street in 2021 and than Dr. Gillis.  She was started in unna boot ~ 2021.  She has noticed some improvement since starting unna boot.  She is currently following with Dr. Haskins.      Pt is not on abx at time of initial visit, but has been treated with previously by podiatry.      She is not a DM.  She is not a smoker.  She denies hx of DVT, and per her report had recent us noting no evidence of dvt at [facility].  She also had arterial studies done.    I had previously seen her in the past in regards to left buttock thigh wound, which started as abscess.      Pt works at [employer] in the [employer] and is on her feet all day.    22  Reflux study - if significant findings will schedule for fu  Continue compression therapy [facility] per podiatry with aquacell dressing  Elevation  She does not have significant arterial occlusive disease  22  Patient has asked to continuing following with me going forward because of our previous relationship  She is going to let Dr. Schuler office know  She  tolerated unna boot and aquacell - some improvement  216/22  Appearance improved, slightly larger  Consider drawtek next week but overall drainage seems reasonably managed as periwound appears ok  2/23/2022  The wound, has some exudate, no recent cultures were done, will do wound cultures today  3/2/22  Reflux study reviewed - no significant reflux  Will treat culture - augmentin 875 mg bid x 10 days - script sent  More drainage - stable size  3/9/22  Wound appearance improved, stable in size  drawtek  3/16/22  Wound slightly larger in size, new wound of ankle  Continue Drawtek, change to profore  Avoid shoes which will contact ankle area  3/23/22  Wounds stable  Overall appearance improved  Will have pt see ID re cultures - disc with Dr Layla Easley  3/30/22  Much improved appearance  On oral abx per ID - concerns re pt ability to work while on IV - will see how her wound progresses  4/6/22  Appearance improved but size not much different  Finished oral abx  Will re culture next week if no significant size change - possible advance skin therapy  4/20/2022  Ulcer on the medial aspect, fairly clean and granulating, ulcer of the lateral aspect, has some exudate, debrided  4/27/22  Wounds stable   Hypergranulation tissue removed  Culture done - possible graft in future  5/4/22  Wound stable  Disc culture with Dr Layla Easley who would like to start her on iv abx  5/11/22  Wound stable  Iv abx have not been started yet - spoke with Dr Layla Easley - spoke with pt she will call his office  She had spoke with [facility] but there were some issues  5/18/22  Calf stable, ankle improved  Iv abx to start this week after picc placement  On exam   L dp 2+, PT triphasic - with compression of dp - PT becomes weakly biphasic  Concern that PT though triphasic is not getting inline flow and as a result isn't healing in the calf distribution because of occlusive disease  We discussed angiogram - will schedule  I reviewed the procedure with the patient and family as available. I discussed the procedure, risks, benefits, complications, and alternatives of the procedure. They understand and consent.   All questions were answered  Script for percocet 5/325 mg #28 prn q6 hrs - given, oarrs run  5/24/22  Angio, L PT and peroneal plasty  5/25/22  On iv abx  Wound appearance better  R groin no hematoma, L DP 2+, PT triphasic   6/1/22  Wound size and appearance better  6/8/22  Wound size and appearance better  Discussed with Dr Ayesha Alba - he will stop abx  6/15/22  Wound size slightly improvement, appearance better  6/22/22  Wounds sizes smaller  6/29/22  Wounds stable   Hypergranulation tissue present throughout wound beds    Continue drawtex, foam, ABD and profore   7/6/22  Wounds slightly improved  7/13/22  Both wounds slightly larger  Hyperkeratotic tissue debrided back  7/14/22  Patient came in due to drainage through her wrap  Dressing noted to have large amounts of yellow/green drainage throughout  Cultures obtained    7/20/22  Culture reviewed large growth S aureus and Pseudomonas  Disc with Dr Hannon - will start clindamycin 600 mg tid for staph  He will see her in office in regards to IV for pseudomonas  Wounds stable in size  Change to aquacell ag  7/27/22  Dr Ayesha Alba to see  Medial slightly larger, lateral slightly smaller  8/3/22  Dr Ayesha Alba saw her felt wound appearance better - will hold off on iv for now  Medial slightly improved, lateral stable  8/10/2022  Wounds stable  8/17/22  Wound stable, more pain with debridement  Discussed OR for debridement and possible advanced skin therapy - pt agreeable  8/24/22  Debridement, kerecise application  7/15/93  Wound appearance improved  Medial wound improved, lateral stable  9/7/22  Wounds are smaller  9/14/22  Wound stable  Enodfrom and drawtek  9/21/2022  Wound debrided, stable according to the measurements  9/28/22  Wound larger  Culture done  Discussed OR  Aquacell ag change   10/5/22  Wound slightly larger  Percocet 5/325 mg #25 - script given, oarrs reviewed  Cx reviewed - will disc with Dr Stephanie Victor - all light growth   Not interested in OR at this time  10/12/22  Wound stable  Hold on cx tx  10/19/22  Wound stable  Ok for surgical debridement will schedule  Declined debridement today  10/25/22  Irrigation and excisional debridement of left medial and lateral ankle wound, Application of 391 mcg micromatrix acel  Application of unna boot  Lateral 15.5 sq cm, Medial 40.5 sq cm  11/2/22  Appearance improved  Measuring larger  No debridement  11/9/22  Appearance improved  Medial 62 (57), Lateral 18 (37)  Aquacell ag and wraps  11/16/22  Both appearance much improved  Medial 58 (62) Lateral 18 (18)  Aquacell ag , dratwek over top due to increased drainage  Percocet 5/325 mg #28 oarrs reviewed  11/23/22  Stable in size and appearance  New venous reflux study - Tuesday 11/29 at 1230 at my office  Will give her dressings to replace wrap after it is cut off for study  Refusing debridement due to pain  12/7/22  Missed last week due to influenza  Improved size  Tolerated debridement a little better than usual allowing slough to be removed especially around edges  Had to be rescheduled for venous US when office US is working again  12/14/22  Medial calf slightly larger but appearance improved 54 sq cm  Lateral calf slightly larger - more fibrinous exudate - 17 sq cm  Pt would only allow lateral calf debridement  Us 12/20 rescheduled  12/21/22  Minimal debridement allowed to both wounds  Venous reflux study reviewed - will discuss with Dr. Wilder Sandhu  Medial and lateral calf stable in size with fibrinous exudate  12/28/22  Debrided medial  Medial 52 increased (48)  Lateral 18 decreased (19)  Plan for lesser saphenous vein ablation  Oarrs reviewed - Percocet 5/325 mg #28  1/4/23  Slightly larger  Short term disability paperwork filled out   Surgery 1/12/23 - off till 2/6/23 1/12/23  Lesser saphenous vein ablation, stab phlebectomy  Wound debridement  1/18/23  Us rescheduled for 1/23  Wound appearance much improved, size stable  Oarrs reviewed - Percocet 5/325 mg #28  1/25/23  Us noted popliteal vein dvt - started on eliquis  Wound appearance improved  2/1/23  Wounds improved  Will extend leave from work to 2/27/23 2/8/23  Wounds improved  Medial 33, lateral 13  2/15/23  Size improved, pain improved  Discussed appropriate use of NSAIDs for breakthrough pain to begin weening off her percocet  2/22/23  Improved appearance  Oarrs reviewed - Percocet 5/325 mg #28  3/1/23  Improved  3/8/23  Improved 28 sq cm   3/15/23  Medial larger - may need surgical debridement due to tolerance  Lateral improved  Oarrs reviewed - Percocet 5/325 mg #28    Wound/Ulcer Pain Timing/Severity: constant, moderate  Quality of pain: aching, throbbing, tender  Severity:  8/ 10   Modifying Factors: Pain worsens with dressing changes, debridement  Associated Signs/Symptoms: edema, drainage and pain    Ulcer Identification:  Ulcer Type: venous  Contributing Factors: edema and venous stasis    Diabetic/Pressure/Non Pressure Ulcers only:  Ulcer: Non-Pressure ulcer, fat layer exposed        PAST MEDICAL HISTORY      Diagnosis Date    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 ([address] 05/18/2022    Dizziness - light-headed     GERD (gastroesophageal reflux disease)     Herpes dermatitis 04/27/2017    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    Lightheadedness     Migraine     PONV (postoperative nausea and vomiting)     Skin ulcer of buttock with fat layer exposed ([address] 07/20/2016    Venous stasis ulcer of left ankle with fat layer exposed with varicose veins ([address] 02/02/2022     Past Surgical History:   Procedure Laterality Date    CHOLECYSTECTOMY, LAPAROSCOPIC  04/08/2014    LEG SURGERY Left 8/24/2022    LEFT LOWER EXTREMITY DEBRIDMENT WITH POSSIBLE ADVANCED SKIN THERAPY, POSSIBLE UNNA BOOT performed by Tom Loya MD at [address] [place] [address] 10/25/2022    DEBRIDEMENT LEFT LEG, POSSIBLE ADVANCED SKIN THERAPY with acell micromatrix application , Stuart Harris [facility] [address] [facility]2023    RADIOFREQUENCY ABLATION LEFT LESSER SAPHENOUS VEIN WITH STAB PHLEBECTOMIES, LEFT LEG WOUND DEBRIDEMENT performed by Hebert Dasilva MD at [address] [facility] ENDOSCOPY  12/13/2013    mild gastritis and small hiatal hernia, Dr Omid Peterson, [place] [address]    GERD, Dr. Chaim Hanson, office     Family History   Problem Relation Age of Onset    Diabetes Mother     Hypertension Mother     Heart Disease Father     Heart Attack Father     Heart Surgery Father         angioplasty    Stroke Father     High Cholesterol Father     Heart Attack Maternal Grandfather      Social History     Tobacco Use    Smoking status: Never    Smokeless tobacco: Never   Vaping Use    Vaping Use: Never used   Substance Use Topics    Alcohol use: No    Drug use: No     Allergies   Allergen Reactions    Bee Pollen Anaphylaxis    Tetracyclines & Related Hives     Current Outpatient Medications on File Prior to Encounter   Medication Sig Dispense Refill    apixaban (ELIQUIS) 5 MG TABS tablet Take 1 tablet by mouth 2 times daily 60 tablet 2    pantoprazole (PROTONIX) 40 MG tablet Take 1 tablet by mouth every morning (before breakfast) 90 tablet 1    butalbital-acetaminophen-caffeine (FIORICET, ESGIC) -40 MG per tablet Take 1 tablet by mouth 3 times daily as needed for Headaches or Migraine Indications: Cluster Headache 90 tablet 5    acyclovir (ZOVIRAX) 400 MG tablet take 1 tablet by mouth once daily 90 tablet 1    acyclovir (ZOVIRAX) 200 MG capsule       hydrOXYzine HCl (ATARAX) 25 MG tablet take 1 tablet  tablet 1    EPINEPHrine (EPIPEN) 0.3 MG/0.3ML SOAJ injection Use as directed for allergic reaction 1 each 5    aspirin-acetaminophen-caffeine (EXCEDRIN MIGRAINE) 250-250-65 MG per tablet Take 1 tablet by mouth as needed for Headaches 300 tablet 3     No current facility-administered medications on file prior to encounter.        REVIEW OF SYSTEMS See HPI    Objective:    BP (!) 182/61   Pulse 81   Temp 98.2 °F (36.8 °C) (Tympanic)   Resp 18   Ht 5' 8\" (1.727 m)   Wt 171 lb (77.6 kg)   BMI 26.00 kg/m²   Wt Readings from Last 3 Encounters:   03/15/23 171 lb (77.6 kg)   03/08/23 171 lb (77.6 kg)   03/01/23 165 lb (74.8 kg)     PHYSICAL EXAM  CONSTITUTIONAL:   Awake, alert, cooperative   EYES:  lids and lashes normal   ENT: external ears and nose without lesions   NECK:  supple, symmetrical, trachea midline   SKIN:  Open wound Present    Assessment:     Problem List Items Addressed This Visit       Varicose veins of left lower extremity with ulcer of ankle with fat layer exposed ([address] - Primary    Relevant Medications    oxyCODONE-acetaminophen (PERCOCET) 5-325 MG per tablet    Other Relevant Orders    Initiate Outpatient Wound Care Protocol    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 ([address] (Chronic)    Relevant Orders    Initiate Outpatient Wound Care Protocol       Pre Debridement Measurements:  Are located in the [facility]  Documentation Flow Sheet  Post Debridement Measurements:  Wound/Ulcer Descriptions are Pre Debridement except measurements:     Wound 08/10/16 Other (Comment) Buttocks Left;Distal #2 acq 8/4/16 (Active)   Number of days: 2407       Wound 08/31/16 Buttocks Left;Proximal #3 aquired 8-27-26 (Active)   Number of days: 2386       Wound 02/02/22 Ankle Left;Medial #1 (Active)   Wound Image   03/15/23 0745   Dressing Status New dressing applied 03/08/23 0817   Wound Cleansed Cleansed with saline 03/08/23 0817   Dressing/Treatment Alginate with Ag;ABD 03/08/23 0817   Offloading for Diabetic Foot Ulcers Offloading not required 02/22/23 0814   Wound Length (cm) 8 cm 03/15/23 0745   Wound Width (cm) 5.7 cm 03/15/23 0745   Wound Depth (cm) 0.1 cm 03/15/23 0745   Wound Surface Area (cm^2) 45.6 cm^2 03/15/23 0745   Change in Wound Size % (l*w) -68.51 03/15/23 0745   Wound Volume (cm^3) 4.56 cm^3 03/15/23 0745   Wound Healing % -69 03/15/23 0745   Post-Procedure Length (cm) 7 cm 03/15/23 0803   Post-Procedure Width (cm) 4.6 cm 03/15/23 0803   Post-Procedure Depth (cm) 0.1 cm 03/15/23 0803   Post-Procedure Surface Area (cm^2) 32.2 cm^2 03/15/23 0803   Post-Procedure Volume (cm^3) 3.22 cm^3 03/15/23 0803   Wound Assessment Fibrin;Pale granulation tissue;Slough 03/15/23 0745   Drainage Amount Moderate 03/15/23 0745   Drainage Description Yellow;Brown 03/15/23 0745   Odor None 03/15/23 0745   Melany-wound Assessment Maceration; Excoriated 03/08/23 0749   Number of days: 405       Wound 03/16/22 Ankle Posterior; Left #2 (Active)   Wound Image   03/15/23 0745   Dressing Status New dressing applied;Clean;Dry; Intact 03/08/23 0817   Wound Cleansed Cleansed with saline 03/08/23 0817   Dressing/Treatment Alginate with Ag;Dry dressing 03/08/23 0817   Offloading for Diabetic Foot Ulcers Offloading not required 02/08/23 0844   Wound Length (cm) 2.8 cm 03/15/23 0745   Wound Width (cm) 2 cm 03/15/23 0745   Wound Depth (cm) 0.1 cm 03/15/23 0745   Wound Surface Area (cm^2) 5.6 cm^2 03/15/23 0745   Change in Wound Size % (l*w) -124 03/15/23 0745   Wound Volume (cm^3) 0.56 cm^3 03/15/23 0745   Wound Healing % -124 03/15/23 0745   Post-Procedure Length (cm) 2.9 cm 03/15/23 0803   Post-Procedure Width (cm) 2 cm 03/15/23 0803   Post-Procedure Depth (cm) 0.1 cm 03/15/23 0803   Post-Procedure Surface Area (cm^2) 5.8 cm^2 03/15/23 0803   Post-Procedure Volume (cm^3) 0.58 cm^3 03/15/23 0803   Wound Assessment Granulation tissue;Slough 03/15/23 0745   Drainage Amount Moderate 03/15/23 0745   Drainage Description Yellow;Brown 03/15/23 0745   Odor None 03/15/23 0745   Melany-wound Assessment Maceration; Excoriated 03/15/23 0745   Number of days: 364           Procedure Note  Indications:  Based on my examination of this patient's wound(s)/ulcer(s) today, debridement is required to promote healing and evaluate the wound base. Performed by: Bradford Garcia MD     Consent obtained:  Yes     Time out taken:  Yes     Pain Control: Anesthetic  Anesthetic: 4% Lidocaine Liquid Topical      Debridement:Excisional Debridement     Using curette the wound(s)/ulcer(s) was/were sharply debrided down through and including the removal of epidermis, dermis, and subcutaneous tissue. Devitalized Tissue Debrided:  fibrin, biofilm, slough, and exudate to stimulate bleeding to promote healing, post debridement good bleeding base and wound edges noted     Wound/Ulcer #:  1, 2     Percent of Wound/Ulcer Debrided: 80%     Total Surface Area Debrided: 40 sq cm      Estimated Blood Loss:  Minimal  Hemostasis Achieved:  by pressure     Procedural Pain:  10  / 10   Post Procedural Pain:  6 / 10      Response to treatment:  With complaints of pain. Plan:   Treatment Note please see attached Discharge Instructions    Written patient dismissal instructions given to patient and signed by patient or POA. Discharge Instructions         Visit Discharge/Physician Orders     Discharge condition: Stable     Assessment of pain at discharge: yes     Anesthetic used: 4% lidocaine solution     Discharge to: Home     Left via:Private automobile     Accompanied by:self     ECF/HHA: n/a     Dressing Orders:LEFT MEDIAL and LATERAL LOWER LEG-Cleanse with normal saline, apply zinc to fiona wound, aquacel AG and drawtex, dressing, ABD pad , unna boot and coban. Change weekly. Treatment Orders: Eat a diet high in protein and vitamin C. Take a multiple vitamin daily unless contraindicated. To see Dr. Miriam Vitale as scheduled      Must wear slip on shoe to work due to wrap on leg! Ok to return to work Monday 3/6.       [address] [place] [address],[address] followup visit : 1 week____________________________  (Please note your next appointment above and if you are unable to keep, kindly give a 24 hour notice. Thank you.)     Physician signature:__________________________     If you experience any of the following, please call the [facility] during business hours:     * Increase in Pain  * Temperature over 101  * Increase in drainage from your wound  * Drainage with a foul odor  * Bleeding  * Increase in swelling  * Need for compression bandage changes due to slippage, breakthrough drainage. If you need medical attention outside of the business hours of the [facility] please contact your PCP or go to the nearest emergency room.    Electronically signed by Hue Chahal MD

## 2023-03-21 NOTE — DISCHARGE INSTRUCTIONS
Visit Discharge/Physician Orders     Discharge condition: Stable     Assessment of pain at discharge: yes     Anesthetic used: 4% lidocaine solution     Discharge to: Home     Left via:Private automobile     Accompanied by:self     ECF/HHA: n/a     Dressing Orders:LEFT MEDIAL and LATERAL LOWER LEG-Cleanse with normal saline, apply zinc to fiona wound, aquacel AG and drawtex, dressing, ABD pad , unna boot and coban. Change weekly. Treatment Orders: Eat a diet high in protein and vitamin C. Take a multiple vitamin daily unless contraindicated. To see Dr. Ladan Ellison as scheduled      Must wear slip on shoe to work due to wrap on leg! Ok to return to work Monday 3/6. [facility] followup visit : 1 week____________________________  (Please note your next appointment above and if you are unable to keep, kindly give a 24 hour notice. Thank you.)     Physician signature:__________________________     If you experience any of the following, please call the [facility] during business hours:     * Increase in Pain  * Temperature over 101  * Increase in drainage from your wound  * Drainage with a foul odor  * Bleeding  * Increase in swelling  * Need for compression bandage changes due to slippage, breakthrough drainage. If you need medical attention outside of the business hours of the [facility] please contact your PCP or go to the nearest emergency room.

## 2023-03-22 ENCOUNTER — HOSPITAL ENCOUNTER (OUTPATIENT)
Dept: WOUND CARE | Age: 68
Discharge: HOME | End: 2023-03-22
Payer: MEDICARE

## 2023-03-22 VITALS
DIASTOLIC BLOOD PRESSURE: 65 MMHG | HEART RATE: 82 BPM | WEIGHT: 171 LBS | BODY MASS INDEX: 26 KG/M2 | SYSTOLIC BLOOD PRESSURE: 174 MMHG | RESPIRATION RATE: 18 BRPM | TEMPERATURE: 96.9 F

## 2023-03-22 DIAGNOSIS — L97.322 VARICOSE VEINS OF LEFT LOWER EXTREMITY WITH ULCER OF ANKLE WITH FAT LAYER EXPOSED (HCC): ICD-10-CM

## 2023-03-22 DIAGNOSIS — I70.242 ATHEROSCLEROSIS OF NATIVE ARTERY OF LEFT LOWER EXTREMITY WITH ULCERATION OF CALF (HCC): Primary | ICD-10-CM

## 2023-03-22 DIAGNOSIS — I83.023 VARICOSE VEINS OF LEFT LOWER EXTREMITY WITH ULCER OF ANKLE WITH FAT LAYER EXPOSED (HCC): ICD-10-CM

## 2023-03-22 PROCEDURE — 11042 DBRDMT SUBQ TIS 1ST 20SQCM/<: CPT

## 2023-03-22 PROCEDURE — 11045 DBRDMT SUBQ TISS EACH ADDL: CPT

## 2023-03-22 RX ORDER — BACITRACIN ZINC AND POLYMYXIN B SULFATE 500; 1000 [USP'U]/G; [USP'U]/G
OINTMENT TOPICAL ONCE
OUTPATIENT
Start: 2023-03-22 | End: 2023-03-22

## 2023-03-22 RX ORDER — LIDOCAINE HYDROCHLORIDE 20 MG/ML
JELLY TOPICAL ONCE
OUTPATIENT
Start: 2023-03-22 | End: 2023-03-22

## 2023-03-22 RX ORDER — LIDOCAINE 40 MG/G
CREAM TOPICAL ONCE
OUTPATIENT
Start: 2023-03-22 | End: 2023-03-22

## 2023-03-22 RX ORDER — BACITRACIN, NEOMYCIN, POLYMYXIN B 400; 3.5; 5 [USP'U]/G; MG/G; [USP'U]/G
OINTMENT TOPICAL ONCE
OUTPATIENT
Start: 2023-03-22 | End: 2023-03-22

## 2023-03-22 RX ORDER — CLOBETASOL PROPIONATE 0.5 MG/G
OINTMENT TOPICAL ONCE
OUTPATIENT
Start: 2023-03-22 | End: 2023-03-22

## 2023-03-22 RX ORDER — LIDOCAINE HYDROCHLORIDE 40 MG/ML
SOLUTION TOPICAL ONCE
OUTPATIENT
Start: 2023-03-22 | End: 2023-03-22

## 2023-03-22 RX ORDER — LIDOCAINE HYDROCHLORIDE 40 MG/ML
SOLUTION TOPICAL ONCE
Status: COMPLETED | OUTPATIENT
Start: 2023-03-22 | End: 2023-03-22

## 2023-03-22 RX ORDER — LIDOCAINE 50 MG/G
OINTMENT TOPICAL ONCE
OUTPATIENT
Start: 2023-03-22 | End: 2023-03-22

## 2023-03-22 RX ORDER — BETAMETHASONE DIPROPIONATE 0.05 %
OINTMENT (GRAM) TOPICAL ONCE
OUTPATIENT
Start: 2023-03-22 | End: 2023-03-22

## 2023-03-22 RX ORDER — GINSENG 100 MG
CAPSULE ORAL ONCE
OUTPATIENT
Start: 2023-03-22 | End: 2023-03-22

## 2023-03-22 RX ORDER — GENTAMICIN SULFATE 1 MG/G
OINTMENT TOPICAL ONCE
OUTPATIENT
Start: 2023-03-22 | End: 2023-03-22

## 2023-03-22 RX ADMIN — LIDOCAINE HYDROCHLORIDE 10 ML: 40 SOLUTION TOPICAL at 07:43

## 2023-03-22 ASSESSMENT — PAIN DESCRIPTION - ORIENTATION: ORIENTATION: LEFT

## 2023-03-22 ASSESSMENT — PAIN DESCRIPTION - DESCRIPTORS: DESCRIPTORS: ACHING

## 2023-03-22 ASSESSMENT — PAIN SCALES - GENERAL: PAINLEVEL_OUTOF10: 5

## 2023-03-22 ASSESSMENT — PAIN DESCRIPTION - LOCATION: LOCATION: LEG

## 2023-03-22 NOTE — PROGRESS NOTES
family as available. I discussed the procedure, risks, benefits, complications, and alternatives of the procedure. They understand and consent.   All questions were answered  Script for percocet 5/325 mg #28 prn q6 hrs - given, oarrs run  5/24/22  Angio, L PT and peroneal plasty  5/25/22  On iv abx  Wound appearance better  R groin no hematoma, L DP 2+, PT triphasic   6/1/22  Wound size and appearance better  6/8/22  Wound size and appearance better  Discussed with Dr Chema Hagen - he will stop abx  6/15/22  Wound size slightly improvement, appearance better  6/22/22  Wounds sizes smaller  6/29/22  Wounds stable   Hypergranulation tissue present throughout wound beds    Continue drawtex, foam, ABD and profore   7/6/22  Wounds slightly improved  7/13/22  Both wounds slightly larger  Hyperkeratotic tissue debrided back  7/14/22  Patient came in due to drainage through her wrap  Dressing noted to have large amounts of yellow/green drainage throughout  Cultures obtained    7/20/22  Culture reviewed large growth S aureus and Pseudomonas  Disc with Dr Hannon - will start clindamycin 600 mg tid for staph  He will see her in office in regards to IV for pseudomonas  Wounds stable in size  Change to aquacell ag  7/27/22  Dr Chema Hagen to see  Medial slightly larger, lateral slightly smaller  8/3/22  Dr Chema Hagen saw her felt wound appearance better - will hold off on iv for now  Medial slightly improved, lateral stable  8/10/2022  Wounds stable  8/17/22  Wound stable, more pain with debridement  Discussed OR for debridement and possible advanced skin therapy - pt agreeable  8/24/22  Debridement, kerecise application  2/11/01  Wound appearance improved  Medial wound improved, lateral stable  9/7/22  Wounds are smaller  9/14/22  Wound stable  Enodfrom and drawtek  9/21/2022  Wound debrided, stable according to the measurements  9/28/22  Wound larger  Culture done  Discussed OR  Aquacell ag change   10/5/22  Wound slightly larger  Percocet

## 2023-03-27 NOTE — DISCHARGE INSTRUCTIONS
Visit Discharge/Physician Orders     Discharge condition: Stable     Assessment of pain at discharge: yes     Anesthetic used: 4% lidocaine solution     Discharge to: Home     Left via:Private automobile     Accompanied by:self     ECF/HHA: n/a     Dressing Orders:LEFT MEDIAL and LATERAL LOWER LEG-Cleanse with normal saline, apply zinc to fiona wound, aquacel AG and drawtex, dressing, ABD pad , unna boot and coban. Change weekly. Treatment Orders: Eat a diet high in protein and vitamin C. Take a multiple vitamin daily unless contraindicated. To see Dr. Humphries Files as scheduled      Must wear slip on shoe to work due to wrap on leg! Ok to return to work Monday 3/6. Tissue culture obtained at [facility] 3-29-23    Possible out patient surgical debridement in future. [facility] followup visit : 1 week____________________________  (Please note your next appointment above and if you are unable to keep, kindly give a 24 hour notice. Thank you.)     Physician signature:__________________________     If you experience any of the following, please call the [facility] during business hours:     * Increase in Pain  * Temperature over 101  * Increase in drainage from your wound  * Drainage with a foul odor  * Bleeding  * Increase in swelling  * Need for compression bandage changes due to slippage, breakthrough drainage. If you need medical attention outside of the business hours of the [facility] [address] please contact your PCP or go to the nearest emergency room.

## 2023-03-29 ENCOUNTER — TELEPHONE (OUTPATIENT)
Dept: VASCULAR SURGERY | Age: 68
End: 2023-03-29

## 2023-03-29 ENCOUNTER — HOSPITAL ENCOUNTER (OUTPATIENT)
Dept: WOUND CARE | Age: 68
Discharge: HOME | End: 2023-03-29
Payer: MEDICARE

## 2023-03-29 ENCOUNTER — PREP FOR PROCEDURE (OUTPATIENT)
Dept: VASCULAR SURGERY | Age: 68
End: 2023-03-29

## 2023-03-29 VITALS
TEMPERATURE: 97.6 F | RESPIRATION RATE: 18 BRPM | SYSTOLIC BLOOD PRESSURE: 178 MMHG | HEART RATE: 82 BPM | DIASTOLIC BLOOD PRESSURE: 72 MMHG

## 2023-03-29 DIAGNOSIS — I70.242 ATHEROSCLEROSIS OF NATIVE ARTERY OF LEFT LOWER EXTREMITY WITH ULCERATION OF CALF (HCC): ICD-10-CM

## 2023-03-29 DIAGNOSIS — I83.023 VARICOSE VEINS OF LEFT LOWER EXTREMITY WITH ULCER OF ANKLE WITH FAT LAYER EXPOSED (HCC): Primary | ICD-10-CM

## 2023-03-29 DIAGNOSIS — L97.322 VARICOSE VEINS OF LEFT LOWER EXTREMITY WITH ULCER OF ANKLE WITH FAT LAYER EXPOSED (HCC): Primary | ICD-10-CM

## 2023-03-29 DIAGNOSIS — I70.243 ATHEROSCLEROSIS OF NATIVE ARTERY OF LEFT LOWER EXTREMITY WITH ULCERATION OF ANKLE (HCC): ICD-10-CM

## 2023-03-29 PROCEDURE — 87075 CULTR BACTERIA EXCEPT BLOOD: CPT

## 2023-03-29 PROCEDURE — 87186 SC STD MICRODIL/AGAR DIL: CPT

## 2023-03-29 PROCEDURE — 11042 DBRDMT SUBQ TIS 1ST 20SQCM/<: CPT

## 2023-03-29 PROCEDURE — 87070 CULTURE OTHR SPECIMN AEROBIC: CPT

## 2023-03-29 PROCEDURE — 11045 DBRDMT SUBQ TISS EACH ADDL: CPT

## 2023-03-29 PROCEDURE — 87077 CULTURE AEROBIC IDENTIFY: CPT

## 2023-03-29 RX ORDER — CLOBETASOL PROPIONATE 0.5 MG/G
OINTMENT TOPICAL ONCE
OUTPATIENT
Start: 2023-03-29 | End: 2023-03-29

## 2023-03-29 RX ORDER — GINSENG 100 MG
CAPSULE ORAL ONCE
OUTPATIENT
Start: 2023-03-29 | End: 2023-03-29

## 2023-03-29 RX ORDER — LIDOCAINE HYDROCHLORIDE 40 MG/ML
SOLUTION TOPICAL ONCE
OUTPATIENT
Start: 2023-03-29 | End: 2023-03-29

## 2023-03-29 RX ORDER — BACITRACIN, NEOMYCIN, POLYMYXIN B 400; 3.5; 5 [USP'U]/G; MG/G; [USP'U]/G
OINTMENT TOPICAL ONCE
OUTPATIENT
Start: 2023-03-29 | End: 2023-03-29

## 2023-03-29 RX ORDER — BETAMETHASONE DIPROPIONATE 0.05 %
OINTMENT (GRAM) TOPICAL ONCE
OUTPATIENT
Start: 2023-03-29 | End: 2023-03-29

## 2023-03-29 RX ORDER — LIDOCAINE HYDROCHLORIDE 20 MG/ML
JELLY TOPICAL ONCE
OUTPATIENT
Start: 2023-03-29 | End: 2023-03-29

## 2023-03-29 RX ORDER — LIDOCAINE HYDROCHLORIDE 40 MG/ML
SOLUTION TOPICAL ONCE
Status: COMPLETED | OUTPATIENT
Start: 2023-03-29 | End: 2023-03-29

## 2023-03-29 RX ORDER — BACITRACIN ZINC AND POLYMYXIN B SULFATE 500; 1000 [USP'U]/G; [USP'U]/G
OINTMENT TOPICAL ONCE
OUTPATIENT
Start: 2023-03-29 | End: 2023-03-29

## 2023-03-29 RX ORDER — GENTAMICIN SULFATE 1 MG/G
OINTMENT TOPICAL ONCE
OUTPATIENT
Start: 2023-03-29 | End: 2023-03-29

## 2023-03-29 RX ORDER — LIDOCAINE 50 MG/G
OINTMENT TOPICAL ONCE
OUTPATIENT
Start: 2023-03-29 | End: 2023-03-29

## 2023-03-29 RX ORDER — LIDOCAINE 40 MG/G
CREAM TOPICAL ONCE
OUTPATIENT
Start: 2023-03-29 | End: 2023-03-29

## 2023-03-29 RX ADMIN — LIDOCAINE HYDROCHLORIDE 10 ML: 40 SOLUTION TOPICAL at 07:44

## 2023-03-29 ASSESSMENT — PAIN DESCRIPTION - LOCATION: LOCATION: LEG

## 2023-03-29 ASSESSMENT — PAIN SCALES - GENERAL: PAINLEVEL_OUTOF10: 5

## 2023-03-29 ASSESSMENT — PAIN DESCRIPTION - DESCRIPTORS: DESCRIPTORS: ACHING

## 2023-03-29 ASSESSMENT — PAIN DESCRIPTION - ORIENTATION: ORIENTATION: LEFT

## 2023-03-29 NOTE — TELEPHONE ENCOUNTER
Scheduled debridement (L) LE, possible advanced skin therapy, possible Unna boot 4/4/23 at 11:00 a.m. Pt was instructed to report to [facility] at 9:00 a.m, to be NPO after midnight the night before except heart and/or BP meds in the a.m with sips of water, hold Eliquis x 2 days prior.

## 2023-03-29 NOTE — PROGRESS NOTES
diet high in protein and vitamin C. Take a multiple vitamin daily unless contraindicated. To see Dr. Wayne Mooney as scheduled      Must wear slip on shoe to work due to wrap on leg! Ok to return to work Monday 3/6. Tissue culture obtained at [address] [place] [address],[address] 3-29-23    Possible out patient surgical debridement in future. [address] [place] [address],[address] followup visit : 1 week____________________________  (Please note your next appointment above and if you are unable to keep, kindly give a 24 hour notice. Thank you.)     Physician signature:__________________________     If you experience any of the following, please call the [address] [place] [address] during business hours:     * Increase in Pain  * Temperature over 101  * Increase in drainage from your wound  * Drainage with a foul odor  * Bleeding  * Increase in swelling  * Need for compression bandage changes due to slippage, breakthrough drainage. If you need medical attention outside of the business hours of the [address] [place] [address] Road please contact your PCP or go to the nearest emergency room.    Electronically signed by Miguel Horvath MD

## 2023-03-31 LAB — BACTERIA SPEC ANAEROBE CULT: NORMAL

## 2023-04-01 LAB
BACTERIA WND AEROBE CULT: ABNORMAL
BACTERIA WND AEROBE CULT: ABNORMAL
ORGANISM: ABNORMAL

## 2023-04-03 ENCOUNTER — TELEPHONE (OUTPATIENT)
Dept: VASCULAR SURGERY | Age: 68
End: 2023-04-03

## 2023-04-05 ENCOUNTER — HOSPITAL ENCOUNTER (OUTPATIENT)
Dept: WOUND CARE | Age: 68
Discharge: HOME | End: 2023-04-05
Payer: MEDICARE

## 2023-04-05 VITALS
DIASTOLIC BLOOD PRESSURE: 79 MMHG | WEIGHT: 170 LBS | SYSTOLIC BLOOD PRESSURE: 177 MMHG | HEIGHT: 68 IN | HEART RATE: 85 BPM | BODY MASS INDEX: 25.76 KG/M2 | TEMPERATURE: 97.2 F | RESPIRATION RATE: 18 BRPM

## 2023-04-05 DIAGNOSIS — I83.023 VARICOSE VEINS OF LEFT LOWER EXTREMITY WITH ULCER OF ANKLE WITH FAT LAYER EXPOSED (HCC): Primary | ICD-10-CM

## 2023-04-05 DIAGNOSIS — L97.322 VARICOSE VEINS OF LEFT LOWER EXTREMITY WITH ULCER OF ANKLE WITH FAT LAYER EXPOSED (HCC): Primary | ICD-10-CM

## 2023-04-05 DIAGNOSIS — I70.242 ATHEROSCLEROSIS OF NATIVE ARTERY OF LEFT LOWER EXTREMITY WITH ULCERATION OF CALF (HCC): ICD-10-CM

## 2023-04-05 PROCEDURE — 99213 OFFICE O/P EST LOW 20 MIN: CPT

## 2023-04-05 PROCEDURE — 99213 OFFICE O/P EST LOW 20 MIN: CPT | Performed by: SURGERY

## 2023-04-05 RX ORDER — LIDOCAINE HYDROCHLORIDE 40 MG/ML
SOLUTION TOPICAL ONCE
OUTPATIENT
Start: 2023-04-05 | End: 2023-04-05

## 2023-04-05 RX ORDER — LIDOCAINE HYDROCHLORIDE 20 MG/ML
JELLY TOPICAL ONCE
OUTPATIENT
Start: 2023-04-05 | End: 2023-04-05

## 2023-04-05 RX ORDER — GINSENG 100 MG
CAPSULE ORAL ONCE
OUTPATIENT
Start: 2023-04-05 | End: 2023-04-05

## 2023-04-05 RX ORDER — BETAMETHASONE DIPROPIONATE 0.05 %
OINTMENT (GRAM) TOPICAL ONCE
OUTPATIENT
Start: 2023-04-05 | End: 2023-04-05

## 2023-04-05 RX ORDER — LIDOCAINE 40 MG/G
CREAM TOPICAL ONCE
OUTPATIENT
Start: 2023-04-05 | End: 2023-04-05

## 2023-04-05 RX ORDER — LIDOCAINE HYDROCHLORIDE 40 MG/ML
SOLUTION TOPICAL ONCE
Status: COMPLETED | OUTPATIENT
Start: 2023-04-05 | End: 2023-04-05

## 2023-04-05 RX ORDER — BACITRACIN, NEOMYCIN, POLYMYXIN B 400; 3.5; 5 [USP'U]/G; MG/G; [USP'U]/G
OINTMENT TOPICAL ONCE
OUTPATIENT
Start: 2023-04-05 | End: 2023-04-05

## 2023-04-05 RX ORDER — LIDOCAINE 50 MG/G
OINTMENT TOPICAL ONCE
OUTPATIENT
Start: 2023-04-05 | End: 2023-04-05

## 2023-04-05 RX ORDER — CLOBETASOL PROPIONATE 0.5 MG/G
OINTMENT TOPICAL ONCE
OUTPATIENT
Start: 2023-04-05 | End: 2023-04-05

## 2023-04-05 RX ORDER — BACITRACIN ZINC AND POLYMYXIN B SULFATE 500; 1000 [USP'U]/G; [USP'U]/G
OINTMENT TOPICAL ONCE
OUTPATIENT
Start: 2023-04-05 | End: 2023-04-05

## 2023-04-05 RX ORDER — GENTAMICIN SULFATE 1 MG/G
OINTMENT TOPICAL ONCE
OUTPATIENT
Start: 2023-04-05 | End: 2023-04-05

## 2023-04-05 RX ADMIN — LIDOCAINE HYDROCHLORIDE 10 ML: 40 SOLUTION TOPICAL at 07:53

## 2023-04-05 ASSESSMENT — PAIN SCALES - GENERAL: PAINLEVEL_OUTOF10: 9

## 2023-04-05 ASSESSMENT — PAIN DESCRIPTION - ONSET: ONSET: ON-GOING

## 2023-04-05 ASSESSMENT — PAIN DESCRIPTION - PAIN TYPE: TYPE: CHRONIC PAIN

## 2023-04-05 ASSESSMENT — PAIN - FUNCTIONAL ASSESSMENT: PAIN_FUNCTIONAL_ASSESSMENT: PREVENTS OR INTERFERES SOME ACTIVE ACTIVITIES AND ADLS

## 2023-04-05 ASSESSMENT — PAIN DESCRIPTION - ORIENTATION: ORIENTATION: LEFT

## 2023-04-05 ASSESSMENT — PAIN DESCRIPTION - FREQUENCY: FREQUENCY: CONTINUOUS

## 2023-04-05 ASSESSMENT — PAIN DESCRIPTION - DESCRIPTORS: DESCRIPTORS: BURNING;THROBBING

## 2023-04-05 NOTE — PROGRESS NOTES
[address] during business hours:     * Increase in Pain  * Temperature over 101  * Increase in drainage from your wound  * Drainage with a foul odor  * Bleeding  * Increase in swelling  * Need for compression bandage changes due to slippage, breakthrough drainage. If you need medical attention outside of the business hours of the [address] please contact your PCP or go to the nearest emergency room.      Electronically signed by Katheryne Prader, MD

## 2023-04-05 NOTE — PLAN OF CARE
Problem: Pain  Goal: Verbalizes/displays adequate comfort level or baseline comfort level  Outcome: Progressing     Problem: Wound:  Goal: Will show signs of wound healing; wound closure and no evidence of infection  Description: Will show signs of wound healing; wound closure and no evidence of infection  Outcome: Progressing     Problem: Venous:  Goal: Signs of wound healing will improve  Description: Signs of wound healing will improve  Outcome: Adequate for Discharge     Problem: Compression therapy:  Goal: Will be free from complications associated with compression therapy  Description: Will be free from complications associated with compression therapy  Outcome: Adequate for Discharge

## 2023-04-05 NOTE — DISCHARGE INSTRUCTIONS
Visit Discharge/Physician Orders     Discharge condition: Stable     Assessment of pain at discharge: yes     Anesthetic used: 4% lidocaine solution     Discharge to: Home     Left via:Private automobile     Accompanied by:self     ECF/HHA: n/a     Dressing Orders:LEFT MEDIAL and LATERAL LOWER LEG-Cleanse with normal saline, apply zinc to fiona wound, aquacel AG and drawtex, dressing, ABD pad , unna boot and coban. Change weekly. Treatment Orders: Eat a diet high in protein and vitamin C. Take a multiple vitamin daily unless contraindicated. To see Dr. Yang Human as scheduled      Must wear slip on shoe to work due to wrap on leg! Ok to return to work Monday 3/6. Continue bactrim as prescribed-take until finished. Possible out patient surgical debridement in future. [facility] followup visit : 1 week____________________________  (Please note your next appointment above and if you are unable to keep, kindly give a 24 hour notice. Thank you.)     Physician signature:__________________________     If you experience any of the following, please call the [address] during business hours:     * Increase in Pain  * Temperature over 101  * Increase in drainage from your wound  * Drainage with a foul odor  * Bleeding  * Increase in swelling  * Need for compression bandage changes due to slippage, breakthrough drainage. If you need medical attention outside of the business hours of the [address] please contact your PCP or go to the nearest emergency room.

## 2023-04-17 NOTE — DISCHARGE INSTRUCTIONS
Visit Discharge/Physician Orders     Discharge condition: Stable     Assessment of pain at discharge: yes     Anesthetic used: 4% lidocaine solution     Discharge to: Home     Left via:Private automobile     Accompanied by:self     ECF/HHA: n/a     Dressing Orders:LEFT MEDIAL and LATERAL LOWER LEG-Cleanse with normal saline, apply zinc to fiona wound, aquacel AG and drawtex, dressing, ABD pad , unna boot and coban. Change weekly. Treatment Orders: Eat a diet high in protein and vitamin C. Take a multiple vitamin daily unless contraindicated. To see Dr. Eulalia Garzon as scheduled      Must wear slip on shoe to work due to wrap on leg! Ok to return to work Monday 3/6. Possible out patient surgical debridement in future. Tissue culture obtained at [facility] today 4-19-23    [facility] followup visit : 1 week____________________________  (Please note your next appointment above and if you are unable to keep, kindly give a 24 hour notice. Thank you.)     Physician signature:__________________________     If you experience any of the following, please call the [address] during business hours:     * Increase in Pain  * Temperature over 101  * Increase in drainage from your wound  * Drainage with a foul odor  * Bleeding  * Increase in swelling  * Need for compression bandage changes due to slippage, breakthrough drainage. If you need medical attention outside of the business hours of the [address] please contact your PCP or go to the nearest emergency room.

## 2023-04-19 ENCOUNTER — HOSPITAL ENCOUNTER (OUTPATIENT)
Dept: WOUND CARE | Age: 68
Discharge: HOME | End: 2023-04-19
Payer: MEDICARE

## 2023-04-19 VITALS
RESPIRATION RATE: 18 BRPM | TEMPERATURE: 98.2 F | BODY MASS INDEX: 25.76 KG/M2 | HEIGHT: 68 IN | SYSTOLIC BLOOD PRESSURE: 180 MMHG | DIASTOLIC BLOOD PRESSURE: 98 MMHG | WEIGHT: 170 LBS | HEART RATE: 80 BPM

## 2023-04-19 DIAGNOSIS — L97.322 VARICOSE VEINS OF LEFT LOWER EXTREMITY WITH ULCER OF ANKLE WITH FAT LAYER EXPOSED (HCC): Primary | ICD-10-CM

## 2023-04-19 DIAGNOSIS — I83.023 VARICOSE VEINS OF LEFT LOWER EXTREMITY WITH ULCER OF ANKLE WITH FAT LAYER EXPOSED (HCC): Primary | ICD-10-CM

## 2023-04-19 DIAGNOSIS — I70.242 ATHEROSCLEROSIS OF NATIVE ARTERY OF LEFT LOWER EXTREMITY WITH ULCERATION OF CALF (HCC): ICD-10-CM

## 2023-04-19 DIAGNOSIS — I70.243 ATHEROSCLEROSIS OF NATIVE ARTERY OF LEFT LOWER EXTREMITY WITH ULCERATION OF ANKLE (HCC): Chronic | ICD-10-CM

## 2023-04-19 PROCEDURE — 87077 CULTURE AEROBIC IDENTIFY: CPT

## 2023-04-19 PROCEDURE — 87186 SC STD MICRODIL/AGAR DIL: CPT

## 2023-04-19 PROCEDURE — 87205 SMEAR GRAM STAIN: CPT

## 2023-04-19 PROCEDURE — 11042 DBRDMT SUBQ TIS 1ST 20SQCM/<: CPT

## 2023-04-19 PROCEDURE — 87075 CULTR BACTERIA EXCEPT BLOOD: CPT

## 2023-04-19 PROCEDURE — 87070 CULTURE OTHR SPECIMN AEROBIC: CPT

## 2023-04-19 RX ORDER — LIDOCAINE HYDROCHLORIDE 40 MG/ML
SOLUTION TOPICAL ONCE
OUTPATIENT
Start: 2023-04-19 | End: 2023-04-19

## 2023-04-19 RX ORDER — BACITRACIN ZINC AND POLYMYXIN B SULFATE 500; 1000 [USP'U]/G; [USP'U]/G
OINTMENT TOPICAL ONCE
OUTPATIENT
Start: 2023-04-19 | End: 2023-04-19

## 2023-04-19 RX ORDER — LIDOCAINE HYDROCHLORIDE 40 MG/ML
SOLUTION TOPICAL ONCE
Status: COMPLETED | OUTPATIENT
Start: 2023-04-19 | End: 2023-04-19

## 2023-04-19 RX ORDER — BETAMETHASONE DIPROPIONATE 0.05 %
OINTMENT (GRAM) TOPICAL ONCE
OUTPATIENT
Start: 2023-04-19 | End: 2023-04-19

## 2023-04-19 RX ORDER — LIDOCAINE HYDROCHLORIDE 20 MG/ML
JELLY TOPICAL ONCE
OUTPATIENT
Start: 2023-04-19 | End: 2023-04-19

## 2023-04-19 RX ORDER — LIDOCAINE 50 MG/G
OINTMENT TOPICAL ONCE
OUTPATIENT
Start: 2023-04-19 | End: 2023-04-19

## 2023-04-19 RX ORDER — BACITRACIN, NEOMYCIN, POLYMYXIN B 400; 3.5; 5 [USP'U]/G; MG/G; [USP'U]/G
OINTMENT TOPICAL ONCE
OUTPATIENT
Start: 2023-04-19 | End: 2023-04-19

## 2023-04-19 RX ORDER — OXYCODONE HYDROCHLORIDE AND ACETAMINOPHEN 5; 325 MG/1; MG/1
1 TABLET ORAL EVERY 6 HOURS PRN
Qty: 28 TABLET | Refills: 0 | Status: SHIPPED | OUTPATIENT
Start: 2023-04-19 | End: 2023-04-26

## 2023-04-19 RX ORDER — LIDOCAINE 40 MG/G
CREAM TOPICAL ONCE
OUTPATIENT
Start: 2023-04-19 | End: 2023-04-19

## 2023-04-19 RX ORDER — CLOBETASOL PROPIONATE 0.5 MG/G
OINTMENT TOPICAL ONCE
OUTPATIENT
Start: 2023-04-19 | End: 2023-04-19

## 2023-04-19 RX ORDER — GINSENG 100 MG
CAPSULE ORAL ONCE
OUTPATIENT
Start: 2023-04-19 | End: 2023-04-19

## 2023-04-19 RX ORDER — GENTAMICIN SULFATE 1 MG/G
OINTMENT TOPICAL ONCE
OUTPATIENT
Start: 2023-04-19 | End: 2023-04-19

## 2023-04-19 RX ADMIN — LIDOCAINE HYDROCHLORIDE 6 ML: 40 SOLUTION TOPICAL at 08:06

## 2023-04-19 ASSESSMENT — PAIN SCALES - GENERAL: PAINLEVEL_OUTOF10: 8

## 2023-04-19 ASSESSMENT — PAIN DESCRIPTION - DESCRIPTORS: DESCRIPTORS: THROBBING

## 2023-04-19 ASSESSMENT — PAIN DESCRIPTION - FREQUENCY: FREQUENCY: CONTINUOUS

## 2023-04-19 ASSESSMENT — PAIN DESCRIPTION - LOCATION: LOCATION: LEG

## 2023-04-19 ASSESSMENT — PAIN DESCRIPTION - PAIN TYPE: TYPE: CHRONIC PAIN

## 2023-04-19 ASSESSMENT — PAIN DESCRIPTION - ORIENTATION: ORIENTATION: LEFT

## 2023-04-19 NOTE — PROGRESS NOTES
Discharge condition: Stable     Assessment of pain at discharge: yes     Anesthetic used: 4% lidocaine solution     Discharge to: Home     Left via:Private automobile     Accompanied by:self     ECF/HHA: n/a     Dressing Orders:LEFT MEDIAL and LATERAL LOWER LEG-Cleanse with normal saline, apply zinc to fiona wound, aquacel AG and drawtex, dressing, ABD pad , unna boot and coban. Change weekly. Treatment Orders: Eat a diet high in protein and vitamin C. Take a multiple vitamin daily unless contraindicated. To see Dr. Abdirahman Kinney as scheduled      Must wear slip on shoe to work due to wrap on leg! Ok to return to work Monday 3/6. Possible out patient surgical debridement in future. Tissue culture obtained at [facility] today 4-19-23    [facility] followup visit : 1 week____________________________  (Please note your next appointment above and if you are unable to keep, kindly give a 24 hour notice. Thank you.)     Physician signature:__________________________     If you experience any of the following, please call the [facility] [address] during business hours:     * Increase in Pain  * Temperature over 101  * Increase in drainage from your wound  * Drainage with a foul odor  * Bleeding  * Increase in swelling  * Need for compression bandage changes due to slippage, breakthrough drainage. If you need medical attention outside of the business hours of the [address] please contact your PCP or go to the nearest emergency room.      Electronically signed by Gifty Brown MD

## 2023-04-21 LAB — BACTERIA SPEC ANAEROBE CULT: NORMAL

## 2023-04-22 LAB
BACTERIA WND AEROBE CULT: ABNORMAL
BACTERIA WND AEROBE CULT: ABNORMAL
GRAM STN SPEC: ABNORMAL
ORGANISM: ABNORMAL

## 2023-04-25 NOTE — DISCHARGE INSTRUCTIONS
Visit Discharge/Physician Orders     Discharge condition: Stable     Assessment of pain at discharge: yes     Anesthetic used: 4% lidocaine solution     Discharge to: Home     Left via:Private automobile     Accompanied by:self     ECF/HHA: n/a     Dressing Orders:LEFT MEDIAL and LATERAL LOWER LEG-Cleanse with normal saline, apply zinc to fiona wound, aquacel AG and drawtex, dressing, ABD pad , unna boot and coban. Change weekly. Treatment Orders: Eat a diet high in protein and vitamin C. Take a multiple vitamin daily unless contraindicated. To see Dr. Kathia Burroughs as scheduled      Must wear slip on shoe to work due to wrap on leg! Possible out patient surgical debridement in future. Take clindamycin as prescribed until finished     [address] [place] [address],[address] followup visit : 1 week____________________________  (Please note your next appointment above and if you are unable to keep, kindly give a 24 hour notice. Thank you.)     Physician signature:__________________________     If you experience any of the following, please call the [facility] during business hours:     * Increase in Pain  * Temperature over 101  * Increase in drainage from your wound  * Drainage with a foul odor  * Bleeding  * Increase in swelling  * Need for compression bandage changes due to slippage, breakthrough drainage. If you need medical attention outside of the business hours of the [address] please contact your PCP or go to the nearest emergency room.

## 2023-04-26 ENCOUNTER — HOSPITAL ENCOUNTER (OUTPATIENT)
Dept: WOUND CARE | Age: 68
Discharge: HOME | End: 2023-04-26
Payer: MEDICARE

## 2023-04-26 ENCOUNTER — TELEPHONE (OUTPATIENT)
Dept: VASCULAR SURGERY | Age: 68
End: 2023-04-26

## 2023-04-26 ENCOUNTER — PREP FOR PROCEDURE (OUTPATIENT)
Dept: VASCULAR SURGERY | Age: 68
End: 2023-04-26

## 2023-04-26 VITALS
WEIGHT: 170 LBS | SYSTOLIC BLOOD PRESSURE: 189 MMHG | TEMPERATURE: 96.9 F | DIASTOLIC BLOOD PRESSURE: 77 MMHG | HEART RATE: 83 BPM | RESPIRATION RATE: 18 BRPM | BODY MASS INDEX: 25.76 KG/M2 | HEIGHT: 68 IN

## 2023-04-26 DIAGNOSIS — L97.322 VARICOSE VEINS OF LEFT LOWER EXTREMITY WITH ULCER OF ANKLE WITH FAT LAYER EXPOSED (HCC): Primary | ICD-10-CM

## 2023-04-26 DIAGNOSIS — I83.023 VARICOSE VEINS OF LEFT LOWER EXTREMITY WITH ULCER OF ANKLE WITH FAT LAYER EXPOSED (HCC): Primary | ICD-10-CM

## 2023-04-26 DIAGNOSIS — I70.242 ATHEROSCLEROSIS OF NATIVE ARTERY OF LEFT LOWER EXTREMITY WITH ULCERATION OF CALF (HCC): ICD-10-CM

## 2023-04-26 DIAGNOSIS — I70.243 ATHEROSCLEROSIS OF NATIVE ARTERY OF LEFT LOWER EXTREMITY WITH ULCERATION OF ANKLE (HCC): Chronic | ICD-10-CM

## 2023-04-26 PROCEDURE — 99213 OFFICE O/P EST LOW 20 MIN: CPT | Performed by: SURGERY

## 2023-04-26 PROCEDURE — 99213 OFFICE O/P EST LOW 20 MIN: CPT

## 2023-04-26 RX ORDER — CLOBETASOL PROPIONATE 0.5 MG/G
OINTMENT TOPICAL ONCE
OUTPATIENT
Start: 2023-04-26 | End: 2023-04-26

## 2023-04-26 RX ORDER — GENTAMICIN SULFATE 1 MG/G
OINTMENT TOPICAL ONCE
OUTPATIENT
Start: 2023-04-26 | End: 2023-04-26

## 2023-04-26 RX ORDER — LIDOCAINE HYDROCHLORIDE 20 MG/ML
JELLY TOPICAL ONCE
OUTPATIENT
Start: 2023-04-26 | End: 2023-04-26

## 2023-04-26 RX ORDER — CLINDAMYCIN HYDROCHLORIDE 300 MG/1
300 CAPSULE ORAL 3 TIMES DAILY
Qty: 30 CAPSULE | Refills: 0 | Status: SHIPPED | OUTPATIENT
Start: 2023-04-26 | End: 2023-05-06

## 2023-04-26 RX ORDER — BACITRACIN, NEOMYCIN, POLYMYXIN B 400; 3.5; 5 [USP'U]/G; MG/G; [USP'U]/G
OINTMENT TOPICAL ONCE
OUTPATIENT
Start: 2023-04-26 | End: 2023-04-26

## 2023-04-26 RX ORDER — BETAMETHASONE DIPROPIONATE 0.05 %
OINTMENT (GRAM) TOPICAL ONCE
OUTPATIENT
Start: 2023-04-26 | End: 2023-04-26

## 2023-04-26 RX ORDER — GINSENG 100 MG
CAPSULE ORAL ONCE
OUTPATIENT
Start: 2023-04-26 | End: 2023-04-26

## 2023-04-26 RX ORDER — LIDOCAINE HYDROCHLORIDE 40 MG/ML
SOLUTION TOPICAL ONCE
OUTPATIENT
Start: 2023-04-26 | End: 2023-04-26

## 2023-04-26 RX ORDER — LIDOCAINE 40 MG/G
CREAM TOPICAL ONCE
OUTPATIENT
Start: 2023-04-26 | End: 2023-04-26

## 2023-04-26 RX ORDER — BACITRACIN ZINC AND POLYMYXIN B SULFATE 500; 1000 [USP'U]/G; [USP'U]/G
OINTMENT TOPICAL ONCE
OUTPATIENT
Start: 2023-04-26 | End: 2023-04-26

## 2023-04-26 RX ORDER — LIDOCAINE HYDROCHLORIDE 40 MG/ML
SOLUTION TOPICAL ONCE
Status: COMPLETED | OUTPATIENT
Start: 2023-04-26 | End: 2023-04-26

## 2023-04-26 RX ORDER — LIDOCAINE 50 MG/G
OINTMENT TOPICAL ONCE
OUTPATIENT
Start: 2023-04-26 | End: 2023-04-26

## 2023-04-26 RX ADMIN — LIDOCAINE HYDROCHLORIDE 10 ML: 40 SOLUTION TOPICAL at 07:53

## 2023-04-26 ASSESSMENT — PAIN DESCRIPTION - ORIENTATION: ORIENTATION: LEFT

## 2023-04-26 ASSESSMENT — PAIN DESCRIPTION - LOCATION: LOCATION: LEG

## 2023-04-26 ASSESSMENT — PAIN DESCRIPTION - ONSET: ONSET: ON-GOING

## 2023-04-26 ASSESSMENT — PAIN DESCRIPTION - FREQUENCY: FREQUENCY: CONTINUOUS

## 2023-04-26 ASSESSMENT — PAIN DESCRIPTION - PAIN TYPE: TYPE: CHRONIC PAIN

## 2023-04-26 ASSESSMENT — PAIN SCALES - GENERAL: PAINLEVEL_OUTOF10: 10

## 2023-04-26 ASSESSMENT — PAIN - FUNCTIONAL ASSESSMENT: PAIN_FUNCTIONAL_ASSESSMENT: PREVENTS OR INTERFERES SOME ACTIVE ACTIVITIES AND ADLS

## 2023-04-26 NOTE — PROGRESS NOTES
Wound Healing Center Followup Visit Note    Referring Physician : Rafat Bernal MD  [address] [place] [address] RECORD NUMBER:  [id number]  AGE: 74 y.o. GENDER: female  : 1955  EPISODE DATE:  2023    Subjective:     Chief Complaint   Patient presents with    Wound Check     Left leg      HISTORY of PRESENT ILLNESS HPI   Vernida Baumgarten is a 74 y.o. female who presents today in regards to follow up evaluation and treatment of wound/ulcer. That patient's past medical, family and social hx were reviewed and changes were made if present. History of Wound Context:  The patient has had a wound of her left ankle/calf which was first noted approximately 2021. This has been treated local wound care. On their initial visit to the wound healing center, 22,  the patient has noted that the wound has been improving. The patient has not had similar previous wounds in the past.      She started seeing Dr. Allan Johnson in 2021 and than Dr. Oly Abreu. She was started in [facility] ~ 2021. She has noticed some improvement since starting unna boot. She is currently following with Dr. Fortunato Arauz. Pt is not on abx at time of initial visit, but has been treated with previously by podiatry. She is not a DM. She is not a smoker. She denies hx of DVT, and per her report had recent us noting no evidence of dvt at [facility]. She also had arterial studies done. I had previously seen her in the past in regards to left buttock thigh wound, which started as abscess. Pt works at [employer] in [facility] and is on her feet all day.     22  Reflux study - if significant findings will schedule for fu  Continue compression therapy [facility] per podiatry with aquacell dressing  Elevation  She does not have significant arterial occlusive disease  22  Patient has asked to continuing following with me going forward because of our previous relationship  She is going to let Dr. Julisa Carl office know  She

## 2023-04-26 NOTE — TELEPHONE ENCOUNTER
Scheduled debridement (L) LE, possible Unna boot, possible advanced skin therapy with Dr. Aria Jose 5/2 at 12:00 pm.  Pt has questions regarding antibiotics.     I called pt to discuss  All concerns addressed    She will hold her eliquis Sunday 4/30  She will continue abx    She will be at hospital at 10 am   Her procdure is scheduled for 12    She understands above  She understands that my office will not be contacting her re procedure for 5/2 any further    Kendall South MD

## 2023-05-01 ENCOUNTER — ANESTHESIA EVENT (OUTPATIENT)
Dept: OPERATING ROOM | Age: 68
End: 2023-05-01
Payer: MEDICARE

## 2023-05-02 ENCOUNTER — HOSPITAL ENCOUNTER (OUTPATIENT)
Age: 68
Setting detail: OBSERVATION
Discharge: HOME | End: 2023-05-03
Attending: SURGERY | Admitting: SURGERY
Payer: MEDICARE

## 2023-05-02 ENCOUNTER — ANESTHESIA (OUTPATIENT)
Dept: OPERATING ROOM | Age: 68
End: 2023-05-02
Payer: MEDICARE

## 2023-05-02 PROBLEM — D64.9 ANEMIA: Status: ACTIVE | Noted: 2023-05-02

## 2023-05-02 LAB
ABO + RH BLD: NORMAL
BLD GP AB SCN SERPL QL: NORMAL
BLOOD BANK DISPENSE STATUS: NORMAL
BLOOD BANK PRODUCT CODE: NORMAL
BPU ID: NORMAL
DESCRIPTION BLOOD BANK: NORMAL
ERYTHROCYTE [DISTWIDTH] IN BLOOD BY AUTOMATED COUNT: 19.5 FL (ref 11.5–15)
HCT VFR BLD AUTO: 25 % (ref 34–48)
HGB BLD-MCNC: 6.9 G/DL (ref 11.5–15.5)
INR BLD: 1.2
MCH RBC QN AUTO: 19.8 PG (ref 26–35)
MCHC RBC AUTO-ENTMCNC: 27.6 % (ref 32–34.5)
MCV RBC AUTO: 71.8 FL (ref 80–99.9)
PLATELET # BLD AUTO: 271 E9/L (ref 130–450)
PMV BLD AUTO: 9.9 FL (ref 7–12)
PROTHROMBIN TIME: 12.8 SEC (ref 9.3–12.4)
RBC # BLD AUTO: 3.48 E12/L (ref 3.5–5.5)
WBC # BLD: 8.8 E9/L (ref 4.5–11.5)

## 2023-05-02 PROCEDURE — 2580000003 HC RX 258: Performed by: PHYSICIAN ASSISTANT

## 2023-05-02 PROCEDURE — 86923 COMPATIBILITY TEST ELECTRIC: CPT

## 2023-05-02 PROCEDURE — 3700000001 HC ADD 15 MINUTES (ANESTHESIA): Performed by: SURGERY

## 2023-05-02 PROCEDURE — 3700000000 HC ANESTHESIA ATTENDED CARE: Performed by: SURGERY

## 2023-05-02 PROCEDURE — 3600000002 HC SURGERY LEVEL 2 BASE: Performed by: SURGERY

## 2023-05-02 PROCEDURE — 2580000003 HC RX 258

## 2023-05-02 PROCEDURE — 2500000003 HC RX 250 WO HCPCS

## 2023-05-02 PROCEDURE — 36415 COLL VENOUS BLD VENIPUNCTURE: CPT

## 2023-05-02 PROCEDURE — 86850 RBC ANTIBODY SCREEN: CPT

## 2023-05-02 PROCEDURE — 6370000000 HC RX 637 (ALT 250 FOR IP)

## 2023-05-02 PROCEDURE — G0378 HOSPITAL OBSERVATION PER HR: HCPCS

## 2023-05-02 PROCEDURE — 86901 BLOOD TYPING SEROLOGIC RH(D): CPT

## 2023-05-02 PROCEDURE — 6370000000 HC RX 637 (ALT 250 FOR IP): Performed by: SURGERY

## 2023-05-02 PROCEDURE — 3600000012 HC SURGERY LEVEL 2 ADDTL 15MIN: Performed by: SURGERY

## 2023-05-02 PROCEDURE — 7100000000 HC PACU RECOVERY - FIRST 15 MIN: Performed by: SURGERY

## 2023-05-02 PROCEDURE — 6360000002 HC RX W HCPCS: Performed by: PHYSICIAN ASSISTANT

## 2023-05-02 PROCEDURE — 15271 SKIN SUB GRAFT TRNK/ARM/LEG: CPT | Performed by: SURGERY

## 2023-05-02 PROCEDURE — 7100000001 HC PACU RECOVERY - ADDTL 15 MIN: Performed by: SURGERY

## 2023-05-02 PROCEDURE — P9016 RBC LEUKOCYTES REDUCED: HCPCS

## 2023-05-02 PROCEDURE — 6360000002 HC RX W HCPCS

## 2023-05-02 PROCEDURE — 86900 BLOOD TYPING SEROLOGIC ABO: CPT

## 2023-05-02 PROCEDURE — 85027 COMPLETE CBC AUTOMATED: CPT

## 2023-05-02 PROCEDURE — 85610 PROTHROMBIN TIME: CPT

## 2023-05-02 PROCEDURE — 6360000002 HC RX W HCPCS: Performed by: ANESTHESIOLOGY

## 2023-05-02 PROCEDURE — 2709999900 HC NON-CHARGEABLE SUPPLY: Performed by: SURGERY

## 2023-05-02 DEVICE — CYTAL® WOUND MATRIX 3-LAYER, 7CM X 10CM
Type: IMPLANTABLE DEVICE | Site: LEG | Status: FUNCTIONAL
Brand: CYTAL®

## 2023-05-02 RX ORDER — FENTANYL CITRATE 50 UG/ML
50 INJECTION, SOLUTION INTRAMUSCULAR; INTRAVENOUS EVERY 5 MIN PRN
Status: DISCONTINUED | OUTPATIENT
Start: 2023-05-02 | End: 2023-05-02 | Stop reason: HOSPADM

## 2023-05-02 RX ORDER — LABETALOL HYDROCHLORIDE 5 MG/ML
INJECTION, SOLUTION INTRAVENOUS PRN
Status: DISCONTINUED | OUTPATIENT
Start: 2023-05-02 | End: 2023-05-02 | Stop reason: SDUPTHER

## 2023-05-02 RX ORDER — SODIUM CHLORIDE 9 MG/ML
INJECTION, SOLUTION INTRAVENOUS PRN
Status: DISCONTINUED | OUTPATIENT
Start: 2023-05-02 | End: 2023-05-03 | Stop reason: HOSPADM

## 2023-05-02 RX ORDER — MIDAZOLAM HYDROCHLORIDE 1 MG/ML
INJECTION INTRAMUSCULAR; INTRAVENOUS PRN
Status: DISCONTINUED | OUTPATIENT
Start: 2023-05-02 | End: 2023-05-02 | Stop reason: SDUPTHER

## 2023-05-02 RX ORDER — MORPHINE SULFATE 2 MG/ML
2 INJECTION, SOLUTION INTRAMUSCULAR; INTRAVENOUS EVERY 4 HOURS PRN
Status: DISCONTINUED | OUTPATIENT
Start: 2023-05-02 | End: 2023-05-03 | Stop reason: HOSPADM

## 2023-05-02 RX ORDER — OSTOMY ADHESIVE
STRIP MISCELLANEOUS PRN
Status: DISCONTINUED | OUTPATIENT
Start: 2023-05-02 | End: 2023-05-02 | Stop reason: HOSPADM

## 2023-05-02 RX ORDER — SODIUM CHLORIDE 0.9 % (FLUSH) 0.9 %
5-40 SYRINGE (ML) INJECTION EVERY 12 HOURS SCHEDULED
Status: DISCONTINUED | OUTPATIENT
Start: 2023-05-02 | End: 2023-05-02 | Stop reason: HOSPADM

## 2023-05-02 RX ORDER — LIDOCAINE HYDROCHLORIDE 20 MG/ML
INJECTION, SOLUTION INTRAVENOUS PRN
Status: DISCONTINUED | OUTPATIENT
Start: 2023-05-02 | End: 2023-05-02 | Stop reason: SDUPTHER

## 2023-05-02 RX ORDER — PANTOPRAZOLE SODIUM 40 MG/1
40 TABLET, DELAYED RELEASE ORAL
Status: DISCONTINUED | OUTPATIENT
Start: 2023-05-03 | End: 2023-05-03 | Stop reason: HOSPADM

## 2023-05-02 RX ORDER — IPRATROPIUM BROMIDE AND ALBUTEROL SULFATE 2.5; .5 MG/3ML; MG/3ML
1 SOLUTION RESPIRATORY (INHALATION)
Status: DISCONTINUED | OUTPATIENT
Start: 2023-05-02 | End: 2023-05-02 | Stop reason: HOSPADM

## 2023-05-02 RX ORDER — FENTANYL CITRATE 50 UG/ML
25 INJECTION, SOLUTION INTRAMUSCULAR; INTRAVENOUS EVERY 5 MIN PRN
Status: DISCONTINUED | OUTPATIENT
Start: 2023-05-02 | End: 2023-05-02 | Stop reason: HOSPADM

## 2023-05-02 RX ORDER — LABETALOL HYDROCHLORIDE 5 MG/ML
10 INJECTION, SOLUTION INTRAVENOUS
Status: DISCONTINUED | OUTPATIENT
Start: 2023-05-02 | End: 2023-05-02 | Stop reason: HOSPADM

## 2023-05-02 RX ORDER — PROPOFOL 10 MG/ML
INJECTION, EMULSION INTRAVENOUS PRN
Status: DISCONTINUED | OUTPATIENT
Start: 2023-05-02 | End: 2023-05-02 | Stop reason: SDUPTHER

## 2023-05-02 RX ORDER — MIDAZOLAM HYDROCHLORIDE 2 MG/2ML
2 INJECTION, SOLUTION INTRAMUSCULAR; INTRAVENOUS
Status: DISCONTINUED | OUTPATIENT
Start: 2023-05-02 | End: 2023-05-02 | Stop reason: HOSPADM

## 2023-05-02 RX ORDER — OXYCODONE HYDROCHLORIDE AND ACETAMINOPHEN 5; 325 MG/1; MG/1
1 TABLET ORAL EVERY 4 HOURS PRN
Status: DISCONTINUED | OUTPATIENT
Start: 2023-05-02 | End: 2023-05-03 | Stop reason: HOSPADM

## 2023-05-02 RX ORDER — SODIUM CHLORIDE 9 MG/ML
INJECTION, SOLUTION INTRAVENOUS CONTINUOUS
Status: DISCONTINUED | OUTPATIENT
Start: 2023-05-02 | End: 2023-05-02 | Stop reason: HOSPADM

## 2023-05-02 RX ORDER — SODIUM CHLORIDE 0.9 % (FLUSH) 0.9 %
5-40 SYRINGE (ML) INJECTION 2 TIMES DAILY
Status: DISCONTINUED | OUTPATIENT
Start: 2023-05-02 | End: 2023-05-03 | Stop reason: HOSPADM

## 2023-05-02 RX ORDER — SODIUM CHLORIDE 0.9 % (FLUSH) 0.9 %
5-40 SYRINGE (ML) INJECTION PRN
Status: DISCONTINUED | OUTPATIENT
Start: 2023-05-02 | End: 2023-05-02 | Stop reason: HOSPADM

## 2023-05-02 RX ORDER — SODIUM CHLORIDE 9 MG/ML
INJECTION, SOLUTION INTRAVENOUS PRN
Status: DISCONTINUED | OUTPATIENT
Start: 2023-05-02 | End: 2023-05-02 | Stop reason: HOSPADM

## 2023-05-02 RX ORDER — MORPHINE SULFATE 2 MG/ML
INJECTION, SOLUTION INTRAMUSCULAR; INTRAVENOUS
Status: COMPLETED
Start: 2023-05-02 | End: 2023-05-02

## 2023-05-02 RX ORDER — FENTANYL CITRATE 50 UG/ML
INJECTION, SOLUTION INTRAMUSCULAR; INTRAVENOUS PRN
Status: DISCONTINUED | OUTPATIENT
Start: 2023-05-02 | End: 2023-05-02 | Stop reason: SDUPTHER

## 2023-05-02 RX ORDER — DIPHENHYDRAMINE HYDROCHLORIDE 50 MG/ML
12.5 INJECTION INTRAMUSCULAR; INTRAVENOUS
Status: DISCONTINUED | OUTPATIENT
Start: 2023-05-02 | End: 2023-05-02 | Stop reason: HOSPADM

## 2023-05-02 RX ORDER — KETAMINE HCL IN NACL, ISO-OSM 100MG/10ML
SYRINGE (ML) INJECTION PRN
Status: DISCONTINUED | OUTPATIENT
Start: 2023-05-02 | End: 2023-05-02 | Stop reason: SDUPTHER

## 2023-05-02 RX ORDER — KETOROLAC TROMETHAMINE 30 MG/ML
15 INJECTION, SOLUTION INTRAMUSCULAR; INTRAVENOUS
Status: DISCONTINUED | OUTPATIENT
Start: 2023-05-02 | End: 2023-05-02 | Stop reason: HOSPADM

## 2023-05-02 RX ORDER — CLINDAMYCIN HYDROCHLORIDE 150 MG/1
300 CAPSULE ORAL 3 TIMES DAILY
Status: DISCONTINUED | OUTPATIENT
Start: 2023-05-02 | End: 2023-05-03 | Stop reason: HOSPADM

## 2023-05-02 RX ORDER — MEPERIDINE HYDROCHLORIDE 25 MG/ML
12.5 INJECTION INTRAMUSCULAR; INTRAVENOUS; SUBCUTANEOUS EVERY 5 MIN PRN
Status: DISCONTINUED | OUTPATIENT
Start: 2023-05-02 | End: 2023-05-02 | Stop reason: HOSPADM

## 2023-05-02 RX ORDER — SODIUM CHLORIDE 0.9 % (FLUSH) 0.9 %
5-40 SYRINGE (ML) INJECTION PRN
Status: DISCONTINUED | OUTPATIENT
Start: 2023-05-02 | End: 2023-05-03 | Stop reason: HOSPADM

## 2023-05-02 RX ORDER — OXYCODONE HYDROCHLORIDE AND ACETAMINOPHEN 5; 325 MG/1; MG/1
2 TABLET ORAL EVERY 4 HOURS PRN
Status: DISCONTINUED | OUTPATIENT
Start: 2023-05-02 | End: 2023-05-03 | Stop reason: HOSPADM

## 2023-05-02 RX ORDER — HYDRALAZINE HYDROCHLORIDE 20 MG/ML
10 INJECTION INTRAMUSCULAR; INTRAVENOUS
Status: DISCONTINUED | OUTPATIENT
Start: 2023-05-02 | End: 2023-05-02 | Stop reason: HOSPADM

## 2023-05-02 RX ORDER — OXYCODONE HYDROCHLORIDE AND ACETAMINOPHEN 5; 325 MG/1; MG/1
1 TABLET ORAL EVERY 4 HOURS PRN
COMMUNITY

## 2023-05-02 RX ADMIN — SODIUM CHLORIDE, PRESERVATIVE FREE 5 ML: 5 INJECTION INTRAVENOUS at 21:39

## 2023-05-02 RX ADMIN — MORPHINE SULFATE 2 MG: 2 INJECTION, SOLUTION INTRAMUSCULAR; INTRAVENOUS at 18:12

## 2023-05-02 RX ADMIN — PROPOFOL 40 MG: 10 INJECTION, EMULSION INTRAVENOUS at 12:31

## 2023-05-02 RX ADMIN — MIDAZOLAM 2 MG: 1 INJECTION INTRAMUSCULAR; INTRAVENOUS at 12:43

## 2023-05-02 RX ADMIN — FENTANYL CITRATE 50 MCG: 0.05 INJECTION, SOLUTION INTRAMUSCULAR; INTRAVENOUS at 12:31

## 2023-05-02 RX ADMIN — MIDAZOLAM 2 MG: 1 INJECTION INTRAMUSCULAR; INTRAVENOUS at 12:20

## 2023-05-02 RX ADMIN — OXYCODONE HYDROCHLORIDE AND ACETAMINOPHEN 1 TABLET: 5; 325 TABLET ORAL at 20:43

## 2023-05-02 RX ADMIN — LABETALOL HYDROCHLORIDE 5 MG: 5 INJECTION INTRAVENOUS at 12:37

## 2023-05-02 RX ADMIN — MEPERIDINE HYDROCHLORIDE 12.5 MG: 25 INJECTION, SOLUTION INTRAMUSCULAR; INTRAVENOUS; SUBCUTANEOUS at 14:01

## 2023-05-02 RX ADMIN — Medication 20 MG: at 12:31

## 2023-05-02 RX ADMIN — CLINDAMYCIN HYDROCHLORIDE 300 MG: 150 CAPSULE ORAL at 20:44

## 2023-05-02 RX ADMIN — LABETALOL HYDROCHLORIDE 5 MG: 5 INJECTION INTRAVENOUS at 12:43

## 2023-05-02 RX ADMIN — PROPOFOL 125 MCG/KG/MIN: 10 INJECTION, EMULSION INTRAVENOUS at 12:32

## 2023-05-02 RX ADMIN — CEFAZOLIN 2000 MG: 2 INJECTION, POWDER, FOR SOLUTION INTRAMUSCULAR; INTRAVENOUS at 12:45

## 2023-05-02 RX ADMIN — SODIUM CHLORIDE: 9 INJECTION, SOLUTION INTRAVENOUS at 12:20

## 2023-05-02 RX ADMIN — FENTANYL CITRATE 50 MCG: 0.05 INJECTION, SOLUTION INTRAMUSCULAR; INTRAVENOUS at 12:47

## 2023-05-02 RX ADMIN — LIDOCAINE HYDROCHLORIDE 40 MG: 20 INJECTION, SOLUTION INTRAVENOUS at 12:31

## 2023-05-02 ASSESSMENT — PAIN DESCRIPTION - LOCATION
LOCATION: FOOT
LOCATION: ANKLE
LOCATION: ANKLE

## 2023-05-02 ASSESSMENT — PAIN DESCRIPTION - ORIENTATION
ORIENTATION: LEFT

## 2023-05-02 ASSESSMENT — PAIN SCALES - GENERAL
PAINLEVEL_OUTOF10: 6
PAINLEVEL_OUTOF10: 7
PAINLEVEL_OUTOF10: 8
PAINLEVEL_OUTOF10: 4
PAINLEVEL_OUTOF10: 9

## 2023-05-02 ASSESSMENT — PAIN - FUNCTIONAL ASSESSMENT: PAIN_FUNCTIONAL_ASSESSMENT: PREVENTS OR INTERFERES SOME ACTIVE ACTIVITIES AND ADLS

## 2023-05-02 ASSESSMENT — PAIN DESCRIPTION - DESCRIPTORS
DESCRIPTORS: DISCOMFORT;THROBBING;STABBING
DESCRIPTORS: ACHING;DISCOMFORT
DESCRIPTORS: STABBING;THROBBING

## 2023-05-02 NOTE — DISCHARGE INSTRUCTIONS
Local Wound Care Instructions  Left lower extremity Wound  - Keep Wound Clean and Dry  - Do no remove Mepitel one, Steristrips and underlying graft  - Keep unna boot dressing on until follow up in the wound care center on 5/10     [facility] may be drowsy or lightheaded after receiving sedation or anesthesia. A responsible person should be with you for the next 24 hours. Please follow the instructions checked below:    DIET INSTRUCTIONS:  [x]Start with light diet and progress to your normal diet as you feel like eating. If you experience nausea or repeated episodes of vomiting which persist beyond 12-24 hours, notify your doctor. []Other     ACTIVITY INSTRUCTIONS:  [x]Rest today. Increase activity as tolerated    []Elevate operative limb   [x]No heavy lifting or strenuous activity     [x]No driving  []Other     WOUND/DRESSING INSTRUCTIONS:  Always ensure you and your care giver clean hands before and after caring for the wound. []May shower      []May bathe      []Other         MEDICATION INSTRUCTIONS:    []Prescriptions sent with you. Use as directed. When taking pain medications, you may experience dizziness or drowsiness. Do not drink alcohol or drive when taking these medications. [x]You may take a non-prescription headache remedy, preferably one that does not contain aspirin. Continue with percocets at home. Other Instructions:      FOLLOW-UP CARE:  [x]Follow up in the wound care center on 5/10    Call physician if they or any other problems occur:  Fever over 101°    Redness, swelling, hardness or warmth at the operative site  Unrelieved nausea    Foul smelling or cloudy drainage at the operative site   Unrelieved pain    Blood soaked dressing.  (Some oozing may be normal)

## 2023-05-02 NOTE — H&P
Vascular Surgery History & Physical Exam      Chief Complaint: Chronic wound of the left lower extremity    HISTORY OF PRESENT ILLNESS:                The patient is a 74 y.o. female who presents to the hospital for elective debridement of chronic wound of the left lower extremity. IMPRESSION:  Chronic wound of the left lower extremity    PLAN:  Debridement of chronic wound of the left lower extremity. Possible unna boot, possible advanced skin therapy. I reviewed the procedure with the patient and family as available. I discussed the procedure, risks, benefits, complications, and alternatives of the procedure. They understand and consent.   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 ([address] 05/18/2022    Dizziness - light-headed     GERD (gastroesophageal reflux disease)     Herpes dermatitis 04/27/2017    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    Lightheadedness     Migraine     PONV (postoperative nausea and vomiting)     Skin ulcer of buttock with fat layer exposed ([address] 07/20/2016    Venous stasis ulcer of left ankle with fat layer exposed with varicose veins ([address] 02/02/2022     Past Surgical History:   Procedure Laterality Date    CHOLECYSTECTOMY, LAPAROSCOPIC  04/08/2014    LEG SURGERY Left 8/24/2022    LEFT LOWER EXTREMITY DEBRIDMENT WITH POSSIBLE ADVANCED SKIN THERAPY, POSSIBLE UNNA BOOT performed by Vanessa Andrew MD at [address] [place] [address] Left 10/25/2022    DEBRIDEMENT LEFT LEG, POSSIBLE ADVANCED SKIN THERAPY with acell micromatrix application

## 2023-05-02 NOTE — ANESTHESIA POSTPROCEDURE EVALUATION
Department of Anesthesiology  Postprocedure Note    Patient: Radha Chavez  MRN: [id number]  YOB: 1955  Date of evaluation: 5/2/2023      Procedure Summary     Date: 05/02/23 Room / Location: [address] [place] [address] OR 03 / [facility]    Anesthesia Start: 6030 Anesthesia Stop: 6106    Procedure: LEFT LOWER EXTREMITY DEBRIDEMENT, POSSIBLE UNNA BOOT, POSSIBLE ADVANCED SKIN THERAPY (Left: Leg Lower) Diagnosis:       Non-pressure chronic ulcer of left ankle with fat layer exposed ([address]      (Non-pressure chronic ulcer of left ankle with fat layer exposed ([address] [M63.443])    Surgeons: Christina Olsen MD Responsible Provider: Fidelia Bach MD    Anesthesia Type: MAC ASA Status: 3          Anesthesia Type: MAC    Gamaliel Phase I: Gamaliel Score: 10    Gamaliel Phase II:        Anesthesia Post Evaluation    Patient location during evaluation: PACU  Patient participation: complete - patient participated  Level of consciousness: awake  Airway patency: patent  Nausea & Vomiting: no nausea and no vomiting  Complications: no  Cardiovascular status: hemodynamically stable  Respiratory status: acceptable  Hydration status: euvolemic

## 2023-05-02 NOTE — ANESTHESIA PRE PROCEDURE
Department of Anesthesiology  Preprocedure Note       Name:  Radha Chavez   Age:  74 y.o.  :  1955                                          MRN:  [id number]         Date:  2023      Surgeon: Shiva Simon):  Christina Olsen MD    Procedure: Procedure(s):  DEBRIDEMENT LEFT LOWER EXTREMITY, POSSIBLE UNNA BOOT, POSSIBLE ADVANCED SKIN THERAPY    Medications prior to admission:   Prior to Admission medications    Medication Sig Start Date End Date Taking? Authorizing Provider   oxyCODONE-acetaminophen (PERCOCET) 5-325 MG per tablet Take 1 tablet by mouth every 4 hours as needed for Pain. Max Daily Amount: 6 tablets    Historical Provider, MD   clindamycin (CLEOCIN) 300 MG capsule Take 1 capsule by mouth 3 times daily for 10 days 23  Christina Olsen MD   apixaban (ELIQUIS) 5 MG TABS tablet Take 1 tablet by mouth 2 times daily 3/1/23   Christina Olsen MD   pantoprazole (PROTONIX) 40 MG tablet Take 1 tablet by mouth every morning (before breakfast) 3/1/23 2/29/24  Shakir Navarro MD   butalbital-acetaminophen-caffeine (FIORICET, ESGIC) -18 MG per tablet Take 1 tablet by mouth 3 times daily as needed for Headaches or Migraine Indications: Cluster Headache 3/1/23 8/31/23  Shakir Navarro MD   acyclovir (ZOVIRAX) 200 MG capsule daily 23   Historical Provider, MD   hydrOXYzine HCl (ATARAX) 25 MG tablet take 1 tablet BID 3/1/23 8/31/23  Shakir Navarro MD   EPINEPHrine (EPIPEN) 0.3 MG/0.3ML SOAJ injection Use as directed for allergic reaction 22  Shakir Navarro MD   aspirin-acetaminophen-caffeine (Sherl Buerger) 710-086-03 MG per tablet Take 1 tablet by mouth as needed for Headaches 3/2/22   Shakir Navarro MD       Current medications:    No current facility-administered medications for this visit. No current outpatient medications on file.      Facility-Administered Medications Ordered in Other Visits

## 2023-05-02 NOTE — OP NOTE
Bekah Ndiaye  1955    DATE OF PROCEDURE: 5/2/2023     SURGEON: Sola Soni M.D. Assistant:Hayden Brown     PREOPERATIVE DIAGNOSIS: Chronic wound of the L LE Venous stasis ulceration     POSTOPERATIVE DIAGNOSIS: Same    OPERATION: Irrigation and excisional debridement of left medial and lateral ankle wound  Application of Acel Cytal 7x10 3 layer graft to both wounds  Application of unna boot    Wound  L Lateral ankle 3 cm width x 5 cm length x 0.5 cm depth  L Medial ankle 5 cm width x 8 cm length x 0.5 cm depth     ANESTHESIA: Lidocaine Jelly, LMAC     ESTIMATED BLOOD LOSS: Minimal     COMPLICATIONS: None    DESCRIPTION OF PROCEDURE: The patient was identified and the procedure was confirmed. The wound and surrounding area was cleaned, and draped. Lidocaine gel soaked gauze was applied. It was subsequently removed. With the patient in supine position, the wound was debrided sharply of fibrotic, necrotic, and hyperkeratotic tissue, including a layer of surrounding healthy tissue using a curette for a total surface area of > 20 cm2. The skin was excised through the level of the subcutaneous tissue. 100%% of the wound was debrided. Wound was copiously irrigated with normal saline solution. There was minimal bleeding that was controlled with pressure. The wound was deemed appropriate for cytal and it was prepped per 's instructions. The product was applied to the medial and lateral ankle wound and than secure with mepitel one, steristrips, aquacell, abdominal pads, unna boot and koban. Needle, sponge and instrument counts were reported as correct x2. The patient tolerated the procedure and was transferred to the recovery area in satisfactory condition. Implants:  Implant Name Type Inv.  Item Serial No.  Lot No. LRB No. Used Action   DRESSING WND 3 LAYR 7X10 CM MTRX CYTAL - [id number]  DRESSING WND 3 LAYR 7X10 CM MTRX CYTAL [id number] [employer]-

## 2023-05-03 ENCOUNTER — APPOINTMENT (OUTPATIENT)
Dept: ULTRASOUND IMAGING | Age: 68
End: 2023-05-03
Attending: SURGERY
Payer: MEDICARE

## 2023-05-03 VITALS
BODY MASS INDEX: 27.1 KG/M2 | DIASTOLIC BLOOD PRESSURE: 87 MMHG | RESPIRATION RATE: 18 BRPM | OXYGEN SATURATION: 94 % | HEIGHT: 68 IN | HEART RATE: 67 BPM | TEMPERATURE: 98.6 F | SYSTOLIC BLOOD PRESSURE: 176 MMHG | WEIGHT: 178.79 LBS

## 2023-05-03 LAB
ANION GAP SERPL CALCULATED.3IONS-SCNC: 7 MMOL/L (ref 7–16)
BLOOD BANK DISPENSE STATUS: NORMAL
BLOOD BANK PRODUCT CODE: NORMAL
BPU ID: NORMAL
BUN SERPL-MCNC: 10 MG/DL (ref 6–23)
CALCIUM SERPL-MCNC: 8.7 MG/DL (ref 8.6–10.2)
CHLORIDE SERPL-SCNC: 108 MMOL/L (ref 98–107)
CO2 SERPL-SCNC: 24 MMOL/L (ref 22–29)
CREAT SERPL-MCNC: 0.8 MG/DL (ref 0.5–1)
DESCRIPTION BLOOD BANK: NORMAL
ERYTHROCYTE [DISTWIDTH] IN BLOOD BY AUTOMATED COUNT: 20.3 FL (ref 11.5–15)
GLUCOSE SERPL-MCNC: 95 MG/DL (ref 74–99)
HCT VFR BLD AUTO: 22.8 % (ref 34–48)
HCT VFR BLD AUTO: 28.7 % (ref 34–48)
HGB BLD-MCNC: 6.7 G/DL (ref 11.5–15.5)
HGB BLD-MCNC: 8.3 G/DL (ref 11.5–15.5)
MCH RBC QN AUTO: 21.2 PG (ref 26–35)
MCHC RBC AUTO-ENTMCNC: 29.4 % (ref 32–34.5)
MCV RBC AUTO: 72.2 FL (ref 80–99.9)
PLATELET # BLD AUTO: 227 E9/L (ref 130–450)
PMV BLD AUTO: 10.2 FL (ref 7–12)
POTASSIUM SERPL-SCNC: 4 MMOL/L (ref 3.5–5)
RBC # BLD AUTO: 3.16 E12/L (ref 3.5–5.5)
SODIUM SERPL-SCNC: 139 MMOL/L (ref 132–146)
WBC # BLD: 6.7 E9/L (ref 4.5–11.5)

## 2023-05-03 PROCEDURE — 80048 BASIC METABOLIC PNL TOTAL CA: CPT

## 2023-05-03 PROCEDURE — 85018 HEMOGLOBIN: CPT

## 2023-05-03 PROCEDURE — 2580000003 HC RX 258

## 2023-05-03 PROCEDURE — 36430 TRANSFUSION BLD/BLD COMPNT: CPT

## 2023-05-03 PROCEDURE — 36415 COLL VENOUS BLD VENIPUNCTURE: CPT

## 2023-05-03 PROCEDURE — 85014 HEMATOCRIT: CPT

## 2023-05-03 PROCEDURE — 85027 COMPLETE CBC AUTOMATED: CPT

## 2023-05-03 PROCEDURE — G0378 HOSPITAL OBSERVATION PER HR: HCPCS

## 2023-05-03 PROCEDURE — 6370000000 HC RX 637 (ALT 250 FOR IP)

## 2023-05-03 PROCEDURE — 93971 EXTREMITY STUDY: CPT

## 2023-05-03 RX ORDER — OSTOMY ADHESIVE
1 STRIP MISCELLANEOUS ONCE
Status: COMPLETED | OUTPATIENT
Start: 2023-05-03 | End: 2023-05-03

## 2023-05-03 RX ORDER — SODIUM CHLORIDE 9 MG/ML
INJECTION, SOLUTION INTRAVENOUS PRN
Status: DISCONTINUED | OUTPATIENT
Start: 2023-05-03 | End: 2023-05-03

## 2023-05-03 RX ADMIN — CLINDAMYCIN HYDROCHLORIDE 300 MG: 150 CAPSULE ORAL at 14:46

## 2023-05-03 RX ADMIN — Medication 1 EACH: at 16:05

## 2023-05-03 RX ADMIN — CLINDAMYCIN HYDROCHLORIDE 300 MG: 150 CAPSULE ORAL at 08:49

## 2023-05-03 RX ADMIN — OXYCODONE HYDROCHLORIDE AND ACETAMINOPHEN 2 TABLET: 5; 325 TABLET ORAL at 14:23

## 2023-05-03 RX ADMIN — SODIUM CHLORIDE, PRESERVATIVE FREE 10 ML: 5 INJECTION INTRAVENOUS at 08:50

## 2023-05-03 RX ADMIN — PANTOPRAZOLE SODIUM 40 MG: 40 TABLET, DELAYED RELEASE ORAL at 05:03

## 2023-05-03 RX ADMIN — OXYCODONE HYDROCHLORIDE AND ACETAMINOPHEN 1 TABLET: 5; 325 TABLET ORAL at 05:03

## 2023-05-03 ASSESSMENT — PAIN SCALES - GENERAL
PAINLEVEL_OUTOF10: 6
PAINLEVEL_OUTOF10: 9

## 2023-05-03 ASSESSMENT — PAIN DESCRIPTION - ORIENTATION
ORIENTATION: LEFT
ORIENTATION: LEFT

## 2023-05-03 ASSESSMENT — PAIN - FUNCTIONAL ASSESSMENT: PAIN_FUNCTIONAL_ASSESSMENT: ACTIVITIES ARE NOT PREVENTED

## 2023-05-03 ASSESSMENT — PAIN DESCRIPTION - LOCATION
LOCATION: LEG
LOCATION: FOOT;LEG

## 2023-05-03 ASSESSMENT — PAIN DESCRIPTION - DESCRIPTORS
DESCRIPTORS: ACHING;DISCOMFORT;THROBBING;STABBING
DESCRIPTORS: THROBBING;STABBING

## 2023-05-03 NOTE — CARE COORDINATION
Discharge order noted. Patient was here under observation POD#1 irrigation and excisional debridement of left medial and lateral ankle wound, application of AcelCytal to both wounds, and application of unna boot for Chronic wound of the L LE Venous stasis ulceration. Per vascular surgery note today, Give 1u PRBC today for Hgb 6.7 and recheck Hgb. No signs of gastrointestinal hemorrhage or other areas of bleeding. If Hgb responds appropriately, then okay to discharge. Hgb now 8.3. I attempted to meet with patient in room to explain role and discus transition of care but patient already left the building.    Shantanu Joel RN CM  [phone]

## 2023-05-03 NOTE — PROGRESS NOTES
Vascular Surgery Progress Note    Pt is being seen in f/u today s/p irrigation and excisional debridement of left medial and lateral ankle wound, application of AcelCytal to both wounds, and application of unna boot 5/2    Subjective  Pt s/e. Pt states that she is doing well. Reports that her pain is doing well, feels better than yesterday. Tolerating a diet. Received 1u PRBC yesterday and Hgb this morning 6.7, given another unit of PRBC. Denies melena, hematochezia, hematemesis, or bruising.     Current Medications:    sodium chloride      sodium chloride        sodium chloride, sodium chloride, oxyCODONE-acetaminophen **OR** oxyCODONE-acetaminophen, morphine, sodium chloride flush    pantoprazole  40 mg Oral QAM AC    clindamycin  300 mg Oral TID    sodium chloride flush  5-40 mL IntraVENous BID        PHYSICAL EXAM:    BP (!) 160/75   Pulse 68   Temp 98.2 °F (36.8 °C) (Oral)   Resp 18   Ht 5' 8\" (1.727 m)   Wt 178 lb 12.7 oz (81.1 kg)   SpO2 93%   BMI 27.19 kg/m²     Intake/Output Summary (Last 24 hours) at 5/3/2023 3858  Last data filed at 5/3/2023 0435  Gross per 24 hour   Intake 1142.83 ml   Output 10 ml   Net 1132.83 ml          Gen: awake, alert and oriented x3, no apparent distress  CVS: RR and hypertensive  Resp: No increased work of breathing on room air  Abd: Soft, non-tender, non-distended  L LE: Wrapped in Coban, wiggles toes    LABS:    Lab Results   Component Value Date    WBC 6.7 05/03/2023    HGB 6.7 (LL) 05/03/2023    HCT 22.8 (L) 05/03/2023     05/03/2023    PROTIME 12.8 (H) 05/02/2023    INR 1.2 05/02/2023    APTT 31.7 11/14/2012    K 4.0 05/03/2023    BUN 10 05/03/2023    CREATININE 0.8 05/03/2023       A/P  74 y.o. female s/p irrigation and excisional debridement of left medial and lateral ankle wound, application of AcelCytal to both wounds, and application of unna boot 5/2, now with acute on chronic anemia    - Okay for diet as tolerated  - Pain control PRN  - Give 1u PRBC today

## 2023-05-03 NOTE — PROGRESS NOTES
Hgb was critical at 6.7.  [facility] new [facility] placed at this time for 1 unit of blood

## 2023-05-10 ENCOUNTER — HOSPITAL ENCOUNTER (OUTPATIENT)
Dept: WOUND CARE | Age: 68
Discharge: HOME | End: 2023-05-10
Payer: MEDICARE

## 2023-05-10 VITALS
DIASTOLIC BLOOD PRESSURE: 73 MMHG | SYSTOLIC BLOOD PRESSURE: 160 MMHG | HEART RATE: 71 BPM | TEMPERATURE: 96.9 F | RESPIRATION RATE: 18 BRPM

## 2023-05-10 DIAGNOSIS — L97.322 VARICOSE VEINS OF LEFT LOWER EXTREMITY WITH ULCER OF ANKLE WITH FAT LAYER EXPOSED (HCC): Primary | ICD-10-CM

## 2023-05-10 DIAGNOSIS — I70.242 ATHEROSCLEROSIS OF NATIVE ARTERY OF LEFT LOWER EXTREMITY WITH ULCERATION OF CALF (HCC): ICD-10-CM

## 2023-05-10 DIAGNOSIS — I83.023 VARICOSE VEINS OF LEFT LOWER EXTREMITY WITH ULCER OF ANKLE WITH FAT LAYER EXPOSED (HCC): Primary | ICD-10-CM

## 2023-05-10 DIAGNOSIS — I70.243 ATHEROSCLEROSIS OF NATIVE ARTERY OF LEFT LOWER EXTREMITY WITH ULCERATION OF ANKLE (HCC): Chronic | ICD-10-CM

## 2023-05-10 PROCEDURE — 99213 OFFICE O/P EST LOW 20 MIN: CPT | Performed by: SURGERY

## 2023-05-10 PROCEDURE — 99213 OFFICE O/P EST LOW 20 MIN: CPT

## 2023-05-10 RX ORDER — OXYCODONE AND ACETAMINOPHEN 7.5; 325 MG/1; MG/1
1 TABLET ORAL EVERY 8 HOURS PRN
Qty: 60 TABLET | Refills: 0 | Status: SHIPPED | OUTPATIENT
Start: 2023-05-10 | End: 2023-05-24

## 2023-05-10 RX ORDER — LIDOCAINE 50 MG/G
OINTMENT TOPICAL ONCE
OUTPATIENT
Start: 2023-05-10 | End: 2023-05-10

## 2023-05-10 RX ORDER — BACITRACIN, NEOMYCIN, POLYMYXIN B 400; 3.5; 5 [USP'U]/G; MG/G; [USP'U]/G
OINTMENT TOPICAL ONCE
OUTPATIENT
Start: 2023-05-10 | End: 2023-05-10

## 2023-05-10 RX ORDER — BACITRACIN ZINC AND POLYMYXIN B SULFATE 500; 1000 [USP'U]/G; [USP'U]/G
OINTMENT TOPICAL ONCE
OUTPATIENT
Start: 2023-05-10 | End: 2023-05-10

## 2023-05-10 RX ORDER — LIDOCAINE HYDROCHLORIDE 40 MG/ML
SOLUTION TOPICAL ONCE
OUTPATIENT
Start: 2023-05-10 | End: 2023-05-10

## 2023-05-10 RX ORDER — LIDOCAINE 40 MG/G
CREAM TOPICAL ONCE
OUTPATIENT
Start: 2023-05-10 | End: 2023-05-10

## 2023-05-10 RX ORDER — GENTAMICIN SULFATE 1 MG/G
OINTMENT TOPICAL ONCE
OUTPATIENT
Start: 2023-05-10 | End: 2023-05-10

## 2023-05-10 RX ORDER — BETAMETHASONE DIPROPIONATE 0.05 %
OINTMENT (GRAM) TOPICAL ONCE
OUTPATIENT
Start: 2023-05-10 | End: 2023-05-10

## 2023-05-10 RX ORDER — LIDOCAINE HYDROCHLORIDE 40 MG/ML
SOLUTION TOPICAL ONCE
Status: COMPLETED | OUTPATIENT
Start: 2023-05-10 | End: 2023-05-10

## 2023-05-10 RX ORDER — CLOBETASOL PROPIONATE 0.5 MG/G
OINTMENT TOPICAL ONCE
OUTPATIENT
Start: 2023-05-10 | End: 2023-05-10

## 2023-05-10 RX ORDER — GINSENG 100 MG
CAPSULE ORAL ONCE
OUTPATIENT
Start: 2023-05-10 | End: 2023-05-10

## 2023-05-10 RX ORDER — LIDOCAINE HYDROCHLORIDE 20 MG/ML
JELLY TOPICAL ONCE
OUTPATIENT
Start: 2023-05-10 | End: 2023-05-10

## 2023-05-10 RX ADMIN — LIDOCAINE HYDROCHLORIDE 10 ML: 40 SOLUTION TOPICAL at 08:04

## 2023-05-10 ASSESSMENT — PAIN DESCRIPTION - FREQUENCY: FREQUENCY: CONTINUOUS

## 2023-05-10 ASSESSMENT — PAIN SCALES - GENERAL: PAINLEVEL_OUTOF10: 9

## 2023-05-10 ASSESSMENT — PAIN - FUNCTIONAL ASSESSMENT: PAIN_FUNCTIONAL_ASSESSMENT: PREVENTS OR INTERFERES SOME ACTIVE ACTIVITIES AND ADLS

## 2023-05-10 ASSESSMENT — PAIN DESCRIPTION - PAIN TYPE: TYPE: CHRONIC PAIN

## 2023-05-10 ASSESSMENT — PAIN DESCRIPTION - LOCATION: LOCATION: LEG

## 2023-05-10 ASSESSMENT — PAIN DESCRIPTION - DESCRIPTORS: DESCRIPTORS: THROBBING;STABBING

## 2023-05-10 ASSESSMENT — PAIN DESCRIPTION - ORIENTATION: ORIENTATION: LEFT

## 2023-05-10 NOTE — PROGRESS NOTES
tolerated unna boot and aquacell - some improvement  216/22  Appearance improved, slightly larger  Consider drawtek next week but overall drainage seems reasonably managed as periwound appears ok  2/23/2022  The wound, has some exudate, no recent cultures were done, will do wound cultures today  3/2/22  Reflux study reviewed - no significant reflux  Will treat culture - augmentin 875 mg bid x 10 days - script sent  More drainage - stable size  3/9/22  Wound appearance improved, stable in size  drawtek  3/16/22  Wound slightly larger in size, new wound of ankle  Continue Drawtek, change to profore  Avoid shoes which will contact ankle area  3/23/22  Wounds stable  Overall appearance improved  Will have pt see ID re cultures - disc with Dr Marcella Hubbard  3/30/22  Much improved appearance  On oral abx per ID - concerns re pt ability to work while on IV - will see how her wound progresses  4/6/22  Appearance improved but size not much different  Finished oral abx  Will re culture next week if no significant size change - possible advance skin therapy  4/20/2022  Ulcer on the medial aspect, fairly clean and granulating, ulcer of the lateral aspect, has some exudate, debrided  4/27/22  Wounds stable   Hypergranulation tissue removed  Culture done - possible graft in future  5/4/22  Wound stable  Disc culture with Dr Marcella Hubbard who would like to start her on iv abx  5/11/22  Wound stable  Iv abx have not been started yet - spoke with Dr Marcella Hubbard - spoke with pt she will call his office  She had spoke with [facility] but there were some issues  5/18/22  Calf stable, ankle improved  Iv abx to start this week after picc placement  On exam   L dp 2+, PT triphasic - with compression of dp - PT becomes weakly biphasic  Concern that PT though triphasic is not getting inline flow and as a result isn't healing in the calf distribution because of occlusive disease  We discussed angiogram - will schedule  I reviewed the procedure with the patient and

## 2023-05-16 NOTE — DISCHARGE INSTRUCTIONS
Discharge Instructions           Visit Discharge/Physician Orders     Discharge condition: Stable     Assessment of pain at discharge: yes     Anesthetic used: 4% lidocaine solution     Discharge to: Home     Left via:Private automobile     Accompanied by:self     ECF/HHA: n/a     Dressing Orders:LEFT MEDIAL and LATERAL LOWER LEG-Cleanse with normal saline, apply zinc to fiona wound, aquacel AG and drawtex, dressing, ABD pad , unna boot and coban. Change weekly. Treatment Orders: Eat a diet high in protein and vitamin C. Take a multiple vitamin daily unless contraindicated. To see Dr. Ivette Salmon as scheduled      Must wear slip on shoe to work due to wrap on leg! [address] [facility] followup visit : 1 week____________________________  (Please note your next appointment above and if you are unable to keep, kindly give a 24 hour notice. Thank you.)     Physician signature:__________________________     If you experience any of the following, please call the [facility] during business hours:     * Increase in Pain  * Temperature over 101  * Increase in drainage from your wound  * Drainage with a foul odor  * Bleeding  * Increase in swelling  * Need for compression bandage changes due to slippage, breakthrough drainage. If you need medical attention outside of the business hours of the [facility] please contact your PCP or go to the nearest emergency room.

## 2023-05-17 ENCOUNTER — HOSPITAL ENCOUNTER (OUTPATIENT)
Dept: WOUND CARE | Age: 68
Discharge: HOME | End: 2023-05-17
Payer: MEDICARE

## 2023-05-17 VITALS
HEART RATE: 75 BPM | BODY MASS INDEX: 26.98 KG/M2 | DIASTOLIC BLOOD PRESSURE: 82 MMHG | TEMPERATURE: 97.4 F | HEIGHT: 68 IN | RESPIRATION RATE: 18 BRPM | WEIGHT: 178 LBS | SYSTOLIC BLOOD PRESSURE: 148 MMHG

## 2023-05-17 DIAGNOSIS — I70.242 ATHEROSCLEROSIS OF NATIVE ARTERY OF LEFT LOWER EXTREMITY WITH ULCERATION OF CALF (HCC): ICD-10-CM

## 2023-05-17 DIAGNOSIS — I83.023 VARICOSE VEINS OF LEFT LOWER EXTREMITY WITH ULCER OF ANKLE WITH FAT LAYER EXPOSED (HCC): Primary | ICD-10-CM

## 2023-05-17 DIAGNOSIS — L97.322 VARICOSE VEINS OF LEFT LOWER EXTREMITY WITH ULCER OF ANKLE WITH FAT LAYER EXPOSED (HCC): Primary | ICD-10-CM

## 2023-05-17 PROCEDURE — 11045 DBRDMT SUBQ TISS EACH ADDL: CPT

## 2023-05-17 PROCEDURE — 11042 DBRDMT SUBQ TIS 1ST 20SQCM/<: CPT | Performed by: PHYSICIAN ASSISTANT

## 2023-05-17 PROCEDURE — 11045 DBRDMT SUBQ TISS EACH ADDL: CPT | Performed by: PHYSICIAN ASSISTANT

## 2023-05-17 PROCEDURE — 11042 DBRDMT SUBQ TIS 1ST 20SQCM/<: CPT

## 2023-05-17 RX ORDER — LIDOCAINE HYDROCHLORIDE 20 MG/ML
JELLY TOPICAL ONCE
OUTPATIENT
Start: 2023-05-17 | End: 2023-05-17

## 2023-05-17 RX ORDER — LIDOCAINE HYDROCHLORIDE 40 MG/ML
SOLUTION TOPICAL ONCE
OUTPATIENT
Start: 2023-05-17 | End: 2023-05-17

## 2023-05-17 RX ORDER — GENTAMICIN SULFATE 1 MG/G
OINTMENT TOPICAL ONCE
OUTPATIENT
Start: 2023-05-17 | End: 2023-05-17

## 2023-05-17 RX ORDER — BACITRACIN ZINC AND POLYMYXIN B SULFATE 500; 1000 [USP'U]/G; [USP'U]/G
OINTMENT TOPICAL ONCE
OUTPATIENT
Start: 2023-05-17 | End: 2023-05-17

## 2023-05-17 RX ORDER — LIDOCAINE 50 MG/G
OINTMENT TOPICAL ONCE
OUTPATIENT
Start: 2023-05-17 | End: 2023-05-17

## 2023-05-17 RX ORDER — BETAMETHASONE DIPROPIONATE 0.05 %
OINTMENT (GRAM) TOPICAL ONCE
OUTPATIENT
Start: 2023-05-17 | End: 2023-05-17

## 2023-05-17 RX ORDER — BACITRACIN, NEOMYCIN, POLYMYXIN B 400; 3.5; 5 [USP'U]/G; MG/G; [USP'U]/G
OINTMENT TOPICAL ONCE
OUTPATIENT
Start: 2023-05-17 | End: 2023-05-17

## 2023-05-17 RX ORDER — LIDOCAINE HYDROCHLORIDE 40 MG/ML
SOLUTION TOPICAL ONCE
Status: COMPLETED | OUTPATIENT
Start: 2023-05-17 | End: 2023-05-17

## 2023-05-17 RX ORDER — GINSENG 100 MG
CAPSULE ORAL ONCE
OUTPATIENT
Start: 2023-05-17 | End: 2023-05-17

## 2023-05-17 RX ORDER — CLOBETASOL PROPIONATE 0.5 MG/G
OINTMENT TOPICAL ONCE
OUTPATIENT
Start: 2023-05-17 | End: 2023-05-17

## 2023-05-17 RX ORDER — LIDOCAINE 40 MG/G
CREAM TOPICAL ONCE
OUTPATIENT
Start: 2023-05-17 | End: 2023-05-17

## 2023-05-17 RX ADMIN — LIDOCAINE HYDROCHLORIDE 5 ML: 40 SOLUTION TOPICAL at 07:47

## 2023-05-17 ASSESSMENT — PAIN DESCRIPTION - PAIN TYPE: TYPE: CHRONIC PAIN

## 2023-05-17 ASSESSMENT — PAIN SCALES - GENERAL: PAINLEVEL_OUTOF10: 9

## 2023-05-17 ASSESSMENT — PAIN DESCRIPTION - ONSET: ONSET: ON-GOING

## 2023-05-17 ASSESSMENT — PAIN DESCRIPTION - LOCATION: LOCATION: LEG

## 2023-05-17 ASSESSMENT — PAIN DESCRIPTION - FREQUENCY: FREQUENCY: CONTINUOUS

## 2023-05-17 ASSESSMENT — PAIN - FUNCTIONAL ASSESSMENT: PAIN_FUNCTIONAL_ASSESSMENT: PREVENTS OR INTERFERES SOME ACTIVE ACTIVITIES AND ADLS

## 2023-05-17 ASSESSMENT — PAIN DESCRIPTION - DESCRIPTORS: DESCRIPTORS: STABBING;THROBBING

## 2023-05-17 ASSESSMENT — PAIN DESCRIPTION - ORIENTATION: ORIENTATION: LEFT

## 2023-05-17 NOTE — PROGRESS NOTES
Assessment Maceration;Blanchable erythema 05/17/23 0744   Margins Attached edges 05/17/23 0744   Wound Thickness Description not for Pressure Injury Full thickness 05/17/23 0744   Number of days: 468       Wound 03/16/22 Ankle Posterior; Left #2 (Active)   Wound Image   04/19/23 0752   Dressing Status New dressing applied 05/10/23 0853   Wound Cleansed Cleansed with saline 05/10/23 0853   Dressing/Treatment Alginate with Ag;ABD 05/10/23 0853   Offloading for Diabetic Foot Ulcers Offloading not required 02/08/23 0844   Wound Length (cm) 4.8 cm 05/17/23 0744   Wound Width (cm) 4.3 cm 05/17/23 0744   Wound Depth (cm) 0.1 cm 05/17/23 0744   Wound Surface Area (cm^2) 20.64 cm^2 05/17/23 0744   Change in Wound Size % (l*w) -725.6 05/17/23 0744   Wound Volume (cm^3) 2.064 cm^3 05/17/23 0744   Wound Healing % -726 05/17/23 0744   Post-Procedure Length (cm) 4.8 cm 05/17/23 0811   Post-Procedure Width (cm) 4.4 cm 05/17/23 0811   Post-Procedure Depth (cm) 0.1 cm 05/17/23 0811   Post-Procedure Surface Area (cm^2) 21.12 cm^2 05/17/23 0811   Post-Procedure Volume (cm^3) 2.112 cm^3 05/17/23 0811   Wound Assessment Pale granulation tissue;Fibrin;Pink/red 05/17/23 0744   Drainage Amount Large 05/17/23 0744   Drainage Description Serosanguinous; Yellow;Brown 05/17/23 0744   Odor None 05/17/23 0744   Melany-wound Assessment Intact; Blanchable erythema 05/17/23 0744   Margins Attached edges 05/17/23 0744   Wound Thickness Description not for Pressure Injury Full thickness 05/17/23 0744   Number of days: 427         Procedure Note  Indications:  Based on my examination of this patient's wound(s)/ulcer(s) today, debridement is required to promote healing and evaluate the wound base.      Performed by: Tresea Officer, MALATHI     Consent obtained:  Yes     Time out taken:  Yes     Pain Control: Anesthetic  Anesthetic: 4% Lidocaine Liquid Topical      Debridement:Excisional Debridement     Using curette the wound(s)/ulcer(s) was/were sharply

## 2023-05-22 NOTE — DISCHARGE INSTRUCTIONS
Discharge Instructions           Visit Discharge/Physician Orders     Discharge condition: Stable     Assessment of pain at discharge: yes     Anesthetic used: 4% lidocaine solution     Discharge to: Home     Left via:Private automobile     Accompanied by:self     ECF/HHA: n/a     Dressing Orders:LEFT MEDIAL and LATERAL LOWER LEG-Cleanse with normal saline, apply zinc to fiona wound, aquacel AG and drawtex, dressing, ABD pad , unna boot and coban. Change weekly. Treatment Orders: Eat a diet high in protein and vitamin C. Take a multiple vitamin daily unless contraindicated. To see Dr. Sonny Mujica as scheduled      Must wear slip on shoe to work due to wrap on leg! [facility] followup visit : 1 week____________________________  (Please note your next appointment above and if you are unable to keep, kindly give a 24 hour notice. Thank you.)     Physician signature:__________________________     If you experience any of the following, please call the [facility] during business hours:     * Increase in Pain  * Temperature over 101  * Increase in drainage from your wound  * Drainage with a foul odor  * Bleeding  * Increase in swelling  * Need for compression bandage changes due to slippage, breakthrough drainage. If you need medical attention outside of the business hours of the [facility] please contact your PCP or go to the nearest emergency room.

## 2023-05-24 ENCOUNTER — HOSPITAL ENCOUNTER (OUTPATIENT)
Dept: WOUND CARE | Age: 68
Discharge: HOME | End: 2023-05-24
Payer: MEDICARE

## 2023-05-24 VITALS
DIASTOLIC BLOOD PRESSURE: 82 MMHG | SYSTOLIC BLOOD PRESSURE: 178 MMHG | RESPIRATION RATE: 18 BRPM | TEMPERATURE: 97.1 F | HEART RATE: 78 BPM

## 2023-05-24 DIAGNOSIS — L97.322 VARICOSE VEINS OF LEFT LOWER EXTREMITY WITH ULCER OF ANKLE WITH FAT LAYER EXPOSED (HCC): Primary | ICD-10-CM

## 2023-05-24 DIAGNOSIS — I70.242 ATHEROSCLEROSIS OF NATIVE ARTERY OF LEFT LOWER EXTREMITY WITH ULCERATION OF CALF (HCC): ICD-10-CM

## 2023-05-24 DIAGNOSIS — I70.243 ATHEROSCLEROSIS OF NATIVE ARTERY OF LEFT LOWER EXTREMITY WITH ULCERATION OF ANKLE (HCC): Chronic | ICD-10-CM

## 2023-05-24 DIAGNOSIS — I83.023 VARICOSE VEINS OF LEFT LOWER EXTREMITY WITH ULCER OF ANKLE WITH FAT LAYER EXPOSED (HCC): Primary | ICD-10-CM

## 2023-05-24 PROCEDURE — 11042 DBRDMT SUBQ TIS 1ST 20SQCM/<: CPT | Performed by: SURGERY

## 2023-05-24 PROCEDURE — 11042 DBRDMT SUBQ TIS 1ST 20SQCM/<: CPT

## 2023-05-24 PROCEDURE — 11045 DBRDMT SUBQ TISS EACH ADDL: CPT | Performed by: SURGERY

## 2023-05-24 PROCEDURE — 11045 DBRDMT SUBQ TISS EACH ADDL: CPT

## 2023-05-24 RX ORDER — BACITRACIN ZINC AND POLYMYXIN B SULFATE 500; 1000 [USP'U]/G; [USP'U]/G
OINTMENT TOPICAL ONCE
OUTPATIENT
Start: 2023-05-24 | End: 2023-05-24

## 2023-05-24 RX ORDER — LIDOCAINE 50 MG/G
OINTMENT TOPICAL ONCE
OUTPATIENT
Start: 2023-05-24 | End: 2023-05-24

## 2023-05-24 RX ORDER — GENTAMICIN SULFATE 1 MG/G
OINTMENT TOPICAL ONCE
OUTPATIENT
Start: 2023-05-24 | End: 2023-05-24

## 2023-05-24 RX ORDER — BACITRACIN, NEOMYCIN, POLYMYXIN B 400; 3.5; 5 [USP'U]/G; MG/G; [USP'U]/G
OINTMENT TOPICAL ONCE
OUTPATIENT
Start: 2023-05-24 | End: 2023-05-24

## 2023-05-24 RX ORDER — LIDOCAINE 40 MG/G
CREAM TOPICAL ONCE
OUTPATIENT
Start: 2023-05-24 | End: 2023-05-24

## 2023-05-24 RX ORDER — LIDOCAINE HYDROCHLORIDE 20 MG/ML
JELLY TOPICAL ONCE
OUTPATIENT
Start: 2023-05-24 | End: 2023-05-24

## 2023-05-24 RX ORDER — LIDOCAINE HYDROCHLORIDE 40 MG/ML
SOLUTION TOPICAL ONCE
Status: COMPLETED | OUTPATIENT
Start: 2023-05-24 | End: 2023-05-24

## 2023-05-24 RX ORDER — CLOBETASOL PROPIONATE 0.5 MG/G
OINTMENT TOPICAL ONCE
OUTPATIENT
Start: 2023-05-24 | End: 2023-05-24

## 2023-05-24 RX ORDER — GINSENG 100 MG
CAPSULE ORAL ONCE
OUTPATIENT
Start: 2023-05-24 | End: 2023-05-24

## 2023-05-24 RX ORDER — LIDOCAINE HYDROCHLORIDE 40 MG/ML
SOLUTION TOPICAL ONCE
OUTPATIENT
Start: 2023-05-24 | End: 2023-05-24

## 2023-05-24 RX ORDER — BETAMETHASONE DIPROPIONATE 0.05 %
OINTMENT (GRAM) TOPICAL ONCE
OUTPATIENT
Start: 2023-05-24 | End: 2023-05-24

## 2023-05-24 RX ADMIN — LIDOCAINE HYDROCHLORIDE 10 ML: 40 SOLUTION TOPICAL at 07:55

## 2023-05-24 ASSESSMENT — PAIN DESCRIPTION - DESCRIPTORS: DESCRIPTORS: STABBING

## 2023-05-24 ASSESSMENT — PAIN DESCRIPTION - ORIENTATION: ORIENTATION: LEFT

## 2023-05-24 ASSESSMENT — PAIN - FUNCTIONAL ASSESSMENT: PAIN_FUNCTIONAL_ASSESSMENT: PREVENTS OR INTERFERES SOME ACTIVE ACTIVITIES AND ADLS

## 2023-05-24 ASSESSMENT — PAIN DESCRIPTION - LOCATION: LOCATION: LEG

## 2023-05-24 NOTE — PROGRESS NOTES
hours of the [address] please contact your PCP or go to the nearest emergency room.      Electronically signed by Cipriano Monroe MD

## 2023-05-31 ENCOUNTER — HOSPITAL ENCOUNTER (OUTPATIENT)
Dept: WOUND CARE | Age: 68
Discharge: HOME | End: 2023-05-31
Payer: MEDICARE

## 2023-05-31 VITALS
WEIGHT: 175 LBS | DIASTOLIC BLOOD PRESSURE: 69 MMHG | HEIGHT: 68 IN | SYSTOLIC BLOOD PRESSURE: 168 MMHG | TEMPERATURE: 96 F | HEART RATE: 73 BPM | BODY MASS INDEX: 26.52 KG/M2 | RESPIRATION RATE: 16 BRPM

## 2023-05-31 DIAGNOSIS — I83.023 VARICOSE VEINS OF LEFT LOWER EXTREMITY WITH ULCER OF ANKLE WITH FAT LAYER EXPOSED (HCC): ICD-10-CM

## 2023-05-31 DIAGNOSIS — L97.322 VARICOSE VEINS OF LEFT LOWER EXTREMITY WITH ULCER OF ANKLE WITH FAT LAYER EXPOSED (HCC): ICD-10-CM

## 2023-05-31 DIAGNOSIS — L97.322 NON-PRESSURE CHRONIC ULCER OF LEFT ANKLE WITH FAT LAYER EXPOSED (HCC): ICD-10-CM

## 2023-05-31 DIAGNOSIS — I70.242 ATHEROSCLEROSIS OF NATIVE ARTERY OF LEFT LOWER EXTREMITY WITH ULCERATION OF CALF (HCC): Primary | ICD-10-CM

## 2023-05-31 PROCEDURE — 11042 DBRDMT SUBQ TIS 1ST 20SQCM/<: CPT

## 2023-05-31 PROCEDURE — 11045 DBRDMT SUBQ TISS EACH ADDL: CPT | Performed by: SURGERY

## 2023-05-31 PROCEDURE — 11045 DBRDMT SUBQ TISS EACH ADDL: CPT

## 2023-05-31 PROCEDURE — 11042 DBRDMT SUBQ TIS 1ST 20SQCM/<: CPT | Performed by: SURGERY

## 2023-05-31 RX ORDER — LIDOCAINE HYDROCHLORIDE 20 MG/ML
JELLY TOPICAL ONCE
OUTPATIENT
Start: 2023-05-31 | End: 2023-05-31

## 2023-05-31 RX ORDER — BACITRACIN, NEOMYCIN, POLYMYXIN B 400; 3.5; 5 [USP'U]/G; MG/G; [USP'U]/G
OINTMENT TOPICAL ONCE
OUTPATIENT
Start: 2023-05-31 | End: 2023-05-31

## 2023-05-31 RX ORDER — OXYCODONE AND ACETAMINOPHEN 7.5; 325 MG/1; MG/1
1 TABLET ORAL EVERY 8 HOURS PRN
Qty: 42 TABLET | Refills: 0 | Status: SHIPPED | OUTPATIENT
Start: 2023-05-31 | End: 2023-06-14

## 2023-05-31 RX ORDER — GENTAMICIN SULFATE 1 MG/G
OINTMENT TOPICAL ONCE
OUTPATIENT
Start: 2023-05-31 | End: 2023-05-31

## 2023-05-31 RX ORDER — GINSENG 100 MG
CAPSULE ORAL ONCE
OUTPATIENT
Start: 2023-05-31 | End: 2023-05-31

## 2023-05-31 RX ORDER — LIDOCAINE 40 MG/G
CREAM TOPICAL ONCE
OUTPATIENT
Start: 2023-05-31 | End: 2023-05-31

## 2023-05-31 RX ORDER — LIDOCAINE HYDROCHLORIDE 40 MG/ML
SOLUTION TOPICAL ONCE
Status: COMPLETED | OUTPATIENT
Start: 2023-05-31 | End: 2023-05-31

## 2023-05-31 RX ORDER — BETAMETHASONE DIPROPIONATE 0.05 %
OINTMENT (GRAM) TOPICAL ONCE
OUTPATIENT
Start: 2023-05-31 | End: 2023-05-31

## 2023-05-31 RX ORDER — CLOBETASOL PROPIONATE 0.5 MG/G
OINTMENT TOPICAL ONCE
OUTPATIENT
Start: 2023-05-31 | End: 2023-05-31

## 2023-05-31 RX ORDER — LIDOCAINE 50 MG/G
OINTMENT TOPICAL ONCE
OUTPATIENT
Start: 2023-05-31 | End: 2023-05-31

## 2023-05-31 RX ORDER — BACITRACIN ZINC AND POLYMYXIN B SULFATE 500; 1000 [USP'U]/G; [USP'U]/G
OINTMENT TOPICAL ONCE
OUTPATIENT
Start: 2023-05-31 | End: 2023-05-31

## 2023-05-31 RX ORDER — LIDOCAINE HYDROCHLORIDE 40 MG/ML
SOLUTION TOPICAL ONCE
OUTPATIENT
Start: 2023-05-31 | End: 2023-05-31

## 2023-05-31 RX ADMIN — LIDOCAINE HYDROCHLORIDE 10 ML: 40 SOLUTION TOPICAL at 08:04

## 2023-05-31 ASSESSMENT — PAIN DESCRIPTION - DESCRIPTORS: DESCRIPTORS: STABBING

## 2023-05-31 ASSESSMENT — PAIN DESCRIPTION - FREQUENCY: FREQUENCY: CONTINUOUS

## 2023-05-31 ASSESSMENT — PAIN - FUNCTIONAL ASSESSMENT: PAIN_FUNCTIONAL_ASSESSMENT: PREVENTS OR INTERFERES SOME ACTIVE ACTIVITIES AND ADLS

## 2023-05-31 ASSESSMENT — PAIN DESCRIPTION - ONSET: ONSET: ON-GOING

## 2023-05-31 ASSESSMENT — PAIN DESCRIPTION - ORIENTATION: ORIENTATION: LEFT

## 2023-05-31 ASSESSMENT — PAIN DESCRIPTION - PAIN TYPE: TYPE: CHRONIC PAIN

## 2023-05-31 ASSESSMENT — PAIN DESCRIPTION - LOCATION: LOCATION: LEG

## 2023-05-31 ASSESSMENT — PAIN SCALES - GENERAL: PAINLEVEL_OUTOF10: 8

## 2023-05-31 NOTE — PROGRESS NOTES
Wound Healing Center Followup Visit Note    Referring Physician : Kervin Lopez MD  [address] [place] [address] RECORD NUMBER:  [id number]  AGE: 74 y.o. GENDER: female  : 1955  EPISODE DATE:  2023    Subjective:     Chief Complaint   Patient presents with    Wound Check     Left leg       HISTORY of PRESENT ILLNESS HPI   Joselito Alberto is a 74 y.o. female who presents today in regards to follow up evaluation and treatment of wound/ulcer. That patient's past medical, family and social hx were reviewed and changes were made if present. History of Wound Context:  The patient has had a wound of her left ankle/calf which was first noted approximately 2021. This has been treated local wound care. On their initial visit to the wound healing center, 22,  the patient has noted that the wound has been improving. The patient has not had similar previous wounds in the past.      She started seeing Dr. David Chandler in 2021 and than Dr. Bennie Duffy. She was started in [facility] ~ 2021. She has noticed some improvement since starting unna boot. She is currently following with Dr. Dave Shepard. Pt is not on abx at time of initial visit, but has been treated with previously by podiatry. She is not a DM. She is not a smoker. She denies hx of DVT, and per her report had recent us noting no evidence of dvt at [facility]. She also had arterial studies done. I had previously seen her in the past in regards to left buttock thigh wound, which started as abscess. Pt works at [employer] in [place] and is on her feet all day.     22  Reflux study - if significant findings will schedule for fu  Continue compression therapy [facility] per podiatry with aquacell dressing  Elevation  She does not have significant arterial occlusive disease  22  Patient has asked to continuing following with me going forward because of our previous relationship  She is going to let Dr. Reddy Covarrubias office know  She

## 2023-06-06 NOTE — DISCHARGE INSTRUCTIONS
Visit Discharge/Physician Orders     Discharge condition: Stable     Assessment of pain at discharge: yes     Anesthetic used: 4% lidocaine solution     Discharge to: Home     Left via:Private automobile     Accompanied by:self     ECF/HHA: n/a     Dressing Orders:LEFT MEDIAL and LATERAL LOWER LEG-Cleanse with normal saline, apply zinc to fiona wound, aquacel AG and drawtex, dressing, ABD pad , unna boot and coban. Change weekly. Treatment Orders: Eat a diet high in protein and vitamin C. Take a multiple vitamin daily unless contraindicated. To see Dr. Miriam Vitale as scheduled      Must wear slip on shoe to work due to wrap on leg! Tissue culture obtained at [facility] today 6-7-23     [facility] followup visit : 1 week____________________________  (Please note your next appointment above and if you are unable to keep, kindly give a 24 hour notice. Thank you.)     Physician signature:__________________________     If you experience any of the following, please call the [facility] during business hours:     * Increase in Pain  * Temperature over 101  * Increase in drainage from your wound  * Drainage with a foul odor  * Bleeding  * Increase in swelling  * Need for compression bandage changes due to slippage, breakthrough drainage. If you need medical attention outside of the business hours of the [facility] please contact your PCP or go to the nearest emergency room.

## 2023-06-07 ENCOUNTER — HOSPITAL ENCOUNTER (OUTPATIENT)
Dept: WOUND CARE | Age: 68
Discharge: HOME | End: 2023-06-07
Payer: MEDICARE

## 2023-06-07 VITALS
DIASTOLIC BLOOD PRESSURE: 81 MMHG | TEMPERATURE: 96.8 F | RESPIRATION RATE: 18 BRPM | HEART RATE: 73 BPM | SYSTOLIC BLOOD PRESSURE: 196 MMHG

## 2023-06-07 DIAGNOSIS — I83.023 VARICOSE VEINS OF LEFT LOWER EXTREMITY WITH ULCER OF ANKLE WITH FAT LAYER EXPOSED (HCC): ICD-10-CM

## 2023-06-07 DIAGNOSIS — L97.322 VARICOSE VEINS OF LEFT LOWER EXTREMITY WITH ULCER OF ANKLE WITH FAT LAYER EXPOSED (HCC): ICD-10-CM

## 2023-06-07 DIAGNOSIS — I70.242 ATHEROSCLEROSIS OF NATIVE ARTERY OF LEFT LOWER EXTREMITY WITH ULCERATION OF CALF (HCC): Primary | ICD-10-CM

## 2023-06-07 PROCEDURE — 11042 DBRDMT SUBQ TIS 1ST 20SQCM/<: CPT

## 2023-06-07 PROCEDURE — 87070 CULTURE OTHR SPECIMN AEROBIC: CPT

## 2023-06-07 RX ORDER — LIDOCAINE 50 MG/G
OINTMENT TOPICAL ONCE
OUTPATIENT
Start: 2023-06-07 | End: 2023-06-07

## 2023-06-07 RX ORDER — GINSENG 100 MG
CAPSULE ORAL ONCE
OUTPATIENT
Start: 2023-06-07 | End: 2023-06-07

## 2023-06-07 RX ORDER — LIDOCAINE 40 MG/G
CREAM TOPICAL ONCE
OUTPATIENT
Start: 2023-06-07 | End: 2023-06-07

## 2023-06-07 RX ORDER — BACITRACIN ZINC AND POLYMYXIN B SULFATE 500; 1000 [USP'U]/G; [USP'U]/G
OINTMENT TOPICAL ONCE
OUTPATIENT
Start: 2023-06-07 | End: 2023-06-07

## 2023-06-07 RX ORDER — BETAMETHASONE DIPROPIONATE 0.05 %
OINTMENT (GRAM) TOPICAL ONCE
OUTPATIENT
Start: 2023-06-07 | End: 2023-06-07

## 2023-06-07 RX ORDER — LIDOCAINE HYDROCHLORIDE 40 MG/ML
SOLUTION TOPICAL ONCE
OUTPATIENT
Start: 2023-06-07 | End: 2023-06-07

## 2023-06-07 RX ORDER — CLOBETASOL PROPIONATE 0.5 MG/G
OINTMENT TOPICAL ONCE
OUTPATIENT
Start: 2023-06-07 | End: 2023-06-07

## 2023-06-07 RX ORDER — IBUPROFEN 200 MG
TABLET ORAL ONCE
OUTPATIENT
Start: 2023-06-07 | End: 2023-06-07

## 2023-06-07 RX ORDER — LIDOCAINE HYDROCHLORIDE 20 MG/ML
JELLY TOPICAL ONCE
OUTPATIENT
Start: 2023-06-07 | End: 2023-06-07

## 2023-06-07 RX ORDER — GENTAMICIN SULFATE 1 MG/G
OINTMENT TOPICAL ONCE
OUTPATIENT
Start: 2023-06-07 | End: 2023-06-07

## 2023-06-07 RX ORDER — LIDOCAINE HYDROCHLORIDE 40 MG/ML
SOLUTION TOPICAL ONCE
Status: COMPLETED | OUTPATIENT
Start: 2023-06-07 | End: 2023-06-07

## 2023-06-07 RX ADMIN — LIDOCAINE HYDROCHLORIDE 5 ML: 40 SOLUTION TOPICAL at 07:53

## 2023-06-07 ASSESSMENT — PAIN DESCRIPTION - DESCRIPTORS: DESCRIPTORS: SHOOTING;STABBING

## 2023-06-07 ASSESSMENT — PAIN DESCRIPTION - LOCATION: LOCATION: LEG

## 2023-06-07 ASSESSMENT — PAIN DESCRIPTION - ORIENTATION: ORIENTATION: LEFT

## 2023-06-07 ASSESSMENT — PAIN SCALES - GENERAL: PAINLEVEL_OUTOF10: 10

## 2023-06-07 NOTE — PROGRESS NOTES
5/325 mg #25 - script given, oarrs reviewed  Cx reviewed - will disc with Dr Dougie Gardiner - all light growth   Not interested in OR at this time  10/12/22  Wound stable  Hold on cx tx  10/19/22  Wound stable  Ok for surgical debridement will schedule  Declined debridement today  10/25/22  Irrigation and excisional debridement of left medial and lateral ankle wound, Application of 874 mcg micromatrix acel  Application of unna boot  Lateral 15.5 sq cm, Medial 40.5 sq cm  11/2/22  Appearance improved  Measuring larger  No debridement  11/9/22  Appearance improved  Medial 62 (57), Lateral 18 (37)  Aquacell ag and wraps  11/16/22  Both appearance much improved  Medial 58 (62) Lateral 18 (18)  Aquacell ag , dratwek over top due to increased drainage  Percocet 5/325 mg #28 oarrs reviewed  11/23/22  Stable in size and appearance  New venous reflux study - Tuesday 11/29 at 1230 at my office  Will give her dressings to replace wrap after it is cut off for study  Refusing debridement due to pain  12/7/22  Missed last week due to influenza  Improved size  Tolerated debridement a little better than usual allowing slough to be removed especially around edges  Had to be rescheduled for venous US when office US is working again  12/14/22  Medial calf slightly larger but appearance improved 54 sq cm  Lateral calf slightly larger - more fibrinous exudate - 17 sq cm  Pt would only allow lateral calf debridement  Us 12/20 rescheduled  12/21/22  Minimal debridement allowed to both wounds  Venous reflux study reviewed - will discuss with Dr. Allison Xie  Medial and lateral calf stable in size with fibrinous exudate  12/28/22  Debrided medial  Medial 52 increased (48)  Lateral 18 decreased (19)  Plan for lesser saphenous vein ablation  Oarrs reviewed - Percocet 5/325 mg #28  1/4/23  Slightly larger  Short term disability paperwork filled out   Surgery 1/12/23 - off till 2/6/23 1/12/23  Lesser saphenous vein ablation, stab phlebectomy  Wound

## 2023-06-09 LAB
BACTERIA SPEC ANAEROBE CULT: NORMAL
BACTERIA WND AEROBE CULT: NORMAL

## 2023-06-19 NOTE — DISCHARGE INSTRUCTIONS
Visit Discharge/Physician Orders     Discharge condition: Stable     Assessment of pain at discharge: yes     Anesthetic used: 4% lidocaine solution     Discharge to: Home     Left via:Private automobile     Accompanied by:self     ECF/HHA: n/a     Dressing Orders:LEFT MEDIAL and LATERAL LOWER LEG-Cleanse with normal saline, apply zinc to fiona wound, aquacel AG and drawtex, dressing, ABD pad , unna boot and coban. Change weekly. Treatment Orders: Eat a diet high in protein and vitamin C. Take a multiple vitamin daily unless contraindicated. To see Dr. Chloe Cueva as scheduled      Must wear slip on shoe to work due to wrap on leg! Possible Surgical debridement in future      Obtain CBC today    OPS debridement to be 6-27-23-his office will call with instructions    [facility] followup visit : 2 week (d/t surgery)____________________________  (Please note your next appointment above and if you are unable to keep, kindly give a 24 hour notice. Thank you.)     Physician signature:__________________________     If you experience any of the following, please call the [facility] during business hours:     * Increase in Pain  * Temperature over 101  * Increase in drainage from your wound  * Drainage with a foul odor  * Bleeding  * Increase in swelling  * Need for compression bandage changes due to slippage, breakthrough drainage. If you need medical attention outside of the business hours of the [facility] please contact your PCP or go to the nearest emergency room.

## 2023-06-21 ENCOUNTER — HOSPITAL ENCOUNTER (OUTPATIENT)
Dept: WOUND CARE | Age: 68
Discharge: HOME | End: 2023-06-21
Payer: MEDICARE

## 2023-06-21 ENCOUNTER — HOSPITAL ENCOUNTER (OUTPATIENT)
Age: 68
Discharge: HOME | End: 2023-06-21
Payer: MEDICARE

## 2023-06-21 VITALS
TEMPERATURE: 97.6 F | DIASTOLIC BLOOD PRESSURE: 73 MMHG | HEIGHT: 68 IN | WEIGHT: 175 LBS | RESPIRATION RATE: 18 BRPM | SYSTOLIC BLOOD PRESSURE: 160 MMHG | HEART RATE: 83 BPM | BODY MASS INDEX: 26.52 KG/M2

## 2023-06-21 DIAGNOSIS — I70.243 ATHEROSCLEROSIS OF NATIVE ARTERY OF LEFT LOWER EXTREMITY WITH ULCERATION OF ANKLE (HCC): Chronic | ICD-10-CM

## 2023-06-21 DIAGNOSIS — L97.322 VARICOSE VEINS OF LEFT LOWER EXTREMITY WITH ULCER OF ANKLE WITH FAT LAYER EXPOSED (HCC): ICD-10-CM

## 2023-06-21 DIAGNOSIS — I83.023 VARICOSE VEINS OF LEFT LOWER EXTREMITY WITH ULCER OF ANKLE WITH FAT LAYER EXPOSED (HCC): Primary | ICD-10-CM

## 2023-06-21 DIAGNOSIS — I83.023 VARICOSE VEINS OF LEFT LOWER EXTREMITY WITH ULCER OF ANKLE WITH FAT LAYER EXPOSED (HCC): ICD-10-CM

## 2023-06-21 DIAGNOSIS — I70.242 ATHEROSCLEROSIS OF NATIVE ARTERY OF LEFT LOWER EXTREMITY WITH ULCERATION OF CALF (HCC): ICD-10-CM

## 2023-06-21 DIAGNOSIS — L97.322 VARICOSE VEINS OF LEFT LOWER EXTREMITY WITH ULCER OF ANKLE WITH FAT LAYER EXPOSED (HCC): Primary | ICD-10-CM

## 2023-06-21 LAB
ERYTHROCYTE [DISTWIDTH] IN BLOOD BY AUTOMATED COUNT: 22.7 FL (ref 11.5–15)
HCT VFR BLD AUTO: 28.9 % (ref 34–48)
HGB BLD-MCNC: 8.5 G/DL (ref 11.5–15.5)
MCH RBC QN AUTO: 23.5 PG (ref 26–35)
MCHC RBC AUTO-ENTMCNC: 29.4 % (ref 32–34.5)
MCV RBC AUTO: 80.1 FL (ref 80–99.9)
PLATELET # BLD AUTO: 280 E9/L (ref 130–450)
PMV BLD AUTO: 10 FL (ref 7–12)
RBC # BLD AUTO: 3.61 E12/L (ref 3.5–5.5)
WBC # BLD: 9.8 E9/L (ref 4.5–11.5)

## 2023-06-21 PROCEDURE — 11045 DBRDMT SUBQ TISS EACH ADDL: CPT

## 2023-06-21 PROCEDURE — 11042 DBRDMT SUBQ TIS 1ST 20SQCM/<: CPT

## 2023-06-21 PROCEDURE — 85027 COMPLETE CBC AUTOMATED: CPT

## 2023-06-21 PROCEDURE — 36415 COLL VENOUS BLD VENIPUNCTURE: CPT

## 2023-06-21 RX ORDER — BACITRACIN ZINC AND POLYMYXIN B SULFATE 500; 1000 [USP'U]/G; [USP'U]/G
OINTMENT TOPICAL ONCE
OUTPATIENT
Start: 2023-06-21 | End: 2023-06-21

## 2023-06-21 RX ORDER — LIDOCAINE HYDROCHLORIDE 20 MG/ML
JELLY TOPICAL ONCE
OUTPATIENT
Start: 2023-06-21 | End: 2023-06-21

## 2023-06-21 RX ORDER — BETAMETHASONE DIPROPIONATE 0.05 %
OINTMENT (GRAM) TOPICAL ONCE
OUTPATIENT
Start: 2023-06-21 | End: 2023-06-21

## 2023-06-21 RX ORDER — LIDOCAINE HYDROCHLORIDE 40 MG/ML
SOLUTION TOPICAL ONCE
OUTPATIENT
Start: 2023-06-21 | End: 2023-06-21

## 2023-06-21 RX ORDER — LIDOCAINE 40 MG/G
CREAM TOPICAL ONCE
OUTPATIENT
Start: 2023-06-21 | End: 2023-06-21

## 2023-06-21 RX ORDER — GENTAMICIN SULFATE 1 MG/G
OINTMENT TOPICAL ONCE
OUTPATIENT
Start: 2023-06-21 | End: 2023-06-21

## 2023-06-21 RX ORDER — LIDOCAINE 50 MG/G
OINTMENT TOPICAL ONCE
OUTPATIENT
Start: 2023-06-21 | End: 2023-06-21

## 2023-06-21 RX ORDER — LIDOCAINE HYDROCHLORIDE 40 MG/ML
SOLUTION TOPICAL ONCE
Status: COMPLETED | OUTPATIENT
Start: 2023-06-21 | End: 2023-06-21

## 2023-06-21 RX ORDER — IBUPROFEN 200 MG
TABLET ORAL ONCE
OUTPATIENT
Start: 2023-06-21 | End: 2023-06-21

## 2023-06-21 RX ORDER — GINSENG 100 MG
CAPSULE ORAL ONCE
OUTPATIENT
Start: 2023-06-21 | End: 2023-06-21

## 2023-06-21 RX ORDER — CLOBETASOL PROPIONATE 0.5 MG/G
OINTMENT TOPICAL ONCE
OUTPATIENT
Start: 2023-06-21 | End: 2023-06-21

## 2023-06-21 RX ADMIN — LIDOCAINE HYDROCHLORIDE 10 ML: 40 SOLUTION TOPICAL at 07:56

## 2023-06-21 ASSESSMENT — PAIN DESCRIPTION - FREQUENCY: FREQUENCY: CONTINUOUS

## 2023-06-21 ASSESSMENT — PAIN SCALES - GENERAL: PAINLEVEL_OUTOF10: 10

## 2023-06-21 ASSESSMENT — PAIN DESCRIPTION - ORIENTATION: ORIENTATION: LEFT

## 2023-06-21 ASSESSMENT — PAIN DESCRIPTION - LOCATION: LOCATION: LEG

## 2023-06-21 ASSESSMENT — PAIN DESCRIPTION - ONSET: ONSET: ON-GOING

## 2023-06-21 ASSESSMENT — PAIN - FUNCTIONAL ASSESSMENT: PAIN_FUNCTIONAL_ASSESSMENT: PREVENTS OR INTERFERES SOME ACTIVE ACTIVITIES AND ADLS

## 2023-06-21 ASSESSMENT — PAIN DESCRIPTION - DESCRIPTORS: DESCRIPTORS: SHOOTING;STABBING

## 2023-06-21 ASSESSMENT — PAIN DESCRIPTION - PAIN TYPE: TYPE: CHRONIC PAIN

## 2023-06-21 NOTE — PROGRESS NOTES
Wound Healing Center Followup Visit Note    Referring Physician : Sharyn Fontaine MD  [address] [place] [address] RECORD NUMBER:  [id number]  AGE: 74 y.o. GENDER: female  : 1955  EPISODE DATE:  2023    Subjective:     Chief Complaint   Patient presents with    Wound Check     Left leg      HISTORY of PRESENT ILLNESS HPI   Angela Branham is a 74 y.o. female who presents today in regards to follow up evaluation and treatment of wound/ulcer. That patient's past medical, family and social hx were reviewed and changes were made if present. History of Wound Context:  The patient has had a wound of her left ankle/calf which was first noted approximately 2021. This has been treated local wound care. On their initial visit to the wound healing center, 22,  the patient has noted that the wound has been improving. The patient has not had similar previous wounds in the past.      She started seeing Dr. Kavon Jose in 2021 and than Dr. Barbara France. She was started in [facility] ~ 2021. She has noticed some improvement since starting unna boot. She is currently following with Dr. Kamaljit Conrad. Pt is not on abx at time of initial visit, but has been treated with previously by podiatry. She is not a DM. She is not a smoker. She denies hx of DVT, and per her report had recent us noting no evidence of dvt at [facility]. She also had arterial studies done. I had previously seen her in the past in regards to left buttock thigh wound, which started as abscess. Pt works at [employer] in [place] and is on her feet all day.     22  Reflux study - if significant findings will schedule for fu  Continue compression therapy [facility] per podiatry with aquacell dressing  Elevation  She does not have significant arterial occlusive disease  22  Patient has asked to continuing following with me going forward because of our previous relationship  She is going to let Dr. Behzad Almendarez office know  She

## 2023-06-21 NOTE — PLAN OF CARE
Problem: Pain  Goal: Verbalizes/displays adequate comfort level or baseline comfort level  Outcome: Progressing     Problem: Wound:  Goal: Will show signs of wound healing; wound closure and no evidence of infection  Description: Will show signs of wound healing; wound closure and no evidence of infection  6/21/2023 0828 by Michi Dumont RN  Outcome: Progressing  6/21/2023 0824 by Michi Dumont RN  Outcome: Progressing  6/21/2023 0821 by Michi Dumont RN  Outcome: Progressing     Problem: Venous:  Goal: Signs of wound healing will improve  Description: Signs of wound healing will improve  6/21/2023 0828 by Michi Dumont RN  Outcome: Adequate for Discharge  6/21/2023 2519 by Michi Dumont RN  Outcome: Adequate for Discharge  6/21/2023 2486 by Michi Dumont RN  Outcome: Adequate for Discharge     Problem: Compression therapy:  Goal: Will be free from complications associated with compression therapy  Description: Will be free from complications associated with compression therapy  6/21/2023 0828 by Michi Dumont RN  Outcome: Progressing  6/21/2023 0824 by Michi Dumont RN  Outcome: Progressing  6/21/2023 0821 by Michi Dumont RN  Outcome: Progressing

## 2023-06-26 ENCOUNTER — PREP FOR PROCEDURE (OUTPATIENT)
Dept: VASCULAR SURGERY | Age: 68
End: 2023-06-26

## 2023-06-27 ENCOUNTER — ANESTHESIA EVENT (OUTPATIENT)
Dept: OPERATING ROOM | Age: 68
End: 2023-06-27
Payer: MEDICARE

## 2023-06-27 ENCOUNTER — HOSPITAL ENCOUNTER (OUTPATIENT)
Age: 68
Setting detail: OUTPATIENT SURGERY
Discharge: HOME | End: 2023-06-27
Attending: SURGERY | Admitting: SURGERY
Payer: MEDICARE

## 2023-06-27 ENCOUNTER — ANESTHESIA (OUTPATIENT)
Dept: OPERATING ROOM | Age: 68
End: 2023-06-27
Payer: MEDICARE

## 2023-06-27 VITALS
HEIGHT: 68 IN | RESPIRATION RATE: 18 BRPM | DIASTOLIC BLOOD PRESSURE: 79 MMHG | SYSTOLIC BLOOD PRESSURE: 159 MMHG | WEIGHT: 175 LBS | OXYGEN SATURATION: 97 % | TEMPERATURE: 97.3 F | HEART RATE: 68 BPM | BODY MASS INDEX: 26.52 KG/M2

## 2023-06-27 LAB
ERYTHROCYTE [DISTWIDTH] IN BLOOD BY AUTOMATED COUNT: 22.3 FL (ref 11.5–15)
HCT VFR BLD AUTO: 30.6 % (ref 34–48)
HGB BLD-MCNC: 9 G/DL (ref 11.5–15.5)
INR BLD: 1.1
MCH RBC QN AUTO: 23.4 PG (ref 26–35)
MCHC RBC AUTO-ENTMCNC: 29.4 % (ref 32–34.5)
MCV RBC AUTO: 79.7 FL (ref 80–99.9)
PLATELET # BLD AUTO: 278 E9/L (ref 130–450)
PMV BLD AUTO: 9.9 FL (ref 7–12)
PROTHROMBIN TIME: 11.8 SEC (ref 9.3–12.4)
RBC # BLD AUTO: 3.84 E12/L (ref 3.5–5.5)
WBC # BLD: 9.3 E9/L (ref 4.5–11.5)

## 2023-06-27 PROCEDURE — 6360000002 HC RX W HCPCS: Performed by: NURSE ANESTHETIST, CERTIFIED REGISTERED

## 2023-06-27 PROCEDURE — 2580000003 HC RX 258

## 2023-06-27 PROCEDURE — 7100000010 HC PHASE II RECOVERY - FIRST 15 MIN: Performed by: SURGERY

## 2023-06-27 PROCEDURE — 6370000000 HC RX 637 (ALT 250 FOR IP): Performed by: SURGERY

## 2023-06-27 PROCEDURE — 2709999900 HC NON-CHARGEABLE SUPPLY: Performed by: SURGERY

## 2023-06-27 PROCEDURE — 3700000001 HC ADD 15 MINUTES (ANESTHESIA): Performed by: SURGERY

## 2023-06-27 PROCEDURE — 3600000012 HC SURGERY LEVEL 2 ADDTL 15MIN: Performed by: SURGERY

## 2023-06-27 PROCEDURE — 3700000000 HC ANESTHESIA ATTENDED CARE: Performed by: SURGERY

## 2023-06-27 PROCEDURE — 7100000011 HC PHASE II RECOVERY - ADDTL 15 MIN: Performed by: SURGERY

## 2023-06-27 PROCEDURE — 3600000002 HC SURGERY LEVEL 2 BASE: Performed by: SURGERY

## 2023-06-27 PROCEDURE — 85027 COMPLETE CBC AUTOMATED: CPT

## 2023-06-27 PROCEDURE — 85610 PROTHROMBIN TIME: CPT

## 2023-06-27 PROCEDURE — 36415 COLL VENOUS BLD VENIPUNCTURE: CPT

## 2023-06-27 PROCEDURE — 2500000003 HC RX 250 WO HCPCS: Performed by: NURSE ANESTHETIST, CERTIFIED REGISTERED

## 2023-06-27 PROCEDURE — 6360000002 HC RX W HCPCS

## 2023-06-27 RX ORDER — SODIUM CHLORIDE 0.9 % (FLUSH) 0.9 %
5-40 SYRINGE (ML) INJECTION EVERY 12 HOURS SCHEDULED
Status: DISCONTINUED | OUTPATIENT
Start: 2023-06-27 | End: 2023-06-27 | Stop reason: HOSPADM

## 2023-06-27 RX ORDER — OXYCODONE HYDROCHLORIDE AND ACETAMINOPHEN 5; 325 MG/1; MG/1
1 TABLET ORAL ONCE
Status: COMPLETED | OUTPATIENT
Start: 2023-06-27 | End: 2023-06-27

## 2023-06-27 RX ORDER — OSTOMY ADHESIVE
STRIP MISCELLANEOUS PRN
Status: DISCONTINUED | OUTPATIENT
Start: 2023-06-27 | End: 2023-06-27 | Stop reason: ALTCHOICE

## 2023-06-27 RX ORDER — FAMOTIDINE 10 MG/ML
INJECTION, SOLUTION INTRAVENOUS PRN
Status: DISCONTINUED | OUTPATIENT
Start: 2023-06-27 | End: 2023-06-27 | Stop reason: SDUPTHER

## 2023-06-27 RX ORDER — SODIUM CHLORIDE 9 MG/ML
INJECTION, SOLUTION INTRAVENOUS CONTINUOUS
Status: DISCONTINUED | OUTPATIENT
Start: 2023-06-27 | End: 2023-06-27 | Stop reason: HOSPADM

## 2023-06-27 RX ORDER — KETOROLAC TROMETHAMINE 30 MG/ML
INJECTION, SOLUTION INTRAMUSCULAR; INTRAVENOUS PRN
Status: DISCONTINUED | OUTPATIENT
Start: 2023-06-27 | End: 2023-06-27 | Stop reason: SDUPTHER

## 2023-06-27 RX ORDER — ONDANSETRON 2 MG/ML
INJECTION INTRAMUSCULAR; INTRAVENOUS PRN
Status: DISCONTINUED | OUTPATIENT
Start: 2023-06-27 | End: 2023-06-27 | Stop reason: SDUPTHER

## 2023-06-27 RX ORDER — FENTANYL CITRATE 50 UG/ML
INJECTION, SOLUTION INTRAMUSCULAR; INTRAVENOUS PRN
Status: DISCONTINUED | OUTPATIENT
Start: 2023-06-27 | End: 2023-06-27 | Stop reason: SDUPTHER

## 2023-06-27 RX ORDER — DEXAMETHASONE SODIUM PHOSPHATE 10 MG/ML
INJECTION INTRAMUSCULAR; INTRAVENOUS PRN
Status: DISCONTINUED | OUTPATIENT
Start: 2023-06-27 | End: 2023-06-27 | Stop reason: SDUPTHER

## 2023-06-27 RX ORDER — LABETALOL HYDROCHLORIDE 5 MG/ML
INJECTION, SOLUTION INTRAVENOUS PRN
Status: DISCONTINUED | OUTPATIENT
Start: 2023-06-27 | End: 2023-06-27 | Stop reason: SDUPTHER

## 2023-06-27 RX ORDER — SODIUM CHLORIDE 9 MG/ML
INJECTION, SOLUTION INTRAVENOUS PRN
Status: DISCONTINUED | OUTPATIENT
Start: 2023-06-27 | End: 2023-06-27 | Stop reason: HOSPADM

## 2023-06-27 RX ORDER — SODIUM CHLORIDE 0.9 % (FLUSH) 0.9 %
5-40 SYRINGE (ML) INJECTION PRN
Status: DISCONTINUED | OUTPATIENT
Start: 2023-06-27 | End: 2023-06-27 | Stop reason: HOSPADM

## 2023-06-27 RX ORDER — DIPHENHYDRAMINE HYDROCHLORIDE 50 MG/ML
INJECTION INTRAMUSCULAR; INTRAVENOUS PRN
Status: DISCONTINUED | OUTPATIENT
Start: 2023-06-27 | End: 2023-06-27 | Stop reason: SDUPTHER

## 2023-06-27 RX ORDER — HYDROMORPHONE HCL 110MG/55ML
PATIENT CONTROLLED ANALGESIA SYRINGE INTRAVENOUS PRN
Status: DISCONTINUED | OUTPATIENT
Start: 2023-06-27 | End: 2023-06-27 | Stop reason: SDUPTHER

## 2023-06-27 RX ORDER — LIDOCAINE HYDROCHLORIDE 20 MG/ML
JELLY TOPICAL PRN
Status: DISCONTINUED | OUTPATIENT
Start: 2023-06-27 | End: 2023-06-27 | Stop reason: ALTCHOICE

## 2023-06-27 RX ORDER — MIDAZOLAM HYDROCHLORIDE 1 MG/ML
INJECTION INTRAMUSCULAR; INTRAVENOUS PRN
Status: DISCONTINUED | OUTPATIENT
Start: 2023-06-27 | End: 2023-06-27 | Stop reason: SDUPTHER

## 2023-06-27 RX ORDER — PROPOFOL 10 MG/ML
INJECTION, EMULSION INTRAVENOUS PRN
Status: DISCONTINUED | OUTPATIENT
Start: 2023-06-27 | End: 2023-06-27 | Stop reason: SDUPTHER

## 2023-06-27 RX ADMIN — ONDANSETRON HYDROCHLORIDE 4 MG: 2 SOLUTION INTRAMUSCULAR; INTRAVENOUS at 09:49

## 2023-06-27 RX ADMIN — PROPOFOL 50 MG: 10 INJECTION, EMULSION INTRAVENOUS at 09:59

## 2023-06-27 RX ADMIN — PROPOFOL 50 MG: 10 INJECTION, EMULSION INTRAVENOUS at 10:13

## 2023-06-27 RX ADMIN — LABETALOL HYDROCHLORIDE 5 MG: 5 INJECTION INTRAVENOUS at 10:02

## 2023-06-27 RX ADMIN — LABETALOL HYDROCHLORIDE 10 MG: 5 INJECTION INTRAVENOUS at 10:26

## 2023-06-27 RX ADMIN — LABETALOL HYDROCHLORIDE 5 MG: 5 INJECTION INTRAVENOUS at 10:21

## 2023-06-27 RX ADMIN — OXYCODONE HYDROCHLORIDE AND ACETAMINOPHEN 1 TABLET: 5; 325 TABLET ORAL at 11:11

## 2023-06-27 RX ADMIN — SODIUM CHLORIDE: 9 INJECTION, SOLUTION INTRAVENOUS at 08:56

## 2023-06-27 RX ADMIN — CEFAZOLIN 2000 MG: 2 INJECTION, POWDER, FOR SOLUTION INTRAMUSCULAR; INTRAVENOUS at 09:51

## 2023-06-27 RX ADMIN — PROPOFOL 100 MCG/KG/MIN: 10 INJECTION, EMULSION INTRAVENOUS at 10:00

## 2023-06-27 RX ADMIN — HYDROMORPHONE HYDROCHLORIDE 1 MG: 2 INJECTION, SOLUTION INTRAMUSCULAR; INTRAVENOUS; SUBCUTANEOUS at 10:30

## 2023-06-27 RX ADMIN — DIPHENHYDRAMINE HYDROCHLORIDE 12.5 MG: 50 INJECTION, SOLUTION INTRAMUSCULAR; INTRAVENOUS at 09:49

## 2023-06-27 RX ADMIN — MIDAZOLAM 2 MG: 1 INJECTION INTRAMUSCULAR; INTRAVENOUS at 09:52

## 2023-06-27 RX ADMIN — DEXAMETHASONE SODIUM PHOSPHATE 10 MG: 10 INJECTION INTRAMUSCULAR; INTRAVENOUS at 10:00

## 2023-06-27 RX ADMIN — FAMOTIDINE 20 MG: 10 INJECTION, SOLUTION INTRAVENOUS at 09:50

## 2023-06-27 RX ADMIN — FENTANYL CITRATE 100 MCG: 50 INJECTION, SOLUTION INTRAMUSCULAR; INTRAVENOUS at 09:57

## 2023-06-27 RX ADMIN — HYDROMORPHONE HYDROCHLORIDE 1 MG: 2 INJECTION, SOLUTION INTRAMUSCULAR; INTRAVENOUS; SUBCUTANEOUS at 10:25

## 2023-06-27 RX ADMIN — KETOROLAC TROMETHAMINE 30 MG: 30 INJECTION, SOLUTION INTRAMUSCULAR; INTRAVENOUS at 10:20

## 2023-06-27 RX ADMIN — PROPOFOL 50 MG: 10 INJECTION, EMULSION INTRAVENOUS at 10:23

## 2023-06-27 RX ADMIN — PROPOFOL 50 MG: 10 INJECTION, EMULSION INTRAVENOUS at 10:20

## 2023-06-27 ASSESSMENT — PAIN DESCRIPTION - LOCATION
LOCATION: LEG
LOCATION: LEG

## 2023-06-27 ASSESSMENT — PAIN SCALES - GENERAL
PAINLEVEL_OUTOF10: 4
PAINLEVEL_OUTOF10: 7
PAINLEVEL_OUTOF10: 10
PAINLEVEL_OUTOF10: 7

## 2023-06-27 ASSESSMENT — PAIN DESCRIPTION - ORIENTATION
ORIENTATION: LEFT
ORIENTATION: LEFT

## 2023-06-27 ASSESSMENT — PAIN DESCRIPTION - DESCRIPTORS: DESCRIPTORS: ACHING

## 2023-06-27 ASSESSMENT — ENCOUNTER SYMPTOMS
GASTROINTESTINAL NEGATIVE: 1
EYES NEGATIVE: 1
RESPIRATORY NEGATIVE: 1

## 2023-07-03 NOTE — DISCHARGE INSTRUCTIONS
Visit Discharge/Physician Orders     Discharge condition: Stable     Assessment of pain at discharge: yes     Anesthetic used: 4% lidocaine solution     Discharge to: Home     Left via:Private automobile     Accompanied by:self     ECF/HHA: n/a     Dressing Orders:LEFT MEDIAL and LATERAL LOWER LEG-Cleanse with normal saline, apply zinc to fiona wound, aquacel AG and drawtex, dressing, ABD pad , unna boot and coban. Change weekly. Treatment Orders: Eat a diet high in protein and vitamin C. Take a multiple vitamin daily unless contraindicated. To see Dr. Indy Aguilar as scheduled      Must wear slip on shoe to work due to wrap on leg! [facility] followup visit : 1 week ____________________________  (Please note your next appointment above and if you are unable to keep, kindly give a 24 hour notice. Thank you.)     Physician signature:__________________________     If you experience any of the following, please call the [facility] during business hours:     * Increase in Pain  * Temperature over 101  * Increase in drainage from your wound  * Drainage with a foul odor  * Bleeding  * Increase in swelling  * Need for compression bandage changes due to slippage, breakthrough drainage. If you need medical attention outside of the business hours of the [facility] please contact your PCP or go to the nearest emergency room.

## 2023-07-05 ENCOUNTER — HOSPITAL ENCOUNTER (OUTPATIENT)
Dept: WOUND CARE | Age: 68
Discharge: HOME | End: 2023-07-05
Payer: MEDICARE

## 2023-07-05 VITALS
RESPIRATION RATE: 18 BRPM | TEMPERATURE: 97.1 F | HEIGHT: 68 IN | SYSTOLIC BLOOD PRESSURE: 170 MMHG | WEIGHT: 175 LBS | DIASTOLIC BLOOD PRESSURE: 80 MMHG | BODY MASS INDEX: 26.52 KG/M2 | HEART RATE: 76 BPM

## 2023-07-05 DIAGNOSIS — I70.243 ATHEROSCLEROSIS OF NATIVE ARTERY OF LEFT LOWER EXTREMITY WITH ULCERATION OF ANKLE (HCC): Chronic | ICD-10-CM

## 2023-07-05 DIAGNOSIS — I70.242 ATHEROSCLEROSIS OF NATIVE ARTERY OF LEFT LOWER EXTREMITY WITH ULCERATION OF CALF (HCC): ICD-10-CM

## 2023-07-05 DIAGNOSIS — I83.023 VARICOSE VEINS OF LEFT LOWER EXTREMITY WITH ULCER OF ANKLE WITH FAT LAYER EXPOSED (HCC): Primary | ICD-10-CM

## 2023-07-05 DIAGNOSIS — L97.322 VARICOSE VEINS OF LEFT LOWER EXTREMITY WITH ULCER OF ANKLE WITH FAT LAYER EXPOSED (HCC): Primary | ICD-10-CM

## 2023-07-05 PROCEDURE — 11045 DBRDMT SUBQ TISS EACH ADDL: CPT

## 2023-07-05 PROCEDURE — 11042 DBRDMT SUBQ TIS 1ST 20SQCM/<: CPT

## 2023-07-05 RX ORDER — LIDOCAINE HYDROCHLORIDE 40 MG/ML
SOLUTION TOPICAL ONCE
Status: COMPLETED | OUTPATIENT
Start: 2023-07-05 | End: 2023-07-05

## 2023-07-05 RX ORDER — LIDOCAINE HYDROCHLORIDE 40 MG/ML
SOLUTION TOPICAL ONCE
OUTPATIENT
Start: 2023-07-05 | End: 2023-07-05

## 2023-07-05 RX ORDER — GENTAMICIN SULFATE 1 MG/G
OINTMENT TOPICAL ONCE
OUTPATIENT
Start: 2023-07-05 | End: 2023-07-05

## 2023-07-05 RX ORDER — CLOBETASOL PROPIONATE 0.5 MG/G
OINTMENT TOPICAL ONCE
OUTPATIENT
Start: 2023-07-05 | End: 2023-07-05

## 2023-07-05 RX ORDER — LIDOCAINE HYDROCHLORIDE 20 MG/ML
JELLY TOPICAL ONCE
OUTPATIENT
Start: 2023-07-05 | End: 2023-07-05

## 2023-07-05 RX ORDER — LIDOCAINE 50 MG/G
OINTMENT TOPICAL ONCE
OUTPATIENT
Start: 2023-07-05 | End: 2023-07-05

## 2023-07-05 RX ORDER — BACITRACIN ZINC AND POLYMYXIN B SULFATE 500; 1000 [USP'U]/G; [USP'U]/G
OINTMENT TOPICAL ONCE
OUTPATIENT
Start: 2023-07-05 | End: 2023-07-05

## 2023-07-05 RX ORDER — IBUPROFEN 200 MG
TABLET ORAL ONCE
OUTPATIENT
Start: 2023-07-05 | End: 2023-07-05

## 2023-07-05 RX ORDER — LIDOCAINE 40 MG/G
CREAM TOPICAL ONCE
OUTPATIENT
Start: 2023-07-05 | End: 2023-07-05

## 2023-07-05 RX ORDER — GINSENG 100 MG
CAPSULE ORAL ONCE
OUTPATIENT
Start: 2023-07-05 | End: 2023-07-05

## 2023-07-05 RX ORDER — BETAMETHASONE DIPROPIONATE 0.05 %
OINTMENT (GRAM) TOPICAL ONCE
OUTPATIENT
Start: 2023-07-05 | End: 2023-07-05

## 2023-07-05 RX ADMIN — LIDOCAINE HYDROCHLORIDE 15 ML: 40 SOLUTION TOPICAL at 08:07

## 2023-07-05 ASSESSMENT — PAIN - FUNCTIONAL ASSESSMENT: PAIN_FUNCTIONAL_ASSESSMENT: PREVENTS OR INTERFERES SOME ACTIVE ACTIVITIES AND ADLS

## 2023-07-05 ASSESSMENT — PAIN DESCRIPTION - ONSET: ONSET: ON-GOING

## 2023-07-05 ASSESSMENT — PAIN DESCRIPTION - PAIN TYPE: TYPE: CHRONIC PAIN

## 2023-07-05 ASSESSMENT — PAIN SCALES - GENERAL: PAINLEVEL_OUTOF10: 9

## 2023-07-05 ASSESSMENT — PAIN DESCRIPTION - DESCRIPTORS: DESCRIPTORS: BURNING

## 2023-07-05 ASSESSMENT — PAIN DESCRIPTION - LOCATION: LOCATION: LEG

## 2023-07-05 ASSESSMENT — PAIN DESCRIPTION - FREQUENCY: FREQUENCY: CONTINUOUS

## 2023-07-05 ASSESSMENT — PAIN DESCRIPTION - ORIENTATION: ORIENTATION: LEFT

## 2023-07-05 NOTE — PROGRESS NOTES
Wound Healing Center Followup Visit Note    Referring Physician : Alvarez Boothe MD  [address] RECORD NUMBER:  [id number]  AGE: 74 y.o. GENDER: female  : 1955  EPISODE DATE:  2023    Subjective:     Chief Complaint   Patient presents with    Wound Check     Left leg      HISTORY of PRESENT ILLNESS HPI   Tony Celestin is a 74 y.o. female who presents today in regards to follow up evaluation and treatment of wound/ulcer. That patient's past medical, family and social hx were reviewed and changes were made if present. History of Wound Context:  The patient has had a wound of her left ankle/calf which was first noted approximately 2021. This has been treated local wound care. On their initial visit to the wound healing center, 22,  the patient has noted that the wound has been improving. The patient has not had similar previous wounds in the past.      She started seeing Dr. Nury Joseph in 2021 and than Dr. Rishabh Garcia. She was started in [facility] ~ 2021. She has noticed some improvement since starting unna boot. She is currently following with Dr. Manuel Gonzalez. Pt is not on abx at time of initial visit, but has been treated with previously by podiatry. She is not a DM. She is not a smoker. She denies hx of DVT, and per her report had recent us noting no evidence of dvt at [facility]. She also had arterial studies done. I had previously seen her in the past in regards to left buttock thigh wound, which started as abscess. Pt works at [employer] in [place] and is on her feet all day.     22  Reflux study - if significant findings will schedule for fu  Continue compression therapy [facility] per podiatry with aquacell dressing  Elevation  She does not have significant arterial occlusive disease  22  Patient has asked to continuing following with me going forward because of our previous relationship  She is going to let Dr. Gregoria Lopez office know  She

## 2023-07-12 ENCOUNTER — HOSPITAL ENCOUNTER (OUTPATIENT)
Dept: WOUND CARE | Age: 68
Discharge: HOME | End: 2023-07-12
Payer: MEDICARE

## 2023-07-12 VITALS
WEIGHT: 175 LBS | SYSTOLIC BLOOD PRESSURE: 155 MMHG | BODY MASS INDEX: 26.52 KG/M2 | RESPIRATION RATE: 18 BRPM | DIASTOLIC BLOOD PRESSURE: 77 MMHG | HEIGHT: 68 IN | TEMPERATURE: 98.6 F | HEART RATE: 83 BPM

## 2023-07-12 DIAGNOSIS — L97.322 VARICOSE VEINS OF LEFT LOWER EXTREMITY WITH ULCER OF ANKLE WITH FAT LAYER EXPOSED (HCC): Primary | ICD-10-CM

## 2023-07-12 DIAGNOSIS — I70.242 ATHEROSCLEROSIS OF NATIVE ARTERY OF LEFT LOWER EXTREMITY WITH ULCERATION OF CALF (HCC): ICD-10-CM

## 2023-07-12 DIAGNOSIS — I83.023 VARICOSE VEINS OF LEFT LOWER EXTREMITY WITH ULCER OF ANKLE WITH FAT LAYER EXPOSED (HCC): Primary | ICD-10-CM

## 2023-07-12 DIAGNOSIS — I70.243 ATHEROSCLEROSIS OF NATIVE ARTERY OF LEFT LOWER EXTREMITY WITH ULCERATION OF ANKLE (HCC): Chronic | ICD-10-CM

## 2023-07-12 PROCEDURE — 11045 DBRDMT SUBQ TISS EACH ADDL: CPT

## 2023-07-12 PROCEDURE — 11042 DBRDMT SUBQ TIS 1ST 20SQCM/<: CPT

## 2023-07-12 RX ORDER — BETAMETHASONE DIPROPIONATE 0.05 %
OINTMENT (GRAM) TOPICAL ONCE
OUTPATIENT
Start: 2023-07-12 | End: 2023-07-12

## 2023-07-12 RX ORDER — BACITRACIN ZINC AND POLYMYXIN B SULFATE 500; 1000 [USP'U]/G; [USP'U]/G
OINTMENT TOPICAL ONCE
OUTPATIENT
Start: 2023-07-12 | End: 2023-07-12

## 2023-07-12 RX ORDER — OXYCODONE AND ACETAMINOPHEN 7.5; 325 MG/1; MG/1
1 TABLET ORAL EVERY 8 HOURS PRN
Qty: 42 TABLET | Refills: 0 | Status: SHIPPED | OUTPATIENT
Start: 2023-07-12 | End: 2023-07-26

## 2023-07-12 RX ORDER — IBUPROFEN 200 MG
TABLET ORAL ONCE
OUTPATIENT
Start: 2023-07-12 | End: 2023-07-12

## 2023-07-12 RX ORDER — CLOBETASOL PROPIONATE 0.5 MG/G
OINTMENT TOPICAL ONCE
OUTPATIENT
Start: 2023-07-12 | End: 2023-07-12

## 2023-07-12 RX ORDER — GINSENG 100 MG
CAPSULE ORAL ONCE
OUTPATIENT
Start: 2023-07-12 | End: 2023-07-12

## 2023-07-12 RX ORDER — LIDOCAINE 50 MG/G
OINTMENT TOPICAL ONCE
OUTPATIENT
Start: 2023-07-12 | End: 2023-07-12

## 2023-07-12 RX ORDER — LIDOCAINE HYDROCHLORIDE 20 MG/ML
JELLY TOPICAL ONCE
OUTPATIENT
Start: 2023-07-12 | End: 2023-07-12

## 2023-07-12 RX ORDER — LIDOCAINE HYDROCHLORIDE 40 MG/ML
SOLUTION TOPICAL ONCE
OUTPATIENT
Start: 2023-07-12 | End: 2023-07-12

## 2023-07-12 RX ORDER — LIDOCAINE HYDROCHLORIDE 40 MG/ML
SOLUTION TOPICAL ONCE
Status: COMPLETED | OUTPATIENT
Start: 2023-07-12 | End: 2023-07-12

## 2023-07-12 RX ORDER — GENTAMICIN SULFATE 1 MG/G
OINTMENT TOPICAL ONCE
OUTPATIENT
Start: 2023-07-12 | End: 2023-07-12

## 2023-07-12 RX ORDER — LIDOCAINE 40 MG/G
CREAM TOPICAL ONCE
OUTPATIENT
Start: 2023-07-12 | End: 2023-07-12

## 2023-07-12 RX ADMIN — LIDOCAINE HYDROCHLORIDE 10 ML: 40 SOLUTION TOPICAL at 07:56

## 2023-07-12 ASSESSMENT — PAIN - FUNCTIONAL ASSESSMENT: PAIN_FUNCTIONAL_ASSESSMENT: PREVENTS OR INTERFERES SOME ACTIVE ACTIVITIES AND ADLS

## 2023-07-12 ASSESSMENT — PAIN DESCRIPTION - ORIENTATION: ORIENTATION: LEFT

## 2023-07-12 ASSESSMENT — PAIN DESCRIPTION - PAIN TYPE: TYPE: CHRONIC PAIN

## 2023-07-12 ASSESSMENT — PAIN SCALES - GENERAL: PAINLEVEL_OUTOF10: 10

## 2023-07-12 ASSESSMENT — PAIN DESCRIPTION - LOCATION: LOCATION: LEG

## 2023-07-12 ASSESSMENT — PAIN DESCRIPTION - ONSET: ONSET: ON-GOING

## 2023-07-12 ASSESSMENT — PAIN DESCRIPTION - DESCRIPTORS: DESCRIPTORS: BURNING

## 2023-07-12 ASSESSMENT — PAIN DESCRIPTION - FREQUENCY: FREQUENCY: CONTINUOUS

## 2023-07-12 NOTE — PROGRESS NOTES
days: 2505       Wound 02/02/22 Ankle Left;Medial #1 (Active)   Wound Image   07/12/23 0753   Wound Etiology Venous 06/21/23 0856   Dressing Status Reinforced dressing 06/27/23 1130   Wound Cleansed Irrigated with saline 06/27/23 1036   Dressing/Treatment Gauze dressing/dressing sponge 06/27/23 1130   Offloading for Diabetic Foot Ulcers Offloading not required 02/22/23 0814   Wound Length (cm) 9.5 cm 07/12/23 0753   Wound Width (cm) 6.9 cm 07/12/23 0753   Wound Depth (cm) 0.3 cm 07/12/23 0753   Wound Surface Area (cm^2) 65.55 cm^2 07/12/23 0753   Change in Wound Size % (l*w) -142.24 07/12/23 0753   Wound Volume (cm^3) 19.665 cm^3 07/12/23 0753   Wound Healing % -627 07/12/23 0753   Post-Procedure Length (cm) 10.4 cm 07/05/23 0824   Post-Procedure Width (cm) 7 cm 07/05/23 0824   Post-Procedure Depth (cm) 0.2 cm 07/05/23 0824   Post-Procedure Surface Area (cm^2) 72.8 cm^2 07/05/23 0824   Post-Procedure Volume (cm^3) 14.56 cm^3 07/05/23 0824   Wound Assessment Pink/red;Fibrin;Slough 07/12/23 0753   Drainage Amount Large 07/12/23 0753   Drainage Description Yellow;Green 07/12/23 0753   Odor None 07/12/23 0753   Melany-wound Assessment Blanchable erythema; Maceration 07/12/23 0753   Margins Attached edges 05/31/23 0754   Wound Thickness Description not for Pressure Injury Full thickness 05/24/23 0749   Number of days: 524       Wound 03/16/22 Ankle Posterior; Left #2 (Active)   Wound Image   07/12/23 0753   Wound Etiology Venous 06/21/23 0856   Dressing Status New dressing applied;Clean;Dry; Intact 07/05/23 0844   Wound Cleansed Cleansed with saline 07/05/23 0844   Dressing/Treatment Alginate with Ag;ABD 07/05/23 0844   Offloading for Diabetic Foot Ulcers Offloading not required 02/08/23 0844   Wound Length (cm) 6 cm 07/12/23 0753   Wound Width (cm) 4.4 cm 07/12/23 0753   Wound Depth (cm) 0.2 cm 07/12/23 0753   Wound Surface Area (cm^2) 26.4 cm^2 07/12/23 0753   Change in Wound Size % (l*w) -956 07/12/23 0753   Wound Volume

## 2023-07-17 NOTE — DISCHARGE INSTRUCTIONS
Visit Discharge/Physician Orders     Discharge condition: Stable     Assessment of pain at discharge: yes     Anesthetic used: 4% lidocaine solution     Discharge to: Home     Left via:Private automobile     Accompanied by:self     ECF/HHA: n/a     Dressing Orders:LEFT MEDIAL and LATERAL LOWER LEG-Cleanse with normal saline, apply zinc to fiona wound, aquacel AG and drawtex, dressing, ABD pad , unna boot and coban. Change weekly. Treatment Orders: Eat a diet high in protein and vitamin C. Take a multiple vitamin daily unless contraindicated. To see Dr. Bronwyn Alegria as scheduled      Must wear slip on shoe to work due to wrap on leg! [facility] followup visit : 2 week D/T surgery next Tuesday 7--19-10____________________________  (Please note your next appointment above and if you are unable to keep, kindly give a 24 hour notice. Thank you.)     Physician signature:__________________________     If you experience any of the following, please call the [facility] during business hours:     * Increase in Pain  * Temperature over 101  * Increase in drainage from your wound  * Drainage with a foul odor  * Bleeding  * Increase in swelling  * Need for compression bandage changes due to slippage, breakthrough drainage. If you need medical attention outside of the business hours of the [facility] please contact your PCP or go to the nearest emergency room.

## 2023-07-19 ENCOUNTER — HOSPITAL ENCOUNTER (OUTPATIENT)
Dept: WOUND CARE | Age: 68
Discharge: HOME | End: 2023-07-19
Payer: MEDICARE

## 2023-07-19 ENCOUNTER — PREP FOR PROCEDURE (OUTPATIENT)
Dept: VASCULAR SURGERY | Age: 68
End: 2023-07-19

## 2023-07-19 VITALS
HEIGHT: 68 IN | DIASTOLIC BLOOD PRESSURE: 68 MMHG | TEMPERATURE: 97.9 F | BODY MASS INDEX: 26.52 KG/M2 | WEIGHT: 175 LBS | RESPIRATION RATE: 18 BRPM | SYSTOLIC BLOOD PRESSURE: 164 MMHG | HEART RATE: 74 BPM

## 2023-07-19 DIAGNOSIS — L97.322 VARICOSE VEINS OF LEFT LOWER EXTREMITY WITH ULCER OF ANKLE WITH FAT LAYER EXPOSED (HCC): Primary | ICD-10-CM

## 2023-07-19 DIAGNOSIS — I83.023 VARICOSE VEINS OF LEFT LOWER EXTREMITY WITH ULCER OF ANKLE WITH FAT LAYER EXPOSED (HCC): Primary | ICD-10-CM

## 2023-07-19 DIAGNOSIS — I70.243 ATHEROSCLEROSIS OF NATIVE ARTERY OF LEFT LOWER EXTREMITY WITH ULCERATION OF ANKLE (HCC): Chronic | ICD-10-CM

## 2023-07-19 DIAGNOSIS — I70.242 ATHEROSCLEROSIS OF NATIVE ARTERY OF LEFT LOWER EXTREMITY WITH ULCERATION OF CALF (HCC): ICD-10-CM

## 2023-07-19 PROCEDURE — 11042 DBRDMT SUBQ TIS 1ST 20SQCM/<: CPT

## 2023-07-19 PROCEDURE — 11045 DBRDMT SUBQ TISS EACH ADDL: CPT

## 2023-07-19 RX ORDER — LIDOCAINE HYDROCHLORIDE 20 MG/ML
JELLY TOPICAL ONCE
OUTPATIENT
Start: 2023-07-19 | End: 2023-07-19

## 2023-07-19 RX ORDER — LIDOCAINE HYDROCHLORIDE 40 MG/ML
SOLUTION TOPICAL ONCE
Status: COMPLETED | OUTPATIENT
Start: 2023-07-19 | End: 2023-07-19

## 2023-07-19 RX ORDER — LIDOCAINE HYDROCHLORIDE 40 MG/ML
SOLUTION TOPICAL ONCE
OUTPATIENT
Start: 2023-07-19 | End: 2023-07-19

## 2023-07-19 RX ORDER — CLOBETASOL PROPIONATE 0.5 MG/G
OINTMENT TOPICAL ONCE
OUTPATIENT
Start: 2023-07-19 | End: 2023-07-19

## 2023-07-19 RX ORDER — LIDOCAINE 40 MG/G
CREAM TOPICAL ONCE
OUTPATIENT
Start: 2023-07-19 | End: 2023-07-19

## 2023-07-19 RX ORDER — IBUPROFEN 200 MG
TABLET ORAL ONCE
OUTPATIENT
Start: 2023-07-19 | End: 2023-07-19

## 2023-07-19 RX ORDER — BACITRACIN ZINC AND POLYMYXIN B SULFATE 500; 1000 [USP'U]/G; [USP'U]/G
OINTMENT TOPICAL ONCE
OUTPATIENT
Start: 2023-07-19 | End: 2023-07-19

## 2023-07-19 RX ORDER — GINSENG 100 MG
CAPSULE ORAL ONCE
OUTPATIENT
Start: 2023-07-19 | End: 2023-07-19

## 2023-07-19 RX ORDER — BETAMETHASONE DIPROPIONATE 0.05 %
OINTMENT (GRAM) TOPICAL ONCE
OUTPATIENT
Start: 2023-07-19 | End: 2023-07-19

## 2023-07-19 RX ORDER — LIDOCAINE 50 MG/G
OINTMENT TOPICAL ONCE
OUTPATIENT
Start: 2023-07-19 | End: 2023-07-19

## 2023-07-19 RX ORDER — GENTAMICIN SULFATE 1 MG/G
OINTMENT TOPICAL ONCE
OUTPATIENT
Start: 2023-07-19 | End: 2023-07-19

## 2023-07-19 RX ADMIN — LIDOCAINE HYDROCHLORIDE 12 ML: 40 SOLUTION TOPICAL at 08:17

## 2023-07-19 ASSESSMENT — PAIN DESCRIPTION - PAIN TYPE: TYPE: CHRONIC PAIN

## 2023-07-19 ASSESSMENT — PAIN DESCRIPTION - LOCATION: LOCATION: LEG

## 2023-07-19 ASSESSMENT — PAIN - FUNCTIONAL ASSESSMENT: PAIN_FUNCTIONAL_ASSESSMENT: PREVENTS OR INTERFERES SOME ACTIVE ACTIVITIES AND ADLS

## 2023-07-19 ASSESSMENT — PAIN DESCRIPTION - ORIENTATION: ORIENTATION: LEFT

## 2023-07-19 ASSESSMENT — PAIN SCALES - GENERAL: PAINLEVEL_OUTOF10: 10

## 2023-07-19 ASSESSMENT — PAIN DESCRIPTION - ONSET: ONSET: PROGRESSIVE

## 2023-07-19 ASSESSMENT — PAIN DESCRIPTION - DESCRIPTORS: DESCRIPTORS: STABBING;THROBBING

## 2023-07-19 NOTE — PROGRESS NOTES
Please bring your photo ID and insurance card. A nurse will greet you in accordance to the time you are needed in the pre-op area to prepare you for surgery. Please do not be discouraged if you are not greeted in the order you arrive as there are many variables that are involved in patient preparation. Your patience is greatly appreciated as you wait for your nurse. Please bring in items such as: books, magazines, newspapers, electronics, or any other items  to occupy your time in the waiting area. [x]  Delays may occur with surgery and staff will make a sincere effort to keep you informed of delays. If any delays occur with your procedure, we apologize ahead of time for your inconvenience as we recognize the value of your time.

## 2023-07-19 NOTE — PROGRESS NOTES
dressing applied;Clean;Dry; Intact 07/12/23 0836   Wound Cleansed Cleansed with saline 07/12/23 0836   Dressing/Treatment Alginate with Ag;ABD 07/12/23 0836   Offloading for Diabetic Foot Ulcers Offloading not required 02/08/23 0844   Wound Length (cm) 6.1 cm 07/19/23 0813   Wound Width (cm) 4.5 cm 07/19/23 0813   Wound Depth (cm) 0.2 cm 07/19/23 0813   Wound Surface Area (cm^2) 27.45 cm^2 07/19/23 0813   Change in Wound Size % (l*w) -998 07/19/23 0813   Wound Volume (cm^3) 5.49 cm^3 07/19/23 0813   Wound Healing % -2096 07/19/23 0813   Post-Procedure Length (cm) 6.2 cm 07/19/23 0852   Post-Procedure Width (cm) 4.5 cm 07/19/23 0852   Post-Procedure Depth (cm) 0.2 cm 07/19/23 0852   Post-Procedure Surface Area (cm^2) 27.9 cm^2 07/19/23 0852   Post-Procedure Volume (cm^3) 5.58 cm^3 07/19/23 0852   Wound Assessment Fibrin;Pink/red;Slough 07/19/23 0813   Drainage Amount Large 07/19/23 0813   Drainage Description Yellow;Green 07/19/23 0813   Odor None 07/19/23 0813   Melany-wound Assessment Blanchable erythema; Maceration 07/19/23 0813   Margins Attached edges 07/19/23 0813   Wound Thickness Description not for Pressure Injury Full thickness 07/19/23 0813   Number of days: 490         Procedure Note  Indications:  Based on my examination of this patient's wound(s)/ulcer(s) today, debridement is required to promote healing and evaluate the wound base. Performed by: Rock Yenni MD    Consent obtained:  Yes     Time out taken:  Yes     Pain Control: Anesthetic  Anesthetic: 4% Lidocaine Liquid Topical      Debridement:Excisional Debridement     Using curette the wound(s)/ulcer(s) was/were sharply debrided down through and including the removal of epidermis, dermis, and subcutaneous tissue.          Devitalized Tissue Debrided:  fibrin, biofilm, slough, and exudate to stimulate bleeding to promote healing, post debridement good bleeding base and wound edges noted     Wound/Ulcer #:  1,2     Percent of Wound/Ulcer

## 2023-07-25 ENCOUNTER — ANESTHESIA (OUTPATIENT)
Dept: OPERATING ROOM | Age: 68
End: 2023-07-25
Payer: MEDICARE

## 2023-07-25 ENCOUNTER — ANESTHESIA EVENT (OUTPATIENT)
Dept: OPERATING ROOM | Age: 68
End: 2023-07-25
Payer: MEDICARE

## 2023-07-25 ENCOUNTER — HOSPITAL ENCOUNTER (OUTPATIENT)
Age: 68
Setting detail: OUTPATIENT SURGERY
Discharge: HOME | End: 2023-07-25
Attending: SURGERY | Admitting: SURGERY
Payer: MEDICARE

## 2023-07-25 VITALS
WEIGHT: 175 LBS | RESPIRATION RATE: 16 BRPM | SYSTOLIC BLOOD PRESSURE: 174 MMHG | DIASTOLIC BLOOD PRESSURE: 81 MMHG | TEMPERATURE: 96.8 F | HEIGHT: 68 IN | OXYGEN SATURATION: 100 % | BODY MASS INDEX: 26.52 KG/M2 | HEART RATE: 59 BPM

## 2023-07-25 LAB
ERYTHROCYTE [DISTWIDTH] IN BLOOD BY AUTOMATED COUNT: 18.9 % (ref 11.5–15)
HCT VFR BLD AUTO: 30.7 % (ref 34–48)
HGB BLD-MCNC: 9 G/DL (ref 11.5–15.5)
INR PPP: 1.1
MCH RBC QN AUTO: 23.1 PG (ref 26–35)
MCHC RBC AUTO-ENTMCNC: 29.3 G/DL (ref 32–34.5)
MCV RBC AUTO: 78.9 FL (ref 80–99.9)
PLATELET # BLD AUTO: 279 K/UL (ref 130–450)
PMV BLD AUTO: 10.5 FL (ref 7–12)
PROTHROMBIN TIME: 11.6 SEC (ref 9.3–12.4)
RBC # BLD AUTO: 3.89 M/UL (ref 3.5–5.5)
WBC OTHER # BLD: 7 K/UL (ref 4.5–11.5)

## 2023-07-25 PROCEDURE — 2500000003 HC RX 250 WO HCPCS: Performed by: INTERNAL MEDICINE

## 2023-07-25 PROCEDURE — 85027 COMPLETE CBC AUTOMATED: CPT

## 2023-07-25 PROCEDURE — 3700000000 HC ANESTHESIA ATTENDED CARE: Performed by: SURGERY

## 2023-07-25 PROCEDURE — 2580000003 HC RX 258: Performed by: PHYSICIAN ASSISTANT

## 2023-07-25 PROCEDURE — 2580000003 HC RX 258: Performed by: INTERNAL MEDICINE

## 2023-07-25 PROCEDURE — 6360000002 HC RX W HCPCS: Performed by: PHYSICIAN ASSISTANT

## 2023-07-25 PROCEDURE — 7100000010 HC PHASE II RECOVERY - FIRST 15 MIN: Performed by: SURGERY

## 2023-07-25 PROCEDURE — 3600000012 HC SURGERY LEVEL 2 ADDTL 15MIN: Performed by: SURGERY

## 2023-07-25 PROCEDURE — 85610 PROTHROMBIN TIME: CPT

## 2023-07-25 PROCEDURE — 6360000002 HC RX W HCPCS: Performed by: INTERNAL MEDICINE

## 2023-07-25 PROCEDURE — 15271 SKIN SUB GRAFT TRNK/ARM/LEG: CPT | Performed by: SURGERY

## 2023-07-25 PROCEDURE — 3600000002 HC SURGERY LEVEL 2 BASE: Performed by: SURGERY

## 2023-07-25 PROCEDURE — 2500000003 HC RX 250 WO HCPCS: Performed by: ANESTHESIOLOGY

## 2023-07-25 PROCEDURE — 7100000011 HC PHASE II RECOVERY - ADDTL 15 MIN: Performed by: SURGERY

## 2023-07-25 PROCEDURE — 6370000000 HC RX 637 (ALT 250 FOR IP): Performed by: SURGERY

## 2023-07-25 PROCEDURE — 3700000001 HC ADD 15 MINUTES (ANESTHESIA): Performed by: SURGERY

## 2023-07-25 PROCEDURE — 2709999900 HC NON-CHARGEABLE SUPPLY: Performed by: SURGERY

## 2023-07-25 RX ORDER — PROPOFOL 10 MG/ML
INJECTION, EMULSION INTRAVENOUS CONTINUOUS PRN
Status: DISCONTINUED | OUTPATIENT
Start: 2023-07-25 | End: 2023-07-25 | Stop reason: SDUPTHER

## 2023-07-25 RX ORDER — MEPERIDINE HYDROCHLORIDE 25 MG/ML
12.5 INJECTION INTRAMUSCULAR; INTRAVENOUS; SUBCUTANEOUS EVERY 5 MIN PRN
Status: DISCONTINUED | OUTPATIENT
Start: 2023-07-25 | End: 2023-07-25 | Stop reason: HOSPADM

## 2023-07-25 RX ORDER — SODIUM CHLORIDE 9 MG/ML
INJECTION, SOLUTION INTRAVENOUS PRN
Status: DISCONTINUED | OUTPATIENT
Start: 2023-07-25 | End: 2023-07-25 | Stop reason: HOSPADM

## 2023-07-25 RX ORDER — SODIUM CHLORIDE 9 MG/ML
INJECTION, SOLUTION INTRAVENOUS CONTINUOUS PRN
Status: DISCONTINUED | OUTPATIENT
Start: 2023-07-25 | End: 2023-07-25 | Stop reason: SDUPTHER

## 2023-07-25 RX ORDER — HYDRALAZINE HYDROCHLORIDE 20 MG/ML
INJECTION INTRAMUSCULAR; INTRAVENOUS
Status: COMPLETED
Start: 2023-07-25 | End: 2023-07-25

## 2023-07-25 RX ORDER — SODIUM CHLORIDE 0.9 % (FLUSH) 0.9 %
5-40 SYRINGE (ML) INJECTION EVERY 12 HOURS SCHEDULED
Status: DISCONTINUED | OUTPATIENT
Start: 2023-07-25 | End: 2023-07-25 | Stop reason: HOSPADM

## 2023-07-25 RX ORDER — LIDOCAINE HYDROCHLORIDE 20 MG/ML
JELLY TOPICAL PRN
Status: DISCONTINUED | OUTPATIENT
Start: 2023-07-25 | End: 2023-07-25 | Stop reason: ALTCHOICE

## 2023-07-25 RX ORDER — HYDROMORPHONE HYDROCHLORIDE 1 MG/ML
0.25 INJECTION, SOLUTION INTRAMUSCULAR; INTRAVENOUS; SUBCUTANEOUS EVERY 5 MIN PRN
Status: DISCONTINUED | OUTPATIENT
Start: 2023-07-25 | End: 2023-07-25 | Stop reason: HOSPADM

## 2023-07-25 RX ORDER — HYDROMORPHONE HYDROCHLORIDE 1 MG/ML
0.5 INJECTION, SOLUTION INTRAMUSCULAR; INTRAVENOUS; SUBCUTANEOUS EVERY 5 MIN PRN
Status: DISCONTINUED | OUTPATIENT
Start: 2023-07-25 | End: 2023-07-25 | Stop reason: HOSPADM

## 2023-07-25 RX ORDER — HYDRALAZINE HYDROCHLORIDE 20 MG/ML
INJECTION INTRAMUSCULAR; INTRAVENOUS PRN
Status: DISCONTINUED | OUTPATIENT
Start: 2023-07-25 | End: 2023-07-25 | Stop reason: SDUPTHER

## 2023-07-25 RX ORDER — SODIUM CHLORIDE 0.9 % (FLUSH) 0.9 %
5-40 SYRINGE (ML) INJECTION PRN
Status: DISCONTINUED | OUTPATIENT
Start: 2023-07-25 | End: 2023-07-25 | Stop reason: HOSPADM

## 2023-07-25 RX ORDER — DIPHENHYDRAMINE HYDROCHLORIDE 50 MG/ML
INJECTION INTRAMUSCULAR; INTRAVENOUS PRN
Status: DISCONTINUED | OUTPATIENT
Start: 2023-07-25 | End: 2023-07-25 | Stop reason: SDUPTHER

## 2023-07-25 RX ORDER — ONDANSETRON 2 MG/ML
INJECTION INTRAMUSCULAR; INTRAVENOUS PRN
Status: DISCONTINUED | OUTPATIENT
Start: 2023-07-25 | End: 2023-07-25 | Stop reason: SDUPTHER

## 2023-07-25 RX ORDER — PROCHLORPERAZINE EDISYLATE 5 MG/ML
INJECTION INTRAMUSCULAR; INTRAVENOUS PRN
Status: DISCONTINUED | OUTPATIENT
Start: 2023-07-25 | End: 2023-07-25 | Stop reason: SDUPTHER

## 2023-07-25 RX ORDER — MIDAZOLAM HYDROCHLORIDE 1 MG/ML
INJECTION INTRAMUSCULAR; INTRAVENOUS PRN
Status: DISCONTINUED | OUTPATIENT
Start: 2023-07-25 | End: 2023-07-25 | Stop reason: SDUPTHER

## 2023-07-25 RX ORDER — DEXAMETHASONE SODIUM PHOSPHATE 10 MG/ML
INJECTION INTRAMUSCULAR; INTRAVENOUS PRN
Status: DISCONTINUED | OUTPATIENT
Start: 2023-07-25 | End: 2023-07-25 | Stop reason: SDUPTHER

## 2023-07-25 RX ORDER — METOPROLOL TARTRATE 5 MG/5ML
INJECTION INTRAVENOUS PRN
Status: DISCONTINUED | OUTPATIENT
Start: 2023-07-25 | End: 2023-07-25 | Stop reason: SDUPTHER

## 2023-07-25 RX ORDER — OSTOMY ADHESIVE
STRIP MISCELLANEOUS PRN
Status: DISCONTINUED | OUTPATIENT
Start: 2023-07-25 | End: 2023-07-25 | Stop reason: ALTCHOICE

## 2023-07-25 RX ORDER — SODIUM CHLORIDE 9 MG/ML
INJECTION, SOLUTION INTRAVENOUS CONTINUOUS
Status: DISCONTINUED | OUTPATIENT
Start: 2023-07-25 | End: 2023-07-25 | Stop reason: HOSPADM

## 2023-07-25 RX ADMIN — DEXAMETHASONE SODIUM PHOSPHATE 10 MG: 10 INJECTION INTRAMUSCULAR; INTRAVENOUS at 12:39

## 2023-07-25 RX ADMIN — HYDROMORPHONE HYDROCHLORIDE 0.25 MG: 1 INJECTION, SOLUTION INTRAMUSCULAR; INTRAVENOUS; SUBCUTANEOUS at 14:06

## 2023-07-25 RX ADMIN — CEFAZOLIN 2000 MG: 2 INJECTION, POWDER, FOR SOLUTION INTRAMUSCULAR; INTRAVENOUS at 12:44

## 2023-07-25 RX ADMIN — DIPHENHYDRAMINE HYDROCHLORIDE 25 MG: 50 INJECTION, SOLUTION INTRAMUSCULAR; INTRAVENOUS at 12:39

## 2023-07-25 RX ADMIN — METOPROLOL TARTRATE 5 MG: 5 INJECTION, SOLUTION INTRAVENOUS at 13:30

## 2023-07-25 RX ADMIN — MIDAZOLAM 2 MG: 1 INJECTION INTRAMUSCULAR; INTRAVENOUS at 12:39

## 2023-07-25 RX ADMIN — ONDANSETRON HYDROCHLORIDE 4 MG: 2 SOLUTION INTRAMUSCULAR; INTRAVENOUS at 12:39

## 2023-07-25 RX ADMIN — PROPOFOL 200 MCG/KG/MIN: 10 INJECTION, EMULSION INTRAVENOUS at 12:39

## 2023-07-25 RX ADMIN — SODIUM CHLORIDE: 9 INJECTION, SOLUTION INTRAVENOUS at 12:33

## 2023-07-25 RX ADMIN — PROCHLORPERAZINE EDISYLATE 10 MG: 5 INJECTION INTRAMUSCULAR; INTRAVENOUS at 12:39

## 2023-07-25 RX ADMIN — SODIUM CHLORIDE: 9 INJECTION, SOLUTION INTRAVENOUS at 11:16

## 2023-07-25 RX ADMIN — HYDRALAZINE HYDROCHLORIDE 10 MG: 20 INJECTION INTRAMUSCULAR; INTRAVENOUS at 13:47

## 2023-07-25 ASSESSMENT — PAIN SCALES - GENERAL
PAINLEVEL_OUTOF10: 2
PAINLEVEL_OUTOF10: 6
PAINLEVEL_OUTOF10: 10
PAINLEVEL_OUTOF10: 10

## 2023-07-25 ASSESSMENT — PAIN DESCRIPTION - ORIENTATION: ORIENTATION: LEFT

## 2023-07-25 ASSESSMENT — PAIN DESCRIPTION - LOCATION: LOCATION: ANKLE

## 2023-07-25 ASSESSMENT — PAIN - FUNCTIONAL ASSESSMENT: PAIN_FUNCTIONAL_ASSESSMENT: 0-10

## 2023-07-25 NOTE — ANESTHESIA POSTPROCEDURE EVALUATION
Department of Anesthesiology  Postprocedure Note    Patient: Olga Duarte  MRN: [id number]  YOB: 1955  Date of evaluation: 7/27/2023      Procedure Summary     Date: 07/25/23 Room / Location: [facility] OR 03 / [facility]    Anesthesia Start:  Anesthesia Stop:     Procedure: LEFT LEG DEBRIDEMENT/ POSSIBLE AC5 (Left) Diagnosis:       Non-pressure chronic ulcer of left ankle with fat layer exposed ([address])      (Non-pressure chronic ulcer of left ankle with fat layer exposed ([address]) [T01.587])    Surgeons: Esther Banegas MD Responsible Provider:     Anesthesia Type: MAC ASA Status: 3          Anesthesia Type: No value filed.     Gamaliel Phase I: Gamaliel Score: 10    Gamaliel Phase II: Gamaliel Score: 10      Anesthesia Post Evaluation    Patient location during evaluation: PACU  Patient participation: complete - patient participated  Level of consciousness: awake  Pain score: 3  Airway patency: patent  Nausea & Vomiting: no nausea and no vomiting  Complications: no  Cardiovascular status: blood pressure returned to baseline  Respiratory status: acceptable  Hydration status: euvolemic

## 2023-07-25 NOTE — OP NOTE
Operative Note      Patient: Madhuri Jolly  YOB: 1955  MRN: [id number]    Date of Procedure: 7/25/2023    Pre-Op Diagnosis Codes:     * Non-pressure chronic ulcer of left ankle with fat layer exposed ([address]) [L97.322]    Post-Op Diagnosis: Same       Procedure   L LE wound debridement  Application of AC5 x2 to medial and lateral calf wounds  Application of unna boot    Medial 8 cm length x 4 cm width x 0.3 cm depth  Lateral 5 cm length x 4 cm width x 0.3 cm depth    Surgeon(s):  Lay Nova MD    Assistant:   Resident: Jair Britton DO; Shoshana Gallardo DPM    Anesthesia: Monitor Anesthesia Care    Estimated Blood Loss (mL): Minimal    Complications: None    Specimens:   * No specimens in log *    Implants:  Implant Name Type Inv. Item Serial No.  Lot No. LRB No. Used Action   AC5 ADVANCED WOUND SYSTEM      [id number] Left 2 Implanted     DESCRIPTION OF PROCEDURE: The patient was identified and the procedure was confirmed. The wound and surrounding area was cleaned, and draped. Lidocaine gel soaked gauze was applied. It was subsequently removed. With the patient in supine position, the wound was debrided sharply of fibrotic, necrotic, and hyperkeratotic tissue, including a layer of surrounding healthy tissue using a curette for a total surface area of > 20 cm2. The skin was excised through the level of the subcutaneous tissue. 100%% of the wound was debrided. Wound was copiously irrigated with normal saline solution. There was minimal bleeding that was controlled with pressure. The wound was deemed appropriate for AC5 and it was prepped per 's instructions. The product was applied to the wound and than secure with mepitel one, steristrips, alginate, abdominal pads, unna boot, and koban. Needle, sponge and instrument counts were reported as correct x2.  The patient tolerated the procedure and was transferred to the recovery area in satisfactory

## 2023-07-25 NOTE — H&P
Vascular Surgery History & Physical      HPI :    Marsha Montanez is a 74 y.o. female who presents for evaluation of left leg wound. Last seen in office on 7/19. Marsha Montanez was reexamined preoperatively today, 7/25/23. There is no substantial change in her physical status since the time of her pre-anesthesia testing, allergies to drugs and biologics were reviewed on chart and are unchanged. She is fit for the proposed surgical procedure.      2/2/22  Reflux study - if significant findings will schedule for fu  Continue compression therapy unna boot per podiatry with aquacell dressing  Elevation  She does not have significant arterial occlusive disease  2/9/22  Patient has asked to continuing following with me going forward because of our previous relationship  She is going to let Dr. Austen Travis office know  She tolerated unna boot and aquacell - some improvement  216/22  Appearance improved, slightly larger  Consider drawtek next week but overall drainage seems reasonably managed as periwound appears ok  2/23/2022  The wound, has some exudate, no recent cultures were done, will do wound cultures today  3/2/22  Reflux study reviewed - no significant reflux  Will treat culture - augmentin 875 mg bid x 10 days - script sent  More drainage - stable size  3/9/22  Wound appearance improved, stable in size  drawtek  3/16/22  Wound slightly larger in size, new wound of ankle  Continue Drawtek, change to profore  Avoid shoes which will contact ankle area  3/23/22  Wounds stable  Overall appearance improved  Will have pt see ID re cultures - disc with Dr Vee Clark  3/30/22  Much improved appearance  On oral abx per ID - concerns re pt ability to work while on IV - will see how her wound progresses  4/6/22  Appearance improved but size not much different  Finished oral abx  Will re culture next week if no significant size change - possible advance skin therapy  4/20/2022  Ulcer on the medial aspect, fairly clean and granulating,

## 2023-07-28 NOTE — DISCHARGE INSTRUCTIONS
Visit Discharge/Physician Orders     Discharge condition: Stable     Assessment of pain at discharge: yes     Anesthetic used: 4% lidocaine solution     Discharge to: Home     Left via:Private automobile     Accompanied by:self     ECF/HHA: n/a     Dressing Orders:LEFT MEDIAL and LATERAL LOWER LEG-Cleanse with normal saline, apply zinc to fiona wound,  drawtex, dressing, ABD pad , unna boot and coban. Change weekly. Treatment Orders: Eat a diet high in protein and vitamin C. Take a multiple vitamin daily unless contraindicated. To see Dr. Angelica Deng as scheduled      Must wear slip on shoe to work due to wrap on leg! [facility] followup visit : 1 week____________________________  (Please note your next appointment above and if you are unable to keep, kindly give a 24 hour notice. Thank you.)     Physician signature:__________________________     If you experience any of the following, please call the [facility] during business hours:     * Increase in Pain  * Temperature over 101  * Increase in drainage from your wound  * Drainage with a foul odor  * Bleeding  * Increase in swelling  * Need for compression bandage changes due to slippage, breakthrough drainage. If you need medical attention outside of the business hours of the [facility] please contact your PCP or go to the nearest emergency room.

## 2023-07-31 ENCOUNTER — HOSPITAL ENCOUNTER (OUTPATIENT)
Dept: WOUND CARE | Age: 68
Discharge: HOME | End: 2023-07-31
Payer: MEDICARE

## 2023-07-31 ENCOUNTER — HOSPITAL ENCOUNTER (OUTPATIENT)
Age: 68
Discharge: HOME | End: 2023-07-31
Payer: MEDICARE

## 2023-07-31 VITALS
BODY MASS INDEX: 26.52 KG/M2 | HEART RATE: 81 BPM | SYSTOLIC BLOOD PRESSURE: 182 MMHG | HEIGHT: 68 IN | RESPIRATION RATE: 18 BRPM | DIASTOLIC BLOOD PRESSURE: 97 MMHG | WEIGHT: 175 LBS | TEMPERATURE: 97.7 F

## 2023-07-31 DIAGNOSIS — I83.023 VARICOSE VEINS OF LEFT LOWER EXTREMITY WITH ULCER OF ANKLE WITH FAT LAYER EXPOSED (HCC): ICD-10-CM

## 2023-07-31 DIAGNOSIS — I70.243 ATHEROSCLEROSIS OF NATIVE ARTERY OF LEFT LOWER EXTREMITY WITH ULCERATION OF ANKLE (HCC): Chronic | ICD-10-CM

## 2023-07-31 DIAGNOSIS — L97.322 VARICOSE VEINS OF LEFT LOWER EXTREMITY WITH ULCER OF ANKLE WITH FAT LAYER EXPOSED (HCC): Primary | ICD-10-CM

## 2023-07-31 DIAGNOSIS — I83.023 VARICOSE VEINS OF LEFT LOWER EXTREMITY WITH ULCER OF ANKLE WITH FAT LAYER EXPOSED (HCC): Primary | ICD-10-CM

## 2023-07-31 DIAGNOSIS — L97.322 VARICOSE VEINS OF LEFT LOWER EXTREMITY WITH ULCER OF ANKLE WITH FAT LAYER EXPOSED (HCC): ICD-10-CM

## 2023-07-31 LAB
BASOPHILS # BLD: 0.06 K/UL (ref 0–0.2)
BASOPHILS NFR BLD: 1 % (ref 0–2)
EOSINOPHIL # BLD: 0.11 K/UL (ref 0.05–0.5)
EOSINOPHILS RELATIVE PERCENT: 1 % (ref 0–6)
ERYTHROCYTE [DISTWIDTH] IN BLOOD BY AUTOMATED COUNT: 18.3 % (ref 11.5–15)
HCT VFR BLD AUTO: 31.6 % (ref 34–48)
HGB BLD-MCNC: 9.2 G/DL (ref 11.5–15.5)
IMM GRANULOCYTES # BLD AUTO: <0.03 K/UL (ref 0–0.58)
IMM GRANULOCYTES NFR BLD: 0 % (ref 0–5)
LYMPHOCYTES NFR BLD: 2.58 K/UL (ref 1.5–4)
LYMPHOCYTES RELATIVE PERCENT: 32 % (ref 20–42)
MCH RBC QN AUTO: 23 PG (ref 26–35)
MCHC RBC AUTO-ENTMCNC: 29.1 G/DL (ref 32–34.5)
MCV RBC AUTO: 79 FL (ref 80–99.9)
MONOCYTES NFR BLD: 0.74 K/UL (ref 0.1–0.95)
MONOCYTES NFR BLD: 9 % (ref 2–12)
NEUTROPHILS NFR BLD: 57 % (ref 43–80)
NEUTS SEG NFR BLD: 4.66 K/UL (ref 1.8–7.3)
PLATELET # BLD AUTO: 299 K/UL (ref 130–450)
PMV BLD AUTO: 10.2 FL (ref 7–12)
RBC # BLD AUTO: 4 M/UL (ref 3.5–5.5)
WBC OTHER # BLD: 8.2 K/UL (ref 4.5–11.5)

## 2023-07-31 PROCEDURE — 87075 CULTR BACTERIA EXCEPT BLOOD: CPT

## 2023-07-31 PROCEDURE — 87070 CULTURE OTHR SPECIMN AEROBIC: CPT

## 2023-07-31 PROCEDURE — 85027 COMPLETE CBC AUTOMATED: CPT

## 2023-07-31 PROCEDURE — 36415 COLL VENOUS BLD VENIPUNCTURE: CPT

## 2023-07-31 PROCEDURE — 99212 OFFICE O/P EST SF 10 MIN: CPT

## 2023-07-31 PROCEDURE — 99212 OFFICE O/P EST SF 10 MIN: CPT | Performed by: SURGERY

## 2023-07-31 PROCEDURE — 87205 SMEAR GRAM STAIN: CPT

## 2023-07-31 PROCEDURE — 87077 CULTURE AEROBIC IDENTIFY: CPT

## 2023-07-31 PROCEDURE — 99213 OFFICE O/P EST LOW 20 MIN: CPT

## 2023-07-31 ASSESSMENT — PAIN DESCRIPTION - DESCRIPTORS: DESCRIPTORS: STABBING;THROBBING

## 2023-07-31 ASSESSMENT — PAIN SCALES - GENERAL: PAINLEVEL_OUTOF10: 10

## 2023-07-31 ASSESSMENT — PAIN DESCRIPTION - ONSET: ONSET: ON-GOING

## 2023-07-31 ASSESSMENT — PAIN DESCRIPTION - LOCATION: LOCATION: LEG;ANKLE

## 2023-07-31 ASSESSMENT — PAIN DESCRIPTION - ORIENTATION: ORIENTATION: LEFT

## 2023-07-31 ASSESSMENT — PAIN DESCRIPTION - FREQUENCY: FREQUENCY: CONTINUOUS

## 2023-07-31 ASSESSMENT — PAIN - FUNCTIONAL ASSESSMENT: PAIN_FUNCTIONAL_ASSESSMENT: PREVENTS OR INTERFERES SOME ACTIVE ACTIVITIES AND ADLS

## 2023-07-31 ASSESSMENT — PAIN DESCRIPTION - PAIN TYPE: TYPE: CHRONIC PAIN

## 2023-07-31 NOTE — PROGRESS NOTES
Wound Healing Center Followup Visit Note    Referring Physician : Edd Soni MD  [address] RECORD NUMBER:  [id number]  AGE: 74 y.o. GENDER: female  : 1955  EPISODE DATE:  2023    Subjective:     Chief Complaint   Patient presents with    Wound Check     Left leg      HISTORY of PRESENT ILLNESS HPI   Eleazar Dacosta is a 74 y.o. female who presents today in regards to follow up evaluation and treatment of wound/ulcer. That patient's past medical, family and social hx were reviewed and changes were made if present. History of Wound Context:  The patient has had a wound of her left ankle/calf which was first noted approximately 2021. This has been treated local wound care. On their initial visit to the wound healing center, 22,  the patient has noted that the wound has been improving. The patient has not had similar previous wounds in the past.      She started seeing Dr. Nay Hoang in 2021 and than Dr. Faheem Mccray. She was started in [facility] ~ 2021. She has noticed some improvement since starting unna boot. She is currently following with Dr. Nicky Palencia. Pt is not on abx at time of initial visit, but has been treated with previously by podiatry. She is not a DM. She is not a smoker. She denies hx of DVT, and per her report had recent us noting no evidence of dvt at [facility]. She also had arterial studies done. I had previously seen her in the past in regards to left buttock thigh wound, which started as abscess. Pt works at [employer] in [place] and is on her feet all day.     22  Reflux study - if significant findings will schedule for fu  Continue compression therapy [facility] per podiatry with aquacell dressing  Elevation  She does not have significant arterial occlusive disease  22  Patient has asked to continuing following with me going forward because of our previous relationship  She is going to let Dr. Francine Steiner office know  She

## 2023-07-31 NOTE — PLAN OF CARE
Problem: Wound:  Goal: Will show signs of wound healing; wound closure and no evidence of infection  Description: Will show signs of wound healing; wound closure and no evidence of infection  Outcome: Not Progressing     Problem: Pain  Goal: Verbalizes/displays adequate comfort level or baseline comfort level  Outcome: Progressing     Problem: Venous:  Goal: Signs of wound healing will improve  Description: Signs of wound healing will improve  Outcome: Progressing     Problem: Compression therapy:  Goal: Will be free from complications associated with compression therapy  Description: Will be free from complications associated with compression therapy  Outcome: Progressing     Problem: Wound:  Goal: Will show signs of wound healing; wound closure and no evidence of infection  Description: Will show signs of wound healing; wound closure and no evidence of infection  Outcome: Not Progressing

## 2023-07-31 NOTE — DISCHARGE INSTRUCTIONS
Visit Discharge/Physician Orders     Discharge condition: Stable     Assessment of pain at discharge: yes     Anesthetic used: 4% lidocaine solution     Discharge to: Home     Left via:Private automobile     Accompanied by:self     ECF/HHA: n/a     Dressing Orders:LEFT MEDIAL and LATERAL LOWER LEG-Cleanse with normal saline, apply zinc to fiona wound, drawtex,  ABD pad , unna boot and coban. Change weekly. Treatment Orders: 7/31 Culture taken and Lab work ordered   Eat a diet high in protein and vitamin C. Take a multiple vitamin daily unless contraindicated. To see Dr. Ernestina Sorto as scheduled  Must wear slip on shoe to work due to wrap on leg! [facility] followup visit : _______8/2 with PK _______________  (Please note your next appointment above and if you are unable to keep, kindly give a 24 hour notice. Thank you.)     Physician signature:__________________________     If you experience any of the following, please call the [facility] during business hours:     * Increase in Pain  * Temperature over 101  * Increase in drainage from your wound  * Drainage with a foul odor  * Bleeding  * Increase in swelling  * Need for compression bandage changes due to slippage, breakthrough drainage. If you need medical attention outside of the business hours of the [facility] please contact your PCP or go to the nearest emergency room.

## 2023-08-02 ENCOUNTER — HOSPITAL ENCOUNTER (OUTPATIENT)
Dept: WOUND CARE | Age: 68
Discharge: HOME | End: 2023-08-02
Payer: MEDICARE

## 2023-08-02 VITALS
RESPIRATION RATE: 18 BRPM | TEMPERATURE: 97.5 F | SYSTOLIC BLOOD PRESSURE: 153 MMHG | DIASTOLIC BLOOD PRESSURE: 70 MMHG | HEART RATE: 77 BPM

## 2023-08-02 DIAGNOSIS — I70.243 ATHEROSCLEROSIS OF NATIVE ARTERY OF LEFT LOWER EXTREMITY WITH ULCERATION OF ANKLE (HCC): Chronic | ICD-10-CM

## 2023-08-02 DIAGNOSIS — I70.242 ATHEROSCLEROSIS OF NATIVE ARTERY OF LEFT LOWER EXTREMITY WITH ULCERATION OF CALF (HCC): ICD-10-CM

## 2023-08-02 DIAGNOSIS — I83.023 VARICOSE VEINS OF LEFT LOWER EXTREMITY WITH ULCER OF ANKLE WITH FAT LAYER EXPOSED (HCC): Primary | ICD-10-CM

## 2023-08-02 DIAGNOSIS — L97.322 VARICOSE VEINS OF LEFT LOWER EXTREMITY WITH ULCER OF ANKLE WITH FAT LAYER EXPOSED (HCC): Primary | ICD-10-CM

## 2023-08-02 LAB
MICROORGANISM SPEC CULT: ABNORMAL
MICROORGANISM SPEC CULT: NORMAL
MICROORGANISM/AGENT SPEC: ABNORMAL
SPECIMEN DESCRIPTION: ABNORMAL
SPECIMEN DESCRIPTION: NORMAL

## 2023-08-02 PROCEDURE — 11042 DBRDMT SUBQ TIS 1ST 20SQCM/<: CPT | Performed by: SURGERY

## 2023-08-02 PROCEDURE — 11042 DBRDMT SUBQ TIS 1ST 20SQCM/<: CPT

## 2023-08-02 RX ORDER — LIDOCAINE HYDROCHLORIDE 20 MG/ML
JELLY TOPICAL ONCE
OUTPATIENT
Start: 2023-08-02 | End: 2023-08-02

## 2023-08-02 RX ORDER — LEVOFLOXACIN 750 MG/1
750 TABLET ORAL DAILY
Qty: 10 TABLET | Refills: 0 | Status: SHIPPED | OUTPATIENT
Start: 2023-08-02 | End: 2023-08-12

## 2023-08-02 RX ORDER — GENTAMICIN SULFATE 1 MG/G
OINTMENT TOPICAL ONCE
OUTPATIENT
Start: 2023-08-02 | End: 2023-08-02

## 2023-08-02 RX ORDER — CLOBETASOL PROPIONATE 0.5 MG/G
OINTMENT TOPICAL ONCE
OUTPATIENT
Start: 2023-08-02 | End: 2023-08-02

## 2023-08-02 RX ORDER — OXYCODONE AND ACETAMINOPHEN 7.5; 325 MG/1; MG/1
1 TABLET ORAL EVERY 8 HOURS PRN
Qty: 42 TABLET | Refills: 0 | Status: SHIPPED | OUTPATIENT
Start: 2023-08-02 | End: 2023-08-16

## 2023-08-02 RX ORDER — LIDOCAINE HYDROCHLORIDE 40 MG/ML
SOLUTION TOPICAL ONCE
OUTPATIENT
Start: 2023-08-02 | End: 2023-08-02

## 2023-08-02 RX ORDER — LIDOCAINE 40 MG/G
CREAM TOPICAL ONCE
OUTPATIENT
Start: 2023-08-02 | End: 2023-08-02

## 2023-08-02 RX ORDER — BETAMETHASONE DIPROPIONATE 0.05 %
OINTMENT (GRAM) TOPICAL ONCE
OUTPATIENT
Start: 2023-08-02 | End: 2023-08-02

## 2023-08-02 RX ORDER — LIDOCAINE 50 MG/G
OINTMENT TOPICAL ONCE
OUTPATIENT
Start: 2023-08-02 | End: 2023-08-02

## 2023-08-02 RX ORDER — GINSENG 100 MG
CAPSULE ORAL ONCE
OUTPATIENT
Start: 2023-08-02 | End: 2023-08-02

## 2023-08-02 RX ORDER — IBUPROFEN 200 MG
TABLET ORAL ONCE
OUTPATIENT
Start: 2023-08-02 | End: 2023-08-02

## 2023-08-02 RX ORDER — BACITRACIN ZINC AND POLYMYXIN B SULFATE 500; 1000 [USP'U]/G; [USP'U]/G
OINTMENT TOPICAL ONCE
OUTPATIENT
Start: 2023-08-02 | End: 2023-08-02

## 2023-08-02 RX ORDER — LIDOCAINE HYDROCHLORIDE 40 MG/ML
SOLUTION TOPICAL ONCE
Status: COMPLETED | OUTPATIENT
Start: 2023-08-02 | End: 2023-08-02

## 2023-08-02 RX ADMIN — LIDOCAINE HYDROCHLORIDE 10 ML: 40 SOLUTION TOPICAL at 08:02

## 2023-08-02 ASSESSMENT — PAIN DESCRIPTION - FREQUENCY: FREQUENCY: CONTINUOUS

## 2023-08-02 ASSESSMENT — PAIN DESCRIPTION - ONSET: ONSET: ON-GOING

## 2023-08-02 ASSESSMENT — PAIN DESCRIPTION - DESCRIPTORS: DESCRIPTORS: STABBING;THROBBING

## 2023-08-02 ASSESSMENT — PAIN DESCRIPTION - PAIN TYPE: TYPE: CHRONIC PAIN

## 2023-08-02 ASSESSMENT — PAIN DESCRIPTION - ORIENTATION: ORIENTATION: LEFT

## 2023-08-02 ASSESSMENT — PAIN SCALES - GENERAL: PAINLEVEL_OUTOF10: 9

## 2023-08-02 ASSESSMENT — PAIN - FUNCTIONAL ASSESSMENT: PAIN_FUNCTIONAL_ASSESSMENT: PREVENTS OR INTERFERES SOME ACTIVE ACTIVITIES AND ADLS

## 2023-08-02 ASSESSMENT — PAIN DESCRIPTION - LOCATION: LOCATION: FOOT;ANKLE

## 2023-08-02 NOTE — PROGRESS NOTES
Called Barry Lozada and informed her of her positive wound culture and that Dr. Maye Massey sent an order for levaquin in to her pharmacy for 10 days. She stated she would pick it up later.

## 2023-08-02 NOTE — PROGRESS NOTES
Wound Healing Center Followup Visit Note    Referring Physician : Galina Grimm MD  [address] RECORD NUMBER:  [id number]  AGE: 74 y.o. GENDER: female  : 1955  EPISODE DATE:  2023    Subjective:     Chief Complaint   Patient presents with    Wound Check      HISTORY of PRESENT ILLNESS HPI   Johnathan Du is a 74 y.o. female who presents today in regards to follow up evaluation and treatment of wound/ulcer. That patient's past medical, family and social hx were reviewed and changes were made if present. History of Wound Context:  The patient has had a wound of her left ankle/calf which was first noted approximately 2021. This has been treated local wound care. On their initial visit to the wound healing center, 22,  the patient has noted that the wound has been improving. The patient has not had similar previous wounds in the past.      She started seeing Dr. Alejandra Ashby in 2021 and than Dr. Darryle Haines. She was started in [facility] ~ 2021. She has noticed some improvement since [facility]. She is currently following with Dr. Jose Frey. Pt is not on abx at time of initial visit, but has been treated with previously by podiatry. She is not a DM. She is not a smoker. She denies hx of DVT, and per her report had recent us noting no evidence of dvt at [facility]. She also had arterial studies done. I had previously seen her in the past in regards to left buttock thigh wound, which started as abscess. Pt works at [employer] in [place] [employer] and is on her feet all day.     22  Reflux study - if significant findings will schedule for fu  Continue compression therapy [facility] boot per podiatry with aquacell dressing  Elevation  She does not have significant arterial occlusive disease  22  Patient has asked to continuing following with me going forward because of our previous relationship  She is going to let Dr. Horacio Miller office know  She tolerated unna

## 2023-08-07 NOTE — DISCHARGE INSTRUCTIONS
Visit Discharge/Physician Orders     Discharge condition: Stable     Assessment of pain at discharge: yes     Anesthetic used: 4% lidocaine solution     Discharge to: Home     Left via:Private automobile     Accompanied by:self     ECF/HHA: n/a     Dressing Orders:LEFT MEDIAL and LATERAL LOWER LEG-Cleanse with normal saline, apply zinc to fiona wound,  drawtex, dressing, ABD pad , unna boot and coban. Change weekly. Treatment Orders: Eat a diet high in protein and vitamin C. Take a multiple vitamin daily unless contraindicated. To see Dr. Samina Pascual as scheduled      Must wear slip on shoe to work due to wrap on leg! [facility] followup visit : 1 week____________________________  (Please note your next appointment above and if you are unable to keep, kindly give a 24 hour notice. Thank you.)     Physician signature:__________________________     If you experience any of the following, please call the [facility] during business hours:     * Increase in Pain  * Temperature over 101  * Increase in drainage from your wound  * Drainage with a foul odor  * Bleeding  * Increase in swelling  * Need for compression bandage changes due to slippage, breakthrough drainage. If you need medical attention outside of the business hours of the [facility] please contact your PCP or go to the nearest emergency room.

## 2023-08-09 ENCOUNTER — HOSPITAL ENCOUNTER (OUTPATIENT)
Dept: WOUND CARE | Age: 68
Discharge: HOME | End: 2023-08-09
Payer: MEDICARE

## 2023-08-09 VITALS
HEART RATE: 84 BPM | DIASTOLIC BLOOD PRESSURE: 69 MMHG | TEMPERATURE: 96.7 F | WEIGHT: 175 LBS | BODY MASS INDEX: 26.52 KG/M2 | SYSTOLIC BLOOD PRESSURE: 145 MMHG | RESPIRATION RATE: 18 BRPM | HEIGHT: 68 IN

## 2023-08-09 DIAGNOSIS — I70.243 ATHEROSCLEROSIS OF NATIVE ARTERY OF LEFT LOWER EXTREMITY WITH ULCERATION OF ANKLE (HCC): Chronic | ICD-10-CM

## 2023-08-09 DIAGNOSIS — I70.242 ATHEROSCLEROSIS OF NATIVE ARTERY OF LEFT LOWER EXTREMITY WITH ULCERATION OF CALF (HCC): ICD-10-CM

## 2023-08-09 DIAGNOSIS — L97.322 VARICOSE VEINS OF LEFT LOWER EXTREMITY WITH ULCER OF ANKLE WITH FAT LAYER EXPOSED (HCC): Primary | ICD-10-CM

## 2023-08-09 DIAGNOSIS — I83.023 VARICOSE VEINS OF LEFT LOWER EXTREMITY WITH ULCER OF ANKLE WITH FAT LAYER EXPOSED (HCC): Primary | ICD-10-CM

## 2023-08-09 PROCEDURE — 11045 DBRDMT SUBQ TISS EACH ADDL: CPT

## 2023-08-09 PROCEDURE — 11042 DBRDMT SUBQ TIS 1ST 20SQCM/<: CPT

## 2023-08-09 RX ORDER — BACITRACIN ZINC AND POLYMYXIN B SULFATE 500; 1000 [USP'U]/G; [USP'U]/G
OINTMENT TOPICAL ONCE
OUTPATIENT
Start: 2023-08-09 | End: 2023-08-09

## 2023-08-09 RX ORDER — LIDOCAINE HYDROCHLORIDE 20 MG/ML
JELLY TOPICAL ONCE
OUTPATIENT
Start: 2023-08-09 | End: 2023-08-09

## 2023-08-09 RX ORDER — GENTAMICIN SULFATE 1 MG/G
OINTMENT TOPICAL ONCE
OUTPATIENT
Start: 2023-08-09 | End: 2023-08-09

## 2023-08-09 RX ORDER — LIDOCAINE 50 MG/G
OINTMENT TOPICAL ONCE
OUTPATIENT
Start: 2023-08-09 | End: 2023-08-09

## 2023-08-09 RX ORDER — LIDOCAINE HYDROCHLORIDE 40 MG/ML
SOLUTION TOPICAL ONCE
Status: COMPLETED | OUTPATIENT
Start: 2023-08-09 | End: 2023-08-09

## 2023-08-09 RX ORDER — GINSENG 100 MG
CAPSULE ORAL ONCE
OUTPATIENT
Start: 2023-08-09 | End: 2023-08-09

## 2023-08-09 RX ORDER — LIDOCAINE 40 MG/G
CREAM TOPICAL ONCE
OUTPATIENT
Start: 2023-08-09 | End: 2023-08-09

## 2023-08-09 RX ORDER — LIDOCAINE HYDROCHLORIDE 40 MG/ML
SOLUTION TOPICAL ONCE
OUTPATIENT
Start: 2023-08-09 | End: 2023-08-09

## 2023-08-09 RX ORDER — IBUPROFEN 200 MG
TABLET ORAL ONCE
OUTPATIENT
Start: 2023-08-09 | End: 2023-08-09

## 2023-08-09 RX ORDER — BETAMETHASONE DIPROPIONATE 0.05 %
OINTMENT (GRAM) TOPICAL ONCE
OUTPATIENT
Start: 2023-08-09 | End: 2023-08-09

## 2023-08-09 RX ORDER — CLOBETASOL PROPIONATE 0.5 MG/G
OINTMENT TOPICAL ONCE
OUTPATIENT
Start: 2023-08-09 | End: 2023-08-09

## 2023-08-09 RX ADMIN — LIDOCAINE HYDROCHLORIDE 10 ML: 40 SOLUTION TOPICAL at 07:55

## 2023-08-09 ASSESSMENT — PAIN DESCRIPTION - DESCRIPTORS: DESCRIPTORS: PINS AND NEEDLES

## 2023-08-09 ASSESSMENT — PAIN DESCRIPTION - PAIN TYPE: TYPE: CHRONIC PAIN

## 2023-08-09 ASSESSMENT — PAIN DESCRIPTION - ONSET: ONSET: ON-GOING

## 2023-08-09 ASSESSMENT — PAIN SCALES - GENERAL: PAINLEVEL_OUTOF10: 10

## 2023-08-09 ASSESSMENT — PAIN - FUNCTIONAL ASSESSMENT: PAIN_FUNCTIONAL_ASSESSMENT: PREVENTS OR INTERFERES SOME ACTIVE ACTIVITIES AND ADLS

## 2023-08-09 ASSESSMENT — PAIN DESCRIPTION - FREQUENCY: FREQUENCY: INTERMITTENT

## 2023-08-09 ASSESSMENT — PAIN DESCRIPTION - ORIENTATION: ORIENTATION: LEFT

## 2023-08-09 ASSESSMENT — PAIN DESCRIPTION - LOCATION: LOCATION: LEG

## 2023-08-09 NOTE — PROGRESS NOTES
ADVANCED SKIN THERAPY/APPLICATION OF ACF performed by Ashley Fraga MD at [address] Left 1/12/2023    RADIOFREQUENCY ABLATION LEFT LESSER SAPHENOUS VEIN WITH STAB PHLEBECTOMIES, LEFT LEG WOUND DEBRIDEMENT performed by Ashley Fraga MD at [address] [facility] ENDOSCOPY  12/13/2013    mild gastritis and small hiatal hernia, Dr Nadine Carpio, [address]  2015    GERD, Dr. Sudhakar Lou, office     Family History   Problem Relation Age of Onset    Diabetes Mother     Hypertension Mother     Heart Disease Father     Heart Attack Father     Heart Surgery Father         angioplasty    Stroke Father     High Cholesterol Father     Heart Attack Maternal Grandfather      Social History     Tobacco Use    Smoking status: Never    Smokeless tobacco: Never   Vaping Use    Vaping Use: Never used   Substance Use Topics    Alcohol use: No    Drug use: No     Allergies   Allergen Reactions    Bee Pollen Anaphylaxis    Tetracyclines & Related Hives     Current Outpatient Medications on File Prior to Encounter   Medication Sig Dispense Refill    oxyCODONE-acetaminophen (PERCOCET) 7.5-325 MG per tablet Take 1 tablet by mouth every 8 hours as needed for Pain for up to 14 days. Intended supply: 30 days Max Daily Amount: 3 tablets 42 tablet 0    levoFLOXacin (LEVAQUIN) 750 MG tablet Take 1 tablet by mouth daily for 10 days 10 tablet 0    oxyCODONE-acetaminophen (PERCOCET) 5-325 MG per tablet Take 1 tablet by mouth every 4 hours as needed for Pain.  (Patient not taking: Reported on 8/9/2023)      pantoprazole (PROTONIX) 40 MG tablet Take 1 tablet by mouth every morning (before breakfast) 90 tablet 1    butalbital-acetaminophen-caffeine (FIORICET, ESGIC) -40 MG per tablet Take 1 tablet by mouth 3 times daily as needed for Headaches or Migraine Indications: Cluster Headache 90 tablet 5    acyclovir (ZOVIRAX) 200 MG capsule Take 1

## 2023-08-13 RX ORDER — ACYCLOVIR 200 MG/1
CAPSULE ORAL
Qty: 30 CAPSULE | Refills: 0 | Status: SHIPPED
Start: 2023-08-13 | End: 2023-09-06 | Stop reason: SDUPTHER

## 2023-08-13 NOTE — TELEPHONE ENCOUNTER
08/13/23 :  I will prescribe a 30 day supply of this prescription. Patient needs to schedule an office visit with PCP prior to refills for this or any other medications.

## 2023-08-14 NOTE — DISCHARGE INSTRUCTIONS
Written             Visit Discharge/Physician Orders     Discharge condition: Stable     Assessment of pain at discharge: yes     Anesthetic used: 4% lidocaine solution     Discharge to: Home     Left via:Private automobile     Accompanied by:self     ECF/HHA: n/a     Dressing Orders:LEFT MEDIAL and LATERAL LOWER LEG-Cleanse with normal saline, apply zinc to fiona wound,  drawtex, dressing, ABD pad , unna boot and coban. Change weekly. Treatment Orders: Eat a diet high in protein and vitamin C. Take a multiple vitamin daily unless contraindicated. To see Dr. Mady Krishnamurthy as scheduled      Must wear slip on shoe to work due to wrap on leg! [facility] followup visit : 1 week____________________________  (Please note your next appointment above and if you are unable to keep, kindly give a 24 hour notice. Thank you.)     Physician signature:__________________________     If you experience any of the following, please call the [facility] during business hours:     * Increase in Pain  * Temperature over 101  * Increase in drainage from your wound  * Drainage with a foul odor  * Bleeding  * Increase in swelling  * Need for compression bandage changes due to slippage, breakthrough drainage. If you need medical attention outside of the business hours of the [facility] please contact your PCP or go to the nearest emergency room.

## 2023-08-16 ENCOUNTER — HOSPITAL ENCOUNTER (OUTPATIENT)
Dept: WOUND CARE | Age: 68
Discharge: HOME | End: 2023-08-16
Payer: MEDICARE

## 2023-08-16 VITALS
DIASTOLIC BLOOD PRESSURE: 74 MMHG | HEART RATE: 78 BPM | SYSTOLIC BLOOD PRESSURE: 169 MMHG | RESPIRATION RATE: 18 BRPM | TEMPERATURE: 98.2 F

## 2023-08-16 DIAGNOSIS — I83.023 VARICOSE VEINS OF LEFT LOWER EXTREMITY WITH ULCER OF ANKLE WITH FAT LAYER EXPOSED (HCC): Primary | ICD-10-CM

## 2023-08-16 DIAGNOSIS — L97.322 NON-PRESSURE CHRONIC ULCER OF LEFT ANKLE WITH FAT LAYER EXPOSED (HCC): ICD-10-CM

## 2023-08-16 DIAGNOSIS — L97.322 VARICOSE VEINS OF LEFT LOWER EXTREMITY WITH ULCER OF ANKLE WITH FAT LAYER EXPOSED (HCC): Primary | ICD-10-CM

## 2023-08-16 DIAGNOSIS — I70.242 ATHEROSCLEROSIS OF NATIVE ARTERY OF LEFT LOWER EXTREMITY WITH ULCERATION OF CALF (HCC): ICD-10-CM

## 2023-08-16 PROCEDURE — 11042 DBRDMT SUBQ TIS 1ST 20SQCM/<: CPT

## 2023-08-16 PROCEDURE — 11045 DBRDMT SUBQ TISS EACH ADDL: CPT

## 2023-08-16 RX ORDER — BETAMETHASONE DIPROPIONATE 0.05 %
OINTMENT (GRAM) TOPICAL ONCE
OUTPATIENT
Start: 2023-08-16 | End: 2023-08-16

## 2023-08-16 RX ORDER — LIDOCAINE 40 MG/G
CREAM TOPICAL ONCE
OUTPATIENT
Start: 2023-08-16 | End: 2023-08-16

## 2023-08-16 RX ORDER — GINSENG 100 MG
CAPSULE ORAL ONCE
OUTPATIENT
Start: 2023-08-16 | End: 2023-08-16

## 2023-08-16 RX ORDER — GENTAMICIN SULFATE 1 MG/G
OINTMENT TOPICAL ONCE
OUTPATIENT
Start: 2023-08-16 | End: 2023-08-16

## 2023-08-16 RX ORDER — BACITRACIN ZINC AND POLYMYXIN B SULFATE 500; 1000 [USP'U]/G; [USP'U]/G
OINTMENT TOPICAL ONCE
OUTPATIENT
Start: 2023-08-16 | End: 2023-08-16

## 2023-08-16 RX ORDER — LIDOCAINE HYDROCHLORIDE 40 MG/ML
SOLUTION TOPICAL ONCE
OUTPATIENT
Start: 2023-08-16 | End: 2023-08-16

## 2023-08-16 RX ORDER — LIDOCAINE HYDROCHLORIDE 20 MG/ML
JELLY TOPICAL ONCE
OUTPATIENT
Start: 2023-08-16 | End: 2023-08-16

## 2023-08-16 RX ORDER — IBUPROFEN 200 MG
TABLET ORAL ONCE
OUTPATIENT
Start: 2023-08-16 | End: 2023-08-16

## 2023-08-16 RX ORDER — CLOBETASOL PROPIONATE 0.5 MG/G
OINTMENT TOPICAL ONCE
OUTPATIENT
Start: 2023-08-16 | End: 2023-08-16

## 2023-08-16 RX ORDER — LIDOCAINE HYDROCHLORIDE 40 MG/ML
SOLUTION TOPICAL ONCE
Status: COMPLETED | OUTPATIENT
Start: 2023-08-16 | End: 2023-08-16

## 2023-08-16 RX ORDER — LIDOCAINE 50 MG/G
OINTMENT TOPICAL ONCE
OUTPATIENT
Start: 2023-08-16 | End: 2023-08-16

## 2023-08-16 RX ADMIN — LIDOCAINE HYDROCHLORIDE 10 ML: 40 SOLUTION TOPICAL at 07:58

## 2023-08-16 ASSESSMENT — PAIN DESCRIPTION - LOCATION: LOCATION: LEG

## 2023-08-16 ASSESSMENT — PAIN SCALES - GENERAL: PAINLEVEL_OUTOF10: 10

## 2023-08-16 ASSESSMENT — PAIN DESCRIPTION - ORIENTATION: ORIENTATION: LEFT

## 2023-08-16 ASSESSMENT — PAIN DESCRIPTION - DESCRIPTORS: DESCRIPTORS: ACHING;SQUEEZING

## 2023-08-16 NOTE — PROGRESS NOTES
to work due to wrap on leg! [facility] followup visit : 1 week____________________________  (Please note your next appointment above and if you are unable to keep, kindly give a 24 hour notice. Thank you.)     Physician signature:__________________________     If you experience any of the following, please call the [facility] during business hours:     * Increase in Pain  * Temperature over 101  * Increase in drainage from your wound  * Drainage with a foul odor  * Bleeding  * Increase in swelling  * Need for compression bandage changes due to slippage, breakthrough drainage. If you need medical attention outside of the business hours of the [facility] please contact your PCP or go to the nearest emergency room.              Electronically signed by LISA Clark CNP

## 2023-08-21 NOTE — DISCHARGE INSTRUCTIONS
Written                   Visit Discharge/Physician Orders     Discharge condition: Stable     Assessment of pain at discharge: yes     Anesthetic used: 4% lidocaine solution     Discharge to: Home     Left via:Private automobile     Accompanied by:self     ECF/HHA: n/a     Dressing Orders:LEFT MEDIAL and LATERAL LOWER LEG-Cleanse with normal saline, apply zinc to fiona wound,  drawtex, dressing, ABD pad , unna boot and coban. Change weekly. Treatment Orders: Eat a diet high in protein and vitamin C. Take a multiple vitamin daily unless contraindicated. To see Dr. Mady Krishnamurthy as scheduled      Must wear slip on shoe to work due to wrap on leg! [facility] followup visit : 1 week____________________________  (Please note your next appointment above and if you are unable to keep, kindly give a 24 hour notice. Thank you.)     Physician signature:__________________________     If you experience any of the following, please call the [facility] during business hours:     * Increase in Pain  * Temperature over 101  * Increase in drainage from your wound  * Drainage with a foul odor  * Bleeding  * Increase in swelling  * Need for compression bandage changes due to slippage, breakthrough drainage. If you need medical attention outside of the business hours of the [facility] please contact your PCP or go to the nearest emergency room.

## 2023-08-23 ENCOUNTER — HOSPITAL ENCOUNTER (OUTPATIENT)
Dept: WOUND CARE | Age: 68
Discharge: HOME | End: 2023-08-23
Payer: MEDICARE

## 2023-08-23 VITALS
HEART RATE: 77 BPM | TEMPERATURE: 96.7 F | RESPIRATION RATE: 18 BRPM | SYSTOLIC BLOOD PRESSURE: 147 MMHG | DIASTOLIC BLOOD PRESSURE: 76 MMHG

## 2023-08-23 DIAGNOSIS — L97.322 VARICOSE VEINS OF LEFT LOWER EXTREMITY WITH ULCER OF ANKLE WITH FAT LAYER EXPOSED (HCC): Primary | ICD-10-CM

## 2023-08-23 DIAGNOSIS — I70.243 ATHEROSCLEROSIS OF NATIVE ARTERY OF LEFT LOWER EXTREMITY WITH ULCERATION OF ANKLE (HCC): Chronic | ICD-10-CM

## 2023-08-23 DIAGNOSIS — I83.023 VARICOSE VEINS OF LEFT LOWER EXTREMITY WITH ULCER OF ANKLE WITH FAT LAYER EXPOSED (HCC): Primary | ICD-10-CM

## 2023-08-23 DIAGNOSIS — I70.242 ATHEROSCLEROSIS OF NATIVE ARTERY OF LEFT LOWER EXTREMITY WITH ULCERATION OF CALF (HCC): ICD-10-CM

## 2023-08-23 PROCEDURE — 11042 DBRDMT SUBQ TIS 1ST 20SQCM/<: CPT

## 2023-08-23 PROCEDURE — 11045 DBRDMT SUBQ TISS EACH ADDL: CPT

## 2023-08-23 RX ORDER — LIDOCAINE 50 MG/G
OINTMENT TOPICAL ONCE
OUTPATIENT
Start: 2023-08-23 | End: 2023-08-23

## 2023-08-23 RX ORDER — LIDOCAINE HYDROCHLORIDE 40 MG/ML
SOLUTION TOPICAL ONCE
OUTPATIENT
Start: 2023-08-23 | End: 2023-08-23

## 2023-08-23 RX ORDER — GENTAMICIN SULFATE 1 MG/G
OINTMENT TOPICAL ONCE
OUTPATIENT
Start: 2023-08-23 | End: 2023-08-23

## 2023-08-23 RX ORDER — LIDOCAINE 40 MG/G
CREAM TOPICAL ONCE
OUTPATIENT
Start: 2023-08-23 | End: 2023-08-23

## 2023-08-23 RX ORDER — GINSENG 100 MG
CAPSULE ORAL ONCE
OUTPATIENT
Start: 2023-08-23 | End: 2023-08-23

## 2023-08-23 RX ORDER — BETAMETHASONE DIPROPIONATE 0.05 %
OINTMENT (GRAM) TOPICAL ONCE
OUTPATIENT
Start: 2023-08-23 | End: 2023-08-23

## 2023-08-23 RX ORDER — IBUPROFEN 200 MG
TABLET ORAL ONCE
OUTPATIENT
Start: 2023-08-23 | End: 2023-08-23

## 2023-08-23 RX ORDER — CLOBETASOL PROPIONATE 0.5 MG/G
OINTMENT TOPICAL ONCE
OUTPATIENT
Start: 2023-08-23 | End: 2023-08-23

## 2023-08-23 RX ORDER — BACITRACIN ZINC AND POLYMYXIN B SULFATE 500; 1000 [USP'U]/G; [USP'U]/G
OINTMENT TOPICAL ONCE
OUTPATIENT
Start: 2023-08-23 | End: 2023-08-23

## 2023-08-23 RX ORDER — LIDOCAINE HYDROCHLORIDE 40 MG/ML
SOLUTION TOPICAL ONCE
Status: COMPLETED | OUTPATIENT
Start: 2023-08-23 | End: 2023-08-23

## 2023-08-23 RX ORDER — LIDOCAINE HYDROCHLORIDE 20 MG/ML
JELLY TOPICAL ONCE
OUTPATIENT
Start: 2023-08-23 | End: 2023-08-23

## 2023-08-23 RX ADMIN — LIDOCAINE HYDROCHLORIDE 10 ML: 40 SOLUTION TOPICAL at 08:06

## 2023-08-23 ASSESSMENT — PAIN DESCRIPTION - DESCRIPTORS: DESCRIPTORS: ACHING;STABBING;SQUEEZING

## 2023-08-23 ASSESSMENT — PAIN DESCRIPTION - ORIENTATION: ORIENTATION: LEFT

## 2023-08-23 ASSESSMENT — PAIN - FUNCTIONAL ASSESSMENT: PAIN_FUNCTIONAL_ASSESSMENT: PREVENTS OR INTERFERES SOME ACTIVE ACTIVITIES AND ADLS

## 2023-08-23 ASSESSMENT — PAIN DESCRIPTION - LOCATION: LOCATION: LEG

## 2023-08-24 RX ORDER — OXYCODONE AND ACETAMINOPHEN 7.5; 325 MG/1; MG/1
1 TABLET ORAL EVERY 8 HOURS PRN
Qty: 42 TABLET | Refills: 0 | Status: SHIPPED | OUTPATIENT
Start: 2023-08-24 | End: 2023-09-07

## 2023-08-24 NOTE — PROGRESS NOTES
signature:__________________________     If you experience any of the following, please call the [address] [place] [address] during business hours:     * Increase in Pain  * Temperature over 101  * Increase in drainage from your wound  * Drainage with a foul odor  * Bleeding  * Increase in swelling  * Need for compression bandage changes due to slippage, breakthrough drainage. If you need medical attention outside of the business hours of the [address] [place] [address] please contact your PCP or go to the nearest emergency room.      Electronically signed by Maia King MD

## 2023-08-28 NOTE — PROGRESS NOTES
Patient called in stating that her wrap is has been uncomfortable around her ankle and that she has missed 4 days of work. Patient wanted to come in to have it re-wrapped before Wednesday. This nurse explained that we are unable to do a nurse visits at this time per supervision. This nurse explained that if it is that painful she is able to unwrap or cut off the wrap and do daily dressing changes until she comes in on Wednesday. This nurse also told her to use a spandigrip or compression stocking on in morning and off at night until being seen. 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 This nurse explained that we tell all of our patients here that if the the wrap is causing pain they are allowed to take off and do daily dressing changes. Patient states she understands but doesn't want to take the wrap off.

## 2023-08-28 NOTE — DISCHARGE INSTRUCTIONS
Visit Discharge/Physician Orders     Discharge condition: Stable     Assessment of pain at discharge: yes     Anesthetic used: 4% lidocaine solution     Discharge to: Home     Left via:Private automobile     Accompanied by:self     ECF/HHA: n/a     Dressing Orders:LEFT MEDIAL and LATERAL LOWER LEG-Cleanse with normal saline, apply zinc to fiona wound,  drawtex, dressing, ABD pad , unna boot and coban. Change weekly. Treatment Orders: Eat a diet high in protein and vitamin C. Take a multiple vitamin daily unless contraindicated. To see Dr. Marni Francis as scheduled      Must wear slip on shoe to work due to wrap on leg! Yamilet Edward had to miss work for 6 days last week due to her compression wrap and the pain associated with it.**     [address] [place] [address] followup visit : 1 week____________________________  (Please note your next appointment above and if you are unable to keep, kindly give a 24 hour notice. Thank you.)     Physician signature:__________________________     If you experience any of the following, please call the [address] [place] [address] during business hours:     * Increase in Pain  * Temperature over 101  * Increase in drainage from your wound  * Drainage with a foul odor  * Bleeding  * Increase in swelling  * Need for compression bandage changes due to slippage, breakthrough drainage. If you need medical attention outside of the business hours of the [address] [place] [address] please contact your PCP or go to the nearest emergency room.

## 2023-08-30 ENCOUNTER — HOSPITAL ENCOUNTER (OUTPATIENT)
Dept: WOUND CARE | Age: 68
Discharge: HOME | End: 2023-08-30
Payer: MEDICARE

## 2023-08-30 VITALS
HEIGHT: 68 IN | RESPIRATION RATE: 18 BRPM | DIASTOLIC BLOOD PRESSURE: 73 MMHG | TEMPERATURE: 97.5 F | BODY MASS INDEX: 26.52 KG/M2 | WEIGHT: 175 LBS | SYSTOLIC BLOOD PRESSURE: 169 MMHG | HEART RATE: 78 BPM

## 2023-08-30 DIAGNOSIS — I70.242 ATHEROSCLEROSIS OF NATIVE ARTERY OF LEFT LOWER EXTREMITY WITH ULCERATION OF CALF (HCC): ICD-10-CM

## 2023-08-30 DIAGNOSIS — I83.023 VARICOSE VEINS OF LEFT LOWER EXTREMITY WITH ULCER OF ANKLE WITH FAT LAYER EXPOSED (HCC): Primary | ICD-10-CM

## 2023-08-30 DIAGNOSIS — L97.322 VARICOSE VEINS OF LEFT LOWER EXTREMITY WITH ULCER OF ANKLE WITH FAT LAYER EXPOSED (HCC): Primary | ICD-10-CM

## 2023-08-30 PROCEDURE — 11042 DBRDMT SUBQ TIS 1ST 20SQCM/<: CPT | Performed by: SURGERY

## 2023-08-30 PROCEDURE — 11045 DBRDMT SUBQ TISS EACH ADDL: CPT

## 2023-08-30 PROCEDURE — 11045 DBRDMT SUBQ TISS EACH ADDL: CPT | Performed by: SURGERY

## 2023-08-30 PROCEDURE — 11042 DBRDMT SUBQ TIS 1ST 20SQCM/<: CPT

## 2023-08-30 RX ORDER — CLOBETASOL PROPIONATE 0.5 MG/G
OINTMENT TOPICAL ONCE
OUTPATIENT
Start: 2023-08-30 | End: 2023-08-30

## 2023-08-30 RX ORDER — LIDOCAINE HYDROCHLORIDE 40 MG/ML
SOLUTION TOPICAL ONCE
Status: COMPLETED | OUTPATIENT
Start: 2023-08-30 | End: 2023-08-30

## 2023-08-30 RX ORDER — LIDOCAINE 40 MG/G
CREAM TOPICAL ONCE
OUTPATIENT
Start: 2023-08-30 | End: 2023-08-30

## 2023-08-30 RX ORDER — LIDOCAINE HYDROCHLORIDE 40 MG/ML
SOLUTION TOPICAL ONCE
OUTPATIENT
Start: 2023-08-30 | End: 2023-08-30

## 2023-08-30 RX ORDER — BETAMETHASONE DIPROPIONATE 0.05 %
OINTMENT (GRAM) TOPICAL ONCE
OUTPATIENT
Start: 2023-08-30 | End: 2023-08-30

## 2023-08-30 RX ORDER — LIDOCAINE HYDROCHLORIDE 20 MG/ML
JELLY TOPICAL ONCE
OUTPATIENT
Start: 2023-08-30 | End: 2023-08-30

## 2023-08-30 RX ORDER — LIDOCAINE 50 MG/G
OINTMENT TOPICAL ONCE
OUTPATIENT
Start: 2023-08-30 | End: 2023-08-30

## 2023-08-30 RX ORDER — GENTAMICIN SULFATE 1 MG/G
OINTMENT TOPICAL ONCE
OUTPATIENT
Start: 2023-08-30 | End: 2023-08-30

## 2023-08-30 RX ORDER — BACITRACIN ZINC AND POLYMYXIN B SULFATE 500; 1000 [USP'U]/G; [USP'U]/G
OINTMENT TOPICAL ONCE
OUTPATIENT
Start: 2023-08-30 | End: 2023-08-30

## 2023-08-30 RX ORDER — GINSENG 100 MG
CAPSULE ORAL ONCE
OUTPATIENT
Start: 2023-08-30 | End: 2023-08-30

## 2023-08-30 RX ORDER — IBUPROFEN 200 MG
TABLET ORAL ONCE
OUTPATIENT
Start: 2023-08-30 | End: 2023-08-30

## 2023-08-30 RX ADMIN — LIDOCAINE HYDROCHLORIDE 10 ML: 40 SOLUTION TOPICAL at 08:07

## 2023-08-30 ASSESSMENT — PAIN SCALES - GENERAL: PAINLEVEL_OUTOF10: 10

## 2023-08-30 ASSESSMENT — PAIN DESCRIPTION - DESCRIPTORS: DESCRIPTORS: ACHING;SQUEEZING;STABBING

## 2023-08-30 ASSESSMENT — PAIN DESCRIPTION - ORIENTATION: ORIENTATION: LEFT

## 2023-08-30 ASSESSMENT — PAIN DESCRIPTION - LOCATION: LOCATION: LEG

## 2023-08-30 ASSESSMENT — PAIN DESCRIPTION - ONSET: ONSET: ON-GOING

## 2023-08-30 ASSESSMENT — PAIN DESCRIPTION - PAIN TYPE: TYPE: CHRONIC PAIN

## 2023-08-30 ASSESSMENT — PAIN - FUNCTIONAL ASSESSMENT: PAIN_FUNCTIONAL_ASSESSMENT: PREVENTS OR INTERFERES SOME ACTIVE ACTIVITIES AND ADLS

## 2023-08-30 ASSESSMENT — PAIN DESCRIPTION - FREQUENCY: FREQUENCY: INTERMITTENT

## 2023-08-30 NOTE — PROGRESS NOTES
Wound Healing Center Followup Visit Note    Referring Physician : Enmanuel Renae MD  [address] RECORD NUMBER:  [id number]  AGE: 74 y.o. GENDER: female  : 1955  EPISODE DATE:  2023    Subjective:     Chief Complaint   Patient presents with    Wound Check     LEFT LEG      HISTORY of PRESENT ILLNESS HPI   Key Loki is a 74 y.o. female who presents today in regards to follow up evaluation and treatment of wound/ulcer. That patient's past medical, family and social hx were reviewed and changes were made if present. History of Wound Context:  The patient has had a wound of her left ankle/calf which was first noted approximately 2021. This has been treated local wound care. On their initial visit to the wound healing center, 22,  the patient has noted that the wound has been improving. The patient has not had similar previous wounds in the past.      She started seeing Dr. Bri eMndez in 2021 and than Dr. Bryon Nunez. She was started in [facility] ~ 2021. She has noticed some improvement since starting [facility] boot. She is currently following with Dr. [facility]. Pt is not on abx at time of initial visit, but has been treated with previously by podiatry. She is not a DM. She is not a smoker. She denies hx of DVT, and per her report had recent us noting no evidence of dvt at [facility]. She also had arterial studies done. I had previously seen her in the past in regards to left buttock thigh wound, which started as abscess. Pt works at [employer] in [place] [facility] and is on her feet all day.     22  Reflux study - if significant findings will schedule for fu  Continue compression therapy [facility] boot per podiatry with aquacell dressing  Elevation  She does not have significant arterial occlusive disease  22  Patient has asked to continuing following with me going forward because of our previous relationship  She is going to let Dr. Mcduffie Nurse office know  She

## 2023-09-05 NOTE — DISCHARGE INSTRUCTIONS
Visit Discharge/Physician Orders     Discharge condition: Stable     Assessment of pain at discharge: yes     Anesthetic used: 4% lidocaine solution     Discharge to: Home     Left via:Private automobile     Accompanied by:self     ECF/HHA: n/a     Dressing Orders:LEFT MEDIAL and LATERAL LOWER LEG-Cleanse with normal saline, apply zinc to fiona wound,  drawtex, dressing, ABD pad , unna boot and coban. Change weekly. Treatment Orders: Eat a diet high in protein and vitamin C. Take a multiple vitamin daily unless contraindicated. To see Dr. Carly Hansen as scheduled      Must wear slip on shoe to work due to wrap on leg! Out patient surgical debridement in future-his office will call. [address] [place] [address] followup visit : 2 week D/T surgery____________________________  (Please note your next appointment above and if you are unable to keep, kindly give a 24 hour notice. Thank you.)     Physician signature:__________________________     If you experience any of the following, please call the [facility] during business hours:     * Increase in Pain  * Temperature over 101  * Increase in drainage from your wound  * Drainage with a foul odor  * Bleeding  * Increase in swelling  * Need for compression bandage changes due to slippage, breakthrough drainage. If you need medical attention outside of the business hours of the [facility] please contact your PCP or go to the nearest emergency room.

## 2023-09-06 ENCOUNTER — HOSPITAL ENCOUNTER (OUTPATIENT)
Dept: WOUND CARE | Age: 68
Discharge: HOME | End: 2023-09-06
Payer: MEDICARE

## 2023-09-06 ENCOUNTER — OFFICE VISIT (OUTPATIENT)
Dept: FAMILY MEDICINE CLINIC | Age: 68
End: 2023-09-06
Payer: MEDICARE

## 2023-09-06 ENCOUNTER — PREP FOR PROCEDURE (OUTPATIENT)
Dept: VASCULAR SURGERY | Age: 68
End: 2023-09-06

## 2023-09-06 VITALS
DIASTOLIC BLOOD PRESSURE: 78 MMHG | BODY MASS INDEX: 26.07 KG/M2 | HEIGHT: 68 IN | SYSTOLIC BLOOD PRESSURE: 148 MMHG | OXYGEN SATURATION: 96 % | TEMPERATURE: 97.9 F | WEIGHT: 172 LBS | HEART RATE: 78 BPM

## 2023-09-06 VITALS
DIASTOLIC BLOOD PRESSURE: 79 MMHG | SYSTOLIC BLOOD PRESSURE: 171 MMHG | RESPIRATION RATE: 18 BRPM | HEART RATE: 72 BPM | TEMPERATURE: 96.7 F

## 2023-09-06 DIAGNOSIS — G44.029 CHRONIC CLUSTER HEADACHE, NOT INTRACTABLE: ICD-10-CM

## 2023-09-06 DIAGNOSIS — T63.441A ALLERGIC REACTION TO BEE STING: ICD-10-CM

## 2023-09-06 DIAGNOSIS — I70.242 ATHEROSCLEROSIS OF NATIVE ARTERY OF LEFT LOWER EXTREMITY WITH ULCERATION OF CALF (HCC): ICD-10-CM

## 2023-09-06 DIAGNOSIS — F41.9 INSOMNIA SECONDARY TO ANXIETY: ICD-10-CM

## 2023-09-06 DIAGNOSIS — G43.909 MIGRAINE WITHOUT STATUS MIGRAINOSUS, NOT INTRACTABLE, UNSPECIFIED MIGRAINE TYPE: ICD-10-CM

## 2023-09-06 DIAGNOSIS — I83.023 VARICOSE VEINS OF LEFT LOWER EXTREMITY WITH ULCER OF ANKLE WITH FAT LAYER EXPOSED (HCC): Primary | ICD-10-CM

## 2023-09-06 DIAGNOSIS — K21.00 GASTROESOPHAGEAL REFLUX DISEASE WITH ESOPHAGITIS WITHOUT HEMORRHAGE: Chronic | ICD-10-CM

## 2023-09-06 DIAGNOSIS — I70.243 ATHEROSCLEROSIS OF NATIVE ARTERY OF LEFT LOWER EXTREMITY WITH ULCERATION OF ANKLE (HCC): Chronic | ICD-10-CM

## 2023-09-06 DIAGNOSIS — Z76.0 ENCOUNTER FOR MEDICATION REFILL: ICD-10-CM

## 2023-09-06 DIAGNOSIS — B00.9 HERPES: ICD-10-CM

## 2023-09-06 DIAGNOSIS — Z00.00 WELCOME TO MEDICARE PREVENTIVE VISIT: Primary | ICD-10-CM

## 2023-09-06 DIAGNOSIS — L97.322 VARICOSE VEINS OF LEFT LOWER EXTREMITY WITH ULCER OF ANKLE WITH FAT LAYER EXPOSED (HCC): Primary | ICD-10-CM

## 2023-09-06 DIAGNOSIS — F51.05 INSOMNIA SECONDARY TO ANXIETY: ICD-10-CM

## 2023-09-06 PROCEDURE — 11042 DBRDMT SUBQ TIS 1ST 20SQCM/<: CPT

## 2023-09-06 PROCEDURE — 1124F ACP DISCUSS-NO DSCNMKR DOCD: CPT | Performed by: FAMILY MEDICINE

## 2023-09-06 PROCEDURE — G0402 INITIAL PREVENTIVE EXAM: HCPCS | Performed by: FAMILY MEDICINE

## 2023-09-06 PROCEDURE — 3017F COLORECTAL CA SCREEN DOC REV: CPT | Performed by: FAMILY MEDICINE

## 2023-09-06 PROCEDURE — 11045 DBRDMT SUBQ TISS EACH ADDL: CPT

## 2023-09-06 RX ORDER — HYDROXYZINE HYDROCHLORIDE 25 MG/1
TABLET, FILM COATED ORAL
Qty: 180 TABLET | Refills: 1 | Status: SHIPPED | OUTPATIENT
Start: 2023-09-06 | End: 2024-03-07

## 2023-09-06 RX ORDER — BUTALBITAL, ACETAMINOPHEN AND CAFFEINE 50; 325; 40 MG/1; MG/1; MG/1
1 TABLET ORAL 3 TIMES DAILY PRN
Qty: 90 TABLET | Refills: 5 | Status: SHIPPED | OUTPATIENT
Start: 2023-09-06 | End: 2024-03-07

## 2023-09-06 RX ORDER — IBUPROFEN 200 MG
TABLET ORAL ONCE
OUTPATIENT
Start: 2023-09-06 | End: 2023-09-06

## 2023-09-06 RX ORDER — LIDOCAINE HYDROCHLORIDE 40 MG/ML
SOLUTION TOPICAL ONCE
OUTPATIENT
Start: 2023-09-06 | End: 2023-09-06

## 2023-09-06 RX ORDER — EPINEPHRINE 0.3 MG/.3ML
INJECTION SUBCUTANEOUS
Qty: 1 EACH | Refills: 5 | Status: SHIPPED | OUTPATIENT
Start: 2023-09-06 | End: 2024-09-11

## 2023-09-06 RX ORDER — LIDOCAINE 40 MG/G
CREAM TOPICAL ONCE
OUTPATIENT
Start: 2023-09-06 | End: 2023-09-06

## 2023-09-06 RX ORDER — BETAMETHASONE DIPROPIONATE 0.05 %
OINTMENT (GRAM) TOPICAL ONCE
OUTPATIENT
Start: 2023-09-06 | End: 2023-09-06

## 2023-09-06 RX ORDER — LIDOCAINE HYDROCHLORIDE 20 MG/ML
JELLY TOPICAL ONCE
OUTPATIENT
Start: 2023-09-06 | End: 2023-09-06

## 2023-09-06 RX ORDER — GINSENG 100 MG
CAPSULE ORAL ONCE
OUTPATIENT
Start: 2023-09-06 | End: 2023-09-06

## 2023-09-06 RX ORDER — LIDOCAINE 50 MG/G
OINTMENT TOPICAL ONCE
OUTPATIENT
Start: 2023-09-06 | End: 2023-09-06

## 2023-09-06 RX ORDER — CLOBETASOL PROPIONATE 0.5 MG/G
OINTMENT TOPICAL ONCE
OUTPATIENT
Start: 2023-09-06 | End: 2023-09-06

## 2023-09-06 RX ORDER — LIDOCAINE HYDROCHLORIDE 40 MG/ML
SOLUTION TOPICAL ONCE
Status: COMPLETED | OUTPATIENT
Start: 2023-09-06 | End: 2023-09-06

## 2023-09-06 RX ORDER — BACITRACIN ZINC AND POLYMYXIN B SULFATE 500; 1000 [USP'U]/G; [USP'U]/G
OINTMENT TOPICAL ONCE
OUTPATIENT
Start: 2023-09-06 | End: 2023-09-06

## 2023-09-06 RX ORDER — GENTAMICIN SULFATE 1 MG/G
OINTMENT TOPICAL ONCE
OUTPATIENT
Start: 2023-09-06 | End: 2023-09-06

## 2023-09-06 RX ORDER — ACYCLOVIR 200 MG/1
200 CAPSULE ORAL DAILY
Qty: 90 CAPSULE | Refills: 1 | Status: SHIPPED | OUTPATIENT
Start: 2023-09-06 | End: 2024-03-01

## 2023-09-06 RX ORDER — PANTOPRAZOLE SODIUM 40 MG/1
40 TABLET, DELAYED RELEASE ORAL
Qty: 90 TABLET | Refills: 1 | Status: SHIPPED | OUTPATIENT
Start: 2023-09-06 | End: 2024-09-05

## 2023-09-06 RX ADMIN — LIDOCAINE HYDROCHLORIDE: 40 SOLUTION TOPICAL at 07:43

## 2023-09-06 ASSESSMENT — PATIENT HEALTH QUESTIONNAIRE - PHQ9
2. FEELING DOWN, DEPRESSED OR HOPELESS: 0
SUM OF ALL RESPONSES TO PHQ QUESTIONS 1-9: 0
SUM OF ALL RESPONSES TO PHQ QUESTIONS 1-9: 0
SUM OF ALL RESPONSES TO PHQ9 QUESTIONS 1 & 2: 0
1. LITTLE INTEREST OR PLEASURE IN DOING THINGS: 0
SUM OF ALL RESPONSES TO PHQ QUESTIONS 1-9: 0
SUM OF ALL RESPONSES TO PHQ QUESTIONS 1-9: 0

## 2023-09-06 ASSESSMENT — PAIN DESCRIPTION - LOCATION: LOCATION: LEG

## 2023-09-06 ASSESSMENT — PAIN DESCRIPTION - ORIENTATION: ORIENTATION: LEFT

## 2023-09-06 ASSESSMENT — LIFESTYLE VARIABLES
HOW MANY STANDARD DRINKS CONTAINING ALCOHOL DO YOU HAVE ON A TYPICAL DAY: 1 OR 2
HOW OFTEN DO YOU HAVE A DRINK CONTAINING ALCOHOL: MONTHLY OR LESS

## 2023-09-06 ASSESSMENT — PAIN SCALES - GENERAL: PAINLEVEL_OUTOF10: 10

## 2023-09-06 NOTE — H&P (VIEW-ONLY)
Wound Healing Center Followup Visit Note    Referring Physician : Hira Andrea MD  [address] RECORD NUMBER:  [id number]  AGE: 74 y.o. GENDER: female  : 1955  EPISODE DATE:  2023    Subjective:     Chief Complaint   Patient presents with    Wound Check     Left leg      HISTORY of PRESENT ILLNESS HPI   Kali Rosas is a 74 y.o. female who presents today in regards to follow up evaluation and treatment of wound/ulcer. That patient's past medical, family and social hx were reviewed and changes were made if present. History of Wound Context:  The patient has had a wound of her left ankle/calf which was first noted approximately 2021. This has been treated local wound care. On their initial visit to the wound healing center, 22,  the patient has noted that the wound has been improving. The patient has not had similar previous wounds in the past.      She started seeing Dr. Hugh Griggs in 2021 and than Dr. Julio Cesar Hernandez. She was started in [facility] ~ 2021. She has noticed some improvement since starting [facility] boot. She is currently following with Dr. Morris Silva. Pt is not on abx at time of initial visit, but has been treated with previously by podiatry. She is not a DM. She is not a smoker. She denies hx of DVT, and per her report had recent us noting no evidence of dvt at [facility]. She also had arterial studies done. I had previously seen her in the past in regards to left buttock thigh wound, which started as abscess. Pt works at [employer] in [place] [facility] and is on her feet all day.     22  Reflux study - if significant findings will schedule for fu  Continue compression therapy [facility] per podiatry with aquacell dressing  Elevation  She does not have significant arterial occlusive disease  22  Patient has asked to continuing following with me going forward because of our previous relationship  She is going to let Dr. Jossy Torres office know  She

## 2023-09-06 NOTE — PROGRESS NOTES
tablet; Take 1 tablet by mouth 3 times daily as needed for Headaches or Migraine Indications: Cluster Headache, Disp-90 tablet, R-59/6/23: DISREGARD PREVIOUS PRESCRIPTIONS AND REFILLS; HONOR THIS PRESCRIPTION AND REFILLSNormal    Migraine without status migrainosus, not intractable, unspecified migraine type: Stable with hydroxyzine; medication refills  -     butalbital-acetaminophen-caffeine (FIORICET, ESGIC) -40 MG per tablet; Take 1 tablet by mouth 3 times daily as needed for Headaches or Migraine Indications: Cluster Headache, Disp-90 tablet, R-59/6/23: DISREGARD PREVIOUS PRESCRIPTIONS AND REFILLS; HONOR THIS PRESCRIPTION AND REFILLSNormal    Gastroesophageal reflux disease with esophagitis without hemorrhage: Stable with hydroxyzine; medication refills  -     pantoprazole (PROTONIX) 40 MG tablet; Take 1 tablet by mouth every morning (before breakfast), Disp-90 tablet, R-19/6/23:  DISREGARD PREVIOUS PRESCRIPTIONS AND REFILLS; HONOR THIS PRESCRIPTION AND REFILLSNormal    Herpes: Stable with hydroxyzine; medication refills  -     acyclovir (ZOVIRAX) 200 MG capsule; Take 1 capsule by mouth daily, Disp-90 capsule, R-108/13/23 :  I will prescribe a 30 day supply of this prescription. Patient needs to schedule an office visit with PCP prior to refills for this or any other medications. Normal    Allergic reaction to bee sting: Medication refills  -     EPINEPHrine (EPIPEN) 0.3 MG/0.3ML SOAJ injection; Use as directed for allergic reaction, Disp-1 each, R-59/6/23: DISREGARD PREVIOUS PRESCRIPTIONS AND REFILLS; HONOR THIS PRESCRIPTION AND REFILLSNormal    Recommendations for Preventive Services Due: see orders and patient instructions/AVS.  Recommended screening schedule for the next 5-10 years is provided to the patient in written form: see Patient Instructions/AVS.     Follow Up:  Return 1) F/U 6 months for check up, for 2) Medicare Annual Wellness Visit (Initial) in 1 year.        Subjective     The following

## 2023-09-06 NOTE — PROGRESS NOTES
saline 08/30/23 0859   Dressing/Treatment ABD;Coban/self-adherent bandages; Other (comment) 08/30/23 0859   Offloading for Diabetic Foot Ulcers Offloading not required 08/16/23 0831   Wound Length (cm) 8 cm 09/06/23 0739   Wound Width (cm) 5.3 cm 09/06/23 0739   Wound Depth (cm) 0.1 cm 09/06/23 0739   Wound Surface Area (cm^2) 42.4 cm^2 09/06/23 0739   Change in Wound Size % (l*w) -56.69 09/06/23 0739   Wound Volume (cm^3) 4.24 cm^3 09/06/23 0739   Wound Healing % -57 09/06/23 0739   Post-Procedure Length (cm) 8.1 cm 09/06/23 0824   Post-Procedure Width (cm) 5.6 cm 09/06/23 0824   Post-Procedure Depth (cm) 0.1 cm 09/06/23 0824   Post-Procedure Surface Area (cm^2) 45.36 cm^2 09/06/23 0824   Post-Procedure Volume (cm^3) 4.536 cm^3 09/06/23 0824   Wound Assessment Fibrin;Granulation tissue 09/06/23 0739   Drainage Amount Moderate (25-50%) 09/06/23 0739   Drainage Description Serosanguinous;Brown 09/06/23 0739   Odor None 09/06/23 0739   Melany-wound Assessment Maceration;Blanchable erythema 09/06/23 0739   Margins Attached edges 08/23/23 0758   Wound Thickness Description not for Pressure Injury Full thickness 08/23/23 0758   Number of days: 580       Wound 03/16/22 Ankle Posterior; Left #2 (Active)   Wound Image   08/30/23 0754   Wound Etiology Venous 08/16/23 0831   Dressing Status New dressing applied;Clean;Dry; Intact 09/06/23 0833   Wound Cleansed Cleansed with saline 09/06/23 0833   Dressing/Treatment ABD;Coban/self-adherent bandages; Other (comment); Zinc paste 09/06/23 0833   Offloading for Diabetic Foot Ulcers Offloading not required 08/16/23 0831   Wound Length (cm) 4.4 cm 09/06/23 0739   Wound Width (cm) 4 cm 09/06/23 0739   Wound Depth (cm) 0.2 cm 09/06/23 0739   Wound Surface Area (cm^2) 17.6 cm^2 09/06/23 0739   Change in Wound Size % (l*w) -604 09/06/23 0739   Wound Volume (cm^3) 3.52 cm^3 09/06/23 0739   Wound Healing % -1308 09/06/23 0739   Post-Procedure Length (cm) 4.5 cm 09/06/23 0824   Post-Procedure

## 2023-09-07 NOTE — PROGRESS NOTES
[address]   PRE-ADMISSION TESTING GENERAL INSTRUCTIONS  [facility] Phone Number: [phone]      GENERAL INSTRUCTIONS:    [x] Antibacterial Soap Shower Night before and/or AM of surgery. [] CHG Wipes instruction sheet and wipes given. [] Hibiclens shower the night before and the morning of surgery.   -Do not use Hibiclens on your face or head. [x] Do not wear makeup, lotions, powders, deodorant. [x] Nothing by mouth after midnight; including gum, candy, mints, or water. [x] You may brush your teeth, gargle, but do NOT swallow water. [x] No tobacco products, illegal drugs, or alcohol within 24 hours of your surgery. [x] Jewelry or valuables should not be brought to the hospital. All body and/or tongue piercing's must be removed prior to arriving to hospital. No contact lens or hair pins. [x] Arrange transportation with a responsible adult  to and from the hospital. Arrange for someone to be with you for the remainder of the day and for 24 hours after your procedure due to having had anesthesia. -Who will be your  for transportation?___BOYFRIEND_______________         -Who will be staying with you for 24 hrs after your procedure?___BOYFRIEND_______________  [x] Bring insurance card and photo ID.  [] Bring copy of living will or healthcare power of  papers to be placed in your electronic record. [] Urine Pregnancy test will be preformed the day of surgery. A specimen sample may be brought from home. [] Transfusion Trav Jd) Bracelet: Please bring with you to hospital, day of surgery. PARKING INSTRUCTIONS:     [x] ARRIVAL DATE & TIME: 9/12 AT 0800  [x] Times are subject to change. We will contact you the business day before surgery if that were to occur. [x] Enter into the [employer]. Two people may accompany you. Masks are not required. [x] Parking Lot \"I\" is where you will park.  It is located on the corner of [address] [place] [address] and [address] [employer]. The entrance is on [employer]. Only one vehicle - per patient, is permitted in parking lot. Upon entering the parking lot, a voucher ticket will print. EDUCATION INSTRUCTIONS:        [] Knee or Hip replacement booklet & exercise pamphlets given. [] [address] placed in chart. [] Pre-admission Testing educational folder given  [] Incentive Spirometry,coughing & deep breathing exercises reviewed. [] Fluoroscopy-Xray used in surgery reviewed with patient. Educational pamphlet placed in chart. [] Pain: Post-op pain is normal and to be expected. You will be asked to rate your pain from 0-10. [] Joint camp offered & Joint replacement booklets given. [] Follow instruction sheet for Ensure/ Protein drinks - provided to you during PAT apt. MEDICATION INSTRUCTIONS:    [x] Bring a complete list of your medications, please write the last time you took the medicine, give this list to the nurse in Pre-Op. [x] Take only the following medications the morning of surgery with 1-2 ounces of water: HYDROXYZINE, PROTONIX,   PERCOCET IF NEEDED  [x] Stop all herbal supplements and vitamins 5 days before surgery. Stop NSAIDS 7 days before surgery. [] DO NOT take any diabetic medicine the morning of surgery. Follow instructions for insulin the day before surgery. [] If you are diabetic and your blood sugar is low or you feel symptomatic, you may drink 1-2 ounces of apple juice or take a glucose tablet.            -The morning of your procedure, you may call the pre-op area if you have concerns about your blood sugar [phone]. [] Use your inhalers the morning of surgery. Bring your emergency inhaler with you day of surgery. [x] Follow physician instructions regarding any blood thinners you may be taking.     WHAT TO EXPECT:    [x] The day of surgery you will be greeted and checked in by the [employer].  In addition, you will be registered

## 2023-09-12 ENCOUNTER — ANESTHESIA (OUTPATIENT)
Dept: OPERATING ROOM | Age: 68
End: 2023-09-12
Payer: MEDICARE

## 2023-09-12 ENCOUNTER — HOSPITAL ENCOUNTER (OUTPATIENT)
Age: 68
Setting detail: OUTPATIENT SURGERY
Discharge: HOME | End: 2023-09-12
Attending: SURGERY | Admitting: SURGERY
Payer: MEDICARE

## 2023-09-12 ENCOUNTER — ANESTHESIA EVENT (OUTPATIENT)
Dept: OPERATING ROOM | Age: 68
End: 2023-09-12
Payer: MEDICARE

## 2023-09-12 VITALS
HEIGHT: 68 IN | TEMPERATURE: 97 F | HEART RATE: 71 BPM | WEIGHT: 170 LBS | OXYGEN SATURATION: 96 % | SYSTOLIC BLOOD PRESSURE: 168 MMHG | RESPIRATION RATE: 16 BRPM | DIASTOLIC BLOOD PRESSURE: 87 MMHG | BODY MASS INDEX: 25.76 KG/M2

## 2023-09-12 DIAGNOSIS — L97.322 NON-PRESSURE CHRONIC ULCER OF LEFT ANKLE WITH FAT LAYER EXPOSED (HCC): Primary | ICD-10-CM

## 2023-09-12 LAB
ERYTHROCYTE [DISTWIDTH] IN BLOOD BY AUTOMATED COUNT: 17.2 % (ref 11.5–15)
HCT VFR BLD AUTO: 29 % (ref 34–48)
HGB BLD-MCNC: 8.4 G/DL (ref 11.5–15.5)
INR PPP: 1
MCH RBC QN AUTO: 21.9 PG (ref 26–35)
MCHC RBC AUTO-ENTMCNC: 29 G/DL (ref 32–34.5)
MCV RBC AUTO: 75.7 FL (ref 80–99.9)
PLATELET # BLD AUTO: 383 K/UL (ref 130–450)
PMV BLD AUTO: 10.5 FL (ref 7–12)
PROTHROMBIN TIME: 11.1 SEC (ref 9.3–12.4)
RBC # BLD AUTO: 3.83 M/UL (ref 3.5–5.5)
WBC OTHER # BLD: 10.4 K/UL (ref 4.5–11.5)

## 2023-09-12 PROCEDURE — 2580000003 HC RX 258: Performed by: PHYSICIAN ASSISTANT

## 2023-09-12 PROCEDURE — 2500000003 HC RX 250 WO HCPCS

## 2023-09-12 PROCEDURE — 2500000003 HC RX 250 WO HCPCS: Performed by: ANESTHESIOLOGY

## 2023-09-12 PROCEDURE — 6360000002 HC RX W HCPCS: Performed by: ANESTHESIOLOGY

## 2023-09-12 PROCEDURE — 85027 COMPLETE CBC AUTOMATED: CPT

## 2023-09-12 PROCEDURE — 3700000000 HC ANESTHESIA ATTENDED CARE: Performed by: SURGERY

## 2023-09-12 PROCEDURE — 3600000002 HC SURGERY LEVEL 2 BASE: Performed by: SURGERY

## 2023-09-12 PROCEDURE — 7100000010 HC PHASE II RECOVERY - FIRST 15 MIN: Performed by: SURGERY

## 2023-09-12 PROCEDURE — 2709999900 HC NON-CHARGEABLE SUPPLY: Performed by: SURGERY

## 2023-09-12 PROCEDURE — 3700000001 HC ADD 15 MINUTES (ANESTHESIA): Performed by: SURGERY

## 2023-09-12 PROCEDURE — 6370000000 HC RX 637 (ALT 250 FOR IP): Performed by: SURGERY

## 2023-09-12 PROCEDURE — 2580000003 HC RX 258: Performed by: ANESTHESIOLOGY

## 2023-09-12 PROCEDURE — 7100000011 HC PHASE II RECOVERY - ADDTL 15 MIN: Performed by: SURGERY

## 2023-09-12 PROCEDURE — 85610 PROTHROMBIN TIME: CPT

## 2023-09-12 PROCEDURE — 3600000012 HC SURGERY LEVEL 2 ADDTL 15MIN: Performed by: SURGERY

## 2023-09-12 RX ORDER — FENTANYL CITRATE 50 UG/ML
INJECTION, SOLUTION INTRAMUSCULAR; INTRAVENOUS PRN
Status: DISCONTINUED | OUTPATIENT
Start: 2023-09-12 | End: 2023-09-12 | Stop reason: SDUPTHER

## 2023-09-12 RX ORDER — PROPOFOL 10 MG/ML
INJECTION, EMULSION INTRAVENOUS CONTINUOUS PRN
Status: DISCONTINUED | OUTPATIENT
Start: 2023-09-12 | End: 2023-09-12 | Stop reason: SDUPTHER

## 2023-09-12 RX ORDER — SODIUM CHLORIDE 9 MG/ML
INJECTION, SOLUTION INTRAVENOUS CONTINUOUS
Status: DISCONTINUED | OUTPATIENT
Start: 2023-09-12 | End: 2023-09-12 | Stop reason: HOSPADM

## 2023-09-12 RX ORDER — DEXAMETHASONE SODIUM PHOSPHATE 10 MG/ML
INJECTION INTRAMUSCULAR; INTRAVENOUS PRN
Status: DISCONTINUED | OUTPATIENT
Start: 2023-09-12 | End: 2023-09-12 | Stop reason: SDUPTHER

## 2023-09-12 RX ORDER — LIDOCAINE HYDROCHLORIDE 20 MG/ML
JELLY TOPICAL PRN
Status: DISCONTINUED | OUTPATIENT
Start: 2023-09-12 | End: 2023-09-12 | Stop reason: ALTCHOICE

## 2023-09-12 RX ORDER — MIDAZOLAM HYDROCHLORIDE 1 MG/ML
INJECTION INTRAMUSCULAR; INTRAVENOUS PRN
Status: DISCONTINUED | OUTPATIENT
Start: 2023-09-12 | End: 2023-09-12 | Stop reason: SDUPTHER

## 2023-09-12 RX ORDER — OSTOMY ADHESIVE
STRIP MISCELLANEOUS PRN
Status: DISCONTINUED | OUTPATIENT
Start: 2023-09-12 | End: 2023-09-12 | Stop reason: ALTCHOICE

## 2023-09-12 RX ORDER — ONDANSETRON 2 MG/ML
INJECTION INTRAMUSCULAR; INTRAVENOUS PRN
Status: DISCONTINUED | OUTPATIENT
Start: 2023-09-12 | End: 2023-09-12 | Stop reason: SDUPTHER

## 2023-09-12 RX ORDER — HYDROMORPHONE HYDROCHLORIDE 1 MG/ML
0.5 INJECTION, SOLUTION INTRAMUSCULAR; INTRAVENOUS; SUBCUTANEOUS EVERY 5 MIN PRN
Status: DISCONTINUED | OUTPATIENT
Start: 2023-09-12 | End: 2023-09-12 | Stop reason: HOSPADM

## 2023-09-12 RX ORDER — SODIUM CHLORIDE 9 MG/ML
INJECTION, SOLUTION INTRAVENOUS CONTINUOUS PRN
Status: DISCONTINUED | OUTPATIENT
Start: 2023-09-12 | End: 2023-09-12 | Stop reason: SDUPTHER

## 2023-09-12 RX ORDER — SODIUM CHLORIDE 0.9 % (FLUSH) 0.9 %
5-40 SYRINGE (ML) INJECTION PRN
Status: DISCONTINUED | OUTPATIENT
Start: 2023-09-12 | End: 2023-09-12 | Stop reason: HOSPADM

## 2023-09-12 RX ORDER — SODIUM CHLORIDE 0.9 % (FLUSH) 0.9 %
5-40 SYRINGE (ML) INJECTION EVERY 12 HOURS SCHEDULED
Status: DISCONTINUED | OUTPATIENT
Start: 2023-09-12 | End: 2023-09-12 | Stop reason: HOSPADM

## 2023-09-12 RX ORDER — MEPERIDINE HYDROCHLORIDE 25 MG/ML
12.5 INJECTION INTRAMUSCULAR; INTRAVENOUS; SUBCUTANEOUS EVERY 5 MIN PRN
Status: DISCONTINUED | OUTPATIENT
Start: 2023-09-12 | End: 2023-09-12 | Stop reason: HOSPADM

## 2023-09-12 RX ORDER — OXYCODONE HYDROCHLORIDE AND ACETAMINOPHEN 5; 325 MG/1; MG/1
1 TABLET ORAL EVERY 6 HOURS PRN
Status: ON HOLD | COMMUNITY
End: 2023-09-12 | Stop reason: HOSPADM

## 2023-09-12 RX ORDER — SODIUM CHLORIDE 9 MG/ML
INJECTION, SOLUTION INTRAVENOUS PRN
Status: DISCONTINUED | OUTPATIENT
Start: 2023-09-12 | End: 2023-09-12 | Stop reason: HOSPADM

## 2023-09-12 RX ORDER — LABETALOL HYDROCHLORIDE 5 MG/ML
10 INJECTION, SOLUTION INTRAVENOUS ONCE
Status: COMPLETED | OUTPATIENT
Start: 2023-09-12 | End: 2023-09-12

## 2023-09-12 RX ORDER — CEFAZOLIN SODIUM 1 G/3ML
INJECTION, POWDER, FOR SOLUTION INTRAMUSCULAR; INTRAVENOUS PRN
Status: DISCONTINUED | OUTPATIENT
Start: 2023-09-12 | End: 2023-09-12 | Stop reason: SDUPTHER

## 2023-09-12 RX ORDER — KETAMINE HCL IN NACL, ISO-OSM 100MG/10ML
SYRINGE (ML) INJECTION PRN
Status: DISCONTINUED | OUTPATIENT
Start: 2023-09-12 | End: 2023-09-12 | Stop reason: SDUPTHER

## 2023-09-12 RX ORDER — OXYCODONE HYDROCHLORIDE AND ACETAMINOPHEN 5; 325 MG/1; MG/1
1 TABLET ORAL EVERY 6 HOURS PRN
Qty: 42 TABLET | Refills: 0 | Status: SHIPPED | OUTPATIENT
Start: 2023-09-12 | End: 2023-09-26

## 2023-09-12 RX ADMIN — DEXAMETHASONE SODIUM PHOSPHATE 10 MG: 10 INJECTION INTRAMUSCULAR; INTRAVENOUS at 13:04

## 2023-09-12 RX ADMIN — Medication 20 MG: at 13:19

## 2023-09-12 RX ADMIN — SODIUM CHLORIDE: 9 INJECTION, SOLUTION INTRAVENOUS at 12:55

## 2023-09-12 RX ADMIN — HYDROMORPHONE HYDROCHLORIDE 0.5 MG: 1 INJECTION, SOLUTION INTRAMUSCULAR; INTRAVENOUS; SUBCUTANEOUS at 14:04

## 2023-09-12 RX ADMIN — CEFAZOLIN 2 G: 1 INJECTION, POWDER, FOR SOLUTION INTRAMUSCULAR; INTRAVENOUS at 13:17

## 2023-09-12 RX ADMIN — HYDROMORPHONE HYDROCHLORIDE 0.5 MG: 1 INJECTION, SOLUTION INTRAMUSCULAR; INTRAVENOUS; SUBCUTANEOUS at 14:26

## 2023-09-12 RX ADMIN — PROPOFOL 50 MCG/KG/MIN: 10 INJECTION, EMULSION INTRAVENOUS at 13:04

## 2023-09-12 RX ADMIN — ONDANSETRON HYDROCHLORIDE 4 MG: 2 SOLUTION INTRAMUSCULAR; INTRAVENOUS at 13:04

## 2023-09-12 RX ADMIN — FENTANYL CITRATE 50 MCG: 50 INJECTION, SOLUTION INTRAMUSCULAR; INTRAVENOUS at 13:11

## 2023-09-12 RX ADMIN — PROPOFOL 60 MG: 10 INJECTION, EMULSION INTRAVENOUS at 13:12

## 2023-09-12 RX ADMIN — MIDAZOLAM 2 MG: 1 INJECTION INTRAMUSCULAR; INTRAVENOUS at 12:54

## 2023-09-12 RX ADMIN — LABETALOL HYDROCHLORIDE 10 MG: 5 INJECTION INTRAVENOUS at 14:44

## 2023-09-12 RX ADMIN — Medication 30 MG: at 13:04

## 2023-09-12 RX ADMIN — FENTANYL CITRATE 50 MCG: 50 INJECTION, SOLUTION INTRAMUSCULAR; INTRAVENOUS at 13:18

## 2023-09-12 RX ADMIN — SODIUM CHLORIDE: 9 INJECTION, SOLUTION INTRAVENOUS at 08:55

## 2023-09-12 ASSESSMENT — LIFESTYLE VARIABLES: SMOKING_STATUS: 0

## 2023-09-12 ASSESSMENT — PAIN DESCRIPTION - LOCATION
LOCATION: ANKLE

## 2023-09-12 ASSESSMENT — PAIN DESCRIPTION - DESCRIPTORS
DESCRIPTORS: ACHING;BURNING
DESCRIPTORS: BURNING;DISCOMFORT
DESCRIPTORS: ACHING;DISCOMFORT
DESCRIPTORS: SHOOTING;STABBING
DESCRIPTORS: ACHING;BURNING

## 2023-09-12 ASSESSMENT — PAIN DESCRIPTION - ORIENTATION
ORIENTATION: LEFT

## 2023-09-12 ASSESSMENT — PAIN SCALES - GENERAL
PAINLEVEL_OUTOF10: 4
PAINLEVEL_OUTOF10: 7
PAINLEVEL_OUTOF10: 7
PAINLEVEL_OUTOF10: 4

## 2023-09-12 ASSESSMENT — PAIN - FUNCTIONAL ASSESSMENT: PAIN_FUNCTIONAL_ASSESSMENT: 0-10

## 2023-09-12 NOTE — ANESTHESIA PRE PROCEDURE
Pulmonary:Negative Pulmonary ROS and normal exam  breath sounds clear to auscultation      (-) not a current smoker                           Cardiovascular:Negative CV ROS  Exercise tolerance: good (>4 METS),         ECG reviewed  Rhythm: regular  Rate: normal           Beta Blocker:  Not on Beta Blocker         Neuro/Psych:   (+) headaches: cluster headaches, migraine headaches, psychiatric history:depression/anxiety              ROS comment: Insomnia  GI/Hepatic/Renal:   (+) GERD: well controlled,          ROS comment: S/p cholecystectomy . Endo/Other:    (+) blood dyscrasia (h/h 8.4/29): anemia:., .                  ROS comment: Herpes dematitis Abdominal:             Vascular:   + PVD, aortic or cerebral, DVT (LLE- popliteal ), .        ROS comment: Non-pressure chronic ulcer of left ankle with fat layer exposed - for debridement today    H/o saphenous vein ablation. Other Findings:      EKG 1/19/15:  Sinus  Rhythm   Voltage criteria for LVH  (S(V1)+R(V5) exceeds 4.00 mV)  -Voltage criteria w/o ST/T abnormality may be normal.       CXR 11/29/22: IMPRESSION:  No acute process           Anesthesia Plan      MAC     ASA 3     (IV Zofran & Decadron for h/o PONV)  Induction: intravenous. MIPS: Prophylactic antiemetics administered. Anesthetic plan and risks discussed with patient. Use of blood products discussed with patient whom consented to blood products. Plan discussed with attending.                   Darron Matias RN   9/12/2023

## 2023-09-12 NOTE — PROGRESS NOTES
Spoke with patients boyfriend Tal Kishore and informed him she has a script for pain medication that will be ready in 20 minutes on the 2nd floor pharmacy

## 2023-09-12 NOTE — PROGRESS NOTES
CLINICAL PHARMACY NOTE: MEDS TO BEDS    Total # of Prescriptions Filled: 1   The following medications were delivered to the patient:  Percocet 5-325    Additional Documentation:   Pharmacy  by javier Carver

## 2023-09-12 NOTE — INTERVAL H&P NOTE
Update History & Physical    The patient's History and Physical of September 26, 2023 was reviewed with the patient and I examined the patient. There was no change. The surgical site was confirmed by the patient and me. Plan: The risks, benefits, expected outcome, and alternative to the recommended procedure have been discussed with the patient. Patient understands and wants to proceed with the procedure.      Electronically signed by Hina Paula MD on 9/12/2023 at 12:04 PM    Marina Schwab, MD

## 2023-09-12 NOTE — H&P
Vascular Surgery History & Physical Exam      Chief Complaint: Chronic wound of the L LE    HISTORY OF PRESENT ILLNESS:                The patient is a 74 y.o. female who presents to the hospital for elective debridement of chronic wound of the LLE. She denies problems since her last visit to the [address] on 9/6/2023. IMPRESSION:  Chronic wound of the L LE    PLAN:  Debridement of chronic wound of the LLE. AC5 application, unna boot application    I reviewed the procedure with the patient and family as available. I discussed the procedure, risks, benefits, complications, and alternatives of the procedure. They understand and consent.   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 ([address]) 05/18/2022    Dizziness - light-headed     GERD (gastroesophageal reflux disease)     Herpes dermatitis 04/27/2017    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    Lightheadedness     Migraine     PONV (postoperative nausea and vomiting)     Skin ulcer of buttock with fat layer exposed ([address]) 07/20/2016    Venous stasis ulcer of left ankle with fat layer exposed with varicose veins ([address]) 02/02/2022     Past Surgical History:   Procedure Laterality Date    CHOLECYSTECTOMY, LAPAROSCOPIC  04/08/2014    LEG SURGERY Left 8/24/2022    LEFT LOWER EXTREMITY DEBRIDMENT WITH POSSIBLE ADVANCED SKIN THERAPY, POSSIBLE UNNA BOOT performed by Shayy Monique MD at [address] [place] [address] Left 10/25/2022    DEBRIDEMENT LEFT LEG, POSSIBLE ADVANCED SKIN THERAPY with acell micromatrix application 09/12/2023     09/12/2023    PROTIME 11.6 07/25/2023    INR 1.1 07/25/2023    APTT 31.7 11/14/2012    K 4.0 05/03/2023    BUN 10 05/03/2023    CREATININE 0.8 05/03/2023       RADIOLOGY:    I reviewed the procedure with the patient and family as available. I discussed the procedure, risks, benefits, complications, and alternatives of the procedure. They understand and consent.   All questions were answered    Max Miguel MD

## 2023-09-13 ENCOUNTER — TELEPHONE (OUTPATIENT)
Dept: FAMILY MEDICINE CLINIC | Age: 68
End: 2023-09-13
Payer: MEDICARE

## 2023-09-13 DIAGNOSIS — I10 PRIMARY HYPERTENSION: Primary | ICD-10-CM

## 2023-09-13 PROCEDURE — 99441 PR PHYS/QHP TELEPHONE EVALUATION 5-10 MIN: CPT | Performed by: SURGERY

## 2023-09-13 RX ORDER — LISINOPRIL 20 MG/1
20 TABLET ORAL DAILY
Qty: 90 TABLET | Refills: 1 | Status: SHIPPED | OUTPATIENT
Start: 2023-09-13

## 2023-09-13 NOTE — OP NOTE
Operative Note      Patient: Jaskaran Pickard  YOB: 1955  MRN: [id number]    Date of Procedure: 9/12/2023    Pre-Op Diagnosis Codes:     * Non-pressure chronic ulcer of left ankle with fat layer exposed ([address]) [L97.322]    Post-Op Diagnosis: Same       Procedure   L LE wound debridement  Application of AC5 x2 to medial and lateral calf wounds  Application of kerlex, koban    Medial 8.5 cm length x 4.5 cm width x 0.3 cm depth  Lateral 4.5 cm length x 4 cm width x 0.3 cm depth    Surgeon(s):  Isaac Ashby MD    Assistant:   Resident: Khanh Jeff MD    Anesthesia: Monitor Anesthesia Care    Estimated Blood Loss (mL): Minimal    Complications: None    Specimens:   * No specimens in log *    Implants:  Implant Name Type Inv. Item Serial No.  Lot No. LRB No. Used Action   PurpleTeal AC5 ADVANCED WOUND SYSTEM      [id number] Left 2 Implanted     DESCRIPTION OF PROCEDURE: The patient was identified and the procedure was confirmed. The wound and surrounding area was cleaned, and draped. Lidocaine gel soaked gauze was applied. It was subsequently removed. With the patient in supine position, the wound was debrided sharply of fibrotic, necrotic, and hyperkeratotic tissue, including a layer of surrounding healthy tissue using a curette for a total surface area of > 20 cm2. The skin was excised through the level of the subcutaneous tissue. 100%% of the wound was debrided. Wound was copiously irrigated with normal saline solution. There was minimal bleeding that was controlled with pressure. The wound was deemed appropriate for AC5 and it was prepped per 's instructions. The product was applied to the wound and than secure with mepitel one, steristrips, alginate, abdominal pads, kerlex, and koban. Needle, sponge and instrument counts were reported as correct x2.  The patient tolerated the procedure and was transferred to the recovery area in

## 2023-09-13 NOTE — ANESTHESIA POSTPROCEDURE EVALUATION
Department of Anesthesiology  Postprocedure Note    Patient: Thanh Lazar  MRN: [id number]  YOB: 1955  Date of evaluation: 9/13/2023      Procedure Summary     Date: 09/12/23 Room / Location: [place] OR 03 / [facility]    Anesthesia Start: 1255 Anesthesia Stop: 7703    Procedure: left leg debridement, AC5 application, unna boot application (Left: Ankle) Diagnosis:       Non-pressure chronic ulcer of left ankle with fat layer exposed ([address])      (Non-pressure chronic ulcer of left ankle with fat layer exposed ([address]) [N80.046])    Surgeons: Shayy Monique MD Responsible Provider: Wilton Farris DO    Anesthesia Type: MAC ASA Status: 3          Anesthesia Type: No value filed.     Gamaliel Phase I: Gamaliel Score: 10    Gamaliel Phase II: Gamaliel Score: 10      Anesthesia Post Evaluation    Patient location during evaluation: bedside  Patient participation: complete - patient cannot participate  Level of consciousness: awake and alert  Airway patency: patent  Nausea & Vomiting: no nausea and no vomiting  Complications: no  Cardiovascular status: blood pressure returned to baseline  Respiratory status: acceptable  Hydration status: euvolemic  Comments: Late entry performed yesterday  Multimodal analgesia pain management approach

## 2023-09-13 NOTE — TELEPHONE ENCOUNTER
----- Message from [facility] sent at 9/13/2023 12:03 PM EDT -----  Subject: Message to Provider    QUESTIONS  Information for Provider? Patient is calling about her bp being elevated   yesterday the reading was 215/101 , pt was given something to help it come   down and then it was lowered to 168. Pt stated her numbers fluctuates. Pt   wants to know if her doctor can prescribe her some medication to help with   her bp readings. Please give pt a call back to advise further. ---------------------------------------------------------------------------  --------------  Megha CARDOZA  [phone]; OK to leave message on voicemail  ---------------------------------------------------------------------------  --------------  SCRIPT ANSWERS  Relationship to Patient?  Self

## 2023-09-15 NOTE — DISCHARGE INSTRUCTIONS
Visit Discharge/Physician Orders     Discharge condition: Stable     Assessment of pain at discharge: yes     Anesthetic used: 4% lidocaine solution     Discharge to: Home     Left via:Private automobile     Accompanied by:self     ECF/HHA: n/a     Dressing Orders:LEFT MEDIAL and LATERAL LOWER LEG-Cleanse with normal saline, apply zinc to fiona wound,  drawtex, dressing, ABD pad , unna boot and coban. Change weekly. Treatment Orders: Eat a diet high in protein and vitamin C. Take a multiple vitamin daily unless contraindicated. To see Dr. Trish Pennington as scheduled      Must wear slip on shoe to work due to wrap on leg! [facility] followup visit : 1 week _____________________  (Please note your next appointment above and if you are unable to keep, kindly give a 24 hour notice. Thank you.)     Physician signature:__________________________     If you experience any of the following, please call the [facility] during business hours:     * Increase in Pain  * Temperature over 101  * Increase in drainage from your wound  * Drainage with a foul odor  * Bleeding  * Increase in swelling  * Need for compression bandage changes due to slippage, breakthrough drainage. If you need medical attention outside of the business hours of the [facility] please contact your PCP or go to the nearest emergency room.

## 2023-09-20 ENCOUNTER — HOSPITAL ENCOUNTER (OUTPATIENT)
Dept: WOUND CARE | Age: 68
Discharge: HOME | End: 2023-09-20
Payer: MEDICARE

## 2023-09-20 VITALS
SYSTOLIC BLOOD PRESSURE: 138 MMHG | TEMPERATURE: 96.5 F | RESPIRATION RATE: 18 BRPM | WEIGHT: 170 LBS | BODY MASS INDEX: 25.76 KG/M2 | HEART RATE: 77 BPM | DIASTOLIC BLOOD PRESSURE: 67 MMHG | HEIGHT: 68 IN

## 2023-09-20 DIAGNOSIS — I70.242 ATHEROSCLEROSIS OF NATIVE ARTERY OF LEFT LOWER EXTREMITY WITH ULCERATION OF CALF (HCC): ICD-10-CM

## 2023-09-20 DIAGNOSIS — L97.322 VARICOSE VEINS OF LEFT LOWER EXTREMITY WITH ULCER OF ANKLE WITH FAT LAYER EXPOSED (HCC): Primary | ICD-10-CM

## 2023-09-20 DIAGNOSIS — I83.023 VARICOSE VEINS OF LEFT LOWER EXTREMITY WITH ULCER OF ANKLE WITH FAT LAYER EXPOSED (HCC): Primary | ICD-10-CM

## 2023-09-20 DIAGNOSIS — I70.243 ATHEROSCLEROSIS OF NATIVE ARTERY OF LEFT LOWER EXTREMITY WITH ULCERATION OF ANKLE (HCC): Chronic | ICD-10-CM

## 2023-09-20 PROCEDURE — 11042 DBRDMT SUBQ TIS 1ST 20SQCM/<: CPT

## 2023-09-20 RX ORDER — LIDOCAINE HYDROCHLORIDE 40 MG/ML
SOLUTION TOPICAL ONCE
OUTPATIENT
Start: 2023-09-20 | End: 2023-09-20

## 2023-09-20 RX ORDER — LIDOCAINE HYDROCHLORIDE 20 MG/ML
JELLY TOPICAL ONCE
OUTPATIENT
Start: 2023-09-20 | End: 2023-09-20

## 2023-09-20 RX ORDER — BACITRACIN ZINC AND POLYMYXIN B SULFATE 500; 1000 [USP'U]/G; [USP'U]/G
OINTMENT TOPICAL ONCE
OUTPATIENT
Start: 2023-09-20 | End: 2023-09-20

## 2023-09-20 RX ORDER — TRIAMCINOLONE ACETONIDE 1 MG/G
OINTMENT TOPICAL ONCE
OUTPATIENT
Start: 2023-09-20 | End: 2023-09-20

## 2023-09-20 RX ORDER — LIDOCAINE 50 MG/G
OINTMENT TOPICAL ONCE
OUTPATIENT
Start: 2023-09-20 | End: 2023-09-20

## 2023-09-20 RX ORDER — LIDOCAINE HYDROCHLORIDE 40 MG/ML
SOLUTION TOPICAL ONCE
Status: COMPLETED | OUTPATIENT
Start: 2023-09-20 | End: 2023-09-20

## 2023-09-20 RX ORDER — GINSENG 100 MG
CAPSULE ORAL ONCE
OUTPATIENT
Start: 2023-09-20 | End: 2023-09-20

## 2023-09-20 RX ORDER — GENTAMICIN SULFATE 1 MG/G
OINTMENT TOPICAL ONCE
OUTPATIENT
Start: 2023-09-20 | End: 2023-09-20

## 2023-09-20 RX ORDER — LIDOCAINE 40 MG/G
CREAM TOPICAL ONCE
OUTPATIENT
Start: 2023-09-20 | End: 2023-09-20

## 2023-09-20 RX ORDER — IBUPROFEN 200 MG
TABLET ORAL ONCE
OUTPATIENT
Start: 2023-09-20 | End: 2023-09-20

## 2023-09-20 RX ORDER — BETAMETHASONE DIPROPIONATE 0.05 %
OINTMENT (GRAM) TOPICAL ONCE
OUTPATIENT
Start: 2023-09-20 | End: 2023-09-20

## 2023-09-20 RX ORDER — CLOBETASOL PROPIONATE 0.5 MG/G
OINTMENT TOPICAL ONCE
OUTPATIENT
Start: 2023-09-20 | End: 2023-09-20

## 2023-09-20 RX ADMIN — LIDOCAINE HYDROCHLORIDE 10 ML: 40 SOLUTION TOPICAL at 07:43

## 2023-09-20 ASSESSMENT — PAIN DESCRIPTION - LOCATION: LOCATION: ANKLE

## 2023-09-20 ASSESSMENT — PAIN DESCRIPTION - DESCRIPTORS: DESCRIPTORS: ACHING;DISCOMFORT

## 2023-09-20 ASSESSMENT — PAIN DESCRIPTION - ORIENTATION: ORIENTATION: LEFT

## 2023-09-20 ASSESSMENT — PAIN DESCRIPTION - PAIN TYPE: TYPE: CHRONIC PAIN

## 2023-09-20 ASSESSMENT — PAIN - FUNCTIONAL ASSESSMENT: PAIN_FUNCTIONAL_ASSESSMENT: PREVENTS OR INTERFERES SOME ACTIVE ACTIVITIES AND ADLS

## 2023-09-20 ASSESSMENT — PAIN DESCRIPTION - ONSET: ONSET: ON-GOING

## 2023-09-20 ASSESSMENT — PAIN DESCRIPTION - FREQUENCY: FREQUENCY: CONTINUOUS

## 2023-09-20 ASSESSMENT — PAIN SCALES - GENERAL: PAINLEVEL_OUTOF10: 10

## 2023-09-20 NOTE — PROGRESS NOTES
(comment); Zinc paste 09/06/23 0833   Offloading for Diabetic Foot Ulcers Offloading not required 08/16/23 0831   Wound Length (cm) 5.7 cm 09/20/23 0745   Wound Width (cm) 4.2 cm 09/20/23 0745   Wound Depth (cm) 0.2 cm 09/20/23 0745   Wound Surface Area (cm^2) 23.94 cm^2 09/20/23 0745   Change in Wound Size % (l*w) -857.6 09/20/23 0745   Wound Volume (cm^3) 4.788 cm^3 09/20/23 0745   Wound Healing % -1815 09/20/23 0745   Post-Procedure Length (cm) 5.7 cm 09/20/23 0823   Post-Procedure Width (cm) 4.2 cm 09/20/23 0823   Post-Procedure Depth (cm) 0.3 cm 09/20/23 0823   Post-Procedure Surface Area (cm^2) 23.94 cm^2 09/20/23 0823   Post-Procedure Volume (cm^3) 7.182 cm^3 09/20/23 0823   Wound Assessment Fibrin;Granulation tissue 09/20/23 0745   Drainage Amount Copious (>75 % saturated) 09/20/23 0745   Drainage Description Thick; Yellow;Brown 09/20/23 0745   Odor Moderate 09/20/23 0745   Melany-wound Assessment Maceration;Blanchable erythema 09/20/23 0745   Margins Attached edges 08/23/23 0758   Wound Thickness Description not for Pressure Injury Full thickness 08/23/23 0758   Number of days: 553      Procedure Note  Indications:  Based on my examination of this patient's wound(s)/ulcer(s) today, debridement is required to promote healing and evaluate the wound base. Performed by: LISA Keller CNP    Consent obtained:  Yes    Time out taken:  Yes    Pain Control: Anesthetic  Anesthetic: 4% Lidocaine Liquid Topical     Debridement:Excisional Debridement    Using curette the wound(s)/ulcer(s) was/were sharply debrided down through and including the removal of epidermis, dermis, and subcutaneous tissue.         Devitalized Tissue Debrided:  fibrin, biofilm, slough, and exudate to stimulate bleeding to promote healing, post debridement good bleeding base and wound edges noted    Wound/Ulcer #:1, 2    Percent of Wound/Ulcer Debrided: 30%    Total Surface Area Debrided:   20 sq cm     Estimated Blood Loss:

## 2023-09-25 NOTE — DISCHARGE INSTRUCTIONS
Visit Discharge/Physician Orders     Discharge condition: Stable     Assessment of pain at discharge: yes     Anesthetic used: 4% lidocaine solution     Discharge to: Home     Left via:Private automobile     Accompanied by:self     ECF/HHA: n/a     Dressing Orders:LEFT MEDIAL and LATERAL LOWER LEG-Cleanse with normal saline, apply zinc to fiona wound,  drawtex, dressing, ABD pad , unna boot and coban. Change weekly. Treatment Orders: Eat a diet high in protein and vitamin C. Take a multiple vitamin daily unless contraindicated. To see Dr. Chad Olea as scheduled      Must wear slip on shoe to work due to wrap on leg! [facility] followup visit : 1 week _____________________  (Please note your next appointment above and if you are unable to keep, kindly give a 24 hour notice. Thank you.)     Physician signature:__________________________     If you experience any of the following, please call the [facility] during business hours:     * Increase in Pain  * Temperature over 101  * Increase in drainage from your wound  * Drainage with a foul odor  * Bleeding  * Increase in swelling  * Need for compression bandage changes due to slippage, breakthrough drainage. If you need medical attention outside of the business hours of the [facility] please contact your PCP or go to the nearest emergency room.

## 2023-09-27 ENCOUNTER — HOSPITAL ENCOUNTER (OUTPATIENT)
Dept: WOUND CARE | Age: 68
Discharge: HOME | End: 2023-09-27
Payer: MEDICARE

## 2023-09-27 VITALS
WEIGHT: 170 LBS | SYSTOLIC BLOOD PRESSURE: 133 MMHG | HEART RATE: 73 BPM | HEIGHT: 68 IN | BODY MASS INDEX: 25.76 KG/M2 | RESPIRATION RATE: 18 BRPM | DIASTOLIC BLOOD PRESSURE: 58 MMHG | TEMPERATURE: 96.9 F

## 2023-09-27 DIAGNOSIS — I83.023 VARICOSE VEINS OF LEFT LOWER EXTREMITY WITH ULCER OF ANKLE WITH FAT LAYER EXPOSED (HCC): ICD-10-CM

## 2023-09-27 DIAGNOSIS — I70.242 ATHEROSCLEROSIS OF NATIVE ARTERY OF LEFT LOWER EXTREMITY WITH ULCERATION OF CALF (HCC): Primary | ICD-10-CM

## 2023-09-27 DIAGNOSIS — L97.322 VARICOSE VEINS OF LEFT LOWER EXTREMITY WITH ULCER OF ANKLE WITH FAT LAYER EXPOSED (HCC): ICD-10-CM

## 2023-09-27 PROCEDURE — 11042 DBRDMT SUBQ TIS 1ST 20SQCM/<: CPT | Performed by: SURGERY

## 2023-09-27 PROCEDURE — 11045 DBRDMT SUBQ TISS EACH ADDL: CPT | Performed by: SURGERY

## 2023-09-27 PROCEDURE — 11042 DBRDMT SUBQ TIS 1ST 20SQCM/<: CPT

## 2023-09-27 PROCEDURE — 11045 DBRDMT SUBQ TISS EACH ADDL: CPT

## 2023-09-27 RX ORDER — LIDOCAINE HYDROCHLORIDE 40 MG/ML
SOLUTION TOPICAL ONCE
Status: COMPLETED | OUTPATIENT
Start: 2023-09-27 | End: 2023-09-27

## 2023-09-27 RX ORDER — LIDOCAINE HYDROCHLORIDE 20 MG/ML
JELLY TOPICAL ONCE
OUTPATIENT
Start: 2023-09-27 | End: 2023-09-27

## 2023-09-27 RX ORDER — LIDOCAINE 40 MG/G
CREAM TOPICAL ONCE
OUTPATIENT
Start: 2023-09-27 | End: 2023-09-27

## 2023-09-27 RX ORDER — BACITRACIN ZINC AND POLYMYXIN B SULFATE 500; 1000 [USP'U]/G; [USP'U]/G
OINTMENT TOPICAL ONCE
OUTPATIENT
Start: 2023-09-27 | End: 2023-09-27

## 2023-09-27 RX ORDER — OXYCODONE HYDROCHLORIDE AND ACETAMINOPHEN 5; 325 MG/1; MG/1
1 TABLET ORAL EVERY 6 HOURS PRN
Qty: 28 TABLET | Refills: 0 | Status: SHIPPED | OUTPATIENT
Start: 2023-09-27 | End: 2023-10-04

## 2023-09-27 RX ORDER — CLOBETASOL PROPIONATE 0.5 MG/G
OINTMENT TOPICAL ONCE
OUTPATIENT
Start: 2023-09-27 | End: 2023-09-27

## 2023-09-27 RX ORDER — GENTAMICIN SULFATE 1 MG/G
OINTMENT TOPICAL ONCE
OUTPATIENT
Start: 2023-09-27 | End: 2023-09-27

## 2023-09-27 RX ORDER — LIDOCAINE HYDROCHLORIDE 40 MG/ML
SOLUTION TOPICAL ONCE
OUTPATIENT
Start: 2023-09-27 | End: 2023-09-27

## 2023-09-27 RX ORDER — LIDOCAINE 50 MG/G
OINTMENT TOPICAL ONCE
OUTPATIENT
Start: 2023-09-27 | End: 2023-09-27

## 2023-09-27 RX ORDER — IBUPROFEN 200 MG
TABLET ORAL ONCE
OUTPATIENT
Start: 2023-09-27 | End: 2023-09-27

## 2023-09-27 RX ORDER — BETAMETHASONE DIPROPIONATE 0.05 %
OINTMENT (GRAM) TOPICAL ONCE
OUTPATIENT
Start: 2023-09-27 | End: 2023-09-27

## 2023-09-27 RX ORDER — GINSENG 100 MG
CAPSULE ORAL ONCE
OUTPATIENT
Start: 2023-09-27 | End: 2023-09-27

## 2023-09-27 RX ORDER — TRIAMCINOLONE ACETONIDE 1 MG/G
OINTMENT TOPICAL ONCE
OUTPATIENT
Start: 2023-09-27 | End: 2023-09-27

## 2023-09-27 RX ADMIN — LIDOCAINE HYDROCHLORIDE 10 ML: 40 SOLUTION TOPICAL at 07:55

## 2023-09-27 ASSESSMENT — PAIN SCALES - GENERAL: PAINLEVEL_OUTOF10: 10

## 2023-09-27 ASSESSMENT — PAIN DESCRIPTION - LOCATION: LOCATION: ANKLE

## 2023-09-27 ASSESSMENT — PAIN DESCRIPTION - ORIENTATION: ORIENTATION: LEFT

## 2023-09-27 NOTE — PROGRESS NOTES
Instructions         Visit Discharge/Physician Orders     Discharge condition: Stable     Assessment of pain at discharge: yes     Anesthetic used: 4% lidocaine solution     Discharge to: Home     Left via:Private automobile     Accompanied by:self     ECF/HHA: n/a     Dressing Orders:LEFT MEDIAL and LATERAL LOWER LEG-Cleanse with normal saline, apply zinc to fiona wound,  drawtex, dressing, ABD pad , unna boot and coban. Change weekly. Treatment Orders: Eat a diet high in protein and vitamin C. Take a multiple vitamin daily unless contraindicated. To see Dr. Angelica Deng as scheduled      Must wear slip on shoe to work due to wrap on leg! [facility] followup visit : 1 week _____________________  (Please note your next appointment above and if you are unable to keep, kindly give a 24 hour notice. Thank you.)     Physician signature:__________________________     If you experience any of the following, please call the [facility] during business hours:     * Increase in Pain  * Temperature over 101  * Increase in drainage from your wound  * Drainage with a foul odor  * Bleeding  * Increase in swelling  * Need for compression bandage changes due to slippage, breakthrough drainage. If you need medical attention outside of the business hours of the [facility] please contact your PCP or go to the nearest emergency room.      Electronically signed by Ene Hathaway MD

## 2023-10-02 NOTE — DISCHARGE INSTRUCTIONS
Visit Discharge/Physician Orders     Discharge condition: Stable     Assessment of pain at discharge: yes     Anesthetic used: 4% lidocaine solution     Discharge to: Home     Left via:Private automobile     Accompanied by:self     ECF/HHA: n/a     Dressing Orders:LEFT MEDIAL and LATERAL LOWER LEG-Cleanse with normal saline, apply zinc to fiona wound,  drawtex, dressing, ABD pad , unna boot and coban. Change weekly. Treatment Orders: Eat a diet high in protein and vitamin C. Take a multiple vitamin daily unless contraindicated. To see Dr. Hayley Coto as scheduled      Must wear slip on shoe to work due to wrap on leg! [facility] followup visit : 1 week _____________________  (Please note your next appointment above and if you are unable to keep, kindly give a 24 hour notice. Thank you.)     Physician signature:__________________________     If you experience any of the following, please call the [facility] during business hours:     * Increase in Pain  * Temperature over 101  * Increase in drainage from your wound  * Drainage with a foul odor  * Bleeding  * Increase in swelling  * Need for compression bandage changes due to slippage, breakthrough drainage. If you need medical attention outside of the business hours of the [facility] please contact your PCP or go to the nearest emergency room.

## 2023-10-04 ENCOUNTER — HOSPITAL ENCOUNTER (OUTPATIENT)
Dept: WOUND CARE | Age: 68
Discharge: HOME | End: 2023-10-04
Payer: MEDICARE

## 2023-10-04 VITALS
RESPIRATION RATE: 18 BRPM | WEIGHT: 170 LBS | TEMPERATURE: 96.4 F | SYSTOLIC BLOOD PRESSURE: 148 MMHG | BODY MASS INDEX: 25.76 KG/M2 | HEIGHT: 68 IN | HEART RATE: 69 BPM | DIASTOLIC BLOOD PRESSURE: 71 MMHG

## 2023-10-04 DIAGNOSIS — I83.023 VARICOSE VEINS OF LEFT LOWER EXTREMITY WITH ULCER OF ANKLE WITH FAT LAYER EXPOSED (HCC): ICD-10-CM

## 2023-10-04 DIAGNOSIS — I70.242 ATHEROSCLEROSIS OF NATIVE ARTERY OF LEFT LOWER EXTREMITY WITH ULCERATION OF CALF (HCC): Primary | ICD-10-CM

## 2023-10-04 DIAGNOSIS — L97.322 VARICOSE VEINS OF LEFT LOWER EXTREMITY WITH ULCER OF ANKLE WITH FAT LAYER EXPOSED (HCC): ICD-10-CM

## 2023-10-04 PROCEDURE — 11042 DBRDMT SUBQ TIS 1ST 20SQCM/<: CPT

## 2023-10-04 PROCEDURE — 11045 DBRDMT SUBQ TISS EACH ADDL: CPT | Performed by: SURGERY

## 2023-10-04 PROCEDURE — 11042 DBRDMT SUBQ TIS 1ST 20SQCM/<: CPT | Performed by: SURGERY

## 2023-10-04 PROCEDURE — 11045 DBRDMT SUBQ TISS EACH ADDL: CPT

## 2023-10-04 RX ORDER — BACITRACIN ZINC AND POLYMYXIN B SULFATE 500; 1000 [USP'U]/G; [USP'U]/G
OINTMENT TOPICAL ONCE
OUTPATIENT
Start: 2023-10-04 | End: 2023-10-04

## 2023-10-04 RX ORDER — BETAMETHASONE DIPROPIONATE 0.05 %
OINTMENT (GRAM) TOPICAL ONCE
OUTPATIENT
Start: 2023-10-04 | End: 2023-10-04

## 2023-10-04 RX ORDER — CLOBETASOL PROPIONATE 0.5 MG/G
OINTMENT TOPICAL ONCE
OUTPATIENT
Start: 2023-10-04 | End: 2023-10-04

## 2023-10-04 RX ORDER — GENTAMICIN SULFATE 1 MG/G
OINTMENT TOPICAL ONCE
OUTPATIENT
Start: 2023-10-04 | End: 2023-10-04

## 2023-10-04 RX ORDER — LIDOCAINE HYDROCHLORIDE 40 MG/ML
SOLUTION TOPICAL ONCE
OUTPATIENT
Start: 2023-10-04 | End: 2023-10-04

## 2023-10-04 RX ORDER — GINSENG 100 MG
CAPSULE ORAL ONCE
OUTPATIENT
Start: 2023-10-04 | End: 2023-10-04

## 2023-10-04 RX ORDER — LIDOCAINE 50 MG/G
OINTMENT TOPICAL ONCE
OUTPATIENT
Start: 2023-10-04 | End: 2023-10-04

## 2023-10-04 RX ORDER — LIDOCAINE HYDROCHLORIDE 20 MG/ML
JELLY TOPICAL ONCE
OUTPATIENT
Start: 2023-10-04 | End: 2023-10-04

## 2023-10-04 RX ORDER — TRIAMCINOLONE ACETONIDE 1 MG/G
OINTMENT TOPICAL ONCE
OUTPATIENT
Start: 2023-10-04 | End: 2023-10-04

## 2023-10-04 RX ORDER — LIDOCAINE HYDROCHLORIDE 40 MG/ML
SOLUTION TOPICAL ONCE
Status: COMPLETED | OUTPATIENT
Start: 2023-10-04 | End: 2023-10-04

## 2023-10-04 RX ORDER — LIDOCAINE 40 MG/G
CREAM TOPICAL ONCE
OUTPATIENT
Start: 2023-10-04 | End: 2023-10-04

## 2023-10-04 RX ORDER — IBUPROFEN 200 MG
TABLET ORAL ONCE
OUTPATIENT
Start: 2023-10-04 | End: 2023-10-04

## 2023-10-04 RX ADMIN — LIDOCAINE HYDROCHLORIDE 15 ML: 40 SOLUTION TOPICAL at 07:55

## 2023-10-04 ASSESSMENT — PAIN DESCRIPTION - ORIENTATION: ORIENTATION: LEFT

## 2023-10-04 ASSESSMENT — PAIN DESCRIPTION - FREQUENCY: FREQUENCY: CONTINUOUS

## 2023-10-04 ASSESSMENT — PAIN DESCRIPTION - LOCATION: LOCATION: ANKLE

## 2023-10-04 ASSESSMENT — PAIN DESCRIPTION - PAIN TYPE: TYPE: CHRONIC PAIN

## 2023-10-04 ASSESSMENT — PAIN DESCRIPTION - ONSET: ONSET: ON-GOING

## 2023-10-04 ASSESSMENT — PAIN SCALES - GENERAL: PAINLEVEL_OUTOF10: 10

## 2023-10-04 ASSESSMENT — PAIN DESCRIPTION - DESCRIPTORS: DESCRIPTORS: ACHING;SHOOTING

## 2023-10-04 ASSESSMENT — PAIN - FUNCTIONAL ASSESSMENT: PAIN_FUNCTIONAL_ASSESSMENT: PREVENTS OR INTERFERES SOME ACTIVE ACTIVITIES AND ADLS

## 2023-10-04 NOTE — PROGRESS NOTES
Wound Healing Center Followup Visit Note    Referring Physician : Sher Jackson MD  [address] RECORD NUMBER:  [id number]  AGE: 74 y.o. GENDER: female  : 1955  EPISODE DATE:  10/4/2023    Subjective:     Chief Complaint   Patient presents with    Wound Check     Left leg      HISTORY of PRESENT ILLNESS AMELIA Lin is a 74 y.o. female who presents today in regards to follow up evaluation and treatment of wound/ulcer. That patient's past medical, family and social hx were reviewed and changes were made if present. History of Wound Context:  The patient has had a wound of her left ankle/calf which was first noted approximately 2021. This has been treated local wound care. On their initial visit to the wound healing center, 22,  the patient has noted that the wound has been improving. The patient has not had similar previous wounds in the past.      She started seeing Dr. Waylon Perez in 2021 and than Dr. Yamini Perry. She was started in [facility] ~ 2021. She has noticed some improvement since starting unna boot. She is currently following with Dr. Mahnaz Field. Pt is not on abx at time of initial visit, but has been treated with previously by podiatry. She is not a DM. She is not a smoker. She denies hx of DVT, and per her report had recent us noting no evidence of dvt at [facility]. She also had arterial studies done. I had previously seen her in the past in regards to left buttock thigh wound, which started as abscess. Pt works at [employer] in [place] [employer] and is on her feet all day.     22  Reflux study - if significant findings will schedule for fu  Continue compression therapy [facility] per podiatry with aquacell dressing  Elevation  She does not have significant arterial occlusive disease  22  Patient has asked to continuing following with me going forward because of our previous relationship  She is going to let Dr. Chuck Dobson office know  She

## 2023-10-09 NOTE — DISCHARGE INSTRUCTIONS
Written             Visit Discharge/Physician Orders     Discharge condition: Stable     Assessment of pain at discharge: yes     Anesthetic used: 4% lidocaine solution     Discharge to: Home     Left via:Private automobile     Accompanied by:self     ECF/HHA: n/a     Dressing Orders:LEFT MEDIAL and LATERAL LOWER LEG-Cleanse with normal saline, apply zinc to fiona wound,  drawtex, dressing, ABD pad , unna boot and coban. Change weekly. Treatment Orders: Eat a diet high in protein and vitamin C. Take a multiple vitamin daily unless contraindicated. To see Dr. Pooja Bhatia as scheduled      Must wear slip on shoe to work due to wrap on leg! [facility] followup visit : 1 week _____________________  (Please note your next appointment above and if you are unable to keep, kindly give a 24 hour notice. Thank you.)     Physician signature:__________________________     If you experience any of the following, please call the [facility] during business hours:     * Increase in Pain  * Temperature over 101  * Increase in drainage from your wound  * Drainage with a foul odor  * Bleeding  * Increase in swelling  * Need for compression bandage changes due to slippage, breakthrough drainage. If you need medical attention outside of the business hours of the [facility] please contact your PCP or go to the nearest emergency room.

## 2023-10-11 ENCOUNTER — HOSPITAL ENCOUNTER (OUTPATIENT)
Dept: WOUND CARE | Age: 68
Discharge: HOME | End: 2023-10-11
Payer: MEDICARE

## 2023-10-11 VITALS — RESPIRATION RATE: 18 BRPM | DIASTOLIC BLOOD PRESSURE: 60 MMHG | HEART RATE: 77 BPM | SYSTOLIC BLOOD PRESSURE: 139 MMHG

## 2023-10-11 DIAGNOSIS — I83.023 VARICOSE VEINS OF LEFT LOWER EXTREMITY WITH ULCER OF ANKLE WITH FAT LAYER EXPOSED (HCC): ICD-10-CM

## 2023-10-11 DIAGNOSIS — L97.322 VARICOSE VEINS OF LEFT LOWER EXTREMITY WITH ULCER OF ANKLE WITH FAT LAYER EXPOSED (HCC): ICD-10-CM

## 2023-10-11 DIAGNOSIS — I70.242 ATHEROSCLEROSIS OF NATIVE ARTERY OF LEFT LOWER EXTREMITY WITH ULCERATION OF CALF (HCC): Primary | ICD-10-CM

## 2023-10-11 PROCEDURE — 11042 DBRDMT SUBQ TIS 1ST 20SQCM/<: CPT

## 2023-10-11 PROCEDURE — 11045 DBRDMT SUBQ TISS EACH ADDL: CPT

## 2023-10-11 PROCEDURE — 11045 DBRDMT SUBQ TISS EACH ADDL: CPT | Performed by: SURGERY

## 2023-10-11 PROCEDURE — 11042 DBRDMT SUBQ TIS 1ST 20SQCM/<: CPT | Performed by: SURGERY

## 2023-10-11 RX ORDER — CLOBETASOL PROPIONATE 0.5 MG/G
OINTMENT TOPICAL ONCE
OUTPATIENT
Start: 2023-10-11 | End: 2023-10-11

## 2023-10-11 RX ORDER — GINSENG 100 MG
CAPSULE ORAL ONCE
OUTPATIENT
Start: 2023-10-11 | End: 2023-10-11

## 2023-10-11 RX ORDER — LIDOCAINE 40 MG/G
CREAM TOPICAL ONCE
OUTPATIENT
Start: 2023-10-11 | End: 2023-10-11

## 2023-10-11 RX ORDER — GENTAMICIN SULFATE 1 MG/G
OINTMENT TOPICAL ONCE
OUTPATIENT
Start: 2023-10-11 | End: 2023-10-11

## 2023-10-11 RX ORDER — LIDOCAINE HYDROCHLORIDE 40 MG/ML
SOLUTION TOPICAL ONCE
OUTPATIENT
Start: 2023-10-11 | End: 2023-10-11

## 2023-10-11 RX ORDER — IBUPROFEN 200 MG
TABLET ORAL ONCE
OUTPATIENT
Start: 2023-10-11 | End: 2023-10-11

## 2023-10-11 RX ORDER — BETAMETHASONE DIPROPIONATE 0.05 %
OINTMENT (GRAM) TOPICAL ONCE
OUTPATIENT
Start: 2023-10-11 | End: 2023-10-11

## 2023-10-11 RX ORDER — LIDOCAINE HYDROCHLORIDE 20 MG/ML
JELLY TOPICAL ONCE
OUTPATIENT
Start: 2023-10-11 | End: 2023-10-11

## 2023-10-11 RX ORDER — BACITRACIN ZINC AND POLYMYXIN B SULFATE 500; 1000 [USP'U]/G; [USP'U]/G
OINTMENT TOPICAL ONCE
OUTPATIENT
Start: 2023-10-11 | End: 2023-10-11

## 2023-10-11 RX ORDER — LIDOCAINE HYDROCHLORIDE 40 MG/ML
SOLUTION TOPICAL ONCE
Status: COMPLETED | OUTPATIENT
Start: 2023-10-11 | End: 2023-10-11

## 2023-10-11 RX ORDER — LIDOCAINE 50 MG/G
OINTMENT TOPICAL ONCE
OUTPATIENT
Start: 2023-10-11 | End: 2023-10-11

## 2023-10-11 RX ORDER — TRIAMCINOLONE ACETONIDE 1 MG/G
OINTMENT TOPICAL ONCE
OUTPATIENT
Start: 2023-10-11 | End: 2023-10-11

## 2023-10-11 RX ORDER — OXYCODONE AND ACETAMINOPHEN 7.5; 325 MG/1; MG/1
1 TABLET ORAL EVERY 8 HOURS PRN
Qty: 42 TABLET | Refills: 0 | Status: SHIPPED | OUTPATIENT
Start: 2023-10-11 | End: 2023-10-25

## 2023-10-11 RX ADMIN — LIDOCAINE HYDROCHLORIDE 10 ML: 40 SOLUTION TOPICAL at 08:06

## 2023-10-11 ASSESSMENT — PAIN SCALES - GENERAL: PAINLEVEL_OUTOF10: 10

## 2023-10-11 ASSESSMENT — PAIN DESCRIPTION - LOCATION: LOCATION: ANKLE

## 2023-10-11 ASSESSMENT — PAIN DESCRIPTION - ORIENTATION: ORIENTATION: LEFT

## 2023-10-11 ASSESSMENT — PAIN DESCRIPTION - DESCRIPTORS: DESCRIPTORS: ACHING;SHOOTING

## 2023-10-11 ASSESSMENT — PAIN - FUNCTIONAL ASSESSMENT: PAIN_FUNCTIONAL_ASSESSMENT: PREVENTS OR INTERFERES SOME ACTIVE ACTIVITIES AND ADLS

## 2023-10-11 NOTE — PROGRESS NOTES
culture done  Did not tolerate debridement well  Medial larger 57  Lateral larger 26  6/14/23  Culture ng  Still not tolerating debridement well  Recommended OR debridement again  Medial 57 stable  Lateral 26 stable  6/21/23  Medial 50 improved  Lateral 22 improved  Cbc ordered to make sure pt not anemic  Pt would like to go forward with debridement in or - will plan on using ac5  I reviewed the procedure with the patient and family as available. I discussed the procedure, risks, benefits, complications, and alternatives of the procedure. They understand and consent.   All questions were answered  7/5/2023  Wounds were debrided last week and surgery, still has some exudate, no cellulitis, debrided including subcu  7/12/2023  Tissue clean without signs of infection or surrounding cellulitis  Slightly improved dimension from last week  Significant pain with debridement - patient states pain has worsened since being out of her medications  7/19/23  Tolerated debridement better  Wounds appearance improved  Plan for ac5 application and debridement next week on 7/25 Tuesday at noon - pt understands she needs to be here at 10 am   Medial 51 (64) lateral 27 (26)  7/31/2023  Patient was concerned, came to the wound care center, felt like the bandage was too tight causing pain, remove the Band-Aids and wanted the wound to be evaluated  Patient underwent surgery by Dr. Tacho Villatoro 25 July, excisional debridement of the left lower extremity wound, along with application of AC5 graft  The wounds themselves look clean, the posterior wound has some turbid drainage come from the graft and culture was taken, patient also recommended, CBC with differential for comparison and coordination of care along with the wound cultures, and was instructed, if she any fever or chills come to the emergency room  All her questions were answered  8/2/23  Pain was better during week but got worse yesterday  Percocet 7/5/325 mg #42 given q 8 hrs (14

## 2023-10-18 ENCOUNTER — HOSPITAL ENCOUNTER (OUTPATIENT)
Dept: WOUND CARE | Age: 68
Discharge: HOME | End: 2023-10-18
Payer: MEDICARE

## 2023-10-18 VITALS
SYSTOLIC BLOOD PRESSURE: 142 MMHG | HEART RATE: 75 BPM | RESPIRATION RATE: 18 BRPM | DIASTOLIC BLOOD PRESSURE: 68 MMHG | TEMPERATURE: 97.1 F

## 2023-10-18 DIAGNOSIS — I83.023 VARICOSE VEINS OF LEFT LOWER EXTREMITY WITH ULCER OF ANKLE WITH FAT LAYER EXPOSED (HCC): ICD-10-CM

## 2023-10-18 DIAGNOSIS — I70.242 ATHEROSCLEROSIS OF NATIVE ARTERY OF LEFT LOWER EXTREMITY WITH ULCERATION OF CALF (HCC): Primary | ICD-10-CM

## 2023-10-18 DIAGNOSIS — L97.322 VARICOSE VEINS OF LEFT LOWER EXTREMITY WITH ULCER OF ANKLE WITH FAT LAYER EXPOSED (HCC): ICD-10-CM

## 2023-10-18 PROCEDURE — 11045 DBRDMT SUBQ TISS EACH ADDL: CPT | Performed by: SURGERY

## 2023-10-18 PROCEDURE — 11042 DBRDMT SUBQ TIS 1ST 20SQCM/<: CPT | Performed by: SURGERY

## 2023-10-18 PROCEDURE — 11045 DBRDMT SUBQ TISS EACH ADDL: CPT

## 2023-10-18 PROCEDURE — 11042 DBRDMT SUBQ TIS 1ST 20SQCM/<: CPT

## 2023-10-18 RX ORDER — LIDOCAINE 50 MG/G
OINTMENT TOPICAL ONCE
OUTPATIENT
Start: 2023-10-18 | End: 2023-10-18

## 2023-10-18 RX ORDER — IBUPROFEN 200 MG
TABLET ORAL ONCE
OUTPATIENT
Start: 2023-10-18 | End: 2023-10-18

## 2023-10-18 RX ORDER — LIDOCAINE HYDROCHLORIDE 40 MG/ML
SOLUTION TOPICAL ONCE
OUTPATIENT
Start: 2023-10-18 | End: 2023-10-18

## 2023-10-18 RX ORDER — GINSENG 100 MG
CAPSULE ORAL ONCE
OUTPATIENT
Start: 2023-10-18 | End: 2023-10-18

## 2023-10-18 RX ORDER — LIDOCAINE HYDROCHLORIDE 20 MG/ML
JELLY TOPICAL ONCE
OUTPATIENT
Start: 2023-10-18 | End: 2023-10-18

## 2023-10-18 RX ORDER — LIDOCAINE HYDROCHLORIDE 40 MG/ML
SOLUTION TOPICAL ONCE
Status: COMPLETED | OUTPATIENT
Start: 2023-10-18 | End: 2023-10-18

## 2023-10-18 RX ORDER — CLOBETASOL PROPIONATE 0.5 MG/G
OINTMENT TOPICAL ONCE
OUTPATIENT
Start: 2023-10-18 | End: 2023-10-18

## 2023-10-18 RX ORDER — BETAMETHASONE DIPROPIONATE 0.05 %
OINTMENT (GRAM) TOPICAL ONCE
OUTPATIENT
Start: 2023-10-18 | End: 2023-10-18

## 2023-10-18 RX ORDER — LIDOCAINE 40 MG/G
CREAM TOPICAL ONCE
OUTPATIENT
Start: 2023-10-18 | End: 2023-10-18

## 2023-10-18 RX ORDER — GENTAMICIN SULFATE 1 MG/G
OINTMENT TOPICAL ONCE
OUTPATIENT
Start: 2023-10-18 | End: 2023-10-18

## 2023-10-18 RX ORDER — BACITRACIN ZINC AND POLYMYXIN B SULFATE 500; 1000 [USP'U]/G; [USP'U]/G
OINTMENT TOPICAL ONCE
OUTPATIENT
Start: 2023-10-18 | End: 2023-10-18

## 2023-10-18 RX ORDER — TRIAMCINOLONE ACETONIDE 1 MG/G
OINTMENT TOPICAL ONCE
OUTPATIENT
Start: 2023-10-18 | End: 2023-10-18

## 2023-10-18 RX ADMIN — LIDOCAINE HYDROCHLORIDE 10 ML: 40 SOLUTION TOPICAL at 07:59

## 2023-10-18 ASSESSMENT — PAIN DESCRIPTION - ORIENTATION: ORIENTATION: LEFT

## 2023-10-18 ASSESSMENT — PAIN DESCRIPTION - PAIN TYPE: TYPE: CHRONIC PAIN

## 2023-10-18 ASSESSMENT — PAIN SCALES - GENERAL: PAINLEVEL_OUTOF10: 10

## 2023-10-18 ASSESSMENT — PAIN DESCRIPTION - LOCATION: LOCATION: ANKLE

## 2023-10-18 ASSESSMENT — PAIN DESCRIPTION - DESCRIPTORS: DESCRIPTORS: ACHING;BURNING;SHARP;THROBBING

## 2023-10-18 NOTE — PROGRESS NOTES
culture done  Did not tolerate debridement well  Medial larger 57  Lateral larger 26  6/14/23  Culture ng  Still not tolerating debridement well  Recommended OR debridement again  Medial 57 stable  Lateral 26 stable  6/21/23  Medial 50 improved  Lateral 22 improved  Cbc ordered to make sure pt not anemic  Pt would like to go forward with debridement in or - will plan on using ac5  I reviewed the procedure with the patient and family as available. I discussed the procedure, risks, benefits, complications, and alternatives of the procedure. They understand and consent.   All questions were answered  7/5/2023  Wounds were debrided last week and surgery, still has some exudate, no cellulitis, debrided including subcu  7/12/2023  Tissue clean without signs of infection or surrounding cellulitis  Slightly improved dimension from last week  Significant pain with debridement - patient states pain has worsened since being out of her medications  7/19/23  Tolerated debridement better  Wounds appearance improved  Plan for ac5 application and debridement next week on 7/25 Tuesday at noon - pt understands she needs to be here at 10 am   Medial 51 (64) lateral 27 (26)  7/31/2023  Patient was concerned, came to the wound care center, felt like the bandage was too tight causing pain, remove the Band-Aids and wanted the wound to be evaluated  Patient underwent surgery by Dr. Perez Right 25 July, excisional debridement of the left lower extremity wound, along with application of AC5 graft  The wounds themselves look clean, the posterior wound has some turbid drainage come from the graft and culture was taken, patient also recommended, CBC with differential for comparison and coordination of care along with the wound cultures, and was instructed, if she any fever or chills come to the emergency room  All her questions were answered  8/2/23  Pain was better during week but got worse yesterday  Percocet 7/5/325 mg #42 given q 8 hrs (14

## 2023-10-18 NOTE — DISCHARGE INSTRUCTIONS
Visit Discharge/Physician Orders     Discharge condition: Stable     Assessment of pain at discharge: yes     Anesthetic used: 4% lidocaine solution     Discharge to: Home     Left via:Private automobile     Accompanied by:self     ECF/HHA: n/a     Dressing Orders:LEFT MEDIAL and LATERAL LOWER LEG-Cleanse with normal saline, apply zinc to fiona wound,  drawtex, dressing, ABD pad , unna boot and coban. Change weekly. Treatment Orders: Eat a diet high in protein and vitamin C. Take a multiple vitamin daily unless contraindicated. To see Dr. José Antonio Ernst as scheduled      Must wear slip on shoe to work due to wrap on leg! [facility] followup visit : 1 week _____________________  (Please note your next appointment above and if you are unable to keep, kindly give a 24 hour notice. Thank you.)     Physician signature:__________________________     If you experience any of the following, please call the [facility] during business hours:     * Increase in Pain  * Temperature over 101  * Increase in drainage from your wound  * Drainage with a foul odor  * Bleeding  * Increase in swelling  * Need for compression bandage changes due to slippage, breakthrough drainage. If you need medical attention outside of the business hours of the [facility] please contact your PCP or go to the nearest emergency room.

## 2023-10-23 NOTE — DISCHARGE INSTRUCTIONS
Visit Discharge/Physician Orders     Discharge condition: Stable     Assessment of pain at discharge: yes     Anesthetic used: 4% lidocaine solution     Discharge to: Home     Left via:Private automobile     Accompanied by:self     ECF/HHA: n/a     Dressing Orders:LEFT MEDIAL and LATERAL LOWER LEG-Cleanse with normal saline, apply zinc to fiona wound,  drawtex, dressing, ABD pad , unna boot and coban. Change weekly. Treatment Orders: Eat a diet high in protein and vitamin C. Take a multiple vitamin daily unless contraindicated. To see Dr. Dania Morales as scheduled      Must wear slip on shoe to work due to wrap on leg! [facility] followup visit : 1 week __________________________________  (Please note your next appointment above and if you are unable to keep, kindly give a 24 hour notice. Thank you.)     Physician signature:__________________________     If you experience any of the following, please call the [facility] during business hours:     * Increase in Pain  * Temperature over 101  * Increase in drainage from your wound  * Drainage with a foul odor  * Bleeding  * Increase in swelling  * Need for compression bandage changes due to slippage, breakthrough drainage. If you need medical attention outside of the business hours of the [facility] please contact your PCP or go to the nearest emergency room.

## 2023-10-25 ENCOUNTER — TELEPHONE (OUTPATIENT)
Dept: VASCULAR SURGERY | Age: 68
End: 2023-10-25

## 2023-10-25 ENCOUNTER — PREP FOR PROCEDURE (OUTPATIENT)
Dept: VASCULAR SURGERY | Age: 68
End: 2023-10-25

## 2023-10-25 ENCOUNTER — HOSPITAL ENCOUNTER (OUTPATIENT)
Dept: WOUND CARE | Age: 68
Discharge: HOME | End: 2023-10-25
Payer: MEDICARE

## 2023-10-25 VITALS
RESPIRATION RATE: 16 BRPM | DIASTOLIC BLOOD PRESSURE: 62 MMHG | HEIGHT: 68 IN | SYSTOLIC BLOOD PRESSURE: 154 MMHG | BODY MASS INDEX: 25.76 KG/M2 | WEIGHT: 170 LBS | HEART RATE: 73 BPM | TEMPERATURE: 96.9 F

## 2023-10-25 DIAGNOSIS — I70.242 ATHEROSCLEROSIS OF NATIVE ARTERY OF LEFT LOWER EXTREMITY WITH ULCERATION OF CALF (HCC): Primary | ICD-10-CM

## 2023-10-25 DIAGNOSIS — L97.322 VARICOSE VEINS OF LEFT LOWER EXTREMITY WITH ULCER OF ANKLE WITH FAT LAYER EXPOSED (HCC): ICD-10-CM

## 2023-10-25 DIAGNOSIS — I83.023 VARICOSE VEINS OF LEFT LOWER EXTREMITY WITH ULCER OF ANKLE WITH FAT LAYER EXPOSED (HCC): ICD-10-CM

## 2023-10-25 PROCEDURE — 11045 DBRDMT SUBQ TISS EACH ADDL: CPT

## 2023-10-25 PROCEDURE — 11042 DBRDMT SUBQ TIS 1ST 20SQCM/<: CPT

## 2023-10-25 RX ORDER — GENTAMICIN SULFATE 1 MG/G
OINTMENT TOPICAL ONCE
OUTPATIENT
Start: 2023-10-25 | End: 2023-10-25

## 2023-10-25 RX ORDER — LIDOCAINE HYDROCHLORIDE 20 MG/ML
JELLY TOPICAL ONCE
OUTPATIENT
Start: 2023-10-25 | End: 2023-10-25

## 2023-10-25 RX ORDER — BACITRACIN ZINC AND POLYMYXIN B SULFATE 500; 1000 [USP'U]/G; [USP'U]/G
OINTMENT TOPICAL ONCE
OUTPATIENT
Start: 2023-10-25 | End: 2023-10-25

## 2023-10-25 RX ORDER — GINSENG 100 MG
CAPSULE ORAL ONCE
OUTPATIENT
Start: 2023-10-25 | End: 2023-10-25

## 2023-10-25 RX ORDER — LIDOCAINE 50 MG/G
OINTMENT TOPICAL ONCE
OUTPATIENT
Start: 2023-10-25 | End: 2023-10-25

## 2023-10-25 RX ORDER — CLOBETASOL PROPIONATE 0.5 MG/G
OINTMENT TOPICAL ONCE
OUTPATIENT
Start: 2023-10-25 | End: 2023-10-25

## 2023-10-25 RX ORDER — LIDOCAINE 40 MG/G
CREAM TOPICAL ONCE
OUTPATIENT
Start: 2023-10-25 | End: 2023-10-25

## 2023-10-25 RX ORDER — LIDOCAINE HYDROCHLORIDE 40 MG/ML
SOLUTION TOPICAL ONCE
OUTPATIENT
Start: 2023-10-25 | End: 2023-10-25

## 2023-10-25 RX ORDER — IBUPROFEN 200 MG
TABLET ORAL ONCE
OUTPATIENT
Start: 2023-10-25 | End: 2023-10-25

## 2023-10-25 RX ORDER — LIDOCAINE HYDROCHLORIDE 40 MG/ML
SOLUTION TOPICAL ONCE
Status: COMPLETED | OUTPATIENT
Start: 2023-10-25 | End: 2023-10-25

## 2023-10-25 RX ORDER — TRIAMCINOLONE ACETONIDE 1 MG/G
OINTMENT TOPICAL ONCE
OUTPATIENT
Start: 2023-10-25 | End: 2023-10-25

## 2023-10-25 RX ORDER — BETAMETHASONE DIPROPIONATE 0.05 %
OINTMENT (GRAM) TOPICAL ONCE
OUTPATIENT
Start: 2023-10-25 | End: 2023-10-25

## 2023-10-25 RX ADMIN — LIDOCAINE HYDROCHLORIDE 10 ML: 40 SOLUTION TOPICAL at 08:03

## 2023-10-25 ASSESSMENT — PAIN DESCRIPTION - ORIENTATION: ORIENTATION: LEFT

## 2023-10-25 ASSESSMENT — PAIN DESCRIPTION - FREQUENCY: FREQUENCY: CONTINUOUS

## 2023-10-25 ASSESSMENT — PAIN DESCRIPTION - DESCRIPTORS: DESCRIPTORS: ACHING;BURNING;THROBBING

## 2023-10-25 ASSESSMENT — PAIN SCALES - GENERAL: PAINLEVEL_OUTOF10: 7

## 2023-10-25 ASSESSMENT — PAIN DESCRIPTION - PAIN TYPE: TYPE: CHRONIC PAIN

## 2023-10-25 ASSESSMENT — PAIN DESCRIPTION - ONSET: ONSET: ON-GOING

## 2023-10-25 ASSESSMENT — PAIN DESCRIPTION - LOCATION: LOCATION: ANKLE

## 2023-10-25 NOTE — TELEPHONE ENCOUNTER
Scheduled debridement left leg with Dr. Hyun Dickens 11/7/23. Dr. Hyun Dickens will notify and instruct the pt during today's wound care visit.

## 2023-11-01 ENCOUNTER — HOSPITAL ENCOUNTER (OUTPATIENT)
Dept: WOUND CARE | Age: 68
Discharge: HOME | End: 2023-11-01
Payer: MEDICARE

## 2023-11-01 VITALS
BODY MASS INDEX: 25.76 KG/M2 | HEIGHT: 68 IN | HEART RATE: 80 BPM | SYSTOLIC BLOOD PRESSURE: 110 MMHG | RESPIRATION RATE: 18 BRPM | WEIGHT: 170 LBS | TEMPERATURE: 97.1 F | DIASTOLIC BLOOD PRESSURE: 58 MMHG

## 2023-11-01 DIAGNOSIS — L97.322 VARICOSE VEINS OF LEFT LOWER EXTREMITY WITH ULCER OF ANKLE WITH FAT LAYER EXPOSED (HCC): ICD-10-CM

## 2023-11-01 DIAGNOSIS — I83.023 VARICOSE VEINS OF LEFT LOWER EXTREMITY WITH ULCER OF ANKLE WITH FAT LAYER EXPOSED (HCC): ICD-10-CM

## 2023-11-01 DIAGNOSIS — I70.242 ATHEROSCLEROSIS OF NATIVE ARTERY OF LEFT LOWER EXTREMITY WITH ULCERATION OF CALF (HCC): Primary | ICD-10-CM

## 2023-11-01 PROCEDURE — 11045 DBRDMT SUBQ TISS EACH ADDL: CPT

## 2023-11-01 PROCEDURE — 11042 DBRDMT SUBQ TIS 1ST 20SQCM/<: CPT | Performed by: SURGERY

## 2023-11-01 PROCEDURE — 11042 DBRDMT SUBQ TIS 1ST 20SQCM/<: CPT

## 2023-11-01 PROCEDURE — 11045 DBRDMT SUBQ TISS EACH ADDL: CPT | Performed by: SURGERY

## 2023-11-01 RX ORDER — LIDOCAINE HYDROCHLORIDE 20 MG/ML
JELLY TOPICAL ONCE
OUTPATIENT
Start: 2023-11-01 | End: 2023-11-01

## 2023-11-01 RX ORDER — BACITRACIN ZINC AND POLYMYXIN B SULFATE 500; 1000 [USP'U]/G; [USP'U]/G
OINTMENT TOPICAL ONCE
OUTPATIENT
Start: 2023-11-01 | End: 2023-11-01

## 2023-11-01 RX ORDER — LIDOCAINE 50 MG/G
OINTMENT TOPICAL ONCE
OUTPATIENT
Start: 2023-11-01 | End: 2023-11-01

## 2023-11-01 RX ORDER — LIDOCAINE HYDROCHLORIDE 40 MG/ML
SOLUTION TOPICAL ONCE
OUTPATIENT
Start: 2023-11-01 | End: 2023-11-01

## 2023-11-01 RX ORDER — GINSENG 100 MG
CAPSULE ORAL ONCE
OUTPATIENT
Start: 2023-11-01 | End: 2023-11-01

## 2023-11-01 RX ORDER — CLOBETASOL PROPIONATE 0.5 MG/G
OINTMENT TOPICAL ONCE
OUTPATIENT
Start: 2023-11-01 | End: 2023-11-01

## 2023-11-01 RX ORDER — GENTAMICIN SULFATE 1 MG/G
OINTMENT TOPICAL ONCE
OUTPATIENT
Start: 2023-11-01 | End: 2023-11-01

## 2023-11-01 RX ORDER — BETAMETHASONE DIPROPIONATE 0.05 %
OINTMENT (GRAM) TOPICAL ONCE
OUTPATIENT
Start: 2023-11-01 | End: 2023-11-01

## 2023-11-01 RX ORDER — TRIAMCINOLONE ACETONIDE 1 MG/G
OINTMENT TOPICAL ONCE
OUTPATIENT
Start: 2023-11-01 | End: 2023-11-01

## 2023-11-01 RX ORDER — LIDOCAINE HYDROCHLORIDE 40 MG/ML
SOLUTION TOPICAL ONCE
Status: COMPLETED | OUTPATIENT
Start: 2023-11-01 | End: 2023-11-01

## 2023-11-01 RX ORDER — IBUPROFEN 200 MG
TABLET ORAL ONCE
OUTPATIENT
Start: 2023-11-01 | End: 2023-11-01

## 2023-11-01 RX ORDER — OXYCODONE AND ACETAMINOPHEN 7.5; 325 MG/1; MG/1
1 TABLET ORAL EVERY 8 HOURS PRN
Qty: 42 TABLET | Refills: 0 | Status: SHIPPED | OUTPATIENT
Start: 2023-11-01 | End: 2023-11-15

## 2023-11-01 RX ORDER — LIDOCAINE 40 MG/G
CREAM TOPICAL ONCE
OUTPATIENT
Start: 2023-11-01 | End: 2023-11-01

## 2023-11-01 RX ADMIN — LIDOCAINE HYDROCHLORIDE 10 ML: 40 SOLUTION TOPICAL at 07:56

## 2023-11-01 ASSESSMENT — PAIN DESCRIPTION - ONSET: ONSET: ON-GOING

## 2023-11-01 ASSESSMENT — PAIN SCALES - GENERAL: PAINLEVEL_OUTOF10: 8

## 2023-11-01 ASSESSMENT — PAIN DESCRIPTION - PAIN TYPE: TYPE: CHRONIC PAIN

## 2023-11-01 ASSESSMENT — PAIN DESCRIPTION - LOCATION: LOCATION: ANKLE

## 2023-11-01 ASSESSMENT — PAIN DESCRIPTION - FREQUENCY: FREQUENCY: CONTINUOUS

## 2023-11-01 ASSESSMENT — PAIN - FUNCTIONAL ASSESSMENT: PAIN_FUNCTIONAL_ASSESSMENT: PREVENTS OR INTERFERES SOME ACTIVE ACTIVITIES AND ADLS

## 2023-11-01 ASSESSMENT — PAIN DESCRIPTION - DESCRIPTORS: DESCRIPTORS: ACHING;BURNING

## 2023-11-01 ASSESSMENT — PAIN DESCRIPTION - ORIENTATION: ORIENTATION: LEFT

## 2023-11-01 NOTE — PROGRESS NOTES
[address]   PRE-ADMISSION TESTING GENERAL INSTRUCTIONS  PAT Phone Number: [phone]      GENERAL INSTRUCTIONS:    [x] Antibacterial Soap Shower Night before and/or AM of surgery. [] CHG Wipes instruction sheet and wipes given. [] Hibiclens shower the night before AND the morning of surgery.   -Do not use Hibiclens on your face or head. [x] Do not wear makeup, lotions, powders, deodorant. [x] Nothing by mouth after midnight; including gum, candy, mints, or water. [x] You may brush your teeth, gargle, but do NOT swallow water. [x] No tobacco products, illegal drugs, or alcohol within 24 hours of your surgery. [x] Jewelry or valuables should not be brought to the hospital. All body and/or tongue piercing's must be removed prior to arriving to hospital. No contact lens or hair pins. [x] Arrange transportation with a responsible adult  to and from the hospital. Arrange for someone to be with you for the remainder of the day and for 24 hours after your procedure due to having had anesthesia. -Who will be your  for transportation?___David______         -Who will be staying with you for 24 hrs after your procedure?__________________  [x] Bring insurance card and photo ID.  [] Bring copy of living will or healthcare power of  papers to be placed in your electronic record. [] Urine Pregnancy test will be preformed the day of surgery. A specimen sample may be brought from home. [] Lewis Rivera) [employer]: Please bring with you to hospital, day of surgery. PARKING INSTRUCTIONS:     [x] ARRIVAL DATE & TIME: 11/7  @  0800  [x] Times are subject to change. We will contact you the business day before surgery if that were to occur. [x] Enter into the [employer]. Two people may accompany you. Masks are not required. [x] Parking Lot \"I\" is where you will park. It is located on the corner of [address] [place] [address] and [address] [facility].  The entrance is on [facility]. Only one vehicle - per patient, is permitted in parking lot. Upon entering the parking lot, a voucher ticket will print. EDUCATION INSTRUCTIONS:           [] [address] placed in chart. [] Pre-admission Testing educational folder given  [] Incentive Spirometry,coughing & deep breathing exercises reviewed. [] Fluoroscopy-Xray used in surgery reviewed with patient. Educational pamphlet placed in chart. [] Pain: Post-op pain is normal and to be expected. You will be asked to rate your pain from 0-10. [] Joint camp offered & Joint replacement booklets given. [] Follow instruction sheet for Ensure/ Protein drinks - provided to you during PAT apt. MEDICATION INSTRUCTIONS:    [x] Bring a complete list of your medications, please write the last time you took the medicine, give this list to the nurse in Pre-Op. [x] Take only the following medications the morning of surgery with 1-2 ounces of water: hydroxyzine, protonix  [x] Stop all herbal supplements and vitamins 5 days before surgery. Stop NSAIDS 7 days before surgery. [] DO NOT take any diabetic medicine the morning of surgery. Follow instructions for insulin the day before surgery. [] If you are diabetic and your blood sugar is low or you feel symptomatic, you may drink 1-2 ounces of apple juice or take a glucose tablet.            -The morning of your procedure, you may call the pre-op area if you have concerns about your blood sugar [phone]. [] Use your inhalers the morning of surgery. Bring your emergency inhaler with you day of surgery. [x] Follow physician instructions regarding any blood thinners you may be taking. WHAT TO EXPECT:    [x] The day of surgery you will be greeted and checked in by the [facility] [employer] [facility].  In addition, you will be registered in the [facility] by a Patient Access Representative. Please bring your photo ID and insurance card.  A

## 2023-11-07 ENCOUNTER — ANESTHESIA EVENT (OUTPATIENT)
Dept: OPERATING ROOM | Age: 68
End: 2023-11-07
Payer: MEDICARE

## 2023-11-07 ENCOUNTER — ANESTHESIA (OUTPATIENT)
Dept: OPERATING ROOM | Age: 68
End: 2023-11-07
Payer: MEDICARE

## 2023-11-07 ENCOUNTER — HOSPITAL ENCOUNTER (OUTPATIENT)
Age: 68
Setting detail: OUTPATIENT SURGERY
Discharge: HOME | End: 2023-11-07
Attending: SURGERY | Admitting: SURGERY
Payer: MEDICARE

## 2023-11-07 VITALS
WEIGHT: 170 LBS | OXYGEN SATURATION: 97 % | HEIGHT: 68 IN | BODY MASS INDEX: 25.76 KG/M2 | DIASTOLIC BLOOD PRESSURE: 66 MMHG | TEMPERATURE: 98.1 F | SYSTOLIC BLOOD PRESSURE: 150 MMHG | HEART RATE: 66 BPM | RESPIRATION RATE: 18 BRPM

## 2023-11-07 LAB
ERYTHROCYTE [DISTWIDTH] IN BLOOD BY AUTOMATED COUNT: 20.6 % (ref 11.5–15)
HCT VFR BLD AUTO: 29 % (ref 34–48)
HGB BLD-MCNC: 8.5 G/DL (ref 11.5–15.5)
INR PPP: 1.1
MCH RBC QN AUTO: 22.4 PG (ref 26–35)
MCHC RBC AUTO-ENTMCNC: 29.3 G/DL (ref 32–34.5)
MCV RBC AUTO: 76.5 FL (ref 80–99.9)
PLATELET # BLD AUTO: 314 K/UL (ref 130–450)
PMV BLD AUTO: 10.3 FL (ref 7–12)
PROTHROMBIN TIME: 11.9 SEC (ref 9.3–12.4)
RBC # BLD AUTO: 3.79 M/UL (ref 3.5–5.5)
WBC OTHER # BLD: 8.5 K/UL (ref 4.5–11.5)

## 2023-11-07 PROCEDURE — 85027 COMPLETE CBC AUTOMATED: CPT

## 2023-11-07 PROCEDURE — 3700000001 HC ADD 15 MINUTES (ANESTHESIA): Performed by: SURGERY

## 2023-11-07 PROCEDURE — 7100000011 HC PHASE II RECOVERY - ADDTL 15 MIN: Performed by: SURGERY

## 2023-11-07 PROCEDURE — 2580000003 HC RX 258: Performed by: NURSE PRACTITIONER

## 2023-11-07 PROCEDURE — 7100000010 HC PHASE II RECOVERY - FIRST 15 MIN: Performed by: SURGERY

## 2023-11-07 PROCEDURE — 6360000002 HC RX W HCPCS

## 2023-11-07 PROCEDURE — 3600000012 HC SURGERY LEVEL 2 ADDTL 15MIN: Performed by: SURGERY

## 2023-11-07 PROCEDURE — 15002 WOUND PREP TRK/ARM/LEG: CPT | Performed by: SURGERY

## 2023-11-07 PROCEDURE — 85610 PROTHROMBIN TIME: CPT

## 2023-11-07 PROCEDURE — 2580000003 HC RX 258

## 2023-11-07 PROCEDURE — 2709999900 HC NON-CHARGEABLE SUPPLY: Performed by: SURGERY

## 2023-11-07 PROCEDURE — 6370000000 HC RX 637 (ALT 250 FOR IP): Performed by: SURGERY

## 2023-11-07 PROCEDURE — A2020: HCPCS

## 2023-11-07 PROCEDURE — 6370000000 HC RX 637 (ALT 250 FOR IP): Performed by: ANESTHESIOLOGY

## 2023-11-07 PROCEDURE — 3600000002 HC SURGERY LEVEL 2 BASE: Performed by: SURGERY

## 2023-11-07 PROCEDURE — 6360000002 HC RX W HCPCS: Performed by: NURSE PRACTITIONER

## 2023-11-07 PROCEDURE — 3700000000 HC ANESTHESIA ATTENDED CARE: Performed by: SURGERY

## 2023-11-07 PROCEDURE — 15271 SKIN SUB GRAFT TRNK/ARM/LEG: CPT | Performed by: SURGERY

## 2023-11-07 RX ORDER — SODIUM CHLORIDE 0.9 % (FLUSH) 0.9 %
5-40 SYRINGE (ML) INJECTION PRN
Status: DISCONTINUED | OUTPATIENT
Start: 2023-11-07 | End: 2023-11-07 | Stop reason: HOSPADM

## 2023-11-07 RX ORDER — OXYCODONE HYDROCHLORIDE AND ACETAMINOPHEN 5; 325 MG/1; MG/1
1 TABLET ORAL
Status: COMPLETED | OUTPATIENT
Start: 2023-11-07 | End: 2023-11-07

## 2023-11-07 RX ORDER — OXYCODONE HYDROCHLORIDE 5 MG/1
2.5 TABLET ORAL
Status: COMPLETED | OUTPATIENT
Start: 2023-11-07 | End: 2023-11-07

## 2023-11-07 RX ORDER — ONDANSETRON 2 MG/ML
INJECTION INTRAMUSCULAR; INTRAVENOUS PRN
Status: DISCONTINUED | OUTPATIENT
Start: 2023-11-07 | End: 2023-11-07 | Stop reason: SDUPTHER

## 2023-11-07 RX ORDER — SODIUM CHLORIDE 9 MG/ML
INJECTION, SOLUTION INTRAVENOUS CONTINUOUS
Status: DISCONTINUED | OUTPATIENT
Start: 2023-11-07 | End: 2023-11-07 | Stop reason: HOSPADM

## 2023-11-07 RX ORDER — LIDOCAINE HYDROCHLORIDE 20 MG/ML
JELLY TOPICAL PRN
Status: DISCONTINUED | OUTPATIENT
Start: 2023-11-07 | End: 2023-11-07 | Stop reason: ALTCHOICE

## 2023-11-07 RX ORDER — DEXAMETHASONE SODIUM PHOSPHATE 10 MG/ML
INJECTION INTRAMUSCULAR; INTRAVENOUS PRN
Status: DISCONTINUED | OUTPATIENT
Start: 2023-11-07 | End: 2023-11-07 | Stop reason: SDUPTHER

## 2023-11-07 RX ORDER — DIPHENHYDRAMINE HYDROCHLORIDE 50 MG/ML
INJECTION INTRAMUSCULAR; INTRAVENOUS PRN
Status: DISCONTINUED | OUTPATIENT
Start: 2023-11-07 | End: 2023-11-07 | Stop reason: SDUPTHER

## 2023-11-07 RX ORDER — SODIUM CHLORIDE 0.9 % (FLUSH) 0.9 %
5-40 SYRINGE (ML) INJECTION EVERY 12 HOURS SCHEDULED
Status: DISCONTINUED | OUTPATIENT
Start: 2023-11-07 | End: 2023-11-07 | Stop reason: HOSPADM

## 2023-11-07 RX ORDER — FENTANYL CITRATE 50 UG/ML
INJECTION, SOLUTION INTRAMUSCULAR; INTRAVENOUS PRN
Status: DISCONTINUED | OUTPATIENT
Start: 2023-11-07 | End: 2023-11-07 | Stop reason: SDUPTHER

## 2023-11-07 RX ORDER — PROPOFOL 10 MG/ML
INJECTION, EMULSION INTRAVENOUS CONTINUOUS PRN
Status: DISCONTINUED | OUTPATIENT
Start: 2023-11-07 | End: 2023-11-07 | Stop reason: SDUPTHER

## 2023-11-07 RX ORDER — OXYCODONE HYDROCHLORIDE AND ACETAMINOPHEN 5; 325 MG/1; MG/1
1.5 TABLET ORAL
Status: DISCONTINUED | OUTPATIENT
Start: 2023-11-07 | End: 2023-11-07 | Stop reason: SDUPTHER

## 2023-11-07 RX ORDER — SODIUM CHLORIDE 9 MG/ML
INJECTION, SOLUTION INTRAVENOUS PRN
Status: DISCONTINUED | OUTPATIENT
Start: 2023-11-07 | End: 2023-11-07 | Stop reason: HOSPADM

## 2023-11-07 RX ORDER — MIDAZOLAM HYDROCHLORIDE 1 MG/ML
INJECTION INTRAMUSCULAR; INTRAVENOUS PRN
Status: DISCONTINUED | OUTPATIENT
Start: 2023-11-07 | End: 2023-11-07 | Stop reason: SDUPTHER

## 2023-11-07 RX ORDER — SODIUM CHLORIDE 9 MG/ML
INJECTION, SOLUTION INTRAVENOUS CONTINUOUS PRN
Status: DISCONTINUED | OUTPATIENT
Start: 2023-11-07 | End: 2023-11-07 | Stop reason: SDUPTHER

## 2023-11-07 RX ADMIN — FENTANYL CITRATE 50 MCG: 0.05 INJECTION, SOLUTION INTRAMUSCULAR; INTRAVENOUS at 10:33

## 2023-11-07 RX ADMIN — FENTANYL CITRATE 50 MCG: 0.05 INJECTION, SOLUTION INTRAMUSCULAR; INTRAVENOUS at 10:50

## 2023-11-07 RX ADMIN — CEFAZOLIN 2000 MG: 2 INJECTION, POWDER, FOR SOLUTION INTRAMUSCULAR; INTRAVENOUS at 10:32

## 2023-11-07 RX ADMIN — SODIUM CHLORIDE: 9 INJECTION, SOLUTION INTRAVENOUS at 08:53

## 2023-11-07 RX ADMIN — MIDAZOLAM 2 MG: 1 INJECTION INTRAMUSCULAR; INTRAVENOUS at 10:23

## 2023-11-07 RX ADMIN — DEXAMETHASONE SODIUM PHOSPHATE 10 MG: 10 INJECTION INTRAMUSCULAR; INTRAVENOUS at 10:36

## 2023-11-07 RX ADMIN — FENTANYL CITRATE 50 MCG: 0.05 INJECTION, SOLUTION INTRAMUSCULAR; INTRAVENOUS at 11:10

## 2023-11-07 RX ADMIN — SODIUM CHLORIDE: 9 INJECTION, SOLUTION INTRAVENOUS at 10:17

## 2023-11-07 RX ADMIN — OXYCODONE AND ACETAMINOPHEN 1 TABLET: 5; 325 TABLET ORAL at 12:07

## 2023-11-07 RX ADMIN — FENTANYL CITRATE 50 MCG: 0.05 INJECTION, SOLUTION INTRAMUSCULAR; INTRAVENOUS at 10:41

## 2023-11-07 RX ADMIN — PROPOFOL 20 MG: 10 INJECTION, EMULSION INTRAVENOUS at 10:36

## 2023-11-07 RX ADMIN — OXYCODONE HYDROCHLORIDE 2.5 MG: 5 TABLET ORAL at 12:08

## 2023-11-07 RX ADMIN — FENTANYL CITRATE 50 MCG: 0.05 INJECTION, SOLUTION INTRAMUSCULAR; INTRAVENOUS at 10:58

## 2023-11-07 RX ADMIN — DIPHENHYDRAMINE HYDROCHLORIDE 25 MG: 50 INJECTION, SOLUTION INTRAMUSCULAR; INTRAVENOUS at 10:29

## 2023-11-07 RX ADMIN — PROPOFOL 100 MCG/KG/MIN: 10 INJECTION, EMULSION INTRAVENOUS at 10:33

## 2023-11-07 RX ADMIN — ONDANSETRON 4 MG: 2 INJECTION INTRAMUSCULAR; INTRAVENOUS at 11:13

## 2023-11-07 ASSESSMENT — PAIN SCALES - GENERAL
PAINLEVEL_OUTOF10: 3
PAINLEVEL_OUTOF10: 9

## 2023-11-07 ASSESSMENT — PAIN DESCRIPTION - DESCRIPTORS: DESCRIPTORS: ACHING;DISCOMFORT;SHARP

## 2023-11-07 ASSESSMENT — PAIN DESCRIPTION - ORIENTATION: ORIENTATION: LEFT

## 2023-11-07 ASSESSMENT — PAIN DESCRIPTION - PAIN TYPE: TYPE: SURGICAL PAIN

## 2023-11-07 ASSESSMENT — PAIN DESCRIPTION - LOCATION: LOCATION: ANKLE;FOOT

## 2023-11-07 ASSESSMENT — PAIN - FUNCTIONAL ASSESSMENT: PAIN_FUNCTIONAL_ASSESSMENT: 0-10

## 2023-11-07 ASSESSMENT — LIFESTYLE VARIABLES: SMOKING_STATUS: 0

## 2023-11-07 NOTE — OP NOTE
Operative Note      Patient: Karen Charlton  YOB: 1955  MRN: [id number]    Date of Procedure: 11/7/2023    Pre-Op Diagnosis Codes:     * Non-pressure chronic ulcer of left ankle with fat layer exposed ([address]) [L97.322]    Post-Op Diagnosis: Same       Procedure   L LE wound debridement  Application of AC5 x2 to medial and lateral calf wounds  Application of kerlex, koban    Medial 8 cm length x 4 cm width x 0.3 cm depth  Lateral 4 cm length x 3 cm width x 0.3 cm depth    Surgeon(s):  Marizol Hernandez MD    Assistant:   Surgical Assistant: Swathi Nelson    Anesthesia: Monitor Anesthesia Care    Estimated Blood Loss (mL): Minimal    Complications: None    Specimens:   * No specimens in log *    Implants:  * No implants in log *    DESCRIPTION OF PROCEDURE: The patient was identified and the procedure was confirmed. The wound and surrounding area was cleaned, and draped. Lidocaine gel soaked gauze was applied. It was subsequently removed. With the patient in supine position, the wound was debrided sharply of fibrotic, necrotic, and hyperkeratotic tissue, including a layer of surrounding healthy tissue using a curette for a total surface area of > 20 cm2. The skin was excised through the level of the subcutaneous tissue. 100%% of the wound was debrided. Wound was copiously irrigated with normal saline solution. There was minimal bleeding that was controlled with pressure. The wound was deemed appropriate for AC5 and it was prepped per 's instructions. The product was applied to the wound and than secure with mepitel one, steristrips, alginate, abdominal pads, kerlex, and koban. Needle, sponge and instrument counts were reported as correct x2. The patient tolerated the procedure and was transferred to the recovery area in satisfactory condition.       Electronically signed by Marizol Hernandez MD on 11/7/2023 at 11:34 AM

## 2023-11-07 NOTE — ANESTHESIA POSTPROCEDURE EVALUATION
Department of Anesthesiology  Postprocedure Note    Patient: Bon Xiong  MRN: [id number]  YOB: 1955  Date of evaluation: 11/7/2023      Procedure Summary     Date: 11/07/23 Room / Location: [address] [place] [address] / [facility]    Anesthesia Start: 1017 Anesthesia Stop: 36    Procedure: DEBRIDEMENT LEFT LEG (Left) Diagnosis:       Non-pressure chronic ulcer of left ankle with fat layer exposed ([address])      (Non-pressure chronic ulcer of left ankle with fat layer exposed ([address]) [F04.890])    Surgeons: Dustin Boeck, MD Responsible Provider: Shalini Patino MD    Anesthesia Type: MAC ASA Status: 3          Anesthesia Type: MAC    Gamaliel Phase I: Gamaliel Score: 10    Gamaliel Phase II: Gamaliel Score: 9      Anesthesia Post Evaluation    Patient location during evaluation: PACU  Patient participation: complete - patient participated  Level of consciousness: awake and alert  Airway patency: patent  Nausea & Vomiting: no nausea and no vomiting  Complications: no  Cardiovascular status: blood pressure returned to baseline and hemodynamically stable  Respiratory status: acceptable and spontaneous ventilation  Hydration status: euvolemic  Multimodal analgesia pain management approach  Pain management: adequate

## 2023-11-13 NOTE — DISCHARGE INSTRUCTIONS
Visit Discharge/Physician Orders     Discharge condition: Stable     Assessment of pain at discharge: yes     Anesthetic used: 4% lidocaine solution     Discharge to: Home     Left via:Private automobile     Accompanied by:self     ECF/HHA: n/a     Dressing Orders:LEFT MEDIAL and LATERAL LOWER LEG-Cleanse with normal saline, apply zinc to fiona wound,  drawtex, dressing, ABD pad , unna boot and coban. Change weekly. Treatment Orders: Eat a diet high in protein and vitamin C. Take a multiple vitamin daily unless contraindicated. To see Dr. Karsetn Wolfe as scheduled      Must wear slip on shoe to work due to wrap on leg! [facility] followup visit : 1 week __________________________________  (Please note your next appointment above and if you are unable to keep, kindly give a 24 hour notice. Thank you.)     Physician signature:__________________________     If you experience any of the following, please call the [facility] during business hours:     * Increase in Pain  * Temperature over 101  * Increase in drainage from your wound  * Drainage with a foul odor  * Bleeding  * Increase in swelling  * Need for compression bandage changes due to slippage, breakthrough drainage. If you need medical attention outside of the business hours of the [facility] please contact your PCP or go to the nearest emergency room.

## 2023-11-15 ENCOUNTER — HOSPITAL ENCOUNTER (OUTPATIENT)
Dept: WOUND CARE | Age: 68
Discharge: HOME | End: 2023-11-15
Payer: MEDICARE

## 2023-11-15 VITALS
SYSTOLIC BLOOD PRESSURE: 135 MMHG | HEART RATE: 72 BPM | WEIGHT: 170 LBS | RESPIRATION RATE: 18 BRPM | DIASTOLIC BLOOD PRESSURE: 54 MMHG | HEIGHT: 68 IN | TEMPERATURE: 97.4 F | BODY MASS INDEX: 25.76 KG/M2

## 2023-11-15 DIAGNOSIS — L97.322 VARICOSE VEINS OF LEFT LOWER EXTREMITY WITH ULCER OF ANKLE WITH FAT LAYER EXPOSED (HCC): ICD-10-CM

## 2023-11-15 DIAGNOSIS — I83.023 VARICOSE VEINS OF LEFT LOWER EXTREMITY WITH ULCER OF ANKLE WITH FAT LAYER EXPOSED (HCC): ICD-10-CM

## 2023-11-15 DIAGNOSIS — I70.242 ATHEROSCLEROSIS OF NATIVE ARTERY OF LEFT LOWER EXTREMITY WITH ULCERATION OF CALF (HCC): Primary | ICD-10-CM

## 2023-11-15 PROCEDURE — 11042 DBRDMT SUBQ TIS 1ST 20SQCM/<: CPT

## 2023-11-15 RX ORDER — BETAMETHASONE DIPROPIONATE 0.05 %
OINTMENT (GRAM) TOPICAL ONCE
OUTPATIENT
Start: 2023-11-15 | End: 2023-11-15

## 2023-11-15 RX ORDER — CLOBETASOL PROPIONATE 0.5 MG/G
OINTMENT TOPICAL ONCE
OUTPATIENT
Start: 2023-11-15 | End: 2023-11-15

## 2023-11-15 RX ORDER — LIDOCAINE 40 MG/G
CREAM TOPICAL ONCE
OUTPATIENT
Start: 2023-11-15 | End: 2023-11-15

## 2023-11-15 RX ORDER — IBUPROFEN 200 MG
TABLET ORAL ONCE
OUTPATIENT
Start: 2023-11-15 | End: 2023-11-15

## 2023-11-15 RX ORDER — GENTAMICIN SULFATE 1 MG/G
OINTMENT TOPICAL ONCE
OUTPATIENT
Start: 2023-11-15 | End: 2023-11-15

## 2023-11-15 RX ORDER — LIDOCAINE HYDROCHLORIDE 20 MG/ML
JELLY TOPICAL ONCE
OUTPATIENT
Start: 2023-11-15 | End: 2023-11-15

## 2023-11-15 RX ORDER — GINSENG 100 MG
CAPSULE ORAL ONCE
OUTPATIENT
Start: 2023-11-15 | End: 2023-11-15

## 2023-11-15 RX ORDER — LIDOCAINE HYDROCHLORIDE 40 MG/ML
SOLUTION TOPICAL ONCE
Status: COMPLETED | OUTPATIENT
Start: 2023-11-15 | End: 2023-11-15

## 2023-11-15 RX ORDER — OXYCODONE AND ACETAMINOPHEN 7.5; 325 MG/1; MG/1
1 TABLET ORAL EVERY 8 HOURS PRN
Qty: 42 TABLET | Refills: 0 | Status: SHIPPED | OUTPATIENT
Start: 2023-11-15 | End: 2023-11-29

## 2023-11-15 RX ORDER — LIDOCAINE 50 MG/G
OINTMENT TOPICAL ONCE
OUTPATIENT
Start: 2023-11-15 | End: 2023-11-15

## 2023-11-15 RX ORDER — BACITRACIN ZINC AND POLYMYXIN B SULFATE 500; 1000 [USP'U]/G; [USP'U]/G
OINTMENT TOPICAL ONCE
OUTPATIENT
Start: 2023-11-15 | End: 2023-11-15

## 2023-11-15 RX ORDER — LIDOCAINE HYDROCHLORIDE 40 MG/ML
SOLUTION TOPICAL ONCE
OUTPATIENT
Start: 2023-11-15 | End: 2023-11-15

## 2023-11-15 RX ORDER — TRIAMCINOLONE ACETONIDE 1 MG/G
OINTMENT TOPICAL ONCE
OUTPATIENT
Start: 2023-11-15 | End: 2023-11-15

## 2023-11-15 RX ADMIN — LIDOCAINE HYDROCHLORIDE 15 ML: 40 SOLUTION TOPICAL at 08:03

## 2023-11-15 ASSESSMENT — PAIN DESCRIPTION - ORIENTATION: ORIENTATION: LEFT

## 2023-11-15 ASSESSMENT — PAIN DESCRIPTION - DESCRIPTORS: DESCRIPTORS: ACHING;SHARP;DISCOMFORT

## 2023-11-15 ASSESSMENT — PAIN DESCRIPTION - LOCATION: LOCATION: LEG;ANKLE

## 2023-11-15 ASSESSMENT — PAIN - FUNCTIONAL ASSESSMENT: PAIN_FUNCTIONAL_ASSESSMENT: PREVENTS OR INTERFERES SOME ACTIVE ACTIVITIES AND ADLS

## 2023-11-15 ASSESSMENT — PAIN DESCRIPTION - PAIN TYPE: TYPE: CHRONIC PAIN

## 2023-11-15 ASSESSMENT — PAIN DESCRIPTION - FREQUENCY: FREQUENCY: CONTINUOUS

## 2023-11-15 ASSESSMENT — PAIN SCALES - GENERAL: PAINLEVEL_OUTOF10: 8

## 2023-11-15 ASSESSMENT — PAIN DESCRIPTION - ONSET: ONSET: ON-GOING

## 2023-11-20 NOTE — DISCHARGE INSTRUCTIONS
Written             Visit Discharge/Physician Orders     Discharge condition: Stable     Assessment of pain at discharge: yes     Anesthetic used: 4% lidocaine solution     Discharge to: Home     Left via:Private automobile     Accompanied by:self     ECF/HHA: n/a     Dressing Orders:LEFT MEDIAL and LATERAL LOWER LEG-Cleanse with normal saline, apply zinc to fiona wound,  drawtex, dressing, ABD pad , unna boot and coban. Change weekly. Treatment Orders: Eat a diet high in protein and vitamin C. Take a multiple vitamin daily unless contraindicated. To see Dr. Marlo Rivers as scheduled      Must wear slip on shoe to work due to wrap on leg! [facility] followup visit : 1 week __________________________________  (Please note your next appointment above and if you are unable to keep, kindly give a 24 hour notice. Thank you.)     Physician signature:__________________________     If you experience any of the following, please call the [facility] during business hours:     * Increase in Pain  * Temperature over 101  * Increase in drainage from your wound  * Drainage with a foul odor  * Bleeding  * Increase in swelling  * Need for compression bandage changes due to slippage, breakthrough drainage. If you need medical attention outside of the business hours of the [facility] please contact your PCP or go to the nearest emergency room.

## 2023-11-22 ENCOUNTER — HOSPITAL ENCOUNTER (OUTPATIENT)
Dept: WOUND CARE | Age: 68
Discharge: HOME | End: 2023-11-22
Payer: MEDICARE

## 2023-11-22 VITALS
SYSTOLIC BLOOD PRESSURE: 125 MMHG | WEIGHT: 170 LBS | RESPIRATION RATE: 18 BRPM | BODY MASS INDEX: 25.76 KG/M2 | TEMPERATURE: 98 F | HEIGHT: 68 IN | HEART RATE: 80 BPM | DIASTOLIC BLOOD PRESSURE: 54 MMHG

## 2023-11-22 DIAGNOSIS — I83.023 VARICOSE VEINS OF LEFT LOWER EXTREMITY WITH ULCER OF ANKLE WITH FAT LAYER EXPOSED (HCC): ICD-10-CM

## 2023-11-22 DIAGNOSIS — L97.322 VARICOSE VEINS OF LEFT LOWER EXTREMITY WITH ULCER OF ANKLE WITH FAT LAYER EXPOSED (HCC): ICD-10-CM

## 2023-11-22 DIAGNOSIS — I70.242 ATHEROSCLEROSIS OF NATIVE ARTERY OF LEFT LOWER EXTREMITY WITH ULCERATION OF CALF (HCC): Primary | ICD-10-CM

## 2023-11-22 PROCEDURE — 11042 DBRDMT SUBQ TIS 1ST 20SQCM/<: CPT

## 2023-11-22 PROCEDURE — 11045 DBRDMT SUBQ TISS EACH ADDL: CPT

## 2023-11-22 PROCEDURE — 11045 DBRDMT SUBQ TISS EACH ADDL: CPT | Performed by: SURGERY

## 2023-11-22 PROCEDURE — 11042 DBRDMT SUBQ TIS 1ST 20SQCM/<: CPT | Performed by: SURGERY

## 2023-11-22 RX ORDER — LIDOCAINE HYDROCHLORIDE 20 MG/ML
JELLY TOPICAL ONCE
OUTPATIENT
Start: 2023-11-22 | End: 2023-11-22

## 2023-11-22 RX ORDER — GENTAMICIN SULFATE 1 MG/G
OINTMENT TOPICAL ONCE
OUTPATIENT
Start: 2023-11-22 | End: 2023-11-22

## 2023-11-22 RX ORDER — LIDOCAINE 50 MG/G
OINTMENT TOPICAL ONCE
OUTPATIENT
Start: 2023-11-22 | End: 2023-11-22

## 2023-11-22 RX ORDER — TRIAMCINOLONE ACETONIDE 1 MG/G
OINTMENT TOPICAL ONCE
OUTPATIENT
Start: 2023-11-22 | End: 2023-11-22

## 2023-11-22 RX ORDER — CLOBETASOL PROPIONATE 0.5 MG/G
OINTMENT TOPICAL ONCE
OUTPATIENT
Start: 2023-11-22 | End: 2023-11-22

## 2023-11-22 RX ORDER — GINSENG 100 MG
CAPSULE ORAL ONCE
OUTPATIENT
Start: 2023-11-22 | End: 2023-11-22

## 2023-11-22 RX ORDER — LIDOCAINE HYDROCHLORIDE 40 MG/ML
SOLUTION TOPICAL ONCE
Status: COMPLETED | OUTPATIENT
Start: 2023-11-22 | End: 2023-11-22

## 2023-11-22 RX ORDER — IBUPROFEN 200 MG
TABLET ORAL ONCE
OUTPATIENT
Start: 2023-11-22 | End: 2023-11-22

## 2023-11-22 RX ORDER — LIDOCAINE HYDROCHLORIDE 40 MG/ML
SOLUTION TOPICAL ONCE
OUTPATIENT
Start: 2023-11-22 | End: 2023-11-22

## 2023-11-22 RX ORDER — BETAMETHASONE DIPROPIONATE 0.05 %
OINTMENT (GRAM) TOPICAL ONCE
OUTPATIENT
Start: 2023-11-22 | End: 2023-11-22

## 2023-11-22 RX ORDER — LIDOCAINE 40 MG/G
CREAM TOPICAL ONCE
OUTPATIENT
Start: 2023-11-22 | End: 2023-11-22

## 2023-11-22 RX ORDER — BACITRACIN ZINC AND POLYMYXIN B SULFATE 500; 1000 [USP'U]/G; [USP'U]/G
OINTMENT TOPICAL ONCE
OUTPATIENT
Start: 2023-11-22 | End: 2023-11-22

## 2023-11-22 RX ADMIN — LIDOCAINE HYDROCHLORIDE 5 ML: 40 SOLUTION TOPICAL at 07:51

## 2023-11-22 ASSESSMENT — PAIN DESCRIPTION - DESCRIPTORS: DESCRIPTORS: ACHING;SHARP

## 2023-11-22 ASSESSMENT — PAIN SCALES - GENERAL: PAINLEVEL_OUTOF10: 7

## 2023-11-22 ASSESSMENT — PAIN DESCRIPTION - PAIN TYPE: TYPE: CHRONIC PAIN

## 2023-11-22 ASSESSMENT — PAIN DESCRIPTION - ORIENTATION: ORIENTATION: LEFT

## 2023-11-22 ASSESSMENT — PAIN DESCRIPTION - FREQUENCY: FREQUENCY: CONTINUOUS

## 2023-11-22 ASSESSMENT — PAIN DESCRIPTION - ONSET: ONSET: ON-GOING

## 2023-11-22 ASSESSMENT — PAIN DESCRIPTION - LOCATION: LOCATION: ANKLE

## 2023-11-22 NOTE — PROGRESS NOTES
Assessment Bleeding;Fibrin;Granulation tissue 11/22/23 0751   Drainage Amount Large (50-75% saturated) 11/22/23 0751   Drainage Description Green;Yellow 11/22/23 0751   Odor None 11/15/23 0752   Melany-wound Assessment Maceration 11/22/23 0751   Margins Attached edges 10/18/23 0747   Wound Thickness Description not for Pressure Injury Full thickness 10/18/23 0747   Number of days: 658       Wound 03/16/22 Ankle Posterior; Left #2 (Active)   Wound Image   11/15/23 0752   Wound Etiology Venous 10/18/23 0824   Dressing Status New dressing applied 11/15/23 0842   Wound Cleansed Cleansed with saline 11/15/23 0842   Dressing/Treatment ABD 11/15/23 0842   Offloading for Diabetic Foot Ulcers Offloading not required 10/18/23 0824   Wound Length (cm) 4 cm 11/22/23 0751   Wound Width (cm) 3.1 cm 11/22/23 0751   Wound Depth (cm) 0.1 cm 11/22/23 0751   Wound Surface Area (cm^2) 12.4 cm^2 11/22/23 0751   Change in Wound Size % (l*w) -396 11/22/23 0751   Wound Volume (cm^3) 1.24 cm^3 11/22/23 0751   Wound Healing % -396 11/22/23 0751   Post-Procedure Length (cm) 4.6 cm 11/15/23 0842   Post-Procedure Width (cm) 3.6 cm 11/15/23 0842   Post-Procedure Depth (cm) 0.1 cm 11/15/23 0842   Post-Procedure Surface Area (cm^2) 16.56 cm^2 11/15/23 0842   Post-Procedure Volume (cm^3) 1. 656 cm^3 11/15/23 0842   Wound Assessment Pale granulation tissue;Fibrin 11/22/23 0751   Drainage Amount Large (50-75% saturated) 11/22/23 0751   Drainage Description Yellow;Green 11/22/23 0751   Odor None 11/22/23 0751   Melany-wound Assessment Maceration 11/22/23 0751   Margins Attached edges 10/18/23 0747   Wound Thickness Description not for Pressure Injury Full thickness 10/18/23 0747   Number of days: 616      Procedure Note  Indications:  Based on my examination of this patient's wound(s)/ulcer(s) today, debridement is required to promote healing and evaluate the wound base.     Performed by: Cameron Falcon MD    Consent obtained:  Yes    Time out taken:

## 2023-11-27 NOTE — DISCHARGE INSTRUCTIONS
Visit Discharge/Physician Orders     Discharge condition: Stable     Assessment of pain at discharge: yes     Anesthetic used: 4% lidocaine solution     Discharge to: Home     Left via:Private automobile     Accompanied by:self     ECF/HHA: n/a     Dressing Orders:LEFT MEDIAL and LATERAL LOWER LEG-Cleanse with normal saline, apply zinc to fiona wound,  drawtex, dressing, ABD pad , unna boot and coban. Change weekly. Treatment Orders: Eat a diet high in protein and vitamin C. Take a multiple vitamin daily unless contraindicated. To see Dr. Fitz Palacio as scheduled      Must wear slip on shoe to work due to wrap on leg! [facility] followup visit : 1 week __________________________________  (Please note your next appointment above and if you are unable to keep, kindly give a 24 hour notice. Thank you.)     Physician signature:__________________________     If you experience any of the following, please call the [email or web address] during business hours:     * Increase in Pain  * Temperature over 101  * Increase in drainage from your wound  * Drainage with a foul odor  * Bleeding  * Increase in swelling  * Need for compression bandage changes due to slippage, breakthrough drainage. If you need medical attention outside of the business hours of the [email or web address] please contact your PCP or go to the nearest emergency room.

## 2023-11-29 ENCOUNTER — HOSPITAL ENCOUNTER (OUTPATIENT)
Dept: WOUND CARE | Age: 68
Discharge: HOME | End: 2023-11-29
Payer: MEDICARE

## 2023-11-29 VITALS
WEIGHT: 170 LBS | TEMPERATURE: 97.5 F | DIASTOLIC BLOOD PRESSURE: 63 MMHG | HEART RATE: 76 BPM | SYSTOLIC BLOOD PRESSURE: 132 MMHG | HEIGHT: 68 IN | BODY MASS INDEX: 25.76 KG/M2 | RESPIRATION RATE: 18 BRPM

## 2023-11-29 DIAGNOSIS — I70.242 ATHEROSCLEROSIS OF NATIVE ARTERY OF LEFT LOWER EXTREMITY WITH ULCERATION OF CALF (HCC): Primary | ICD-10-CM

## 2023-11-29 DIAGNOSIS — L97.322 VARICOSE VEINS OF LEFT LOWER EXTREMITY WITH ULCER OF ANKLE WITH FAT LAYER EXPOSED (HCC): ICD-10-CM

## 2023-11-29 DIAGNOSIS — I83.023 VARICOSE VEINS OF LEFT LOWER EXTREMITY WITH ULCER OF ANKLE WITH FAT LAYER EXPOSED (HCC): ICD-10-CM

## 2023-11-29 PROCEDURE — 11042 DBRDMT SUBQ TIS 1ST 20SQCM/<: CPT

## 2023-11-29 PROCEDURE — 11042 DBRDMT SUBQ TIS 1ST 20SQCM/<: CPT | Performed by: SURGERY

## 2023-11-29 PROCEDURE — 11045 DBRDMT SUBQ TISS EACH ADDL: CPT | Performed by: SURGERY

## 2023-11-29 PROCEDURE — 11045 DBRDMT SUBQ TISS EACH ADDL: CPT

## 2023-11-29 RX ORDER — LIDOCAINE HYDROCHLORIDE 40 MG/ML
SOLUTION TOPICAL ONCE
Status: COMPLETED | OUTPATIENT
Start: 2023-11-29 | End: 2023-11-29

## 2023-11-29 RX ORDER — LIDOCAINE HYDROCHLORIDE 20 MG/ML
JELLY TOPICAL ONCE
OUTPATIENT
Start: 2023-11-29 | End: 2023-11-29

## 2023-11-29 RX ORDER — BACITRACIN ZINC AND POLYMYXIN B SULFATE 500; 1000 [USP'U]/G; [USP'U]/G
OINTMENT TOPICAL ONCE
OUTPATIENT
Start: 2023-11-29 | End: 2023-11-29

## 2023-11-29 RX ORDER — IBUPROFEN 200 MG
TABLET ORAL ONCE
OUTPATIENT
Start: 2023-11-29 | End: 2023-11-29

## 2023-11-29 RX ORDER — LIDOCAINE 40 MG/G
CREAM TOPICAL ONCE
OUTPATIENT
Start: 2023-11-29 | End: 2023-11-29

## 2023-11-29 RX ORDER — LIDOCAINE HYDROCHLORIDE 40 MG/ML
SOLUTION TOPICAL ONCE
OUTPATIENT
Start: 2023-11-29 | End: 2023-11-29

## 2023-11-29 RX ORDER — BETAMETHASONE DIPROPIONATE 0.05 %
OINTMENT (GRAM) TOPICAL ONCE
OUTPATIENT
Start: 2023-11-29 | End: 2023-11-29

## 2023-11-29 RX ORDER — TRIAMCINOLONE ACETONIDE 1 MG/G
OINTMENT TOPICAL ONCE
OUTPATIENT
Start: 2023-11-29 | End: 2023-11-29

## 2023-11-29 RX ORDER — LIDOCAINE 50 MG/G
OINTMENT TOPICAL ONCE
OUTPATIENT
Start: 2023-11-29 | End: 2023-11-29

## 2023-11-29 RX ORDER — GINSENG 100 MG
CAPSULE ORAL ONCE
OUTPATIENT
Start: 2023-11-29 | End: 2023-11-29

## 2023-11-29 RX ORDER — CLOBETASOL PROPIONATE 0.5 MG/G
OINTMENT TOPICAL ONCE
OUTPATIENT
Start: 2023-11-29 | End: 2023-11-29

## 2023-11-29 RX ORDER — GENTAMICIN SULFATE 1 MG/G
OINTMENT TOPICAL ONCE
OUTPATIENT
Start: 2023-11-29 | End: 2023-11-29

## 2023-11-29 RX ADMIN — LIDOCAINE HYDROCHLORIDE 10 ML: 40 SOLUTION TOPICAL at 08:05

## 2023-11-29 ASSESSMENT — PAIN DESCRIPTION - DESCRIPTORS: DESCRIPTORS: ACHING;SHARP

## 2023-11-29 ASSESSMENT — PAIN - FUNCTIONAL ASSESSMENT: PAIN_FUNCTIONAL_ASSESSMENT: PREVENTS OR INTERFERES SOME ACTIVE ACTIVITIES AND ADLS

## 2023-11-29 ASSESSMENT — PAIN SCALES - GENERAL: PAINLEVEL_OUTOF10: 8

## 2023-11-29 ASSESSMENT — PAIN DESCRIPTION - ONSET: ONSET: ON-GOING

## 2023-11-29 ASSESSMENT — PAIN DESCRIPTION - LOCATION: LOCATION: LEG;ANKLE

## 2023-11-29 ASSESSMENT — PAIN DESCRIPTION - PAIN TYPE: TYPE: CHRONIC PAIN

## 2023-11-29 ASSESSMENT — PAIN DESCRIPTION - ORIENTATION: ORIENTATION: LEFT

## 2023-11-29 ASSESSMENT — PAIN DESCRIPTION - FREQUENCY: FREQUENCY: CONTINUOUS

## 2023-11-29 NOTE — PLAN OF CARE
Problem: Pain  Goal: Verbalizes/displays adequate comfort level or baseline comfort level  Outcome: Progressing     Problem: Wound:  Goal: Will show signs of wound healing; wound closure and no evidence of infection  Description: Will show signs of wound healing; wound closure and no evidence of infection  Outcome: Progressing     Problem: Venous:  Goal: Signs of wound healing will improve  Description: Signs of wound healing will improve  Outcome: Progressing     Problem: Compression therapy:  Goal: Will be free from complications associated with compression therapy  Description: Will be free from complications associated with compression therapy  Outcome: Adequate for Discharge

## 2023-11-29 NOTE — PROGRESS NOTES
6.768 cm^3 11/29/23 0815   Wound Assessment Fibrin;Granulation tissue 11/29/23 0754   Drainage Amount Large (50-75% saturated) 11/29/23 0754   Drainage Description Green;Yellow 11/29/23 0754   Odor None 11/29/23 0754   Melany-wound Assessment Maceration 11/29/23 0754   Margins Attached edges 10/18/23 0747   Wound Thickness Description not for Pressure Injury Full thickness 10/18/23 0747   Number of days: 665       Wound 03/16/22 Ankle Posterior; Left #2 (Active)   Wound Image   11/15/23 0752   Wound Etiology Venous 10/18/23 0824   Dressing Status New dressing applied 11/22/23 0837   Wound Cleansed Cleansed with saline 11/22/23 0837   Dressing/Treatment ABD 11/22/23 0837   Offloading for Diabetic Foot Ulcers Offloading not required 10/18/23 0824   Wound Length (cm) 3.8 cm 11/29/23 0754   Wound Width (cm) 3.5 cm 11/29/23 0754   Wound Depth (cm) 0.1 cm 11/29/23 0754   Wound Surface Area (cm^2) 13.3 cm^2 11/29/23 0754   Change in Wound Size % (l*w) -432 11/29/23 0754   Wound Volume (cm^3) 1.33 cm^3 11/29/23 0754   Wound Healing % -432 11/29/23 0754   Post-Procedure Length (cm) 3.8 cm 11/29/23 0815   Post-Procedure Width (cm) 3.5 cm 11/29/23 0815   Post-Procedure Depth (cm) 0.2 cm 11/29/23 0815   Post-Procedure Surface Area (cm^2) 13.3 cm^2 11/29/23 0815   Post-Procedure Volume (cm^3) 2.66 cm^3 11/29/23 0815   Wound Assessment Pale granulation tissue;Fibrin 11/29/23 0754   Drainage Amount Large (50-75% saturated) 11/29/23 0754   Drainage Description Yellow;Green 11/29/23 0754   Odor None 11/29/23 0754   Melany-wound Assessment Maceration 11/29/23 0754   Margins Attached edges 10/18/23 0747   Wound Thickness Description not for Pressure Injury Full thickness 10/18/23 0747   Number of days: 623      Procedure Note  Indications:  Based on my examination of this patient's wound(s)/ulcer(s) today, debridement is required to promote healing and evaluate the wound base.     Performed by: Rock Yenni MD    Consent

## 2023-12-05 NOTE — DISCHARGE INSTRUCTIONS
Written      Visit Discharge/Physician Orders     Discharge condition: Stable     Assessment of pain at discharge: yes     Anesthetic used: 4% lidocaine solution     Discharge to: Home     Left via:Private automobile     Accompanied by:self     ECF/HHA: n/a     Dressing Orders:LEFT MEDIAL and LATERAL LOWER LEG- Cleanse with normal saline, apply zinc to fiona wound,  drawtex, dressing, ABD pad , unna boot and coban. Change weekly. Treatment Orders: Eat a diet high in protein and vitamin C. Take a multiple vitamin daily unless contraindicated. To see Dr. Boyd Herman as scheduled      Must wear slip on shoe to work due to wrap on leg! [facility] followup visit : 1 week __________________________________  (Please note your next appointment above and if you are unable to keep, kindly give a 24 hour notice. Thank you.)     Physician signature:__________________________     If you experience any of the following, please call the [facility] during business hours:     * Increase in Pain  * Temperature over 101  * Increase in drainage from your wound  * Drainage with a foul odor  * Bleeding  * Increase in swelling  * Need for compression bandage changes due to slippage, breakthrough drainage. If you need medical attention outside of the business hours of the [facility] please contact your PCP or go to the nearest emergency room.

## 2023-12-06 ENCOUNTER — HOSPITAL ENCOUNTER (OUTPATIENT)
Dept: WOUND CARE | Age: 68
Discharge: HOME | End: 2023-12-06
Payer: MEDICARE

## 2023-12-06 VITALS
SYSTOLIC BLOOD PRESSURE: 116 MMHG | WEIGHT: 170 LBS | HEIGHT: 68 IN | RESPIRATION RATE: 18 BRPM | DIASTOLIC BLOOD PRESSURE: 59 MMHG | BODY MASS INDEX: 25.76 KG/M2 | TEMPERATURE: 98.4 F | HEART RATE: 80 BPM

## 2023-12-06 DIAGNOSIS — I70.242 ATHEROSCLEROSIS OF NATIVE ARTERY OF LEFT LOWER EXTREMITY WITH ULCERATION OF CALF (HCC): ICD-10-CM

## 2023-12-06 DIAGNOSIS — I83.023 VARICOSE VEINS OF LEFT LOWER EXTREMITY WITH ULCER OF ANKLE WITH FAT LAYER EXPOSED (HCC): Primary | ICD-10-CM

## 2023-12-06 DIAGNOSIS — L97.322 VARICOSE VEINS OF LEFT LOWER EXTREMITY WITH ULCER OF ANKLE WITH FAT LAYER EXPOSED (HCC): Primary | ICD-10-CM

## 2023-12-06 PROCEDURE — 11045 DBRDMT SUBQ TISS EACH ADDL: CPT | Performed by: SURGERY

## 2023-12-06 PROCEDURE — 11045 DBRDMT SUBQ TISS EACH ADDL: CPT

## 2023-12-06 PROCEDURE — 11042 DBRDMT SUBQ TIS 1ST 20SQCM/<: CPT | Performed by: SURGERY

## 2023-12-06 PROCEDURE — 11042 DBRDMT SUBQ TIS 1ST 20SQCM/<: CPT

## 2023-12-06 RX ORDER — CLOBETASOL PROPIONATE 0.5 MG/G
OINTMENT TOPICAL ONCE
OUTPATIENT
Start: 2023-12-06 | End: 2023-12-06

## 2023-12-06 RX ORDER — LIDOCAINE 40 MG/G
CREAM TOPICAL ONCE
OUTPATIENT
Start: 2023-12-06 | End: 2023-12-06

## 2023-12-06 RX ORDER — OXYCODONE AND ACETAMINOPHEN 7.5; 325 MG/1; MG/1
1 TABLET ORAL EVERY 8 HOURS PRN
Qty: 42 TABLET | Refills: 0 | Status: SHIPPED | OUTPATIENT
Start: 2023-12-06 | End: 2023-12-20

## 2023-12-06 RX ORDER — LIDOCAINE 50 MG/G
OINTMENT TOPICAL ONCE
OUTPATIENT
Start: 2023-12-06 | End: 2023-12-06

## 2023-12-06 RX ORDER — LIDOCAINE HYDROCHLORIDE 20 MG/ML
JELLY TOPICAL ONCE
OUTPATIENT
Start: 2023-12-06 | End: 2023-12-06

## 2023-12-06 RX ORDER — GINSENG 100 MG
CAPSULE ORAL ONCE
OUTPATIENT
Start: 2023-12-06 | End: 2023-12-06

## 2023-12-06 RX ORDER — LIDOCAINE HYDROCHLORIDE 40 MG/ML
SOLUTION TOPICAL ONCE
OUTPATIENT
Start: 2023-12-06 | End: 2023-12-06

## 2023-12-06 RX ORDER — GENTAMICIN SULFATE 1 MG/G
OINTMENT TOPICAL ONCE
OUTPATIENT
Start: 2023-12-06 | End: 2023-12-06

## 2023-12-06 RX ORDER — LIDOCAINE HYDROCHLORIDE 40 MG/ML
SOLUTION TOPICAL ONCE
Status: COMPLETED | OUTPATIENT
Start: 2023-12-06 | End: 2023-12-06

## 2023-12-06 RX ORDER — BACITRACIN ZINC AND POLYMYXIN B SULFATE 500; 1000 [USP'U]/G; [USP'U]/G
OINTMENT TOPICAL ONCE
OUTPATIENT
Start: 2023-12-06 | End: 2023-12-06

## 2023-12-06 RX ORDER — TRIAMCINOLONE ACETONIDE 1 MG/G
OINTMENT TOPICAL ONCE
OUTPATIENT
Start: 2023-12-06 | End: 2023-12-06

## 2023-12-06 RX ORDER — IBUPROFEN 200 MG
TABLET ORAL ONCE
OUTPATIENT
Start: 2023-12-06 | End: 2023-12-06

## 2023-12-06 RX ORDER — BETAMETHASONE DIPROPIONATE 0.05 %
OINTMENT (GRAM) TOPICAL ONCE
OUTPATIENT
Start: 2023-12-06 | End: 2023-12-06

## 2023-12-06 RX ADMIN — LIDOCAINE HYDROCHLORIDE 5 ML: 40 SOLUTION TOPICAL at 08:11

## 2023-12-06 ASSESSMENT — PAIN DESCRIPTION - DESCRIPTORS: DESCRIPTORS: ACHING

## 2023-12-06 ASSESSMENT — PAIN DESCRIPTION - PAIN TYPE: TYPE: CHRONIC PAIN

## 2023-12-06 ASSESSMENT — PAIN DESCRIPTION - LOCATION: LOCATION: ANKLE;LEG

## 2023-12-06 ASSESSMENT — PAIN SCALES - GENERAL: PAINLEVEL_OUTOF10: 7

## 2023-12-06 ASSESSMENT — PAIN DESCRIPTION - FREQUENCY: FREQUENCY: CONTINUOUS

## 2023-12-06 ASSESSMENT — PAIN DESCRIPTION - ORIENTATION: ORIENTATION: LEFT

## 2023-12-06 ASSESSMENT — PAIN DESCRIPTION - ONSET: ONSET: ON-GOING

## 2023-12-06 NOTE — PROGRESS NOTES
Wound Healing Center Followup Visit Note    Referring Physician : Sudarshan Mujica MD  [address] RECORD NUMBER:  [id number]  AGE: 79 y.o. GENDER: female  : 1955  EPISODE DATE:  2023    Subjective:     Chief Complaint   Patient presents with    Wound Check     Left ankle      HISTORY of PRESENT ILLNESS HPI   Sherri Fung is a 79 y.o. female who presents today in regards to follow up evaluation and treatment of wound/ulcer. That patient's past medical, family and social hx were reviewed and changes were made if present. History of Wound Context:  The patient has had a wound of her left ankle/calf which was first noted approximately 2021. This has been treated local wound care. On their initial visit to the wound healing center, 22,  the patient has noted that the wound has been improving. The patient has not had similar previous wounds in the past.      She started seeing Dr. Magdalena Arreguin in 2021 and than Dr. Amanda Strickland. She was started in [facility] ~ 2021. She has noticed some improvement since starting unna boot. She is currently following with Dr. Douglas Bello. Pt is not on abx at time of initial visit, but has been treated with previously by podiatry. She is not a DM. She is not a smoker. She denies hx of DVT, and per her report had recent us noting no evidence of dvt at [facility]. She also had arterial studies done. I had previously seen her in the past in regards to left buttock thigh wound, which started as abscess. Pt works at [employer] in [place] [employer] and is on her feet all day.     22  Reflux study - if significant findings will schedule for fu  Continue compression therapy [facility] per podiatry with aquacell dressing  Elevation  She does not have significant arterial occlusive disease  22  Patient has asked to continuing following with me going forward because of our previous relationship  She is going to let Dr. Sandra Castro office know  She

## 2023-12-11 NOTE — DISCHARGE INSTRUCTIONS
Written                  Visit Discharge/Physician Orders     Discharge condition: Stable     Assessment of pain at discharge: yes     Anesthetic used: 4% lidocaine solution     Discharge to: Home     Left via:Private automobile     Accompanied by:self     ECF/HHA: n/a     Dressing Orders:LEFT MEDIAL and LATERAL LOWER LEG- Cleanse with normal saline, apply zinc to fiona wound,  drawtex, dressing, ABD pad , unna boot and coban. Change weekly. Treatment Orders: Eat a diet high in protein and vitamin C. Take a multiple vitamin daily unless contraindicated. To see Dr. Jose Gaffney as scheduled      Must wear slip on shoe to work due to wrap on leg! [facility] followup visit : 1 week __________________________________  (Please note your next appointment above and if you are unable to keep, kindly give a 24 hour notice. Thank you.)     Physician signature:__________________________     If you experience any of the following, please call the [facility] during business hours:     * Increase in Pain  * Temperature over 101  * Increase in drainage from your wound  * Drainage with a foul odor  * Bleeding  * Increase in swelling  * Need for compression bandage changes due to slippage, breakthrough drainage. If you need medical attention outside of the business hours of the [facility] please contact your PCP or go to the nearest emergency room.

## 2023-12-13 ENCOUNTER — HOSPITAL ENCOUNTER (OUTPATIENT)
Dept: WOUND CARE | Age: 68
Discharge: HOME | End: 2023-12-13
Payer: MEDICARE

## 2023-12-13 VITALS
HEIGHT: 68 IN | WEIGHT: 170 LBS | RESPIRATION RATE: 18 BRPM | DIASTOLIC BLOOD PRESSURE: 58 MMHG | SYSTOLIC BLOOD PRESSURE: 112 MMHG | BODY MASS INDEX: 25.76 KG/M2 | TEMPERATURE: 95.8 F | HEART RATE: 86 BPM

## 2023-12-13 DIAGNOSIS — I70.242 ATHEROSCLEROSIS OF NATIVE ARTERY OF LEFT LOWER EXTREMITY WITH ULCERATION OF CALF (HCC): ICD-10-CM

## 2023-12-13 DIAGNOSIS — L97.322 VARICOSE VEINS OF LEFT LOWER EXTREMITY WITH ULCER OF ANKLE WITH FAT LAYER EXPOSED (HCC): Primary | ICD-10-CM

## 2023-12-13 DIAGNOSIS — I83.023 VARICOSE VEINS OF LEFT LOWER EXTREMITY WITH ULCER OF ANKLE WITH FAT LAYER EXPOSED (HCC): Primary | ICD-10-CM

## 2023-12-13 PROCEDURE — 11042 DBRDMT SUBQ TIS 1ST 20SQCM/<: CPT

## 2023-12-13 RX ORDER — LIDOCAINE HYDROCHLORIDE 40 MG/ML
SOLUTION TOPICAL ONCE
OUTPATIENT
Start: 2023-12-13 | End: 2023-12-13

## 2023-12-13 RX ORDER — IBUPROFEN 200 MG
TABLET ORAL ONCE
OUTPATIENT
Start: 2023-12-13 | End: 2023-12-13

## 2023-12-13 RX ORDER — TRIAMCINOLONE ACETONIDE 1 MG/G
OINTMENT TOPICAL ONCE
OUTPATIENT
Start: 2023-12-13 | End: 2023-12-13

## 2023-12-13 RX ORDER — LIDOCAINE HYDROCHLORIDE 20 MG/ML
JELLY TOPICAL ONCE
OUTPATIENT
Start: 2023-12-13 | End: 2023-12-13

## 2023-12-13 RX ORDER — LIDOCAINE 40 MG/G
CREAM TOPICAL ONCE
OUTPATIENT
Start: 2023-12-13 | End: 2023-12-13

## 2023-12-13 RX ORDER — BACITRACIN ZINC AND POLYMYXIN B SULFATE 500; 1000 [USP'U]/G; [USP'U]/G
OINTMENT TOPICAL ONCE
OUTPATIENT
Start: 2023-12-13 | End: 2023-12-13

## 2023-12-13 RX ORDER — LIDOCAINE HYDROCHLORIDE 40 MG/ML
SOLUTION TOPICAL ONCE
Status: COMPLETED | OUTPATIENT
Start: 2023-12-13 | End: 2023-12-13

## 2023-12-13 RX ORDER — GINSENG 100 MG
CAPSULE ORAL ONCE
OUTPATIENT
Start: 2023-12-13 | End: 2023-12-13

## 2023-12-13 RX ORDER — BETAMETHASONE DIPROPIONATE 0.05 %
OINTMENT (GRAM) TOPICAL ONCE
OUTPATIENT
Start: 2023-12-13 | End: 2023-12-13

## 2023-12-13 RX ORDER — CLOBETASOL PROPIONATE 0.5 MG/G
OINTMENT TOPICAL ONCE
OUTPATIENT
Start: 2023-12-13 | End: 2023-12-13

## 2023-12-13 RX ORDER — GENTAMICIN SULFATE 1 MG/G
OINTMENT TOPICAL ONCE
OUTPATIENT
Start: 2023-12-13 | End: 2023-12-13

## 2023-12-13 RX ORDER — LIDOCAINE 50 MG/G
OINTMENT TOPICAL ONCE
OUTPATIENT
Start: 2023-12-13 | End: 2023-12-13

## 2023-12-13 RX ADMIN — LIDOCAINE HYDROCHLORIDE 10 ML: 40 SOLUTION TOPICAL at 08:03

## 2023-12-13 ASSESSMENT — PAIN DESCRIPTION - ONSET: ONSET: ON-GOING

## 2023-12-13 ASSESSMENT — PAIN DESCRIPTION - FREQUENCY: FREQUENCY: CONTINUOUS

## 2023-12-13 ASSESSMENT — PAIN - FUNCTIONAL ASSESSMENT: PAIN_FUNCTIONAL_ASSESSMENT: PREVENTS OR INTERFERES SOME ACTIVE ACTIVITIES AND ADLS

## 2023-12-13 ASSESSMENT — PAIN DESCRIPTION - LOCATION: LOCATION: LEG

## 2023-12-13 ASSESSMENT — PAIN DESCRIPTION - PAIN TYPE: TYPE: CHRONIC PAIN

## 2023-12-13 ASSESSMENT — PAIN DESCRIPTION - DESCRIPTORS: DESCRIPTORS: ACHING;BURNING

## 2023-12-13 ASSESSMENT — PAIN DESCRIPTION - ORIENTATION: ORIENTATION: LEFT

## 2023-12-13 ASSESSMENT — PAIN SCALES - GENERAL: PAINLEVEL_OUTOF10: 10

## 2023-12-13 NOTE — PROGRESS NOTES
(l*w) -38.58 12/13/23 0800   Wound Volume (cm^3) 7.5 cm^3 12/13/23 0800   Wound Healing % -177 12/13/23 0800   Post-Procedure Length (cm) 7.6 cm 12/13/23 0836   Post-Procedure Width (cm) 5 cm 12/13/23 0836   Post-Procedure Depth (cm) 0.1 cm 12/13/23 0836   Post-Procedure Surface Area (cm^2) 38 cm^2 12/13/23 0836   Post-Procedure Volume (cm^3) 3.8 cm^3 12/13/23 0836   Wound Assessment Fibrin;Granulation tissue 12/13/23 0800   Drainage Amount Large (50-75% saturated) 12/13/23 0800   Drainage Description Green;Yellow 12/13/23 0800   Odor None 12/13/23 0800   Melany-wound Assessment Maceration 12/13/23 0800   Margins Attached edges 10/18/23 0747   Wound Thickness Description not for Pressure Injury Full thickness 10/18/23 0747   Number of days: 679       Wound 03/16/22 Ankle Posterior; Left #2 (Active)   Wound Image   12/13/23 0800   Wound Etiology Venous 10/18/23 0824   Dressing Status New dressing applied 12/06/23 0922   Wound Cleansed Cleansed with saline 12/06/23 0922   Dressing/Treatment ABD 12/06/23 0922   Offloading for Diabetic Foot Ulcers Offloading not required 10/18/23 0824   Wound Length (cm) 4.2 cm 12/13/23 0800   Wound Width (cm) 3.3 cm 12/13/23 0800   Wound Depth (cm) 0.1 cm 12/13/23 0800   Wound Surface Area (cm^2) 13.86 cm^2 12/13/23 0800   Change in Wound Size % (l*w) -454.4 12/13/23 0800   Wound Volume (cm^3) 1.386 cm^3 12/13/23 0800   Wound Healing % -454 12/13/23 0800   Post-Procedure Length (cm) 4.3 cm 12/13/23 0836   Post-Procedure Width (cm) 3.4 cm 12/13/23 0836   Post-Procedure Depth (cm) 0.1 cm 12/13/23 0836   Post-Procedure Surface Area (cm^2) 14.62 cm^2 12/13/23 0836   Post-Procedure Volume (cm^3) 1.462 cm^3 12/13/23 0836   Wound Assessment Pale granulation tissue;Fibrin 12/13/23 0800   Drainage Amount Large (50-75% saturated) 12/13/23 0800   Drainage Description Yellow;Green 12/13/23 0800   Odor None 12/13/23 0800   Melany-wound Assessment Maceration 12/13/23 0800   Margins Attached edges

## 2023-12-20 NOTE — DISCHARGE INSTRUCTIONS
Visit Discharge/Physician Orders     Discharge condition: Stable     Assessment of pain at discharge: yes     Anesthetic used: 4% lidocaine solution     Discharge to: Home     Left via:Private automobile     Accompanied by:self     ECF/HHA: n/a     Dressing Orders:LEFT MEDIAL and LATERAL LOWER LEG- Cleanse with normal saline, apply zinc to fiona wound,  drawtex, dressing, ABD pad , unna boot and coban. Change weekly. Treatment Orders: Eat a diet high in protein and vitamin C. Take a multiple vitamin daily unless contraindicated. To see Dr. Jose Gaffney as scheduled      Must wear slip on shoe to work due to wrap on leg! [facility] followup visit : 1 week __________________________________  (Please note your next appointment above and if you are unable to keep, kindly give a 24 hour notice. Thank you.)     Physician signature:__________________________     If you experience any of the following, please call the [email or web address] during business hours:     * Increase in Pain  * Temperature over 101  * Increase in drainage from your wound  * Drainage with a foul odor  * Bleeding  * Increase in swelling  * Need for compression bandage changes due to slippage, breakthrough drainage. If you need medical attention outside of the business hours of the [email or web address] please contact your PCP or go to the nearest emergency room.

## 2023-12-27 ENCOUNTER — HOSPITAL ENCOUNTER (OUTPATIENT)
Dept: WOUND CARE | Age: 68
Discharge: HOME | End: 2023-12-27
Payer: MEDICARE

## 2023-12-27 VITALS
HEIGHT: 68 IN | TEMPERATURE: 96.9 F | SYSTOLIC BLOOD PRESSURE: 110 MMHG | DIASTOLIC BLOOD PRESSURE: 42 MMHG | HEART RATE: 76 BPM | BODY MASS INDEX: 25.76 KG/M2 | WEIGHT: 170 LBS | RESPIRATION RATE: 18 BRPM

## 2023-12-27 DIAGNOSIS — I83.023 VARICOSE VEINS OF LEFT LOWER EXTREMITY WITH ULCER OF ANKLE WITH FAT LAYER EXPOSED (HCC): Primary | ICD-10-CM

## 2023-12-27 DIAGNOSIS — I70.242 ATHEROSCLEROSIS OF NATIVE ARTERY OF LEFT LOWER EXTREMITY WITH ULCERATION OF CALF (HCC): ICD-10-CM

## 2023-12-27 DIAGNOSIS — L97.322 VARICOSE VEINS OF LEFT LOWER EXTREMITY WITH ULCER OF ANKLE WITH FAT LAYER EXPOSED (HCC): Primary | ICD-10-CM

## 2023-12-27 PROCEDURE — 11042 DBRDMT SUBQ TIS 1ST 20SQCM/<: CPT

## 2023-12-27 PROCEDURE — 11042 DBRDMT SUBQ TIS 1ST 20SQCM/<: CPT | Performed by: SURGERY

## 2023-12-27 PROCEDURE — 11045 DBRDMT SUBQ TISS EACH ADDL: CPT | Performed by: SURGERY

## 2023-12-27 PROCEDURE — 11045 DBRDMT SUBQ TISS EACH ADDL: CPT

## 2023-12-27 RX ORDER — LIDOCAINE HYDROCHLORIDE 20 MG/ML
JELLY TOPICAL ONCE
OUTPATIENT
Start: 2023-12-27 | End: 2023-12-27

## 2023-12-27 RX ORDER — GINSENG 100 MG
CAPSULE ORAL ONCE
OUTPATIENT
Start: 2023-12-27 | End: 2023-12-27

## 2023-12-27 RX ORDER — BETAMETHASONE DIPROPIONATE 0.05 %
OINTMENT (GRAM) TOPICAL ONCE
OUTPATIENT
Start: 2023-12-27 | End: 2023-12-27

## 2023-12-27 RX ORDER — GENTAMICIN SULFATE 1 MG/G
OINTMENT TOPICAL ONCE
OUTPATIENT
Start: 2023-12-27 | End: 2023-12-27

## 2023-12-27 RX ORDER — IBUPROFEN 200 MG
TABLET ORAL ONCE
OUTPATIENT
Start: 2023-12-27 | End: 2023-12-27

## 2023-12-27 RX ORDER — CLOBETASOL PROPIONATE 0.5 MG/G
OINTMENT TOPICAL ONCE
OUTPATIENT
Start: 2023-12-27 | End: 2023-12-27

## 2023-12-27 RX ORDER — LIDOCAINE 40 MG/G
CREAM TOPICAL ONCE
OUTPATIENT
Start: 2023-12-27 | End: 2023-12-27

## 2023-12-27 RX ORDER — TRIAMCINOLONE ACETONIDE 1 MG/G
OINTMENT TOPICAL ONCE
OUTPATIENT
Start: 2023-12-27 | End: 2023-12-27

## 2023-12-27 RX ORDER — LIDOCAINE HYDROCHLORIDE 40 MG/ML
SOLUTION TOPICAL ONCE
OUTPATIENT
Start: 2023-12-27 | End: 2023-12-27

## 2023-12-27 RX ORDER — LIDOCAINE 50 MG/G
OINTMENT TOPICAL ONCE
OUTPATIENT
Start: 2023-12-27 | End: 2023-12-27

## 2023-12-27 RX ORDER — LIDOCAINE HYDROCHLORIDE 40 MG/ML
SOLUTION TOPICAL ONCE
Status: COMPLETED | OUTPATIENT
Start: 2023-12-27 | End: 2023-12-27

## 2023-12-27 RX ORDER — BACITRACIN ZINC AND POLYMYXIN B SULFATE 500; 1000 [USP'U]/G; [USP'U]/G
OINTMENT TOPICAL ONCE
OUTPATIENT
Start: 2023-12-27 | End: 2023-12-27

## 2023-12-27 RX ADMIN — LIDOCAINE HYDROCHLORIDE 5 ML: 40 SOLUTION TOPICAL at 07:51

## 2023-12-27 NOTE — PROGRESS NOTES
Wound Healing Center Followup Visit Note    Referring Physician : Kelsey Disla MD  [address] RECORD NUMBER:  [id number]  AGE: 79 y.o. GENDER: female  : 1955  EPISODE DATE:  2023    Subjective:     Chief Complaint   Patient presents with    Wound Check     Left ankle      HISTORY of PRESENT ILLNESS HPI   Steven Shankar is a 79 y.o. female who presents today in regards to follow up evaluation and treatment of wound/ulcer. That patient's past medical, family and social hx were reviewed and changes were made if present. History of Wound Context:  The patient has had a wound of her left ankle/calf which was first noted approximately 2021. This has been treated local wound care. On their initial visit to the wound healing center, 22,  the patient has noted that the wound has been improving. The patient has not had similar previous wounds in the past.      She started seeing Dr. Nicki Almendarez in 2021 and than Dr. Lourdes Son. She was started in [facility] ~ 2021. She has noticed some improvement since starting [facility] boot. She is currently following with Dr. Critsi Aviles. Pt is not on abx at time of initial visit, but has been treated with previously by podiatry. She is not a DM. She is not a smoker. She denies hx of DVT, and per her report had recent us noting no evidence of dvt at [facility]. She also had arterial studies done. I had previously seen her in the past in regards to left buttock thigh wound, which started as abscess. Pt works at [employer] in [place] and is on her feet all day.     22  Reflux study - if significant findings will schedule for fu  Continue compression therapy [facility] bo per podiatry with aquacell dressing  Elevation  She does not have significant arterial occlusive disease  22  Patient has asked to continuing following with me going forward because of our previous relationship  She is going to let Dr. Tang Bench office

## 2023-12-29 NOTE — DISCHARGE INSTRUCTIONS
Discharge Instructions           Visit Discharge/Physician Orders     Discharge condition: Stable     Assessment of pain at discharge: yes     Anesthetic used: 4% lidocaine solution     Discharge to: Home     Left via:Private automobile     Accompanied by:self     ECF/HHA: n/a     Dressing Orders:LEFT MEDIAL and LATERAL LOWER LEG- Cleanse with normal saline, apply zinc to fiona wound,  drawtex, dressing, ABD pad , unna boot and coban. Change weekly.      Treatment Orders: Eat a diet high in protein and vitamin C. Take a multiple vitamin daily unless contraindicated.       To see Dr. Duncan as scheduled      Must wear slip on shoe to work due to wrap on leg!        [facility] followup visit : 1 week __________________________________  (Please note your next appointment above and if you are unable to keep, kindly give a 24 hour notice. Thank you.)     Physician signature:__________________________     If you experience any of the following, please call the Wound Care Center during business hours:     * Increase in Pain  * Temperature over 101  * Increase in drainage from your wound  * Drainage with a foul odor  * Bleeding  * Increase in swelling  * Need for compression bandage changes due to slippage, breakthrough drainage.     If you need medical attention outside of the business hours of the Wound Care Centers please contact your PCP or go to the nearest emergency room.

## 2024-01-03 ENCOUNTER — HOSPITAL ENCOUNTER (OUTPATIENT)
Dept: WOUND CARE | Age: 69
Discharge: HOME | End: 2024-01-03
Payer: MEDICARE

## 2024-01-03 VITALS
RESPIRATION RATE: 18 BRPM | HEART RATE: 78 BPM | HEIGHT: 68 IN | DIASTOLIC BLOOD PRESSURE: 58 MMHG | SYSTOLIC BLOOD PRESSURE: 112 MMHG | TEMPERATURE: 96.1 F | BODY MASS INDEX: 25.76 KG/M2 | WEIGHT: 170 LBS

## 2024-01-03 DIAGNOSIS — I83.023 VARICOSE VEINS OF LEFT LOWER EXTREMITY WITH ULCER OF ANKLE WITH FAT LAYER EXPOSED (HCC): Primary | ICD-10-CM

## 2024-01-03 DIAGNOSIS — L97.322 VARICOSE VEINS OF LEFT LOWER EXTREMITY WITH ULCER OF ANKLE WITH FAT LAYER EXPOSED (HCC): Primary | ICD-10-CM

## 2024-01-03 DIAGNOSIS — I70.242 ATHEROSCLEROSIS OF NATIVE ARTERY OF LEFT LOWER EXTREMITY WITH ULCERATION OF CALF (HCC): ICD-10-CM

## 2024-01-03 PROCEDURE — 11042 DBRDMT SUBQ TIS 1ST 20SQCM/<: CPT

## 2024-01-03 PROCEDURE — 11042 DBRDMT SUBQ TIS 1ST 20SQCM/<: CPT | Performed by: SURGERY

## 2024-01-03 PROCEDURE — 11045 DBRDMT SUBQ TISS EACH ADDL: CPT | Performed by: SURGERY

## 2024-01-03 PROCEDURE — 11045 DBRDMT SUBQ TISS EACH ADDL: CPT

## 2024-01-03 RX ORDER — LIDOCAINE HYDROCHLORIDE 40 MG/ML
SOLUTION TOPICAL ONCE
OUTPATIENT
Start: 2024-01-03 | End: 2024-01-03

## 2024-01-03 RX ORDER — GENTAMICIN SULFATE 1 MG/G
OINTMENT TOPICAL ONCE
OUTPATIENT
Start: 2024-01-03 | End: 2024-01-03

## 2024-01-03 RX ORDER — LIDOCAINE HYDROCHLORIDE 40 MG/ML
SOLUTION TOPICAL ONCE
Status: COMPLETED | OUTPATIENT
Start: 2024-01-03 | End: 2024-01-03

## 2024-01-03 RX ORDER — GINSENG 100 MG
CAPSULE ORAL ONCE
OUTPATIENT
Start: 2024-01-03 | End: 2024-01-03

## 2024-01-03 RX ORDER — BETAMETHASONE DIPROPIONATE 0.05 %
OINTMENT (GRAM) TOPICAL ONCE
OUTPATIENT
Start: 2024-01-03 | End: 2024-01-03

## 2024-01-03 RX ORDER — LIDOCAINE 40 MG/G
CREAM TOPICAL ONCE
OUTPATIENT
Start: 2024-01-03 | End: 2024-01-03

## 2024-01-03 RX ORDER — LIDOCAINE HYDROCHLORIDE 20 MG/ML
JELLY TOPICAL ONCE
OUTPATIENT
Start: 2024-01-03 | End: 2024-01-03

## 2024-01-03 RX ORDER — CLOBETASOL PROPIONATE 0.5 MG/G
OINTMENT TOPICAL ONCE
OUTPATIENT
Start: 2024-01-03 | End: 2024-01-03

## 2024-01-03 RX ORDER — TRIAMCINOLONE ACETONIDE 1 MG/G
OINTMENT TOPICAL ONCE
OUTPATIENT
Start: 2024-01-03 | End: 2024-01-03

## 2024-01-03 RX ORDER — IBUPROFEN 200 MG
TABLET ORAL ONCE
OUTPATIENT
Start: 2024-01-03 | End: 2024-01-03

## 2024-01-03 RX ORDER — LIDOCAINE 50 MG/G
OINTMENT TOPICAL ONCE
OUTPATIENT
Start: 2024-01-03 | End: 2024-01-03

## 2024-01-03 RX ORDER — BACITRACIN ZINC AND POLYMYXIN B SULFATE 500; 1000 [USP'U]/G; [USP'U]/G
OINTMENT TOPICAL ONCE
OUTPATIENT
Start: 2024-01-03 | End: 2024-01-03

## 2024-01-03 RX ADMIN — LIDOCAINE HYDROCHLORIDE 5 ML: 40 SOLUTION TOPICAL at 08:08

## 2024-01-03 ASSESSMENT — PAIN SCALES - GENERAL: PAINLEVEL_OUTOF10: 9

## 2024-01-03 ASSESSMENT — PAIN DESCRIPTION - LOCATION: LOCATION: LEG

## 2024-01-03 ASSESSMENT — PAIN DESCRIPTION - DESCRIPTORS: DESCRIPTORS: THROBBING;STABBING

## 2024-01-03 ASSESSMENT — PAIN DESCRIPTION - ORIENTATION: ORIENTATION: LEFT

## 2024-01-03 NOTE — PROGRESS NOTES
Fibrin;Pink/red 01/03/24 0757   Drainage Amount Moderate (25-50%) 01/03/24 0757   Drainage Description Serosanguinous;Yellow 01/03/24 0757   Odor None 01/03/24 0757   Fiona-wound Assessment Dry/flaky;Maceration 01/03/24 0757   Margins Attached edges 12/20/23 0755   Wound Thickness Description not for Pressure Injury Full thickness 12/20/23 0755   Number of days: 658      Procedure Note  Indications:  Based on my examination of this patient's wound(s)/ulcer(s) today, debridement is required to promote healing and evaluate the wound base.    Performed by: Ashley Staples MD    Consent obtained:  Yes    Time out taken:  Yes    Pain Control: Anesthetic  Anesthetic: 4% Lidocaine Liquid Topical     Debridement:Excisional Debridement    Using curette the wound(s)/ulcer(s) was/were sharply debrided down through and including the removal of epidermis, dermis, and subcutaneous tissue.        Devitalized Tissue Debrided:  fibrin, biofilm, slough, and exudate to stimulate bleeding to promote healing, post debridement good bleeding base and wound edges noted    Wound/Ulcer #:1, 2    Percent of Wound/Ulcer Debrided: 70%    Total Surface Area Debrided:   40 sq cm     Estimated Blood Loss:  Minimal  Hemostasis Achieved:  by pressure    Procedural Pain:  10  / 10   Post Procedural Pain:  10 / 10     Response to treatment:  With complaints of pain.      Plan:        Discharge Instructions           Discharge Instructions           Visit Discharge/Physician Orders     Discharge condition: Stable     Assessment of pain at discharge: yes     Anesthetic used: 4% lidocaine solution     Discharge to: Home     Left via:Private automobile     Accompanied by:self     ECF/HHA: n/a     Dressing Orders:LEFT MEDIAL and LATERAL LOWER LEG- Cleanse with normal saline, apply zinc to fiona wound,  drawtex, dressing, ABD pad , unna boot and coban. Change weekly.      Treatment Orders: Eat a diet high in protein and vitamin C. Take a multiple

## 2024-01-03 NOTE — PLAN OF CARE
Problem: Pain  Goal: Verbalizes/displays adequate comfort level or baseline comfort level  1/3/2024 0818 by Elsa Johnson RN  Outcome: Progressing  1/3/2024 0815 by Elsa Johnson RN  Outcome: Progressing     Problem: Wound:  Goal: Will show signs of wound healing; wound closure and no evidence of infection  Description: Will show signs of wound healing; wound closure and no evidence of infection  1/3/2024 0818 by Elsa Johnson RN  Outcome: Progressing  1/3/2024 0815 by Elsa Johnson RN  Outcome: Progressing     Problem: Venous:  Goal: Signs of wound healing will improve  Description: Signs of wound healing will improve  1/3/2024 0818 by Elsa Johnson RN  Outcome: Progressing  1/3/2024 0815 by Elsa Johnson RN  Outcome: Adequate for Discharge     Problem: Compression therapy:  Goal: Will be free from complications associated with compression therapy  Description: Will be free from complications associated with compression therapy  1/3/2024 0818 by Elsa Johnson RN  Outcome: Progressing  1/3/2024 0815 by Elsa Johnson RN  Outcome: Progressing

## 2024-01-04 NOTE — DISCHARGE INSTRUCTIONS
Discharge Instructions       Visit Discharge/Physician Orders     Discharge condition: Stable     Assessment of pain at discharge: yes     Anesthetic used: 4% lidocaine solution     Discharge to: Home     Left via:Private automobile     Accompanied by:self     ECF/HHA: n/a     Dressing Orders:LEFT MEDIAL and LATERAL LOWER LEG- Cleanse with normal saline, apply zinc to fiona wound,  drawtex, dressing, ABD pad , unna boot and coban. Change weekly.      Treatment Orders: Eat a diet high in protein and vitamin C. Take a multiple vitamin daily unless contraindicated.       To see Dr. Duncan as scheduled      Must wear slip on shoe to work due to wrap on leg!        [facility] followup visit : 1 week __________________________________  (Please note your next appointment above and if you are unable to keep, kindly give a 24 hour notice. Thank you.)     Physician signature:__________________________     If you experience any of the following, please call the Wound Care Center during business hours:     * Increase in Pain  * Temperature over 101  * Increase in drainage from your wound  * Drainage with a foul odor  * Bleeding  * Increase in swelling  * Need for compression bandage changes due to slippage, breakthrough drainage.     If you need medical attention outside of the business hours of the Wound Care Centers please contact your PCP or go to the nearest emergency room.

## 2024-01-10 ENCOUNTER — HOSPITAL ENCOUNTER (OUTPATIENT)
Dept: WOUND CARE | Age: 69
Discharge: HOME | End: 2024-01-10
Payer: MEDICARE

## 2024-01-10 VITALS
RESPIRATION RATE: 18 BRPM | DIASTOLIC BLOOD PRESSURE: 62 MMHG | SYSTOLIC BLOOD PRESSURE: 106 MMHG | HEIGHT: 68 IN | HEART RATE: 62 BPM | BODY MASS INDEX: 25.76 KG/M2 | WEIGHT: 170 LBS | TEMPERATURE: 97.3 F

## 2024-01-10 DIAGNOSIS — I70.242 ATHEROSCLEROSIS OF NATIVE ARTERY OF LEFT LOWER EXTREMITY WITH ULCERATION OF CALF (HCC): ICD-10-CM

## 2024-01-10 DIAGNOSIS — I83.023 VARICOSE VEINS OF LEFT LOWER EXTREMITY WITH ULCER OF ANKLE WITH FAT LAYER EXPOSED (HCC): Primary | ICD-10-CM

## 2024-01-10 DIAGNOSIS — L97.322 VARICOSE VEINS OF LEFT LOWER EXTREMITY WITH ULCER OF ANKLE WITH FAT LAYER EXPOSED (HCC): Primary | ICD-10-CM

## 2024-01-10 PROCEDURE — 11042 DBRDMT SUBQ TIS 1ST 20SQCM/<: CPT | Performed by: NURSE PRACTITIONER

## 2024-01-10 PROCEDURE — 11045 DBRDMT SUBQ TISS EACH ADDL: CPT | Performed by: NURSE PRACTITIONER

## 2024-01-10 PROCEDURE — 11045 DBRDMT SUBQ TISS EACH ADDL: CPT

## 2024-01-10 PROCEDURE — 11042 DBRDMT SUBQ TIS 1ST 20SQCM/<: CPT

## 2024-01-10 RX ORDER — LIDOCAINE HYDROCHLORIDE 20 MG/ML
JELLY TOPICAL ONCE
OUTPATIENT
Start: 2024-01-10 | End: 2024-01-10

## 2024-01-10 RX ORDER — TRIAMCINOLONE ACETONIDE 1 MG/G
OINTMENT TOPICAL ONCE
OUTPATIENT
Start: 2024-01-10 | End: 2024-01-10

## 2024-01-10 RX ORDER — LIDOCAINE 50 MG/G
OINTMENT TOPICAL ONCE
OUTPATIENT
Start: 2024-01-10 | End: 2024-01-10

## 2024-01-10 RX ORDER — LIDOCAINE HYDROCHLORIDE 40 MG/ML
SOLUTION TOPICAL ONCE
OUTPATIENT
Start: 2024-01-10 | End: 2024-01-10

## 2024-01-10 RX ORDER — OXYCODONE AND ACETAMINOPHEN 7.5; 325 MG/1; MG/1
1 TABLET ORAL EVERY 8 HOURS PRN
Qty: 42 TABLET | Refills: 0 | Status: SHIPPED | OUTPATIENT
Start: 2024-01-10 | End: 2024-01-24

## 2024-01-10 RX ORDER — GENTAMICIN SULFATE 1 MG/G
OINTMENT TOPICAL ONCE
OUTPATIENT
Start: 2024-01-10 | End: 2024-01-10

## 2024-01-10 RX ORDER — BACITRACIN ZINC AND POLYMYXIN B SULFATE 500; 1000 [USP'U]/G; [USP'U]/G
OINTMENT TOPICAL ONCE
OUTPATIENT
Start: 2024-01-10 | End: 2024-01-10

## 2024-01-10 RX ORDER — IBUPROFEN 200 MG
TABLET ORAL ONCE
OUTPATIENT
Start: 2024-01-10 | End: 2024-01-10

## 2024-01-10 RX ORDER — LIDOCAINE 40 MG/G
CREAM TOPICAL ONCE
OUTPATIENT
Start: 2024-01-10 | End: 2024-01-10

## 2024-01-10 RX ORDER — GINSENG 100 MG
CAPSULE ORAL ONCE
OUTPATIENT
Start: 2024-01-10 | End: 2024-01-10

## 2024-01-10 RX ORDER — BETAMETHASONE DIPROPIONATE 0.05 %
OINTMENT (GRAM) TOPICAL ONCE
OUTPATIENT
Start: 2024-01-10 | End: 2024-01-10

## 2024-01-10 RX ORDER — CLOBETASOL PROPIONATE 0.5 MG/G
OINTMENT TOPICAL ONCE
OUTPATIENT
Start: 2024-01-10 | End: 2024-01-10

## 2024-01-10 RX ORDER — LIDOCAINE HYDROCHLORIDE 40 MG/ML
SOLUTION TOPICAL ONCE
Status: COMPLETED | OUTPATIENT
Start: 2024-01-10 | End: 2024-01-10

## 2024-01-10 RX ADMIN — LIDOCAINE HYDROCHLORIDE 15 ML: 40 SOLUTION TOPICAL at 08:02

## 2024-01-10 ASSESSMENT — PAIN DESCRIPTION - LOCATION: LOCATION: LEG

## 2024-01-10 ASSESSMENT — PAIN DESCRIPTION - ONSET: ONSET: ON-GOING

## 2024-01-10 ASSESSMENT — PAIN DESCRIPTION - ORIENTATION: ORIENTATION: LEFT

## 2024-01-10 ASSESSMENT — PAIN DESCRIPTION - DESCRIPTORS: DESCRIPTORS: THROBBING;STABBING

## 2024-01-10 ASSESSMENT — PAIN DESCRIPTION - PAIN TYPE: TYPE: CHRONIC PAIN

## 2024-01-10 ASSESSMENT — PAIN DESCRIPTION - FREQUENCY: FREQUENCY: CONTINUOUS

## 2024-01-10 ASSESSMENT — PAIN - FUNCTIONAL ASSESSMENT: PAIN_FUNCTIONAL_ASSESSMENT: PREVENTS OR INTERFERES SOME ACTIVE ACTIVITIES AND ADLS

## 2024-01-10 ASSESSMENT — PAIN SCALES - GENERAL: PAINLEVEL_OUTOF10: 9

## 2024-01-10 NOTE — PROGRESS NOTES
Wound Healing Center Followup Visit Note    Referring Physician : Rosenda Cormier MD  Amanda Ledesma  MEDICAL RECORD NUMBER:  [id number]  AGE: 68 y.o.   GENDER: female  : 1955  EPISODE DATE:  1/10/2024    Subjective:     Chief Complaint   Patient presents with    Wound Check     Left leg       HISTORY of PRESENT ILLNESS HPI   Amanda Ledesma is a 68 y.o. female who presents today in regards to follow up evaluation and treatment of wound/ulcer.  That patient's past medical, family and social hx were reviewed and changes were made if present.    History of Wound Context:  The patient has had a wound of her left ankle/calf which was first noted approximately 2021.  This has been treated local wound care. On their initial visit to the wound healing center, 22,  the patient has noted that the wound has been improving.  The patient has not had similar previous wounds in the past.      She started seeing Dr. Street in 2021 and than Dr. Gillis.  She was started in unna boot ~ 2021.  She has noticed some improvement since starting unna boot.  She is currently following with Dr. Haskins.      Pt is not on abx at time of initial visit, but has been treated with previously by podiatry.      She is not a DM.  She is not a smoker.  She denies hx of DVT, and per her report had recent us noting no evidence of dvt at [facility].  She also had arterial studies done.    I had previously seen her in the past in regards to left buttock thigh wound, which started as abscess.      Pt works at [employer] in the [employer] and is on her feet all day.    22  Reflux study - if significant findings will schedule for fu  Continue compression therapy [facility] per podiatry with aquacell dressing  Elevation  She does not have significant arterial occlusive disease  22  Patient has asked to continuing following with me going forward because of our previous relationship  She is going to let Dr. Schuler office know  She

## 2024-01-15 NOTE — DISCHARGE INSTRUCTIONS
Discharge Instructions       Visit Discharge/Physician Orders     Discharge condition: Stable     Assessment of pain at discharge: yes     Anesthetic used: 4% lidocaine solution     Discharge to: Home     Left via:Private automobile     Accompanied by:self     ECF/HHA: n/a     Dressing Orders:LEFT MEDIAL and LATERAL LOWER LEG- Cleanse with normal saline, apply zinc to fiona wound,  drawtex, dressing, ABD pad , and profore . Change weekly.      Treatment Orders: Eat a diet high in protein and vitamin C. Take a multiple vitamin daily unless contraindicated.       To see Dr. Duncan as scheduled      Must wear slip on shoe to work due to wrap on leg!        [facility] followup visit : 1 week __________________________________  (Please note your next appointment above and if you are unable to keep, kindly give a 24 hour notice. Thank you.)     Physician signature:__________________________     If you experience any of the following, please call the Wound Care Center during business hours:     * Increase in Pain  * Temperature over 101  * Increase in drainage from your wound  * Drainage with a foul odor  * Bleeding  * Increase in swelling  * Need for compression bandage changes due to slippage, breakthrough drainage.     If you need medical attention outside of the business hours of the Wound Care Centers please contact your PCP or go to the nearest emergency room.

## 2024-01-17 ENCOUNTER — HOSPITAL ENCOUNTER (OUTPATIENT)
Dept: WOUND CARE | Age: 69
Discharge: HOME | End: 2024-01-17
Payer: MEDICARE

## 2024-01-17 VITALS
HEART RATE: 82 BPM | WEIGHT: 170 LBS | HEIGHT: 68 IN | BODY MASS INDEX: 25.76 KG/M2 | SYSTOLIC BLOOD PRESSURE: 146 MMHG | RESPIRATION RATE: 18 BRPM | DIASTOLIC BLOOD PRESSURE: 59 MMHG | TEMPERATURE: 96.7 F

## 2024-01-17 DIAGNOSIS — I83.023 VARICOSE VEINS OF LEFT LOWER EXTREMITY WITH ULCER OF ANKLE WITH FAT LAYER EXPOSED (HCC): Primary | ICD-10-CM

## 2024-01-17 DIAGNOSIS — I70.242 ATHEROSCLEROSIS OF NATIVE ARTERY OF LEFT LOWER EXTREMITY WITH ULCERATION OF CALF (HCC): ICD-10-CM

## 2024-01-17 DIAGNOSIS — L97.322 VARICOSE VEINS OF LEFT LOWER EXTREMITY WITH ULCER OF ANKLE WITH FAT LAYER EXPOSED (HCC): Primary | ICD-10-CM

## 2024-01-17 PROCEDURE — 11045 DBRDMT SUBQ TISS EACH ADDL: CPT | Performed by: SURGERY

## 2024-01-17 PROCEDURE — 11042 DBRDMT SUBQ TIS 1ST 20SQCM/<: CPT | Performed by: SURGERY

## 2024-01-17 PROCEDURE — 11042 DBRDMT SUBQ TIS 1ST 20SQCM/<: CPT

## 2024-01-17 PROCEDURE — 11045 DBRDMT SUBQ TISS EACH ADDL: CPT

## 2024-01-17 RX ORDER — GENTAMICIN SULFATE 1 MG/G
OINTMENT TOPICAL ONCE
OUTPATIENT
Start: 2024-01-17 | End: 2024-01-17

## 2024-01-17 RX ORDER — TRIAMCINOLONE ACETONIDE 1 MG/G
OINTMENT TOPICAL ONCE
OUTPATIENT
Start: 2024-01-17 | End: 2024-01-17

## 2024-01-17 RX ORDER — LIDOCAINE 40 MG/G
CREAM TOPICAL ONCE
OUTPATIENT
Start: 2024-01-17 | End: 2024-01-17

## 2024-01-17 RX ORDER — CLOBETASOL PROPIONATE 0.5 MG/G
OINTMENT TOPICAL ONCE
OUTPATIENT
Start: 2024-01-17 | End: 2024-01-17

## 2024-01-17 RX ORDER — LIDOCAINE 50 MG/G
OINTMENT TOPICAL ONCE
OUTPATIENT
Start: 2024-01-17 | End: 2024-01-17

## 2024-01-17 RX ORDER — BETAMETHASONE DIPROPIONATE 0.05 %
OINTMENT (GRAM) TOPICAL ONCE
OUTPATIENT
Start: 2024-01-17 | End: 2024-01-17

## 2024-01-17 RX ORDER — IBUPROFEN 200 MG
TABLET ORAL ONCE
OUTPATIENT
Start: 2024-01-17 | End: 2024-01-17

## 2024-01-17 RX ORDER — GINSENG 100 MG
CAPSULE ORAL ONCE
OUTPATIENT
Start: 2024-01-17 | End: 2024-01-17

## 2024-01-17 RX ORDER — LIDOCAINE HYDROCHLORIDE 20 MG/ML
JELLY TOPICAL ONCE
OUTPATIENT
Start: 2024-01-17 | End: 2024-01-17

## 2024-01-17 RX ORDER — LIDOCAINE HYDROCHLORIDE 40 MG/ML
SOLUTION TOPICAL ONCE
Status: COMPLETED | OUTPATIENT
Start: 2024-01-17 | End: 2024-01-17

## 2024-01-17 RX ORDER — BACITRACIN ZINC AND POLYMYXIN B SULFATE 500; 1000 [USP'U]/G; [USP'U]/G
OINTMENT TOPICAL ONCE
OUTPATIENT
Start: 2024-01-17 | End: 2024-01-17

## 2024-01-17 RX ORDER — LIDOCAINE HYDROCHLORIDE 40 MG/ML
SOLUTION TOPICAL ONCE
OUTPATIENT
Start: 2024-01-17 | End: 2024-01-17

## 2024-01-17 RX ADMIN — LIDOCAINE HYDROCHLORIDE: 40 SOLUTION TOPICAL at 08:00

## 2024-01-17 ASSESSMENT — PAIN DESCRIPTION - ORIENTATION: ORIENTATION: LEFT

## 2024-01-17 ASSESSMENT — PAIN DESCRIPTION - LOCATION: LOCATION: LEG

## 2024-01-17 ASSESSMENT — PAIN DESCRIPTION - DESCRIPTORS: DESCRIPTORS: THROBBING;STABBING

## 2024-01-17 ASSESSMENT — PAIN SCALES - GENERAL: PAINLEVEL_OUTOF10: 9

## 2024-01-17 NOTE — PROGRESS NOTES
Wound Healing Center Followup Visit Note    Referring Physician : Rosenda Cormier MD  Amanda Ledesma  MEDICAL RECORD NUMBER:  [id number]  AGE: 68 y.o.   GENDER: female  : 1955  EPISODE DATE:  2024    Subjective:     Chief Complaint   Patient presents with    Wound Check     L ankle      HISTORY of PRESENT ILLNESS HPI   Amanda Ledesma is a 68 y.o. female who presents today in regards to follow up evaluation and treatment of wound/ulcer.  That patient's past medical, family and social hx were reviewed and changes were made if present.    History of Wound Context:  The patient has had a wound of her left ankle/calf which was first noted approximately 2021.  This has been treated local wound care. On their initial visit to the wound healing center, 22,  the patient has noted that the wound has been improving.  The patient has not had similar previous wounds in the past.      She started seeing Dr. Street in 2021 and than Dr. Gillis.  She was started in unna boot ~ 2021.  She has noticed some improvement since starting unna boot.  She is currently following with Dr. Haskins.      Pt is not on abx at time of initial visit, but has been treated with previously by podiatry.      She is not a DM.  She is not a smoker.  She denies hx of DVT, and per her report had recent us noting no evidence of dvt at [facility].  She also had arterial studies done.    I had previously seen her in the past in regards to left buttock thigh wound, which started as abscess.      Pt works at [employer] in the [employer] and is on her feet all day.    22  Reflux study - if significant findings will schedule for fu  Continue compression therapy [facility] per podiatry with aquacell dressing  Elevation  She does not have significant arterial occlusive disease  22  Patient has asked to continuing following with me going forward because of our previous relationship  She is going to let Dr. Schuler office know  She

## 2024-01-23 NOTE — DISCHARGE INSTRUCTIONS
Discharge Instructions       Visit Discharge/Physician Orders     Discharge condition: Stable     Assessment of pain at discharge: yes     Anesthetic used: 4% lidocaine solution     Discharge to: Home     Left via:Private automobile     Accompanied by:self     ECF/HHA: n/a     Dressing Orders:LEFT MEDIAL and LATERAL LOWER LEG- Cleanse with normal saline, apply zinc to fiona wound,  drawtex, dressing, ABD pad , and profore . Change weekly.      Treatment Orders: Eat a diet high in protein and vitamin C. Take a multiple vitamin daily unless contraindicated.       To see Dr. Duncan as scheduled      Must wear slip on shoe to work due to wrap on leg!        [facility] followup visit : 1 week __________________________________  (Please note your next appointment above and if you are unable to keep, kindly give a 24 hour notice. Thank you.)     Physician signature:__________________________     If you experience any of the following, please call the Wound Care Center during business hours:     * Increase in Pain  * Temperature over 101  * Increase in drainage from your wound  * Drainage with a foul odor  * Bleeding  * Increase in swelling  * Need for compression bandage changes due to slippage, breakthrough drainage.     If you need medical attention outside of the business hours of the Wound Care Centers please contact your PCP or go to the nearest emergency room.

## 2024-01-24 ENCOUNTER — HOSPITAL ENCOUNTER (OUTPATIENT)
Dept: WOUND CARE | Age: 69
Discharge: HOME | End: 2024-01-24
Payer: MEDICAID

## 2024-01-24 VITALS
RESPIRATION RATE: 18 BRPM | WEIGHT: 170 LBS | TEMPERATURE: 97.4 F | BODY MASS INDEX: 25.76 KG/M2 | DIASTOLIC BLOOD PRESSURE: 53 MMHG | HEIGHT: 68 IN | SYSTOLIC BLOOD PRESSURE: 125 MMHG | HEART RATE: 80 BPM

## 2024-01-24 DIAGNOSIS — I70.242 ATHEROSCLEROSIS OF NATIVE ARTERY OF LEFT LOWER EXTREMITY WITH ULCERATION OF CALF (HCC): ICD-10-CM

## 2024-01-24 DIAGNOSIS — I83.023 VARICOSE VEINS OF LEFT LOWER EXTREMITY WITH ULCER OF ANKLE WITH FAT LAYER EXPOSED (HCC): Primary | ICD-10-CM

## 2024-01-24 DIAGNOSIS — L97.322 VARICOSE VEINS OF LEFT LOWER EXTREMITY WITH ULCER OF ANKLE WITH FAT LAYER EXPOSED (HCC): Primary | ICD-10-CM

## 2024-01-24 PROCEDURE — 11042 DBRDMT SUBQ TIS 1ST 20SQCM/<: CPT

## 2024-01-24 PROCEDURE — 11042 DBRDMT SUBQ TIS 1ST 20SQCM/<: CPT | Performed by: SURGERY

## 2024-01-24 PROCEDURE — 11045 DBRDMT SUBQ TISS EACH ADDL: CPT

## 2024-01-24 PROCEDURE — 11045 DBRDMT SUBQ TISS EACH ADDL: CPT | Performed by: SURGERY

## 2024-01-24 RX ORDER — LIDOCAINE 40 MG/G
CREAM TOPICAL ONCE
OUTPATIENT
Start: 2024-01-24 | End: 2024-01-24

## 2024-01-24 RX ORDER — LIDOCAINE HYDROCHLORIDE 40 MG/ML
SOLUTION TOPICAL ONCE
Status: COMPLETED | OUTPATIENT
Start: 2024-01-24 | End: 2024-01-24

## 2024-01-24 RX ORDER — IBUPROFEN 200 MG
TABLET ORAL ONCE
OUTPATIENT
Start: 2024-01-24 | End: 2024-01-24

## 2024-01-24 RX ORDER — LIDOCAINE HYDROCHLORIDE 20 MG/ML
JELLY TOPICAL ONCE
OUTPATIENT
Start: 2024-01-24 | End: 2024-01-24

## 2024-01-24 RX ORDER — BACITRACIN ZINC AND POLYMYXIN B SULFATE 500; 1000 [USP'U]/G; [USP'U]/G
OINTMENT TOPICAL ONCE
OUTPATIENT
Start: 2024-01-24 | End: 2024-01-24

## 2024-01-24 RX ORDER — LIDOCAINE 50 MG/G
OINTMENT TOPICAL ONCE
OUTPATIENT
Start: 2024-01-24 | End: 2024-01-24

## 2024-01-24 RX ORDER — LIDOCAINE HYDROCHLORIDE 40 MG/ML
SOLUTION TOPICAL ONCE
OUTPATIENT
Start: 2024-01-24 | End: 2024-01-24

## 2024-01-24 RX ORDER — GENTAMICIN SULFATE 1 MG/G
OINTMENT TOPICAL ONCE
OUTPATIENT
Start: 2024-01-24 | End: 2024-01-24

## 2024-01-24 RX ORDER — GINSENG 100 MG
CAPSULE ORAL ONCE
OUTPATIENT
Start: 2024-01-24 | End: 2024-01-24

## 2024-01-24 RX ORDER — CLOBETASOL PROPIONATE 0.5 MG/G
OINTMENT TOPICAL ONCE
OUTPATIENT
Start: 2024-01-24 | End: 2024-01-24

## 2024-01-24 RX ORDER — BETAMETHASONE DIPROPIONATE 0.05 %
OINTMENT (GRAM) TOPICAL ONCE
OUTPATIENT
Start: 2024-01-24 | End: 2024-01-24

## 2024-01-24 RX ORDER — TRIAMCINOLONE ACETONIDE 1 MG/G
OINTMENT TOPICAL ONCE
OUTPATIENT
Start: 2024-01-24 | End: 2024-01-24

## 2024-01-24 RX ADMIN — LIDOCAINE HYDROCHLORIDE 5 ML: 40 SOLUTION TOPICAL at 07:52

## 2024-01-24 ASSESSMENT — PAIN SCALES - GENERAL: PAINLEVEL_OUTOF10: 8

## 2024-01-24 ASSESSMENT — PAIN DESCRIPTION - LOCATION: LOCATION: ANKLE;LEG

## 2024-01-24 ASSESSMENT — PAIN DESCRIPTION - PAIN TYPE: TYPE: CHRONIC PAIN

## 2024-01-24 ASSESSMENT — PAIN DESCRIPTION - ORIENTATION: ORIENTATION: LEFT

## 2024-01-24 ASSESSMENT — PAIN DESCRIPTION - FREQUENCY: FREQUENCY: CONTINUOUS

## 2024-01-24 ASSESSMENT — PAIN DESCRIPTION - DESCRIPTORS: DESCRIPTORS: THROBBING

## 2024-01-24 ASSESSMENT — PAIN DESCRIPTION - ONSET: ONSET: ON-GOING

## 2024-01-24 NOTE — PROGRESS NOTES
Ashley Staples MD at [facility] [place]    LEG SURGERY Left 7/25/2023    DEBRIDEMENT OF LEFT LOWER EXTREMITY WOUND, ADVANCED SKIN THERAPY/APPLICATION OF ACF performed by Ashley Staples MD at [facility] [place]    LEG SURGERY Left 9/12/2023    left leg debridement, AC5 application, unna boot application performed by Ashley Staples MD at [facility] [place]    LEG SURGERY Left 11/7/2023    DEBRIDEMENT LEFT LEG performed by Ashley Staples MD at [facility] [place]    SAPHENOUS VEIN ABLATION Left 1/12/2023    RADIOFREQUENCY ABLATION LEFT LESSER SAPHENOUS VEIN WITH STAB PHLEBECTOMIES, LEFT LEG WOUND DEBRIDEMENT performed by Ashley Staples MD at [facility] [place]    TONSILLECTOMY  1960    1960s    UPPER GASTROINTESTINAL ENDOSCOPY  12/13/2013    mild gastritis and small hiatal hernia, Dr Young, [facility]    UPPER GASTROINTESTINAL ENDOSCOPY  2015    GERD, Dr. Godinez, office     Family History   Problem Relation Age of Onset    Diabetes Mother     Hypertension Mother     Heart Disease Father     Heart Attack Father     Heart Surgery Father         angioplasty    Stroke Father     High Cholesterol Father     Heart Attack Maternal Grandfather      Social History     Tobacco Use    Smoking status: Never    Smokeless tobacco: Never   Vaping Use    Vaping Use: Never used   Substance Use Topics    Alcohol use: No    Drug use: No     Allergies   Allergen Reactions    Bee Pollen Anaphylaxis    Tetracyclines & Related Hives     Current Outpatient Medications on File Prior to Encounter   Medication Sig Dispense Refill    oxyCODONE-acetaminophen (PERCOCET) 7.5-325 MG per tablet Take 1 tablet by mouth every 8 hours as needed for Pain for up to 14 days. Intended supply: 30 days Max Daily Amount: 3 tablets 42 tablet 0    lisinopril (PRINIVIL;ZESTRIL) 20 MG tablet Take 1 tablet by mouth daily 90 tablet 1    EPINEPHrine (EPIPEN) 0.3 MG/0.3ML SOAJ injection Use as directed for allergic reaction 1 each 5    hydrOXYzine HCl (ATARAX) 25 MG tablet take 1

## 2024-01-29 NOTE — DISCHARGE INSTRUCTIONS
Discharge Instructions       Visit Discharge/Physician Orders     Discharge condition: Stable     Assessment of pain at discharge: yes     Anesthetic used: 4% lidocaine solution     Discharge to: Home     Left via:Private automobile     Accompanied by:self     ECF/HHA: n/a     Dressing Orders:LEFT MEDIAL and LATERAL LOWER LEG- Cleanse with normal saline, apply zinc to fiona wound,  drawtex, dressing, ABD pad , and profore . Change weekly.      Treatment Orders: Eat a diet high in protein and vitamin C. Take a multiple vitamin daily unless contraindicated.       To see Dr. Duncan as scheduled      Must wear slip on shoe to work due to wrap on leg!        [facility] followup visit : 1 week __________________________________  (Please note your next appointment above and if you are unable to keep, kindly give a 24 hour notice. Thank you.)     Physician signature:__________________________     If you experience any of the following, please call the Wound Care Center during business hours:     * Increase in Pain  * Temperature over 101  * Increase in drainage from your wound  * Drainage with a foul odor  * Bleeding  * Increase in swelling  * Need for compression bandage changes due to slippage, breakthrough drainage.     If you need medical attention outside of the business hours of the Wound Care Centers please contact your PCP or go to the nearest emergency room.

## 2024-01-31 ENCOUNTER — HOSPITAL ENCOUNTER (OUTPATIENT)
Dept: WOUND CARE | Age: 69
Discharge: HOME | End: 2024-01-31
Attending: SURGERY
Payer: MEDICARE

## 2024-01-31 VITALS
HEIGHT: 68 IN | RESPIRATION RATE: 18 BRPM | WEIGHT: 170 LBS | TEMPERATURE: 97.4 F | SYSTOLIC BLOOD PRESSURE: 125 MMHG | BODY MASS INDEX: 25.76 KG/M2 | DIASTOLIC BLOOD PRESSURE: 52 MMHG | HEART RATE: 77 BPM

## 2024-01-31 DIAGNOSIS — I70.242 ATHEROSCLEROSIS OF NATIVE ARTERY OF LEFT LOWER EXTREMITY WITH ULCERATION OF CALF (HCC): ICD-10-CM

## 2024-01-31 DIAGNOSIS — I83.023 VARICOSE VEINS OF LEFT LOWER EXTREMITY WITH ULCER OF ANKLE WITH FAT LAYER EXPOSED (HCC): Primary | ICD-10-CM

## 2024-01-31 DIAGNOSIS — L97.322 VARICOSE VEINS OF LEFT LOWER EXTREMITY WITH ULCER OF ANKLE WITH FAT LAYER EXPOSED (HCC): Primary | ICD-10-CM

## 2024-01-31 PROCEDURE — 11042 DBRDMT SUBQ TIS 1ST 20SQCM/<: CPT | Performed by: SURGERY

## 2024-01-31 PROCEDURE — 11045 DBRDMT SUBQ TISS EACH ADDL: CPT

## 2024-01-31 PROCEDURE — 11045 DBRDMT SUBQ TISS EACH ADDL: CPT | Performed by: SURGERY

## 2024-01-31 PROCEDURE — 11042 DBRDMT SUBQ TIS 1ST 20SQCM/<: CPT

## 2024-01-31 RX ORDER — GINSENG 100 MG
CAPSULE ORAL ONCE
OUTPATIENT
Start: 2024-01-31 | End: 2024-01-31

## 2024-01-31 RX ORDER — LIDOCAINE 50 MG/G
OINTMENT TOPICAL ONCE
Status: CANCELLED | OUTPATIENT
Start: 2024-01-31 | End: 2024-01-31

## 2024-01-31 RX ORDER — BACITRACIN ZINC AND POLYMYXIN B SULFATE 500; 1000 [USP'U]/G; [USP'U]/G
OINTMENT TOPICAL ONCE
Status: CANCELLED | OUTPATIENT
Start: 2024-01-31 | End: 2024-01-31

## 2024-01-31 RX ORDER — BETAMETHASONE DIPROPIONATE 0.05 %
OINTMENT (GRAM) TOPICAL ONCE
Status: CANCELLED | OUTPATIENT
Start: 2024-01-31 | End: 2024-01-31

## 2024-01-31 RX ORDER — IBUPROFEN 200 MG
TABLET ORAL ONCE
OUTPATIENT
Start: 2024-01-31 | End: 2024-01-31

## 2024-01-31 RX ORDER — LIDOCAINE HYDROCHLORIDE 20 MG/ML
JELLY TOPICAL ONCE
Status: CANCELLED | OUTPATIENT
Start: 2024-01-31 | End: 2024-01-31

## 2024-01-31 RX ORDER — OXYCODONE AND ACETAMINOPHEN 7.5; 325 MG/1; MG/1
1 TABLET ORAL EVERY 8 HOURS PRN
Qty: 42 TABLET | Refills: 0 | Status: SHIPPED | OUTPATIENT
Start: 2024-01-31 | End: 2024-02-14

## 2024-01-31 RX ORDER — LIDOCAINE HYDROCHLORIDE 40 MG/ML
SOLUTION TOPICAL ONCE
Status: CANCELLED | OUTPATIENT
Start: 2024-01-31 | End: 2024-01-31

## 2024-01-31 RX ORDER — TRIAMCINOLONE ACETONIDE 1 MG/G
OINTMENT TOPICAL ONCE
Status: CANCELLED | OUTPATIENT
Start: 2024-01-31 | End: 2024-01-31

## 2024-01-31 RX ORDER — TRIAMCINOLONE ACETONIDE 1 MG/G
OINTMENT TOPICAL ONCE
OUTPATIENT
Start: 2024-01-31 | End: 2024-01-31

## 2024-01-31 RX ORDER — LIDOCAINE 40 MG/G
CREAM TOPICAL ONCE
OUTPATIENT
Start: 2024-01-31 | End: 2024-01-31

## 2024-01-31 RX ORDER — LIDOCAINE HYDROCHLORIDE 40 MG/ML
SOLUTION TOPICAL ONCE
OUTPATIENT
Start: 2024-01-31 | End: 2024-01-31

## 2024-01-31 RX ORDER — LIDOCAINE HYDROCHLORIDE 20 MG/ML
JELLY TOPICAL ONCE
OUTPATIENT
Start: 2024-01-31 | End: 2024-01-31

## 2024-01-31 RX ORDER — BETAMETHASONE DIPROPIONATE 0.05 %
OINTMENT (GRAM) TOPICAL ONCE
OUTPATIENT
Start: 2024-01-31 | End: 2024-01-31

## 2024-01-31 RX ORDER — LIDOCAINE HYDROCHLORIDE 40 MG/ML
SOLUTION TOPICAL ONCE
Status: COMPLETED | OUTPATIENT
Start: 2024-01-31 | End: 2024-01-31

## 2024-01-31 RX ORDER — BACITRACIN ZINC AND POLYMYXIN B SULFATE 500; 1000 [USP'U]/G; [USP'U]/G
OINTMENT TOPICAL ONCE
OUTPATIENT
Start: 2024-01-31 | End: 2024-01-31

## 2024-01-31 RX ORDER — IBUPROFEN 200 MG
TABLET ORAL ONCE
Status: CANCELLED | OUTPATIENT
Start: 2024-01-31 | End: 2024-01-31

## 2024-01-31 RX ORDER — LIDOCAINE 40 MG/G
CREAM TOPICAL ONCE
Status: CANCELLED | OUTPATIENT
Start: 2024-01-31 | End: 2024-01-31

## 2024-01-31 RX ORDER — GENTAMICIN SULFATE 1 MG/G
OINTMENT TOPICAL ONCE
Status: CANCELLED | OUTPATIENT
Start: 2024-01-31 | End: 2024-01-31

## 2024-01-31 RX ORDER — CLOBETASOL PROPIONATE 0.5 MG/G
OINTMENT TOPICAL ONCE
Status: CANCELLED | OUTPATIENT
Start: 2024-01-31 | End: 2024-01-31

## 2024-01-31 RX ORDER — GINSENG 100 MG
CAPSULE ORAL ONCE
Status: CANCELLED | OUTPATIENT
Start: 2024-01-31 | End: 2024-01-31

## 2024-01-31 RX ORDER — CLOBETASOL PROPIONATE 0.5 MG/G
OINTMENT TOPICAL ONCE
OUTPATIENT
Start: 2024-01-31 | End: 2024-01-31

## 2024-01-31 RX ORDER — GENTAMICIN SULFATE 1 MG/G
OINTMENT TOPICAL ONCE
OUTPATIENT
Start: 2024-01-31 | End: 2024-01-31

## 2024-01-31 RX ORDER — LIDOCAINE 50 MG/G
OINTMENT TOPICAL ONCE
OUTPATIENT
Start: 2024-01-31 | End: 2024-01-31

## 2024-01-31 RX ADMIN — LIDOCAINE HYDROCHLORIDE 5 ML: 40 SOLUTION TOPICAL at 07:52

## 2024-01-31 ASSESSMENT — PAIN DESCRIPTION - ONSET: ONSET: ON-GOING

## 2024-01-31 ASSESSMENT — PAIN DESCRIPTION - FREQUENCY: FREQUENCY: CONTINUOUS

## 2024-01-31 ASSESSMENT — PAIN SCALES - GENERAL: PAINLEVEL_OUTOF10: 8

## 2024-01-31 ASSESSMENT — PAIN DESCRIPTION - PAIN TYPE: TYPE: CHRONIC PAIN

## 2024-01-31 ASSESSMENT — PAIN DESCRIPTION - LOCATION: LOCATION: ANKLE

## 2024-01-31 ASSESSMENT — PAIN DESCRIPTION - ORIENTATION: ORIENTATION: LEFT

## 2024-01-31 ASSESSMENT — PAIN DESCRIPTION - DESCRIPTORS: DESCRIPTORS: THROBBING

## 2024-01-31 NOTE — PLAN OF CARE
Problem: Pain  Goal: Verbalizes/displays adequate comfort level or baseline comfort level  1/31/2024 0831 by Elsa Johnson RN  Outcome: Progressing  1/31/2024 0807 by Elsa Johnson RN  Outcome: Progressing     Problem: Wound:  Goal: Will show signs of wound healing; wound closure and no evidence of infection  Description: Will show signs of wound healing; wound closure and no evidence of infection  1/31/2024 0831 by Elsa Johnson RN  Outcome: Progressing  1/31/2024 0807 by Elsa Johnson RN  Outcome: Progressing     Problem: Venous:  Goal: Signs of wound healing will improve  Description: Signs of wound healing will improve  1/31/2024 0831 by Elsa Johnson RN  Outcome: Adequate for Discharge  1/31/2024 0807 by Elsa Johnson RN  Outcome: Adequate for Discharge     Problem: Compression therapy:  Goal: Will be free from complications associated with compression therapy  Description: Will be free from complications associated with compression therapy  1/31/2024 0831 by Elsa Johnson RN  Outcome: Adequate for Discharge  1/31/2024 0807 by Elsa Johnson RN  Outcome: Progressing

## 2024-01-31 NOTE — PROGRESS NOTES
Wound Healing Center Followup Visit Note    Referring Physician : Rosenda Cormier MD  Amanda Ledesma  MEDICAL RECORD NUMBER:  [id number]  AGE: 68 y.o.   GENDER: female  : 1955  EPISODE DATE:  2024    Subjective:     Chief Complaint   Patient presents with    Wound Check     Left ankle      HISTORY of PRESENT ILLNESS HPI   Amanda Ledesma is a 68 y.o. female who presents today in regards to follow up evaluation and treatment of wound/ulcer.  That patient's past medical, family and social hx were reviewed and changes were made if present.    History of Wound Context:  The patient has had a wound of her left ankle/calf which was first noted approximately 2021.  This has been treated local wound care. On their initial visit to the wound healing center, 22,  the patient has noted that the wound has been improving.  The patient has not had similar previous wounds in the past.      She started seeing Dr. Street in 2021 and than Dr. Gillis.  She was started in unna boot ~ 2021.  She has noticed some improvement since starting unna boot.  She is currently following with Dr. Haskins.      Pt is not on abx at time of initial visit, but has been treated with previously by podiatry.      She is not a DM.  She is not a smoker.  She denies hx of DVT, and per her report had recent us noting no evidence of dvt at [facility].  She also had arterial studies done.    I had previously seen her in the past in regards to left buttock thigh wound, which started as abscess.      Pt works at [employer] in the [employer] and is on her feet all day.    22  Reflux study - if significant findings will schedule for fu  Continue compression therapy [facility] per podiatry with aquacell dressing  Elevation  She does not have significant arterial occlusive disease  22  Patient has asked to continuing following with me going forward because of our previous relationship  She is going to let Dr. Schuler office know  She

## 2024-02-05 NOTE — DISCHARGE INSTRUCTIONS
Written       Discharge Instructions       Visit Discharge/Physician Orders     Discharge condition: Stable     Assessment of pain at discharge: yes     Anesthetic used: 4% lidocaine solution     Discharge to: Home     Left via:Private automobile     Accompanied by:self     ECF/HHA: n/a     Dressing Orders:LEFT MEDIAL and LATERAL LOWER LEG- Cleanse with normal saline, apply zinc to fiona wound,  drawtex, dressing, ABD pad , and profore . Change weekly.      Treatment Orders: Eat a diet high in protein and vitamin C. Take a multiple vitamin daily unless contraindicated.       To see Dr. Duncan as scheduled      Must wear slip on shoe to work due to wrap on leg!        [facility] followup visit : 1 week __________________________________  (Please note your next appointment above and if you are unable to keep, kindly give a 24 hour notice. Thank you.)     Physician signature:__________________________     If you experience any of the following, please call the Wound Care Center during business hours:     * Increase in Pain  * Temperature over 101  * Increase in drainage from your wound  * Drainage with a foul odor  * Bleeding  * Increase in swelling  * Need for compression bandage changes due to slippage, breakthrough drainage.     If you need medical attention outside of the business hours of the Wound Care Centers please contact your PCP or go to the nearest emergency room.

## 2024-02-07 ENCOUNTER — HOSPITAL ENCOUNTER (OUTPATIENT)
Dept: WOUND CARE | Age: 69
Discharge: HOME | End: 2024-02-07
Attending: SURGERY
Payer: MEDICARE

## 2024-02-07 VITALS
RESPIRATION RATE: 18 BRPM | BODY MASS INDEX: 25.76 KG/M2 | SYSTOLIC BLOOD PRESSURE: 118 MMHG | HEIGHT: 68 IN | WEIGHT: 170 LBS | DIASTOLIC BLOOD PRESSURE: 66 MMHG | TEMPERATURE: 96.8 F

## 2024-02-07 DIAGNOSIS — I83.023 VARICOSE VEINS OF LEFT LOWER EXTREMITY WITH ULCER OF ANKLE WITH FAT LAYER EXPOSED (HCC): Primary | ICD-10-CM

## 2024-02-07 DIAGNOSIS — L97.322 VARICOSE VEINS OF LEFT LOWER EXTREMITY WITH ULCER OF ANKLE WITH FAT LAYER EXPOSED (HCC): Primary | ICD-10-CM

## 2024-02-07 DIAGNOSIS — I70.242 ATHEROSCLEROSIS OF NATIVE ARTERY OF LEFT LOWER EXTREMITY WITH ULCERATION OF CALF (HCC): ICD-10-CM

## 2024-02-07 PROCEDURE — 11042 DBRDMT SUBQ TIS 1ST 20SQCM/<: CPT | Performed by: SURGERY

## 2024-02-07 PROCEDURE — 11042 DBRDMT SUBQ TIS 1ST 20SQCM/<: CPT

## 2024-02-07 PROCEDURE — 11045 DBRDMT SUBQ TISS EACH ADDL: CPT | Performed by: SURGERY

## 2024-02-07 PROCEDURE — 11045 DBRDMT SUBQ TISS EACH ADDL: CPT

## 2024-02-07 RX ORDER — IBUPROFEN 200 MG
TABLET ORAL ONCE
OUTPATIENT
Start: 2024-02-07 | End: 2024-02-07

## 2024-02-07 RX ORDER — GINSENG 100 MG
CAPSULE ORAL ONCE
OUTPATIENT
Start: 2024-02-07 | End: 2024-02-07

## 2024-02-07 RX ORDER — TRIAMCINOLONE ACETONIDE 1 MG/G
OINTMENT TOPICAL ONCE
OUTPATIENT
Start: 2024-02-07 | End: 2024-02-07

## 2024-02-07 RX ORDER — GENTAMICIN SULFATE 1 MG/G
OINTMENT TOPICAL ONCE
OUTPATIENT
Start: 2024-02-07 | End: 2024-02-07

## 2024-02-07 RX ORDER — CLOBETASOL PROPIONATE 0.5 MG/G
OINTMENT TOPICAL ONCE
OUTPATIENT
Start: 2024-02-07 | End: 2024-02-07

## 2024-02-07 RX ORDER — LIDOCAINE 50 MG/G
OINTMENT TOPICAL ONCE
OUTPATIENT
Start: 2024-02-07 | End: 2024-02-07

## 2024-02-07 RX ORDER — LIDOCAINE HYDROCHLORIDE 20 MG/ML
JELLY TOPICAL ONCE
OUTPATIENT
Start: 2024-02-07 | End: 2024-02-07

## 2024-02-07 RX ORDER — LIDOCAINE HYDROCHLORIDE 40 MG/ML
SOLUTION TOPICAL ONCE
Status: COMPLETED | OUTPATIENT
Start: 2024-02-07 | End: 2024-02-07

## 2024-02-07 RX ORDER — BETAMETHASONE DIPROPIONATE 0.05 %
OINTMENT (GRAM) TOPICAL ONCE
OUTPATIENT
Start: 2024-02-07 | End: 2024-02-07

## 2024-02-07 RX ORDER — LIDOCAINE 40 MG/G
CREAM TOPICAL ONCE
OUTPATIENT
Start: 2024-02-07 | End: 2024-02-07

## 2024-02-07 RX ORDER — BACITRACIN ZINC AND POLYMYXIN B SULFATE 500; 1000 [USP'U]/G; [USP'U]/G
OINTMENT TOPICAL ONCE
OUTPATIENT
Start: 2024-02-07 | End: 2024-02-07

## 2024-02-07 RX ORDER — LIDOCAINE HYDROCHLORIDE 40 MG/ML
SOLUTION TOPICAL ONCE
OUTPATIENT
Start: 2024-02-07 | End: 2024-02-07

## 2024-02-07 RX ADMIN — LIDOCAINE HYDROCHLORIDE 5 ML: 40 SOLUTION TOPICAL at 08:03

## 2024-02-07 ASSESSMENT — PAIN DESCRIPTION - LOCATION: LOCATION: ANKLE

## 2024-02-07 ASSESSMENT — PAIN DESCRIPTION - PAIN TYPE: TYPE: CHRONIC PAIN

## 2024-02-07 ASSESSMENT — PAIN DESCRIPTION - FREQUENCY: FREQUENCY: CONTINUOUS

## 2024-02-07 ASSESSMENT — PAIN SCALES - GENERAL: PAINLEVEL_OUTOF10: 8

## 2024-02-07 ASSESSMENT — PAIN DESCRIPTION - ORIENTATION: ORIENTATION: LEFT

## 2024-02-07 ASSESSMENT — PAIN DESCRIPTION - DESCRIPTORS: DESCRIPTORS: THROBBING

## 2024-02-07 NOTE — PLAN OF CARE
Problem: Pain  Goal: Verbalizes/displays adequate comfort level or baseline comfort level  Outcome: Progressing     Problem: Wound:  Goal: Will show signs of wound healing; wound closure and no evidence of infection  Description: Will show signs of wound healing; wound closure and no evidence of infection  Outcome: Progressing     Problem: Venous:  Goal: Signs of wound healing will improve  Description: Signs of wound healing will improve  Outcome: Adequate for Discharge     Problem: Compression therapy:  Goal: Will be free from complications associated with compression therapy  Description: Will be free from complications associated with compression therapy  Outcome: Progressing     
Detail Level: Detailed

## 2024-02-07 NOTE — PROGRESS NOTES
5/325 mg #25 - script given, oarrs reviewed  Cx reviewed - will disc with Dr Duncan - all light growth   Not interested in OR at this time  10/12/22  Wound stable  Hold on cx tx  10/19/22  Wound stable  Ok for surgical debridement will schedule  Declined debridement today  10/25/22  Irrigation and excisional debridement of left medial and lateral ankle wound, Application of 500 mcg micromatrix acel  Application of unna boot  Lateral 15.5 sq cm, Medial 40.5 sq cm  11/2/22  Appearance improved  Measuring larger  No debridement  11/9/22  Appearance improved  Medial 62 (57), Lateral 18 (37)  Aquacell ag and wraps  11/16/22  Both appearance much improved  Medial 58 (62) Lateral 18 (18)  Aquacell ag , dratwek over top due to increased drainage  Percocet 5/325 mg #28 oarrs reviewed  11/23/22  Stable in size and appearance  New venous reflux study - Tuesday 11/29 at 1230 at my office  Will give her dressings to replace wrap after it is cut off for study  Refusing debridement due to pain  12/7/22  Missed last week due to influenza  Improved size  Tolerated debridement a little better than usual allowing slough to be removed especially around edges  Had to be rescheduled for venous US when office US is working again  12/14/22  Medial calf slightly larger but appearance improved 54 sq cm  Lateral calf slightly larger - more fibrinous exudate - 17 sq cm  Pt would only allow lateral calf debridement  Us 12/20 rescheduled  12/21/22  Minimal debridement allowed to both wounds  Venous reflux study reviewed - will discuss with Dr. Staples  Medial and lateral calf stable in size with fibrinous exudate  12/28/22  Debrided medial  Medial 52 increased (48)  Lateral 18 decreased (19)  Plan for lesser saphenous vein ablation  Oarrs reviewed - Percocet 5/325 mg #28  1/4/23  Slightly larger  Short term disability paperwork filled out   Surgery 1/12/23 - off till 2/6/23 1/12/23  Lesser saphenous vein ablation, stab phlebectomy  Wound

## 2024-02-12 NOTE — DISCHARGE INSTRUCTIONS
Discharge Instructions       Visit Discharge/Physician Orders     Discharge condition: Stable     Assessment of pain at discharge: yes     Anesthetic used: 4% lidocaine solution     Discharge to: Home     Left via:Private automobile     Accompanied by:self     ECF/HHA: n/a     Dressing Orders:LEFT MEDIAL and LATERAL LOWER LEG- Cleanse with normal saline, apply zinc to fiona wound,  drawtex, dressing, ABD pad , and profore . Change weekly.      Treatment Orders: Eat a diet high in protein and vitamin C. Take a multiple vitamin daily unless contraindicated.       To see Dr. Duncan as scheduled      Must wear slip on shoe to work due to wrap on leg!        [facility] followup visit : 1 week __________________________________  (Please note your next appointment above and if you are unable to keep, kindly give a 24 hour notice. Thank you.)     Physician signature:__________________________     If you experience any of the following, please call the Wound Care Center during business hours:     * Increase in Pain  * Temperature over 101  * Increase in drainage from your wound  * Drainage with a foul odor  * Bleeding  * Increase in swelling  * Need for compression bandage changes due to slippage, breakthrough drainage.     If you need medical attention outside of the business hours of the Wound Care Centers please contact your PCP or go to the nearest emergency room.

## 2024-02-14 ENCOUNTER — HOSPITAL ENCOUNTER (OUTPATIENT)
Dept: WOUND CARE | Age: 69
Discharge: HOME | End: 2024-02-14
Attending: SURGERY
Payer: MEDICARE

## 2024-02-14 VITALS — HEART RATE: 76 BPM | TEMPERATURE: 97.6 F | RESPIRATION RATE: 18 BRPM

## 2024-02-14 DIAGNOSIS — I83.023 VARICOSE VEINS OF LEFT LOWER EXTREMITY WITH ULCER OF ANKLE WITH FAT LAYER EXPOSED (HCC): ICD-10-CM

## 2024-02-14 DIAGNOSIS — I70.242 ATHEROSCLEROSIS OF NATIVE ARTERY OF LEFT LOWER EXTREMITY WITH ULCERATION OF CALF (HCC): Primary | ICD-10-CM

## 2024-02-14 DIAGNOSIS — L97.322 VARICOSE VEINS OF LEFT LOWER EXTREMITY WITH ULCER OF ANKLE WITH FAT LAYER EXPOSED (HCC): ICD-10-CM

## 2024-02-14 PROCEDURE — 11042 DBRDMT SUBQ TIS 1ST 20SQCM/<: CPT | Performed by: SURGERY

## 2024-02-14 PROCEDURE — 11045 DBRDMT SUBQ TISS EACH ADDL: CPT | Performed by: SURGERY

## 2024-02-14 PROCEDURE — 11045 DBRDMT SUBQ TISS EACH ADDL: CPT

## 2024-02-14 PROCEDURE — 11042 DBRDMT SUBQ TIS 1ST 20SQCM/<: CPT

## 2024-02-14 RX ORDER — LIDOCAINE HYDROCHLORIDE 40 MG/ML
SOLUTION TOPICAL ONCE
Status: COMPLETED | OUTPATIENT
Start: 2024-02-14 | End: 2024-02-14

## 2024-02-14 RX ORDER — OXYCODONE AND ACETAMINOPHEN 7.5; 325 MG/1; MG/1
1 TABLET ORAL EVERY 8 HOURS PRN
Qty: 42 TABLET | Refills: 0 | Status: SHIPPED | OUTPATIENT
Start: 2024-02-14 | End: 2024-02-28

## 2024-02-14 RX ADMIN — LIDOCAINE HYDROCHLORIDE 10 ML: 40 SOLUTION TOPICAL at 07:47

## 2024-02-14 NOTE — PROGRESS NOTES
02/14/24 0748   Post-Procedure Length (cm) 3 cm 02/14/24 0811   Post-Procedure Width (cm) 2.4 cm 02/14/24 0811   Post-Procedure Depth (cm) 0.1 cm 02/14/24 0811   Post-Procedure Surface Area (cm^2) 7.2 cm^2 02/14/24 0811   Post-Procedure Volume (cm^3) 0.72 cm^3 02/14/24 0811   Wound Assessment Fibrin;Pink/red 02/14/24 0748   Drainage Amount Moderate (25-50%) 02/14/24 0748   Drainage Description Green;Serosanguinous 02/14/24 0748   Odor None 02/14/24 0748   Melany-wound Assessment Fragile;Dry/flaky 02/14/24 0748   Margins Attached edges 02/07/24 0756   Wound Thickness Description not for Pressure Injury Full thickness 02/07/24 0756   Number of days: 700      Procedure Note  Indications:  Based on my examination of this patient's wound(s)/ulcer(s) today, debridement is required to promote healing and evaluate the wound base.    Performed by: Ashley Staples MD    Consent obtained:  Yes    Time out taken:  Yes    Pain Control: Anesthetic  Anesthetic: 4% Lidocaine Liquid Topical     Debridement:Excisional Debridement    Using curette the wound(s)/ulcer(s) was/were sharply debrided down through and including the removal of epidermis, dermis, and subcutaneous tissue.        Devitalized Tissue Debrided:  fibrin, biofilm, slough, and exudate to stimulate bleeding to promote healing, post debridement good bleeding base and wound edges noted    Wound/Ulcer #:1, 2    Percent of Wound/Ulcer Debrided: 100%    Total Surface Area Debrided:   33 sq cm     Estimated Blood Loss:  Minimal  Hemostasis Achieved:  by pressure    Procedural Pain:  10  / 10   Post Procedural Pain:  10 / 10     Response to treatment:  With complaints of pain.      Plan:        Discharge Instructions         Discharge Instructions       Visit Discharge/Physician Orders     Discharge condition: Stable     Assessment of pain at discharge: yes     Anesthetic used: 4% lidocaine solution     Discharge to: Home     Left via:Private automobile     Accompanied

## 2024-02-18 DIAGNOSIS — Z76.0 ENCOUNTER FOR MEDICATION REFILL: ICD-10-CM

## 2024-02-18 DIAGNOSIS — B00.9 HERPES: ICD-10-CM

## 2024-02-18 RX ORDER — ACYCLOVIR 200 MG/1
200 CAPSULE ORAL DAILY
Qty: 30 CAPSULE | Refills: 0 | Status: SHIPPED
Start: 2024-02-18 | End: 2024-03-06 | Stop reason: SDUPTHER

## 2024-02-19 NOTE — TELEPHONE ENCOUNTER
2/18/24 :  I will prescribe a 30 day supply of this prescription.  FINAL REFILL UNTIL NEXT APPOINTMENT

## 2024-02-20 DIAGNOSIS — I10 PRIMARY HYPERTENSION: ICD-10-CM

## 2024-02-20 RX ORDER — LISINOPRIL 20 MG/1
20 TABLET ORAL DAILY
Qty: 90 TABLET | Refills: 1 | OUTPATIENT
Start: 2024-02-20

## 2024-02-20 RX ORDER — LISINOPRIL 20 MG/1
20 TABLET ORAL DAILY
Qty: 7 TABLET | Refills: 0 | Status: SHIPPED
Start: 2024-02-20 | End: 2024-03-06 | Stop reason: SDUPTHER

## 2024-02-20 NOTE — DISCHARGE INSTRUCTIONS
Discharge Instructions       Visit Discharge/Physician Orders     Discharge condition: Stable     Assessment of pain at discharge: yes     Anesthetic used: 4% lidocaine solution     Discharge to: Home     Left via:Private automobile     Accompanied by:self     ECF/HHA: n/a     Dressing Orders:LEFT MEDIAL and LATERAL LOWER LEG- Cleanse with normal saline, apply zinc to fiona wound,  drawtex, dressing, ABD pad , and profore . Change weekly.      Treatment Orders: Eat a diet high in protein and vitamin C. Take a multiple vitamin daily unless contraindicated.       To see Dr. Duncan as scheduled      Must wear slip on shoe to work due to wrap on leg!        [facility] followup visit : 1 week __________________________________  (Please note your next appointment above and if you are unable to keep, kindly give a 24 hour notice. Thank you.)     Physician signature:__________________________     If you experience any of the following, please call the Wound Care Center during business hours:     * Increase in Pain  * Temperature over 101  * Increase in drainage from your wound  * Drainage with a foul odor  * Bleeding  * Increase in swelling  * Need for compression bandage changes due to slippage, breakthrough drainage.     If you need medical attention outside of the business hours of the Wound Care Centers please contact your PCP or go to the nearest emergency room.

## 2024-02-20 NOTE — TELEPHONE ENCOUNTER
----- Message from Roxane Betancur sent at 2/20/2024  1:55 PM EST -----  Subject: Refill Request    QUESTIONS  Name of Medication? lisinopril (PRINIVIL;ZESTRIL) 20 MG tablet  Patient-reported dosage and instructions? Take 1 tablet by mouth daily  How many days do you have left? 10  Preferred Pharmacy? [id number]  Pharmacy phone number (if available)? [phone]  Additional Information for Provider? Patient has a upcoming appt on   3/6/24. Please advvise.  ---------------------------------------------------------------------------  --------------  CALL BACK INFO  What is the best way for the office to contact you? OK to leave message on   voicemail  Preferred Call Back Phone Number? [phone]  ---------------------------------------------------------------------------  --------------  SCRIPT ANSWERS  Relationship to Patient? Self

## 2024-02-21 ENCOUNTER — HOSPITAL ENCOUNTER (OUTPATIENT)
Dept: WOUND CARE | Age: 69
Discharge: HOME | End: 2024-02-21
Attending: SURGERY
Payer: MEDICARE

## 2024-02-21 VITALS
HEIGHT: 68 IN | BODY MASS INDEX: 25.76 KG/M2 | HEART RATE: 80 BPM | RESPIRATION RATE: 18 BRPM | DIASTOLIC BLOOD PRESSURE: 60 MMHG | TEMPERATURE: 97.7 F | SYSTOLIC BLOOD PRESSURE: 134 MMHG | WEIGHT: 170 LBS

## 2024-02-21 DIAGNOSIS — L97.322 VARICOSE VEINS OF LEFT LOWER EXTREMITY WITH ULCER OF ANKLE WITH FAT LAYER EXPOSED (HCC): Primary | ICD-10-CM

## 2024-02-21 DIAGNOSIS — I10 PRIMARY HYPERTENSION: ICD-10-CM

## 2024-02-21 DIAGNOSIS — I83.023 VARICOSE VEINS OF LEFT LOWER EXTREMITY WITH ULCER OF ANKLE WITH FAT LAYER EXPOSED (HCC): Primary | ICD-10-CM

## 2024-02-21 PROCEDURE — 11042 DBRDMT SUBQ TIS 1ST 20SQCM/<: CPT

## 2024-02-21 PROCEDURE — 11042 DBRDMT SUBQ TIS 1ST 20SQCM/<: CPT | Performed by: SURGERY

## 2024-02-21 RX ORDER — CLOBETASOL PROPIONATE 0.5 MG/G
OINTMENT TOPICAL ONCE
OUTPATIENT
Start: 2024-02-21 | End: 2024-02-21

## 2024-02-21 RX ORDER — BACITRACIN ZINC AND POLYMYXIN B SULFATE 500; 1000 [USP'U]/G; [USP'U]/G
OINTMENT TOPICAL ONCE
OUTPATIENT
Start: 2024-02-21 | End: 2024-02-21

## 2024-02-21 RX ORDER — GINSENG 100 MG
CAPSULE ORAL ONCE
OUTPATIENT
Start: 2024-02-21 | End: 2024-02-21

## 2024-02-21 RX ORDER — TRIAMCINOLONE ACETONIDE 1 MG/G
OINTMENT TOPICAL ONCE
OUTPATIENT
Start: 2024-02-21 | End: 2024-02-21

## 2024-02-21 RX ORDER — IBUPROFEN 200 MG
TABLET ORAL ONCE
OUTPATIENT
Start: 2024-02-21 | End: 2024-02-21

## 2024-02-21 RX ORDER — LIDOCAINE HYDROCHLORIDE 20 MG/ML
JELLY TOPICAL ONCE
OUTPATIENT
Start: 2024-02-21 | End: 2024-02-21

## 2024-02-21 RX ORDER — GENTAMICIN SULFATE 1 MG/G
OINTMENT TOPICAL ONCE
OUTPATIENT
Start: 2024-02-21 | End: 2024-02-21

## 2024-02-21 RX ORDER — LISINOPRIL 20 MG/1
20 TABLET ORAL DAILY
Qty: 7 TABLET | Refills: 0 | OUTPATIENT
Start: 2024-02-21

## 2024-02-21 RX ORDER — LIDOCAINE HYDROCHLORIDE 40 MG/ML
SOLUTION TOPICAL ONCE
OUTPATIENT
Start: 2024-02-21 | End: 2024-02-21

## 2024-02-21 RX ORDER — LIDOCAINE 50 MG/G
OINTMENT TOPICAL ONCE
OUTPATIENT
Start: 2024-02-21 | End: 2024-02-21

## 2024-02-21 RX ORDER — BETAMETHASONE DIPROPIONATE 0.05 %
OINTMENT (GRAM) TOPICAL ONCE
OUTPATIENT
Start: 2024-02-21 | End: 2024-02-21

## 2024-02-21 RX ORDER — LIDOCAINE 40 MG/G
CREAM TOPICAL ONCE
OUTPATIENT
Start: 2024-02-21 | End: 2024-02-21

## 2024-02-21 ASSESSMENT — PAIN DESCRIPTION - FREQUENCY: FREQUENCY: CONTINUOUS

## 2024-02-21 ASSESSMENT — PAIN SCALES - GENERAL: PAINLEVEL_OUTOF10: 8

## 2024-02-21 ASSESSMENT — PAIN DESCRIPTION - LOCATION: LOCATION: ANKLE

## 2024-02-21 ASSESSMENT — PAIN DESCRIPTION - ORIENTATION: ORIENTATION: LEFT

## 2024-02-21 ASSESSMENT — PAIN - FUNCTIONAL ASSESSMENT: PAIN_FUNCTIONAL_ASSESSMENT: PREVENTS OR INTERFERES SOME ACTIVE ACTIVITIES AND ADLS

## 2024-02-21 ASSESSMENT — PAIN DESCRIPTION - ONSET: ONSET: ON-GOING

## 2024-02-21 ASSESSMENT — PAIN DESCRIPTION - DESCRIPTORS: DESCRIPTORS: THROBBING

## 2024-02-21 ASSESSMENT — PAIN DESCRIPTION - PAIN TYPE: TYPE: CHRONIC PAIN

## 2024-02-21 NOTE — PROGRESS NOTES
days: 2730       Wound 02/02/22 Ankle Left;Medial #1 (Active)   Wound Image   02/14/24 0748   Wound Etiology Venous 10/18/23 0824   Dressing Status New dressing applied 02/14/24 0830   Wound Cleansed Cleansed with saline 02/14/24 0830   Dressing/Treatment ABD 02/14/24 0830   Offloading for Diabetic Foot Ulcers Offloading not required 01/03/24 0830   Wound Length (cm) 6.3 cm 02/21/24 0751   Wound Width (cm) 2.9 cm 02/21/24 0751   Wound Depth (cm) 0.1 cm 02/21/24 0751   Wound Surface Area (cm^2) 18.27 cm^2 02/21/24 0751   Change in Wound Size % (l*w) 32.48 02/21/24 0751   Wound Volume (cm^3) 1.827 cm^3 02/21/24 0751   Wound Healing % 32 02/21/24 0751   Post-Procedure Length (cm) 6.6 cm 02/21/24 0823   Post-Procedure Width (cm) 3.2 cm 02/21/24 0823   Post-Procedure Depth (cm) 0.1 cm 02/21/24 0823   Post-Procedure Surface Area (cm^2) 21.12 cm^2 02/21/24 0823   Post-Procedure Volume (cm^3) 2.112 cm^3 02/21/24 0823   Wound Assessment Fibrin;Pale granulation tissue 02/21/24 0751   Drainage Amount Moderate (25-50%) 02/21/24 0751   Drainage Description Yellow;Green 02/21/24 0751   Odor None 02/21/24 0751   Melany-wound Assessment Fragile;Dry/flaky 02/21/24 0751   Margins Attached edges 02/07/24 0756   Wound Thickness Description not for Pressure Injury Full thickness 02/07/24 0756   Number of days: 749       Wound 03/16/22 Ankle Posterior;Left #2 (Active)   Wound Image   02/14/24 0748   Wound Etiology Venous 10/18/23 0824   Dressing Status New dressing applied 02/14/24 0830   Wound Cleansed Cleansed with saline 02/14/24 0830   Dressing/Treatment ABD 02/14/24 0830   Offloading for Diabetic Foot Ulcers Offloading not required 01/03/24 0830   Wound Length (cm) 2.4 cm 02/21/24 0751   Wound Width (cm) 1.8 cm 02/21/24 0751   Wound Depth (cm) 0.1 cm 02/21/24 0751   Wound Surface Area (cm^2) 4.32 cm^2 02/21/24 0751   Change in Wound Size % (l*w) -72.8 02/21/24 0751   Wound Volume (cm^3) 0.432 cm^3 02/21/24 0751   Wound Healing % -73

## 2024-02-27 NOTE — DISCHARGE INSTRUCTIONS

## 2024-02-28 ENCOUNTER — HOSPITAL ENCOUNTER (OUTPATIENT)
Dept: WOUND CARE | Age: 69
Discharge: HOME | End: 2024-02-28
Attending: SURGERY
Payer: MEDICARE

## 2024-02-28 VITALS — TEMPERATURE: 97.8 F | RESPIRATION RATE: 18 BRPM

## 2024-02-28 DIAGNOSIS — L97.322 VARICOSE VEINS OF LEFT LOWER EXTREMITY WITH ULCER OF ANKLE WITH FAT LAYER EXPOSED (HCC): Primary | ICD-10-CM

## 2024-02-28 DIAGNOSIS — I83.023 VARICOSE VEINS OF LEFT LOWER EXTREMITY WITH ULCER OF ANKLE WITH FAT LAYER EXPOSED (HCC): Primary | ICD-10-CM

## 2024-02-28 DIAGNOSIS — I70.242 ATHEROSCLEROSIS OF NATIVE ARTERY OF LEFT LOWER EXTREMITY WITH ULCERATION OF CALF (HCC): ICD-10-CM

## 2024-02-28 PROCEDURE — 11042 DBRDMT SUBQ TIS 1ST 20SQCM/<: CPT | Performed by: SURGERY

## 2024-02-28 PROCEDURE — 11042 DBRDMT SUBQ TIS 1ST 20SQCM/<: CPT

## 2024-02-28 RX ORDER — LIDOCAINE HYDROCHLORIDE 20 MG/ML
JELLY TOPICAL ONCE
OUTPATIENT
Start: 2024-02-28 | End: 2024-02-28

## 2024-02-28 RX ORDER — BETAMETHASONE DIPROPIONATE 0.05 %
OINTMENT (GRAM) TOPICAL ONCE
OUTPATIENT
Start: 2024-02-28 | End: 2024-02-28

## 2024-02-28 RX ORDER — GINSENG 100 MG
CAPSULE ORAL ONCE
OUTPATIENT
Start: 2024-02-28 | End: 2024-02-28

## 2024-02-28 RX ORDER — TRIAMCINOLONE ACETONIDE 1 MG/G
OINTMENT TOPICAL ONCE
OUTPATIENT
Start: 2024-02-28 | End: 2024-02-28

## 2024-02-28 RX ORDER — BACITRACIN ZINC AND POLYMYXIN B SULFATE 500; 1000 [USP'U]/G; [USP'U]/G
OINTMENT TOPICAL ONCE
OUTPATIENT
Start: 2024-02-28 | End: 2024-02-28

## 2024-02-28 RX ORDER — IBUPROFEN 200 MG
TABLET ORAL ONCE
OUTPATIENT
Start: 2024-02-28 | End: 2024-02-28

## 2024-02-28 RX ORDER — LIDOCAINE 40 MG/G
CREAM TOPICAL ONCE
OUTPATIENT
Start: 2024-02-28 | End: 2024-02-28

## 2024-02-28 RX ORDER — GENTAMICIN SULFATE 1 MG/G
OINTMENT TOPICAL ONCE
OUTPATIENT
Start: 2024-02-28 | End: 2024-02-28

## 2024-02-28 RX ORDER — CLOBETASOL PROPIONATE 0.5 MG/G
OINTMENT TOPICAL ONCE
OUTPATIENT
Start: 2024-02-28 | End: 2024-02-28

## 2024-02-28 RX ORDER — LIDOCAINE HYDROCHLORIDE 40 MG/ML
SOLUTION TOPICAL ONCE
OUTPATIENT
Start: 2024-02-28 | End: 2024-02-28

## 2024-02-28 RX ORDER — LIDOCAINE 50 MG/G
OINTMENT TOPICAL ONCE
OUTPATIENT
Start: 2024-02-28 | End: 2024-02-28

## 2024-02-28 NOTE — PROGRESS NOTES
Wound Healing Center Followup Visit Note    Referring Physician : Rosenda Cormier MD  Amanda Ledesma  MEDICAL RECORD NUMBER:  [id number]  AGE: 68 y.o.   GENDER: female  : 1955  EPISODE DATE:  2024    Subjective:     Chief Complaint   Patient presents with    Wound Check     Leg left      HISTORY of PRESENT ILLNESS HPI   Amanda Ledesma is a 68 y.o. female who presents today in regards to follow up evaluation and treatment of wound/ulcer.  That patient's past medical, family and social hx were reviewed and changes were made if present.    History of Wound Context:  The patient has had a wound of her left ankle/calf which was first noted approximately 2021.  This has been treated local wound care. On their initial visit to the wound healing center, 22,  the patient has noted that the wound has been improving.  The patient has not had similar previous wounds in the past.      She started seeing Dr. Street in 2021 and than Dr. Gillis.  She was started in unna boot ~ 2021.  She has noticed some improvement since starting unna boot.  She is currently following with Dr. Haskins.      Pt is not on abx at time of initial visit, but has been treated with previously by podiatry.      She is not a DM.  She is not a smoker.  She denies hx of DVT, and per her report had recent us noting no evidence of dvt at [facility].  She also had arterial studies done.    I had previously seen her in the past in regards to left buttock thigh wound, which started as abscess.      Pt works at [employer] in the [email or web address] and is on her feet all day.    22  Reflux study - if significant findings will schedule for fu  Continue compression therapy [facility] per podiatry with aquacell dressing  Elevation  She does not have significant arterial occlusive disease  22  Patient has asked to continuing following with me going forward because of our previous relationship  She is going to let Dr. Schuler office know  She

## 2024-03-05 NOTE — DISCHARGE INSTRUCTIONS

## 2024-03-06 ENCOUNTER — OFFICE VISIT (OUTPATIENT)
Dept: FAMILY MEDICINE CLINIC | Age: 69
End: 2024-03-06
Payer: MEDICARE

## 2024-03-06 ENCOUNTER — HOSPITAL ENCOUNTER (OUTPATIENT)
Dept: WOUND CARE | Age: 69
Discharge: HOME | End: 2024-03-06
Attending: SURGERY
Payer: MEDICARE

## 2024-03-06 VITALS
RESPIRATION RATE: 18 BRPM | TEMPERATURE: 98.7 F | SYSTOLIC BLOOD PRESSURE: 113 MMHG | HEART RATE: 84 BPM | HEIGHT: 68 IN | BODY MASS INDEX: 25.76 KG/M2 | DIASTOLIC BLOOD PRESSURE: 50 MMHG | WEIGHT: 170 LBS

## 2024-03-06 VITALS
WEIGHT: 168 LBS | RESPIRATION RATE: 16 BRPM | BODY MASS INDEX: 25.46 KG/M2 | DIASTOLIC BLOOD PRESSURE: 68 MMHG | HEART RATE: 62 BPM | SYSTOLIC BLOOD PRESSURE: 122 MMHG | HEIGHT: 68 IN | TEMPERATURE: 97.7 F | OXYGEN SATURATION: 96 %

## 2024-03-06 DIAGNOSIS — G43.909 MIGRAINE WITHOUT STATUS MIGRAINOSUS, NOT INTRACTABLE, UNSPECIFIED MIGRAINE TYPE: ICD-10-CM

## 2024-03-06 DIAGNOSIS — Z76.0 ENCOUNTER FOR MEDICATION REFILL: Primary | ICD-10-CM

## 2024-03-06 DIAGNOSIS — B00.9 HERPES: ICD-10-CM

## 2024-03-06 DIAGNOSIS — L97.322 VARICOSE VEINS OF LEFT LOWER EXTREMITY WITH ULCER OF ANKLE WITH FAT LAYER EXPOSED (HCC): Primary | ICD-10-CM

## 2024-03-06 DIAGNOSIS — G44.029 CHRONIC CLUSTER HEADACHE, NOT INTRACTABLE: ICD-10-CM

## 2024-03-06 DIAGNOSIS — I10 PRIMARY HYPERTENSION: ICD-10-CM

## 2024-03-06 DIAGNOSIS — F51.05 INSOMNIA SECONDARY TO ANXIETY: ICD-10-CM

## 2024-03-06 DIAGNOSIS — L97.322 VARICOSE VEINS OF LEFT LOWER EXTREMITY WITH ULCER OF ANKLE WITH FAT LAYER EXPOSED (HCC): ICD-10-CM

## 2024-03-06 DIAGNOSIS — T63.441A ALLERGIC REACTION TO BEE STING: ICD-10-CM

## 2024-03-06 DIAGNOSIS — K21.00 GASTROESOPHAGEAL REFLUX DISEASE WITH ESOPHAGITIS WITHOUT HEMORRHAGE: Chronic | ICD-10-CM

## 2024-03-06 DIAGNOSIS — I70.242 ATHEROSCLEROSIS OF NATIVE ARTERY OF LEFT LOWER EXTREMITY WITH ULCERATION OF CALF (HCC): ICD-10-CM

## 2024-03-06 DIAGNOSIS — I83.023 VARICOSE VEINS OF LEFT LOWER EXTREMITY WITH ULCER OF ANKLE WITH FAT LAYER EXPOSED (HCC): Primary | ICD-10-CM

## 2024-03-06 DIAGNOSIS — I83.023 VARICOSE VEINS OF LEFT LOWER EXTREMITY WITH ULCER OF ANKLE WITH FAT LAYER EXPOSED (HCC): ICD-10-CM

## 2024-03-06 DIAGNOSIS — F41.9 INSOMNIA SECONDARY TO ANXIETY: ICD-10-CM

## 2024-03-06 PROCEDURE — 3078F DIAST BP <80 MM HG: CPT | Performed by: FAMILY MEDICINE

## 2024-03-06 PROCEDURE — 1036F TOBACCO NON-USER: CPT | Performed by: FAMILY MEDICINE

## 2024-03-06 PROCEDURE — G8484 FLU IMMUNIZE NO ADMIN: HCPCS | Performed by: FAMILY MEDICINE

## 2024-03-06 PROCEDURE — G8419 CALC BMI OUT NRM PARAM NOF/U: HCPCS | Performed by: FAMILY MEDICINE

## 2024-03-06 PROCEDURE — 99214 OFFICE O/P EST MOD 30 MIN: CPT | Performed by: FAMILY MEDICINE

## 2024-03-06 PROCEDURE — G8427 DOCREV CUR MEDS BY ELIG CLIN: HCPCS | Performed by: FAMILY MEDICINE

## 2024-03-06 PROCEDURE — 11042 DBRDMT SUBQ TIS 1ST 20SQCM/<: CPT

## 2024-03-06 PROCEDURE — 1090F PRES/ABSN URINE INCON ASSESS: CPT | Performed by: FAMILY MEDICINE

## 2024-03-06 PROCEDURE — 3074F SYST BP LT 130 MM HG: CPT | Performed by: FAMILY MEDICINE

## 2024-03-06 PROCEDURE — G8400 PT W/DXA NO RESULTS DOC: HCPCS | Performed by: FAMILY MEDICINE

## 2024-03-06 PROCEDURE — 3017F COLORECTAL CA SCREEN DOC REV: CPT | Performed by: FAMILY MEDICINE

## 2024-03-06 PROCEDURE — 11042 DBRDMT SUBQ TIS 1ST 20SQCM/<: CPT | Performed by: SURGERY

## 2024-03-06 PROCEDURE — 1124F ACP DISCUSS-NO DSCNMKR DOCD: CPT | Performed by: FAMILY MEDICINE

## 2024-03-06 RX ORDER — HYDROXYZINE HYDROCHLORIDE 25 MG/1
TABLET, FILM COATED ORAL
Qty: 180 TABLET | Refills: 1 | Status: SHIPPED | OUTPATIENT
Start: 2024-03-06 | End: 2024-09-06

## 2024-03-06 RX ORDER — LISINOPRIL 20 MG/1
20 TABLET ORAL DAILY
Qty: 90 TABLET | Refills: 1 | Status: SHIPPED | OUTPATIENT
Start: 2024-03-06 | End: 2024-09-06

## 2024-03-06 RX ORDER — LIDOCAINE 40 MG/G
CREAM TOPICAL ONCE
OUTPATIENT
Start: 2024-03-06 | End: 2024-03-06

## 2024-03-06 RX ORDER — BUTALBITAL, ACETAMINOPHEN AND CAFFEINE 50; 325; 40 MG/1; MG/1; MG/1
1 TABLET ORAL 3 TIMES DAILY PRN
Qty: 90 TABLET | Refills: 5 | Status: SHIPPED | OUTPATIENT
Start: 2024-03-06 | End: 2024-09-06

## 2024-03-06 RX ORDER — PANTOPRAZOLE SODIUM 40 MG/1
40 TABLET, DELAYED RELEASE ORAL
Qty: 90 TABLET | Refills: 1 | Status: SHIPPED | OUTPATIENT
Start: 2024-03-06 | End: 2024-09-06

## 2024-03-06 RX ORDER — LIDOCAINE HYDROCHLORIDE 40 MG/ML
SOLUTION TOPICAL ONCE
OUTPATIENT
Start: 2024-03-06 | End: 2024-03-06

## 2024-03-06 RX ORDER — IBUPROFEN 200 MG
TABLET ORAL ONCE
OUTPATIENT
Start: 2024-03-06 | End: 2024-03-06

## 2024-03-06 RX ORDER — ACYCLOVIR 200 MG/1
200 CAPSULE ORAL DAILY
Qty: 90 CAPSULE | Refills: 1 | Status: SHIPPED | OUTPATIENT
Start: 2024-03-06 | End: 2024-09-06

## 2024-03-06 RX ORDER — GENTAMICIN SULFATE 1 MG/G
OINTMENT TOPICAL ONCE
OUTPATIENT
Start: 2024-03-06 | End: 2024-03-06

## 2024-03-06 RX ORDER — BETAMETHASONE DIPROPIONATE 0.05 %
OINTMENT (GRAM) TOPICAL ONCE
OUTPATIENT
Start: 2024-03-06 | End: 2024-03-06

## 2024-03-06 RX ORDER — LIDOCAINE HYDROCHLORIDE 40 MG/ML
SOLUTION TOPICAL ONCE
Status: COMPLETED | OUTPATIENT
Start: 2024-03-06 | End: 2024-03-06

## 2024-03-06 RX ORDER — OXYCODONE AND ACETAMINOPHEN 7.5; 325 MG/1; MG/1
1 TABLET ORAL EVERY 8 HOURS PRN
Qty: 42 TABLET | Refills: 0 | Status: SHIPPED | OUTPATIENT
Start: 2024-03-06 | End: 2024-03-20

## 2024-03-06 RX ORDER — LIDOCAINE 50 MG/G
OINTMENT TOPICAL ONCE
OUTPATIENT
Start: 2024-03-06 | End: 2024-03-06

## 2024-03-06 RX ORDER — LIDOCAINE HYDROCHLORIDE 20 MG/ML
JELLY TOPICAL ONCE
OUTPATIENT
Start: 2024-03-06 | End: 2024-03-06

## 2024-03-06 RX ORDER — CLOBETASOL PROPIONATE 0.5 MG/G
OINTMENT TOPICAL ONCE
OUTPATIENT
Start: 2024-03-06 | End: 2024-03-06

## 2024-03-06 RX ORDER — EPINEPHRINE 0.3 MG/.3ML
INJECTION SUBCUTANEOUS
Qty: 1 EACH | Refills: 5 | Status: SHIPPED | OUTPATIENT
Start: 2024-03-06 | End: 2024-09-06

## 2024-03-06 RX ORDER — GINSENG 100 MG
CAPSULE ORAL ONCE
OUTPATIENT
Start: 2024-03-06 | End: 2024-03-06

## 2024-03-06 RX ORDER — TRIAMCINOLONE ACETONIDE 1 MG/G
OINTMENT TOPICAL ONCE
OUTPATIENT
Start: 2024-03-06 | End: 2024-03-06

## 2024-03-06 RX ORDER — BACITRACIN ZINC AND POLYMYXIN B SULFATE 500; 1000 [USP'U]/G; [USP'U]/G
OINTMENT TOPICAL ONCE
OUTPATIENT
Start: 2024-03-06 | End: 2024-03-06

## 2024-03-06 RX ADMIN — LIDOCAINE HYDROCHLORIDE 10 ML: 40 SOLUTION TOPICAL at 07:54

## 2024-03-06 SDOH — ECONOMIC STABILITY: FOOD INSECURITY: WITHIN THE PAST 12 MONTHS, THE FOOD YOU BOUGHT JUST DIDN'T LAST AND YOU DIDN'T HAVE MONEY TO GET MORE.: NEVER TRUE

## 2024-03-06 SDOH — ECONOMIC STABILITY: INCOME INSECURITY: HOW HARD IS IT FOR YOU TO PAY FOR THE VERY BASICS LIKE FOOD, HOUSING, MEDICAL CARE, AND HEATING?: NOT HARD AT ALL

## 2024-03-06 SDOH — ECONOMIC STABILITY: FOOD INSECURITY: WITHIN THE PAST 12 MONTHS, YOU WORRIED THAT YOUR FOOD WOULD RUN OUT BEFORE YOU GOT MONEY TO BUY MORE.: NEVER TRUE

## 2024-03-06 ASSESSMENT — PATIENT HEALTH QUESTIONNAIRE - PHQ9
SUM OF ALL RESPONSES TO PHQ9 QUESTIONS 1 & 2: 0
SUM OF ALL RESPONSES TO PHQ QUESTIONS 1-9: 0
2. FEELING DOWN, DEPRESSED OR HOPELESS: 0
SUM OF ALL RESPONSES TO PHQ QUESTIONS 1-9: 0
1. LITTLE INTEREST OR PLEASURE IN DOING THINGS: 0
SUM OF ALL RESPONSES TO PHQ QUESTIONS 1-9: 0
SUM OF ALL RESPONSES TO PHQ QUESTIONS 1-9: 0

## 2024-03-06 ASSESSMENT — ENCOUNTER SYMPTOMS
ORTHOPNEA: 0
SPUTUM PRODUCTION: 0
BACK PAIN: 0
VOMITING: 0
BLURRED VISION: 0
SORE THROAT: 0
HEARTBURN: 0
CONSTIPATION: 0
BLOOD IN STOOL: 0
DOUBLE VISION: 0
NAUSEA: 0
WHEEZING: 0
DIARRHEA: 0
COUGH: 0
ABDOMINAL PAIN: 0
SHORTNESS OF BREATH: 0

## 2024-03-06 ASSESSMENT — PAIN SCALES - GENERAL: PAINLEVEL_OUTOF10: 8

## 2024-03-06 ASSESSMENT — PAIN DESCRIPTION - LOCATION: LOCATION: ANKLE

## 2024-03-06 ASSESSMENT — PAIN - FUNCTIONAL ASSESSMENT: PAIN_FUNCTIONAL_ASSESSMENT: PREVENTS OR INTERFERES SOME ACTIVE ACTIVITIES AND ADLS

## 2024-03-06 ASSESSMENT — PAIN DESCRIPTION - PAIN TYPE: TYPE: CHRONIC PAIN

## 2024-03-06 ASSESSMENT — PAIN DESCRIPTION - ORIENTATION: ORIENTATION: LEFT

## 2024-03-06 ASSESSMENT — PAIN DESCRIPTION - ONSET: ONSET: ON-GOING

## 2024-03-06 ASSESSMENT — PAIN DESCRIPTION - DESCRIPTORS: DESCRIPTORS: THROBBING

## 2024-03-06 ASSESSMENT — PAIN DESCRIPTION - FREQUENCY: FREQUENCY: CONTINUOUS

## 2024-03-06 NOTE — PROGRESS NOTES
Wound Healing Center Followup Visit Note    Referring Physician : Rosenda Cormier MD  Amanda Ledesma  MEDICAL RECORD NUMBER:  [id number]  AGE: 68 y.o.   GENDER: female  : 1955  EPISODE DATE:  3/6/2024    Subjective:     Chief Complaint   Patient presents with    Wound Check     Left leg      HISTORY of PRESENT ILLNESS HPI   Amanda Ledesma is a 68 y.o. female who presents today in regards to follow up evaluation and treatment of wound/ulcer.  That patient's past medical, family and social hx were reviewed and changes were made if present.    History of Wound Context:  The patient has had a wound of her left ankle/calf which was first noted approximately 2021.  This has been treated local wound care. On their initial visit to the wound healing center, 22,  the patient has noted that the wound has been improving.  The patient has not had similar previous wounds in the past.      She started seeing Dr. Street in 2021 and than Dr. Gillis.  She was started in unna boot ~ 2021.  She has noticed some improvement since starting unna boot.  She is currently following with Dr. Haskins.      Pt is not on abx at time of initial visit, but has been treated with previously by podiatry.      She is not a DM.  She is not a smoker.  She denies hx of DVT, and per her report had recent us noting no evidence of dvt at [facility].  She also had arterial studies done.    I had previously seen her in the past in regards to left buttock thigh wound, which started as abscess.      Pt works at [employer] in the [employer] and is on her feet all day.    22  Reflux study - if significant findings will schedule for fu  Continue compression therapy [facility] per podiatry with aquacell dressing  Elevation  She does not have significant arterial occlusive disease  22  Patient has asked to continuing following with me going forward because of our previous relationship  She is going to let Dr. Schuler office know  She

## 2024-03-06 NOTE — PROGRESS NOTES
Amanda Ledesma is a 68 y.o. female who presents today for     Chief Complaint   Patient presents with    6 Month Follow-Up    Medication Refill      She has been treated for GERD and migraine/cluster headaches.  She continues to experience anxiety frequently; hydroxyzine helps.  Since last visit, she has been diagnosed with HTN and is now on lisinopril 20 mg/day.    Due for medication refills today.      Hypertension:  Patient is here for follow up chronic hypertension.  This is  generally controlled on current medication regimen (Lisinopril 20mg/day).  BP today is 122/68.  Takes meds as directed and tolerates them well.  Most recent labs reviewed with patient and are not remarkable.  No symptoms from htn standpoint per ROS.  Patient is not compliant with lifestyle modifications.  Patient does not smoke.        Varicose Veins: Vascular Insufficiency:  She remains under the care of Dr. HERNANDEZ, who is seeing her for wound debridement weekly. He has advised her to retire, as she is on her feet all day. She is not ready to do so; she is aware that prolonged standing is only aggravating the stasis issue and prolonging the treatment to clear the ulcers      Headache  Patient with a history of  headache. Symptoms began about several years ago. Generally, the headaches last about several hours and occur several times per week. The headaches do not seem to be related to any time of day or year. The headaches are usually moderate, dull, sharp and throbbing and are located in global scalp/head.  The patient rates her most severe headaches a 8 on a scale from 1 to 10. Recently, the headaches have been stable. Work attendance or other daily activities are not affected by the headaches. Precipitating factors include: cold temperatures, light, stress and weather changes. The headaches are usually not preceded by an aura. Associated neurologic symptoms: vomiting in the early morning. The patient denies decreased physical

## 2024-03-11 NOTE — DISCHARGE INSTRUCTIONS

## 2024-03-13 ENCOUNTER — HOSPITAL ENCOUNTER (OUTPATIENT)
Dept: WOUND CARE | Age: 69
Discharge: HOME | End: 2024-03-13
Attending: SURGERY
Payer: MEDICARE

## 2024-03-13 VITALS
DIASTOLIC BLOOD PRESSURE: 60 MMHG | HEIGHT: 68 IN | RESPIRATION RATE: 18 BRPM | WEIGHT: 168 LBS | BODY MASS INDEX: 25.46 KG/M2 | HEART RATE: 62 BPM | SYSTOLIC BLOOD PRESSURE: 120 MMHG | TEMPERATURE: 97.3 F

## 2024-03-13 DIAGNOSIS — L97.322 VARICOSE VEINS OF LEFT LOWER EXTREMITY WITH ULCER OF ANKLE WITH FAT LAYER EXPOSED (HCC): Primary | ICD-10-CM

## 2024-03-13 DIAGNOSIS — I83.023 VARICOSE VEINS OF LEFT LOWER EXTREMITY WITH ULCER OF ANKLE WITH FAT LAYER EXPOSED (HCC): Primary | ICD-10-CM

## 2024-03-13 DIAGNOSIS — I70.242 ATHEROSCLEROSIS OF NATIVE ARTERY OF LEFT LOWER EXTREMITY WITH ULCERATION OF CALF (HCC): ICD-10-CM

## 2024-03-13 PROCEDURE — 11042 DBRDMT SUBQ TIS 1ST 20SQCM/<: CPT

## 2024-03-13 PROCEDURE — 11042 DBRDMT SUBQ TIS 1ST 20SQCM/<: CPT | Performed by: SURGERY

## 2024-03-13 RX ORDER — CLOBETASOL PROPIONATE 0.5 MG/G
OINTMENT TOPICAL ONCE
OUTPATIENT
Start: 2024-03-13 | End: 2024-03-13

## 2024-03-13 RX ORDER — LIDOCAINE HYDROCHLORIDE 20 MG/ML
JELLY TOPICAL ONCE
OUTPATIENT
Start: 2024-03-13 | End: 2024-03-13

## 2024-03-13 RX ORDER — LIDOCAINE 50 MG/G
OINTMENT TOPICAL ONCE
OUTPATIENT
Start: 2024-03-13 | End: 2024-03-13

## 2024-03-13 RX ORDER — TRIAMCINOLONE ACETONIDE 1 MG/G
OINTMENT TOPICAL ONCE
OUTPATIENT
Start: 2024-03-13 | End: 2024-03-13

## 2024-03-13 RX ORDER — BACITRACIN ZINC AND POLYMYXIN B SULFATE 500; 1000 [USP'U]/G; [USP'U]/G
OINTMENT TOPICAL ONCE
OUTPATIENT
Start: 2024-03-13 | End: 2024-03-13

## 2024-03-13 RX ORDER — LIDOCAINE HYDROCHLORIDE 40 MG/ML
SOLUTION TOPICAL ONCE
OUTPATIENT
Start: 2024-03-13 | End: 2024-03-13

## 2024-03-13 RX ORDER — LIDOCAINE HYDROCHLORIDE 40 MG/ML
SOLUTION TOPICAL ONCE
Status: COMPLETED | OUTPATIENT
Start: 2024-03-13 | End: 2024-03-13

## 2024-03-13 RX ORDER — LIDOCAINE 40 MG/G
CREAM TOPICAL ONCE
OUTPATIENT
Start: 2024-03-13 | End: 2024-03-13

## 2024-03-13 RX ORDER — GENTAMICIN SULFATE 1 MG/G
OINTMENT TOPICAL ONCE
OUTPATIENT
Start: 2024-03-13 | End: 2024-03-13

## 2024-03-13 RX ORDER — BETAMETHASONE DIPROPIONATE 0.05 %
OINTMENT (GRAM) TOPICAL ONCE
OUTPATIENT
Start: 2024-03-13 | End: 2024-03-13

## 2024-03-13 RX ORDER — IBUPROFEN 200 MG
TABLET ORAL ONCE
OUTPATIENT
Start: 2024-03-13 | End: 2024-03-13

## 2024-03-13 RX ORDER — GINSENG 100 MG
CAPSULE ORAL ONCE
OUTPATIENT
Start: 2024-03-13 | End: 2024-03-13

## 2024-03-13 RX ADMIN — LIDOCAINE HYDROCHLORIDE: 40 SOLUTION TOPICAL at 08:07

## 2024-03-13 ASSESSMENT — PAIN DESCRIPTION - ORIENTATION: ORIENTATION: LEFT

## 2024-03-13 ASSESSMENT — PAIN DESCRIPTION - LOCATION: LOCATION: ANKLE

## 2024-03-13 ASSESSMENT — PAIN DESCRIPTION - DESCRIPTORS: DESCRIPTORS: THROBBING

## 2024-03-13 ASSESSMENT — PAIN SCALES - GENERAL: PAINLEVEL_OUTOF10: 8

## 2024-03-13 NOTE — PROGRESS NOTES
located in the Wound/Ulcer Documentation Flow Sheet  Post Debridement Measurements:  Wound/Ulcer Descriptions are Pre Debridement except measurements:     Wound 08/10/16 Other (Comment) Buttocks Left;Distal #2 acq 8/4/16 (Active)   Number of days: 2771       Wound 08/31/16 Buttocks Left;Proximal #3 aquired 8-27-26 (Active)   Number of days: 2750       Wound 02/02/22 Ankle Left;Medial #1 (Active)   Wound Image   03/13/24 0752   Wound Etiology Venous 10/18/23 0824   Dressing Status New dressing applied 03/06/24 0830   Wound Cleansed Cleansed with saline 03/06/24 0830   Dressing/Treatment ABD 03/06/24 0830   Offloading for Diabetic Foot Ulcers Offloading not required 01/03/24 0830   Wound Length (cm) 6.1 cm 03/13/24 0752   Wound Width (cm) 3 cm 03/13/24 0752   Wound Depth (cm) 0.1 cm 03/13/24 0752   Wound Surface Area (cm^2) 18.3 cm^2 03/13/24 0752   Change in Wound Size % (l*w) 32.37 03/13/24 0752   Wound Volume (cm^3) 1.83 cm^3 03/13/24 0752   Wound Healing % 32 03/13/24 0752   Post-Procedure Length (cm) 6.1 cm 03/13/24 0827   Post-Procedure Width (cm) 3.1 cm 03/13/24 0827   Post-Procedure Depth (cm) 0.2 cm 03/13/24 0827   Post-Procedure Surface Area (cm^2) 18.91 cm^2 03/13/24 0827   Post-Procedure Volume (cm^3) 3.782 cm^3 03/13/24 0827   Wound Assessment Fibrin;Pale granulation tissue;Bleeding 03/13/24 0752   Drainage Amount Moderate (25-50%) 03/13/24 0752   Drainage Description Yellow;Green;Serosanguinous 03/13/24 0752   Odor None 03/13/24 0752   Melany-wound Assessment Fragile;Dry/flaky 03/13/24 0752   Margins Attached edges 02/28/24 0800   Wound Thickness Description not for Pressure Injury Full thickness 02/28/24 0800   Number of days: 769       Wound 03/16/22 Ankle Posterior;Left #2 (Active)   Wound Image   03/13/24 0752   Wound Etiology Venous 10/18/23 0824   Dressing Status New dressing applied 03/06/24 0830   Wound Cleansed Cleansed with saline 03/06/24 0830   Dressing/Treatment ABD 03/06/24 0830   Offloading

## 2024-03-19 NOTE — DISCHARGE INSTRUCTIONS
Written       Visit Discharge/Physician Orders     Discharge condition: Stable     Assessment of pain at discharge: yes     Anesthetic used: 4% lidocaine solution     Discharge to: Home     Left via:Private automobile     Accompanied by:self     ECF/HHA: n/a     Dressing Orders:LEFT MEDIAL and LATERAL LOWER LEG- Cleanse with normal saline, apply zinc to fiona wound,  drawtex, dressing, ABD pad , and profore . Change weekly.      Treatment Orders: Eat a diet high in protein and vitamin C. Take a multiple vitamin daily unless contraindicated.       To see Dr. Duncan as scheduled      Must wear slip on shoe to work due to wrap on leg!        [facility] followup visit : 1 week __________________________________  (Please note your next appointment above and if you are unable to keep, kindly give a 24 hour notice. Thank you.)     Physician signature:__________________________     If you experience any of the following, please call the Wound Care Center during business hours:     * Increase in Pain  * Temperature over 101  * Increase in drainage from your wound  * Drainage with a foul odor  * Bleeding  * Increase in swelling  * Need for compression bandage changes due to slippage, breakthrough drainage.     If you need medical attention outside of the business hours of the Wound Care Centers please contact your PCP or go to the nearest emergency room.

## 2024-03-20 ENCOUNTER — HOSPITAL ENCOUNTER (OUTPATIENT)
Dept: WOUND CARE | Age: 69
Discharge: HOME | End: 2024-03-20
Attending: SURGERY
Payer: MEDICARE

## 2024-03-20 VITALS
RESPIRATION RATE: 16 BRPM | WEIGHT: 168 LBS | BODY MASS INDEX: 25.46 KG/M2 | SYSTOLIC BLOOD PRESSURE: 124 MMHG | HEIGHT: 68 IN | TEMPERATURE: 97.3 F | DIASTOLIC BLOOD PRESSURE: 45 MMHG | HEART RATE: 72 BPM

## 2024-03-20 DIAGNOSIS — I70.242 ATHEROSCLEROSIS OF NATIVE ARTERY OF LEFT LOWER EXTREMITY WITH ULCERATION OF CALF (HCC): ICD-10-CM

## 2024-03-20 DIAGNOSIS — L97.322 VARICOSE VEINS OF LEFT LOWER EXTREMITY WITH ULCER OF ANKLE WITH FAT LAYER EXPOSED (HCC): Primary | ICD-10-CM

## 2024-03-20 DIAGNOSIS — I83.023 VARICOSE VEINS OF LEFT LOWER EXTREMITY WITH ULCER OF ANKLE WITH FAT LAYER EXPOSED (HCC): Primary | ICD-10-CM

## 2024-03-20 PROCEDURE — 11042 DBRDMT SUBQ TIS 1ST 20SQCM/<: CPT | Performed by: SURGERY

## 2024-03-20 PROCEDURE — 11042 DBRDMT SUBQ TIS 1ST 20SQCM/<: CPT

## 2024-03-20 RX ORDER — BETAMETHASONE DIPROPIONATE 0.05 %
OINTMENT (GRAM) TOPICAL ONCE
OUTPATIENT
Start: 2024-03-20 | End: 2024-03-20

## 2024-03-20 RX ORDER — GINSENG 100 MG
CAPSULE ORAL ONCE
OUTPATIENT
Start: 2024-03-20 | End: 2024-03-20

## 2024-03-20 RX ORDER — LIDOCAINE 50 MG/G
OINTMENT TOPICAL ONCE
OUTPATIENT
Start: 2024-03-20 | End: 2024-03-20

## 2024-03-20 RX ORDER — LIDOCAINE HYDROCHLORIDE 40 MG/ML
SOLUTION TOPICAL ONCE
Status: COMPLETED | OUTPATIENT
Start: 2024-03-20 | End: 2024-03-20

## 2024-03-20 RX ORDER — LIDOCAINE 40 MG/G
CREAM TOPICAL ONCE
OUTPATIENT
Start: 2024-03-20 | End: 2024-03-20

## 2024-03-20 RX ORDER — IBUPROFEN 200 MG
TABLET ORAL ONCE
OUTPATIENT
Start: 2024-03-20 | End: 2024-03-20

## 2024-03-20 RX ORDER — LIDOCAINE HYDROCHLORIDE 20 MG/ML
JELLY TOPICAL ONCE
OUTPATIENT
Start: 2024-03-20 | End: 2024-03-20

## 2024-03-20 RX ORDER — LIDOCAINE HYDROCHLORIDE 40 MG/ML
SOLUTION TOPICAL ONCE
OUTPATIENT
Start: 2024-03-20 | End: 2024-03-20

## 2024-03-20 RX ORDER — GENTAMICIN SULFATE 1 MG/G
OINTMENT TOPICAL ONCE
OUTPATIENT
Start: 2024-03-20 | End: 2024-03-20

## 2024-03-20 RX ORDER — BACITRACIN ZINC AND POLYMYXIN B SULFATE 500; 1000 [USP'U]/G; [USP'U]/G
OINTMENT TOPICAL ONCE
OUTPATIENT
Start: 2024-03-20 | End: 2024-03-20

## 2024-03-20 RX ORDER — CLOBETASOL PROPIONATE 0.5 MG/G
OINTMENT TOPICAL ONCE
OUTPATIENT
Start: 2024-03-20 | End: 2024-03-20

## 2024-03-20 RX ORDER — TRIAMCINOLONE ACETONIDE 1 MG/G
OINTMENT TOPICAL ONCE
OUTPATIENT
Start: 2024-03-20 | End: 2024-03-20

## 2024-03-20 RX ADMIN — LIDOCAINE HYDROCHLORIDE 15 ML: 40 SOLUTION TOPICAL at 07:51

## 2024-03-20 ASSESSMENT — PAIN - FUNCTIONAL ASSESSMENT: PAIN_FUNCTIONAL_ASSESSMENT: PREVENTS OR INTERFERES SOME ACTIVE ACTIVITIES AND ADLS

## 2024-03-20 ASSESSMENT — PAIN DESCRIPTION - LOCATION: LOCATION: ANKLE

## 2024-03-20 ASSESSMENT — PAIN SCALES - GENERAL: PAINLEVEL_OUTOF10: 8

## 2024-03-20 ASSESSMENT — PAIN DESCRIPTION - DESCRIPTORS: DESCRIPTORS: STABBING;THROBBING

## 2024-03-20 ASSESSMENT — PAIN DESCRIPTION - ORIENTATION: ORIENTATION: LEFT

## 2024-03-20 ASSESSMENT — PAIN DESCRIPTION - ONSET: ONSET: ON-GOING

## 2024-03-20 ASSESSMENT — PAIN DESCRIPTION - PAIN TYPE: TYPE: CHRONIC PAIN

## 2024-03-20 ASSESSMENT — PAIN DESCRIPTION - FREQUENCY: FREQUENCY: CONTINUOUS

## 2024-03-20 NOTE — PROGRESS NOTES
Wound Healing Center Followup Visit Note    Referring Physician : Rosenda Cormier MD  Amanda Ledesma  MEDICAL RECORD NUMBER:  [id number]  AGE: 68 y.o.   GENDER: female  : 1955  EPISODE DATE:  3/20/2024    Subjective:     Chief Complaint   Patient presents with    Wound Check     Left leg       HISTORY of PRESENT ILLNESS HPI   Amanda Ledesma is a 68 y.o. female who presents today in regards to follow up evaluation and treatment of wound/ulcer.  That patient's past medical, family and social hx were reviewed and changes were made if present.    History of Wound Context:  The patient has had a wound of her left ankle/calf which was first noted approximately 2021.  This has been treated local wound care. On their initial visit to the wound healing center, 22,  the patient has noted that the wound has been improving.  The patient has not had similar previous wounds in the past.      She started seeing Dr. Street in 2021 and than Dr. Gillis.  She was started in unna boot ~ 2021.  She has noticed some improvement since starting unna boot.  She is currently following with Dr. Haskins.      Pt is not on abx at time of initial visit, but has been treated with previously by podiatry.      She is not a DM.  She is not a smoker.  She denies hx of DVT, and per her report had recent us noting no evidence of dvt at [facility].  She also had arterial studies done.    I had previously seen her in the past in regards to left buttock thigh wound, which started as abscess.      Pt works at [employer] in the [employer] and is on her feet all day.    22  Reflux study - if significant findings will schedule for fu  Continue compression therapy [facility] per podiatry with aquacell dressing  Elevation  She does not have significant arterial occlusive disease  22  Patient has asked to continuing following with me going forward because of our previous relationship  She is going to let Dr. Schuler office know  She

## 2024-03-22 NOTE — DISCHARGE INSTRUCTIONS
Written       Visit Discharge/Physician Orders     Discharge condition: Stable     Assessment of pain at discharge: yes     Anesthetic used: 4% lidocaine solution     Discharge to: Home     Left via:Private automobile     Accompanied by:self     ECF/HHA: n/a     Dressing Orders:LEFT MEDIAL and LATERAL LOWER LEG- Cleanse with normal saline, apply zinc to fiona wound,  drawtex, dressing, ABD pad , and profore . Change weekly.      Treatment Orders: Eat a diet high in protein and vitamin C. Take a multiple vitamin daily unless contraindicated.       To see Dr. Duncan as scheduled      Must wear slip on shoe to work due to wrap on leg!        [facility] followup visit : 1 week __________________________________  (Please note your next appointment above and if you are unable to keep, kindly give a 24 hour notice. Thank you.)     Physician signature:__________________________     If you experience any of the following, please call the Wound Care Center during business hours:     * Increase in Pain  * Temperature over 101  * Increase in drainage from your wound  * Drainage with a foul odor  * Bleeding  * Increase in swelling  * Need for compression bandage changes due to slippage, breakthrough drainage.     If you need medical attention outside of the business hours of the Wound Care Centers please contact your PCP or go to the nearest emergency room.

## 2024-03-27 ENCOUNTER — HOSPITAL ENCOUNTER (OUTPATIENT)
Dept: WOUND CARE | Age: 69
Discharge: HOME | End: 2024-03-27
Attending: SURGERY
Payer: MEDICARE

## 2024-03-27 VITALS
HEART RATE: 71 BPM | RESPIRATION RATE: 16 BRPM | SYSTOLIC BLOOD PRESSURE: 107 MMHG | TEMPERATURE: 97.6 F | DIASTOLIC BLOOD PRESSURE: 46 MMHG

## 2024-03-27 DIAGNOSIS — I70.242 ATHEROSCLEROSIS OF NATIVE ARTERY OF LEFT LOWER EXTREMITY WITH ULCERATION OF CALF (HCC): ICD-10-CM

## 2024-03-27 DIAGNOSIS — L97.322 VARICOSE VEINS OF LEFT LOWER EXTREMITY WITH ULCER OF ANKLE WITH FAT LAYER EXPOSED (HCC): Primary | ICD-10-CM

## 2024-03-27 DIAGNOSIS — I83.023 VARICOSE VEINS OF LEFT LOWER EXTREMITY WITH ULCER OF ANKLE WITH FAT LAYER EXPOSED (HCC): Primary | ICD-10-CM

## 2024-03-27 PROCEDURE — 11042 DBRDMT SUBQ TIS 1ST 20SQCM/<: CPT | Performed by: SURGERY

## 2024-03-27 PROCEDURE — 11042 DBRDMT SUBQ TIS 1ST 20SQCM/<: CPT

## 2024-03-27 RX ORDER — GINSENG 100 MG
CAPSULE ORAL ONCE
OUTPATIENT
Start: 2024-03-27 | End: 2024-03-27

## 2024-03-27 RX ORDER — TRIAMCINOLONE ACETONIDE 1 MG/G
OINTMENT TOPICAL ONCE
OUTPATIENT
Start: 2024-03-27 | End: 2024-03-27

## 2024-03-27 RX ORDER — GENTAMICIN SULFATE 1 MG/G
OINTMENT TOPICAL ONCE
OUTPATIENT
Start: 2024-03-27 | End: 2024-03-27

## 2024-03-27 RX ORDER — BETAMETHASONE DIPROPIONATE 0.05 %
OINTMENT (GRAM) TOPICAL ONCE
OUTPATIENT
Start: 2024-03-27 | End: 2024-03-27

## 2024-03-27 RX ORDER — LIDOCAINE HYDROCHLORIDE 20 MG/ML
JELLY TOPICAL ONCE
OUTPATIENT
Start: 2024-03-27 | End: 2024-03-27

## 2024-03-27 RX ORDER — IBUPROFEN 200 MG
TABLET ORAL ONCE
OUTPATIENT
Start: 2024-03-27 | End: 2024-03-27

## 2024-03-27 RX ORDER — BACITRACIN ZINC AND POLYMYXIN B SULFATE 500; 1000 [USP'U]/G; [USP'U]/G
OINTMENT TOPICAL ONCE
OUTPATIENT
Start: 2024-03-27 | End: 2024-03-27

## 2024-03-27 RX ORDER — OXYCODONE AND ACETAMINOPHEN 7.5; 325 MG/1; MG/1
1 TABLET ORAL EVERY 8 HOURS PRN
Qty: 42 TABLET | Refills: 0 | Status: SHIPPED | OUTPATIENT
Start: 2024-03-27 | End: 2024-04-10

## 2024-03-27 RX ORDER — LIDOCAINE HYDROCHLORIDE 40 MG/ML
SOLUTION TOPICAL ONCE
Status: COMPLETED | OUTPATIENT
Start: 2024-03-27 | End: 2024-03-27

## 2024-03-27 RX ORDER — LIDOCAINE 50 MG/G
OINTMENT TOPICAL ONCE
OUTPATIENT
Start: 2024-03-27 | End: 2024-03-27

## 2024-03-27 RX ORDER — LIDOCAINE 40 MG/G
CREAM TOPICAL ONCE
OUTPATIENT
Start: 2024-03-27 | End: 2024-03-27

## 2024-03-27 RX ORDER — CLOBETASOL PROPIONATE 0.5 MG/G
OINTMENT TOPICAL ONCE
OUTPATIENT
Start: 2024-03-27 | End: 2024-03-27

## 2024-03-27 RX ORDER — LIDOCAINE HYDROCHLORIDE 40 MG/ML
SOLUTION TOPICAL ONCE
OUTPATIENT
Start: 2024-03-27 | End: 2024-03-27

## 2024-03-27 RX ADMIN — LIDOCAINE HYDROCHLORIDE 10 ML: 40 SOLUTION TOPICAL at 07:52

## 2024-03-27 ASSESSMENT — PAIN - FUNCTIONAL ASSESSMENT: PAIN_FUNCTIONAL_ASSESSMENT: PREVENTS OR INTERFERES SOME ACTIVE ACTIVITIES AND ADLS

## 2024-03-27 ASSESSMENT — PAIN DESCRIPTION - DESCRIPTORS: DESCRIPTORS: STABBING;THROBBING

## 2024-03-27 ASSESSMENT — PAIN DESCRIPTION - ORIENTATION: ORIENTATION: LEFT

## 2024-03-27 ASSESSMENT — PAIN DESCRIPTION - ONSET: ONSET: ON-GOING

## 2024-03-27 ASSESSMENT — PAIN DESCRIPTION - LOCATION: LOCATION: ANKLE

## 2024-03-27 ASSESSMENT — PAIN DESCRIPTION - FREQUENCY: FREQUENCY: CONTINUOUS

## 2024-03-27 ASSESSMENT — PAIN DESCRIPTION - PAIN TYPE: TYPE: CHRONIC PAIN

## 2024-03-27 ASSESSMENT — PAIN SCALES - GENERAL: PAINLEVEL_OUTOF10: 8

## 2024-03-27 NOTE — PROGRESS NOTES
days), oarrs reviewed  Wrap, dratwek  Significant improvement in appearance and size  Would consider reapplication in future  8/9/2023  Patient's wounds look stable, with some exudate, debrided  8/16/23  Improvement in appearance  Tolerated debridement well  8/23/23  Improved 55   Percocet 7/5/325 mg #42 given q 8 hrs (14 days), oarrs reviewed  8/30/23  Slightly larger 60 but appearance improved, edges appear to be coming in  Had to miss work this week due to pain  Will write work excuse  9/6/23  Stable size 60 appearance remains improved  AC5 application next week in or  9/13/23  OR debridement, application of AC5  9/20/23  Overall looks improved  Did not tolerate debridement today  9/27/23  Slightly smaller  Appearance much improved  Oarrs reviewed  Script for percocet 5/325 mg # 28   Medial 48, Lateral 22  10/4/23  Medial 49, Lateral 18  10/11/23  Medial 51, Lateral 18  Percocet 7.5/325 mg #42 q8 oarrs, reviewed  10/25/23  Medial 48, Lateral 18  Appearance remains improved, but size not significantly improving  Faizan proceed with OR tx in 2 weeks  11/1/23  Medial 48, Lateral 18  Appearance remains improved, but size not significantly improving  Faizan proceed with OR next week  Oarrs reviewed  Script for percocet 7.5/325 mg # 42 x 14 days  11/22/2023  Wound looks clean with minimal exudate  11/29/23  Medial 33, lateral 13  Both wounds improved  12/6/23  Medial 36, Lateral 14.5  Both wound stable  Again discussed with her re improtance of elevation   We did discuss reurn to or in future- she does not want ot proceed at this time  12/13/23  Medial 37, Lateral 13.5  Both wound stable  Again discussed with her re improtance of elevation   She admits to hitting it by accident on boxes while decorating for xmas  12/20/23  Medial 41, Lateral 15  Appearance better  Emphasized importance of protein intake  Oarrs reviewed  Script for percocet 7.5/325 mg # 42 x 14 days  12/27/2023  Wound looks  with some exudate,

## 2024-04-01 NOTE — DISCHARGE INSTRUCTIONS
Written       Visit Discharge/Physician Orders     Discharge condition: Stable     Assessment of pain at discharge: yes     Anesthetic used: 4% lidocaine solution     Discharge to: Home     Left via:Private automobile     Accompanied by:self     ECF/HHA: n/a     Dressing Orders:LEFT MEDIAL and LATERAL LOWER LEG- Cleanse with normal saline, apply zinc to fiona wound,  drawtex, dressing, ABD pad , and profore . Change weekly.      Treatment Orders: Eat a diet high in protein and vitamin C. Take a multiple vitamin daily unless contraindicated.       To see Dr. Duncan as scheduled      Must wear slip on shoe to work due to wrap on leg!        [facility] followup visit : 1 week __________________________________  (Please note your next appointment above and if you are unable to keep, kindly give a 24 hour notice. Thank you.)     Physician signature:__________________________     If you experience any of the following, please call the Wound Care Center during business hours:     * Increase in Pain  * Temperature over 101  * Increase in drainage from your wound  * Drainage with a foul odor  * Bleeding  * Increase in swelling  * Need for compression bandage changes due to slippage, breakthrough drainage.     If you need medical attention outside of the business hours of the Wound Care Centers please contact your PCP or go to the nearest emergency room.

## 2024-04-03 ENCOUNTER — HOSPITAL ENCOUNTER (OUTPATIENT)
Dept: WOUND CARE | Age: 69
Discharge: HOME | End: 2024-04-03
Attending: SURGERY
Payer: MEDICARE

## 2024-04-03 VITALS
SYSTOLIC BLOOD PRESSURE: 114 MMHG | DIASTOLIC BLOOD PRESSURE: 50 MMHG | TEMPERATURE: 97.2 F | RESPIRATION RATE: 16 BRPM | HEART RATE: 74 BPM

## 2024-04-03 DIAGNOSIS — L97.322 VARICOSE VEINS OF LEFT LOWER EXTREMITY WITH ULCER OF ANKLE WITH FAT LAYER EXPOSED (HCC): Primary | ICD-10-CM

## 2024-04-03 DIAGNOSIS — I83.023 VARICOSE VEINS OF LEFT LOWER EXTREMITY WITH ULCER OF ANKLE WITH FAT LAYER EXPOSED (HCC): Primary | ICD-10-CM

## 2024-04-03 DIAGNOSIS — I70.242 ATHEROSCLEROSIS OF NATIVE ARTERY OF LEFT LOWER EXTREMITY WITH ULCERATION OF CALF (HCC): ICD-10-CM

## 2024-04-03 PROCEDURE — 11042 DBRDMT SUBQ TIS 1ST 20SQCM/<: CPT

## 2024-04-03 PROCEDURE — 11042 DBRDMT SUBQ TIS 1ST 20SQCM/<: CPT | Performed by: SURGERY

## 2024-04-03 RX ORDER — LIDOCAINE HYDROCHLORIDE 40 MG/ML
SOLUTION TOPICAL ONCE
OUTPATIENT
Start: 2024-04-03 | End: 2024-04-03

## 2024-04-03 RX ORDER — BACITRACIN ZINC AND POLYMYXIN B SULFATE 500; 1000 [USP'U]/G; [USP'U]/G
OINTMENT TOPICAL ONCE
OUTPATIENT
Start: 2024-04-03 | End: 2024-04-03

## 2024-04-03 RX ORDER — GINSENG 100 MG
CAPSULE ORAL ONCE
OUTPATIENT
Start: 2024-04-03 | End: 2024-04-03

## 2024-04-03 RX ORDER — CLOBETASOL PROPIONATE 0.5 MG/G
OINTMENT TOPICAL ONCE
OUTPATIENT
Start: 2024-04-03 | End: 2024-04-03

## 2024-04-03 RX ORDER — GENTAMICIN SULFATE 1 MG/G
OINTMENT TOPICAL ONCE
OUTPATIENT
Start: 2024-04-03 | End: 2024-04-03

## 2024-04-03 RX ORDER — IBUPROFEN 200 MG
TABLET ORAL ONCE
OUTPATIENT
Start: 2024-04-03 | End: 2024-04-03

## 2024-04-03 RX ORDER — BETAMETHASONE DIPROPIONATE 0.05 %
OINTMENT (GRAM) TOPICAL ONCE
OUTPATIENT
Start: 2024-04-03 | End: 2024-04-03

## 2024-04-03 RX ORDER — TRIAMCINOLONE ACETONIDE 1 MG/G
OINTMENT TOPICAL ONCE
OUTPATIENT
Start: 2024-04-03 | End: 2024-04-03

## 2024-04-03 RX ORDER — LIDOCAINE HYDROCHLORIDE 40 MG/ML
SOLUTION TOPICAL ONCE
Status: COMPLETED | OUTPATIENT
Start: 2024-04-03 | End: 2024-04-03

## 2024-04-03 RX ORDER — LIDOCAINE 40 MG/G
CREAM TOPICAL ONCE
OUTPATIENT
Start: 2024-04-03 | End: 2024-04-03

## 2024-04-03 RX ORDER — LIDOCAINE HYDROCHLORIDE 20 MG/ML
JELLY TOPICAL ONCE
OUTPATIENT
Start: 2024-04-03 | End: 2024-04-03

## 2024-04-03 RX ORDER — LIDOCAINE 50 MG/G
OINTMENT TOPICAL ONCE
OUTPATIENT
Start: 2024-04-03 | End: 2024-04-03

## 2024-04-03 RX ADMIN — LIDOCAINE HYDROCHLORIDE 5 ML: 40 SOLUTION TOPICAL at 07:56

## 2024-04-03 ASSESSMENT — PAIN DESCRIPTION - LOCATION: LOCATION: ANKLE

## 2024-04-03 ASSESSMENT — PAIN - FUNCTIONAL ASSESSMENT: PAIN_FUNCTIONAL_ASSESSMENT: PREVENTS OR INTERFERES SOME ACTIVE ACTIVITIES AND ADLS

## 2024-04-03 ASSESSMENT — PAIN DESCRIPTION - ORIENTATION: ORIENTATION: LEFT

## 2024-04-03 ASSESSMENT — PAIN DESCRIPTION - FREQUENCY: FREQUENCY: CONTINUOUS

## 2024-04-03 ASSESSMENT — PAIN DESCRIPTION - DESCRIPTORS: DESCRIPTORS: ACHING;BURNING;THROBBING

## 2024-04-03 ASSESSMENT — PAIN SCALES - GENERAL: PAINLEVEL_OUTOF10: 8

## 2024-04-03 NOTE — PROGRESS NOTES
culture done  Did not tolerate debridement well  Medial larger 57  Lateral larger 26  6/14/23  Culture ng  Still not tolerating debridement well  Recommended OR debridement again  Medial 57 stable  Lateral 26 stable  6/21/23  Medial 50 improved  Lateral 22 improved  Cbc ordered to make sure pt not anemic  Pt would like to go forward with debridement in or - will plan on using ac5  I reviewed the procedure with the patient and family as available.  I discussed the procedure, risks, benefits, complications, and alternatives of the procedure.  They understand and consent.  All questions were answered  7/5/2023  Wounds were debrided last week and surgery, still has some exudate, no cellulitis, debrided including subcu  7/12/2023  Tissue clean without signs of infection or surrounding cellulitis  Slightly improved dimension from last week  Significant pain with debridement - patient states pain has worsened since being out of her medications  7/19/23  Tolerated debridement better  Wounds appearance improved  Plan for ac5 application and debridement next week on 7/25 Tuesday at noon - pt understands she needs to be here at 10 am   Medial 51 (64) lateral 27 (26)  7/31/2023  Patient was concerned, came to the wound care center, felt like the bandage was too tight causing pain, remove the Band-Aids and wanted the wound to be evaluated  Patient underwent surgery by Dr. Staples 25 July, excisional debridement of the left lower extremity wound, along with application of AC5 graft  The wounds themselves look clean, the posterior wound has some turbid drainage come from the graft and culture was taken, patient also recommended, CBC with differential for comparison and coordination of care along with the wound cultures, and was instructed, if she any fever or chills come to the emergency room  All her questions were answered  8/2/23  Pain was better during week but got worse yesterday  Percocet 7/5/325 mg #42 given q 8 hrs (14

## 2024-04-05 NOTE — DISCHARGE INSTRUCTIONS
Written       Visit Discharge/Physician Orders     Discharge condition: Stable     Assessment of pain at discharge: yes     Anesthetic used: 4% lidocaine solution     Discharge to: Home     Left via:Private automobile     Accompanied by:self     ECF/HHA: n/a     Dressing Orders:LEFT MEDIAL and LATERAL LOWER LEG- Cleanse with normal saline, apply zinc to fiona wound,  drawtex, dressing, ABD pad , and profore . Change weekly.      Treatment Orders: Eat a diet high in protein and vitamin C. Take a multiple vitamin daily unless contraindicated.       To see Dr. Duncan as scheduled      Must wear slip on shoe to work due to wrap on leg!        [facility] followup visit : 1 week __________________________________  (Please note your next appointment above and if you are unable to keep, kindly give a 24 hour notice. Thank you.)     Physician signature:__________________________     If you experience any of the following, please call the Wound Care Center during business hours:     * Increase in Pain  * Temperature over 101  * Increase in drainage from your wound  * Drainage with a foul odor  * Bleeding  * Increase in swelling  * Need for compression bandage changes due to slippage, breakthrough drainage.     If you need medical attention outside of the business hours of the Wound Care Centers please contact your PCP or go to the nearest emergency room.

## 2024-04-10 ENCOUNTER — HOSPITAL ENCOUNTER (OUTPATIENT)
Dept: WOUND CARE | Age: 69
Discharge: HOME | End: 2024-04-10
Attending: SURGERY
Payer: MEDICARE

## 2024-04-10 VITALS
SYSTOLIC BLOOD PRESSURE: 118 MMHG | DIASTOLIC BLOOD PRESSURE: 68 MMHG | HEART RATE: 76 BPM | RESPIRATION RATE: 18 BRPM | TEMPERATURE: 96.7 F | BODY MASS INDEX: 25.46 KG/M2 | HEIGHT: 68 IN | WEIGHT: 168 LBS

## 2024-04-10 DIAGNOSIS — I70.242 ATHEROSCLEROSIS OF NATIVE ARTERY OF LEFT LOWER EXTREMITY WITH ULCERATION OF CALF (HCC): ICD-10-CM

## 2024-04-10 DIAGNOSIS — L97.322 VARICOSE VEINS OF LEFT LOWER EXTREMITY WITH ULCER OF ANKLE WITH FAT LAYER EXPOSED (HCC): Primary | ICD-10-CM

## 2024-04-10 DIAGNOSIS — I83.023 VARICOSE VEINS OF LEFT LOWER EXTREMITY WITH ULCER OF ANKLE WITH FAT LAYER EXPOSED (HCC): Primary | ICD-10-CM

## 2024-04-10 PROCEDURE — 11042 DBRDMT SUBQ TIS 1ST 20SQCM/<: CPT

## 2024-04-10 PROCEDURE — 11042 DBRDMT SUBQ TIS 1ST 20SQCM/<: CPT | Performed by: SURGERY

## 2024-04-10 RX ORDER — LIDOCAINE 50 MG/G
OINTMENT TOPICAL ONCE
OUTPATIENT
Start: 2024-04-10 | End: 2024-04-10

## 2024-04-10 RX ORDER — LIDOCAINE HYDROCHLORIDE 20 MG/ML
JELLY TOPICAL ONCE
OUTPATIENT
Start: 2024-04-10 | End: 2024-04-10

## 2024-04-10 RX ORDER — GINSENG 100 MG
CAPSULE ORAL ONCE
OUTPATIENT
Start: 2024-04-10 | End: 2024-04-10

## 2024-04-10 RX ORDER — BACITRACIN ZINC AND POLYMYXIN B SULFATE 500; 1000 [USP'U]/G; [USP'U]/G
OINTMENT TOPICAL ONCE
OUTPATIENT
Start: 2024-04-10 | End: 2024-04-10

## 2024-04-10 RX ORDER — LIDOCAINE HYDROCHLORIDE 40 MG/ML
SOLUTION TOPICAL ONCE
Status: COMPLETED | OUTPATIENT
Start: 2024-04-10 | End: 2024-04-10

## 2024-04-10 RX ORDER — BETAMETHASONE DIPROPIONATE 0.05 %
OINTMENT (GRAM) TOPICAL ONCE
OUTPATIENT
Start: 2024-04-10 | End: 2024-04-10

## 2024-04-10 RX ORDER — OXYCODONE HYDROCHLORIDE AND ACETAMINOPHEN 5; 325 MG/1; MG/1
1 TABLET ORAL EVERY 8 HOURS PRN
Qty: 42 TABLET | Refills: 0 | Status: SHIPPED | OUTPATIENT
Start: 2024-04-10 | End: 2024-04-24

## 2024-04-10 RX ORDER — TRIAMCINOLONE ACETONIDE 1 MG/G
OINTMENT TOPICAL ONCE
OUTPATIENT
Start: 2024-04-10 | End: 2024-04-10

## 2024-04-10 RX ORDER — LIDOCAINE HYDROCHLORIDE 40 MG/ML
SOLUTION TOPICAL ONCE
OUTPATIENT
Start: 2024-04-10 | End: 2024-04-10

## 2024-04-10 RX ORDER — IBUPROFEN 200 MG
TABLET ORAL ONCE
OUTPATIENT
Start: 2024-04-10 | End: 2024-04-10

## 2024-04-10 RX ORDER — GENTAMICIN SULFATE 1 MG/G
OINTMENT TOPICAL ONCE
OUTPATIENT
Start: 2024-04-10 | End: 2024-04-10

## 2024-04-10 RX ORDER — CLOBETASOL PROPIONATE 0.5 MG/G
OINTMENT TOPICAL ONCE
OUTPATIENT
Start: 2024-04-10 | End: 2024-04-10

## 2024-04-10 RX ORDER — LIDOCAINE 40 MG/G
CREAM TOPICAL ONCE
OUTPATIENT
Start: 2024-04-10 | End: 2024-04-10

## 2024-04-10 RX ADMIN — LIDOCAINE HYDROCHLORIDE 10 ML: 40 SOLUTION TOPICAL at 08:03

## 2024-04-10 ASSESSMENT — PAIN SCALES - GENERAL: PAINLEVEL_OUTOF10: 8

## 2024-04-10 ASSESSMENT — PAIN DESCRIPTION - DESCRIPTORS: DESCRIPTORS: ACHING;BURNING;THROBBING

## 2024-04-10 ASSESSMENT — PAIN DESCRIPTION - FREQUENCY: FREQUENCY: CONTINUOUS

## 2024-04-10 ASSESSMENT — PAIN DESCRIPTION - PAIN TYPE: TYPE: CHRONIC PAIN

## 2024-04-10 ASSESSMENT — PAIN DESCRIPTION - LOCATION: LOCATION: ANKLE

## 2024-04-10 ASSESSMENT — PAIN DESCRIPTION - ONSET: ONSET: ON-GOING

## 2024-04-10 ASSESSMENT — PAIN DESCRIPTION - ORIENTATION: ORIENTATION: LEFT

## 2024-04-10 ASSESSMENT — PAIN - FUNCTIONAL ASSESSMENT: PAIN_FUNCTIONAL_ASSESSMENT: PREVENTS OR INTERFERES SOME ACTIVE ACTIVITIES AND ADLS

## 2024-04-10 NOTE — PROGRESS NOTES
Tobacco Use    Smoking status: Never    Smokeless tobacco: Never   Vaping Use    Vaping Use: Never used   Substance Use Topics    Alcohol use: No    Drug use: No     Allergies   Allergen Reactions    Bee Pollen Anaphylaxis    Tetracyclines & Related Hives     Current Outpatient Medications on File Prior to Encounter   Medication Sig Dispense Refill    oxyCODONE-acetaminophen (PERCOCET) 7.5-325 MG per tablet Take 1 tablet by mouth every 8 hours as needed for Pain for up to 14 days. Intended supply: 30 days Max Daily Amount: 3 tablets 42 tablet 0    lisinopril (PRINIVIL;ZESTRIL) 20 MG tablet Take 1 tablet by mouth daily 90 tablet 1    hydrOXYzine HCl (ATARAX) 25 MG tablet take 1 tablet  tablet 1    butalbital-acetaminophen-caffeine (FIORICET, ESGIC) -40 MG per tablet Take 1 tablet by mouth 3 times daily as needed for Headaches or Migraine Indications: Cluster Headache 90 tablet 5    acyclovir (ZOVIRAX) 200 MG capsule Take 1 capsule by mouth daily 90 capsule 1    pantoprazole (PROTONIX) 40 MG tablet Take 1 tablet by mouth every morning (before breakfast) 90 tablet 1    EPINEPHrine (EPIPEN) 0.3 MG/0.3ML SOAJ injection Use as directed for allergic reaction 1 each 5    aspirin-acetaminophen-caffeine (EXCEDRIN MIGRAINE) 250-250-65 MG per tablet Take 1 tablet by mouth as needed for Headaches 300 tablet 3     No current facility-administered medications on file prior to encounter.       REVIEW OF SYSTEMS See HPI    Objective:    /68   Pulse 76   Temp (!) 96.7 °F (35.9 °C) (Temporal)   Resp 18   Ht 1.727 m (5' 8\")   Wt 76.2 kg (168 lb)   BMI 25.54 kg/m²   Wt Readings from Last 3 Encounters:   04/10/24 76.2 kg (168 lb)   03/20/24 76.2 kg (168 lb)   03/13/24 76.2 kg (168 lb)     PHYSICAL EXAM  CONSTITUTIONAL:   Awake, alert, cooperative   EYES:  lids and lashes normal   ENT: external ears and nose without lesions   NECK:  supple, symmetrical, trachea midline   SKIN:  Open wound

## 2024-04-16 NOTE — DISCHARGE INSTRUCTIONS
Written       Visit Discharge/Physician Orders     Discharge condition: Stable     Assessment of pain at discharge: yes     Anesthetic used: 4% lidocaine solution     Discharge to: Home     Left via:Private automobile     Accompanied by:self     ECF/HHA: n/a     Dressing Orders:LEFT MEDIAL and LATERAL LOWER LEG- Cleanse with normal saline, apply zinc to fiona wound,  drawtex, dressing, ABD pad , and profore . Change weekly.      Treatment Orders: Eat a diet high in protein and vitamin C. Take a multiple vitamin daily unless contraindicated.       To see Dr. Duncan as scheduled      Must wear slip on shoe to work due to wrap on leg!        [facility] followup visit : 1 week __________________________________  (Please note your next appointment above and if you are unable to keep, kindly give a 24 hour notice. Thank you.)     Physician signature:__________________________     If you experience any of the following, please call the Wound Care Center during business hours:     * Increase in Pain  * Temperature over 101  * Increase in drainage from your wound  * Drainage with a foul odor  * Bleeding  * Increase in swelling  * Need for compression bandage changes due to slippage, breakthrough drainage.     If you need medical attention outside of the business hours of the Wound Care Centers please contact your PCP or go to the nearest emergency room.

## 2024-04-17 ENCOUNTER — HOSPITAL ENCOUNTER (OUTPATIENT)
Dept: WOUND CARE | Age: 69
Discharge: HOME | End: 2024-04-17
Attending: SURGERY
Payer: MEDICARE

## 2024-04-17 VITALS
WEIGHT: 168 LBS | RESPIRATION RATE: 18 BRPM | HEART RATE: 71 BPM | SYSTOLIC BLOOD PRESSURE: 122 MMHG | BODY MASS INDEX: 25.46 KG/M2 | HEIGHT: 68 IN | TEMPERATURE: 96.9 F | DIASTOLIC BLOOD PRESSURE: 53 MMHG

## 2024-04-17 DIAGNOSIS — I70.242 ATHEROSCLEROSIS OF NATIVE ARTERY OF LEFT LOWER EXTREMITY WITH ULCERATION OF CALF (HCC): ICD-10-CM

## 2024-04-17 DIAGNOSIS — L97.322 VARICOSE VEINS OF LEFT LOWER EXTREMITY WITH ULCER OF ANKLE WITH FAT LAYER EXPOSED (HCC): Primary | ICD-10-CM

## 2024-04-17 DIAGNOSIS — I83.023 VARICOSE VEINS OF LEFT LOWER EXTREMITY WITH ULCER OF ANKLE WITH FAT LAYER EXPOSED (HCC): Primary | ICD-10-CM

## 2024-04-17 PROCEDURE — 11042 DBRDMT SUBQ TIS 1ST 20SQCM/<: CPT | Performed by: SURGERY

## 2024-04-17 PROCEDURE — 11042 DBRDMT SUBQ TIS 1ST 20SQCM/<: CPT

## 2024-04-17 RX ORDER — BACITRACIN ZINC AND POLYMYXIN B SULFATE 500; 1000 [USP'U]/G; [USP'U]/G
OINTMENT TOPICAL ONCE
OUTPATIENT
Start: 2024-04-17 | End: 2024-04-17

## 2024-04-17 RX ORDER — BETAMETHASONE DIPROPIONATE 0.05 %
OINTMENT (GRAM) TOPICAL ONCE
OUTPATIENT
Start: 2024-04-17 | End: 2024-04-17

## 2024-04-17 RX ORDER — LIDOCAINE 40 MG/G
CREAM TOPICAL ONCE
OUTPATIENT
Start: 2024-04-17 | End: 2024-04-17

## 2024-04-17 RX ORDER — LIDOCAINE HYDROCHLORIDE 20 MG/ML
JELLY TOPICAL ONCE
OUTPATIENT
Start: 2024-04-17 | End: 2024-04-17

## 2024-04-17 RX ORDER — LIDOCAINE 50 MG/G
OINTMENT TOPICAL ONCE
OUTPATIENT
Start: 2024-04-17 | End: 2024-04-17

## 2024-04-17 RX ORDER — CLOBETASOL PROPIONATE 0.5 MG/G
OINTMENT TOPICAL ONCE
OUTPATIENT
Start: 2024-04-17 | End: 2024-04-17

## 2024-04-17 RX ORDER — TRIAMCINOLONE ACETONIDE 1 MG/G
OINTMENT TOPICAL ONCE
OUTPATIENT
Start: 2024-04-17 | End: 2024-04-17

## 2024-04-17 RX ORDER — IBUPROFEN 200 MG
TABLET ORAL ONCE
OUTPATIENT
Start: 2024-04-17 | End: 2024-04-17

## 2024-04-17 RX ORDER — LIDOCAINE HYDROCHLORIDE 40 MG/ML
SOLUTION TOPICAL ONCE
OUTPATIENT
Start: 2024-04-17 | End: 2024-04-17

## 2024-04-17 RX ORDER — LIDOCAINE HYDROCHLORIDE 40 MG/ML
SOLUTION TOPICAL ONCE
Status: COMPLETED | OUTPATIENT
Start: 2024-04-17 | End: 2024-04-17

## 2024-04-17 RX ORDER — GENTAMICIN SULFATE 1 MG/G
OINTMENT TOPICAL ONCE
OUTPATIENT
Start: 2024-04-17 | End: 2024-04-17

## 2024-04-17 RX ORDER — GINSENG 100 MG
CAPSULE ORAL ONCE
OUTPATIENT
Start: 2024-04-17 | End: 2024-04-17

## 2024-04-17 RX ADMIN — LIDOCAINE HYDROCHLORIDE 10 ML: 40 SOLUTION TOPICAL at 07:57

## 2024-04-17 ASSESSMENT — PAIN DESCRIPTION - FREQUENCY: FREQUENCY: CONTINUOUS

## 2024-04-17 ASSESSMENT — PAIN DESCRIPTION - LOCATION: LOCATION: ANKLE

## 2024-04-17 ASSESSMENT — PAIN DESCRIPTION - DESCRIPTORS: DESCRIPTORS: ACHING;BURNING;THROBBING

## 2024-04-17 ASSESSMENT — PAIN - FUNCTIONAL ASSESSMENT: PAIN_FUNCTIONAL_ASSESSMENT: PREVENTS OR INTERFERES SOME ACTIVE ACTIVITIES AND ADLS

## 2024-04-17 ASSESSMENT — PAIN DESCRIPTION - ORIENTATION: ORIENTATION: LEFT

## 2024-04-17 ASSESSMENT — PAIN DESCRIPTION - PAIN TYPE: TYPE: CHRONIC PAIN

## 2024-04-17 ASSESSMENT — PAIN SCALES - GENERAL: PAINLEVEL_OUTOF10: 8

## 2024-04-17 ASSESSMENT — PAIN DESCRIPTION - ONSET: ONSET: ON-GOING

## 2024-04-17 NOTE — PROGRESS NOTES
debrided  1/3/23  Medial 41, Lateral 13  1/10/24  Medial wound size improved 40, lateral remains stable 13  Oarrs reviewed  Script for percocet 7.5/325 mg # 42 x 14 days  1/17/24  Medial 39, lateral 12 - appearance much improved  Pt did not work this week  1/24/23  Medial 29, lateral 10  Continue profore  1/31/24  Medial 29, lateral 9  Script for percocet 7.5/325 mg # 42 x 14 days  2/7/24  Medial 28, Lateral 8  2/14/24  Medial 27, Lateral 6  Script for percocet 7.5/325 mg # 42 x 14 days, oarrs  2/21/24  Medial 18, Lateral 4  2/28/24  Medial 21, Lateral 5  3/6/24  Medial 18, Lateral 3  Script for percocet 7.5/325 mg # 42 x 14 days, oarrs  3/13/24  Medial 18, Lateral 2.8  3/20/24  Medial 22, Lateral 2.7  3/27/24  Medial 23, Lateral 2.1  Script for percocet 7.5/325 mg # 42 x 14 days, oarrs  4/3/2024  Ulcers, medial aspect as the lateral aspect of the debrided, some exudate noted, patient tolerated well  4/10/24  Medial measuring slightly larger but appearance stable 30  Lateral improved 0.66  Script for perccoet 5/325 mg #42 x 14 days oarrs reviewed  4/17/24  Medial 27, Later 0.4  Encouraged protein intake, gen - samples given    Wound/Ulcer Pain Timing/Severity: constant, moderate  Quality of pain: aching, throbbing, tender  Severity:  7/ 10   Modifying Factors: Pain worsens with dressing changes, debridement  Associated Signs/Symptoms: edema, drainage and pain    Ulcer Identification:  Ulcer Type: venous  Contributing Factors: edema and venous stasis    Diabetic/Pressure/Non Pressure Ulcers only:  Ulcer: Non-Pressure ulcer, fat layer exposed        PAST MEDICAL HISTORY      Diagnosis Date    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

## 2024-04-19 NOTE — DISCHARGE INSTRUCTIONS

## 2024-04-24 ENCOUNTER — HOSPITAL ENCOUNTER (OUTPATIENT)
Dept: WOUND CARE | Age: 69
Discharge: HOME | End: 2024-04-24
Attending: SURGERY
Payer: MEDICARE

## 2024-04-24 VITALS
HEART RATE: 70 BPM | TEMPERATURE: 96.5 F | RESPIRATION RATE: 18 BRPM | DIASTOLIC BLOOD PRESSURE: 50 MMHG | SYSTOLIC BLOOD PRESSURE: 118 MMHG

## 2024-04-24 DIAGNOSIS — L97.322 VARICOSE VEINS OF LEFT LOWER EXTREMITY WITH ULCER OF ANKLE WITH FAT LAYER EXPOSED (HCC): Primary | ICD-10-CM

## 2024-04-24 DIAGNOSIS — I83.023 VARICOSE VEINS OF LEFT LOWER EXTREMITY WITH ULCER OF ANKLE WITH FAT LAYER EXPOSED (HCC): Primary | ICD-10-CM

## 2024-04-24 DIAGNOSIS — I70.242 ATHEROSCLEROSIS OF NATIVE ARTERY OF LEFT LOWER EXTREMITY WITH ULCERATION OF CALF (HCC): ICD-10-CM

## 2024-04-24 PROCEDURE — 11042 DBRDMT SUBQ TIS 1ST 20SQCM/<: CPT | Performed by: SURGERY

## 2024-04-24 PROCEDURE — 11042 DBRDMT SUBQ TIS 1ST 20SQCM/<: CPT

## 2024-04-24 RX ORDER — GENTAMICIN SULFATE 1 MG/G
OINTMENT TOPICAL ONCE
OUTPATIENT
Start: 2024-04-24 | End: 2024-04-24

## 2024-04-24 RX ORDER — CLOBETASOL PROPIONATE 0.5 MG/G
OINTMENT TOPICAL ONCE
OUTPATIENT
Start: 2024-04-24 | End: 2024-04-24

## 2024-04-24 RX ORDER — LIDOCAINE 50 MG/G
OINTMENT TOPICAL ONCE
OUTPATIENT
Start: 2024-04-24 | End: 2024-04-24

## 2024-04-24 RX ORDER — LIDOCAINE HYDROCHLORIDE 20 MG/ML
JELLY TOPICAL ONCE
OUTPATIENT
Start: 2024-04-24 | End: 2024-04-24

## 2024-04-24 RX ORDER — BACITRACIN ZINC AND POLYMYXIN B SULFATE 500; 1000 [USP'U]/G; [USP'U]/G
OINTMENT TOPICAL ONCE
OUTPATIENT
Start: 2024-04-24 | End: 2024-04-24

## 2024-04-24 RX ORDER — LIDOCAINE HYDROCHLORIDE 40 MG/ML
SOLUTION TOPICAL ONCE
OUTPATIENT
Start: 2024-04-24 | End: 2024-04-24

## 2024-04-24 RX ORDER — BETAMETHASONE DIPROPIONATE 0.05 %
OINTMENT (GRAM) TOPICAL ONCE
OUTPATIENT
Start: 2024-04-24 | End: 2024-04-24

## 2024-04-24 RX ORDER — TRIAMCINOLONE ACETONIDE 1 MG/G
OINTMENT TOPICAL ONCE
OUTPATIENT
Start: 2024-04-24 | End: 2024-04-24

## 2024-04-24 RX ORDER — LIDOCAINE HYDROCHLORIDE 40 MG/ML
SOLUTION TOPICAL ONCE
Status: DISCONTINUED | OUTPATIENT
Start: 2024-04-24 | End: 2024-04-25 | Stop reason: HOSPADM

## 2024-04-24 RX ORDER — GINSENG 100 MG
CAPSULE ORAL ONCE
OUTPATIENT
Start: 2024-04-24 | End: 2024-04-24

## 2024-04-24 RX ORDER — IBUPROFEN 200 MG
TABLET ORAL ONCE
OUTPATIENT
Start: 2024-04-24 | End: 2024-04-24

## 2024-04-24 RX ORDER — LIDOCAINE 40 MG/G
CREAM TOPICAL ONCE
OUTPATIENT
Start: 2024-04-24 | End: 2024-04-24

## 2024-04-24 ASSESSMENT — PAIN SCALES - GENERAL: PAINLEVEL_OUTOF10: 8

## 2024-04-24 ASSESSMENT — PAIN DESCRIPTION - FREQUENCY: FREQUENCY: CONTINUOUS

## 2024-04-24 ASSESSMENT — PAIN - FUNCTIONAL ASSESSMENT: PAIN_FUNCTIONAL_ASSESSMENT: PREVENTS OR INTERFERES SOME ACTIVE ACTIVITIES AND ADLS

## 2024-04-24 ASSESSMENT — PAIN DESCRIPTION - LOCATION: LOCATION: ANKLE

## 2024-04-24 ASSESSMENT — PAIN DESCRIPTION - DESCRIPTORS: DESCRIPTORS: ACHING;BURNING

## 2024-04-24 ASSESSMENT — PAIN DESCRIPTION - ORIENTATION: ORIENTATION: LEFT

## 2024-04-24 ASSESSMENT — PAIN DESCRIPTION - ONSET: ONSET: ON-GOING

## 2024-04-24 NOTE — PROGRESS NOTES
Wound Healing Center Followup Visit Note    Referring Physician : Rosenda Cormier MD  Amanda Ledesma  MEDICAL RECORD NUMBER:  [id number]  AGE: 68 y.o.   GENDER: female  : 1955  EPISODE DATE:  2024    Subjective:     Chief Complaint   Patient presents with    Wound Check     Leg left      HISTORY of PRESENT ILLNESS HPI   Amanda Ledesma is a 68 y.o. female who presents today in regards to follow up evaluation and treatment of wound/ulcer.  That patient's past medical, family and social hx were reviewed and changes were made if present.    History of Wound Context:  The patient has had a wound of her left ankle/calf which was first noted approximately 2021.  This has been treated local wound care. On their initial visit to the wound healing center, 22,  the patient has noted that the wound has been improving.  The patient has not had similar previous wounds in the past.      She started seeing Dr. Street in 2021 and than Dr. Gillis.  She was started in unna boot ~ 2021.  She has noticed some improvement since starting unna boot.  She is currently following with Dr. Haskins.      Pt is not on abx at time of initial visit, but has been treated with previously by podiatry.      She is not a DM.  She is not a smoker.  She denies hx of DVT, and per her report had recent us noting no evidence of dvt at [facility].  She also had arterial studies done.    I had previously seen her in the past in regards to left buttock thigh wound, which started as abscess.      Pt works at [employer] in the [employer] and is on her feet all day.    22  Reflux study - if significant findings will schedule for fu  Continue compression therapy [facility] per podiatry with aquacell dressing  Elevation  She does not have significant arterial occlusive disease  22  Patient has asked to continuing following with me going forward because of our previous relationship  She is going to let Dr. Schuler office know  She

## 2024-04-25 NOTE — DISCHARGE INSTRUCTIONS

## 2024-05-01 ENCOUNTER — HOSPITAL ENCOUNTER (OUTPATIENT)
Dept: WOUND CARE | Age: 69
Discharge: HOME | End: 2024-05-01
Attending: SURGERY
Payer: MEDICARE

## 2024-05-01 VITALS
HEIGHT: 68 IN | HEART RATE: 84 BPM | SYSTOLIC BLOOD PRESSURE: 132 MMHG | BODY MASS INDEX: 25.46 KG/M2 | WEIGHT: 168 LBS | DIASTOLIC BLOOD PRESSURE: 72 MMHG | TEMPERATURE: 97.3 F | RESPIRATION RATE: 18 BRPM

## 2024-05-01 DIAGNOSIS — I83.023 VARICOSE VEINS OF LEFT LOWER EXTREMITY WITH ULCER OF ANKLE WITH FAT LAYER EXPOSED (HCC): Primary | ICD-10-CM

## 2024-05-01 DIAGNOSIS — L97.322 VARICOSE VEINS OF LEFT LOWER EXTREMITY WITH ULCER OF ANKLE WITH FAT LAYER EXPOSED (HCC): Primary | ICD-10-CM

## 2024-05-01 DIAGNOSIS — I70.242 ATHEROSCLEROSIS OF NATIVE ARTERY OF LEFT LOWER EXTREMITY WITH ULCERATION OF CALF (HCC): ICD-10-CM

## 2024-05-01 PROCEDURE — 11042 DBRDMT SUBQ TIS 1ST 20SQCM/<: CPT | Performed by: SURGERY

## 2024-05-01 PROCEDURE — 11042 DBRDMT SUBQ TIS 1ST 20SQCM/<: CPT

## 2024-05-01 RX ORDER — LIDOCAINE 50 MG/G
OINTMENT TOPICAL ONCE
OUTPATIENT
Start: 2024-05-01 | End: 2024-05-01

## 2024-05-01 RX ORDER — LIDOCAINE HYDROCHLORIDE 40 MG/ML
SOLUTION TOPICAL ONCE
OUTPATIENT
Start: 2024-05-01 | End: 2024-05-01

## 2024-05-01 RX ORDER — LIDOCAINE HYDROCHLORIDE 20 MG/ML
JELLY TOPICAL ONCE
OUTPATIENT
Start: 2024-05-01 | End: 2024-05-01

## 2024-05-01 RX ORDER — TRIAMCINOLONE ACETONIDE 1 MG/G
OINTMENT TOPICAL ONCE
OUTPATIENT
Start: 2024-05-01 | End: 2024-05-01

## 2024-05-01 RX ORDER — BETAMETHASONE DIPROPIONATE 0.05 %
OINTMENT (GRAM) TOPICAL ONCE
OUTPATIENT
Start: 2024-05-01 | End: 2024-05-01

## 2024-05-01 RX ORDER — IBUPROFEN 200 MG
TABLET ORAL ONCE
OUTPATIENT
Start: 2024-05-01 | End: 2024-05-01

## 2024-05-01 RX ORDER — GENTAMICIN SULFATE 1 MG/G
OINTMENT TOPICAL ONCE
OUTPATIENT
Start: 2024-05-01 | End: 2024-05-01

## 2024-05-01 RX ORDER — OXYCODONE HYDROCHLORIDE AND ACETAMINOPHEN 5; 325 MG/1; MG/1
1 TABLET ORAL EVERY 8 HOURS PRN
Qty: 42 TABLET | Refills: 0 | Status: SHIPPED | OUTPATIENT
Start: 2024-05-01 | End: 2024-05-15

## 2024-05-01 RX ORDER — BACITRACIN ZINC AND POLYMYXIN B SULFATE 500; 1000 [USP'U]/G; [USP'U]/G
OINTMENT TOPICAL ONCE
OUTPATIENT
Start: 2024-05-01 | End: 2024-05-01

## 2024-05-01 RX ORDER — CLOBETASOL PROPIONATE 0.5 MG/G
OINTMENT TOPICAL ONCE
OUTPATIENT
Start: 2024-05-01 | End: 2024-05-01

## 2024-05-01 RX ORDER — LIDOCAINE HYDROCHLORIDE 40 MG/ML
SOLUTION TOPICAL ONCE
Status: COMPLETED | OUTPATIENT
Start: 2024-05-01 | End: 2024-05-01

## 2024-05-01 RX ORDER — LIDOCAINE 40 MG/G
CREAM TOPICAL ONCE
OUTPATIENT
Start: 2024-05-01 | End: 2024-05-01

## 2024-05-01 RX ORDER — GINSENG 100 MG
CAPSULE ORAL ONCE
OUTPATIENT
Start: 2024-05-01 | End: 2024-05-01

## 2024-05-01 RX ADMIN — LIDOCAINE HYDROCHLORIDE 15 ML: 40 SOLUTION TOPICAL at 07:57

## 2024-05-01 ASSESSMENT — PAIN DESCRIPTION - PROGRESSION: CLINICAL_PROGRESSION: NOT CHANGED

## 2024-05-01 ASSESSMENT — PAIN SCALES - GENERAL: PAINLEVEL_OUTOF10: 8

## 2024-05-01 ASSESSMENT — PAIN DESCRIPTION - ORIENTATION: ORIENTATION: LEFT

## 2024-05-01 ASSESSMENT — PAIN DESCRIPTION - FREQUENCY: FREQUENCY: CONTINUOUS

## 2024-05-01 ASSESSMENT — PAIN DESCRIPTION - PAIN TYPE: TYPE: CHRONIC PAIN

## 2024-05-01 ASSESSMENT — PAIN DESCRIPTION - LOCATION: LOCATION: ANKLE

## 2024-05-01 ASSESSMENT — PAIN - FUNCTIONAL ASSESSMENT: PAIN_FUNCTIONAL_ASSESSMENT: PREVENTS OR INTERFERES SOME ACTIVE ACTIVITIES AND ADLS

## 2024-05-01 ASSESSMENT — PAIN DESCRIPTION - DESCRIPTORS: DESCRIPTORS: SHARP

## 2024-05-01 ASSESSMENT — PAIN DESCRIPTION - ONSET: ONSET: ON-GOING

## 2024-05-01 NOTE — PROGRESS NOTES
Wound Healing Center Followup Visit Note    Referring Physician : Rosenda Cormier MD  Amanda Ledesma  MEDICAL RECORD NUMBER:  [id number]  AGE: 68 y.o.   GENDER: female  : 1955  EPISODE DATE:  2024    Subjective:     Chief Complaint   Patient presents with    Wound Check     Left lowe leg      HISTORY of PRESENT ILLNESS HPI   Amanda Ledesma is a 68 y.o. female who presents today in regards to follow up evaluation and treatment of wound/ulcer.  That patient's past medical, family and social hx were reviewed and changes were made if present.    History of Wound Context:  The patient has had a wound of her left ankle/calf which was first noted approximately 2021.  This has been treated local wound care. On their initial visit to the wound healing center, 22,  the patient has noted that the wound has been improving.  The patient has not had similar previous wounds in the past.      She started seeing Dr. Street in 2021 and than Dr. Gillis.  She was started in unna boot ~ 2021.  She has noticed some improvement since starting [facility] boot.  She is currently following with Dr. Haskins.      Pt is not on abx at time of initial visit, but has been treated with previously by podiatry.      She is not a DM.  She is not a smoker.  She denies hx of DVT, and per her report had recent us noting no evidence of dvt at [facility].  She also had arterial studies done.    I had previously seen her in the past in regards to left buttock thigh wound, which started as abscess.      Pt works at [employer] in the [employer] and is on her feet all day.    22  Reflux study - if significant findings will schedule for fu  Continue compression therapy [facility] per podiatry with aquacell dressing  Elevation  She does not have significant arterial occlusive disease  22  Patient has asked to continuing following with me going forward because of our previous relationship  She is going to let Dr. Schuler office

## 2024-05-03 NOTE — DISCHARGE INSTRUCTIONS

## 2024-05-07 ENCOUNTER — APPOINTMENT (OUTPATIENT)
Dept: GENERAL RADIOLOGY | Age: 69
End: 2024-05-07
Payer: COMMERCIAL

## 2024-05-07 ENCOUNTER — HOSPITAL ENCOUNTER (EMERGENCY)
Age: 69
Discharge: LEFT BEFORE DISCHARGE | End: 2024-05-07
Attending: EMERGENCY MEDICINE
Payer: COMMERCIAL

## 2024-05-07 VITALS
TEMPERATURE: 97.2 F | DIASTOLIC BLOOD PRESSURE: 91 MMHG | OXYGEN SATURATION: 98 % | HEART RATE: 66 BPM | SYSTOLIC BLOOD PRESSURE: 166 MMHG | RESPIRATION RATE: 18 BRPM

## 2024-05-07 DIAGNOSIS — D64.9 ANEMIA, UNSPECIFIED TYPE: Primary | ICD-10-CM

## 2024-05-07 DIAGNOSIS — R11.2 NAUSEA AND VOMITING, UNSPECIFIED VOMITING TYPE: ICD-10-CM

## 2024-05-07 DIAGNOSIS — E16.2 HYPOGLYCEMIA: ICD-10-CM

## 2024-05-07 LAB
ALBUMIN SERPL-MCNC: 4.2 G/DL (ref 3.5–5.2)
ALP SERPL-CCNC: 77 U/L (ref 35–104)
ALT SERPL-CCNC: 9 U/L (ref 0–32)
ANION GAP SERPL CALCULATED.3IONS-SCNC: 15 MMOL/L (ref 7–16)
AST SERPL-CCNC: 15 U/L (ref 0–31)
BASOPHILS # BLD: 0.04 K/UL (ref 0–0.2)
BASOPHILS NFR BLD: 0 % (ref 0–2)
BILIRUB SERPL-MCNC: 0.3 MG/DL (ref 0–1.2)
BUN SERPL-MCNC: 21 MG/DL (ref 6–23)
CALCIUM SERPL-MCNC: 9.7 MG/DL (ref 8.6–10.2)
CHLORIDE SERPL-SCNC: 97 MMOL/L (ref 98–107)
CO2 SERPL-SCNC: 23 MMOL/L (ref 22–29)
CREAT SERPL-MCNC: 1 MG/DL (ref 0.5–1)
EKG ATRIAL RATE: 64 BPM
EKG P AXIS: 53 DEGREES
EKG P-R INTERVAL: 136 MS
EKG Q-T INTERVAL: 424 MS
EKG QRS DURATION: 80 MS
EKG QTC CALCULATION (BAZETT): 437 MS
EKG R AXIS: 79 DEGREES
EKG T AXIS: 69 DEGREES
EKG VENTRICULAR RATE: 64 BPM
EOSINOPHIL # BLD: 0.03 K/UL (ref 0.05–0.5)
EOSINOPHILS RELATIVE PERCENT: 0 % (ref 0–6)
ERYTHROCYTE [DISTWIDTH] IN BLOOD BY AUTOMATED COUNT: 18.9 % (ref 11.5–15)
GFR, ESTIMATED: 64 ML/MIN/1.73M2
GLUCOSE BLD-MCNC: 72 MG/DL (ref 74–99)
GLUCOSE SERPL-MCNC: 69 MG/DL (ref 74–99)
HCT VFR BLD AUTO: 32.4 % (ref 34–48)
HGB BLD-MCNC: 9.6 G/DL (ref 11.5–15.5)
IMM GRANULOCYTES # BLD AUTO: 0.05 K/UL (ref 0–0.58)
IMM GRANULOCYTES NFR BLD: 1 % (ref 0–5)
LACTATE BLDV-SCNC: 1.2 MMOL/L (ref 0.5–2.2)
LIPASE SERPL-CCNC: 49 U/L (ref 13–60)
LYMPHOCYTES NFR BLD: 1.88 K/UL (ref 1.5–4)
LYMPHOCYTES RELATIVE PERCENT: 18 % (ref 20–42)
MCH RBC QN AUTO: 22.9 PG (ref 26–35)
MCHC RBC AUTO-ENTMCNC: 29.6 G/DL (ref 32–34.5)
MCV RBC AUTO: 77.1 FL (ref 80–99.9)
MONOCYTES NFR BLD: 1 K/UL (ref 0.1–0.95)
MONOCYTES NFR BLD: 10 % (ref 2–12)
NEUTROPHILS NFR BLD: 71 % (ref 43–80)
NEUTS SEG NFR BLD: 7.5 K/UL (ref 1.8–7.3)
PLATELET # BLD AUTO: 268 K/UL (ref 130–450)
PMV BLD AUTO: 10.9 FL (ref 7–12)
POTASSIUM SERPL-SCNC: 4.1 MMOL/L (ref 3.5–5)
PROT SERPL-MCNC: 8.6 G/DL (ref 6.4–8.3)
RBC # BLD AUTO: 4.2 M/UL (ref 3.5–5.5)
SODIUM SERPL-SCNC: 135 MMOL/L (ref 132–146)
TROPONIN I SERPL HS-MCNC: 54 NG/L (ref 0–9)
TROPONIN I SERPL HS-MCNC: 56 NG/L (ref 0–9)
WBC OTHER # BLD: 10.5 K/UL (ref 4.5–11.5)

## 2024-05-07 PROCEDURE — 85025 COMPLETE CBC W/AUTO DIFF WBC: CPT

## 2024-05-07 PROCEDURE — 82962 GLUCOSE BLOOD TEST: CPT

## 2024-05-07 PROCEDURE — 84484 ASSAY OF TROPONIN QUANT: CPT

## 2024-05-07 PROCEDURE — 96361 HYDRATE IV INFUSION ADD-ON: CPT

## 2024-05-07 PROCEDURE — 2580000003 HC RX 258: Performed by: EMERGENCY MEDICINE

## 2024-05-07 PROCEDURE — 83605 ASSAY OF LACTIC ACID: CPT

## 2024-05-07 PROCEDURE — 93005 ELECTROCARDIOGRAM TRACING: CPT | Performed by: EMERGENCY MEDICINE

## 2024-05-07 PROCEDURE — 80053 COMPREHEN METABOLIC PANEL: CPT

## 2024-05-07 PROCEDURE — 99285 EMERGENCY DEPT VISIT HI MDM: CPT

## 2024-05-07 PROCEDURE — C9113 INJ PANTOPRAZOLE SODIUM, VIA: HCPCS | Performed by: EMERGENCY MEDICINE

## 2024-05-07 PROCEDURE — 71046 X-RAY EXAM CHEST 2 VIEWS: CPT

## 2024-05-07 PROCEDURE — 6360000002 HC RX W HCPCS: Performed by: EMERGENCY MEDICINE

## 2024-05-07 PROCEDURE — 83690 ASSAY OF LIPASE: CPT

## 2024-05-07 PROCEDURE — 93010 ELECTROCARDIOGRAM REPORT: CPT | Performed by: INTERNAL MEDICINE

## 2024-05-07 PROCEDURE — 96374 THER/PROPH/DIAG INJ IV PUSH: CPT

## 2024-05-07 PROCEDURE — 96375 TX/PRO/DX INJ NEW DRUG ADDON: CPT

## 2024-05-07 RX ORDER — ONDANSETRON 2 MG/ML
4 INJECTION INTRAMUSCULAR; INTRAVENOUS ONCE
Status: COMPLETED | OUTPATIENT
Start: 2024-05-07 | End: 2024-05-07

## 2024-05-07 RX ORDER — PANTOPRAZOLE SODIUM 40 MG/10ML
40 INJECTION, POWDER, LYOPHILIZED, FOR SOLUTION INTRAVENOUS ONCE
Status: COMPLETED | OUTPATIENT
Start: 2024-05-07 | End: 2024-05-07

## 2024-05-07 RX ORDER — DEXTROSE MONOHYDRATE 25 G/50ML
25 INJECTION, SOLUTION INTRAVENOUS PRN
Status: DISCONTINUED | OUTPATIENT
Start: 2024-05-07 | End: 2024-05-07 | Stop reason: HOSPADM

## 2024-05-07 RX ORDER — 0.9 % SODIUM CHLORIDE 0.9 %
1000 INTRAVENOUS SOLUTION INTRAVENOUS ONCE
Status: COMPLETED | OUTPATIENT
Start: 2024-05-07 | End: 2024-05-07

## 2024-05-07 RX ADMIN — SODIUM CHLORIDE 1000 ML: 9 INJECTION, SOLUTION INTRAVENOUS at 12:38

## 2024-05-07 RX ADMIN — PANTOPRAZOLE SODIUM 40 MG: 40 INJECTION, POWDER, FOR SOLUTION INTRAVENOUS at 12:39

## 2024-05-07 RX ADMIN — ONDANSETRON 4 MG: 2 INJECTION INTRAMUSCULAR; INTRAVENOUS at 12:39

## 2024-05-07 ASSESSMENT — LIFESTYLE VARIABLES: HOW OFTEN DO YOU HAVE A DRINK CONTAINING ALCOHOL: NEVER

## 2024-05-07 NOTE — ED PROVIDER NOTES
[facility] EMERGENCY DEPARTMENT  EMERGENCY DEPARTMENT ENCOUNTER        Pt Name: Amanda Ledesma  MRN: [id number]  Birthdate 1955  Date of evaluation: 5/7/2024  Provider: Jaja Mccoy DO  PCP: Rosenda Cormier MD  Note Started: 1:15 PM EDT 5/7/24    CHIEF COMPLAINT       Chief Complaint   Patient presents with    Emesis     Patient states she started vomiting stomach acid Friday. Hx acid reflux       HISTORY OF PRESENT ILLNESS: 1 or more Elements   History From: patient    Limitations to history : None    Amanda Ledesma is a 68 y.o. female who presents with nausea and emesis (no blood, no bile) and reflux (with burning in the back of her throat) beginning Friday.  It continued into Saturday morning and then got better.  She was good on the next day or so and then had 2 episodes of vomiting of gastric acid, which she describes as yellow, with persistent reflux.  Patient reports she used to see a GI doctor and is taking pantoprazole but it does not seem to be working.  The complaint has been persistent, moderate in severity, and worsened by nothing.  Patient denies fever/chills, sore throat, cough, congestion, chest pain, shortness of breath, edema, headache, visual disturbances, focal paresthesias, focal weakness, abdominal pain, diarrhea, constipation, dysuria, hematuria, trauma, neck or back pain, rash or other complaints.  She denies hematemesis, coffee-ground emesis, bright red blood per rectum or melena.  Per note patient attempted to eat some rice today and then became nauseated and vomited again.  She has already had her gallbladder removed remotely.            Nursing Notes were all reviewed and agreed with or any disagreements were addressed in the HPI.    REVIEW OF SYSTEMS :           Positives and Pertinent negatives as per HPI.     SURGICAL HISTORY     Past Surgical History:   Procedure Laterality Date    CHOLECYSTECTOMY, LAPAROSCOPIC  04/08/2014

## 2024-05-07 NOTE — DISCHARGE INSTRUCTIONS
Leaving Against Medical Advice    Discharge against medical advice means that you choose to leave the hospital before your caregiver recommends that you do. Your caregiver may still need to diagnose or treat your condition or your treatment may not be complete.    INSTRUCTIONS: contact your doctor or the provider assigned on your instructions as soon as possible to arrange follow-up.     Risks:  Risks of leaving the hospital before your caregivers recommend include the following:        Your condition may cause other health problems if not treated properly including disability or death..        You may need to be admitted to the hospital again for the same condition.        Your condition could become life-threatening.

## 2024-05-07 NOTE — ED NOTES
Department of Emergency Medicine  FIRST PROVIDER TRIAGE NOTE             Independent MLP           5/7/24  11:03 AM EDT    Date of Encounter: 5/7/24   MRN: [id number]      HPI: Amanda Ledesma is a 68 y.o. female who presents to the ED for No chief complaint on file.  States that she has severe acid reflux which flared up on Friday.  States has had multiple episodes of vomiting since Friday.  She has not been to work since Friday.  Attempted to eat some rice today and became nauseated and had vomiting again.    ROS: Negative for cp or sob.    PE: CV: regular rate     Initial Plan of Care: All treatment areas with department are currently occupied. Plan to order/Initiate the following while awaiting opening in ED: labs.  Initiate Treatment-Testing, Proceed toTreatment Area When Bed Available for ED Attending/MLP to Continue Care    Electronically signed by LISA Rm CNP   DD: 5/7/24         Jannette Llamas APRN - CNP  05/07/24 1101

## 2024-05-08 ENCOUNTER — HOSPITAL ENCOUNTER (OUTPATIENT)
Dept: WOUND CARE | Age: 69
Discharge: HOME | End: 2024-05-08
Attending: SURGERY

## 2024-05-09 ENCOUNTER — APPOINTMENT (OUTPATIENT)
Dept: CT IMAGING | Age: 69
End: 2024-05-09
Attending: STUDENT IN AN ORGANIZED HEALTH CARE EDUCATION/TRAINING PROGRAM
Payer: COMMERCIAL

## 2024-05-09 ENCOUNTER — HOSPITAL ENCOUNTER (EMERGENCY)
Age: 69
Discharge: HOME | End: 2024-05-09
Attending: STUDENT IN AN ORGANIZED HEALTH CARE EDUCATION/TRAINING PROGRAM
Payer: COMMERCIAL

## 2024-05-09 VITALS
BODY MASS INDEX: 24.33 KG/M2 | OXYGEN SATURATION: 100 % | TEMPERATURE: 98.1 F | DIASTOLIC BLOOD PRESSURE: 62 MMHG | RESPIRATION RATE: 14 BRPM | SYSTOLIC BLOOD PRESSURE: 127 MMHG | HEART RATE: 62 BPM | WEIGHT: 160 LBS

## 2024-05-09 DIAGNOSIS — R11.2 NAUSEA AND VOMITING, UNSPECIFIED VOMITING TYPE: Primary | ICD-10-CM

## 2024-05-09 DIAGNOSIS — K59.00 CONSTIPATION, UNSPECIFIED CONSTIPATION TYPE: ICD-10-CM

## 2024-05-09 LAB
ALBUMIN SERPL-MCNC: 4.2 G/DL (ref 3.5–5.2)
ALP SERPL-CCNC: 64 U/L (ref 35–104)
ALT SERPL-CCNC: 11 U/L (ref 0–32)
ANION GAP SERPL CALCULATED.3IONS-SCNC: 14 MMOL/L (ref 7–16)
AST SERPL-CCNC: 26 U/L (ref 0–31)
BACTERIA URNS QL MICRO: ABNORMAL
BASOPHILS # BLD: 0.06 K/UL (ref 0–0.2)
BASOPHILS NFR BLD: 1 % (ref 0–2)
BILIRUB SERPL-MCNC: 0.3 MG/DL (ref 0–1.2)
BILIRUB UR QL STRIP: ABNORMAL
BUN SERPL-MCNC: 22 MG/DL (ref 6–23)
CALCIUM SERPL-MCNC: 9.5 MG/DL (ref 8.6–10.2)
CASTS #/AREA URNS LPF: ABNORMAL /LPF
CHLORIDE SERPL-SCNC: 102 MMOL/L (ref 98–107)
CLARITY UR: CLEAR
CO2 SERPL-SCNC: 21 MMOL/L (ref 22–29)
COLOR UR: YELLOW
CREAT SERPL-MCNC: 1.2 MG/DL (ref 0.5–1)
EOSINOPHIL # BLD: 0.12 K/UL (ref 0.05–0.5)
EOSINOPHILS RELATIVE PERCENT: 2 % (ref 0–6)
EPI CELLS #/AREA URNS HPF: ABNORMAL /HPF
ERYTHROCYTE [DISTWIDTH] IN BLOOD BY AUTOMATED COUNT: 19.5 % (ref 11.5–15)
GFR, ESTIMATED: 48 ML/MIN/1.73M2
GLUCOSE SERPL-MCNC: 88 MG/DL (ref 74–99)
GLUCOSE UR STRIP-MCNC: NEGATIVE MG/DL
HCT VFR BLD AUTO: 30.4 % (ref 34–48)
HGB BLD-MCNC: 8.9 G/DL (ref 11.5–15.5)
HGB UR QL STRIP.AUTO: NEGATIVE
IMM GRANULOCYTES # BLD AUTO: 0.03 K/UL (ref 0–0.58)
IMM GRANULOCYTES NFR BLD: 0 % (ref 0–5)
KETONES UR STRIP-MCNC: ABNORMAL MG/DL
LACTATE BLDV-SCNC: 1 MMOL/L (ref 0.5–2.2)
LEUKOCYTE ESTERASE UR QL STRIP: ABNORMAL
LIPASE SERPL-CCNC: 66 U/L (ref 13–60)
LYMPHOCYTES NFR BLD: 2.06 K/UL (ref 1.5–4)
LYMPHOCYTES RELATIVE PERCENT: 26 % (ref 20–42)
MAGNESIUM SERPL-MCNC: 2.4 MG/DL (ref 1.6–2.6)
MCH RBC QN AUTO: 22.9 PG (ref 26–35)
MCHC RBC AUTO-ENTMCNC: 29.3 G/DL (ref 32–34.5)
MCV RBC AUTO: 78.1 FL (ref 80–99.9)
MONOCYTES NFR BLD: 0.84 K/UL (ref 0.1–0.95)
MONOCYTES NFR BLD: 11 % (ref 2–12)
MUCOUS THREADS URNS QL MICRO: PRESENT
NEUTROPHILS NFR BLD: 61 % (ref 43–80)
NEUTS SEG NFR BLD: 4.84 K/UL (ref 1.8–7.3)
NITRITE UR QL STRIP: NEGATIVE
PH UR STRIP: 6 [PH] (ref 5–9)
PLATELET # BLD AUTO: 280 K/UL (ref 130–450)
PMV BLD AUTO: 11.4 FL (ref 7–12)
POTASSIUM SERPL-SCNC: 4.5 MMOL/L (ref 3.5–5)
PROT SERPL-MCNC: 7.9 G/DL (ref 6.4–8.3)
PROT UR STRIP-MCNC: 30 MG/DL
RBC # BLD AUTO: 3.89 M/UL (ref 3.5–5.5)
RBC #/AREA URNS HPF: ABNORMAL /HPF
SODIUM SERPL-SCNC: 137 MMOL/L (ref 132–146)
SP GR UR STRIP: 1.02 (ref 1–1.03)
TROPONIN I SERPL HS-MCNC: 46 NG/L (ref 0–9)
UROBILINOGEN UR STRIP-ACNC: 1 EU/DL (ref 0–1)
WBC #/AREA URNS HPF: ABNORMAL /HPF
WBC OTHER # BLD: 8 K/UL (ref 4.5–11.5)

## 2024-05-09 PROCEDURE — 84484 ASSAY OF TROPONIN QUANT: CPT

## 2024-05-09 PROCEDURE — 83690 ASSAY OF LIPASE: CPT

## 2024-05-09 PROCEDURE — 96374 THER/PROPH/DIAG INJ IV PUSH: CPT

## 2024-05-09 PROCEDURE — 83735 ASSAY OF MAGNESIUM: CPT

## 2024-05-09 PROCEDURE — 85025 COMPLETE CBC W/AUTO DIFF WBC: CPT

## 2024-05-09 PROCEDURE — 93005 ELECTROCARDIOGRAM TRACING: CPT | Performed by: STUDENT IN AN ORGANIZED HEALTH CARE EDUCATION/TRAINING PROGRAM

## 2024-05-09 PROCEDURE — 6360000004 HC RX CONTRAST MEDICATION: Performed by: RADIOLOGY

## 2024-05-09 PROCEDURE — 81001 URINALYSIS AUTO W/SCOPE: CPT

## 2024-05-09 PROCEDURE — 99285 EMERGENCY DEPT VISIT HI MDM: CPT

## 2024-05-09 PROCEDURE — 74177 CT ABD & PELVIS W/CONTRAST: CPT

## 2024-05-09 PROCEDURE — 2500000003 HC RX 250 WO HCPCS: Performed by: STUDENT IN AN ORGANIZED HEALTH CARE EDUCATION/TRAINING PROGRAM

## 2024-05-09 PROCEDURE — A4216 STERILE WATER/SALINE, 10 ML: HCPCS | Performed by: STUDENT IN AN ORGANIZED HEALTH CARE EDUCATION/TRAINING PROGRAM

## 2024-05-09 PROCEDURE — 80053 COMPREHEN METABOLIC PANEL: CPT

## 2024-05-09 PROCEDURE — 83605 ASSAY OF LACTIC ACID: CPT

## 2024-05-09 PROCEDURE — 2580000003 HC RX 258: Performed by: RADIOLOGY

## 2024-05-09 PROCEDURE — 2580000003 HC RX 258: Performed by: STUDENT IN AN ORGANIZED HEALTH CARE EDUCATION/TRAINING PROGRAM

## 2024-05-09 RX ORDER — 0.9 % SODIUM CHLORIDE 0.9 %
1000 INTRAVENOUS SOLUTION INTRAVENOUS ONCE
Status: COMPLETED | OUTPATIENT
Start: 2024-05-09 | End: 2024-05-09

## 2024-05-09 RX ORDER — DOCUSATE SODIUM 100 MG/1
100 CAPSULE, LIQUID FILLED ORAL 2 TIMES DAILY
Qty: 60 CAPSULE | Refills: 0 | Status: SHIPPED | OUTPATIENT
Start: 2024-05-09 | End: 2024-06-08

## 2024-05-09 RX ORDER — SODIUM CHLORIDE 0.9 % (FLUSH) 0.9 %
10 SYRINGE (ML) INJECTION
Status: COMPLETED | OUTPATIENT
Start: 2024-05-09 | End: 2024-05-09

## 2024-05-09 RX ORDER — ONDANSETRON 4 MG/1
4 TABLET, ORALLY DISINTEGRATING ORAL 3 TIMES DAILY PRN
Qty: 21 TABLET | Refills: 0 | Status: SHIPPED | OUTPATIENT
Start: 2024-05-09

## 2024-05-09 RX ORDER — POLYETHYLENE GLYCOL 3350 17 G/17G
17 POWDER, FOR SOLUTION ORAL DAILY
Qty: 510 G | Refills: 0 | Status: SHIPPED | OUTPATIENT
Start: 2024-05-09 | End: 2024-06-08

## 2024-05-09 RX ADMIN — SODIUM CHLORIDE, PRESERVATIVE FREE 10 ML: 5 INJECTION INTRAVENOUS at 12:04

## 2024-05-09 RX ADMIN — FAMOTIDINE 20 MG: 10 INJECTION, SOLUTION INTRAVENOUS at 09:00

## 2024-05-09 RX ADMIN — IOPAMIDOL 75 ML: 755 INJECTION, SOLUTION INTRAVENOUS at 12:04

## 2024-05-09 RX ADMIN — SODIUM CHLORIDE 1000 ML: 9 INJECTION, SOLUTION INTRAVENOUS at 08:59

## 2024-05-09 ASSESSMENT — LIFESTYLE VARIABLES
HOW MANY STANDARD DRINKS CONTAINING ALCOHOL DO YOU HAVE ON A TYPICAL DAY: PATIENT DOES NOT DRINK
HOW OFTEN DO YOU HAVE A DRINK CONTAINING ALCOHOL: NEVER

## 2024-05-09 NOTE — PROGRESS NOTES
CLINICAL PHARMACY NOTE: MEDS TO BEDS    Total # of Prescriptions Filled: 3   The following medications were delivered to the patient:  Polyethylene glycol 3350 powder  Stool softner 100mg  Zofran 4mg odt    Additional Documentation:  Patient picked up in pharmacy 5-9-24

## 2024-05-09 NOTE — ED PROVIDER NOTES
agreeable with the plan. I discussed at length with them reasons for immediate return here for re evaluation. They will followup with PCP.      --------------------------------- ADDITIONAL PROVIDER NOTES ---------------------------------  At this time the patient is without objective evidence of an acute process requiring hospitalization or inpatient management.  They have remained hemodynamically stable throughout their entire ED visit and are stable for discharge with outpatient follow-up.     The plan has been discussed in detail and they are aware of the specific conditions for emergent return, as well as the importance of follow-up.      Discharge Medication List as of 5/9/2024  3:04 PM        START taking these medications    Details   polyethylene glycol (GLYCOLAX) 17 GM/SCOOP powder Take 17 g by mouth daily, Disp-510 g, R-0Normal      docusate sodium (COLACE) 100 MG capsule Take 1 capsule by mouth 2 times daily, Disp-60 capsule, R-0Normal      ondansetron (ZOFRAN-ODT) 4 MG disintegrating tablet Take 1 tablet by mouth 3 times daily as needed for Nausea or Vomiting, Disp-21 tablet, R-0Normal             Diagnosis:  1. Nausea and vomiting, unspecified vomiting type    2. Constipation, unspecified constipation type        Disposition:  Patient's disposition: Discharge to home  Patient's condition is stable.       Magi Duke MD  05/10/24 0128

## 2024-05-10 LAB
EKG ATRIAL RATE: 78 BPM
EKG P AXIS: 45 DEGREES
EKG P-R INTERVAL: 134 MS
EKG Q-T INTERVAL: 404 MS
EKG QRS DURATION: 86 MS
EKG QTC CALCULATION (BAZETT): 460 MS
EKG R AXIS: 69 DEGREES
EKG T AXIS: 72 DEGREES
EKG VENTRICULAR RATE: 78 BPM

## 2024-05-10 PROCEDURE — 93010 ELECTROCARDIOGRAM REPORT: CPT | Performed by: INTERNAL MEDICINE

## 2024-05-10 ASSESSMENT — ENCOUNTER SYMPTOMS
VOMITING: 1
NAUSEA: 1
SHORTNESS OF BREATH: 0
ABDOMINAL PAIN: 1
DIARRHEA: 0

## 2024-05-10 NOTE — DISCHARGE INSTRUCTIONS

## 2024-05-13 ENCOUNTER — HOSPITAL ENCOUNTER (EMERGENCY)
Age: 69
Discharge: HOME | End: 2024-05-13
Attending: STUDENT IN AN ORGANIZED HEALTH CARE EDUCATION/TRAINING PROGRAM
Payer: MEDICARE

## 2024-05-13 ENCOUNTER — APPOINTMENT (OUTPATIENT)
Dept: GENERAL RADIOLOGY | Age: 69
End: 2024-05-13
Payer: MEDICARE

## 2024-05-13 VITALS
HEART RATE: 73 BPM | HEIGHT: 68 IN | BODY MASS INDEX: 24.25 KG/M2 | OXYGEN SATURATION: 100 % | DIASTOLIC BLOOD PRESSURE: 88 MMHG | SYSTOLIC BLOOD PRESSURE: 119 MMHG | RESPIRATION RATE: 14 BRPM | TEMPERATURE: 98.2 F | WEIGHT: 160 LBS

## 2024-05-13 DIAGNOSIS — R11.2 NAUSEA AND VOMITING, UNSPECIFIED VOMITING TYPE: Primary | ICD-10-CM

## 2024-05-13 DIAGNOSIS — K29.00 ACUTE GASTRITIS WITHOUT HEMORRHAGE, UNSPECIFIED GASTRITIS TYPE: ICD-10-CM

## 2024-05-13 LAB
ALBUMIN SERPL-MCNC: 4 G/DL (ref 3.5–5.2)
ALP SERPL-CCNC: 56 U/L (ref 35–104)
ALT SERPL-CCNC: 7 U/L (ref 0–32)
ANION GAP SERPL CALCULATED.3IONS-SCNC: 11 MMOL/L (ref 7–16)
AST SERPL-CCNC: 15 U/L (ref 0–31)
BASOPHILS # BLD: 0.06 K/UL (ref 0–0.2)
BASOPHILS NFR BLD: 1 % (ref 0–2)
BILIRUB SERPL-MCNC: 0.3 MG/DL (ref 0–1.2)
BUN SERPL-MCNC: 17 MG/DL (ref 6–23)
CALCIUM SERPL-MCNC: 9.3 MG/DL (ref 8.6–10.2)
CHLORIDE SERPL-SCNC: 102 MMOL/L (ref 98–107)
CO2 SERPL-SCNC: 24 MMOL/L (ref 22–29)
CREAT SERPL-MCNC: 1.1 MG/DL (ref 0.5–1)
EKG ATRIAL RATE: 65 BPM
EKG P AXIS: 31 DEGREES
EKG P-R INTERVAL: 146 MS
EKG Q-T INTERVAL: 420 MS
EKG QRS DURATION: 86 MS
EKG QTC CALCULATION (BAZETT): 436 MS
EKG R AXIS: 69 DEGREES
EKG T AXIS: 82 DEGREES
EKG VENTRICULAR RATE: 65 BPM
EOSINOPHIL # BLD: 0.15 K/UL (ref 0.05–0.5)
EOSINOPHILS RELATIVE PERCENT: 2 % (ref 0–6)
ERYTHROCYTE [DISTWIDTH] IN BLOOD BY AUTOMATED COUNT: 19.3 % (ref 11.5–15)
GFR, ESTIMATED: 55 ML/MIN/1.73M2
GLUCOSE SERPL-MCNC: 84 MG/DL (ref 74–99)
HCT VFR BLD AUTO: 30.1 % (ref 34–48)
HGB BLD-MCNC: 8.8 G/DL (ref 11.5–15.5)
IMM GRANULOCYTES # BLD AUTO: <0.03 K/UL (ref 0–0.58)
IMM GRANULOCYTES NFR BLD: 0 % (ref 0–5)
LYMPHOCYTES NFR BLD: 2.34 K/UL (ref 1.5–4)
LYMPHOCYTES RELATIVE PERCENT: 32 % (ref 20–42)
MCH RBC QN AUTO: 23.2 PG (ref 26–35)
MCHC RBC AUTO-ENTMCNC: 29.2 G/DL (ref 32–34.5)
MCV RBC AUTO: 79.4 FL (ref 80–99.9)
MONOCYTES NFR BLD: 0.59 K/UL (ref 0.1–0.95)
MONOCYTES NFR BLD: 8 % (ref 2–12)
NEUTROPHILS NFR BLD: 57 % (ref 43–80)
NEUTS SEG NFR BLD: 4.13 K/UL (ref 1.8–7.3)
PLATELET # BLD AUTO: 309 K/UL (ref 130–450)
PMV BLD AUTO: 11.2 FL (ref 7–12)
POTASSIUM SERPL-SCNC: 3.7 MMOL/L (ref 3.5–5)
PROT SERPL-MCNC: 7.4 G/DL (ref 6.4–8.3)
RBC # BLD AUTO: 3.79 M/UL (ref 3.5–5.5)
SODIUM SERPL-SCNC: 137 MMOL/L (ref 132–146)
TROPONIN I SERPL HS-MCNC: 40 NG/L (ref 0–9)
TROPONIN I SERPL HS-MCNC: 46 NG/L (ref 0–9)
WBC OTHER # BLD: 7.3 K/UL (ref 4.5–11.5)

## 2024-05-13 PROCEDURE — 2580000003 HC RX 258: Performed by: STUDENT IN AN ORGANIZED HEALTH CARE EDUCATION/TRAINING PROGRAM

## 2024-05-13 PROCEDURE — 84484 ASSAY OF TROPONIN QUANT: CPT

## 2024-05-13 PROCEDURE — 99285 EMERGENCY DEPT VISIT HI MDM: CPT

## 2024-05-13 PROCEDURE — 2500000003 HC RX 250 WO HCPCS: Performed by: STUDENT IN AN ORGANIZED HEALTH CARE EDUCATION/TRAINING PROGRAM

## 2024-05-13 PROCEDURE — 93010 ELECTROCARDIOGRAM REPORT: CPT | Performed by: INTERNAL MEDICINE

## 2024-05-13 PROCEDURE — 80053 COMPREHEN METABOLIC PANEL: CPT

## 2024-05-13 PROCEDURE — 93005 ELECTROCARDIOGRAM TRACING: CPT | Performed by: STUDENT IN AN ORGANIZED HEALTH CARE EDUCATION/TRAINING PROGRAM

## 2024-05-13 PROCEDURE — 6370000000 HC RX 637 (ALT 250 FOR IP): Performed by: STUDENT IN AN ORGANIZED HEALTH CARE EDUCATION/TRAINING PROGRAM

## 2024-05-13 PROCEDURE — 96374 THER/PROPH/DIAG INJ IV PUSH: CPT

## 2024-05-13 PROCEDURE — 74019 RADEX ABDOMEN 2 VIEWS: CPT

## 2024-05-13 PROCEDURE — 85025 COMPLETE CBC W/AUTO DIFF WBC: CPT

## 2024-05-13 PROCEDURE — A4216 STERILE WATER/SALINE, 10 ML: HCPCS | Performed by: STUDENT IN AN ORGANIZED HEALTH CARE EDUCATION/TRAINING PROGRAM

## 2024-05-13 RX ORDER — FAMOTIDINE 20 MG/1
20 TABLET, FILM COATED ORAL DAILY
Qty: 14 TABLET | Refills: 0 | Status: SHIPPED | OUTPATIENT
Start: 2024-05-13 | End: 2024-05-27

## 2024-05-13 RX ORDER — SUCRALFATE 1 G/1
1 TABLET ORAL ONCE
Status: COMPLETED | OUTPATIENT
Start: 2024-05-13 | End: 2024-05-13

## 2024-05-13 RX ORDER — SUCRALFATE 1 G/1
1 TABLET ORAL 3 TIMES DAILY
Qty: 60 TABLET | Refills: 0 | Status: SHIPPED | OUTPATIENT
Start: 2024-05-13 | End: 2024-06-02

## 2024-05-13 RX ADMIN — FAMOTIDINE 20 MG: 10 INJECTION, SOLUTION INTRAVENOUS at 13:32

## 2024-05-13 RX ADMIN — SUCRALFATE 1 G: 1 TABLET ORAL at 13:28

## 2024-05-13 RX ADMIN — LIDOCAINE HYDROCHLORIDE: 20 SOLUTION ORAL at 13:32

## 2024-05-13 ASSESSMENT — PAIN - FUNCTIONAL ASSESSMENT: PAIN_FUNCTIONAL_ASSESSMENT: NONE - DENIES PAIN

## 2024-05-13 NOTE — ED PROVIDER NOTES
Amanda Ledesma is a 68 year old female who presented to ED with concern for nausea and vomiting with epigastric abdominal pain. Patient was previously seen at [facility] for symptoms. Patient previously followed with GI over 10 years ago. Patient reported she does have history of gastric ulcers. Patient has been tolerating Po fluids but develops nausea and vomiting with solid food. Symptoms have been present for months worse over the past 1.5 weeks.     The history is provided by the patient and medical records.        Review of Systems   Constitutional:  Negative for chills, diaphoresis, fatigue and fever.   Eyes:  Negative for photophobia and visual disturbance.   Respiratory:  Negative for cough, chest tightness and shortness of breath.    Cardiovascular:  Negative for chest pain, palpitations and leg swelling.   Gastrointestinal:  Positive for abdominal pain, nausea and vomiting. Negative for abdominal distention and diarrhea.   Genitourinary:  Negative for dysuria.   Musculoskeletal:  Negative for neck pain and neck stiffness.   Skin:  Negative for pallor and rash.   Neurological:  Negative for headaches.   Psychiatric/Behavioral:  Negative for confusion.         Physical Exam  Vitals and nursing note reviewed.   Constitutional:       General: She is not in acute distress.     Appearance: Normal appearance. She is not ill-appearing.   HENT:      Head: Normocephalic and atraumatic.   Eyes:      General: No scleral icterus.     Conjunctiva/sclera: Conjunctivae normal.      Pupils: Pupils are equal, round, and reactive to light.   Cardiovascular:      Rate and Rhythm: Normal rate and regular rhythm.   Pulmonary:      Effort: Pulmonary effort is normal.      Breath sounds: Normal breath sounds.   Abdominal:      General: Bowel sounds are normal. There is no distension.      Palpations: Abdomen is soft.      Tenderness: There is abdominal tenderness. There is no guarding or rebound.      Comments: Epigastric tenderness

## 2024-05-14 ENCOUNTER — TELEPHONE (OUTPATIENT)
Dept: FAMILY MEDICINE CLINIC | Age: 69
End: 2024-05-14

## 2024-05-14 NOTE — TELEPHONE ENCOUNTER
Returned patient call, LMOM for patient that an appointment opened up for tomorrow, 5/15/2024 at 3:30 pm. On hold. Contact office to confirm if can the 3:30 pm appointment.

## 2024-05-15 ENCOUNTER — OFFICE VISIT (OUTPATIENT)
Dept: FAMILY MEDICINE CLINIC | Age: 69
End: 2024-05-15
Payer: COMMERCIAL

## 2024-05-15 ENCOUNTER — HOSPITAL ENCOUNTER (OUTPATIENT)
Dept: WOUND CARE | Age: 69
Discharge: HOME | End: 2024-05-15
Attending: SURGERY
Payer: MEDICARE

## 2024-05-15 VITALS
RESPIRATION RATE: 17 BRPM | HEART RATE: 88 BPM | SYSTOLIC BLOOD PRESSURE: 86 MMHG | OXYGEN SATURATION: 98 % | TEMPERATURE: 97.6 F | HEIGHT: 68 IN | WEIGHT: 152 LBS | BODY MASS INDEX: 23.04 KG/M2 | DIASTOLIC BLOOD PRESSURE: 48 MMHG

## 2024-05-15 VITALS
RESPIRATION RATE: 16 BRPM | WEIGHT: 160 LBS | BODY MASS INDEX: 24.25 KG/M2 | HEIGHT: 68 IN | TEMPERATURE: 96.5 F | DIASTOLIC BLOOD PRESSURE: 42 MMHG | HEART RATE: 80 BPM | SYSTOLIC BLOOD PRESSURE: 88 MMHG

## 2024-05-15 DIAGNOSIS — I83.023 VARICOSE VEINS OF LEFT LOWER EXTREMITY WITH ULCER OF ANKLE WITH FAT LAYER EXPOSED (HCC): Primary | ICD-10-CM

## 2024-05-15 DIAGNOSIS — Z87.19 HISTORY OF GASTRITIS: ICD-10-CM

## 2024-05-15 DIAGNOSIS — D50.9 MICROCYTIC ANEMIA: ICD-10-CM

## 2024-05-15 DIAGNOSIS — L97.322 VARICOSE VEINS OF LEFT LOWER EXTREMITY WITH ULCER OF ANKLE WITH FAT LAYER EXPOSED (HCC): Primary | ICD-10-CM

## 2024-05-15 DIAGNOSIS — R11.2 NAUSEA AND VOMITING, UNSPECIFIED VOMITING TYPE: Primary | ICD-10-CM

## 2024-05-15 DIAGNOSIS — I70.242 ATHEROSCLEROSIS OF NATIVE ARTERY OF LEFT LOWER EXTREMITY WITH ULCERATION OF CALF (HCC): ICD-10-CM

## 2024-05-15 DIAGNOSIS — K59.09 OTHER CONSTIPATION: ICD-10-CM

## 2024-05-15 PROCEDURE — 99215 OFFICE O/P EST HI 40 MIN: CPT | Performed by: FAMILY MEDICINE

## 2024-05-15 PROCEDURE — 3074F SYST BP LT 130 MM HG: CPT | Performed by: FAMILY MEDICINE

## 2024-05-15 PROCEDURE — 1090F PRES/ABSN URINE INCON ASSESS: CPT | Performed by: FAMILY MEDICINE

## 2024-05-15 PROCEDURE — 3017F COLORECTAL CA SCREEN DOC REV: CPT | Performed by: FAMILY MEDICINE

## 2024-05-15 PROCEDURE — G8427 DOCREV CUR MEDS BY ELIG CLIN: HCPCS | Performed by: FAMILY MEDICINE

## 2024-05-15 PROCEDURE — 11042 DBRDMT SUBQ TIS 1ST 20SQCM/<: CPT

## 2024-05-15 PROCEDURE — 1036F TOBACCO NON-USER: CPT | Performed by: FAMILY MEDICINE

## 2024-05-15 PROCEDURE — 1124F ACP DISCUSS-NO DSCNMKR DOCD: CPT | Performed by: FAMILY MEDICINE

## 2024-05-15 PROCEDURE — G8420 CALC BMI NORM PARAMETERS: HCPCS | Performed by: FAMILY MEDICINE

## 2024-05-15 PROCEDURE — 3078F DIAST BP <80 MM HG: CPT | Performed by: FAMILY MEDICINE

## 2024-05-15 PROCEDURE — G8400 PT W/DXA NO RESULTS DOC: HCPCS | Performed by: FAMILY MEDICINE

## 2024-05-15 PROCEDURE — 11042 DBRDMT SUBQ TIS 1ST 20SQCM/<: CPT | Performed by: SURGERY

## 2024-05-15 RX ORDER — BACITRACIN ZINC AND POLYMYXIN B SULFATE 500; 1000 [USP'U]/G; [USP'U]/G
OINTMENT TOPICAL ONCE
OUTPATIENT
Start: 2024-05-15 | End: 2024-05-15

## 2024-05-15 RX ORDER — TRIAMCINOLONE ACETONIDE 1 MG/G
OINTMENT TOPICAL ONCE
OUTPATIENT
Start: 2024-05-15 | End: 2024-05-15

## 2024-05-15 RX ORDER — LIDOCAINE HYDROCHLORIDE 40 MG/ML
SOLUTION TOPICAL ONCE
Status: COMPLETED | OUTPATIENT
Start: 2024-05-15 | End: 2024-05-15

## 2024-05-15 RX ORDER — LIDOCAINE HYDROCHLORIDE 20 MG/ML
JELLY TOPICAL ONCE
OUTPATIENT
Start: 2024-05-15 | End: 2024-05-15

## 2024-05-15 RX ORDER — LIDOCAINE 50 MG/G
OINTMENT TOPICAL ONCE
OUTPATIENT
Start: 2024-05-15 | End: 2024-05-15

## 2024-05-15 RX ORDER — GINSENG 100 MG
CAPSULE ORAL ONCE
OUTPATIENT
Start: 2024-05-15 | End: 2024-05-15

## 2024-05-15 RX ORDER — LIDOCAINE HYDROCHLORIDE 40 MG/ML
SOLUTION TOPICAL ONCE
OUTPATIENT
Start: 2024-05-15 | End: 2024-05-15

## 2024-05-15 RX ORDER — LIDOCAINE 40 MG/G
CREAM TOPICAL ONCE
OUTPATIENT
Start: 2024-05-15 | End: 2024-05-15

## 2024-05-15 RX ORDER — IBUPROFEN 200 MG
TABLET ORAL ONCE
OUTPATIENT
Start: 2024-05-15 | End: 2024-05-15

## 2024-05-15 RX ORDER — GENTAMICIN SULFATE 1 MG/G
OINTMENT TOPICAL ONCE
OUTPATIENT
Start: 2024-05-15 | End: 2024-05-15

## 2024-05-15 RX ORDER — CLOBETASOL PROPIONATE 0.5 MG/G
OINTMENT TOPICAL ONCE
OUTPATIENT
Start: 2024-05-15 | End: 2024-05-15

## 2024-05-15 RX ORDER — BETAMETHASONE DIPROPIONATE 0.05 %
OINTMENT (GRAM) TOPICAL ONCE
OUTPATIENT
Start: 2024-05-15 | End: 2024-05-15

## 2024-05-15 RX ADMIN — LIDOCAINE HYDROCHLORIDE 10 ML: 40 SOLUTION TOPICAL at 08:05

## 2024-05-15 ASSESSMENT — ENCOUNTER SYMPTOMS
ABDOMINAL PAIN: 1
PHOTOPHOBIA: 0
BLOOD IN STOOL: 0
SHORTNESS OF BREATH: 0
VOMITING: 1
VOMITING: 1
NAUSEA: 1
WHEEZING: 0
SHORTNESS OF BREATH: 0
CHEST TIGHTNESS: 0
DIARRHEA: 0
CONSTIPATION: 1
SORE THROAT: 0
NAUSEA: 1
COUGH: 0
COUGH: 0
BACK PAIN: 0
ABDOMINAL DISTENTION: 0
DIARRHEA: 0
ABDOMINAL PAIN: 1

## 2024-05-15 ASSESSMENT — PAIN DESCRIPTION - ONSET: ONSET: ON-GOING

## 2024-05-15 ASSESSMENT — PAIN DESCRIPTION - LOCATION: LOCATION: ANKLE

## 2024-05-15 ASSESSMENT — PAIN DESCRIPTION - FREQUENCY: FREQUENCY: CONTINUOUS

## 2024-05-15 ASSESSMENT — PAIN SCALES - GENERAL: PAINLEVEL_OUTOF10: 6

## 2024-05-15 ASSESSMENT — PAIN DESCRIPTION - DESCRIPTORS: DESCRIPTORS: SHARP

## 2024-05-15 ASSESSMENT — PAIN DESCRIPTION - PAIN TYPE: TYPE: CHRONIC PAIN

## 2024-05-15 ASSESSMENT — PAIN DESCRIPTION - ORIENTATION: ORIENTATION: RIGHT

## 2024-05-15 NOTE — PROGRESS NOTES
Wound Healing Center Followup Visit Note    Referring Physician : Rosenda Cormier MD  Amanda Ledesma  MEDICAL RECORD NUMBER:  [id number]  AGE: 68 y.o.   GENDER: female  : 1955  EPISODE DATE:  5/15/2024    Subjective:     Chief Complaint   Patient presents with    Wound Check     LEFT LEG       HISTORY of PRESENT ILLNESS HPI   Amanda Ledesma is a 68 y.o. female who presents today in regards to follow up evaluation and treatment of wound/ulcer.  That patient's past medical, family and social hx were reviewed and changes were made if present.    History of Wound Context:  The patient has had a wound of her left ankle/calf which was first noted approximately 2021.  This has been treated local wound care. On their initial visit to the wound healing center, 22,  the patient has noted that the wound has been improving.  The patient has not had similar previous wounds in the past.      She started seeing Dr. Street in 2021 and than Dr. Gillis.  She was started in unna boot ~ 2021.  She has noticed some improvement since starting unna boot.  She is currently following with Dr. Haskins.      Pt is not on abx at time of initial visit, but has been treated with previously by podiatry.      She is not a DM.  She is not a smoker.  She denies hx of DVT, and per her report had recent us noting no evidence of dvt at [facility].  She also had arterial studies done.    I had previously seen her in the past in regards to left buttock thigh wound, which started as abscess.      Pt works at [employer] in the [employer] and is on her feet all day.    22  Reflux study - if significant findings will schedule for fu  Continue compression therapy [facility] per podiatry with aquacell dressing  Elevation  She does not have significant arterial occlusive disease  22  Patient has asked to continuing following with me going forward because of our previous relationship  She is going to let Dr. Schuler office know  She

## 2024-05-15 NOTE — PROGRESS NOTES
reactive to light.   Cardiovascular:      Rate and Rhythm: Normal rate and regular rhythm.   Pulmonary:      Effort: Pulmonary effort is normal.      Breath sounds: Normal breath sounds.   Abdominal:      General: Bowel sounds are normal. There is no distension.      Palpations: Abdomen is soft.      Tenderness: There is abdominal tenderness. There is no guarding or rebound.      Comments: Epigastric tenderness on exam    Musculoskeletal:      Cervical back: Normal range of motion and neck supple. No rigidity. No muscular tenderness.      Right lower leg: No edema.      Left lower leg: No edema.   Skin:     General: Skin is warm and dry.      Capillary Refill: Capillary refill takes less than 2 seconds.      Coloration: Skin is not pale.      Findings: No erythema or rash.   Neurological:      Mental Status: She is alert and oriented to person, place, and time.   Psychiatric:         Mood and Affect: Mood normal.            Procedures      MDM  Number of Diagnoses or Management Options  Acute gastritis without hemorrhage, unspecified gastritis type  Nausea and vomiting, unspecified vomiting type  Diagnosis management comments: Amanda Ledesma is a 68 year old female who presented to ED with concern for epigastric abdominal pain with nausea and vomiting, patient did have reproducible epigastric pain on exam, abdomen soft and not distended  Vital signs reviewed, patient was afebrile, normal bp, not tachycardic  Lab work was reviewed and interpreted, patient normal sodium, potassium, low risk troponin  Patient given IV and PO medication with improvement of symptoms  Patient had recent CT scan ordered, reviewed results, today KUB ordered since patient has not had worsening of symptoms KUB did show constipation, patient given medication for supportive care she already is taking miralax at home  She was given GI f/u and is stable     History provided by patient   Co morbidities include PVD, htn, gerd, migraine, HA,

## 2024-05-16 ENCOUNTER — HOSPITAL ENCOUNTER (OUTPATIENT)
Age: 69
Discharge: HOME | End: 2024-05-16
Payer: MEDICARE

## 2024-05-16 DIAGNOSIS — D50.9 MICROCYTIC ANEMIA: ICD-10-CM

## 2024-05-16 LAB
FERRITIN SERPL-MCNC: 9 NG/ML
FOLATE SERPL-MCNC: 7 NG/ML (ref 4.8–24.2)
IRON SATN MFR SERPL: 7 % (ref 15–50)
IRON SERPL-MCNC: 25 UG/DL (ref 37–145)
TIBC SERPL-MCNC: 370 UG/DL (ref 250–450)
VIT B12 SERPL-MCNC: 616 PG/ML (ref 211–946)

## 2024-05-16 PROCEDURE — 82607 VITAMIN B-12: CPT

## 2024-05-16 PROCEDURE — 82728 ASSAY OF FERRITIN: CPT

## 2024-05-16 PROCEDURE — 36415 COLL VENOUS BLD VENIPUNCTURE: CPT

## 2024-05-16 PROCEDURE — 82746 ASSAY OF FOLIC ACID SERUM: CPT

## 2024-05-16 PROCEDURE — 83540 ASSAY OF IRON: CPT

## 2024-05-16 PROCEDURE — 83550 IRON BINDING TEST: CPT

## 2024-05-16 NOTE — RESULT ENCOUNTER NOTE
Patient has iron deficiency anemia; these findings go along with the findings from 5/15/24 office visit.    1) Please notify patient; she needs iron therapy.  I have concerns at this time, however, that she will be able to tolerate oral therapy (symptoms of abdominal pain, N/V).    2) I will prepare a letter to GI referral (DR. NICHOLAS Maki), inform him of all findings, and get advice for next steps

## 2024-05-17 NOTE — DISCHARGE INSTRUCTIONS

## 2024-05-22 ENCOUNTER — HOSPITAL ENCOUNTER (OUTPATIENT)
Dept: WOUND CARE | Age: 69
Discharge: HOME | End: 2024-05-22
Attending: SURGERY
Payer: MEDICARE

## 2024-05-22 VITALS
BODY MASS INDEX: 23.86 KG/M2 | HEART RATE: 61 BPM | DIASTOLIC BLOOD PRESSURE: 49 MMHG | HEIGHT: 67 IN | SYSTOLIC BLOOD PRESSURE: 112 MMHG | TEMPERATURE: 97 F | WEIGHT: 152 LBS

## 2024-05-22 DIAGNOSIS — I83.023 VARICOSE VEINS OF LEFT LOWER EXTREMITY WITH ULCER OF ANKLE WITH FAT LAYER EXPOSED (HCC): Primary | ICD-10-CM

## 2024-05-22 DIAGNOSIS — I70.242 ATHEROSCLEROSIS OF NATIVE ARTERY OF LEFT LOWER EXTREMITY WITH ULCERATION OF CALF (HCC): ICD-10-CM

## 2024-05-22 DIAGNOSIS — L97.322 VARICOSE VEINS OF LEFT LOWER EXTREMITY WITH ULCER OF ANKLE WITH FAT LAYER EXPOSED (HCC): Primary | ICD-10-CM

## 2024-05-22 PROCEDURE — 11042 DBRDMT SUBQ TIS 1ST 20SQCM/<: CPT | Performed by: SURGERY

## 2024-05-22 PROCEDURE — 11042 DBRDMT SUBQ TIS 1ST 20SQCM/<: CPT

## 2024-05-22 RX ORDER — LIDOCAINE HYDROCHLORIDE 40 MG/ML
SOLUTION TOPICAL ONCE
OUTPATIENT
Start: 2024-05-22 | End: 2024-05-22

## 2024-05-22 RX ORDER — LIDOCAINE HYDROCHLORIDE 20 MG/ML
JELLY TOPICAL ONCE
OUTPATIENT
Start: 2024-05-22 | End: 2024-05-22

## 2024-05-22 RX ORDER — BACITRACIN ZINC AND POLYMYXIN B SULFATE 500; 1000 [USP'U]/G; [USP'U]/G
OINTMENT TOPICAL ONCE
OUTPATIENT
Start: 2024-05-22 | End: 2024-05-22

## 2024-05-22 RX ORDER — BETAMETHASONE DIPROPIONATE 0.05 %
OINTMENT (GRAM) TOPICAL ONCE
OUTPATIENT
Start: 2024-05-22 | End: 2024-05-22

## 2024-05-22 RX ORDER — CLOBETASOL PROPIONATE 0.5 MG/G
OINTMENT TOPICAL ONCE
OUTPATIENT
Start: 2024-05-22 | End: 2024-05-22

## 2024-05-22 RX ORDER — OXYCODONE HYDROCHLORIDE AND ACETAMINOPHEN 5; 325 MG/1; MG/1
1 TABLET ORAL EVERY 6 HOURS PRN
Qty: 42 TABLET | Refills: 0 | Status: SHIPPED | OUTPATIENT
Start: 2024-05-22 | End: 2024-06-05

## 2024-05-22 RX ORDER — LIDOCAINE 40 MG/G
CREAM TOPICAL ONCE
OUTPATIENT
Start: 2024-05-22 | End: 2024-05-22

## 2024-05-22 RX ORDER — LIDOCAINE 50 MG/G
OINTMENT TOPICAL ONCE
OUTPATIENT
Start: 2024-05-22 | End: 2024-05-22

## 2024-05-22 RX ORDER — GINSENG 100 MG
CAPSULE ORAL ONCE
OUTPATIENT
Start: 2024-05-22 | End: 2024-05-22

## 2024-05-22 RX ORDER — IBUPROFEN 200 MG
TABLET ORAL ONCE
OUTPATIENT
Start: 2024-05-22 | End: 2024-05-22

## 2024-05-22 RX ORDER — GENTAMICIN SULFATE 1 MG/G
OINTMENT TOPICAL ONCE
OUTPATIENT
Start: 2024-05-22 | End: 2024-05-22

## 2024-05-22 RX ORDER — TRIAMCINOLONE ACETONIDE 1 MG/G
OINTMENT TOPICAL ONCE
OUTPATIENT
Start: 2024-05-22 | End: 2024-05-22

## 2024-05-22 RX ORDER — LIDOCAINE HYDROCHLORIDE 40 MG/ML
SOLUTION TOPICAL ONCE
Status: COMPLETED | OUTPATIENT
Start: 2024-05-22 | End: 2024-05-22

## 2024-05-22 RX ADMIN — LIDOCAINE HYDROCHLORIDE: 40 SOLUTION TOPICAL at 07:42

## 2024-05-22 ASSESSMENT — PAIN DESCRIPTION - ORIENTATION: ORIENTATION: RIGHT

## 2024-05-22 ASSESSMENT — PAIN SCALES - GENERAL: PAINLEVEL_OUTOF10: 8

## 2024-05-22 ASSESSMENT — PAIN DESCRIPTION - DESCRIPTORS: DESCRIPTORS: SHARP

## 2024-05-22 ASSESSMENT — PAIN DESCRIPTION - LOCATION: LOCATION: ANKLE

## 2024-05-22 NOTE — PROGRESS NOTES
Wound Healing Center Followup Visit Note    Referring Physician : Rosenda Cormier MD  Amanda Ledesma  MEDICAL RECORD NUMBER:  [id number]  AGE: 68 y.o.   GENDER: female  : 1955  EPISODE DATE:  2024    Subjective:     Chief Complaint   Patient presents with    Wound Check     L leg      HISTORY of PRESENT ILLNESS HPI   Amanda Ledesma is a 68 y.o. female who presents today in regards to follow up evaluation and treatment of wound/ulcer.  That patient's past medical, family and social hx were reviewed and changes were made if present.    History of Wound Context:  The patient has had a wound of her left ankle/calf which was first noted approximately 2021.  This has been treated local wound care. On their initial visit to the wound healing center, 22,  the patient has noted that the wound has been improving.  The patient has not had similar previous wounds in the past.      She started seeing Dr. Street in 2021 and than Dr. Gillis.  She was started in unna boot ~ 2021.  She has noticed some improvement since starting unna boot.  She is currently following with Dr. Haskins.      Pt is not on abx at time of initial visit, but has been treated with previously by podiatry.      She is not a DM.  She is not a smoker.  She denies hx of DVT, and per her report had recent us noting no evidence of dvt at [facility].  She also had arterial studies done.    I had previously seen her in the past in regards to left buttock thigh wound, which started as abscess.      Pt works at [employer] in the [employer] and is on her feet all day.    22  Reflux study - if significant findings will schedule for fu  Continue compression therapy [facility] per podiatry with aquacell dressing  Elevation  She does not have significant arterial occlusive disease  22  Patient has asked to continuing following with me going forward because of our previous relationship  She is going to let Dr. Schuler office know  She

## 2024-05-23 NOTE — DISCHARGE INSTRUCTIONS

## 2024-05-29 ENCOUNTER — HOSPITAL ENCOUNTER (OUTPATIENT)
Dept: WOUND CARE | Age: 69
Discharge: HOME | End: 2024-05-29
Attending: SURGERY
Payer: MEDICARE

## 2024-05-29 VITALS
RESPIRATION RATE: 18 BRPM | SYSTOLIC BLOOD PRESSURE: 115 MMHG | TEMPERATURE: 98.8 F | HEART RATE: 78 BPM | DIASTOLIC BLOOD PRESSURE: 54 MMHG | HEIGHT: 67 IN | WEIGHT: 152 LBS | BODY MASS INDEX: 23.86 KG/M2

## 2024-05-29 DIAGNOSIS — L97.322 VARICOSE VEINS OF LEFT LOWER EXTREMITY WITH ULCER OF ANKLE WITH FAT LAYER EXPOSED (HCC): Primary | ICD-10-CM

## 2024-05-29 DIAGNOSIS — I83.023 VARICOSE VEINS OF LEFT LOWER EXTREMITY WITH ULCER OF ANKLE WITH FAT LAYER EXPOSED (HCC): Primary | ICD-10-CM

## 2024-05-29 DIAGNOSIS — I70.242 ATHEROSCLEROSIS OF NATIVE ARTERY OF LEFT LOWER EXTREMITY WITH ULCERATION OF CALF (HCC): ICD-10-CM

## 2024-05-29 PROCEDURE — 11042 DBRDMT SUBQ TIS 1ST 20SQCM/<: CPT

## 2024-05-29 PROCEDURE — 11042 DBRDMT SUBQ TIS 1ST 20SQCM/<: CPT | Performed by: SURGERY

## 2024-05-29 RX ORDER — LIDOCAINE HYDROCHLORIDE 20 MG/ML
JELLY TOPICAL ONCE
OUTPATIENT
Start: 2024-05-29 | End: 2024-05-29

## 2024-05-29 RX ORDER — LIDOCAINE 50 MG/G
OINTMENT TOPICAL ONCE
OUTPATIENT
Start: 2024-05-29 | End: 2024-05-29

## 2024-05-29 RX ORDER — GENTAMICIN SULFATE 1 MG/G
OINTMENT TOPICAL ONCE
OUTPATIENT
Start: 2024-05-29 | End: 2024-05-29

## 2024-05-29 RX ORDER — LIDOCAINE HYDROCHLORIDE 40 MG/ML
SOLUTION TOPICAL ONCE
OUTPATIENT
Start: 2024-05-29 | End: 2024-05-29

## 2024-05-29 RX ORDER — IBUPROFEN 200 MG
TABLET ORAL ONCE
OUTPATIENT
Start: 2024-05-29 | End: 2024-05-29

## 2024-05-29 RX ORDER — BACITRACIN ZINC AND POLYMYXIN B SULFATE 500; 1000 [USP'U]/G; [USP'U]/G
OINTMENT TOPICAL ONCE
OUTPATIENT
Start: 2024-05-29 | End: 2024-05-29

## 2024-05-29 RX ORDER — LIDOCAINE HYDROCHLORIDE 40 MG/ML
SOLUTION TOPICAL ONCE
Status: COMPLETED | OUTPATIENT
Start: 2024-05-29 | End: 2024-05-29

## 2024-05-29 RX ORDER — LIDOCAINE 40 MG/G
CREAM TOPICAL ONCE
OUTPATIENT
Start: 2024-05-29 | End: 2024-05-29

## 2024-05-29 RX ORDER — GINSENG 100 MG
CAPSULE ORAL ONCE
OUTPATIENT
Start: 2024-05-29 | End: 2024-05-29

## 2024-05-29 RX ORDER — TRIAMCINOLONE ACETONIDE 1 MG/G
OINTMENT TOPICAL ONCE
OUTPATIENT
Start: 2024-05-29 | End: 2024-05-29

## 2024-05-29 RX ORDER — BETAMETHASONE DIPROPIONATE 0.05 %
OINTMENT (GRAM) TOPICAL ONCE
OUTPATIENT
Start: 2024-05-29 | End: 2024-05-29

## 2024-05-29 RX ORDER — CLOBETASOL PROPIONATE 0.5 MG/G
OINTMENT TOPICAL ONCE
OUTPATIENT
Start: 2024-05-29 | End: 2024-05-29

## 2024-05-29 RX ADMIN — LIDOCAINE HYDROCHLORIDE 10 ML: 40 SOLUTION TOPICAL at 07:46

## 2024-05-29 ASSESSMENT — PAIN - FUNCTIONAL ASSESSMENT: PAIN_FUNCTIONAL_ASSESSMENT: PREVENTS OR INTERFERES SOME ACTIVE ACTIVITIES AND ADLS

## 2024-05-29 ASSESSMENT — PAIN DESCRIPTION - PAIN TYPE: TYPE: CHRONIC PAIN

## 2024-05-29 ASSESSMENT — PAIN DESCRIPTION - DESCRIPTORS: DESCRIPTORS: BURNING;SHARP

## 2024-05-29 ASSESSMENT — PAIN DESCRIPTION - FREQUENCY: FREQUENCY: CONTINUOUS

## 2024-05-29 ASSESSMENT — PAIN DESCRIPTION - LOCATION: LOCATION: ANKLE

## 2024-05-29 ASSESSMENT — PAIN DESCRIPTION - ORIENTATION: ORIENTATION: LEFT

## 2024-05-29 ASSESSMENT — PAIN DESCRIPTION - ONSET: ONSET: ON-GOING

## 2024-05-29 NOTE — PROGRESS NOTES
Wound Healing Center Followup Visit Note    Referring Physician : Rosenda Cormier MD  Amanda Ledesma  MEDICAL RECORD NUMBER:  [id number]  AGE: 68 y.o.   GENDER: female  : 1955  EPISODE DATE:  2024    Subjective:     Chief Complaint   Patient presents with    Wound Check     Left leg      HISTORY of PRESENT ILLNESS HPI   Amanda Ledesma is a 68 y.o. female who presents today in regards to follow up evaluation and treatment of wound/ulcer.  That patient's past medical, family and social hx were reviewed and changes were made if present.    History of Wound Context:  The patient has had a wound of her left ankle/calf which was first noted approximately 2021.  This has been treated local wound care. On their initial visit to the wound healing center, 22,  the patient has noted that the wound has been improving.  The patient has not had similar previous wounds in the past.      She started seeing Dr. Street in 2021 and than Dr. Gillis.  She was started in unna boot ~ 2021.  She has noticed some improvement since starting unna boot.  She is currently following with Dr. Haskins.      Pt is not on abx at time of initial visit, but has been treated with previously by podiatry.      She is not a DM.  She is not a smoker.  She denies hx of DVT, and per her report had recent us noting no evidence of dvt at [facility].  She also had arterial studies done.    I had previously seen her in the past in regards to left buttock thigh wound, which started as abscess.      Pt works at [employer] in the [employer] and is on her feet all day.    22  Reflux study - if significant findings will schedule for fu  Continue compression therapy [facility] per podiatry with aquacell dressing  Elevation  She does not have significant arterial occlusive disease  22  Patient has asked to continuing following with me going forward because of our previous relationship  She is going to let Dr. Schuler office know  She

## 2024-06-03 NOTE — DISCHARGE INSTRUCTIONS
Visit Discharge/Physician Orders     Discharge condition: Stable     Assessment of pain at discharge: yes     Anesthetic used: 4% lidocaine solution     Discharge to: Home     Left via:Private automobile     Accompanied by:self     ECF/HHA: n/a     Dressing Orders:LEFT MEDIAL LEG: Cleanse with normal saline, apply zinc to fiona wound,  drawtex, dressing, ABD pad , and profore . Change weekly.        Treatment Orders: Eat a diet high in protein and vitamin C. Take a multiple vitamin daily unless contraindicated.       To see Dr. Duncan as scheduled      Must wear slip on shoe to work due to wrap on leg!        [facility] followup visit : 1 week __________________________________  (Please note your next appointment above and if you are unable to keep, kindly give a 24 hour notice. Thank you.)     Physician signature:__________________________     If you experience any of the following, please call the Wound Care Center during business hours:     * Increase in Pain  * Temperature over 101  * Increase in drainage from your wound  * Drainage with a foul odor  * Bleeding  * Increase in swelling  * Need for compression bandage changes due to slippage, breakthrough drainage.     If you need medical attention outside of the business hours of the Wound Care Centers please contact your PCP or go to the nearest emergency room.

## 2024-06-05 ENCOUNTER — TELEPHONE (OUTPATIENT)
Dept: FAMILY MEDICINE CLINIC | Age: 69
End: 2024-06-05

## 2024-06-05 ENCOUNTER — HOSPITAL ENCOUNTER (OUTPATIENT)
Dept: WOUND CARE | Age: 69
Discharge: HOME | End: 2024-06-05
Attending: SURGERY
Payer: MEDICARE

## 2024-06-05 VITALS
HEART RATE: 78 BPM | WEIGHT: 152 LBS | TEMPERATURE: 99.2 F | BODY MASS INDEX: 23.86 KG/M2 | HEIGHT: 67 IN | DIASTOLIC BLOOD PRESSURE: 46 MMHG | SYSTOLIC BLOOD PRESSURE: 116 MMHG | RESPIRATION RATE: 18 BRPM

## 2024-06-05 DIAGNOSIS — L97.322 VARICOSE VEINS OF LEFT LOWER EXTREMITY WITH ULCER OF ANKLE WITH FAT LAYER EXPOSED (HCC): Primary | ICD-10-CM

## 2024-06-05 DIAGNOSIS — I83.023 VARICOSE VEINS OF LEFT LOWER EXTREMITY WITH ULCER OF ANKLE WITH FAT LAYER EXPOSED (HCC): Primary | ICD-10-CM

## 2024-06-05 DIAGNOSIS — I70.242 ATHEROSCLEROSIS OF NATIVE ARTERY OF LEFT LOWER EXTREMITY WITH ULCERATION OF CALF (HCC): ICD-10-CM

## 2024-06-05 PROCEDURE — 11042 DBRDMT SUBQ TIS 1ST 20SQCM/<: CPT

## 2024-06-05 PROCEDURE — 11042 DBRDMT SUBQ TIS 1ST 20SQCM/<: CPT | Performed by: SURGERY

## 2024-06-05 RX ORDER — LIDOCAINE HYDROCHLORIDE 40 MG/ML
SOLUTION TOPICAL ONCE
OUTPATIENT
Start: 2024-06-05 | End: 2024-06-05

## 2024-06-05 RX ORDER — LIDOCAINE 50 MG/G
OINTMENT TOPICAL ONCE
OUTPATIENT
Start: 2024-06-05 | End: 2024-06-05

## 2024-06-05 RX ORDER — BACITRACIN ZINC AND POLYMYXIN B SULFATE 500; 1000 [USP'U]/G; [USP'U]/G
OINTMENT TOPICAL ONCE
OUTPATIENT
Start: 2024-06-05 | End: 2024-06-05

## 2024-06-05 RX ORDER — IBUPROFEN 200 MG
TABLET ORAL ONCE
OUTPATIENT
Start: 2024-06-05 | End: 2024-06-05

## 2024-06-05 RX ORDER — BETAMETHASONE DIPROPIONATE 0.05 %
OINTMENT (GRAM) TOPICAL ONCE
OUTPATIENT
Start: 2024-06-05 | End: 2024-06-05

## 2024-06-05 RX ORDER — LIDOCAINE 40 MG/G
CREAM TOPICAL ONCE
OUTPATIENT
Start: 2024-06-05 | End: 2024-06-05

## 2024-06-05 RX ORDER — LIDOCAINE HYDROCHLORIDE 20 MG/ML
JELLY TOPICAL ONCE
OUTPATIENT
Start: 2024-06-05 | End: 2024-06-05

## 2024-06-05 RX ORDER — GENTAMICIN SULFATE 1 MG/G
OINTMENT TOPICAL ONCE
OUTPATIENT
Start: 2024-06-05 | End: 2024-06-05

## 2024-06-05 RX ORDER — GINSENG 100 MG
CAPSULE ORAL ONCE
OUTPATIENT
Start: 2024-06-05 | End: 2024-06-05

## 2024-06-05 RX ORDER — CLOBETASOL PROPIONATE 0.5 MG/G
OINTMENT TOPICAL ONCE
OUTPATIENT
Start: 2024-06-05 | End: 2024-06-05

## 2024-06-05 RX ORDER — LIDOCAINE HYDROCHLORIDE 40 MG/ML
SOLUTION TOPICAL ONCE
Status: COMPLETED | OUTPATIENT
Start: 2024-06-05 | End: 2024-06-05

## 2024-06-05 RX ORDER — TRIAMCINOLONE ACETONIDE 1 MG/G
OINTMENT TOPICAL ONCE
OUTPATIENT
Start: 2024-06-05 | End: 2024-06-05

## 2024-06-05 RX ADMIN — LIDOCAINE HYDROCHLORIDE 5 ML: 40 SOLUTION TOPICAL at 07:42

## 2024-06-05 ASSESSMENT — PAIN DESCRIPTION - LOCATION: LOCATION: ANKLE

## 2024-06-05 ASSESSMENT — PAIN DESCRIPTION - DESCRIPTORS: DESCRIPTORS: SHARP

## 2024-06-05 ASSESSMENT — PAIN - FUNCTIONAL ASSESSMENT: PAIN_FUNCTIONAL_ASSESSMENT: PREVENTS OR INTERFERES SOME ACTIVE ACTIVITIES AND ADLS

## 2024-06-05 ASSESSMENT — PAIN DESCRIPTION - ONSET: ONSET: ON-GOING

## 2024-06-05 ASSESSMENT — PAIN DESCRIPTION - ORIENTATION: ORIENTATION: LEFT

## 2024-06-05 ASSESSMENT — PAIN DESCRIPTION - PAIN TYPE: TYPE: CHRONIC PAIN

## 2024-06-05 ASSESSMENT — PAIN DESCRIPTION - FREQUENCY: FREQUENCY: CONTINUOUS

## 2024-06-05 ASSESSMENT — PAIN SCALES - GENERAL: PAINLEVEL_OUTOF10: 7

## 2024-06-05 NOTE — PROGRESS NOTES
Length (cm) 4.4 cm 06/05/24 0745   Wound Width (cm) 2.4 cm 06/05/24 0745   Wound Depth (cm) 0.1 cm 06/05/24 0745   Wound Surface Area (cm^2) 10.56 cm^2 06/05/24 0745   Change in Wound Size % (l*w) 60.98 06/05/24 0745   Wound Volume (cm^3) 1.056 cm^3 06/05/24 0745   Wound Healing % 61 06/05/24 0745   Post-Procedure Length (cm) 4.5 cm 06/05/24 0759   Post-Procedure Width (cm) 2.5 cm 06/05/24 0759   Post-Procedure Depth (cm) 0.1 cm 06/05/24 0759   Post-Procedure Surface Area (cm^2) 11.25 cm^2 06/05/24 0759   Post-Procedure Volume (cm^3) 1.125 cm^3 06/05/24 0759   Wound Assessment Granulation tissue;Fibrin 06/05/24 0745   Drainage Amount Moderate (25-50%) 06/05/24 0745   Drainage Description Serosanguinous;Serous;Green 06/05/24 0745   Odor None 06/05/24 0745   Melany-wound Assessment Fragile;Dry/flaky 06/05/24 0745   Margins Attached edges 05/15/24 0800   Wound Thickness Description not for Pressure Injury Full thickness 05/15/24 0800   Number of days: 854      Procedure Note  Indications:  Based on my examination of this patient's wound(s)/ulcer(s) today, debridement is required to promote healing and evaluate the wound base.    Performed by: Ashley Staples MD    Consent obtained:  Yes    Time out taken:  Yes    Pain Control: Anesthetic  Anesthetic: 4% Lidocaine Liquid Topical     Debridement:Excisional Debridement    Using curette the wound(s)/ulcer(s) was/were sharply debrided down through and including the removal of epidermis, dermis, and subcutaneous tissue.        Devitalized Tissue Debrided:  fibrin, biofilm, slough, and exudate to stimulate bleeding to promote healing, post debridement good bleeding base and wound edges noted    Wound/Ulcer #:1,     Percent of Wound/Ulcer Debrided: 100 %    Total Surface Area Debrided:  10.56 sq cm     Estimated Blood Loss:  Minimal  Hemostasis Achieved:  by pressure    Procedural Pain:  10  / 10   Post Procedural Pain:  10 / 10     Response to treatment:  With

## 2024-06-05 NOTE — TELEPHONE ENCOUNTER
Patient wanted to let you know that she had the scope done, 6/4/2024 and that has 2 bleeding ulcers.  Also is now having iron infusions done due to being anemic.

## 2024-06-06 NOTE — DISCHARGE INSTRUCTIONS
Written       Visit Discharge/Physician Orders     Discharge condition: Stable     Assessment of pain at discharge: yes     Anesthetic used: 4% lidocaine solution     Discharge to: Home     Left via:Private automobile     Accompanied by:self     ECF/HHA: n/a     Dressing Orders: LEFT MEDIAL LEG: Cleanse with normal saline, apply zinc to fiona wound,  drawtex, dressing, ABD pad , and profore . Change weekly.        Treatment Orders: Eat a diet high in protein and vitamin C. Take a multiple vitamin daily unless contraindicated.       To see Dr. Duncan as scheduled      Must wear slip on shoe to work due to wrap on leg!        [facility] followup visit : 1 week __________________________________  (Please note your next appointment above and if you are unable to keep, kindly give a 24 hour notice. Thank you.)     Physician signature:__________________________     If you experience any of the following, please call the Wound Care Center during business hours:     * Increase in Pain  * Temperature over 101  * Increase in drainage from your wound  * Drainage with a foul odor  * Bleeding  * Increase in swelling  * Need for compression bandage changes due to slippage, breakthrough drainage.     If you need medical attention outside of the business hours of the Wound Care Centers please contact your PCP or go to the nearest emergency room.

## 2024-06-12 ENCOUNTER — HOSPITAL ENCOUNTER (OUTPATIENT)
Dept: WOUND CARE | Age: 69
Discharge: HOME | End: 2024-06-12
Attending: SURGERY
Payer: MEDICARE

## 2024-06-12 VITALS
SYSTOLIC BLOOD PRESSURE: 148 MMHG | TEMPERATURE: 96.7 F | HEIGHT: 68 IN | BODY MASS INDEX: 23.04 KG/M2 | WEIGHT: 152 LBS | HEART RATE: 75 BPM | RESPIRATION RATE: 18 BRPM | DIASTOLIC BLOOD PRESSURE: 70 MMHG

## 2024-06-12 DIAGNOSIS — L97.322 VARICOSE VEINS OF LEFT LOWER EXTREMITY WITH ULCER OF ANKLE WITH FAT LAYER EXPOSED (HCC): Primary | ICD-10-CM

## 2024-06-12 DIAGNOSIS — I70.242 ATHEROSCLEROSIS OF NATIVE ARTERY OF LEFT LOWER EXTREMITY WITH ULCERATION OF CALF (HCC): ICD-10-CM

## 2024-06-12 DIAGNOSIS — I83.023 VARICOSE VEINS OF LEFT LOWER EXTREMITY WITH ULCER OF ANKLE WITH FAT LAYER EXPOSED (HCC): Primary | ICD-10-CM

## 2024-06-12 PROCEDURE — 11042 DBRDMT SUBQ TIS 1ST 20SQCM/<: CPT

## 2024-06-12 PROCEDURE — 11042 DBRDMT SUBQ TIS 1ST 20SQCM/<: CPT | Performed by: SURGERY

## 2024-06-12 RX ORDER — LIDOCAINE 40 MG/G
CREAM TOPICAL ONCE
OUTPATIENT
Start: 2024-06-12 | End: 2024-06-12

## 2024-06-12 RX ORDER — BETAMETHASONE DIPROPIONATE 0.05 %
OINTMENT (GRAM) TOPICAL ONCE
OUTPATIENT
Start: 2024-06-12 | End: 2024-06-12

## 2024-06-12 RX ORDER — BACITRACIN ZINC AND POLYMYXIN B SULFATE 500; 1000 [USP'U]/G; [USP'U]/G
OINTMENT TOPICAL ONCE
OUTPATIENT
Start: 2024-06-12 | End: 2024-06-12

## 2024-06-12 RX ORDER — LIDOCAINE HYDROCHLORIDE 40 MG/ML
SOLUTION TOPICAL ONCE
Status: COMPLETED | OUTPATIENT
Start: 2024-06-12 | End: 2024-06-12

## 2024-06-12 RX ORDER — GINSENG 100 MG
CAPSULE ORAL ONCE
OUTPATIENT
Start: 2024-06-12 | End: 2024-06-12

## 2024-06-12 RX ORDER — LIDOCAINE HYDROCHLORIDE 20 MG/ML
JELLY TOPICAL ONCE
OUTPATIENT
Start: 2024-06-12 | End: 2024-06-12

## 2024-06-12 RX ORDER — IBUPROFEN 200 MG
TABLET ORAL ONCE
OUTPATIENT
Start: 2024-06-12 | End: 2024-06-12

## 2024-06-12 RX ORDER — GENTAMICIN SULFATE 1 MG/G
OINTMENT TOPICAL ONCE
OUTPATIENT
Start: 2024-06-12 | End: 2024-06-12

## 2024-06-12 RX ORDER — CLOBETASOL PROPIONATE 0.5 MG/G
OINTMENT TOPICAL ONCE
OUTPATIENT
Start: 2024-06-12 | End: 2024-06-12

## 2024-06-12 RX ORDER — TRIAMCINOLONE ACETONIDE 1 MG/G
OINTMENT TOPICAL ONCE
OUTPATIENT
Start: 2024-06-12 | End: 2024-06-12

## 2024-06-12 RX ORDER — LIDOCAINE 50 MG/G
OINTMENT TOPICAL ONCE
OUTPATIENT
Start: 2024-06-12 | End: 2024-06-12

## 2024-06-12 RX ORDER — LIDOCAINE HYDROCHLORIDE 40 MG/ML
SOLUTION TOPICAL ONCE
OUTPATIENT
Start: 2024-06-12 | End: 2024-06-12

## 2024-06-12 RX ORDER — OXYCODONE HYDROCHLORIDE AND ACETAMINOPHEN 5; 325 MG/1; MG/1
1 TABLET ORAL EVERY 6 HOURS PRN
Qty: 28 TABLET | Refills: 0 | Status: SHIPPED | OUTPATIENT
Start: 2024-06-12 | End: 2024-06-26

## 2024-06-12 RX ADMIN — LIDOCAINE HYDROCHLORIDE: 40 SOLUTION TOPICAL at 07:55

## 2024-06-12 ASSESSMENT — PAIN DESCRIPTION - LOCATION: LOCATION: ANKLE

## 2024-06-12 ASSESSMENT — PAIN DESCRIPTION - DESCRIPTORS: DESCRIPTORS: SHARP

## 2024-06-12 ASSESSMENT — PAIN DESCRIPTION - ORIENTATION: ORIENTATION: LEFT

## 2024-06-12 NOTE — PROGRESS NOTES
Wound Healing Center Followup Visit Note    Referring Physician : Rosenda Cormier MD  Amanda Ledesma  MEDICAL RECORD NUMBER:  [id number]  AGE: 68 y.o.   GENDER: female  : 1955  EPISODE DATE:  2024    Subjective:     Chief Complaint   Patient presents with    Wound Check     L LEG      HISTORY of PRESENT ILLNESS HPI   Amanda Ledesma is a 68 y.o. female who presents today in regards to follow up evaluation and treatment of wound/ulcer.  That patient's past medical, family and social hx were reviewed and changes were made if present.    History of Wound Context:  The patient has had a wound of her left ankle/calf which was first noted approximately 2021.  This has been treated local wound care. On their initial visit to the wound healing center, 22,  the patient has noted that the wound has been improving.  The patient has not had similar previous wounds in the past.      She started seeing Dr. Street in 2021 and than Dr. Gillis.  She was started in unna boot ~ 2021.  She has noticed some improvement since starting unna boot.  She is currently following with Dr. Haskins.      Pt is not on abx at time of initial visit, but has been treated with previously by podiatry.      She is not a DM.  She is not a smoker.  She denies hx of DVT, and per her report had recent us noting no evidence of dvt at [facility].  She also had arterial studies done.    I had previously seen her in the past in regards to left buttock thigh wound, which started as abscess.      Pt works at [employer] in the [employer] and is on her feet all day.    22  Reflux study - if significant findings will schedule for fu  Continue compression therapy [facility] per podiatry with aquacell dressing  Elevation  She does not have significant arterial occlusive disease  22  Patient has asked to continuing following with me going forward because of our previous relationship  She is going to let Dr. Schuler office know  She

## 2024-06-17 NOTE — DISCHARGE INSTRUCTIONS

## 2024-06-19 ENCOUNTER — OFFICE VISIT (OUTPATIENT)
Dept: FAMILY MEDICINE CLINIC | Age: 69
End: 2024-06-19

## 2024-06-19 ENCOUNTER — TELEPHONE (OUTPATIENT)
Dept: FAMILY MEDICINE CLINIC | Age: 69
End: 2024-06-19

## 2024-06-19 ENCOUNTER — HOSPITAL ENCOUNTER (OUTPATIENT)
Dept: WOUND CARE | Age: 69
Discharge: HOME | End: 2024-06-19
Attending: SURGERY
Payer: MEDICAID

## 2024-06-19 VITALS
OXYGEN SATURATION: 95 % | SYSTOLIC BLOOD PRESSURE: 138 MMHG | TEMPERATURE: 97.8 F | HEIGHT: 68 IN | BODY MASS INDEX: 23.79 KG/M2 | DIASTOLIC BLOOD PRESSURE: 74 MMHG | HEART RATE: 68 BPM | WEIGHT: 157 LBS | RESPIRATION RATE: 16 BRPM

## 2024-06-19 VITALS
BODY MASS INDEX: 23.04 KG/M2 | SYSTOLIC BLOOD PRESSURE: 166 MMHG | WEIGHT: 152 LBS | DIASTOLIC BLOOD PRESSURE: 76 MMHG | TEMPERATURE: 97.7 F | HEART RATE: 73 BPM | RESPIRATION RATE: 20 BRPM | HEIGHT: 68 IN

## 2024-06-19 DIAGNOSIS — I83.023 VARICOSE VEINS OF LEFT LOWER EXTREMITY WITH ULCER OF ANKLE WITH FAT LAYER EXPOSED (HCC): Primary | ICD-10-CM

## 2024-06-19 DIAGNOSIS — K21.01 GASTROESOPHAGEAL REFLUX DISEASE WITH ESOPHAGITIS AND HEMORRHAGE: Primary | ICD-10-CM

## 2024-06-19 DIAGNOSIS — L97.322 VARICOSE VEINS OF LEFT LOWER EXTREMITY WITH ULCER OF ANKLE WITH FAT LAYER EXPOSED (HCC): Primary | ICD-10-CM

## 2024-06-19 DIAGNOSIS — K25.9 GASTRIC ULCER, UNSPECIFIED CHRONICITY, UNSPECIFIED WHETHER GASTRIC ULCER HEMORRHAGE OR PERFORATION PRESENT: ICD-10-CM

## 2024-06-19 DIAGNOSIS — I70.242 ATHEROSCLEROSIS OF NATIVE ARTERY OF LEFT LOWER EXTREMITY WITH ULCERATION OF CALF (HCC): ICD-10-CM

## 2024-06-19 PROCEDURE — 11042 DBRDMT SUBQ TIS 1ST 20SQCM/<: CPT

## 2024-06-19 PROCEDURE — 11042 DBRDMT SUBQ TIS 1ST 20SQCM/<: CPT | Performed by: SURGERY

## 2024-06-19 RX ORDER — CLOBETASOL PROPIONATE 0.5 MG/G
OINTMENT TOPICAL ONCE
OUTPATIENT
Start: 2024-06-19 | End: 2024-06-19

## 2024-06-19 RX ORDER — LIDOCAINE HYDROCHLORIDE 20 MG/ML
JELLY TOPICAL ONCE
OUTPATIENT
Start: 2024-06-19 | End: 2024-06-19

## 2024-06-19 RX ORDER — BETAMETHASONE DIPROPIONATE 0.05 %
OINTMENT (GRAM) TOPICAL ONCE
OUTPATIENT
Start: 2024-06-19 | End: 2024-06-19

## 2024-06-19 RX ORDER — LIDOCAINE HYDROCHLORIDE 40 MG/ML
SOLUTION TOPICAL ONCE
Status: COMPLETED | OUTPATIENT
Start: 2024-06-19 | End: 2024-06-19

## 2024-06-19 RX ORDER — TRIAMCINOLONE ACETONIDE 1 MG/G
OINTMENT TOPICAL ONCE
OUTPATIENT
Start: 2024-06-19 | End: 2024-06-19

## 2024-06-19 RX ORDER — GINSENG 100 MG
CAPSULE ORAL ONCE
OUTPATIENT
Start: 2024-06-19 | End: 2024-06-19

## 2024-06-19 RX ORDER — LIDOCAINE HYDROCHLORIDE 40 MG/ML
SOLUTION TOPICAL ONCE
OUTPATIENT
Start: 2024-06-19 | End: 2024-06-19

## 2024-06-19 RX ORDER — LIDOCAINE 50 MG/G
OINTMENT TOPICAL ONCE
OUTPATIENT
Start: 2024-06-19 | End: 2024-06-19

## 2024-06-19 RX ORDER — BACITRACIN ZINC AND POLYMYXIN B SULFATE 500; 1000 [USP'U]/G; [USP'U]/G
OINTMENT TOPICAL ONCE
OUTPATIENT
Start: 2024-06-19 | End: 2024-06-19

## 2024-06-19 RX ORDER — IBUPROFEN 200 MG
TABLET ORAL ONCE
OUTPATIENT
Start: 2024-06-19 | End: 2024-06-19

## 2024-06-19 RX ORDER — GENTAMICIN SULFATE 1 MG/G
OINTMENT TOPICAL ONCE
OUTPATIENT
Start: 2024-06-19 | End: 2024-06-19

## 2024-06-19 RX ORDER — LIDOCAINE 40 MG/G
CREAM TOPICAL ONCE
OUTPATIENT
Start: 2024-06-19 | End: 2024-06-19

## 2024-06-19 RX ADMIN — LIDOCAINE HYDROCHLORIDE 5 ML: 40 SOLUTION TOPICAL at 07:58

## 2024-06-19 ASSESSMENT — ENCOUNTER SYMPTOMS
VOMITING: 1
SHORTNESS OF BREATH: 0
NAUSEA: 1
WHEEZING: 0
SORE THROAT: 0
BLOOD IN STOOL: 0
BACK PAIN: 0
COUGH: 0
CONSTIPATION: 1
ABDOMINAL PAIN: 1
DIARRHEA: 0

## 2024-06-19 ASSESSMENT — PAIN DESCRIPTION - LOCATION: LOCATION: ANKLE

## 2024-06-19 ASSESSMENT — PAIN DESCRIPTION - ORIENTATION: ORIENTATION: LEFT

## 2024-06-19 ASSESSMENT — PAIN SCALES - GENERAL: PAINLEVEL_OUTOF10: 7

## 2024-06-19 NOTE — PROGRESS NOTES
hemorrhage or perforation present: Symptoms are improving/resolving.         -     Continue pantoprazole 40 mg/day        -     For now, continue sucralfate 1 gm QID        -     Patient encouraged to keep appointment for EGD and follow through with colonoscopy as indeicated        -     Work oriented paperwork completed with the patient in attendance       Follow Up:  Return for Keep scheduled appointment(s). or sooner if necessary.      Call or go to ED immediately if symptoms worsen or persist.    Educational materials and/or home exercises printed for patient's review and were included in patient instructions on his/her AfterVisit Summary and given to patient at the end of visit.      Counseled regarding above diagnosis,including possible risks and complications,  especially if left uncontrolled.    Counseled regarding the possible side effects, risks, benefits and alternatives to treatment; patient and/or guardian verbalizes understanding, agrees, feels comfortable with and wishes to proceed with above treatment plan.    Advised patient tocall with any new medication issues, and read all Rx info from pharmacy to assureaware of all possible risks and side effects of medication before taking.    Reviewed age and gender appropriate health screening exams and vaccinations.  Advisedpatient regarding importance of keeping up with recommended health maintenance andto schedule as soon as possible if overdue, as this is important in assessing forundiagnosed pathology, especially cancer, as well as protecting against potentially harmful/life threatening disease.      Patient and/or guardian verbalizes understandingand agrees with above counseling, assessment and plan.    All questions answered.    Rosenda Cormier MD on 6/19/24      
20

## 2024-06-19 NOTE — TELEPHONE ENCOUNTER
Patient left message that forgot to tell you when was at appointment that was told that is to have iron therapy?  How does she go about that?

## 2024-06-19 NOTE — PROGRESS NOTES
Wound Healing Center Followup Visit Note    Referring Physician : Rosenda Cormier MD  Amanda Ledesma  MEDICAL RECORD NUMBER:  [id number]  AGE: 68 y.o.   GENDER: female  : 1955  EPISODE DATE:  2024    Subjective:     Chief Complaint   Patient presents with    Wound Check     \"Left ankle\"      HISTORY of PRESENT ILLNESS HPI   Amanda Ledesma is a 68 y.o. female who presents today in regards to follow up evaluation and treatment of wound/ulcer.  That patient's past medical, family and social hx were reviewed and changes were made if present.    History of Wound Context:  The patient has had a wound of her left ankle/calf which was first noted approximately 2021.  This has been treated local wound care. On their initial visit to the wound healing center, 22,  the patient has noted that the wound has been improving.  The patient has not had similar previous wounds in the past.      She started seeing Dr. Street in 2021 and than Dr. Gillis.  She was started in unna boot ~ 2021.  She has noticed some improvement since starting [facility] boot.  She is currently following with Dr. Haskins.      Pt is not on abx at time of initial visit, but has been treated with previously by podiatry.      She is not a DM.  She is not a smoker.  She denies hx of DVT, and per her report had recent us noting no evidence of dvt at [facility].  She also had arterial studies done.    I had previously seen her in the past in regards to left buttock thigh wound, which started as abscess.      Pt works at [employer] in the [employer] and is on her feet all day.    22  Reflux study - if significant findings will schedule for fu  Continue compression therapy [facility] per podiatry with aquacell dressing  Elevation  She does not have significant arterial occlusive disease  22  Patient has asked to continuing following with me going forward because of our previous relationship  She is going to let Dr. Schuler office

## 2024-06-21 NOTE — TELEPHONE ENCOUNTER
1) Please notify patient; she needs iron therapy. I have concerns at this time, however, that she will be able to tolerate oral therapy (symptoms of abdominal pain, N/V).     This came from Dr Hernandez.

## 2024-06-24 NOTE — TELEPHONE ENCOUNTER
LMOM regarding message from Dr Hernandez.  If is now able to eat, drink, no vomiting and minimal/no nausea to start taking iron and vitamin C daily.  Was concerned at the as could not keep any food/drink down.

## 2024-06-24 NOTE — DISCHARGE INSTRUCTIONS

## 2024-06-26 ENCOUNTER — HOSPITAL ENCOUNTER (OUTPATIENT)
Dept: WOUND CARE | Age: 69
Discharge: HOME | End: 2024-06-26
Attending: SURGERY
Payer: MEDICAID

## 2024-06-26 VITALS
WEIGHT: 157 LBS | DIASTOLIC BLOOD PRESSURE: 64 MMHG | HEART RATE: 81 BPM | TEMPERATURE: 97.6 F | BODY MASS INDEX: 23.79 KG/M2 | SYSTOLIC BLOOD PRESSURE: 163 MMHG | HEIGHT: 68 IN | RESPIRATION RATE: 18 BRPM

## 2024-06-26 DIAGNOSIS — I83.023 VARICOSE VEINS OF LEFT LOWER EXTREMITY WITH ULCER OF ANKLE WITH FAT LAYER EXPOSED (HCC): Primary | ICD-10-CM

## 2024-06-26 DIAGNOSIS — L97.322 VARICOSE VEINS OF LEFT LOWER EXTREMITY WITH ULCER OF ANKLE WITH FAT LAYER EXPOSED (HCC): Primary | ICD-10-CM

## 2024-06-26 DIAGNOSIS — I70.242 ATHEROSCLEROSIS OF NATIVE ARTERY OF LEFT LOWER EXTREMITY WITH ULCERATION OF CALF (HCC): ICD-10-CM

## 2024-06-26 PROCEDURE — 11042 DBRDMT SUBQ TIS 1ST 20SQCM/<: CPT

## 2024-06-26 PROCEDURE — 11042 DBRDMT SUBQ TIS 1ST 20SQCM/<: CPT | Performed by: SURGERY

## 2024-06-26 RX ORDER — FERROUS SULFATE 325(65) MG
325 TABLET ORAL
COMMUNITY

## 2024-06-26 RX ORDER — BETAMETHASONE DIPROPIONATE 0.05 %
OINTMENT (GRAM) TOPICAL ONCE
OUTPATIENT
Start: 2024-06-26 | End: 2024-06-26

## 2024-06-26 RX ORDER — TRIAMCINOLONE ACETONIDE 1 MG/G
OINTMENT TOPICAL ONCE
OUTPATIENT
Start: 2024-06-26 | End: 2024-06-26

## 2024-06-26 RX ORDER — BACITRACIN ZINC AND POLYMYXIN B SULFATE 500; 1000 [USP'U]/G; [USP'U]/G
OINTMENT TOPICAL ONCE
OUTPATIENT
Start: 2024-06-26 | End: 2024-06-26

## 2024-06-26 RX ORDER — LIDOCAINE HYDROCHLORIDE 40 MG/ML
SOLUTION TOPICAL ONCE
Status: COMPLETED | OUTPATIENT
Start: 2024-06-26 | End: 2024-06-26

## 2024-06-26 RX ORDER — LIDOCAINE 40 MG/G
CREAM TOPICAL ONCE
OUTPATIENT
Start: 2024-06-26 | End: 2024-06-26

## 2024-06-26 RX ORDER — MULTIVIT WITH MINERALS/LUTEIN
250 TABLET ORAL DAILY
COMMUNITY

## 2024-06-26 RX ORDER — LIDOCAINE HYDROCHLORIDE 20 MG/ML
JELLY TOPICAL ONCE
OUTPATIENT
Start: 2024-06-26 | End: 2024-06-26

## 2024-06-26 RX ORDER — LIDOCAINE HYDROCHLORIDE 40 MG/ML
SOLUTION TOPICAL ONCE
OUTPATIENT
Start: 2024-06-26 | End: 2024-06-26

## 2024-06-26 RX ORDER — OXYCODONE HYDROCHLORIDE AND ACETAMINOPHEN 5; 325 MG/1; MG/1
1 TABLET ORAL EVERY 6 HOURS PRN
Qty: 28 TABLET | Refills: 0 | Status: SHIPPED | OUTPATIENT
Start: 2024-06-26 | End: 2024-07-03

## 2024-06-26 RX ORDER — LIDOCAINE 50 MG/G
OINTMENT TOPICAL ONCE
OUTPATIENT
Start: 2024-06-26 | End: 2024-06-26

## 2024-06-26 RX ORDER — GINSENG 100 MG
CAPSULE ORAL ONCE
OUTPATIENT
Start: 2024-06-26 | End: 2024-06-26

## 2024-06-26 RX ORDER — CLOBETASOL PROPIONATE 0.5 MG/G
OINTMENT TOPICAL ONCE
OUTPATIENT
Start: 2024-06-26 | End: 2024-06-26

## 2024-06-26 RX ORDER — GENTAMICIN SULFATE 1 MG/G
OINTMENT TOPICAL ONCE
OUTPATIENT
Start: 2024-06-26 | End: 2024-06-26

## 2024-06-26 RX ORDER — IBUPROFEN 200 MG
TABLET ORAL ONCE
OUTPATIENT
Start: 2024-06-26 | End: 2024-06-26

## 2024-06-26 RX ADMIN — LIDOCAINE HYDROCHLORIDE 10 ML: 40 SOLUTION TOPICAL at 07:52

## 2024-06-26 ASSESSMENT — PAIN DESCRIPTION - LOCATION: LOCATION: ANKLE

## 2024-06-26 ASSESSMENT — PAIN DESCRIPTION - DESCRIPTORS: DESCRIPTORS: SHARP;STABBING

## 2024-06-26 ASSESSMENT — PAIN DESCRIPTION - ORIENTATION: ORIENTATION: LEFT

## 2024-06-26 ASSESSMENT — PAIN SCALES - GENERAL: PAINLEVEL_OUTOF10: 7

## 2024-06-26 NOTE — PROGRESS NOTES
Wound Healing Center Followup Visit Note    Referring Physician : Rosenda Cormier MD  Amanda Ledesma  MEDICAL RECORD NUMBER:  [id number]  AGE: 68 y.o.   GENDER: female  : 1955  EPISODE DATE:  2024    Subjective:     Chief Complaint   Patient presents with    Wound Check     Left ankle      HISTORY of PRESENT ILLNESS HPI   Amanda Ledesma is a 68 y.o. female who presents today in regards to follow up evaluation and treatment of wound/ulcer.  That patient's past medical, family and social hx were reviewed and changes were made if present.    History of Wound Context:  The patient has had a wound of her left ankle/calf which was first noted approximately 2021.  This has been treated local wound care. On their initial visit to the wound healing center, 22,  the patient has noted that the wound has been improving.  The patient has not had similar previous wounds in the past.      She started seeing Dr. Street in 2021 and than Dr. Gillis.  She was started in unna boot ~ 2021.  She has noticed some improvement since starting unna boot.  She is currently following with Dr. Haskins.      Pt is not on abx at time of initial visit, but has been treated with previously by podiatry.      She is not a DM.  She is not a smoker.  She denies hx of DVT, and per her report had recent us noting no evidence of dvt at [facility].  She also had arterial studies done.    I had previously seen her in the past in regards to left buttock thigh wound, which started as abscess.      Pt works at [employer] in the [employer] and is on her feet all day.    22  Reflux study - if significant findings will schedule for fu  Continue compression therapy [facility] per podiatry with aquacell dressing  Elevation  She does not have significant arterial occlusive disease  22  Patient has asked to continuing following with me going forward because of our previous relationship  She is going to let Dr. Schuler office know  She

## 2024-06-27 NOTE — DISCHARGE INSTRUCTIONS
Visit Discharge/Physician Orders     Discharge condition: Stable     Assessment of pain at discharge: yes     Anesthetic used: 4% lidocaine solution     Discharge to: Home     Left via:Private automobile     Accompanied by:self     ECF/HHA: n/a     Dressing Orders: LEFT MEDIAL LEG: Cleanse with normal saline, apply zinc to fiona wound,  drawtex, dressing, ABD pad , and profore . Change weekly.        Treatment Orders: Eat a diet high in protein and vitamin C. Take a multiple vitamin daily unless contraindicated.       To see Dr. Duncan as scheduled      Must wear slip on shoe to work due to wrap on leg!        Keep wrap dry at all times. If wrap becomes wet or falls 2 inches call [facility] ([phone])and may remove wrap and apply double tubigrip.     [facility] followup visit : 1 week __________________________________  (Please note your next appointment above and if you are unable to keep, kindly give a 24 hour notice. Thank you.)     Physician signature:__________________________     If you experience any of the following, please call the Wound Care Center during business hours:     * Increase in Pain  * Temperature over 101  * Increase in drainage from your wound  * Drainage with a foul odor  * Bleeding  * Increase in swelling  * Need for compression bandage changes due to slippage, breakthrough drainage.     If you need medical attention outside of the business hours of the Wound Care Centers please contact your PCP or go to the nearest emergency room.

## 2024-07-03 ENCOUNTER — HOSPITAL ENCOUNTER (OUTPATIENT)
Dept: WOUND CARE | Age: 69
Discharge: HOME | End: 2024-07-03
Attending: SURGERY
Payer: MEDICARE

## 2024-07-03 VITALS
DIASTOLIC BLOOD PRESSURE: 69 MMHG | HEIGHT: 68 IN | HEART RATE: 81 BPM | SYSTOLIC BLOOD PRESSURE: 168 MMHG | BODY MASS INDEX: 23.79 KG/M2 | WEIGHT: 157 LBS | TEMPERATURE: 98.2 F | RESPIRATION RATE: 18 BRPM

## 2024-07-03 DIAGNOSIS — L97.322 VARICOSE VEINS OF LEFT LOWER EXTREMITY WITH ULCER OF ANKLE WITH FAT LAYER EXPOSED (HCC): Primary | ICD-10-CM

## 2024-07-03 DIAGNOSIS — I70.242 ATHEROSCLEROSIS OF NATIVE ARTERY OF LEFT LOWER EXTREMITY WITH ULCERATION OF CALF (HCC): ICD-10-CM

## 2024-07-03 DIAGNOSIS — I83.023 VARICOSE VEINS OF LEFT LOWER EXTREMITY WITH ULCER OF ANKLE WITH FAT LAYER EXPOSED (HCC): Primary | ICD-10-CM

## 2024-07-03 PROCEDURE — 11042 DBRDMT SUBQ TIS 1ST 20SQCM/<: CPT

## 2024-07-03 PROCEDURE — 11042 DBRDMT SUBQ TIS 1ST 20SQCM/<: CPT | Performed by: SURGERY

## 2024-07-03 RX ORDER — LIDOCAINE 50 MG/G
OINTMENT TOPICAL ONCE
OUTPATIENT
Start: 2024-07-03 | End: 2024-07-03

## 2024-07-03 RX ORDER — LIDOCAINE HYDROCHLORIDE 40 MG/ML
SOLUTION TOPICAL ONCE
Status: COMPLETED | OUTPATIENT
Start: 2024-07-03 | End: 2024-07-03

## 2024-07-03 RX ORDER — LIDOCAINE HYDROCHLORIDE 40 MG/ML
SOLUTION TOPICAL ONCE
OUTPATIENT
Start: 2024-07-03 | End: 2024-07-03

## 2024-07-03 RX ORDER — TRIAMCINOLONE ACETONIDE 1 MG/G
OINTMENT TOPICAL ONCE
OUTPATIENT
Start: 2024-07-03 | End: 2024-07-03

## 2024-07-03 RX ORDER — LIDOCAINE HYDROCHLORIDE 20 MG/ML
JELLY TOPICAL ONCE
OUTPATIENT
Start: 2024-07-03 | End: 2024-07-03

## 2024-07-03 RX ORDER — BACITRACIN ZINC AND POLYMYXIN B SULFATE 500; 1000 [USP'U]/G; [USP'U]/G
OINTMENT TOPICAL ONCE
OUTPATIENT
Start: 2024-07-03 | End: 2024-07-03

## 2024-07-03 RX ORDER — GINSENG 100 MG
CAPSULE ORAL ONCE
OUTPATIENT
Start: 2024-07-03 | End: 2024-07-03

## 2024-07-03 RX ORDER — BETAMETHASONE DIPROPIONATE 0.05 %
OINTMENT (GRAM) TOPICAL ONCE
OUTPATIENT
Start: 2024-07-03 | End: 2024-07-03

## 2024-07-03 RX ORDER — CLOBETASOL PROPIONATE 0.5 MG/G
OINTMENT TOPICAL ONCE
OUTPATIENT
Start: 2024-07-03 | End: 2024-07-03

## 2024-07-03 RX ORDER — GENTAMICIN SULFATE 1 MG/G
OINTMENT TOPICAL ONCE
OUTPATIENT
Start: 2024-07-03 | End: 2024-07-03

## 2024-07-03 RX ORDER — LIDOCAINE 40 MG/G
CREAM TOPICAL ONCE
OUTPATIENT
Start: 2024-07-03 | End: 2024-07-03

## 2024-07-03 RX ORDER — IBUPROFEN 200 MG
TABLET ORAL ONCE
OUTPATIENT
Start: 2024-07-03 | End: 2024-07-03

## 2024-07-03 RX ADMIN — LIDOCAINE HYDROCHLORIDE 5 ML: 40 SOLUTION TOPICAL at 07:40

## 2024-07-03 ASSESSMENT — PAIN DESCRIPTION - ORIENTATION: ORIENTATION: LEFT

## 2024-07-03 ASSESSMENT — PAIN DESCRIPTION - LOCATION: LOCATION: ANKLE

## 2024-07-03 ASSESSMENT — PAIN DESCRIPTION - DESCRIPTORS: DESCRIPTORS: STABBING

## 2024-07-03 ASSESSMENT — PAIN SCALES - GENERAL: PAINLEVEL_OUTOF10: 7

## 2024-07-03 NOTE — PROGRESS NOTES
Wound Healing Center Followup Visit Note    Referring Physician : Rosenda Cormier MD  Amanda Ledesma  MEDICAL RECORD NUMBER:  [id number]  AGE: 68 y.o.   GENDER: female  : 1955  EPISODE DATE:  7/3/2024    Subjective:     Chief Complaint   Patient presents with    Wound Check     Left ankle      HISTORY of PRESENT ILLNESS HPI   Amanda Ledesma is a 68 y.o. female who presents today in regards to follow up evaluation and treatment of wound/ulcer.  That patient's past medical, family and social hx were reviewed and changes were made if present.    History of Wound Context:  The patient has had a wound of her left ankle/calf which was first noted approximately 2021.  This has been treated local wound care. On their initial visit to the wound healing center, 22,  the patient has noted that the wound has been improving.  The patient has not had similar previous wounds in the past.      She started seeing Dr. Street in 2021 and than Dr. Gillis.  She was started in unna boot ~ 2021.  She has noticed some improvement since starting unna boot.  She is currently following with Dr. Haskins.      Pt is not on abx at time of initial visit, but has been treated with previously by podiatry.      She is not a DM.  She is not a smoker.  She denies hx of DVT, and per her report had recent us noting no evidence of dvt at [facility].  She also had arterial studies done.    I had previously seen her in the past in regards to left buttock thigh wound, which started as abscess.      Pt works at [employer] in the [employer] and is on her feet all day.    22  Reflux study - if significant findings will schedule for fu  Continue compression therapy [facility] per podiatry with aquacell dressing  Elevation  She does not have significant arterial occlusive disease  22  Patient has asked to continuing following with me going forward because of our previous relationship  She is going to let Dr. Schuler office know  She

## 2024-07-08 NOTE — DISCHARGE INSTRUCTIONS
Visit Discharge/Physician Orders     Discharge condition: Stable     Assessment of pain at discharge: yes     Anesthetic used: 4% lidocaine solution     Discharge to: Home     Left via:Private automobile     Accompanied by:self     ECF/HHA: n/a     Dressing Orders: LEFT MEDIAL LEG: Cleanse with normal saline, apply zinc to fiona wound, drawtex, ABD pad, and profore . Change weekly.        Treatment Orders: Eat a diet high in protein and vitamin C. Take a multiple vitamin daily unless contraindicated.     Dr. Duncan as needed.       Must wear slip on shoe to work due to wrap on leg!        Keep wrap dry at all times. If wrap becomes wet or falls 2 inches call [facility] ([phone])and may remove wrap and apply double tubigrip.      [facility] followup visit : 1 week __________________________________  (Please note your next appointment above and if you are unable to keep, kindly give a 24 hour notice. Thank you.)     Physician signature:__________________________     If you experience any of the following, please call the Wound Care Center during business hours:     * Increase in Pain  * Temperature over 101  * Increase in drainage from your wound  * Drainage with a foul odor  * Bleeding  * Increase in swelling  * Need for compression bandage changes due to slippage, breakthrough drainage.     If you need medical attention outside of the business hours of the Wound Care Centers please contact your PCP or go to the nearest emergency room.

## 2024-07-10 ENCOUNTER — HOSPITAL ENCOUNTER (OUTPATIENT)
Dept: WOUND CARE | Age: 69
Discharge: HOME | End: 2024-07-10
Attending: SURGERY
Payer: MEDICARE

## 2024-07-10 VITALS
HEART RATE: 71 BPM | HEIGHT: 68 IN | RESPIRATION RATE: 18 BRPM | BODY MASS INDEX: 23.79 KG/M2 | SYSTOLIC BLOOD PRESSURE: 173 MMHG | TEMPERATURE: 97.6 F | DIASTOLIC BLOOD PRESSURE: 84 MMHG | WEIGHT: 157 LBS

## 2024-07-10 DIAGNOSIS — I70.242 ATHEROSCLEROSIS OF NATIVE ARTERY OF LEFT LOWER EXTREMITY WITH ULCERATION OF CALF (HCC): ICD-10-CM

## 2024-07-10 DIAGNOSIS — L97.322 VARICOSE VEINS OF LEFT LOWER EXTREMITY WITH ULCER OF ANKLE WITH FAT LAYER EXPOSED (HCC): Primary | ICD-10-CM

## 2024-07-10 DIAGNOSIS — I83.023 VARICOSE VEINS OF LEFT LOWER EXTREMITY WITH ULCER OF ANKLE WITH FAT LAYER EXPOSED (HCC): Primary | ICD-10-CM

## 2024-07-10 PROCEDURE — 11042 DBRDMT SUBQ TIS 1ST 20SQCM/<: CPT

## 2024-07-10 PROCEDURE — 11042 DBRDMT SUBQ TIS 1ST 20SQCM/<: CPT | Performed by: SURGERY

## 2024-07-10 RX ORDER — BACITRACIN ZINC AND POLYMYXIN B SULFATE 500; 1000 [USP'U]/G; [USP'U]/G
OINTMENT TOPICAL ONCE
OUTPATIENT
Start: 2024-07-10 | End: 2024-07-10

## 2024-07-10 RX ORDER — IBUPROFEN 200 MG
TABLET ORAL ONCE
OUTPATIENT
Start: 2024-07-10 | End: 2024-07-10

## 2024-07-10 RX ORDER — TRIAMCINOLONE ACETONIDE 1 MG/G
OINTMENT TOPICAL ONCE
OUTPATIENT
Start: 2024-07-10 | End: 2024-07-10

## 2024-07-10 RX ORDER — GENTAMICIN SULFATE 1 MG/G
OINTMENT TOPICAL ONCE
OUTPATIENT
Start: 2024-07-10 | End: 2024-07-10

## 2024-07-10 RX ORDER — OXYCODONE HYDROCHLORIDE AND ACETAMINOPHEN 5; 325 MG/1; MG/1
1 TABLET ORAL EVERY 6 HOURS PRN
Qty: 28 TABLET | Refills: 0 | Status: SHIPPED | OUTPATIENT
Start: 2024-07-10 | End: 2024-07-17

## 2024-07-10 RX ORDER — LIDOCAINE 40 MG/G
CREAM TOPICAL ONCE
OUTPATIENT
Start: 2024-07-10 | End: 2024-07-10

## 2024-07-10 RX ORDER — LIDOCAINE HYDROCHLORIDE 40 MG/ML
SOLUTION TOPICAL ONCE
Status: COMPLETED | OUTPATIENT
Start: 2024-07-10 | End: 2024-07-10

## 2024-07-10 RX ORDER — BETAMETHASONE DIPROPIONATE 0.05 %
OINTMENT (GRAM) TOPICAL ONCE
OUTPATIENT
Start: 2024-07-10 | End: 2024-07-10

## 2024-07-10 RX ORDER — CLOBETASOL PROPIONATE 0.5 MG/G
OINTMENT TOPICAL ONCE
OUTPATIENT
Start: 2024-07-10 | End: 2024-07-10

## 2024-07-10 RX ORDER — LIDOCAINE HYDROCHLORIDE 20 MG/ML
JELLY TOPICAL ONCE
OUTPATIENT
Start: 2024-07-10 | End: 2024-07-10

## 2024-07-10 RX ORDER — LIDOCAINE HYDROCHLORIDE 40 MG/ML
SOLUTION TOPICAL ONCE
OUTPATIENT
Start: 2024-07-10 | End: 2024-07-10

## 2024-07-10 RX ORDER — LIDOCAINE 50 MG/G
OINTMENT TOPICAL ONCE
OUTPATIENT
Start: 2024-07-10 | End: 2024-07-10

## 2024-07-10 RX ORDER — GINSENG 100 MG
CAPSULE ORAL ONCE
OUTPATIENT
Start: 2024-07-10 | End: 2024-07-10

## 2024-07-10 RX ADMIN — LIDOCAINE HYDROCHLORIDE 10 ML: 40 SOLUTION TOPICAL at 07:43

## 2024-07-10 ASSESSMENT — PAIN SCALES - GENERAL: PAINLEVEL_OUTOF10: 7

## 2024-07-10 ASSESSMENT — PAIN DESCRIPTION - FREQUENCY: FREQUENCY: CONTINUOUS

## 2024-07-10 ASSESSMENT — PAIN DESCRIPTION - ORIENTATION: ORIENTATION: LEFT

## 2024-07-10 ASSESSMENT — PAIN DESCRIPTION - ONSET: ONSET: ON-GOING

## 2024-07-10 ASSESSMENT — PAIN DESCRIPTION - DESCRIPTORS: DESCRIPTORS: STABBING

## 2024-07-10 ASSESSMENT — PAIN - FUNCTIONAL ASSESSMENT: PAIN_FUNCTIONAL_ASSESSMENT: PREVENTS OR INTERFERES SOME ACTIVE ACTIVITIES AND ADLS

## 2024-07-10 ASSESSMENT — PAIN DESCRIPTION - PAIN TYPE: TYPE: CHRONIC PAIN

## 2024-07-10 ASSESSMENT — PAIN DESCRIPTION - LOCATION: LOCATION: ANKLE

## 2024-07-10 NOTE — PROGRESS NOTES
(PROTONIX) 40 MG tablet Take 1 tablet by mouth every morning (before breakfast) 90 tablet 1    EPINEPHrine (EPIPEN) 0.3 MG/0.3ML SOAJ injection Use as directed for allergic reaction 1 each 5    aspirin-acetaminophen-caffeine (EXCEDRIN MIGRAINE) 250-250-65 MG per tablet Take 1 tablet by mouth as needed for Headaches 300 tablet 3     No current facility-administered medications on file prior to encounter.       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) - Primary    Relevant Medications    oxyCODONE-acetaminophen (PERCOCET) 5-325 MG per tablet    Other Relevant Orders    Initiate Outpatient Wound Care Protocol    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) (Chronic)    Relevant Orders    Initiate Outpatient Wound Care Protocol       Pre Debridement Measurements:  Are located in the Wound/Ulcer Documentation Flow Sheet  Post Debridement Measurements:  Wound/Ulcer Descriptions are Pre Debridement except measurements:     Wound 08/10/16 Other (Comment) Buttocks Left;Distal #2 acq 8/4/16 (Active)   Number of days: 2890       Wound 08/31/16 Buttocks Left;Proximal #3 aquired 8-27-26 (Active)   Number of days: 2869       Wound 02/02/22 Ankle Left;Medial #1 (Active)   Wound Image   06/19/24 0745   Wound Etiology Venous 10/18/23 0824   Dressing Status New dressing applied 07/03/24 0917

## 2024-07-12 NOTE — DISCHARGE INSTRUCTIONS
Visit Discharge/Physician Orders     Discharge condition: Stable     Assessment of pain at discharge: yes     Anesthetic used: 4% lidocaine solution     Discharge to: Home     Left via:Private automobile     Accompanied by:self     ECF/HHA: n/a     Dressing Orders: LEFT MEDIAL LEG: Cleanse with normal saline, apply zinc to fiona wound, drawtex, ABD pad, and profore . Change weekly.        Treatment Orders: Eat a diet high in protein and vitamin C. Take a multiple vitamin daily unless contraindicated.     Dr. Duncan as needed.       Must wear slip on shoe to work due to wrap on leg!        Keep wrap dry at all times. If wrap becomes wet or falls 2 inches call [facility] ([phone])and may remove wrap and apply double tubigrip.      [facility] followup visit : 1 week __________________________________  (Please note your next appointment above and if you are unable to keep, kindly give a 24 hour notice. Thank you.)     Physician signature:__________________________     If you experience any of the following, please call the Wound Care Center during business hours:     * Increase in Pain  * Temperature over 101  * Increase in drainage from your wound  * Drainage with a foul odor  * Bleeding  * Increase in swelling  * Need for compression bandage changes due to slippage, breakthrough drainage.     If you need medical attention outside of the business hours of the Wound Care Centers please contact your PCP or go to the nearest emergency room.

## 2024-07-17 ENCOUNTER — HOSPITAL ENCOUNTER (OUTPATIENT)
Dept: WOUND CARE | Age: 69
Discharge: HOME | End: 2024-07-17
Attending: SURGERY
Payer: MEDICARE

## 2024-07-17 VITALS
WEIGHT: 157 LBS | HEIGHT: 68 IN | DIASTOLIC BLOOD PRESSURE: 72 MMHG | SYSTOLIC BLOOD PRESSURE: 140 MMHG | RESPIRATION RATE: 18 BRPM | TEMPERATURE: 97 F | HEART RATE: 75 BPM | BODY MASS INDEX: 23.79 KG/M2

## 2024-07-17 DIAGNOSIS — I83.023 VARICOSE VEINS OF LEFT LOWER EXTREMITY WITH ULCER OF ANKLE WITH FAT LAYER EXPOSED (HCC): Primary | ICD-10-CM

## 2024-07-17 DIAGNOSIS — I70.242 ATHEROSCLEROSIS OF NATIVE ARTERY OF LEFT LOWER EXTREMITY WITH ULCERATION OF CALF (HCC): ICD-10-CM

## 2024-07-17 DIAGNOSIS — L97.322 VARICOSE VEINS OF LEFT LOWER EXTREMITY WITH ULCER OF ANKLE WITH FAT LAYER EXPOSED (HCC): Primary | ICD-10-CM

## 2024-07-17 PROCEDURE — 11042 DBRDMT SUBQ TIS 1ST 20SQCM/<: CPT | Performed by: SURGERY

## 2024-07-17 PROCEDURE — 11042 DBRDMT SUBQ TIS 1ST 20SQCM/<: CPT

## 2024-07-17 RX ORDER — GENTAMICIN SULFATE 1 MG/G
OINTMENT TOPICAL ONCE
OUTPATIENT
Start: 2024-07-17 | End: 2024-07-17

## 2024-07-17 RX ORDER — LIDOCAINE HYDROCHLORIDE 40 MG/ML
SOLUTION TOPICAL ONCE
Status: COMPLETED | OUTPATIENT
Start: 2024-07-17 | End: 2024-07-17

## 2024-07-17 RX ORDER — TRIAMCINOLONE ACETONIDE 1 MG/G
OINTMENT TOPICAL ONCE
OUTPATIENT
Start: 2024-07-17 | End: 2024-07-17

## 2024-07-17 RX ORDER — CLOBETASOL PROPIONATE 0.5 MG/G
OINTMENT TOPICAL ONCE
OUTPATIENT
Start: 2024-07-17 | End: 2024-07-17

## 2024-07-17 RX ORDER — LIDOCAINE HYDROCHLORIDE 40 MG/ML
SOLUTION TOPICAL ONCE
OUTPATIENT
Start: 2024-07-17 | End: 2024-07-17

## 2024-07-17 RX ORDER — IBUPROFEN 200 MG
TABLET ORAL ONCE
OUTPATIENT
Start: 2024-07-17 | End: 2024-07-17

## 2024-07-17 RX ORDER — BACITRACIN ZINC AND POLYMYXIN B SULFATE 500; 1000 [USP'U]/G; [USP'U]/G
OINTMENT TOPICAL ONCE
OUTPATIENT
Start: 2024-07-17 | End: 2024-07-17

## 2024-07-17 RX ORDER — BETAMETHASONE DIPROPIONATE 0.05 %
OINTMENT (GRAM) TOPICAL ONCE
OUTPATIENT
Start: 2024-07-17 | End: 2024-07-17

## 2024-07-17 RX ORDER — LIDOCAINE HYDROCHLORIDE 20 MG/ML
JELLY TOPICAL ONCE
OUTPATIENT
Start: 2024-07-17 | End: 2024-07-17

## 2024-07-17 RX ORDER — GINSENG 100 MG
CAPSULE ORAL ONCE
OUTPATIENT
Start: 2024-07-17 | End: 2024-07-17

## 2024-07-17 RX ORDER — LIDOCAINE 40 MG/G
CREAM TOPICAL ONCE
OUTPATIENT
Start: 2024-07-17 | End: 2024-07-17

## 2024-07-17 RX ORDER — LIDOCAINE 50 MG/G
OINTMENT TOPICAL ONCE
OUTPATIENT
Start: 2024-07-17 | End: 2024-07-17

## 2024-07-17 RX ADMIN — LIDOCAINE HYDROCHLORIDE: 40 SOLUTION TOPICAL at 07:54

## 2024-07-17 ASSESSMENT — PAIN SCALES - GENERAL: PAINLEVEL_OUTOF10: 7

## 2024-07-17 ASSESSMENT — PAIN DESCRIPTION - DESCRIPTORS: DESCRIPTORS: STABBING

## 2024-07-17 ASSESSMENT — PAIN DESCRIPTION - ORIENTATION: ORIENTATION: LEFT

## 2024-07-17 ASSESSMENT — PAIN DESCRIPTION - LOCATION: LOCATION: ANKLE

## 2024-07-17 NOTE — PROGRESS NOTES
(Active)   Number of days: 2876       Wound 02/02/22 Ankle Left;Medial #1 (Active)   Wound Image   07/17/24 0744   Wound Etiology Venous 10/18/23 0824   Dressing Status New dressing applied 07/10/24 0922   Wound Cleansed Cleansed with saline 07/10/24 0922   Dressing/Treatment ABD;Other (comment);Zinc paste 07/10/24 0922   Offloading for Diabetic Foot Ulcers Offloading ordered 07/03/24 0917   Wound Length (cm) 3.3 cm 07/17/24 0744   Wound Width (cm) 2.2 cm 07/17/24 0744   Wound Depth (cm) 0.1 cm 07/17/24 0744   Wound Surface Area (cm^2) 7.26 cm^2 07/17/24 0744   Change in Wound Size % (l*w) 73.17 07/17/24 0744   Wound Volume (cm^3) 0.726 cm^3 07/17/24 0744   Wound Healing % 73 07/17/24 0744   Post-Procedure Length (cm) 3.4 cm 07/17/24 0824   Post-Procedure Width (cm) 2.4 cm 07/17/24 0824   Post-Procedure Depth (cm) 0.1 cm 07/17/24 0824   Post-Procedure Surface Area (cm^2) 8.16 cm^2 07/17/24 0824   Post-Procedure Volume (cm^3) 0.816 cm^3 07/17/24 0824   Wound Assessment Fibrin;Granulation tissue;Pink/red 07/17/24 0744   Drainage Amount Moderate (25-50%) 07/17/24 0744   Drainage Description Serosanguinous;Yellow;Green 07/17/24 0744   Odor None 07/17/24 0744   Melany-wound Assessment Fragile;Dry/flaky 07/17/24 0744   Margins Attached edges 06/26/24 0741   Wound Thickness Description not for Pressure Injury Full thickness 05/15/24 0800   Number of days: 895      Procedure Note  Indications:  Based on my examination of this patient's wound(s)/ulcer(s) today, debridement is required to promote healing and evaluate the wound base.    Performed by: Ashley Staples MD    Consent obtained:  Yes    Time out taken:  Yes    Pain Control: Anesthetic  Anesthetic: 4% Lidocaine Liquid Topical     Debridement:Excisional Debridement    Using curette the wound(s)/ulcer(s) was/were sharply debrided down through and including the removal of epidermis, dermis, and subcutaneous tissue.        Devitalized Tissue Debrided:  fibrin,

## 2024-07-23 NOTE — DISCHARGE INSTRUCTIONS
Visit Discharge/Physician Orders     Discharge condition: Stable     Assessment of pain at discharge: yes     Anesthetic used: 4% lidocaine solution     Discharge to: Home     Left via:Private automobile     Accompanied by:self     ECF/HHA: n/a     Dressing Orders: LEFT MEDIAL LEG: Cleanse with normal saline, apply zinc to fiona wound, drawtex, ABD pad, and profore . Change weekly.        Treatment Orders: Eat a diet high in protein and vitamin C. Take a multiple vitamin daily unless contraindicated.     Dr. Duncan as needed.       Must wear slip on shoe to work due to wrap on leg!        Keep wrap dry at all times. If wrap becomes wet or falls 2 inches call [facility] ([phone])and may remove wrap and apply double tubigrip.      [facility] followup visit : 1 week __________________________________  (Please note your next appointment above and if you are unable to keep, kindly give a 24 hour notice. Thank you.)     Physician signature:__________________________     If you experience any of the following, please call the Wound Care Center during business hours:     * Increase in Pain  * Temperature over 101  * Increase in drainage from your wound  * Drainage with a foul odor  * Bleeding  * Increase in swelling  * Need for compression bandage changes due to slippage, breakthrough drainage.     If you need medical attention outside of the business hours of the Wound Care Centers please contact your PCP or go to the nearest emergency room.

## 2024-07-24 ENCOUNTER — HOSPITAL ENCOUNTER (OUTPATIENT)
Dept: WOUND CARE | Age: 69
Discharge: HOME | End: 2024-07-24
Attending: SURGERY
Payer: MEDICARE

## 2024-07-24 VITALS
RESPIRATION RATE: 18 BRPM | HEIGHT: 68 IN | HEART RATE: 72 BPM | BODY MASS INDEX: 23.79 KG/M2 | DIASTOLIC BLOOD PRESSURE: 80 MMHG | TEMPERATURE: 97.9 F | SYSTOLIC BLOOD PRESSURE: 151 MMHG | WEIGHT: 157 LBS

## 2024-07-24 DIAGNOSIS — L97.322 VARICOSE VEINS OF LEFT LOWER EXTREMITY WITH ULCER OF ANKLE WITH FAT LAYER EXPOSED (HCC): Primary | ICD-10-CM

## 2024-07-24 DIAGNOSIS — I83.023 VARICOSE VEINS OF LEFT LOWER EXTREMITY WITH ULCER OF ANKLE WITH FAT LAYER EXPOSED (HCC): Primary | ICD-10-CM

## 2024-07-24 PROCEDURE — 11042 DBRDMT SUBQ TIS 1ST 20SQCM/<: CPT

## 2024-07-24 PROCEDURE — 11042 DBRDMT SUBQ TIS 1ST 20SQCM/<: CPT | Performed by: SURGERY

## 2024-07-24 RX ORDER — OXYCODONE HYDROCHLORIDE AND ACETAMINOPHEN 5; 325 MG/1; MG/1
1 TABLET ORAL EVERY 6 HOURS PRN
Qty: 28 TABLET | Refills: 0 | Status: SHIPPED | OUTPATIENT
Start: 2024-07-24 | End: 2024-07-31

## 2024-07-24 NOTE — PROGRESS NOTES
Minimal  Hemostasis Achieved:  by pressure    Procedural Pain:  8  / 10   Post Procedural Pain: 7 / 10     Response to treatment: Well tolerated     Plan:        Discharge Instructions         Visit Discharge/Physician Orders     Discharge condition: Stable     Assessment of pain at discharge: yes     Anesthetic used: 4% lidocaine solution     Discharge to: Home     Left via:Private automobile     Accompanied by:self     ECF/HHA: n/a     Dressing Orders: LEFT MEDIAL LEG: Cleanse with normal saline, apply zinc to fiona wound, drawtex, ABD pad, and profore . Change weekly.        Treatment Orders: Eat a diet high in protein and vitamin C. Take a multiple vitamin daily unless contraindicated.     Dr. Duncan as needed.       Must wear slip on shoe to work due to wrap on leg!        Keep wrap dry at all times. If wrap becomes wet or falls 2 inches call [facility] ([phone])and may remove wrap and apply double tubigrip.      [facility] followup visit : 1 week __________________________________  (Please note your next appointment above and if you are unable to keep, kindly give a 24 hour notice. Thank you.)     Physician signature:__________________________     If you experience any of the following, please call the Wound Care Center during business hours:     * Increase in Pain  * Temperature over 101  * Increase in drainage from your wound  * Drainage with a foul odor  * Bleeding  * Increase in swelling  * Need for compression bandage changes due to slippage, breakthrough drainage.     If you need medical attention outside of the business hours of the Wound Care Centers please contact your PCP or go to the nearest emergency room.          Electronically signed by Aslhey Staples MD

## 2024-07-26 NOTE — DISCHARGE INSTRUCTIONS
Visit Discharge/Physician Orders     Discharge condition: Stable     Assessment of pain at discharge: yes     Anesthetic used: 4% lidocaine solution     Discharge to: Home     Left via:Private automobile     Accompanied by:self     ECF/HHA: n/a     Dressing Orders: LEFT MEDIAL LEG: Cleanse with normal saline, apply zinc to fiona wound, drawtex, ABD pad, and profore . Change weekly.        Treatment Orders: Eat a diet high in protein and vitamin C. Take a multiple vitamin daily unless contraindicated.     Dr. Duncan as needed.       Must wear slip on shoe to work due to wrap on leg!        Keep wrap dry at all times. If wrap becomes wet or falls 2 inches call [facility] ([phone])and may remove wrap and apply double tubigrip.      [facility] followup visit : 1 week __________________________________  (Please note your next appointment above and if you are unable to keep, kindly give a 24 hour notice. Thank you.)     Physician signature:__________________________     If you experience any of the following, please call the Wound Care Center during business hours:     * Increase in Pain  * Temperature over 101  * Increase in drainage from your wound  * Drainage with a foul odor  * Bleeding  * Increase in swelling  * Need for compression bandage changes due to slippage, breakthrough drainage.     If you need medical attention outside of the business hours of the Wound Care Centers please contact your PCP or go to the nearest emergency room.

## 2024-07-31 ENCOUNTER — HOSPITAL ENCOUNTER (OUTPATIENT)
Dept: WOUND CARE | Age: 69
Discharge: HOME | End: 2024-07-31
Attending: SURGERY
Payer: MEDICARE

## 2024-07-31 VITALS
HEART RATE: 75 BPM | HEIGHT: 68 IN | WEIGHT: 157 LBS | TEMPERATURE: 97.1 F | DIASTOLIC BLOOD PRESSURE: 87 MMHG | RESPIRATION RATE: 18 BRPM | SYSTOLIC BLOOD PRESSURE: 177 MMHG | BODY MASS INDEX: 23.79 KG/M2

## 2024-07-31 DIAGNOSIS — I70.242 ATHEROSCLEROSIS OF NATIVE ARTERY OF LEFT LOWER EXTREMITY WITH ULCERATION OF CALF (HCC): ICD-10-CM

## 2024-07-31 DIAGNOSIS — L97.322 VARICOSE VEINS OF LEFT LOWER EXTREMITY WITH ULCER OF ANKLE WITH FAT LAYER EXPOSED (HCC): Primary | ICD-10-CM

## 2024-07-31 DIAGNOSIS — I83.023 VARICOSE VEINS OF LEFT LOWER EXTREMITY WITH ULCER OF ANKLE WITH FAT LAYER EXPOSED (HCC): Primary | ICD-10-CM

## 2024-07-31 PROCEDURE — 11042 DBRDMT SUBQ TIS 1ST 20SQCM/<: CPT | Performed by: SURGERY

## 2024-07-31 PROCEDURE — 11042 DBRDMT SUBQ TIS 1ST 20SQCM/<: CPT

## 2024-07-31 RX ORDER — BETAMETHASONE DIPROPIONATE 0.05 %
OINTMENT (GRAM) TOPICAL ONCE
OUTPATIENT
Start: 2024-07-31 | End: 2024-07-31

## 2024-07-31 RX ORDER — LIDOCAINE HYDROCHLORIDE 40 MG/ML
SOLUTION TOPICAL ONCE
Status: COMPLETED | OUTPATIENT
Start: 2024-07-31 | End: 2024-07-31

## 2024-07-31 RX ORDER — TRIAMCINOLONE ACETONIDE 1 MG/G
OINTMENT TOPICAL ONCE
OUTPATIENT
Start: 2024-07-31 | End: 2024-07-31

## 2024-07-31 RX ORDER — LIDOCAINE HYDROCHLORIDE 40 MG/ML
SOLUTION TOPICAL ONCE
OUTPATIENT
Start: 2024-07-31 | End: 2024-07-31

## 2024-07-31 RX ORDER — OXYCODONE HYDROCHLORIDE AND ACETAMINOPHEN 5; 325 MG/1; MG/1
1 TABLET ORAL EVERY 6 HOURS PRN
Qty: 28 TABLET | Refills: 0 | Status: SHIPPED | OUTPATIENT
Start: 2024-08-07 | End: 2024-08-14

## 2024-07-31 RX ORDER — LIDOCAINE HYDROCHLORIDE 20 MG/ML
JELLY TOPICAL ONCE
OUTPATIENT
Start: 2024-07-31 | End: 2024-07-31

## 2024-07-31 RX ORDER — BACITRACIN ZINC AND POLYMYXIN B SULFATE 500; 1000 [USP'U]/G; [USP'U]/G
OINTMENT TOPICAL ONCE
OUTPATIENT
Start: 2024-07-31 | End: 2024-07-31

## 2024-07-31 RX ORDER — CLOBETASOL PROPIONATE 0.5 MG/G
OINTMENT TOPICAL ONCE
OUTPATIENT
Start: 2024-07-31 | End: 2024-07-31

## 2024-07-31 RX ORDER — GINSENG 100 MG
CAPSULE ORAL ONCE
OUTPATIENT
Start: 2024-07-31 | End: 2024-07-31

## 2024-07-31 RX ORDER — GENTAMICIN SULFATE 1 MG/G
OINTMENT TOPICAL ONCE
OUTPATIENT
Start: 2024-07-31 | End: 2024-07-31

## 2024-07-31 RX ORDER — IBUPROFEN 200 MG
TABLET ORAL ONCE
OUTPATIENT
Start: 2024-07-31 | End: 2024-07-31

## 2024-07-31 RX ORDER — LIDOCAINE 50 MG/G
OINTMENT TOPICAL ONCE
OUTPATIENT
Start: 2024-07-31 | End: 2024-07-31

## 2024-07-31 RX ORDER — LIDOCAINE 40 MG/G
CREAM TOPICAL ONCE
OUTPATIENT
Start: 2024-07-31 | End: 2024-07-31

## 2024-07-31 RX ADMIN — LIDOCAINE HYDROCHLORIDE 10 ML: 40 SOLUTION TOPICAL at 07:44

## 2024-07-31 ASSESSMENT — PAIN DESCRIPTION - FREQUENCY: FREQUENCY: CONTINUOUS

## 2024-07-31 ASSESSMENT — PAIN SCALES - GENERAL: PAINLEVEL_OUTOF10: 7

## 2024-07-31 ASSESSMENT — PAIN DESCRIPTION - DESCRIPTORS: DESCRIPTORS: STABBING

## 2024-07-31 ASSESSMENT — PAIN DESCRIPTION - LOCATION: LOCATION: ANKLE

## 2024-07-31 ASSESSMENT — PAIN - FUNCTIONAL ASSESSMENT: PAIN_FUNCTIONAL_ASSESSMENT: PREVENTS OR INTERFERES SOME ACTIVE ACTIVITIES AND ADLS

## 2024-07-31 ASSESSMENT — PAIN DESCRIPTION - PAIN TYPE: TYPE: CHRONIC PAIN

## 2024-07-31 ASSESSMENT — PAIN DESCRIPTION - ORIENTATION: ORIENTATION: LEFT

## 2024-07-31 ASSESSMENT — PAIN DESCRIPTION - ONSET: ONSET: ON-GOING

## 2024-07-31 NOTE — PROGRESS NOTES
Wound Healing Center Followup Visit Note    Referring Physician : Rosenda Cormier MD  Amanda Ledesma  MEDICAL RECORD NUMBER:  [id number]  AGE: 68 y.o.   GENDER: female  : 1955  EPISODE DATE:  2024    Subjective:     Chief Complaint   Patient presents with    Wound Check     LEFT LEG      HISTORY of PRESENT ILLNESS HPI   Amanda Ledesma is a 68 y.o. female who presents today in regards to follow up evaluation and treatment of wound/ulcer.  That patient's past medical, family and social hx were reviewed and changes were made if present.    History of Wound Context:  The patient has had a wound of her left ankle/calf which was first noted approximately 2021.  This has been treated local wound care. On their initial visit to the wound healing center, 22,  the patient has noted that the wound has been improving.  The patient has not had similar previous wounds in the past.      She started seeing Dr. Street in 2021 and than Dr. Gillis.  She was started in unna boot ~ 2021.  She has noticed some improvement since starting unna boot.  She is currently following with Dr. Haskins.      Pt is not on abx at time of initial visit, but has been treated with previously by podiatry.      She is not a DM.  She is not a smoker.  She denies hx of DVT, and per her report had recent us noting no evidence of dvt at [facility].  She also had arterial studies done.    I had previously seen her in the past in regards to left buttock thigh wound, which started as abscess.      Pt works at [employer] in the [employer] and is on her feet all day.    22  Reflux study - if significant findings will schedule for fu  Continue compression therapy [facility] per podiatry with aquacell dressing  Elevation  She does not have significant arterial occlusive disease  22  Patient has asked to continuing following with me going forward because of our previous relationship  She is going to let Dr. Schuler office know  She

## 2024-08-05 NOTE — DISCHARGE INSTRUCTIONS
Visit Discharge/Physician Orders     Discharge condition: Stable     Assessment of pain at discharge: yes     Anesthetic used: 4% lidocaine solution     Discharge to: Home     Left via:Private automobile     Accompanied by:self     ECF/HHA: n/a     Dressing Orders: LEFT MEDIAL LEG: Cleanse with normal saline, apply zinc to fiona wound, drawtex, ABD pad, and profore . Change weekly.        Treatment Orders: Eat a diet high in protein and vitamin C. Take a multiple vitamin daily unless contraindicated.     Dr. Duncan as needed.       Must wear slip on shoe to work due to wrap on leg!        Keep wrap dry at all times. If wrap becomes wet or falls 2 inches call [facility] ([phone])and may remove wrap and apply double tubigrip.      [facility] followup visit : 1 week __________________________________  (Please note your next appointment above and if you are unable to keep, kindly give a 24 hour notice. Thank you.)     Physician signature:__________________________     If you experience any of the following, please call the Wound Care Center during business hours:     * Increase in Pain  * Temperature over 101  * Increase in drainage from your wound  * Drainage with a foul odor  * Bleeding  * Increase in swelling  * Need for compression bandage changes due to slippage, breakthrough drainage.     If you need medical attention outside of the business hours of the Wound Care Centers please contact your PCP or go to the nearest emergency room.

## 2024-08-07 ENCOUNTER — HOSPITAL ENCOUNTER (OUTPATIENT)
Dept: WOUND CARE | Age: 69
Discharge: HOME | End: 2024-08-07
Attending: SURGERY
Payer: MEDICARE

## 2024-08-07 VITALS
WEIGHT: 157 LBS | DIASTOLIC BLOOD PRESSURE: 78 MMHG | HEIGHT: 68 IN | SYSTOLIC BLOOD PRESSURE: 142 MMHG | RESPIRATION RATE: 16 BRPM | BODY MASS INDEX: 23.79 KG/M2 | TEMPERATURE: 97.7 F | HEART RATE: 76 BPM

## 2024-08-07 DIAGNOSIS — I70.242 ATHEROSCLEROSIS OF NATIVE ARTERY OF LEFT LOWER EXTREMITY WITH ULCERATION OF CALF (HCC): ICD-10-CM

## 2024-08-07 DIAGNOSIS — L97.322 VARICOSE VEINS OF LEFT LOWER EXTREMITY WITH ULCER OF ANKLE WITH FAT LAYER EXPOSED (HCC): Primary | ICD-10-CM

## 2024-08-07 DIAGNOSIS — I83.023 VARICOSE VEINS OF LEFT LOWER EXTREMITY WITH ULCER OF ANKLE WITH FAT LAYER EXPOSED (HCC): Primary | ICD-10-CM

## 2024-08-07 DIAGNOSIS — I70.243 ATHEROSCLEROSIS OF NATIVE ARTERY OF LEFT LOWER EXTREMITY WITH ULCERATION OF ANKLE (HCC): Chronic | ICD-10-CM

## 2024-08-07 PROCEDURE — 11042 DBRDMT SUBQ TIS 1ST 20SQCM/<: CPT

## 2024-08-07 PROCEDURE — 11042 DBRDMT SUBQ TIS 1ST 20SQCM/<: CPT | Performed by: SURGERY

## 2024-08-07 RX ORDER — BETAMETHASONE DIPROPIONATE 0.05 %
OINTMENT (GRAM) TOPICAL ONCE
OUTPATIENT
Start: 2024-08-07 | End: 2024-08-07

## 2024-08-07 RX ORDER — GENTAMICIN SULFATE 1 MG/G
OINTMENT TOPICAL ONCE
OUTPATIENT
Start: 2024-08-07 | End: 2024-08-07

## 2024-08-07 RX ORDER — LIDOCAINE HYDROCHLORIDE 20 MG/ML
JELLY TOPICAL ONCE
OUTPATIENT
Start: 2024-08-07 | End: 2024-08-07

## 2024-08-07 RX ORDER — CLOBETASOL PROPIONATE 0.5 MG/G
OINTMENT TOPICAL ONCE
OUTPATIENT
Start: 2024-08-07 | End: 2024-08-07

## 2024-08-07 RX ORDER — LIDOCAINE HYDROCHLORIDE 40 MG/ML
SOLUTION TOPICAL ONCE
Status: COMPLETED | OUTPATIENT
Start: 2024-08-07 | End: 2024-08-07

## 2024-08-07 RX ORDER — IBUPROFEN 200 MG
TABLET ORAL ONCE
OUTPATIENT
Start: 2024-08-07 | End: 2024-08-07

## 2024-08-07 RX ORDER — TRIAMCINOLONE ACETONIDE 1 MG/G
OINTMENT TOPICAL ONCE
OUTPATIENT
Start: 2024-08-07 | End: 2024-08-07

## 2024-08-07 RX ORDER — LIDOCAINE HYDROCHLORIDE 40 MG/ML
SOLUTION TOPICAL ONCE
OUTPATIENT
Start: 2024-08-07 | End: 2024-08-07

## 2024-08-07 RX ORDER — LIDOCAINE 40 MG/G
CREAM TOPICAL ONCE
OUTPATIENT
Start: 2024-08-07 | End: 2024-08-07

## 2024-08-07 RX ORDER — BACITRACIN ZINC AND POLYMYXIN B SULFATE 500; 1000 [USP'U]/G; [USP'U]/G
OINTMENT TOPICAL ONCE
OUTPATIENT
Start: 2024-08-07 | End: 2024-08-07

## 2024-08-07 RX ORDER — GINSENG 100 MG
CAPSULE ORAL ONCE
OUTPATIENT
Start: 2024-08-07 | End: 2024-08-07

## 2024-08-07 RX ORDER — LIDOCAINE 50 MG/G
OINTMENT TOPICAL ONCE
OUTPATIENT
Start: 2024-08-07 | End: 2024-08-07

## 2024-08-07 RX ADMIN — LIDOCAINE HYDROCHLORIDE 20 ML: 40 SOLUTION TOPICAL at 08:10

## 2024-08-07 ASSESSMENT — PAIN DESCRIPTION - PROGRESSION: CLINICAL_PROGRESSION: NOT CHANGED

## 2024-08-07 ASSESSMENT — PAIN DESCRIPTION - DESCRIPTORS: DESCRIPTORS: STABBING

## 2024-08-07 ASSESSMENT — PAIN SCALES - GENERAL: PAINLEVEL_OUTOF10: 7

## 2024-08-07 ASSESSMENT — PAIN DESCRIPTION - FREQUENCY: FREQUENCY: CONTINUOUS

## 2024-08-07 ASSESSMENT — PAIN DESCRIPTION - LOCATION: LOCATION: ANKLE

## 2024-08-07 ASSESSMENT — PAIN DESCRIPTION - ONSET: ONSET: ON-GOING

## 2024-08-07 ASSESSMENT — PAIN DESCRIPTION - PAIN TYPE: TYPE: CHRONIC PAIN

## 2024-08-07 ASSESSMENT — PAIN - FUNCTIONAL ASSESSMENT: PAIN_FUNCTIONAL_ASSESSMENT: PREVENTS OR INTERFERES SOME ACTIVE ACTIVITIES AND ADLS

## 2024-08-07 ASSESSMENT — PAIN DESCRIPTION - ORIENTATION: ORIENTATION: LEFT

## 2024-08-07 NOTE — PROGRESS NOTES
and family as available.  I discussed the procedure, risks, benefits, complications, and alternatives of the procedure.  They understand and consent.  All questions were answered  Script for percocet 5/325 mg #28 prn q6 hrs - given, oarrs run  5/24/22  Angio, L PT and peroneal plasty  5/25/22  On iv abx  Wound appearance better  R groin no hematoma, L DP 2+, PT triphasic   6/1/22  Wound size and appearance better  6/8/22  Wound size and appearance better  Discussed with Dr Duncan - he will stop abx  6/15/22  Wound size slightly improvement, appearance better  6/22/22  Wounds sizes smaller  6/29/22  Wounds stable   Hypergranulation tissue present throughout wound beds    Continue drawtex, foam, ABD and profore   7/6/22  Wounds slightly improved  7/13/22  Both wounds slightly larger  Hyperkeratotic tissue debrided back  7/14/22  Patient came in due to drainage through her wrap  Dressing noted to have large amounts of yellow/green drainage throughout  Cultures obtained    7/20/22  Culture reviewed large growth S aureus and Pseudomonas  Disc with Dr Hannon - will start clindamycin 600 mg tid for staph  He will see her in office in regards to IV for pseudomonas  Wounds stable in size  Change to aquacell ag  7/27/22  Dr Duncan to see  Medial slightly larger, lateral slightly smaller  8/3/22  Dr Duncan saw her felt wound appearance better - will hold off on iv for now  Medial slightly improved, lateral stable  8/10/2022  Wounds stable  8/17/22  Wound stable, more pain with debridement  Discussed OR for debridement and possible advanced skin therapy - pt agreeable  8/24/22  Debridement, kerecise application  8/31/22  Wound appearance improved  Medial wound improved, lateral stable  9/7/22  Wounds are smaller  9/14/22  Wound stable  Enodfrom and drawtek  9/21/2022  Wound debrided, stable according to the measurements  9/28/22  Wound larger  Culture done  Discussed OR  Aquacell ag change   10/5/22  Wound slightly larger  Percocet

## 2024-08-08 NOTE — DISCHARGE INSTRUCTIONS
Visit Discharge/Physician Orders     Discharge condition: Stable     Assessment of pain at discharge: yes     Anesthetic used: 4% lidocaine solution     Discharge to: Home     Left via:Private automobile     Accompanied by:self     ECF/HHA: n/a     Dressing Orders: LEFT MEDIAL LEG: Cleanse with normal saline, apply zinc to fiona wound, drawtex, ABD pad, and profore . Change weekly.        Treatment Orders: Eat a diet high in protein and vitamin C. Take a multiple vitamin daily unless contraindicated.     Dr. Duncan as needed.       Must wear slip on shoe to work due to wrap on leg!        Keep wrap dry at all times. If wrap becomes wet or falls 2 inches call [facility] ([phone])and may remove wrap and apply double tubigrip.      [facility] followup visit : 1 week __________________________________  (Please note your next appointment above and if you are unable to keep, kindly give a 24 hour notice. Thank you.)     Physician signature:__________________________     If you experience any of the following, please call the Wound Care Center during business hours:     * Increase in Pain  * Temperature over 101  * Increase in drainage from your wound  * Drainage with a foul odor  * Bleeding  * Increase in swelling  * Need for compression bandage changes due to slippage, breakthrough drainage.     If you need medical attention outside of the business hours of the Wound Care Centers please contact your PCP or go to the nearest emergency room.

## 2024-08-14 ENCOUNTER — HOSPITAL ENCOUNTER (OUTPATIENT)
Dept: WOUND CARE | Age: 69
Discharge: HOME | End: 2024-08-14
Attending: SURGERY
Payer: MEDICARE

## 2024-08-14 VITALS
SYSTOLIC BLOOD PRESSURE: 165 MMHG | HEIGHT: 68 IN | RESPIRATION RATE: 18 BRPM | DIASTOLIC BLOOD PRESSURE: 86 MMHG | HEART RATE: 76 BPM | WEIGHT: 157 LBS | TEMPERATURE: 97.2 F | BODY MASS INDEX: 23.79 KG/M2

## 2024-08-14 DIAGNOSIS — I70.242 ATHEROSCLEROSIS OF NATIVE ARTERY OF LEFT LOWER EXTREMITY WITH ULCERATION OF CALF (HCC): ICD-10-CM

## 2024-08-14 DIAGNOSIS — I83.023 VARICOSE VEINS OF LEFT LOWER EXTREMITY WITH ULCER OF ANKLE WITH FAT LAYER EXPOSED (HCC): Primary | ICD-10-CM

## 2024-08-14 DIAGNOSIS — L97.322 VARICOSE VEINS OF LEFT LOWER EXTREMITY WITH ULCER OF ANKLE WITH FAT LAYER EXPOSED (HCC): Primary | ICD-10-CM

## 2024-08-14 PROCEDURE — 11042 DBRDMT SUBQ TIS 1ST 20SQCM/<: CPT | Performed by: SURGERY

## 2024-08-14 PROCEDURE — 11042 DBRDMT SUBQ TIS 1ST 20SQCM/<: CPT

## 2024-08-14 RX ORDER — LIDOCAINE 40 MG/G
CREAM TOPICAL ONCE
OUTPATIENT
Start: 2024-08-14 | End: 2024-08-14

## 2024-08-14 RX ORDER — LIDOCAINE HYDROCHLORIDE 20 MG/ML
JELLY TOPICAL ONCE
OUTPATIENT
Start: 2024-08-14 | End: 2024-08-14

## 2024-08-14 RX ORDER — LIDOCAINE 50 MG/G
OINTMENT TOPICAL ONCE
OUTPATIENT
Start: 2024-08-14 | End: 2024-08-14

## 2024-08-14 RX ORDER — IBUPROFEN 200 MG
TABLET ORAL ONCE
OUTPATIENT
Start: 2024-08-14 | End: 2024-08-14

## 2024-08-14 RX ORDER — GENTAMICIN SULFATE 1 MG/G
OINTMENT TOPICAL ONCE
OUTPATIENT
Start: 2024-08-14 | End: 2024-08-14

## 2024-08-14 RX ORDER — BETAMETHASONE DIPROPIONATE 0.05 %
OINTMENT (GRAM) TOPICAL ONCE
OUTPATIENT
Start: 2024-08-14 | End: 2024-08-14

## 2024-08-14 RX ORDER — LIDOCAINE HYDROCHLORIDE 40 MG/ML
SOLUTION TOPICAL ONCE
OUTPATIENT
Start: 2024-08-14 | End: 2024-08-14

## 2024-08-14 RX ORDER — TRIAMCINOLONE ACETONIDE 1 MG/G
OINTMENT TOPICAL ONCE
OUTPATIENT
Start: 2024-08-14 | End: 2024-08-14

## 2024-08-14 RX ORDER — BACITRACIN ZINC AND POLYMYXIN B SULFATE 500; 1000 [USP'U]/G; [USP'U]/G
OINTMENT TOPICAL ONCE
OUTPATIENT
Start: 2024-08-14 | End: 2024-08-14

## 2024-08-14 RX ORDER — LIDOCAINE HYDROCHLORIDE 40 MG/ML
SOLUTION TOPICAL ONCE
Status: COMPLETED | OUTPATIENT
Start: 2024-08-14 | End: 2024-08-14

## 2024-08-14 RX ORDER — GINSENG 100 MG
CAPSULE ORAL ONCE
OUTPATIENT
Start: 2024-08-14 | End: 2024-08-14

## 2024-08-14 RX ORDER — OXYCODONE HYDROCHLORIDE AND ACETAMINOPHEN 5; 325 MG/1; MG/1
1 TABLET ORAL EVERY 6 HOURS PRN
Qty: 28 TABLET | Refills: 0 | Status: SHIPPED | OUTPATIENT
Start: 2024-08-20 | End: 2024-08-27

## 2024-08-14 RX ORDER — CLOBETASOL PROPIONATE 0.5 MG/G
OINTMENT TOPICAL ONCE
OUTPATIENT
Start: 2024-08-14 | End: 2024-08-14

## 2024-08-14 RX ADMIN — LIDOCAINE HYDROCHLORIDE 10 ML: 40 SOLUTION TOPICAL at 07:31

## 2024-08-14 ASSESSMENT — PAIN DESCRIPTION - ONSET: ONSET: ON-GOING

## 2024-08-14 ASSESSMENT — PAIN DESCRIPTION - PAIN TYPE: TYPE: CHRONIC PAIN

## 2024-08-14 ASSESSMENT — PAIN SCALES - GENERAL: PAINLEVEL_OUTOF10: 7

## 2024-08-14 ASSESSMENT — PAIN DESCRIPTION - ORIENTATION: ORIENTATION: LEFT

## 2024-08-14 ASSESSMENT — PAIN DESCRIPTION - LOCATION: LOCATION: ANKLE

## 2024-08-14 ASSESSMENT — PAIN DESCRIPTION - DESCRIPTORS: DESCRIPTORS: STABBING

## 2024-08-14 ASSESSMENT — PAIN DESCRIPTION - FREQUENCY: FREQUENCY: CONTINUOUS

## 2024-08-14 ASSESSMENT — PAIN - FUNCTIONAL ASSESSMENT: PAIN_FUNCTIONAL_ASSESSMENT: PREVENTS OR INTERFERES SOME ACTIVE ACTIVITIES AND ADLS

## 2024-08-14 NOTE — PROGRESS NOTES
family as available.  I discussed the procedure, risks, benefits, complications, and alternatives of the procedure.  They understand and consent.  All questions were answered  Script for percocet 5/325 mg #28 prn q6 hrs - given, oarrs run  5/24/22  Angio, L PT and peroneal plasty  5/25/22  On iv abx  Wound appearance better  R groin no hematoma, L DP 2+, PT triphasic   6/1/22  Wound size and appearance better  6/8/22  Wound size and appearance better  Discussed with Dr Duncan - he will stop abx  6/15/22  Wound size slightly improvement, appearance better  6/22/22  Wounds sizes smaller  6/29/22  Wounds stable   Hypergranulation tissue present throughout wound beds    Continue drawtex, foam, ABD and profore   7/6/22  Wounds slightly improved  7/13/22  Both wounds slightly larger  Hyperkeratotic tissue debrided back  7/14/22  Patient came in due to drainage through her wrap  Dressing noted to have large amounts of yellow/green drainage throughout  Cultures obtained    7/20/22  Culture reviewed large growth S aureus and Pseudomonas  Disc with Dr Hannon - will start clindamycin 600 mg tid for staph  He will see her in office in regards to IV for pseudomonas  Wounds stable in size  Change to aquacell ag  7/27/22  Dr Duncan to see  Medial slightly larger, lateral slightly smaller  8/3/22  Dr Duncan saw her felt wound appearance better - will hold off on iv for now  Medial slightly improved, lateral stable  8/10/2022  Wounds stable  8/17/22  Wound stable, more pain with debridement  Discussed OR for debridement and possible advanced skin therapy - pt agreeable  8/24/22  Debridement, kerecise application  8/31/22  Wound appearance improved  Medial wound improved, lateral stable  9/7/22  Wounds are smaller  9/14/22  Wound stable  Enodfrom and drawtek  9/21/2022  Wound debrided, stable according to the measurements  9/28/22  Wound larger  Culture done  Discussed OR  Aquacell ag change   10/5/22  Wound slightly larger  Percocet

## 2024-08-15 NOTE — DISCHARGE INSTRUCTIONS
Visit Discharge/Physician Orders     Discharge condition: Stable     Assessment of pain at discharge: yes     Anesthetic used: 4% lidocaine solution     Discharge to: Home     Left via:Private automobile     Accompanied by:self     ECF/HHA: n/a     Dressing Orders: LEFT MEDIAL LEG: Cleanse with normal saline, apply zinc to fiona wound, drawtex, ABD pad, and profore . Change weekly.        Treatment Orders: Eat a diet high in protein and vitamin C. Take a multiple vitamin daily unless contraindicated.     Dr. Duncan as needed.       Must wear slip on shoe to work due to wrap on leg!        Keep wrap dry at all times. If wrap becomes wet or falls 2 inches call [facility] ([phone])and may remove wrap and apply double tubigrip.      [facility] followup visit : 1 week __________________________________  (Please note your next appointment above and if you are unable to keep, kindly give a 24 hour notice. Thank you.)     Physician signature:__________________________     If you experience any of the following, please call the Wound Care Center during business hours:     * Increase in Pain  * Temperature over 101  * Increase in drainage from your wound  * Drainage with a foul odor  * Bleeding  * Increase in swelling  * Need for compression bandage changes due to slippage, breakthrough drainage.     If you need medical attention outside of the business hours of the Wound Care Centers please contact your PCP or go to the nearest emergency room.

## 2024-08-21 ENCOUNTER — HOSPITAL ENCOUNTER (OUTPATIENT)
Dept: WOUND CARE | Age: 69
Discharge: HOME | End: 2024-08-21
Attending: SURGERY
Payer: MEDICARE

## 2024-08-21 ENCOUNTER — TELEPHONE (OUTPATIENT)
Dept: FAMILY MEDICINE CLINIC | Age: 69
End: 2024-08-21

## 2024-08-21 VITALS
RESPIRATION RATE: 16 BRPM | WEIGHT: 157 LBS | HEIGHT: 68 IN | SYSTOLIC BLOOD PRESSURE: 142 MMHG | BODY MASS INDEX: 23.79 KG/M2 | HEART RATE: 76 BPM | DIASTOLIC BLOOD PRESSURE: 74 MMHG | TEMPERATURE: 97 F

## 2024-08-21 DIAGNOSIS — I70.242 ATHEROSCLEROSIS OF NATIVE ARTERY OF LEFT LOWER EXTREMITY WITH ULCERATION OF CALF (HCC): ICD-10-CM

## 2024-08-21 DIAGNOSIS — I83.023 VARICOSE VEINS OF LEFT LOWER EXTREMITY WITH ULCER OF ANKLE WITH FAT LAYER EXPOSED (HCC): Primary | ICD-10-CM

## 2024-08-21 DIAGNOSIS — L97.322 VARICOSE VEINS OF LEFT LOWER EXTREMITY WITH ULCER OF ANKLE WITH FAT LAYER EXPOSED (HCC): Primary | ICD-10-CM

## 2024-08-21 PROCEDURE — 11042 DBRDMT SUBQ TIS 1ST 20SQCM/<: CPT | Performed by: SURGERY

## 2024-08-21 PROCEDURE — 11042 DBRDMT SUBQ TIS 1ST 20SQCM/<: CPT

## 2024-08-21 RX ORDER — LIDOCAINE HYDROCHLORIDE 40 MG/ML
SOLUTION TOPICAL ONCE
Status: COMPLETED | OUTPATIENT
Start: 2024-08-21 | End: 2024-08-21

## 2024-08-21 RX ORDER — LIDOCAINE 50 MG/G
OINTMENT TOPICAL ONCE
OUTPATIENT
Start: 2024-08-21 | End: 2024-08-21

## 2024-08-21 RX ORDER — TRIAMCINOLONE ACETONIDE 1 MG/G
OINTMENT TOPICAL ONCE
OUTPATIENT
Start: 2024-08-21 | End: 2024-08-21

## 2024-08-21 RX ORDER — BETAMETHASONE DIPROPIONATE 0.05 %
OINTMENT (GRAM) TOPICAL ONCE
OUTPATIENT
Start: 2024-08-21 | End: 2024-08-21

## 2024-08-21 RX ORDER — LIDOCAINE HYDROCHLORIDE 20 MG/ML
JELLY TOPICAL ONCE
OUTPATIENT
Start: 2024-08-21 | End: 2024-08-21

## 2024-08-21 RX ORDER — IBUPROFEN 200 MG
TABLET ORAL ONCE
OUTPATIENT
Start: 2024-08-21 | End: 2024-08-21

## 2024-08-21 RX ORDER — BACITRACIN ZINC AND POLYMYXIN B SULFATE 500; 1000 [USP'U]/G; [USP'U]/G
OINTMENT TOPICAL ONCE
OUTPATIENT
Start: 2024-08-21 | End: 2024-08-21

## 2024-08-21 RX ORDER — LIDOCAINE HYDROCHLORIDE 40 MG/ML
SOLUTION TOPICAL ONCE
OUTPATIENT
Start: 2024-08-21 | End: 2024-08-21

## 2024-08-21 RX ORDER — CLOBETASOL PROPIONATE 0.5 MG/G
OINTMENT TOPICAL ONCE
OUTPATIENT
Start: 2024-08-21 | End: 2024-08-21

## 2024-08-21 RX ORDER — LIDOCAINE 40 MG/G
CREAM TOPICAL ONCE
OUTPATIENT
Start: 2024-08-21 | End: 2024-08-21

## 2024-08-21 RX ORDER — GENTAMICIN SULFATE 1 MG/G
OINTMENT TOPICAL ONCE
OUTPATIENT
Start: 2024-08-21 | End: 2024-08-21

## 2024-08-21 RX ORDER — GINSENG 100 MG
CAPSULE ORAL ONCE
OUTPATIENT
Start: 2024-08-21 | End: 2024-08-21

## 2024-08-21 RX ADMIN — LIDOCAINE HYDROCHLORIDE 15 ML: 40 SOLUTION TOPICAL at 07:56

## 2024-08-21 ASSESSMENT — PAIN DESCRIPTION - FREQUENCY: FREQUENCY: CONTINUOUS

## 2024-08-21 ASSESSMENT — PAIN DESCRIPTION - DESCRIPTORS: DESCRIPTORS: STABBING

## 2024-08-21 ASSESSMENT — PAIN - FUNCTIONAL ASSESSMENT: PAIN_FUNCTIONAL_ASSESSMENT: ACTIVITIES ARE NOT PREVENTED

## 2024-08-21 ASSESSMENT — PAIN DESCRIPTION - ORIENTATION: ORIENTATION: LEFT;LOWER

## 2024-08-21 ASSESSMENT — PAIN DESCRIPTION - LOCATION: LOCATION: LEG

## 2024-08-21 ASSESSMENT — PAIN DESCRIPTION - PROGRESSION: CLINICAL_PROGRESSION: NOT CHANGED

## 2024-08-21 ASSESSMENT — PAIN DESCRIPTION - PAIN TYPE: TYPE: CHRONIC PAIN

## 2024-08-21 NOTE — TELEPHONE ENCOUNTER
Attempted to contact, Madi/nurse from [facility].  On hold will attempt to contact at another time    Returned call back to [facility], spoke with Madison and informed that patient is not on cholesterol medication as there has been no diagnoses, per dr patel to be on a cholesterol medication.  Madison voiced understanding and documented into their chart of patient.

## 2024-08-21 NOTE — PROGRESS NOTES
Wound Healing Center Followup Visit Note    Referring Physician : Rosenda Cormier MD  Amanda Ledesma  MEDICAL RECORD NUMBER:  [id number]  AGE: 68 y.o.   GENDER: female  : 1955  EPISODE DATE:  2024    Subjective:     Chief Complaint   Patient presents with    Wound Check     Left lower leg      HISTORY of PRESENT ILLNESS HPI   Amanda Ledesma is a 68 y.o. female who presents today in regards to follow up evaluation and treatment of wound/ulcer.  That patient's past medical, family and social hx were reviewed and changes were made if present.    History of Wound Context:  The patient has had a wound of her left ankle/calf which was first noted approximately 2021.  This has been treated local wound care. On their initial visit to the wound healing center, 22,  the patient has noted that the wound has been improving.  The patient has not had similar previous wounds in the past.      She started seeing Dr. Street in 2021 and than Dr. Gillis.  She was started in unna boot ~ 2021.  She has noticed some improvement since starting [facility] boot.  She is currently following with Dr. Haskins.      Pt is not on abx at time of initial visit, but has been treated with previously by podiatry.      She is not a DM.  She is not a smoker.  She denies hx of DVT, and per her report had recent us noting no evidence of dvt at [facility].  She also had arterial studies done.    I had previously seen her in the past in regards to left buttock thigh wound, which started as abscess.      Pt works at [employer] in the [employer] and is on her feet all day.    22  Reflux study - if significant findings will schedule for fu  Continue compression therapy [facility] per podiatry with aquacell dressing  Elevation  She does not have significant arterial occlusive disease  22  Patient has asked to continuing following with me going forward because of our previous relationship  She is going to let Dr. Schuler office

## 2024-08-21 NOTE — TELEPHONE ENCOUNTER
----- Message from Melody YOUNG sent at 8/21/2024  9:11 AM EDT -----  Regarding: ECC Message to Provider  ECC Message to Provider    Relationship to Patient: Covered Entity /Shelsuzie /nurse  from [facility]     Additional Information / Madi /nurse  from [facility] want to know if the office receive the request  for cholesterol medication of the patient  that was fax last August 15,2024.  --------------------------------------------------------------------------------------------------------------------------    Call Back Information: OK to leave message on voicemail  Preferred Call Back Number: Phone [phone]

## 2024-08-22 NOTE — DISCHARGE INSTRUCTIONS
Visit Discharge/Physician Orders     Discharge condition: Stable     Assessment of pain at discharge: yes     Anesthetic used: 4% lidocaine solution     Discharge to: Home     Left via:Private automobile     Accompanied by:self     ECF/HHA: n/a     Dressing Orders: LEFT MEDIAL LEG: Cleanse with normal saline, apply zinc to fiona wound, drawtex, ABD pad, and profore . Change weekly.        Treatment Orders: Eat a diet high in protein and vitamin C. Take a multiple vitamin daily unless contraindicated.     Dr. Duncan as needed.       Must wear slip on shoe to work due to wrap on leg!        Keep wrap dry at all times. If wrap becomes wet or falls 2 inches call [facility] ([phone])and may remove wrap and apply double tubigrip.      [facility] followup visit : 1 week __________________________________  (Please note your next appointment above and if you are unable to keep, kindly give a 24 hour notice. Thank you.)     Physician signature:__________________________     If you experience any of the following, please call the Wound Care Center during business hours:     * Increase in Pain  * Temperature over 101  * Increase in drainage from your wound  * Drainage with a foul odor  * Bleeding  * Increase in swelling  * Need for compression bandage changes due to slippage, breakthrough drainage.     If you need medical attention outside of the business hours of the Wound Care Centers please contact your PCP or go to the nearest emergency room.

## 2024-08-27 ENCOUNTER — OFFICE VISIT (OUTPATIENT)
Dept: FAMILY MEDICINE CLINIC | Age: 69
End: 2024-08-27
Payer: MEDICARE

## 2024-08-27 VITALS
HEART RATE: 80 BPM | DIASTOLIC BLOOD PRESSURE: 86 MMHG | SYSTOLIC BLOOD PRESSURE: 144 MMHG | HEIGHT: 68 IN | BODY MASS INDEX: 25.49 KG/M2 | WEIGHT: 168.2 LBS | OXYGEN SATURATION: 97 % | TEMPERATURE: 97.6 F

## 2024-08-27 DIAGNOSIS — U07.1 COVID-19: Primary | ICD-10-CM

## 2024-08-27 DIAGNOSIS — R09.81 SINUS CONGESTION: ICD-10-CM

## 2024-08-27 LAB
Lab: ABNORMAL
PERFORMING INSTRUMENT: ABNORMAL
QC PASS/FAIL: ABNORMAL
SARS-COV-2, POC: DETECTED

## 2024-08-27 PROCEDURE — 3077F SYST BP >= 140 MM HG: CPT | Performed by: PHYSICIAN ASSISTANT

## 2024-08-27 PROCEDURE — 1124F ACP DISCUSS-NO DSCNMKR DOCD: CPT | Performed by: PHYSICIAN ASSISTANT

## 2024-08-27 PROCEDURE — 87426 SARSCOV CORONAVIRUS AG IA: CPT | Performed by: PHYSICIAN ASSISTANT

## 2024-08-27 PROCEDURE — G8400 PT W/DXA NO RESULTS DOC: HCPCS | Performed by: PHYSICIAN ASSISTANT

## 2024-08-27 PROCEDURE — G8427 DOCREV CUR MEDS BY ELIG CLIN: HCPCS | Performed by: PHYSICIAN ASSISTANT

## 2024-08-27 PROCEDURE — 99213 OFFICE O/P EST LOW 20 MIN: CPT | Performed by: PHYSICIAN ASSISTANT

## 2024-08-27 PROCEDURE — 3017F COLORECTAL CA SCREEN DOC REV: CPT | Performed by: PHYSICIAN ASSISTANT

## 2024-08-27 PROCEDURE — 1090F PRES/ABSN URINE INCON ASSESS: CPT | Performed by: PHYSICIAN ASSISTANT

## 2024-08-27 PROCEDURE — 3079F DIAST BP 80-89 MM HG: CPT | Performed by: PHYSICIAN ASSISTANT

## 2024-08-27 PROCEDURE — 1036F TOBACCO NON-USER: CPT | Performed by: PHYSICIAN ASSISTANT

## 2024-08-27 PROCEDURE — G8419 CALC BMI OUT NRM PARAM NOF/U: HCPCS | Performed by: PHYSICIAN ASSISTANT

## 2024-08-27 RX ORDER — BENZONATATE 100 MG/1
100 CAPSULE ORAL 3 TIMES DAILY PRN
Qty: 21 CAPSULE | Refills: 0 | Status: SHIPPED | OUTPATIENT
Start: 2024-08-27 | End: 2024-09-03

## 2024-08-27 RX ORDER — HYDROXYZINE HYDROCHLORIDE 10 MG/5ML
4 SYRUP ORAL EVERY 6 HOURS PRN
Qty: 20 TABLET | Refills: 0 | Status: SHIPPED | OUTPATIENT
Start: 2024-08-27

## 2024-08-27 RX ORDER — PREDNISONE 10 MG/1
TABLET ORAL
Qty: 18 TABLET | Refills: 0 | Status: SHIPPED | OUTPATIENT
Start: 2024-08-27

## 2024-08-27 NOTE — PROGRESS NOTES
24  Amanda Ledesma : 1955 Sex: female  Age 68 y.o.      Subjective:  Chief Complaint   Patient presents with    Congestion     Started yesterday    Drainage    Headache         HPI:   HPI  Amanda Ledesma , 68 y.o. female presents to express care for evaluation of congestion, drainage, headache.  The patient started with the symptoms yesterday.  The patient has had the symptoms ongoing since yesterday.  The patient has had increased congestion, drainage, headache.  The patient has been increasingly fatigued.  The patient states that she had a little bit of sneezing congestion on  but most of the symptoms started yesterday.  The patient is not really have much sputum production.  The patient is not currently on any antibiotics.  He has not really been exposed to any other sick contacts      ROS:   Unless otherwise stated in this report the patient's positive and negative responses for review of systems for constitutional, eyes, ENT, cardiovascular, respiratory, gastrointestinal, neurological, , musculoskeletal, and integument systems and related systems to the presenting problem are either stated in the history of present illness or were not pertinent or were negative for the symptoms and/or complaints related to the presenting medical problem.  Positives and pertinent negatives as per HPI.  All others reviewed and are negative.      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 Although effort is taken to assure the accuracy ofthis document, it is possible that grammatical, syntax, or spelling errors may occur.

## 2024-08-28 ENCOUNTER — HOSPITAL ENCOUNTER (OUTPATIENT)
Dept: WOUND CARE | Age: 69
Discharge: HOME | End: 2024-08-28
Attending: SURGERY
Payer: MEDICARE

## 2024-08-28 VITALS
BODY MASS INDEX: 25.46 KG/M2 | TEMPERATURE: 97.5 F | SYSTOLIC BLOOD PRESSURE: 171 MMHG | HEIGHT: 68 IN | WEIGHT: 168 LBS | DIASTOLIC BLOOD PRESSURE: 77 MMHG | HEART RATE: 68 BPM

## 2024-08-28 DIAGNOSIS — L97.322 VARICOSE VEINS OF LEFT LOWER EXTREMITY WITH ULCER OF ANKLE WITH FAT LAYER EXPOSED (HCC): Primary | ICD-10-CM

## 2024-08-28 DIAGNOSIS — I83.023 VARICOSE VEINS OF LEFT LOWER EXTREMITY WITH ULCER OF ANKLE WITH FAT LAYER EXPOSED (HCC): Primary | ICD-10-CM

## 2024-08-28 DIAGNOSIS — I70.242 ATHEROSCLEROSIS OF NATIVE ARTERY OF LEFT LOWER EXTREMITY WITH ULCERATION OF CALF (HCC): ICD-10-CM

## 2024-08-28 PROCEDURE — 11042 DBRDMT SUBQ TIS 1ST 20SQCM/<: CPT

## 2024-08-28 PROCEDURE — 11042 DBRDMT SUBQ TIS 1ST 20SQCM/<: CPT | Performed by: SURGERY

## 2024-08-28 RX ORDER — LIDOCAINE HYDROCHLORIDE 40 MG/ML
SOLUTION TOPICAL ONCE
Status: COMPLETED | OUTPATIENT
Start: 2024-08-28 | End: 2024-08-28

## 2024-08-28 RX ORDER — LIDOCAINE HYDROCHLORIDE 20 MG/ML
JELLY TOPICAL ONCE
OUTPATIENT
Start: 2024-08-28 | End: 2024-08-28

## 2024-08-28 RX ORDER — GENTAMICIN SULFATE 1 MG/G
OINTMENT TOPICAL ONCE
OUTPATIENT
Start: 2024-08-28 | End: 2024-08-28

## 2024-08-28 RX ORDER — LIDOCAINE 40 MG/G
CREAM TOPICAL ONCE
OUTPATIENT
Start: 2024-08-28 | End: 2024-08-28

## 2024-08-28 RX ORDER — LIDOCAINE 50 MG/G
OINTMENT TOPICAL ONCE
OUTPATIENT
Start: 2024-08-28 | End: 2024-08-28

## 2024-08-28 RX ORDER — SILVER SULFADIAZINE 10 MG/G
CREAM TOPICAL ONCE
OUTPATIENT
Start: 2024-08-28 | End: 2024-08-28

## 2024-08-28 RX ORDER — BETAMETHASONE DIPROPIONATE 0.05 %
OINTMENT (GRAM) TOPICAL ONCE
OUTPATIENT
Start: 2024-08-28 | End: 2024-08-28

## 2024-08-28 RX ORDER — CLOBETASOL PROPIONATE 0.5 MG/G
OINTMENT TOPICAL ONCE
OUTPATIENT
Start: 2024-08-28 | End: 2024-08-28

## 2024-08-28 RX ORDER — LIDOCAINE HYDROCHLORIDE 40 MG/ML
SOLUTION TOPICAL ONCE
OUTPATIENT
Start: 2024-08-28 | End: 2024-08-28

## 2024-08-28 RX ORDER — NEOMYCIN/BACITRACIN/POLYMYXINB 3.5-400-5K
OINTMENT (GRAM) TOPICAL ONCE
OUTPATIENT
Start: 2024-08-28 | End: 2024-08-28

## 2024-08-28 RX ORDER — GINSENG 100 MG
CAPSULE ORAL ONCE
OUTPATIENT
Start: 2024-08-28 | End: 2024-08-28

## 2024-08-28 RX ORDER — TRIAMCINOLONE ACETONIDE 1 MG/G
OINTMENT TOPICAL ONCE
OUTPATIENT
Start: 2024-08-28 | End: 2024-08-28

## 2024-08-28 RX ORDER — BACITRACIN ZINC AND POLYMYXIN B SULFATE 500; 1000 [USP'U]/G; [USP'U]/G
OINTMENT TOPICAL ONCE
OUTPATIENT
Start: 2024-08-28 | End: 2024-08-28

## 2024-08-28 RX ORDER — OXYCODONE AND ACETAMINOPHEN 5; 325 MG/1; MG/1
1 TABLET ORAL EVERY 6 HOURS PRN
Qty: 28 TABLET | Refills: 0 | Status: SHIPPED | OUTPATIENT
Start: 2024-08-28 | End: 2024-09-04

## 2024-08-28 RX ORDER — MUPIROCIN 20 MG/G
OINTMENT TOPICAL ONCE
OUTPATIENT
Start: 2024-08-28 | End: 2024-08-28

## 2024-08-28 RX ADMIN — LIDOCAINE HYDROCHLORIDE 10 ML: 40 SOLUTION TOPICAL at 07:51

## 2024-08-28 ASSESSMENT — PAIN DESCRIPTION - DESCRIPTORS: DESCRIPTORS: STABBING

## 2024-08-28 ASSESSMENT — PAIN DESCRIPTION - LOCATION: LOCATION: LEG

## 2024-08-28 ASSESSMENT — PAIN DESCRIPTION - FREQUENCY: FREQUENCY: CONTINUOUS

## 2024-08-28 ASSESSMENT — PAIN DESCRIPTION - PAIN TYPE: TYPE: CHRONIC PAIN

## 2024-08-28 ASSESSMENT — PAIN DESCRIPTION - ORIENTATION: ORIENTATION: LEFT;LOWER

## 2024-08-28 ASSESSMENT — PAIN SCALES - GENERAL: PAINLEVEL_OUTOF10: 7

## 2024-08-28 NOTE — PLAN OF CARE
Problem: Cognitive:  Goal: Knowledge of wound care  Description: Knowledge of wound care  Outcome: Progressing  Goal: Understands risk factors for wounds  Description: Understands risk factors for wounds  Outcome: Progressing     Problem: Compression therapy:  Goal: Will be free from complications associated with compression therapy  Description: Will be free from complications associated with compression therapy  Outcome: Progressing

## 2024-08-28 NOTE — PROGRESS NOTES
daily for 3 days 18 tablet 0    chlorpheniramine (ALLER-CHLOR) 4 MG tablet Take 1 tablet by mouth every 6 hours as needed for Allergies 20 tablet 0    benzonatate (TESSALON) 100 MG capsule Take 1 capsule by mouth 3 times daily as needed for Cough 21 capsule 0    Ascorbic Acid (VITAMIN C) 250 MG tablet Take 1 tablet by mouth daily Over the counter      ferrous sulfate (IRON 325) 325 (65 Fe) MG tablet Take 1 tablet by mouth daily (with breakfast)      hydrOXYzine HCl (ATARAX) 25 MG tablet take 1 tablet  tablet 1    butalbital-acetaminophen-caffeine (FIORICET, ESGIC) -40 MG per tablet Take 1 tablet by mouth 3 times daily as needed for Headaches or Migraine Indications: Cluster Headache 90 tablet 5    acyclovir (ZOVIRAX) 200 MG capsule Take 1 capsule by mouth daily 90 capsule 1    pantoprazole (PROTONIX) 40 MG tablet Take 1 tablet by mouth every morning (before breakfast) 90 tablet 1    EPINEPHrine (EPIPEN) 0.3 MG/0.3ML SOAJ injection Use as directed for allergic reaction 1 each 5    aspirin-acetaminophen-caffeine (EXCEDRIN MIGRAINE) 250-250-65 MG per tablet Take 1 tablet by mouth as needed for Headaches 300 tablet 3     No current facility-administered medications on file prior to encounter.       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) - Primary    Relevant Medications     oxyCODONE-acetaminophen (PERCOCET) 5-325 MG per tablet    Other Relevant Orders    Initiate Outpatient Wound Care Protocol    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) (Chronic)    Relevant Orders    Initiate Outpatient Wound Care Protocol       Pre Debridement Measurements:  Are located in the Wound/Ulcer Documentation Flow Sheet  Post Debridement Measurements:  Wound/Ulcer Descriptions are Pre Debridement except measurements:     Wound 08/10/16 Other (Comment) Buttocks Left;Distal #2 acq 8/4/16 (Active)   Number of days: 2939       Wound 08/31/16 Buttocks Left;Proximal #3 aquired 8-27-26 (Active)   Number of days: 2918       Wound 02/02/22 Ankle Left;Medial #1 (Active)   Wound Image   07/17/24 0744   Wound Etiology Venous 10/18/23 0824   Dressing Status New dressing applied 08/21/24 0834   Wound Cleansed Cleansed with saline 08/21/24 0834   Dressing/Treatment ABD;Dry dressing 08/21/24 0834   Offloading for Diabetic Foot Ulcers Offloading ordered 07/03/24 0917   Wound Length (cm) 7.1 cm 08/28/24 0745   Wound Width (cm) 2.9 cm 08/28/24 0745   Wound Depth (cm) 0.1 cm 08/28/24 0745   Wound Surface Area (cm^2) 20.59 cm^2 08/28/24 0745   Change in Wound Size % (l*w) 23.91 08/28/24 0745   Wound Volume (cm^3) 2.059 cm^3 08/28/24 0745   Wound Healing % 24 08/28/24 0745   Post-Procedure Length (cm) 7.1 cm 08/28/24 0831   Post-Procedure Width (cm) 3 cm 08/28/24 0831   Post-Procedure Depth (cm) 0.1 cm 08/28/24 0831   Post-Procedure Surface Area (cm^2) 21.3 cm^2 08/28/24 0831   Post-Procedure Volume (cm^3) 2.13 cm^3 08/28/24 0831   Wound Assessment Fibrin;Granulation tissue 08/28/24 0745   Drainage Amount Large (50-75% saturated) 08/28/24 0745   Drainage Description Green;Yellow;Serosanguinous 08/28/24 0745   Odor None 08/28/24 0745   Melany-wound Assessment Blanchable erythema 08/28/24 0745   Margins Attached edges 06/26/24 0741   Wound Thickness Description not for Pressure Injury Full thickness

## 2024-08-29 NOTE — DISCHARGE INSTRUCTIONS
Visit Discharge/Physician Orders     Discharge condition: Stable     Assessment of pain at discharge: yes     Anesthetic used: 4% lidocaine solution     Discharge to: Home     Left via:Private automobile     Accompanied by:self     ECF/HHA: n/a     Dressing Orders: LEFT MEDIAL LEG: Cleanse with normal saline, apply zinc to fiona wound, drawtex, ABD pad, and profore . Change weekly.        Treatment Orders: Eat a diet high in protein and vitamin C. Take a multiple vitamin daily unless contraindicated.     Dr. Duncan as needed.       Must wear slip on shoe to work due to wrap on leg!        Keep wrap dry at all times. If wrap becomes wet or falls 2 inches call [facility] ([phone])and may remove wrap and apply double tubigrip.      [facility] followup visit : 1 week __________________________________  (Please note your next appointment above and if you are unable to keep, kindly give a 24 hour notice. Thank you.)     Physician signature:__________________________     If you experience any of the following, please call the Wound Care Center during business hours:     * Increase in Pain  * Temperature over 101  * Increase in drainage from your wound  * Drainage with a foul odor  * Bleeding  * Increase in swelling  * Need for compression bandage changes due to slippage, breakthrough drainage.     If you need medical attention outside of the business hours of the Wound Care Centers please contact your PCP or go to the nearest emergency room.

## 2024-09-04 ENCOUNTER — HOSPITAL ENCOUNTER (OUTPATIENT)
Dept: WOUND CARE | Age: 69
Discharge: HOME | End: 2024-09-04
Attending: SURGERY
Payer: MEDICARE

## 2024-09-04 VITALS
RESPIRATION RATE: 16 BRPM | WEIGHT: 168 LBS | HEIGHT: 68 IN | BODY MASS INDEX: 25.46 KG/M2 | TEMPERATURE: 96.9 F | DIASTOLIC BLOOD PRESSURE: 84 MMHG | SYSTOLIC BLOOD PRESSURE: 170 MMHG

## 2024-09-04 DIAGNOSIS — L97.322 VARICOSE VEINS OF LEFT LOWER EXTREMITY WITH ULCER OF ANKLE WITH FAT LAYER EXPOSED (HCC): Primary | ICD-10-CM

## 2024-09-04 DIAGNOSIS — I83.023 VARICOSE VEINS OF LEFT LOWER EXTREMITY WITH ULCER OF ANKLE WITH FAT LAYER EXPOSED (HCC): Primary | ICD-10-CM

## 2024-09-04 DIAGNOSIS — I70.242 ATHEROSCLEROSIS OF NATIVE ARTERY OF LEFT LOWER EXTREMITY WITH ULCERATION OF CALF (HCC): ICD-10-CM

## 2024-09-04 PROCEDURE — 11042 DBRDMT SUBQ TIS 1ST 20SQCM/<: CPT | Performed by: SURGERY

## 2024-09-04 PROCEDURE — 11042 DBRDMT SUBQ TIS 1ST 20SQCM/<: CPT

## 2024-09-04 RX ORDER — GINSENG 100 MG
CAPSULE ORAL ONCE
OUTPATIENT
Start: 2024-09-04 | End: 2024-09-04

## 2024-09-04 RX ORDER — LIDOCAINE HYDROCHLORIDE 20 MG/ML
JELLY TOPICAL ONCE
OUTPATIENT
Start: 2024-09-04 | End: 2024-09-04

## 2024-09-04 RX ORDER — BETAMETHASONE DIPROPIONATE 0.05 %
OINTMENT (GRAM) TOPICAL ONCE
OUTPATIENT
Start: 2024-09-04 | End: 2024-09-04

## 2024-09-04 RX ORDER — SILVER SULFADIAZINE 10 MG/G
CREAM TOPICAL ONCE
OUTPATIENT
Start: 2024-09-04 | End: 2024-09-04

## 2024-09-04 RX ORDER — NEOMYCIN/BACITRACIN/POLYMYXINB 3.5-400-5K
OINTMENT (GRAM) TOPICAL ONCE
OUTPATIENT
Start: 2024-09-04 | End: 2024-09-04

## 2024-09-04 RX ORDER — LIDOCAINE 50 MG/G
OINTMENT TOPICAL ONCE
OUTPATIENT
Start: 2024-09-04 | End: 2024-09-04

## 2024-09-04 RX ORDER — LIDOCAINE 40 MG/G
CREAM TOPICAL ONCE
OUTPATIENT
Start: 2024-09-04 | End: 2024-09-04

## 2024-09-04 RX ORDER — GENTAMICIN SULFATE 1 MG/G
OINTMENT TOPICAL ONCE
OUTPATIENT
Start: 2024-09-04 | End: 2024-09-04

## 2024-09-04 RX ORDER — LIDOCAINE HYDROCHLORIDE 40 MG/ML
SOLUTION TOPICAL ONCE
Status: COMPLETED | OUTPATIENT
Start: 2024-09-04 | End: 2024-09-04

## 2024-09-04 RX ORDER — CLOBETASOL PROPIONATE 0.5 MG/G
OINTMENT TOPICAL ONCE
OUTPATIENT
Start: 2024-09-04 | End: 2024-09-04

## 2024-09-04 RX ORDER — MUPIROCIN 20 MG/G
OINTMENT TOPICAL ONCE
OUTPATIENT
Start: 2024-09-04 | End: 2024-09-04

## 2024-09-04 RX ORDER — TRIAMCINOLONE ACETONIDE 1 MG/G
OINTMENT TOPICAL ONCE
OUTPATIENT
Start: 2024-09-04 | End: 2024-09-04

## 2024-09-04 RX ORDER — BACITRACIN ZINC AND POLYMYXIN B SULFATE 500; 1000 [USP'U]/G; [USP'U]/G
OINTMENT TOPICAL ONCE
OUTPATIENT
Start: 2024-09-04 | End: 2024-09-04

## 2024-09-04 RX ORDER — LIDOCAINE HYDROCHLORIDE 40 MG/ML
SOLUTION TOPICAL ONCE
OUTPATIENT
Start: 2024-09-04 | End: 2024-09-04

## 2024-09-04 RX ADMIN — LIDOCAINE HYDROCHLORIDE 5 ML: 40 SOLUTION TOPICAL at 07:48

## 2024-09-04 ASSESSMENT — PAIN DESCRIPTION - FREQUENCY: FREQUENCY: CONTINUOUS

## 2024-09-04 ASSESSMENT — PAIN DESCRIPTION - ORIENTATION: ORIENTATION: LEFT

## 2024-09-04 ASSESSMENT — PAIN DESCRIPTION - LOCATION: LOCATION: ANKLE

## 2024-09-04 ASSESSMENT — PAIN DESCRIPTION - DESCRIPTORS: DESCRIPTORS: STABBING

## 2024-09-04 ASSESSMENT — PAIN SCALES - GENERAL: PAINLEVEL_OUTOF10: 7

## 2024-09-04 ASSESSMENT — PAIN DESCRIPTION - PAIN TYPE: TYPE: CHRONIC PAIN

## 2024-09-04 NOTE — PROGRESS NOTES
Wound Healing Center Followup Visit Note    Referring Physician : Rosenda Cormier MD  Amanda Ledesma  MEDICAL RECORD NUMBER:  [id number]  AGE: 68 y.o.   GENDER: female  : 1955  EPISODE DATE:  2024    Subjective:     Chief Complaint   Patient presents with    Wound Check     Left ankle      HISTORY of PRESENT ILLNESS HPI   Amanda Ledesma is a 68 y.o. female who presents today in regards to follow up evaluation and treatment of wound/ulcer.  That patient's past medical, family and social hx were reviewed and changes were made if present.    History of Wound Context:  The patient has had a wound of her left ankle/calf which was first noted approximately 2021.  This has been treated local wound care. On their initial visit to the wound healing center, 22,  the patient has noted that the wound has been improving.  The patient has not had similar previous wounds in the past.      She started seeing Dr. Street in 2021 and than Dr. Gillis.  She was started in unna boot ~ 2021.  She has noticed some improvement since starting unna boot.  She is currently following with Dr. Haskins.      Pt is not on abx at time of initial visit, but has been treated with previously by podiatry.      She is not a DM.  She is not a smoker.  She denies hx of DVT, and per her report had recent us noting no evidence of dvt at [facility].  She also had arterial studies done.    I had previously seen her in the past in regards to left buttock thigh wound, which started as abscess.      Pt works at [employer] in the [employer] and is on her feet all day.    22  Reflux study - if significant findings will schedule for fu  Continue compression therapy [facility] per podiatry with aquacell dressing  Elevation  She does not have significant arterial occlusive disease  22  Patient has asked to continuing following with me going forward because of our previous relationship  She is going to let Dr. Schuler office know  She

## 2024-09-11 ENCOUNTER — HOSPITAL ENCOUNTER (OUTPATIENT)
Dept: WOUND CARE | Age: 69
Discharge: HOME | End: 2024-09-11
Attending: SURGERY
Payer: COMMERCIAL

## 2024-09-11 ENCOUNTER — OFFICE VISIT (OUTPATIENT)
Dept: FAMILY MEDICINE CLINIC | Age: 69
End: 2024-09-11
Payer: COMMERCIAL

## 2024-09-11 VITALS
BODY MASS INDEX: 25.16 KG/M2 | DIASTOLIC BLOOD PRESSURE: 80 MMHG | TEMPERATURE: 97.5 F | WEIGHT: 166 LBS | RESPIRATION RATE: 18 BRPM | HEIGHT: 68 IN | OXYGEN SATURATION: 95 % | HEART RATE: 68 BPM | SYSTOLIC BLOOD PRESSURE: 138 MMHG

## 2024-09-11 VITALS
HEIGHT: 68 IN | HEART RATE: 71 BPM | TEMPERATURE: 97.3 F | SYSTOLIC BLOOD PRESSURE: 157 MMHG | WEIGHT: 168 LBS | BODY MASS INDEX: 25.46 KG/M2 | DIASTOLIC BLOOD PRESSURE: 75 MMHG | RESPIRATION RATE: 18 BRPM

## 2024-09-11 DIAGNOSIS — B00.9 HERPES: ICD-10-CM

## 2024-09-11 DIAGNOSIS — G44.029 CHRONIC CLUSTER HEADACHE, NOT INTRACTABLE: ICD-10-CM

## 2024-09-11 DIAGNOSIS — F41.9 INSOMNIA SECONDARY TO ANXIETY: ICD-10-CM

## 2024-09-11 DIAGNOSIS — I70.242 ATHEROSCLEROSIS OF NATIVE ARTERY OF LEFT LOWER EXTREMITY WITH ULCERATION OF CALF (HCC): ICD-10-CM

## 2024-09-11 DIAGNOSIS — G43.909 MIGRAINE WITHOUT STATUS MIGRAINOSUS, NOT INTRACTABLE, UNSPECIFIED MIGRAINE TYPE: ICD-10-CM

## 2024-09-11 DIAGNOSIS — Z76.0 ENCOUNTER FOR MEDICATION REFILL: ICD-10-CM

## 2024-09-11 DIAGNOSIS — I83.023 VARICOSE VEINS OF LEFT LOWER EXTREMITY WITH ULCER OF ANKLE WITH FAT LAYER EXPOSED (HCC): Primary | ICD-10-CM

## 2024-09-11 DIAGNOSIS — L97.322 VARICOSE VEINS OF LEFT LOWER EXTREMITY WITH ULCER OF ANKLE WITH FAT LAYER EXPOSED (HCC): Primary | ICD-10-CM

## 2024-09-11 DIAGNOSIS — Z72.3 INACTIVITY: ICD-10-CM

## 2024-09-11 DIAGNOSIS — F51.05 INSOMNIA SECONDARY TO ANXIETY: ICD-10-CM

## 2024-09-11 DIAGNOSIS — Z00.00 INITIAL MEDICARE ANNUAL WELLNESS VISIT: Primary | ICD-10-CM

## 2024-09-11 DIAGNOSIS — I10 PRIMARY HYPERTENSION: ICD-10-CM

## 2024-09-11 DIAGNOSIS — Z71.89 ACP (ADVANCE CARE PLANNING): ICD-10-CM

## 2024-09-11 DIAGNOSIS — Z78.0 ASYMPTOMATIC MENOPAUSAL STATE: ICD-10-CM

## 2024-09-11 DIAGNOSIS — Z12.31 ENCOUNTER FOR SCREENING MAMMOGRAM FOR MALIGNANT NEOPLASM OF BREAST: ICD-10-CM

## 2024-09-11 DIAGNOSIS — K21.00 GASTROESOPHAGEAL REFLUX DISEASE WITH ESOPHAGITIS WITHOUT HEMORRHAGE: Chronic | ICD-10-CM

## 2024-09-11 PROCEDURE — G8427 DOCREV CUR MEDS BY ELIG CLIN: HCPCS | Performed by: FAMILY MEDICINE

## 2024-09-11 PROCEDURE — 11042 DBRDMT SUBQ TIS 1ST 20SQCM/<: CPT

## 2024-09-11 PROCEDURE — G0438 PPPS, INITIAL VISIT: HCPCS | Performed by: FAMILY MEDICINE

## 2024-09-11 PROCEDURE — 3079F DIAST BP 80-89 MM HG: CPT | Performed by: FAMILY MEDICINE

## 2024-09-11 PROCEDURE — 1036F TOBACCO NON-USER: CPT | Performed by: FAMILY MEDICINE

## 2024-09-11 PROCEDURE — G8400 PT W/DXA NO RESULTS DOC: HCPCS | Performed by: FAMILY MEDICINE

## 2024-09-11 PROCEDURE — 3017F COLORECTAL CA SCREEN DOC REV: CPT | Performed by: FAMILY MEDICINE

## 2024-09-11 PROCEDURE — 1124F ACP DISCUSS-NO DSCNMKR DOCD: CPT | Performed by: FAMILY MEDICINE

## 2024-09-11 PROCEDURE — 3075F SYST BP GE 130 - 139MM HG: CPT | Performed by: FAMILY MEDICINE

## 2024-09-11 PROCEDURE — G8419 CALC BMI OUT NRM PARAM NOF/U: HCPCS | Performed by: FAMILY MEDICINE

## 2024-09-11 PROCEDURE — 99214 OFFICE O/P EST MOD 30 MIN: CPT | Performed by: FAMILY MEDICINE

## 2024-09-11 PROCEDURE — 11042 DBRDMT SUBQ TIS 1ST 20SQCM/<: CPT | Performed by: SURGERY

## 2024-09-11 PROCEDURE — 1090F PRES/ABSN URINE INCON ASSESS: CPT | Performed by: FAMILY MEDICINE

## 2024-09-11 PROCEDURE — 99497 ADVNCD CARE PLAN 30 MIN: CPT | Performed by: FAMILY MEDICINE

## 2024-09-11 RX ORDER — LIDOCAINE HYDROCHLORIDE 20 MG/ML
JELLY TOPICAL ONCE
OUTPATIENT
Start: 2024-09-11 | End: 2024-09-11

## 2024-09-11 RX ORDER — LIDOCAINE 40 MG/G
CREAM TOPICAL ONCE
OUTPATIENT
Start: 2024-09-11 | End: 2024-09-11

## 2024-09-11 RX ORDER — BUTALBITAL, ACETAMINOPHEN AND CAFFEINE 50; 325; 40 MG/1; MG/1; MG/1
1 TABLET ORAL 3 TIMES DAILY PRN
Qty: 90 TABLET | Refills: 5 | Status: SHIPPED | OUTPATIENT
Start: 2024-09-11 | End: 2025-03-14

## 2024-09-11 RX ORDER — HYDROXYZINE HYDROCHLORIDE 25 MG/1
TABLET, FILM COATED ORAL
Qty: 180 TABLET | Refills: 1 | Status: SHIPPED | OUTPATIENT
Start: 2024-09-11 | End: 2025-03-14

## 2024-09-11 RX ORDER — NEOMYCIN/BACITRACIN/POLYMYXINB 3.5-400-5K
OINTMENT (GRAM) TOPICAL ONCE
OUTPATIENT
Start: 2024-09-11 | End: 2024-09-11

## 2024-09-11 RX ORDER — GENTAMICIN SULFATE 1 MG/G
OINTMENT TOPICAL ONCE
OUTPATIENT
Start: 2024-09-11 | End: 2024-09-11

## 2024-09-11 RX ORDER — BACITRACIN ZINC AND POLYMYXIN B SULFATE 500; 1000 [USP'U]/G; [USP'U]/G
OINTMENT TOPICAL ONCE
OUTPATIENT
Start: 2024-09-11 | End: 2024-09-11

## 2024-09-11 RX ORDER — SILVER SULFADIAZINE 10 MG/G
CREAM TOPICAL ONCE
OUTPATIENT
Start: 2024-09-11 | End: 2024-09-11

## 2024-09-11 RX ORDER — CLOBETASOL PROPIONATE 0.5 MG/G
OINTMENT TOPICAL ONCE
OUTPATIENT
Start: 2024-09-11 | End: 2024-09-11

## 2024-09-11 RX ORDER — ACYCLOVIR 200 MG/1
200 CAPSULE ORAL DAILY
Qty: 90 CAPSULE | Refills: 1 | Status: SHIPPED | OUTPATIENT
Start: 2024-09-11 | End: 2025-03-14

## 2024-09-11 RX ORDER — LIDOCAINE 50 MG/G
OINTMENT TOPICAL ONCE
OUTPATIENT
Start: 2024-09-11 | End: 2024-09-11

## 2024-09-11 RX ORDER — GINSENG 100 MG
CAPSULE ORAL ONCE
OUTPATIENT
Start: 2024-09-11 | End: 2024-09-11

## 2024-09-11 RX ORDER — MUPIROCIN 20 MG/G
OINTMENT TOPICAL ONCE
OUTPATIENT
Start: 2024-09-11 | End: 2024-09-11

## 2024-09-11 RX ORDER — PANTOPRAZOLE SODIUM 40 MG/1
40 TABLET, DELAYED RELEASE ORAL
Qty: 90 TABLET | Refills: 1 | Status: SHIPPED | OUTPATIENT
Start: 2024-09-11 | End: 2025-03-14

## 2024-09-11 RX ORDER — LISINOPRIL 10 MG/1
10 TABLET ORAL DAILY
Qty: 90 TABLET | Refills: 1 | Status: SHIPPED | OUTPATIENT
Start: 2024-09-11 | End: 2025-03-14

## 2024-09-11 RX ORDER — BETAMETHASONE DIPROPIONATE 0.05 %
OINTMENT (GRAM) TOPICAL ONCE
OUTPATIENT
Start: 2024-09-11 | End: 2024-09-11

## 2024-09-11 RX ORDER — TRIAMCINOLONE ACETONIDE 1 MG/G
OINTMENT TOPICAL ONCE
OUTPATIENT
Start: 2024-09-11 | End: 2024-09-11

## 2024-09-11 RX ORDER — OXYCODONE AND ACETAMINOPHEN 5; 325 MG/1; MG/1
1 TABLET ORAL EVERY 6 HOURS PRN
Qty: 28 TABLET | Refills: 0 | Status: SHIPPED | OUTPATIENT
Start: 2024-09-11 | End: 2024-09-18

## 2024-09-11 RX ORDER — LIDOCAINE HYDROCHLORIDE 40 MG/ML
SOLUTION TOPICAL ONCE
OUTPATIENT
Start: 2024-09-11 | End: 2024-09-11

## 2024-09-11 RX ORDER — LIDOCAINE HYDROCHLORIDE 40 MG/ML
SOLUTION TOPICAL ONCE
Status: COMPLETED | OUTPATIENT
Start: 2024-09-11 | End: 2024-09-11

## 2024-09-11 RX ADMIN — LIDOCAINE HYDROCHLORIDE 10 ML: 40 SOLUTION TOPICAL at 07:44

## 2024-09-11 ASSESSMENT — PAIN SCALES - GENERAL: PAINLEVEL_OUTOF10: 7

## 2024-09-11 ASSESSMENT — PATIENT HEALTH QUESTIONNAIRE - PHQ9
1. LITTLE INTEREST OR PLEASURE IN DOING THINGS: NOT AT ALL
SUM OF ALL RESPONSES TO PHQ QUESTIONS 1-9: 0
SUM OF ALL RESPONSES TO PHQ9 QUESTIONS 1 & 2: 0
SUM OF ALL RESPONSES TO PHQ QUESTIONS 1-9: 0
2. FEELING DOWN, DEPRESSED OR HOPELESS: NOT AT ALL

## 2024-09-11 ASSESSMENT — PAIN DESCRIPTION - ONSET: ONSET: ON-GOING

## 2024-09-11 ASSESSMENT — PAIN - FUNCTIONAL ASSESSMENT: PAIN_FUNCTIONAL_ASSESSMENT: ACTIVITIES ARE NOT PREVENTED

## 2024-09-11 ASSESSMENT — PAIN DESCRIPTION - FREQUENCY: FREQUENCY: CONTINUOUS

## 2024-09-11 ASSESSMENT — PAIN DESCRIPTION - DESCRIPTORS: DESCRIPTORS: STABBING

## 2024-09-11 ASSESSMENT — PAIN DESCRIPTION - LOCATION: LOCATION: ANKLE

## 2024-09-11 ASSESSMENT — PAIN DESCRIPTION - PAIN TYPE: TYPE: CHRONIC PAIN

## 2024-09-12 ENCOUNTER — CLINICAL DOCUMENTATION (OUTPATIENT)
Dept: SPIRITUAL SERVICES | Age: 69
End: 2024-09-12

## 2024-09-18 ENCOUNTER — HOSPITAL ENCOUNTER (OUTPATIENT)
Dept: WOUND CARE | Age: 69
Discharge: HOME | End: 2024-09-18
Attending: SURGERY
Payer: COMMERCIAL

## 2024-09-18 VITALS
DIASTOLIC BLOOD PRESSURE: 68 MMHG | TEMPERATURE: 97.4 F | HEART RATE: 68 BPM | SYSTOLIC BLOOD PRESSURE: 132 MMHG | WEIGHT: 166 LBS | RESPIRATION RATE: 18 BRPM | BODY MASS INDEX: 25.16 KG/M2 | HEIGHT: 68 IN

## 2024-09-18 DIAGNOSIS — I83.023 VARICOSE VEINS OF LEFT LOWER EXTREMITY WITH ULCER OF ANKLE WITH FAT LAYER EXPOSED (HCC): Primary | ICD-10-CM

## 2024-09-18 DIAGNOSIS — I70.242 ATHEROSCLEROSIS OF NATIVE ARTERY OF LEFT LOWER EXTREMITY WITH ULCERATION OF CALF (HCC): ICD-10-CM

## 2024-09-18 DIAGNOSIS — L97.322 VARICOSE VEINS OF LEFT LOWER EXTREMITY WITH ULCER OF ANKLE WITH FAT LAYER EXPOSED (HCC): Primary | ICD-10-CM

## 2024-09-18 PROCEDURE — 11042 DBRDMT SUBQ TIS 1ST 20SQCM/<: CPT

## 2024-09-18 PROCEDURE — 11042 DBRDMT SUBQ TIS 1ST 20SQCM/<: CPT | Performed by: SURGERY

## 2024-09-18 RX ORDER — LIDOCAINE 50 MG/G
OINTMENT TOPICAL ONCE
OUTPATIENT
Start: 2024-09-18 | End: 2024-09-18

## 2024-09-18 RX ORDER — LIDOCAINE HYDROCHLORIDE 20 MG/ML
JELLY TOPICAL ONCE
OUTPATIENT
Start: 2024-09-18 | End: 2024-09-18

## 2024-09-18 RX ORDER — LIDOCAINE HYDROCHLORIDE 40 MG/ML
SOLUTION TOPICAL ONCE
Status: COMPLETED | OUTPATIENT
Start: 2024-09-18 | End: 2024-09-18

## 2024-09-18 RX ORDER — MUPIROCIN 20 MG/G
OINTMENT TOPICAL ONCE
OUTPATIENT
Start: 2024-09-18 | End: 2024-09-18

## 2024-09-18 RX ORDER — SILVER SULFADIAZINE 10 MG/G
CREAM TOPICAL ONCE
OUTPATIENT
Start: 2024-09-18 | End: 2024-09-18

## 2024-09-18 RX ORDER — BACITRACIN ZINC AND POLYMYXIN B SULFATE 500; 1000 [USP'U]/G; [USP'U]/G
OINTMENT TOPICAL ONCE
OUTPATIENT
Start: 2024-09-18 | End: 2024-09-18

## 2024-09-18 RX ORDER — TRIAMCINOLONE ACETONIDE 1 MG/G
OINTMENT TOPICAL ONCE
OUTPATIENT
Start: 2024-09-18 | End: 2024-09-18

## 2024-09-18 RX ORDER — BETAMETHASONE DIPROPIONATE 0.05 %
OINTMENT (GRAM) TOPICAL ONCE
OUTPATIENT
Start: 2024-09-18 | End: 2024-09-18

## 2024-09-18 RX ORDER — LIDOCAINE HYDROCHLORIDE 40 MG/ML
SOLUTION TOPICAL ONCE
OUTPATIENT
Start: 2024-09-18 | End: 2024-09-18

## 2024-09-18 RX ORDER — LIDOCAINE 40 MG/G
CREAM TOPICAL ONCE
OUTPATIENT
Start: 2024-09-18 | End: 2024-09-18

## 2024-09-18 RX ORDER — GINSENG 100 MG
CAPSULE ORAL ONCE
OUTPATIENT
Start: 2024-09-18 | End: 2024-09-18

## 2024-09-18 RX ORDER — CLOBETASOL PROPIONATE 0.5 MG/G
OINTMENT TOPICAL ONCE
OUTPATIENT
Start: 2024-09-18 | End: 2024-09-18

## 2024-09-18 RX ORDER — GENTAMICIN SULFATE 1 MG/G
OINTMENT TOPICAL ONCE
OUTPATIENT
Start: 2024-09-18 | End: 2024-09-18

## 2024-09-18 RX ORDER — NEOMYCIN/BACITRACIN/POLYMYXINB 3.5-400-5K
OINTMENT (GRAM) TOPICAL ONCE
OUTPATIENT
Start: 2024-09-18 | End: 2024-09-18

## 2024-09-18 RX ADMIN — LIDOCAINE HYDROCHLORIDE 5 ML: 40 SOLUTION TOPICAL at 07:41

## 2024-09-18 ASSESSMENT — PAIN DESCRIPTION - ORIENTATION: ORIENTATION: LEFT

## 2024-09-18 ASSESSMENT — PAIN DESCRIPTION - PAIN TYPE: TYPE: CHRONIC PAIN

## 2024-09-18 ASSESSMENT — PAIN - FUNCTIONAL ASSESSMENT: PAIN_FUNCTIONAL_ASSESSMENT: ACTIVITIES ARE NOT PREVENTED

## 2024-09-18 ASSESSMENT — PAIN DESCRIPTION - LOCATION: LOCATION: ANKLE

## 2024-09-18 ASSESSMENT — PAIN DESCRIPTION - FREQUENCY: FREQUENCY: CONTINUOUS

## 2024-09-18 ASSESSMENT — PAIN DESCRIPTION - ONSET: ONSET: ON-GOING

## 2024-09-18 ASSESSMENT — PAIN DESCRIPTION - DESCRIPTORS: DESCRIPTORS: STABBING

## 2024-09-18 ASSESSMENT — PAIN SCALES - GENERAL: PAINLEVEL_OUTOF10: 7

## 2024-09-25 ENCOUNTER — HOSPITAL ENCOUNTER (OUTPATIENT)
Dept: WOUND CARE | Age: 69
Discharge: HOME | End: 2024-09-25
Attending: SURGERY
Payer: COMMERCIAL

## 2024-09-25 VITALS
RESPIRATION RATE: 16 BRPM | BODY MASS INDEX: 25.16 KG/M2 | DIASTOLIC BLOOD PRESSURE: 84 MMHG | HEART RATE: 68 BPM | WEIGHT: 166 LBS | HEIGHT: 68 IN | SYSTOLIC BLOOD PRESSURE: 144 MMHG | TEMPERATURE: 97.2 F

## 2024-09-25 DIAGNOSIS — I70.242 ATHEROSCLEROSIS OF NATIVE ARTERY OF LEFT LOWER EXTREMITY WITH ULCERATION OF CALF (HCC): ICD-10-CM

## 2024-09-25 DIAGNOSIS — I83.023 VARICOSE VEINS OF LEFT LOWER EXTREMITY WITH ULCER OF ANKLE WITH FAT LAYER EXPOSED (HCC): Primary | ICD-10-CM

## 2024-09-25 DIAGNOSIS — I70.243 ATHEROSCLEROSIS OF NATIVE ARTERY OF LEFT LOWER EXTREMITY WITH ULCERATION OF ANKLE (HCC): Chronic | ICD-10-CM

## 2024-09-25 DIAGNOSIS — L97.322 VARICOSE VEINS OF LEFT LOWER EXTREMITY WITH ULCER OF ANKLE WITH FAT LAYER EXPOSED (HCC): Primary | ICD-10-CM

## 2024-09-25 PROCEDURE — 11042 DBRDMT SUBQ TIS 1ST 20SQCM/<: CPT

## 2024-09-25 PROCEDURE — 11042 DBRDMT SUBQ TIS 1ST 20SQCM/<: CPT | Performed by: SURGERY

## 2024-09-25 RX ORDER — NEOMYCIN/BACITRACIN/POLYMYXINB 3.5-400-5K
OINTMENT (GRAM) TOPICAL ONCE
OUTPATIENT
Start: 2024-09-25 | End: 2024-09-25

## 2024-09-25 RX ORDER — OXYCODONE AND ACETAMINOPHEN 5; 325 MG/1; MG/1
1 TABLET ORAL EVERY 6 HOURS PRN
Qty: 28 TABLET | Refills: 0 | Status: SHIPPED | OUTPATIENT
Start: 2024-09-25 | End: 2024-10-02

## 2024-09-25 RX ORDER — GINSENG 100 MG
CAPSULE ORAL ONCE
OUTPATIENT
Start: 2024-09-25 | End: 2024-09-25

## 2024-09-25 RX ORDER — TRIAMCINOLONE ACETONIDE 1 MG/G
OINTMENT TOPICAL ONCE
OUTPATIENT
Start: 2024-09-25 | End: 2024-09-25

## 2024-09-25 RX ORDER — LIDOCAINE HYDROCHLORIDE 20 MG/ML
JELLY TOPICAL ONCE
OUTPATIENT
Start: 2024-09-25 | End: 2024-09-25

## 2024-09-25 RX ORDER — LIDOCAINE HYDROCHLORIDE 40 MG/ML
SOLUTION TOPICAL ONCE
Status: COMPLETED | OUTPATIENT
Start: 2024-09-25 | End: 2024-09-25

## 2024-09-25 RX ORDER — SILVER SULFADIAZINE 10 MG/G
CREAM TOPICAL ONCE
OUTPATIENT
Start: 2024-09-25 | End: 2024-09-25

## 2024-09-25 RX ORDER — LIDOCAINE 50 MG/G
OINTMENT TOPICAL ONCE
OUTPATIENT
Start: 2024-09-25 | End: 2024-09-25

## 2024-09-25 RX ORDER — CLOBETASOL PROPIONATE 0.5 MG/G
OINTMENT TOPICAL ONCE
OUTPATIENT
Start: 2024-09-25 | End: 2024-09-25

## 2024-09-25 RX ORDER — BETAMETHASONE DIPROPIONATE 0.05 %
OINTMENT (GRAM) TOPICAL ONCE
OUTPATIENT
Start: 2024-09-25 | End: 2024-09-25

## 2024-09-25 RX ORDER — LIDOCAINE 40 MG/G
CREAM TOPICAL ONCE
OUTPATIENT
Start: 2024-09-25 | End: 2024-09-25

## 2024-09-25 RX ORDER — LIDOCAINE HYDROCHLORIDE 40 MG/ML
SOLUTION TOPICAL ONCE
OUTPATIENT
Start: 2024-09-25 | End: 2024-09-25

## 2024-09-25 RX ORDER — GENTAMICIN SULFATE 1 MG/G
OINTMENT TOPICAL ONCE
OUTPATIENT
Start: 2024-09-25 | End: 2024-09-25

## 2024-09-25 RX ORDER — MUPIROCIN 20 MG/G
OINTMENT TOPICAL ONCE
OUTPATIENT
Start: 2024-09-25 | End: 2024-09-25

## 2024-09-25 RX ORDER — BACITRACIN ZINC AND POLYMYXIN B SULFATE 500; 1000 [USP'U]/G; [USP'U]/G
OINTMENT TOPICAL ONCE
OUTPATIENT
Start: 2024-09-25 | End: 2024-09-25

## 2024-09-25 RX ADMIN — LIDOCAINE HYDROCHLORIDE 20 ML: 40 SOLUTION TOPICAL at 08:03

## 2024-09-25 ASSESSMENT — PAIN DESCRIPTION - PROGRESSION: CLINICAL_PROGRESSION: NOT CHANGED

## 2024-10-02 ENCOUNTER — HOSPITAL ENCOUNTER (OUTPATIENT)
Dept: WOUND CARE | Age: 69
Discharge: HOME | End: 2024-10-02
Attending: SURGERY
Payer: COMMERCIAL

## 2024-10-02 VITALS
WEIGHT: 166 LBS | TEMPERATURE: 98 F | SYSTOLIC BLOOD PRESSURE: 145 MMHG | HEART RATE: 81 BPM | BODY MASS INDEX: 25.16 KG/M2 | HEIGHT: 68 IN | DIASTOLIC BLOOD PRESSURE: 60 MMHG | RESPIRATION RATE: 18 BRPM

## 2024-10-02 DIAGNOSIS — I70.242 ATHEROSCLEROSIS OF NATIVE ARTERY OF LEFT LOWER EXTREMITY WITH ULCERATION OF CALF (HCC): ICD-10-CM

## 2024-10-02 DIAGNOSIS — I83.023 VARICOSE VEINS OF LEFT LOWER EXTREMITY WITH ULCER OF ANKLE WITH FAT LAYER EXPOSED (HCC): Primary | ICD-10-CM

## 2024-10-02 DIAGNOSIS — L97.322 VARICOSE VEINS OF LEFT LOWER EXTREMITY WITH ULCER OF ANKLE WITH FAT LAYER EXPOSED (HCC): Primary | ICD-10-CM

## 2024-10-02 DIAGNOSIS — I70.243 ATHEROSCLEROSIS OF NATIVE ARTERY OF LEFT LOWER EXTREMITY WITH ULCERATION OF ANKLE (HCC): Chronic | ICD-10-CM

## 2024-10-02 PROCEDURE — 11042 DBRDMT SUBQ TIS 1ST 20SQCM/<: CPT | Performed by: SURGERY

## 2024-10-02 PROCEDURE — 11042 DBRDMT SUBQ TIS 1ST 20SQCM/<: CPT

## 2024-10-02 RX ORDER — SILVER SULFADIAZINE 10 MG/G
CREAM TOPICAL ONCE
OUTPATIENT
Start: 2024-10-02 | End: 2024-10-02

## 2024-10-02 RX ORDER — BETAMETHASONE DIPROPIONATE 0.05 %
OINTMENT (GRAM) TOPICAL ONCE
OUTPATIENT
Start: 2024-10-02 | End: 2024-10-02

## 2024-10-02 RX ORDER — LIDOCAINE HYDROCHLORIDE 40 MG/ML
SOLUTION TOPICAL ONCE
Status: COMPLETED | OUTPATIENT
Start: 2024-10-02 | End: 2024-10-02

## 2024-10-02 RX ORDER — LIDOCAINE HYDROCHLORIDE 20 MG/ML
JELLY TOPICAL ONCE
OUTPATIENT
Start: 2024-10-02 | End: 2024-10-02

## 2024-10-02 RX ORDER — LIDOCAINE 50 MG/G
OINTMENT TOPICAL ONCE
OUTPATIENT
Start: 2024-10-02 | End: 2024-10-02

## 2024-10-02 RX ORDER — LIDOCAINE HYDROCHLORIDE 40 MG/ML
SOLUTION TOPICAL ONCE
OUTPATIENT
Start: 2024-10-02 | End: 2024-10-02

## 2024-10-02 RX ORDER — GINSENG 100 MG
CAPSULE ORAL ONCE
OUTPATIENT
Start: 2024-10-02 | End: 2024-10-02

## 2024-10-02 RX ORDER — BACITRACIN ZINC AND POLYMYXIN B SULFATE 500; 1000 [USP'U]/G; [USP'U]/G
OINTMENT TOPICAL ONCE
OUTPATIENT
Start: 2024-10-02 | End: 2024-10-02

## 2024-10-02 RX ORDER — TRIAMCINOLONE ACETONIDE 1 MG/G
OINTMENT TOPICAL ONCE
OUTPATIENT
Start: 2024-10-02 | End: 2024-10-02

## 2024-10-02 RX ORDER — GENTAMICIN SULFATE 1 MG/G
OINTMENT TOPICAL ONCE
OUTPATIENT
Start: 2024-10-02 | End: 2024-10-02

## 2024-10-02 RX ORDER — CLOBETASOL PROPIONATE 0.5 MG/G
OINTMENT TOPICAL ONCE
OUTPATIENT
Start: 2024-10-02 | End: 2024-10-02

## 2024-10-02 RX ORDER — LIDOCAINE 40 MG/G
CREAM TOPICAL ONCE
OUTPATIENT
Start: 2024-10-02 | End: 2024-10-02

## 2024-10-02 RX ORDER — OXYCODONE AND ACETAMINOPHEN 5; 325 MG/1; MG/1
1 TABLET ORAL EVERY 6 HOURS PRN
Qty: 28 TABLET | Refills: 0 | Status: SHIPPED | OUTPATIENT
Start: 2024-10-05 | End: 2024-10-12

## 2024-10-02 RX ORDER — MUPIROCIN 20 MG/G
OINTMENT TOPICAL ONCE
OUTPATIENT
Start: 2024-10-02 | End: 2024-10-02

## 2024-10-02 RX ORDER — NEOMYCIN/BACITRACIN/POLYMYXINB 3.5-400-5K
OINTMENT (GRAM) TOPICAL ONCE
OUTPATIENT
Start: 2024-10-02 | End: 2024-10-02

## 2024-10-02 RX ADMIN — LIDOCAINE HYDROCHLORIDE 10 ML: 40 SOLUTION TOPICAL at 07:44

## 2024-10-02 ASSESSMENT — PAIN DESCRIPTION - DESCRIPTORS: DESCRIPTORS: STABBING

## 2024-10-02 ASSESSMENT — PAIN DESCRIPTION - ONSET: ONSET: ON-GOING

## 2024-10-02 ASSESSMENT — PAIN DESCRIPTION - ORIENTATION: ORIENTATION: LEFT

## 2024-10-02 ASSESSMENT — PAIN - FUNCTIONAL ASSESSMENT: PAIN_FUNCTIONAL_ASSESSMENT: ACTIVITIES ARE NOT PREVENTED

## 2024-10-02 ASSESSMENT — PAIN DESCRIPTION - LOCATION: LOCATION: ANKLE

## 2024-10-02 ASSESSMENT — PAIN DESCRIPTION - PAIN TYPE: TYPE: CHRONIC PAIN

## 2024-10-02 ASSESSMENT — PAIN DESCRIPTION - FREQUENCY: FREQUENCY: INTERMITTENT

## 2024-10-02 NOTE — PROGRESS NOTES
Wound Healing Center Followup Visit Note    Referring Physician : Rosenda Cormier MD  Amanda Ledesma  MEDICAL RECORD NUMBER:  [id number]  AGE: 68 y.o.   GENDER: female  : 1955  EPISODE DATE:  10/2/2024    Subjective:     Chief Complaint   Patient presents with    Wound Check     Left leg      HISTORY of PRESENT ILLNESS HPI   Amanda Ledesma is a 68 y.o. female who presents today in regards to follow up evaluation and treatment of wound/ulcer.  That patient's past medical, family and social hx were reviewed and changes were made if present.    History of Wound Context:  The patient has had a wound of her left ankle/calf which was first noted approximately 2021.  This has been treated local wound care. On their initial visit to the wound healing center, 22,  the patient has noted that the wound has been improving.  The patient has not had similar previous wounds in the past.      She started seeing Dr. Street in 2021 and than Dr. Gillis.  She was started in unna boot ~ 2021.  She has noticed some improvement since starting unna boot.  She is currently following with Dr. Haskins.      Pt is not on abx at time of initial visit, but has been treated with previously by podiatry.      She is not a DM.  She is not a smoker.  She denies hx of DVT, and per her report had recent us noting no evidence of dvt at [facility].  She also had arterial studies done.    I had previously seen her in the past in regards to left buttock thigh wound, which started as abscess.      Pt works at [employer] in the [employer] and is on her feet all day.    22  Reflux study - if significant findings will schedule for fu  Continue compression therapy [facility] per podiatry with aquacell dressing  Elevation  She does not have significant arterial occlusive disease  22  Patient has asked to continuing following with me going forward because of our previous relationship  She is going to let Dr. Schuler office know  She

## 2024-10-03 NOTE — DISCHARGE INSTRUCTIONS
Visit Discharge/Physician Orders     Discharge condition: Stable     Assessment of pain at discharge: yes     Anesthetic used: 4% lidocaine solution     Discharge to: Home     Left via:Private automobile     Accompanied by:self     ECF/HHA: n/a     Dressing Orders: LEFT MEDIAL LEG: Cleanse with normal saline, apply zinc to fiona wound, drawtex, ABD pad, and profore . Change weekly.        Treatment Orders: Eat a diet high in protein and vitamin C. Take a multiple vitamin daily unless contraindicated.     Dr. Duncan as needed.       Must wear slip on shoe to work due to wrap on leg!        Keep wrap dry at all times. If wrap becomes wet or falls 2 inches call [facility] ([phone])and may remove wrap and apply double tubigrip.         [facility] followup visit : 1 week __________________________________  (Please note your next appointment above and if you are unable to keep, kindly give a 24 hour notice. Thank you.)     Physician signature:__________________________     If you experience any of the following, please call the Wound Care Center during business hours:     * Increase in Pain  * Temperature over 101  * Increase in drainage from your wound  * Drainage with a foul odor  * Bleeding  * Increase in swelling  * Need for compression bandage changes due to slippage, breakthrough drainage.     If you need medical attention outside of the business hours of the Wound Care Centers please contact your PCP or go to the nearest emergency room.

## 2024-10-09 ENCOUNTER — HOSPITAL ENCOUNTER (OUTPATIENT)
Dept: WOUND CARE | Age: 69
Discharge: HOME | End: 2024-10-09
Attending: SURGERY
Payer: COMMERCIAL

## 2024-10-09 VITALS
HEIGHT: 68 IN | HEART RATE: 82 BPM | WEIGHT: 166 LBS | DIASTOLIC BLOOD PRESSURE: 66 MMHG | RESPIRATION RATE: 18 BRPM | SYSTOLIC BLOOD PRESSURE: 157 MMHG | TEMPERATURE: 97.1 F | BODY MASS INDEX: 25.16 KG/M2

## 2024-10-09 DIAGNOSIS — I83.023 VARICOSE VEINS OF LEFT LOWER EXTREMITY WITH ULCER OF ANKLE WITH FAT LAYER EXPOSED (HCC): Primary | ICD-10-CM

## 2024-10-09 DIAGNOSIS — I70.242 ATHEROSCLEROSIS OF NATIVE ARTERY OF LEFT LOWER EXTREMITY WITH ULCERATION OF CALF (HCC): ICD-10-CM

## 2024-10-09 DIAGNOSIS — L97.322 VARICOSE VEINS OF LEFT LOWER EXTREMITY WITH ULCER OF ANKLE WITH FAT LAYER EXPOSED (HCC): Primary | ICD-10-CM

## 2024-10-09 PROCEDURE — 11042 DBRDMT SUBQ TIS 1ST 20SQCM/<: CPT

## 2024-10-09 PROCEDURE — 11042 DBRDMT SUBQ TIS 1ST 20SQCM/<: CPT | Performed by: SURGERY

## 2024-10-09 RX ORDER — GINSENG 100 MG
CAPSULE ORAL ONCE
OUTPATIENT
Start: 2024-10-09 | End: 2024-10-09

## 2024-10-09 RX ORDER — NEOMYCIN/BACITRACIN/POLYMYXINB 3.5-400-5K
OINTMENT (GRAM) TOPICAL ONCE
OUTPATIENT
Start: 2024-10-09 | End: 2024-10-09

## 2024-10-09 RX ORDER — LIDOCAINE HYDROCHLORIDE 40 MG/ML
SOLUTION TOPICAL ONCE
Status: COMPLETED | OUTPATIENT
Start: 2024-10-09 | End: 2024-10-09

## 2024-10-09 RX ORDER — MUPIROCIN 20 MG/G
OINTMENT TOPICAL ONCE
OUTPATIENT
Start: 2024-10-09 | End: 2024-10-09

## 2024-10-09 RX ORDER — CLOBETASOL PROPIONATE 0.5 MG/G
OINTMENT TOPICAL ONCE
OUTPATIENT
Start: 2024-10-09 | End: 2024-10-09

## 2024-10-09 RX ORDER — LIDOCAINE 40 MG/G
CREAM TOPICAL ONCE
OUTPATIENT
Start: 2024-10-09 | End: 2024-10-09

## 2024-10-09 RX ORDER — LIDOCAINE 50 MG/G
OINTMENT TOPICAL ONCE
OUTPATIENT
Start: 2024-10-09 | End: 2024-10-09

## 2024-10-09 RX ORDER — GENTAMICIN SULFATE 1 MG/G
OINTMENT TOPICAL ONCE
OUTPATIENT
Start: 2024-10-09 | End: 2024-10-09

## 2024-10-09 RX ORDER — SILVER SULFADIAZINE 10 MG/G
CREAM TOPICAL ONCE
OUTPATIENT
Start: 2024-10-09 | End: 2024-10-09

## 2024-10-09 RX ORDER — TRIAMCINOLONE ACETONIDE 1 MG/G
OINTMENT TOPICAL ONCE
OUTPATIENT
Start: 2024-10-09 | End: 2024-10-09

## 2024-10-09 RX ORDER — LIDOCAINE HYDROCHLORIDE 40 MG/ML
SOLUTION TOPICAL ONCE
OUTPATIENT
Start: 2024-10-09 | End: 2024-10-09

## 2024-10-09 RX ORDER — LIDOCAINE HYDROCHLORIDE 20 MG/ML
JELLY TOPICAL ONCE
OUTPATIENT
Start: 2024-10-09 | End: 2024-10-09

## 2024-10-09 RX ORDER — BETAMETHASONE DIPROPIONATE 0.05 %
OINTMENT (GRAM) TOPICAL ONCE
OUTPATIENT
Start: 2024-10-09 | End: 2024-10-09

## 2024-10-09 RX ORDER — BACITRACIN ZINC AND POLYMYXIN B SULFATE 500; 1000 [USP'U]/G; [USP'U]/G
OINTMENT TOPICAL ONCE
OUTPATIENT
Start: 2024-10-09 | End: 2024-10-09

## 2024-10-09 RX ADMIN — LIDOCAINE HYDROCHLORIDE 10 ML: 40 SOLUTION TOPICAL at 07:57

## 2024-10-09 ASSESSMENT — PAIN - FUNCTIONAL ASSESSMENT: PAIN_FUNCTIONAL_ASSESSMENT: PREVENTS OR INTERFERES SOME ACTIVE ACTIVITIES AND ADLS

## 2024-10-09 ASSESSMENT — PAIN DESCRIPTION - FREQUENCY: FREQUENCY: INTERMITTENT

## 2024-10-09 ASSESSMENT — PAIN DESCRIPTION - LOCATION: LOCATION: ANKLE

## 2024-10-09 ASSESSMENT — PAIN SCALES - GENERAL: PAINLEVEL_OUTOF10: 7

## 2024-10-09 ASSESSMENT — PAIN DESCRIPTION - DESCRIPTORS: DESCRIPTORS: STABBING

## 2024-10-09 ASSESSMENT — PAIN DESCRIPTION - PAIN TYPE: TYPE: CHRONIC PAIN

## 2024-10-09 ASSESSMENT — PAIN DESCRIPTION - ORIENTATION: ORIENTATION: LEFT

## 2024-10-09 NOTE — PROGRESS NOTES
Wound Healing Center Followup Visit Note    Referring Physician : Rosenda Cormier MD  Amanda Ledesma  MEDICAL RECORD NUMBER:  [id number]  AGE: 68 y.o.   GENDER: female  : 1955  EPISODE DATE:  10/9/2024    Subjective:     Chief Complaint   Patient presents with    Wound Check     Left ankle      HISTORY of PRESENT ILLNESS HPI   Amanda Ledesma is a 68 y.o. female who presents today in regards to follow up evaluation and treatment of wound/ulcer.  That patient's past medical, family and social hx were reviewed and changes were made if present.    History of Wound Context:  The patient has had a wound of her left ankle/calf which was first noted approximately 2021.  This has been treated local wound care. On their initial visit to the wound healing center, 22,  the patient has noted that the wound has been improving.  The patient has not had similar previous wounds in the past.      She started seeing Dr. Street in 2021 and than Dr. Gillis.  She was started in unna boot ~ 2021.  She has noticed some improvement since starting unna boot.  She is currently following with Dr. Haskins.      Pt is not on abx at time of initial visit, but has been treated with previously by podiatry.      She is not a DM.  She is not a smoker.  She denies hx of DVT, and per her report had recent us noting no evidence of dvt at [facility].  She also had arterial studies done.    I had previously seen her in the past in regards to left buttock thigh wound, which started as abscess.      Pt works at [employer] in the [employer] and is on her feet all day.    22  Reflux study - if significant findings will schedule for fu  Continue compression therapy [facility] per podiatry with aquacell dressing  Elevation  She does not have significant arterial occlusive disease  22  Patient has asked to continuing following with me going forward because of our previous relationship  She is going to let Dr. Schuler office know  She

## 2024-10-14 NOTE — DISCHARGE INSTRUCTIONS
Visit Discharge/Physician Orders     Discharge condition: Stable     Assessment of pain at discharge: yes     Anesthetic used: 4% lidocaine solution     Discharge to: Home     Left via:Private automobile     Accompanied by:self     ECF/HHA: n/a     Dressing Orders: LEFT MEDIAL LEG: Cleanse with normal saline, apply zinc to fiona wound, drawtex, ABD pad, and profore . Change weekly.        Treatment Orders: Eat a diet high in protein and vitamin C. Take a multiple vitamin daily unless contraindicated.     Dr. Duncan as needed.       Must wear slip on shoe to work due to wrap on leg!        Keep wrap dry at all times. If wrap becomes wet or falls 2 inches call [facility] ([phone])and may remove wrap and apply double tubigrip.         [facility] followup visit : 1 week __________________________________  (Please note your next appointment above and if you are unable to keep, kindly give a 24 hour notice. Thank you.)     Physician signature:__________________________     If you experience any of the following, please call the Wound Care Center during business hours:     * Increase in Pain  * Temperature over 101  * Increase in drainage from your wound  * Drainage with a foul odor  * Bleeding  * Increase in swelling  * Need for compression bandage changes due to slippage, breakthrough drainage.     If you need medical attention outside of the business hours of the Wound Care Centers please contact your PCP or go to the nearest emergency ro

## 2024-10-16 ENCOUNTER — HOSPITAL ENCOUNTER (OUTPATIENT)
Dept: WOUND CARE | Age: 69
Discharge: HOME | End: 2024-10-16
Attending: SURGERY
Payer: COMMERCIAL

## 2024-10-16 VITALS
TEMPERATURE: 97.6 F | HEIGHT: 68 IN | BODY MASS INDEX: 25.16 KG/M2 | SYSTOLIC BLOOD PRESSURE: 123 MMHG | RESPIRATION RATE: 18 BRPM | HEART RATE: 76 BPM | WEIGHT: 166 LBS | DIASTOLIC BLOOD PRESSURE: 67 MMHG

## 2024-10-16 DIAGNOSIS — I83.023 VARICOSE VEINS OF LEFT LOWER EXTREMITY WITH ULCER OF ANKLE WITH FAT LAYER EXPOSED (HCC): Primary | ICD-10-CM

## 2024-10-16 DIAGNOSIS — L97.322 VARICOSE VEINS OF LEFT LOWER EXTREMITY WITH ULCER OF ANKLE WITH FAT LAYER EXPOSED (HCC): Primary | ICD-10-CM

## 2024-10-16 DIAGNOSIS — I70.243 ATHEROSCLEROSIS OF NATIVE ARTERY OF LEFT LOWER EXTREMITY WITH ULCERATION OF ANKLE (HCC): Chronic | ICD-10-CM

## 2024-10-16 DIAGNOSIS — I70.242 ATHEROSCLEROSIS OF NATIVE ARTERY OF LEFT LOWER EXTREMITY WITH ULCERATION OF CALF (HCC): ICD-10-CM

## 2024-10-16 PROCEDURE — 11042 DBRDMT SUBQ TIS 1ST 20SQCM/<: CPT | Performed by: SURGERY

## 2024-10-16 PROCEDURE — 11042 DBRDMT SUBQ TIS 1ST 20SQCM/<: CPT

## 2024-10-16 RX ORDER — LIDOCAINE 50 MG/G
OINTMENT TOPICAL ONCE
OUTPATIENT
Start: 2024-10-16 | End: 2024-10-16

## 2024-10-16 RX ORDER — LIDOCAINE HYDROCHLORIDE 40 MG/ML
SOLUTION TOPICAL ONCE
Status: COMPLETED | OUTPATIENT
Start: 2024-10-16 | End: 2024-10-16

## 2024-10-16 RX ORDER — CLOBETASOL PROPIONATE 0.5 MG/G
OINTMENT TOPICAL ONCE
OUTPATIENT
Start: 2024-10-16 | End: 2024-10-16

## 2024-10-16 RX ORDER — LIDOCAINE HYDROCHLORIDE 20 MG/ML
JELLY TOPICAL ONCE
OUTPATIENT
Start: 2024-10-16 | End: 2024-10-16

## 2024-10-16 RX ORDER — GINSENG 100 MG
CAPSULE ORAL ONCE
OUTPATIENT
Start: 2024-10-16 | End: 2024-10-16

## 2024-10-16 RX ORDER — BACITRACIN ZINC AND POLYMYXIN B SULFATE 500; 1000 [USP'U]/G; [USP'U]/G
OINTMENT TOPICAL ONCE
OUTPATIENT
Start: 2024-10-16 | End: 2024-10-16

## 2024-10-16 RX ORDER — SILVER SULFADIAZINE 10 MG/G
CREAM TOPICAL ONCE
OUTPATIENT
Start: 2024-10-16 | End: 2024-10-16

## 2024-10-16 RX ORDER — BETAMETHASONE DIPROPIONATE 0.05 %
OINTMENT (GRAM) TOPICAL ONCE
OUTPATIENT
Start: 2024-10-16 | End: 2024-10-16

## 2024-10-16 RX ORDER — OXYCODONE AND ACETAMINOPHEN 5; 325 MG/1; MG/1
1 TABLET ORAL EVERY 6 HOURS PRN
Qty: 28 TABLET | Refills: 0 | Status: SHIPPED | OUTPATIENT
Start: 2024-10-16 | End: 2024-10-23

## 2024-10-16 RX ORDER — MUPIROCIN 20 MG/G
OINTMENT TOPICAL ONCE
OUTPATIENT
Start: 2024-10-16 | End: 2024-10-16

## 2024-10-16 RX ORDER — LIDOCAINE HYDROCHLORIDE 40 MG/ML
SOLUTION TOPICAL ONCE
OUTPATIENT
Start: 2024-10-16 | End: 2024-10-16

## 2024-10-16 RX ORDER — TRIAMCINOLONE ACETONIDE 1 MG/G
OINTMENT TOPICAL ONCE
OUTPATIENT
Start: 2024-10-16 | End: 2024-10-16

## 2024-10-16 RX ORDER — GENTAMICIN SULFATE 1 MG/G
OINTMENT TOPICAL ONCE
OUTPATIENT
Start: 2024-10-16 | End: 2024-10-16

## 2024-10-16 RX ORDER — LIDOCAINE 40 MG/G
CREAM TOPICAL ONCE
OUTPATIENT
Start: 2024-10-16 | End: 2024-10-16

## 2024-10-16 RX ORDER — NEOMYCIN/BACITRACIN/POLYMYXINB 3.5-400-5K
OINTMENT (GRAM) TOPICAL ONCE
OUTPATIENT
Start: 2024-10-16 | End: 2024-10-16

## 2024-10-16 RX ADMIN — LIDOCAINE HYDROCHLORIDE 10 ML: 40 SOLUTION TOPICAL at 07:44

## 2024-10-16 ASSESSMENT — PAIN DESCRIPTION - FREQUENCY: FREQUENCY: CONTINUOUS

## 2024-10-16 ASSESSMENT — PAIN DESCRIPTION - PAIN TYPE: TYPE: CHRONIC PAIN

## 2024-10-16 ASSESSMENT — PAIN DESCRIPTION - ONSET: ONSET: ON-GOING

## 2024-10-16 ASSESSMENT — PAIN - FUNCTIONAL ASSESSMENT: PAIN_FUNCTIONAL_ASSESSMENT: PREVENTS OR INTERFERES SOME ACTIVE ACTIVITIES AND ADLS

## 2024-10-16 ASSESSMENT — PAIN DESCRIPTION - DESCRIPTORS: DESCRIPTORS: STABBING

## 2024-10-16 ASSESSMENT — PAIN DESCRIPTION - ORIENTATION: ORIENTATION: LEFT

## 2024-10-16 ASSESSMENT — PAIN SCALES - GENERAL: PAINLEVEL_OUTOF10: 7

## 2024-10-16 ASSESSMENT — PAIN DESCRIPTION - LOCATION: LOCATION: LEG

## 2024-10-16 NOTE — PROGRESS NOTES
Drainage Description Yellow;Brown 10/16/24 0738   Odor None 10/16/24 0738   Fiona-wound Assessment Fragile;Blanchable erythema 10/16/24 0738   Margins Attached edges 06/26/24 0741   Wound Thickness Description not for Pressure Injury Full thickness 05/15/24 0800   Number of days: 986      Procedure Note  Indications:  Based on my examination of this patient's wound(s)/ulcer(s) today, debridement is required to promote healing and evaluate the wound base.    Performed by: Ashley Staples MD    Consent obtained:  Yes    Time out taken:  Yes    Pain Control: Anesthetic  Anesthetic: 4% Lidocaine Liquid Topical     Debridement:Excisional Debridement    Using curette the wound(s)/ulcer(s) was/were sharply debrided down through and including the removal of epidermis, dermis, and subcutaneous tissue.        Devitalized Tissue Debrided:  fibrin, biofilm, slough, and exudate to stimulate bleeding to promote healing, post debridement good bleeding base and wound edges noted    Wound/Ulcer #:1,     Percent of Wound/Ulcer Debrided: 100 %    Total Surface Area Debrided: 20 sq cm   Estimated Blood Loss:  Minimal  Hemostasis Achieved:  by pressure    Procedural Pain:  8  / 10   Post Procedural Pain: 7 / 10     Response to treatment: Well tolerated     Plan:        Discharge Instructions         Visit Discharge/Physician Orders     Discharge condition: Stable     Assessment of pain at discharge: yes     Anesthetic used: 4% lidocaine solution     Discharge to: Home     Left via:Private automobile     Accompanied by:self     ECF/HHA: n/a     Dressing Orders: LEFT MEDIAL LEG: Cleanse with normal saline, apply zinc to fiona wound, drawtex, ABD pad, and profore . Change weekly.        Treatment Orders: Eat a diet high in protein and vitamin C. Take a multiple vitamin daily unless contraindicated.     Dr. Duncan as needed.       Must wear slip on shoe to work due to wrap on leg!        Keep wrap dry at all times. If wrap becomes

## 2024-10-21 NOTE — DISCHARGE INSTRUCTIONS
Visit Discharge/Physician Orders     Discharge condition: Stable     Assessment of pain at discharge: yes     Anesthetic used: 4% lidocaine solution     Discharge to: Home     Left via:Private automobile     Accompanied by:self     ECF/HHA: n/a     Dressing Orders: LEFT MEDIAL LEG: Cleanse with normal saline, apply zinc to fiona wound, drawtex, ABD pad, and profore . Change weekly.        Treatment Orders: Eat a diet high in protein and vitamin C. Take a multiple vitamin daily unless contraindicated.     Dr. Duncan as needed.       Must wear slip on shoe to work due to wrap on leg!        Keep wrap dry at all times. If wrap becomes wet or falls 2 inches call [facility] ([phone])and may remove wrap and apply double tubigrip.         [facility] followup visit : 1 week Dr. HERNANDEZ __________________________________  (Please note your next appointment above and if you are unable to keep, kindly give a 24 hour notice. Thank you.)     Physician signature:__________________________     If you experience any of the following, please call the Wound Care Center during business hours:     * Increase in Pain  * Temperature over 101  * Increase in drainage from your wound  * Drainage with a foul odor  * Bleeding  * Increase in swelling  * Need for compression bandage changes due to slippage, breakthrough drainage.     If you need medical attention outside of the business hours of the Wound Care Centers please contact your PCP or go to the nearest emergency room

## 2024-10-23 ENCOUNTER — HOSPITAL ENCOUNTER (OUTPATIENT)
Dept: WOUND CARE | Age: 69
Discharge: HOME | End: 2024-10-23
Attending: SURGERY
Payer: COMMERCIAL

## 2024-10-23 VITALS
DIASTOLIC BLOOD PRESSURE: 74 MMHG | HEART RATE: 77 BPM | SYSTOLIC BLOOD PRESSURE: 159 MMHG | RESPIRATION RATE: 18 BRPM | BODY MASS INDEX: 25.16 KG/M2 | TEMPERATURE: 97.4 F | WEIGHT: 166 LBS | HEIGHT: 68 IN

## 2024-10-23 DIAGNOSIS — I70.243 ATHEROSCLEROSIS OF NATIVE ARTERY OF LEFT LOWER EXTREMITY WITH ULCERATION OF ANKLE (HCC): Chronic | ICD-10-CM

## 2024-10-23 DIAGNOSIS — L97.322 VARICOSE VEINS OF LEFT LOWER EXTREMITY WITH ULCER OF ANKLE WITH FAT LAYER EXPOSED (HCC): Primary | ICD-10-CM

## 2024-10-23 DIAGNOSIS — I83.023 VARICOSE VEINS OF LEFT LOWER EXTREMITY WITH ULCER OF ANKLE WITH FAT LAYER EXPOSED (HCC): Primary | ICD-10-CM

## 2024-10-23 DIAGNOSIS — I70.242 ATHEROSCLEROSIS OF NATIVE ARTERY OF LEFT LOWER EXTREMITY WITH ULCERATION OF CALF (HCC): ICD-10-CM

## 2024-10-23 PROCEDURE — 11042 DBRDMT SUBQ TIS 1ST 20SQCM/<: CPT | Performed by: SURGERY

## 2024-10-23 PROCEDURE — 11042 DBRDMT SUBQ TIS 1ST 20SQCM/<: CPT

## 2024-10-23 RX ORDER — SILVER SULFADIAZINE 10 MG/G
CREAM TOPICAL ONCE
OUTPATIENT
Start: 2024-10-23 | End: 2024-10-23

## 2024-10-23 RX ORDER — MUPIROCIN 20 MG/G
OINTMENT TOPICAL ONCE
OUTPATIENT
Start: 2024-10-23 | End: 2024-10-23

## 2024-10-23 RX ORDER — BETAMETHASONE DIPROPIONATE 0.05 %
OINTMENT (GRAM) TOPICAL ONCE
OUTPATIENT
Start: 2024-10-23 | End: 2024-10-23

## 2024-10-23 RX ORDER — GINSENG 100 MG
CAPSULE ORAL ONCE
OUTPATIENT
Start: 2024-10-23 | End: 2024-10-23

## 2024-10-23 RX ORDER — GENTAMICIN SULFATE 1 MG/G
OINTMENT TOPICAL ONCE
OUTPATIENT
Start: 2024-10-23 | End: 2024-10-23

## 2024-10-23 RX ORDER — LIDOCAINE HYDROCHLORIDE 40 MG/ML
SOLUTION TOPICAL ONCE
OUTPATIENT
Start: 2024-10-23 | End: 2024-10-23

## 2024-10-23 RX ORDER — CLOBETASOL PROPIONATE 0.5 MG/G
OINTMENT TOPICAL ONCE
OUTPATIENT
Start: 2024-10-23 | End: 2024-10-23

## 2024-10-23 RX ORDER — LIDOCAINE HYDROCHLORIDE 20 MG/ML
JELLY TOPICAL ONCE
OUTPATIENT
Start: 2024-10-23 | End: 2024-10-23

## 2024-10-23 RX ORDER — TRIAMCINOLONE ACETONIDE 1 MG/G
OINTMENT TOPICAL ONCE
OUTPATIENT
Start: 2024-10-23 | End: 2024-10-23

## 2024-10-23 RX ORDER — NEOMYCIN/BACITRACIN/POLYMYXINB 3.5-400-5K
OINTMENT (GRAM) TOPICAL ONCE
OUTPATIENT
Start: 2024-10-23 | End: 2024-10-23

## 2024-10-23 RX ORDER — LIDOCAINE HYDROCHLORIDE 40 MG/ML
SOLUTION TOPICAL ONCE
Status: COMPLETED | OUTPATIENT
Start: 2024-10-23 | End: 2024-10-23

## 2024-10-23 RX ORDER — LIDOCAINE 40 MG/G
CREAM TOPICAL ONCE
OUTPATIENT
Start: 2024-10-23 | End: 2024-10-23

## 2024-10-23 RX ORDER — BACITRACIN ZINC AND POLYMYXIN B SULFATE 500; 1000 [USP'U]/G; [USP'U]/G
OINTMENT TOPICAL ONCE
OUTPATIENT
Start: 2024-10-23 | End: 2024-10-23

## 2024-10-23 RX ORDER — LIDOCAINE 50 MG/G
OINTMENT TOPICAL ONCE
OUTPATIENT
Start: 2024-10-23 | End: 2024-10-23

## 2024-10-23 RX ADMIN — LIDOCAINE HYDROCHLORIDE 10 ML: 40 SOLUTION TOPICAL at 07:44

## 2024-10-23 ASSESSMENT — PAIN DESCRIPTION - PAIN TYPE: TYPE: CHRONIC PAIN

## 2024-10-23 ASSESSMENT — PAIN DESCRIPTION - LOCATION: LOCATION: LEG

## 2024-10-23 ASSESSMENT — PAIN DESCRIPTION - FREQUENCY: FREQUENCY: INTERMITTENT

## 2024-10-23 ASSESSMENT — PAIN DESCRIPTION - DESCRIPTORS: DESCRIPTORS: STABBING

## 2024-10-23 ASSESSMENT — PAIN SCALES - GENERAL: PAINLEVEL_OUTOF10: 7

## 2024-10-23 ASSESSMENT — PAIN - FUNCTIONAL ASSESSMENT: PAIN_FUNCTIONAL_ASSESSMENT: PREVENTS OR INTERFERES SOME ACTIVE ACTIVITIES AND ADLS

## 2024-10-23 ASSESSMENT — PAIN DESCRIPTION - ORIENTATION: ORIENTATION: LEFT

## 2024-10-23 ASSESSMENT — PAIN DESCRIPTION - ONSET: ONSET: ON-GOING

## 2024-10-23 NOTE — PROGRESS NOTES
Wound Healing Center Followup Visit Note    Referring Physician : Rosenda Cormier MD  Amanda Ledesma  MEDICAL RECORD NUMBER:  [id number]  AGE: 68 y.o.   GENDER: female  : 1955  EPISODE DATE:  10/23/2024    Subjective:     Chief Complaint   Patient presents with    Wound Check     Left leg      HISTORY of PRESENT ILLNESS HPI   Amanda Ledesma is a 68 y.o. female who presents today in regards to follow up evaluation and treatment of wound/ulcer.  That patient's past medical, family and social hx were reviewed and changes were made if present.    History of Wound Context:  The patient has had a wound of her left ankle/calf which was first noted approximately 2021.  This has been treated local wound care. On their initial visit to the wound healing center, 22,  the patient has noted that the wound has been improving.  The patient has not had similar previous wounds in the past.      She started seeing Dr. Street in 2021 and than Dr. Gillis.  She was started in unna boot ~ 2021.  She has noticed some improvement since starting unna boot.  She is currently following with Dr. Haskins.      Pt is not on abx at time of initial visit, but has been treated with previously by podiatry.      She is not a DM.  She is not a smoker.  She denies hx of DVT, and per her report had recent us noting no evidence of dvt at [facility].  She also had arterial studies done.    I had previously seen her in the past in regards to left buttock thigh wound, which started as abscess.      Pt works at [employer] in the [employer] and is on her feet all day.    22  Reflux study - if significant findings will schedule for fu  Continue compression therapy [facility] per podiatry with aquacell dressing  Elevation  She does not have significant arterial occlusive disease  22  Patient has asked to continuing following with me going forward because of our previous relationship  She is going to let Dr. Schuler office know  She

## 2024-10-25 NOTE — DISCHARGE INSTRUCTIONS
Visit Discharge/Physician Orders     Discharge condition: Stable     Assessment of pain at discharge: yes     Anesthetic used: 4% lidocaine solution     Discharge to: Home     Left via:Private automobile     Accompanied by:self     ECF/HHA: n/a     Dressing Orders: LEFT MEDIAL LEG: Cleanse with normal saline, apply zinc to fiona wound, drawtex, ABD pad, and profore . Change weekly.        Treatment Orders: Eat a diet high in protein and vitamin C. Take a multiple vitamin daily unless contraindicated.     Dr. Duncan as needed.       Must wear slip on shoe to work due to wrap on leg!        Keep wrap dry at all times. If wrap becomes wet or falls 2 inches call [facility] ([phone])and may remove wrap and apply double tubigrip.         [facility] followup visit : 1 week Dr. HERNANDEZ __________________________________  (Please note your next appointment above and if you are unable to keep, kindly give a 24 hour notice. Thank you.)     Physician signature:__________________________     If you experience any of the following, please call the Wound Care Center during business hours:     * Increase in Pain  * Temperature over 101  * Increase in drainage from your wound  * Drainage with a foul odor  * Bleeding  * Increase in swelling  * Need for compression bandage changes due to slippage, breakthrough drainage.     If you need medical attention outside of the business hours of the Wound Care Centers please contact your PCP or go to the nearest emergency room

## 2024-10-30 ENCOUNTER — HOSPITAL ENCOUNTER (OUTPATIENT)
Dept: WOUND CARE | Age: 69
Discharge: HOME | End: 2024-10-30
Attending: SURGERY
Payer: COMMERCIAL

## 2024-10-30 VITALS
HEART RATE: 71 BPM | RESPIRATION RATE: 18 BRPM | DIASTOLIC BLOOD PRESSURE: 87 MMHG | SYSTOLIC BLOOD PRESSURE: 148 MMHG | BODY MASS INDEX: 25.16 KG/M2 | TEMPERATURE: 97.5 F | HEIGHT: 68 IN | WEIGHT: 166 LBS

## 2024-10-30 DIAGNOSIS — I70.243 ATHEROSCLEROSIS OF NATIVE ARTERY OF LEFT LOWER EXTREMITY WITH ULCERATION OF ANKLE (HCC): Chronic | ICD-10-CM

## 2024-10-30 DIAGNOSIS — I70.242 ATHEROSCLEROSIS OF NATIVE ARTERY OF LEFT LOWER EXTREMITY WITH ULCERATION OF CALF (HCC): ICD-10-CM

## 2024-10-30 DIAGNOSIS — I83.023 VARICOSE VEINS OF LEFT LOWER EXTREMITY WITH ULCER OF ANKLE WITH FAT LAYER EXPOSED (HCC): Primary | ICD-10-CM

## 2024-10-30 DIAGNOSIS — L97.322 VARICOSE VEINS OF LEFT LOWER EXTREMITY WITH ULCER OF ANKLE WITH FAT LAYER EXPOSED (HCC): Primary | ICD-10-CM

## 2024-10-30 PROCEDURE — 11042 DBRDMT SUBQ TIS 1ST 20SQCM/<: CPT

## 2024-10-30 RX ORDER — GENTAMICIN SULFATE 1 MG/G
OINTMENT TOPICAL ONCE
OUTPATIENT
Start: 2024-10-30 | End: 2024-10-30

## 2024-10-30 RX ORDER — OXYCODONE AND ACETAMINOPHEN 5; 325 MG/1; MG/1
1 TABLET ORAL EVERY 6 HOURS PRN
Qty: 28 TABLET | Refills: 0 | Status: SHIPPED | OUTPATIENT
Start: 2024-10-30 | End: 2024-11-06

## 2024-10-30 RX ORDER — GINSENG 100 MG
CAPSULE ORAL ONCE
OUTPATIENT
Start: 2024-10-30 | End: 2024-10-30

## 2024-10-30 RX ORDER — LIDOCAINE HYDROCHLORIDE 40 MG/ML
SOLUTION TOPICAL ONCE
Status: COMPLETED | OUTPATIENT
Start: 2024-10-30 | End: 2024-10-30

## 2024-10-30 RX ORDER — LIDOCAINE HYDROCHLORIDE 20 MG/ML
JELLY TOPICAL ONCE
OUTPATIENT
Start: 2024-10-30 | End: 2024-10-30

## 2024-10-30 RX ORDER — MUPIROCIN 20 MG/G
OINTMENT TOPICAL ONCE
OUTPATIENT
Start: 2024-10-30 | End: 2024-10-30

## 2024-10-30 RX ORDER — CLOBETASOL PROPIONATE 0.5 MG/G
OINTMENT TOPICAL ONCE
OUTPATIENT
Start: 2024-10-30 | End: 2024-10-30

## 2024-10-30 RX ORDER — BACITRACIN ZINC AND POLYMYXIN B SULFATE 500; 1000 [USP'U]/G; [USP'U]/G
OINTMENT TOPICAL ONCE
OUTPATIENT
Start: 2024-10-30 | End: 2024-10-30

## 2024-10-30 RX ORDER — SILVER SULFADIAZINE 10 MG/G
CREAM TOPICAL ONCE
OUTPATIENT
Start: 2024-10-30 | End: 2024-10-30

## 2024-10-30 RX ORDER — LIDOCAINE 50 MG/G
OINTMENT TOPICAL ONCE
OUTPATIENT
Start: 2024-10-30 | End: 2024-10-30

## 2024-10-30 RX ORDER — LIDOCAINE HYDROCHLORIDE 40 MG/ML
SOLUTION TOPICAL ONCE
OUTPATIENT
Start: 2024-10-30 | End: 2024-10-30

## 2024-10-30 RX ORDER — NEOMYCIN/BACITRACIN/POLYMYXINB 3.5-400-5K
OINTMENT (GRAM) TOPICAL ONCE
OUTPATIENT
Start: 2024-10-30 | End: 2024-10-30

## 2024-10-30 RX ORDER — LIDOCAINE 40 MG/G
CREAM TOPICAL ONCE
OUTPATIENT
Start: 2024-10-30 | End: 2024-10-30

## 2024-10-30 RX ORDER — TRIAMCINOLONE ACETONIDE 1 MG/G
OINTMENT TOPICAL ONCE
OUTPATIENT
Start: 2024-10-30 | End: 2024-10-30

## 2024-10-30 RX ORDER — BETAMETHASONE DIPROPIONATE 0.05 %
OINTMENT (GRAM) TOPICAL ONCE
OUTPATIENT
Start: 2024-10-30 | End: 2024-10-30

## 2024-10-30 RX ADMIN — LIDOCAINE HYDROCHLORIDE 10 ML: 40 SOLUTION TOPICAL at 07:47

## 2024-10-30 ASSESSMENT — PAIN DESCRIPTION - DESCRIPTORS: DESCRIPTORS: STABBING

## 2024-10-30 ASSESSMENT — PAIN DESCRIPTION - ORIENTATION: ORIENTATION: LEFT

## 2024-10-30 ASSESSMENT — PAIN DESCRIPTION - ONSET: ONSET: ON-GOING

## 2024-10-30 ASSESSMENT — PAIN DESCRIPTION - PAIN TYPE: TYPE: CHRONIC PAIN

## 2024-10-30 ASSESSMENT — PAIN DESCRIPTION - FREQUENCY: FREQUENCY: INTERMITTENT

## 2024-10-30 ASSESSMENT — PAIN DESCRIPTION - LOCATION: LOCATION: LEG

## 2024-10-30 ASSESSMENT — PAIN SCALES - GENERAL: PAINLEVEL_OUTOF10: 7

## 2024-10-30 ASSESSMENT — PAIN - FUNCTIONAL ASSESSMENT: PAIN_FUNCTIONAL_ASSESSMENT: PREVENTS OR INTERFERES SOME ACTIVE ACTIVITIES AND ADLS

## 2024-10-30 NOTE — PROGRESS NOTES
Wound Healing Center Followup Visit Note    Referring Physician : Rosenda Cormier MD  Amanda Ledesma  MEDICAL RECORD NUMBER:  [id number]  AGE: 68 y.o.   GENDER: female  : 1955  EPISODE DATE:  10/30/2024    Subjective:     Chief Complaint   Patient presents with    Wound Check     Left leg      HISTORY of PRESENT ILLNESS HPI   Amanda Ledesma is a 68 y.o. female who presents today in regards to follow up evaluation and treatment of wound/ulcer.  That patient's past medical, family and social hx were reviewed and changes were made if present.    History of Wound Context:  The patient has had a wound of her left ankle/calf which was first noted approximately 2021.  This has been treated local wound care. On their initial visit to the wound healing center, 22,  the patient has noted that the wound has been improving.  The patient has not had similar previous wounds in the past.      She started seeing Dr. Street in 2021 and than Dr. Gillis.  She was started in unna boot ~ 2021.  She has noticed some improvement since starting unna boot.  She is currently following with Dr. Haskins.      Pt is not on abx at time of initial visit, but has been treated with previously by podiatry.      She is not a DM.  She is not a smoker.  She denies hx of DVT, and per her report had recent us noting no evidence of dvt at [facility].  She also had arterial studies done.    I had previously seen her in the past in regards to left buttock thigh wound, which started as abscess.      Pt works at [employer] in the [employer] and is on her feet all day.    22  Reflux study - if significant findings will schedule for fu  Continue compression therapy [facility] per podiatry with aquacell dressing  Elevation  She does not have significant arterial occlusive disease  22  Patient has asked to continuing following with me going forward because of our previous relationship  She is going to let Dr. Schuler office know  She

## 2024-11-04 NOTE — DISCHARGE INSTRUCTIONS
[facility]  SERENA Gibbs M.D.  CHIQUIS Johnson        Internal Medicine Progress Note    3/5/2023   08:55 CST    Name:  Julio Cesar Gonzales  MRN:    [id number]     Acct:     [id number]   Room:  4/[facility] Visit Discharge/Physician Orders     Discharge condition: Stable     Assessment of pain at discharge: yes     Anesthetic used: 4% lidocaine solution     Discharge to: Home     Left via:Private automobile     Accompanied by:self     ECF/HHA: n/a     Dressing Orders: LEFT MEDIAL LEG: Cleanse with normal saline, apply zinc to fiona wound, drawtex, ABD pad, and profore . Change weekly.        Treatment Orders: Eat a diet high in protein and vitamin C. Take a multiple vitamin daily unless contraindicated.     Dr. Duncan as needed.       Must wear slip on shoe to work due to wrap on leg!        Keep wrap dry at all times. If wrap becomes wet or falls 2 inches call [facility] ([phone])and may remove wrap and apply double tubigrip.         [facility] followup visit : 1 week Dr. HERNANDEZ __________________________________  (Please note your next appointment above and if you are unable to keep, kindly give a 24 hour notice. Thank you.)     Physician signature:__________________________     If you experience any of the following, please call the Wound Care Center during business hours:     * Increase in Pain  * Temperature over 101  * Increase in drainage from your wound  * Drainage with a foul odor  * Bleeding  * Increase in swelling  * Need for compression bandage changes due to slippage, breakthrough drainage.     If you need medical attention outside of the business hours of the Wound Care Centers please contact your PCP or go to the nearest emergency room                   CST

## 2024-11-06 ENCOUNTER — HOSPITAL ENCOUNTER (OUTPATIENT)
Dept: WOUND CARE | Age: 69
Discharge: HOME | End: 2024-11-06
Attending: SURGERY
Payer: COMMERCIAL

## 2024-11-06 VITALS
RESPIRATION RATE: 18 BRPM | HEIGHT: 68 IN | BODY MASS INDEX: 25.16 KG/M2 | HEART RATE: 76 BPM | SYSTOLIC BLOOD PRESSURE: 148 MMHG | TEMPERATURE: 95.3 F | DIASTOLIC BLOOD PRESSURE: 70 MMHG | WEIGHT: 166 LBS

## 2024-11-06 DIAGNOSIS — I70.242 ATHEROSCLEROSIS OF NATIVE ARTERY OF LEFT LOWER EXTREMITY WITH ULCERATION OF CALF (HCC): ICD-10-CM

## 2024-11-06 DIAGNOSIS — I70.243 ATHEROSCLEROSIS OF NATIVE ARTERY OF LEFT LOWER EXTREMITY WITH ULCERATION OF ANKLE (HCC): Chronic | ICD-10-CM

## 2024-11-06 DIAGNOSIS — L97.322 VARICOSE VEINS OF LEFT LOWER EXTREMITY WITH ULCER OF ANKLE WITH FAT LAYER EXPOSED (HCC): Primary | ICD-10-CM

## 2024-11-06 DIAGNOSIS — I83.023 VARICOSE VEINS OF LEFT LOWER EXTREMITY WITH ULCER OF ANKLE WITH FAT LAYER EXPOSED (HCC): Primary | ICD-10-CM

## 2024-11-06 PROCEDURE — 11042 DBRDMT SUBQ TIS 1ST 20SQCM/<: CPT

## 2024-11-06 PROCEDURE — 11042 DBRDMT SUBQ TIS 1ST 20SQCM/<: CPT | Performed by: SURGERY

## 2024-11-06 RX ORDER — LIDOCAINE HYDROCHLORIDE 20 MG/ML
JELLY TOPICAL ONCE
OUTPATIENT
Start: 2024-11-06 | End: 2024-11-06

## 2024-11-06 RX ORDER — OXYCODONE AND ACETAMINOPHEN 5; 325 MG/1; MG/1
1 TABLET ORAL EVERY 6 HOURS PRN
Qty: 28 TABLET | Refills: 0 | Status: SHIPPED | OUTPATIENT
Start: 2024-11-11 | End: 2024-11-18

## 2024-11-06 RX ORDER — MUPIROCIN 20 MG/G
OINTMENT TOPICAL ONCE
OUTPATIENT
Start: 2024-11-06 | End: 2024-11-06

## 2024-11-06 RX ORDER — LIDOCAINE 40 MG/G
CREAM TOPICAL ONCE
OUTPATIENT
Start: 2024-11-06 | End: 2024-11-06

## 2024-11-06 RX ORDER — LIDOCAINE 50 MG/G
OINTMENT TOPICAL ONCE
OUTPATIENT
Start: 2024-11-06 | End: 2024-11-06

## 2024-11-06 RX ORDER — LIDOCAINE HYDROCHLORIDE 40 MG/ML
SOLUTION TOPICAL ONCE
Status: COMPLETED | OUTPATIENT
Start: 2024-11-06 | End: 2024-11-06

## 2024-11-06 RX ORDER — BETAMETHASONE DIPROPIONATE 0.05 %
OINTMENT (GRAM) TOPICAL ONCE
OUTPATIENT
Start: 2024-11-06 | End: 2024-11-06

## 2024-11-06 RX ORDER — LIDOCAINE HYDROCHLORIDE 40 MG/ML
SOLUTION TOPICAL ONCE
OUTPATIENT
Start: 2024-11-06 | End: 2024-11-06

## 2024-11-06 RX ORDER — GENTAMICIN SULFATE 1 MG/G
OINTMENT TOPICAL ONCE
OUTPATIENT
Start: 2024-11-06 | End: 2024-11-06

## 2024-11-06 RX ORDER — TRIAMCINOLONE ACETONIDE 1 MG/G
OINTMENT TOPICAL ONCE
OUTPATIENT
Start: 2024-11-06 | End: 2024-11-06

## 2024-11-06 RX ORDER — BACITRACIN ZINC AND POLYMYXIN B SULFATE 500; 1000 [USP'U]/G; [USP'U]/G
OINTMENT TOPICAL ONCE
OUTPATIENT
Start: 2024-11-06 | End: 2024-11-06

## 2024-11-06 RX ORDER — NEOMYCIN/BACITRACIN/POLYMYXINB 3.5-400-5K
OINTMENT (GRAM) TOPICAL ONCE
OUTPATIENT
Start: 2024-11-06 | End: 2024-11-06

## 2024-11-06 RX ORDER — SILVER SULFADIAZINE 10 MG/G
CREAM TOPICAL ONCE
OUTPATIENT
Start: 2024-11-06 | End: 2024-11-06

## 2024-11-06 RX ORDER — GINSENG 100 MG
CAPSULE ORAL ONCE
OUTPATIENT
Start: 2024-11-06 | End: 2024-11-06

## 2024-11-06 RX ORDER — CLOBETASOL PROPIONATE 0.5 MG/G
OINTMENT TOPICAL ONCE
OUTPATIENT
Start: 2024-11-06 | End: 2024-11-06

## 2024-11-06 RX ADMIN — LIDOCAINE HYDROCHLORIDE 10 ML: 40 SOLUTION TOPICAL at 07:52

## 2024-11-06 ASSESSMENT — PAIN DESCRIPTION - PAIN TYPE: TYPE: CHRONIC PAIN

## 2024-11-06 ASSESSMENT — PAIN DESCRIPTION - DESCRIPTORS: DESCRIPTORS: ACHING;STABBING

## 2024-11-06 ASSESSMENT — PAIN - FUNCTIONAL ASSESSMENT: PAIN_FUNCTIONAL_ASSESSMENT: PREVENTS OR INTERFERES WITH MANY ACTIVE NOT PASSIVE ACTIVITIES

## 2024-11-06 ASSESSMENT — PAIN DESCRIPTION - ORIENTATION: ORIENTATION: LEFT

## 2024-11-06 ASSESSMENT — PAIN SCALES - GENERAL: PAINLEVEL_OUTOF10: 7

## 2024-11-06 ASSESSMENT — PAIN DESCRIPTION - ONSET: ONSET: ON-GOING

## 2024-11-06 ASSESSMENT — PAIN DESCRIPTION - LOCATION: LOCATION: LEG

## 2024-11-06 NOTE — PROGRESS NOTES
procedure, risks, benefits, complications, and alternatives of the procedure.  They understand and consent.  All questions were answered  Script for percocet 5/325 mg #28 prn q6 hrs - given, oarrs run  5/24/22  Angio, L PT and peroneal plasty  5/25/22  On iv abx  Wound appearance better  R groin no hematoma, L DP 2+, PT triphasic   6/1/22  Wound size and appearance better  6/8/22  Wound size and appearance better  Discussed with Dr Duncan - he will stop abx  6/15/22  Wound size slightly improvement, appearance better  6/22/22  Wounds sizes smaller  6/29/22  Wounds stable   Hypergranulation tissue present throughout wound beds    Continue drawtex, foam, ABD and profore   7/6/22  Wounds slightly improved  7/13/22  Both wounds slightly larger  Hyperkeratotic tissue debrided back  7/14/22  Patient came in due to drainage through her wrap  Dressing noted to have large amounts of yellow/green drainage throughout  Cultures obtained    7/20/22  Culture reviewed large growth S aureus and Pseudomonas  Disc with Dr Hannon - will start clindamycin 600 mg tid for staph  He will see her in office in regards to IV for pseudomonas  Wounds stable in size  Change to aquacell ag  7/27/22  Dr Duncan to see  Medial slightly larger, lateral slightly smaller  8/3/22  Dr Duncan saw her felt wound appearance better - will hold off on iv for now  Medial slightly improved, lateral stable  8/10/2022  Wounds stable  8/17/22  Wound stable, more pain with debridement  Discussed OR for debridement and possible advanced skin therapy - pt agreeable  8/24/22  Debridement, kerecise application  8/31/22  Wound appearance improved  Medial wound improved, lateral stable  9/7/22  Wounds are smaller  9/14/22  Wound stable  Enodfrom and drawtek  9/21/2022  Wound debrided, stable according to the measurements  9/28/22  Wound larger  Culture done  Discussed OR  Aquacell ag change   10/5/22  Wound slightly larger  Percocet 5/325 mg #25 - script given, oarrs

## 2024-11-07 NOTE — DISCHARGE INSTRUCTIONS
Visit Discharge/Physician Orders     Discharge condition: Stable     Assessment of pain at discharge: yes     Anesthetic used: 4% lidocaine solution     Discharge to: Home     Left via:Private automobile     Accompanied by:self     ECF/HHA: n/a     Dressing Orders: LEFT MEDIAL LEG: Cleanse with normal saline, apply zinc to fiona wound, drawtex, ABD pad, and profore . Change weekly.        Treatment Orders: Eat a diet high in protein and vitamin C. Take a multiple vitamin daily unless contraindicated.     Dr. Duncan as needed.       Must wear slip on shoe to work due to wrap on leg!        Keep wrap dry at all times. If wrap becomes wet or falls 2 inches call [facility] ([phone])and may remove wrap and apply double tubigrip.         [facility] followup visit : 1 week Dr. HERNANDEZ __________________________________  (Please note your next appointment above and if you are unable to keep, kindly give a 24 hour notice. Thank you.)     Physician signature:__________________________     If you experience any of the following, please call the Wound Care Center during business hours:     * Increase in Pain  * Temperature over 101  * Increase in drainage from your wound  * Drainage with a foul odor  * Bleeding  * Increase in swelling  * Need for compression bandage changes due to slippage, breakthrough drainage.     If you need medical attention outside of the business hours of the Wound Care Centers please contact your PCP or go to the nearest emergency room

## 2024-11-13 ENCOUNTER — HOSPITAL ENCOUNTER (OUTPATIENT)
Dept: WOUND CARE | Age: 69
Discharge: HOME | End: 2024-11-13
Attending: SURGERY
Payer: COMMERCIAL

## 2024-11-13 VITALS
SYSTOLIC BLOOD PRESSURE: 148 MMHG | TEMPERATURE: 97.1 F | HEART RATE: 74 BPM | DIASTOLIC BLOOD PRESSURE: 72 MMHG | RESPIRATION RATE: 18 BRPM

## 2024-11-13 DIAGNOSIS — I83.023 VARICOSE VEINS OF LEFT LOWER EXTREMITY WITH ULCER OF ANKLE WITH FAT LAYER EXPOSED (HCC): Primary | ICD-10-CM

## 2024-11-13 DIAGNOSIS — I70.242 ATHEROSCLEROSIS OF NATIVE ARTERY OF LEFT LOWER EXTREMITY WITH ULCERATION OF CALF (HCC): ICD-10-CM

## 2024-11-13 DIAGNOSIS — I70.243 ATHEROSCLEROSIS OF NATIVE ARTERY OF LEFT LOWER EXTREMITY WITH ULCERATION OF ANKLE (HCC): Chronic | ICD-10-CM

## 2024-11-13 DIAGNOSIS — L97.322 VARICOSE VEINS OF LEFT LOWER EXTREMITY WITH ULCER OF ANKLE WITH FAT LAYER EXPOSED (HCC): Primary | ICD-10-CM

## 2024-11-13 PROCEDURE — 11042 DBRDMT SUBQ TIS 1ST 20SQCM/<: CPT | Performed by: SURGERY

## 2024-11-13 PROCEDURE — 11042 DBRDMT SUBQ TIS 1ST 20SQCM/<: CPT

## 2024-11-13 RX ORDER — SILVER SULFADIAZINE 10 MG/G
CREAM TOPICAL ONCE
OUTPATIENT
Start: 2024-11-13 | End: 2024-11-13

## 2024-11-13 RX ORDER — MUPIROCIN 20 MG/G
OINTMENT TOPICAL ONCE
OUTPATIENT
Start: 2024-11-13 | End: 2024-11-13

## 2024-11-13 RX ORDER — LIDOCAINE 40 MG/G
CREAM TOPICAL ONCE
OUTPATIENT
Start: 2024-11-13 | End: 2024-11-13

## 2024-11-13 RX ORDER — BACITRACIN ZINC AND POLYMYXIN B SULFATE 500; 1000 [USP'U]/G; [USP'U]/G
OINTMENT TOPICAL ONCE
OUTPATIENT
Start: 2024-11-13 | End: 2024-11-13

## 2024-11-13 RX ORDER — LIDOCAINE 50 MG/G
OINTMENT TOPICAL ONCE
OUTPATIENT
Start: 2024-11-13 | End: 2024-11-13

## 2024-11-13 RX ORDER — BETAMETHASONE DIPROPIONATE 0.05 %
OINTMENT (GRAM) TOPICAL ONCE
OUTPATIENT
Start: 2024-11-13 | End: 2024-11-13

## 2024-11-13 RX ORDER — LIDOCAINE HYDROCHLORIDE 40 MG/ML
SOLUTION TOPICAL ONCE
OUTPATIENT
Start: 2024-11-13 | End: 2024-11-13

## 2024-11-13 RX ORDER — LIDOCAINE HYDROCHLORIDE 40 MG/ML
SOLUTION TOPICAL ONCE
Status: COMPLETED | OUTPATIENT
Start: 2024-11-13 | End: 2024-11-13

## 2024-11-13 RX ORDER — GENTAMICIN SULFATE 1 MG/G
OINTMENT TOPICAL ONCE
OUTPATIENT
Start: 2024-11-13 | End: 2024-11-13

## 2024-11-13 RX ORDER — LIDOCAINE HYDROCHLORIDE 20 MG/ML
JELLY TOPICAL ONCE
OUTPATIENT
Start: 2024-11-13 | End: 2024-11-13

## 2024-11-13 RX ORDER — NEOMYCIN/BACITRACIN/POLYMYXINB 3.5-400-5K
OINTMENT (GRAM) TOPICAL ONCE
OUTPATIENT
Start: 2024-11-13 | End: 2024-11-13

## 2024-11-13 RX ORDER — GINSENG 100 MG
CAPSULE ORAL ONCE
OUTPATIENT
Start: 2024-11-13 | End: 2024-11-13

## 2024-11-13 RX ORDER — TRIAMCINOLONE ACETONIDE 1 MG/G
OINTMENT TOPICAL ONCE
OUTPATIENT
Start: 2024-11-13 | End: 2024-11-13

## 2024-11-13 RX ORDER — CLOBETASOL PROPIONATE 0.5 MG/G
OINTMENT TOPICAL ONCE
OUTPATIENT
Start: 2024-11-13 | End: 2024-11-13

## 2024-11-13 RX ADMIN — LIDOCAINE HYDROCHLORIDE 10 ML: 40 SOLUTION TOPICAL at 07:48

## 2024-11-13 ASSESSMENT — PAIN DESCRIPTION - LOCATION: LOCATION: LEG

## 2024-11-13 ASSESSMENT — PAIN DESCRIPTION - PAIN TYPE: TYPE: CHRONIC PAIN

## 2024-11-13 ASSESSMENT — PAIN SCALES - GENERAL: PAINLEVEL_OUTOF10: 7

## 2024-11-13 ASSESSMENT — PAIN - FUNCTIONAL ASSESSMENT: PAIN_FUNCTIONAL_ASSESSMENT: PREVENTS OR INTERFERES SOME ACTIVE ACTIVITIES AND ADLS

## 2024-11-13 ASSESSMENT — PAIN DESCRIPTION - DESCRIPTORS: DESCRIPTORS: ACHING;STABBING

## 2024-11-13 ASSESSMENT — PAIN DESCRIPTION - ORIENTATION: ORIENTATION: LEFT

## 2024-11-13 NOTE — PROGRESS NOTES
Post-Procedure Depth (cm) 0.3 cm 11/13/24 0804   Post-Procedure Surface Area (cm^2) 21.7 cm^2 11/13/24 0804   Post-Procedure Volume (cm^3) 6.51 cm^3 11/13/24 0804   Wound Assessment Fibrin;Pink/red;Pale granulation tissue 11/13/24 0751   Drainage Amount Large (50-75% saturated) 11/13/24 0751   Drainage Description Yellow;Brown 11/13/24 0751   Odor None 11/13/24 0751   Fiona-wound Assessment Fragile;Blanchable erythema 11/13/24 0751   Margins Attached edges 11/13/24 0751   Wound Thickness Description not for Pressure Injury Full thickness 11/13/24 0751   Number of days: 1014      Procedure Note  Indications:  Based on my examination of this patient's wound(s)/ulcer(s) today, debridement is required to promote healing and evaluate the wound base.    Performed by: Ashley Staples MD    Consent obtained:  Yes    Time out taken:  Yes    Pain Control: Anesthetic  Anesthetic: 4% Lidocaine Liquid Topical     Debridement:Excisional Debridement    Using curette the wound(s)/ulcer(s) was/were sharply debrided down through and including the removal of epidermis, dermis, and subcutaneous tissue.        Devitalized Tissue Debrided:  fibrin, biofilm, slough, and exudate to stimulate bleeding to promote healing, post debridement good bleeding base and wound edges noted    Wound/Ulcer #:1,     Percent of Wound/Ulcer Debrided: 90 %    Total Surface Area Debrided: 20 sq cm   Estimated Blood Loss:  Minimal  Hemostasis Achieved:  by pressure    Procedural Pain:  8  / 10   Post Procedural Pain: 7 / 10     Response to treatment: Well tolerated     Plan:        Discharge Instructions         Visit Discharge/Physician Orders     Discharge condition: Stable     Assessment of pain at discharge: yes     Anesthetic used: 4% lidocaine solution     Discharge to: Home     Left via:Private automobile     Accompanied by:self     ECF/HHA: n/a     Dressing Orders: LEFT MEDIAL LEG: Cleanse with normal saline, apply zinc to fiona wound, drawtex,

## 2024-11-18 NOTE — DISCHARGE INSTRUCTIONS
Visit Discharge/Physician Orders     Discharge condition: Stable     Assessment of pain at discharge: yes     Anesthetic used: 4% lidocaine solution     Discharge to: Home     Left via:Private automobile     Accompanied by:self     ECF/HHA: n/a     Dressing Orders: LEFT MEDIAL LEG: Cleanse with normal saline, apply zinc to fiona wound, drawtex, ABD pad, and profore lite . Change weekly.        Treatment Orders: Eat a diet high in protein and vitamin C. Take a multiple vitamin daily unless contraindicated.     Dr. Duncan as needed.       Must wear slip on shoe to work due to wrap on leg!        Keep wrap dry at all times. If wrap becomes wet or falls 2 inches call [facility] ([phone])and may remove wrap and apply double tubigrip.         [facility] followup visit : 1 week Dr. HERNANDEZ __________________________________  (Please note your next appointment above and if you are unable to keep, kindly give a 24 hour notice. Thank you.)     Physician signature:__________________________     If you experience any of the following, please call the Wound Care Center during business hours:     * Increase in Pain  * Temperature over 101  * Increase in drainage from your wound  * Drainage with a foul odor  * Bleeding  * Increase in swelling  * Need for compression bandage changes due to slippage, breakthrough drainage.     If you need medical attention outside of the business hours of the Wound Care Centers please contact your PCP or go to the nearest emergency room

## 2024-11-20 ENCOUNTER — HOSPITAL ENCOUNTER (OUTPATIENT)
Dept: WOUND CARE | Age: 69
Discharge: HOME | End: 2024-11-20
Attending: SURGERY
Payer: COMMERCIAL

## 2024-11-20 VITALS
HEART RATE: 80 BPM | TEMPERATURE: 96.9 F | RESPIRATION RATE: 18 BRPM | SYSTOLIC BLOOD PRESSURE: 114 MMHG | WEIGHT: 166 LBS | BODY MASS INDEX: 25.16 KG/M2 | DIASTOLIC BLOOD PRESSURE: 63 MMHG | HEIGHT: 68 IN

## 2024-11-20 DIAGNOSIS — L97.322 VARICOSE VEINS OF LEFT LOWER EXTREMITY WITH ULCER OF ANKLE WITH FAT LAYER EXPOSED (HCC): Primary | ICD-10-CM

## 2024-11-20 DIAGNOSIS — I70.242 ATHEROSCLEROSIS OF NATIVE ARTERY OF LEFT LOWER EXTREMITY WITH ULCERATION OF CALF (HCC): ICD-10-CM

## 2024-11-20 DIAGNOSIS — I83.023 VARICOSE VEINS OF LEFT LOWER EXTREMITY WITH ULCER OF ANKLE WITH FAT LAYER EXPOSED (HCC): Primary | ICD-10-CM

## 2024-11-20 DIAGNOSIS — I70.243 ATHEROSCLEROSIS OF NATIVE ARTERY OF LEFT LOWER EXTREMITY WITH ULCERATION OF ANKLE (HCC): Chronic | ICD-10-CM

## 2024-11-20 PROCEDURE — 11042 DBRDMT SUBQ TIS 1ST 20SQCM/<: CPT | Performed by: SURGERY

## 2024-11-20 PROCEDURE — 11042 DBRDMT SUBQ TIS 1ST 20SQCM/<: CPT

## 2024-11-20 RX ORDER — LIDOCAINE HYDROCHLORIDE 20 MG/ML
JELLY TOPICAL ONCE
OUTPATIENT
Start: 2024-11-20 | End: 2024-11-20

## 2024-11-20 RX ORDER — LIDOCAINE 50 MG/G
OINTMENT TOPICAL ONCE
OUTPATIENT
Start: 2024-11-20 | End: 2024-11-20

## 2024-11-20 RX ORDER — GENTAMICIN SULFATE 1 MG/G
OINTMENT TOPICAL ONCE
OUTPATIENT
Start: 2024-11-20 | End: 2024-11-20

## 2024-11-20 RX ORDER — NEOMYCIN/BACITRACIN/POLYMYXINB 3.5-400-5K
OINTMENT (GRAM) TOPICAL ONCE
OUTPATIENT
Start: 2024-11-20 | End: 2024-11-20

## 2024-11-20 RX ORDER — LIDOCAINE 40 MG/G
CREAM TOPICAL ONCE
OUTPATIENT
Start: 2024-11-20 | End: 2024-11-20

## 2024-11-20 RX ORDER — OXYCODONE AND ACETAMINOPHEN 5; 325 MG/1; MG/1
1 TABLET ORAL EVERY 6 HOURS PRN
Qty: 28 TABLET | Refills: 0 | Status: SHIPPED | OUTPATIENT
Start: 2024-11-20 | End: 2024-11-27

## 2024-11-20 RX ORDER — BETAMETHASONE DIPROPIONATE 0.05 %
OINTMENT (GRAM) TOPICAL ONCE
OUTPATIENT
Start: 2024-11-20 | End: 2024-11-20

## 2024-11-20 RX ORDER — MUPIROCIN 20 MG/G
OINTMENT TOPICAL ONCE
OUTPATIENT
Start: 2024-11-20 | End: 2024-11-20

## 2024-11-20 RX ORDER — CLOBETASOL PROPIONATE 0.5 MG/G
OINTMENT TOPICAL ONCE
OUTPATIENT
Start: 2024-11-20 | End: 2024-11-20

## 2024-11-20 RX ORDER — LIDOCAINE HYDROCHLORIDE 40 MG/ML
SOLUTION TOPICAL ONCE
Status: COMPLETED | OUTPATIENT
Start: 2024-11-20 | End: 2024-11-20

## 2024-11-20 RX ORDER — TRIAMCINOLONE ACETONIDE 1 MG/G
OINTMENT TOPICAL ONCE
OUTPATIENT
Start: 2024-11-20 | End: 2024-11-20

## 2024-11-20 RX ORDER — GINSENG 100 MG
CAPSULE ORAL ONCE
OUTPATIENT
Start: 2024-11-20 | End: 2024-11-20

## 2024-11-20 RX ORDER — LIDOCAINE HYDROCHLORIDE 40 MG/ML
SOLUTION TOPICAL ONCE
OUTPATIENT
Start: 2024-11-20 | End: 2024-11-20

## 2024-11-20 RX ORDER — SILVER SULFADIAZINE 10 MG/G
CREAM TOPICAL ONCE
OUTPATIENT
Start: 2024-11-20 | End: 2024-11-20

## 2024-11-20 RX ORDER — BACITRACIN ZINC AND POLYMYXIN B SULFATE 500; 1000 [USP'U]/G; [USP'U]/G
OINTMENT TOPICAL ONCE
OUTPATIENT
Start: 2024-11-20 | End: 2024-11-20

## 2024-11-20 RX ADMIN — LIDOCAINE HYDROCHLORIDE 10 ML: 40 SOLUTION TOPICAL at 07:43

## 2024-11-20 ASSESSMENT — PAIN DESCRIPTION - DESCRIPTORS: DESCRIPTORS: ACHING;STABBING

## 2024-11-20 ASSESSMENT — PAIN DESCRIPTION - PAIN TYPE: TYPE: CHRONIC PAIN

## 2024-11-20 ASSESSMENT — PAIN DESCRIPTION - FREQUENCY: FREQUENCY: INTERMITTENT

## 2024-11-20 ASSESSMENT — PAIN - FUNCTIONAL ASSESSMENT: PAIN_FUNCTIONAL_ASSESSMENT: PREVENTS OR INTERFERES SOME ACTIVE ACTIVITIES AND ADLS

## 2024-11-20 ASSESSMENT — PAIN DESCRIPTION - ONSET: ONSET: ON-GOING

## 2024-11-20 ASSESSMENT — PAIN DESCRIPTION - LOCATION: LOCATION: LEG

## 2024-11-20 ASSESSMENT — PAIN DESCRIPTION - ORIENTATION: ORIENTATION: LEFT

## 2024-11-20 ASSESSMENT — PAIN SCALES - GENERAL: PAINLEVEL_OUTOF10: 7

## 2024-11-20 NOTE — PROGRESS NOTES
with normal saline, apply zinc to fiona wound, drawtex, ABD pad, and profore lite . Change weekly.        Treatment Orders: Eat a diet high in protein and vitamin C. Take a multiple vitamin daily unless contraindicated.     Dr. Duncan as needed.       Must wear slip on shoe to work due to wrap on leg!        Keep wrap dry at all times. If wrap becomes wet or falls 2 inches call [facility] ([phone])and may remove wrap and apply double tubigrip.         [facility] followup visit : 1 week Dr. HERNANDEZ __________________________________  (Please note your next appointment above and if you are unable to keep, kindly give a 24 hour notice. Thank you.)     Physician signature:__________________________     If you experience any of the following, please call the Wound Care Center during business hours:     * Increase in Pain  * Temperature over 101  * Increase in drainage from your wound  * Drainage with a foul odor  * Bleeding  * Increase in swelling  * Need for compression bandage changes due to slippage, breakthrough drainage.     If you need medical attention outside of the business hours of the Wound Care Centers please contact your PCP or go to the nearest emergency room                Electronically signed by Ashley Staples MD

## 2024-11-25 NOTE — DISCHARGE INSTRUCTIONS
Visit Discharge/Physician Orders     Discharge condition: Stable     Assessment of pain at discharge: yes     Anesthetic used: 4% lidocaine solution     Discharge to: Home     Left via:Private automobile     Accompanied by:self     ECF/HHA: [facility] [phone]     Dressing Orders: LEFT MEDIAL LEG: Cleanse with normal saline, apply zinc to fiona wound, drawtex, ABD pad, and profore lite . Change weekly.        Treatment Orders: Eat a diet high in protein and vitamin C. Take a multiple vitamin daily unless contraindicated.     Dr. Duncan as needed.       Must wear slip on shoe to work due to wrap on leg!        Keep wrap dry at all times. If wrap becomes wet or falls 2 inches call [facility] ([phone])and may remove wrap and apply double tubigrip.      11/27/24 Compression wrap for left leg to be ordered through [facility].      [facility] followup visit : 1 week Dr. HERNANDEZ __________________________________  (Please note your next appointment above and if you are unable to keep, kindly give a 24 hour notice. Thank you.)     Physician signature:__________________________     If you experience any of the following, please call the Wound Care Center during business hours:     * Increase in Pain  * Temperature over 101  * Increase in drainage from your wound  * Drainage with a foul odor  * Bleeding  * Increase in swelling  * Need for compression bandage changes due to slippage, breakthrough drainage.     If you need medical attention outside of the business hours of the Wound Care Centers please contact your PCP or go to the nearest emergency room      positive S1/positive S2

## 2024-11-27 ENCOUNTER — HOSPITAL ENCOUNTER (OUTPATIENT)
Dept: WOUND CARE | Age: 69
Discharge: HOME | End: 2024-11-27
Attending: SURGERY
Payer: COMMERCIAL

## 2024-11-27 VITALS
BODY MASS INDEX: 25.16 KG/M2 | HEIGHT: 68 IN | WEIGHT: 166 LBS | SYSTOLIC BLOOD PRESSURE: 122 MMHG | TEMPERATURE: 97.9 F | DIASTOLIC BLOOD PRESSURE: 64 MMHG | HEART RATE: 84 BPM | RESPIRATION RATE: 18 BRPM

## 2024-11-27 DIAGNOSIS — L97.322 VARICOSE VEINS OF LEFT LOWER EXTREMITY WITH ULCER OF ANKLE WITH FAT LAYER EXPOSED (HCC): Primary | ICD-10-CM

## 2024-11-27 DIAGNOSIS — I70.242 ATHEROSCLEROSIS OF NATIVE ARTERY OF LEFT LOWER EXTREMITY WITH ULCERATION OF CALF (HCC): ICD-10-CM

## 2024-11-27 DIAGNOSIS — I83.023 VARICOSE VEINS OF LEFT LOWER EXTREMITY WITH ULCER OF ANKLE WITH FAT LAYER EXPOSED (HCC): Primary | ICD-10-CM

## 2024-11-27 PROCEDURE — 11042 DBRDMT SUBQ TIS 1ST 20SQCM/<: CPT

## 2024-11-27 RX ORDER — NEOMYCIN/BACITRACIN/POLYMYXINB 3.5-400-5K
OINTMENT (GRAM) TOPICAL ONCE
OUTPATIENT
Start: 2024-11-27 | End: 2024-11-27

## 2024-11-27 RX ORDER — BACITRACIN ZINC AND POLYMYXIN B SULFATE 500; 1000 [USP'U]/G; [USP'U]/G
OINTMENT TOPICAL ONCE
OUTPATIENT
Start: 2024-11-27 | End: 2024-11-27

## 2024-11-27 RX ORDER — MUPIROCIN 20 MG/G
OINTMENT TOPICAL ONCE
OUTPATIENT
Start: 2024-11-27 | End: 2024-11-27

## 2024-11-27 RX ORDER — GINSENG 100 MG
CAPSULE ORAL ONCE
OUTPATIENT
Start: 2024-11-27 | End: 2024-11-27

## 2024-11-27 RX ORDER — LIDOCAINE 40 MG/G
CREAM TOPICAL ONCE
OUTPATIENT
Start: 2024-11-27 | End: 2024-11-27

## 2024-11-27 RX ORDER — TRIAMCINOLONE ACETONIDE 1 MG/G
OINTMENT TOPICAL ONCE
OUTPATIENT
Start: 2024-11-27 | End: 2024-11-27

## 2024-11-27 RX ORDER — LIDOCAINE HYDROCHLORIDE 20 MG/ML
JELLY TOPICAL ONCE
OUTPATIENT
Start: 2024-11-27 | End: 2024-11-27

## 2024-11-27 RX ORDER — CLOBETASOL PROPIONATE 0.5 MG/G
OINTMENT TOPICAL ONCE
OUTPATIENT
Start: 2024-11-27 | End: 2024-11-27

## 2024-11-27 RX ORDER — LIDOCAINE HYDROCHLORIDE 40 MG/ML
SOLUTION TOPICAL ONCE
Status: COMPLETED | OUTPATIENT
Start: 2024-11-27 | End: 2024-11-27

## 2024-11-27 RX ORDER — SILVER SULFADIAZINE 10 MG/G
CREAM TOPICAL ONCE
OUTPATIENT
Start: 2024-11-27 | End: 2024-11-27

## 2024-11-27 RX ORDER — OXYCODONE AND ACETAMINOPHEN 5; 325 MG/1; MG/1
1 TABLET ORAL EVERY 6 HOURS PRN
Qty: 28 TABLET | Refills: 0 | Status: SHIPPED | OUTPATIENT
Start: 2024-12-02 | End: 2024-12-09

## 2024-11-27 RX ORDER — BETAMETHASONE DIPROPIONATE 0.05 %
OINTMENT (GRAM) TOPICAL ONCE
OUTPATIENT
Start: 2024-11-27 | End: 2024-11-27

## 2024-11-27 RX ORDER — LIDOCAINE 50 MG/G
OINTMENT TOPICAL ONCE
OUTPATIENT
Start: 2024-11-27 | End: 2024-11-27

## 2024-11-27 RX ORDER — GENTAMICIN SULFATE 1 MG/G
OINTMENT TOPICAL ONCE
OUTPATIENT
Start: 2024-11-27 | End: 2024-11-27

## 2024-11-27 RX ORDER — LIDOCAINE HYDROCHLORIDE 40 MG/ML
SOLUTION TOPICAL ONCE
OUTPATIENT
Start: 2024-11-27 | End: 2024-11-27

## 2024-11-27 RX ADMIN — LIDOCAINE HYDROCHLORIDE 20 ML: 40 SOLUTION TOPICAL at 08:01

## 2024-11-27 ASSESSMENT — PAIN DESCRIPTION - PROGRESSION: CLINICAL_PROGRESSION: NOT CHANGED

## 2024-11-27 NOTE — PROGRESS NOTES
Compression Garments    Supply Company for Compression Stockings:     [facility] [address] [place] [address]  p: [phone] f: [phone]     Ordering Center:     [facility]  [address]  [facility] [address]  [phone]  WOUND CARE Dept: [phone]   FAX NUMBER [phone]    Patient Information:      Amanda Ledesma  [address]  [facility] [address]   [phone]   : 1955  AGE: 68 y.o.     GENDER: female   TODAYS DATE:  2024    Insurance:      PRIMARY INSURANCE:  Plan: UNITED [facility] DUAL  Coverage: [facility]  Effective Date: 2022  Group Number: [unfilled]  Subscriber Number: [id number] - (Medicare Managed)    Payer/Plan Subscr  Sex Relation Sub. Ins. ID Effective Group Num   1. [employer] [facility]* AMANDA LEDESMA 1955 Female Self [id number]                                   [address]   2. [place]ICAID [place] -* AMANDA LEDESMA 1955 Female Self [id number]                                    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 
(EXCEDRIN MIGRAINE) 250-250-65 MG per tablet Take 1 tablet by mouth as needed for Headaches 300 tablet 3    EPINEPHrine (EPIPEN) 0.3 MG/0.3ML SOAJ injection Use as directed for allergic reaction 1 each 5     No current facility-administered medications on file prior to encounter.       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) - Primary    Relevant Medications    oxyCODONE-acetaminophen (PERCOCET) 5-325 MG per tablet (Start on 12/2/2024)    Other Relevant Orders    Initiate Outpatient Wound Care Protocol    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) (Chronic)    Relevant Orders    Initiate Outpatient Wound Care Protocol       Pre Debridement Measurements:  Are located in the Wound/Ulcer Documentation Flow Sheet  Post Debridement Measurements:  Wound/Ulcer Descriptions are Pre Debridement except measurements:     Wound 08/10/16 Other (Comment) Buttocks Left;Distal #2 acq 8/4/16 (Active)   Number of days: 3031       Wound 08/31/16 Buttocks Left;Proximal #3 aquired 8-27-26 (Active)   Number of days: 3010       Wound 02/02/22 Ankle Left;Medial #1 (Active)   Wound Image   11/20/24 0736   Wound Etiology Venous 10/18/23 0824   Dressing Status New dressing applied 11/20/24 0815   Wound Cleansed Cleansed with saline 11/20/24 0815   Dressing/Treatment ABD;Foam 11/20/24 0815   Offloading

## 2024-12-02 NOTE — DISCHARGE INSTRUCTIONS
Visit Discharge/Physician Orders     Discharge condition: Stable     Assessment of pain at discharge: yes     Anesthetic used: 4% lidocaine solution     Discharge to: Home     Left via:Private automobile     Accompanied by:self     ECF/HHA: [facility] [phone]     Dressing Orders: LEFT MEDIAL LEG: Cleanse with normal saline, apply zinc to fiona wound, drawtex, ABD pad, and profore lite . Change weekly.        Treatment Orders: Eat a diet high in protein and vitamin C. Take a multiple vitamin daily unless contraindicated.     Dr. Duncan as needed.       Must wear slip on shoe to work due to wrap on leg!        Keep wrap dry at all times. If wrap becomes wet or falls 2 inches call [facility] ([phone])and may remove wrap and apply double tubigrip.      11/27/24 Compression wrap for left leg to be ordered through [facility].      [facility] followup visit : 1 week Dr. HERNANDEZ __________________________________  (Please note your next appointment above and if you are unable to keep, kindly give a 24 hour notice. Thank you.)     Physician signature:__________________________     If you experience any of the following, please call the Wound Care Center during business hours:     * Increase in Pain  * Temperature over 101  * Increase in drainage from your wound  * Drainage with a foul odor  * Bleeding  * Increase in swelling  * Need for compression bandage changes due to slippage, breakthrough drainage.     If you need medical attention outside of the business hours of the Wound Care Centers please contact your PCP or go to the nearest emergency room

## 2024-12-04 ENCOUNTER — HOSPITAL ENCOUNTER (OUTPATIENT)
Dept: WOUND CARE | Age: 69
Discharge: HOME | End: 2024-12-04
Attending: SURGERY
Payer: COMMERCIAL

## 2024-12-04 VITALS
SYSTOLIC BLOOD PRESSURE: 142 MMHG | BODY MASS INDEX: 25.16 KG/M2 | WEIGHT: 166 LBS | RESPIRATION RATE: 18 BRPM | HEIGHT: 68 IN | DIASTOLIC BLOOD PRESSURE: 51 MMHG | HEART RATE: 76 BPM | TEMPERATURE: 97.7 F

## 2024-12-04 DIAGNOSIS — I70.242 ATHEROSCLEROSIS OF NATIVE ARTERY OF LEFT LOWER EXTREMITY WITH ULCERATION OF CALF (HCC): ICD-10-CM

## 2024-12-04 DIAGNOSIS — I83.023 VARICOSE VEINS OF LEFT LOWER EXTREMITY WITH ULCER OF ANKLE WITH FAT LAYER EXPOSED (HCC): Primary | ICD-10-CM

## 2024-12-04 DIAGNOSIS — L97.322 VARICOSE VEINS OF LEFT LOWER EXTREMITY WITH ULCER OF ANKLE WITH FAT LAYER EXPOSED (HCC): Primary | ICD-10-CM

## 2024-12-04 PROCEDURE — 11042 DBRDMT SUBQ TIS 1ST 20SQCM/<: CPT

## 2024-12-04 PROCEDURE — 11042 DBRDMT SUBQ TIS 1ST 20SQCM/<: CPT | Performed by: SURGERY

## 2024-12-04 RX ORDER — GENTAMICIN SULFATE 1 MG/G
OINTMENT TOPICAL ONCE
OUTPATIENT
Start: 2024-12-04 | End: 2024-12-04

## 2024-12-04 RX ORDER — BACITRACIN ZINC AND POLYMYXIN B SULFATE 500; 1000 [USP'U]/G; [USP'U]/G
OINTMENT TOPICAL ONCE
OUTPATIENT
Start: 2024-12-04 | End: 2024-12-04

## 2024-12-04 RX ORDER — LIDOCAINE HYDROCHLORIDE 20 MG/ML
JELLY TOPICAL ONCE
OUTPATIENT
Start: 2024-12-04 | End: 2024-12-04

## 2024-12-04 RX ORDER — NEOMYCIN/BACITRACIN/POLYMYXINB 3.5-400-5K
OINTMENT (GRAM) TOPICAL ONCE
OUTPATIENT
Start: 2024-12-04 | End: 2024-12-04

## 2024-12-04 RX ORDER — MUPIROCIN 20 MG/G
OINTMENT TOPICAL ONCE
OUTPATIENT
Start: 2024-12-04 | End: 2024-12-04

## 2024-12-04 RX ORDER — BETAMETHASONE DIPROPIONATE 0.05 %
OINTMENT (GRAM) TOPICAL ONCE
OUTPATIENT
Start: 2024-12-04 | End: 2024-12-04

## 2024-12-04 RX ORDER — GINSENG 100 MG
CAPSULE ORAL ONCE
OUTPATIENT
Start: 2024-12-04 | End: 2024-12-04

## 2024-12-04 RX ORDER — LIDOCAINE 40 MG/G
CREAM TOPICAL ONCE
OUTPATIENT
Start: 2024-12-04 | End: 2024-12-04

## 2024-12-04 RX ORDER — TRIAMCINOLONE ACETONIDE 1 MG/G
OINTMENT TOPICAL ONCE
OUTPATIENT
Start: 2024-12-04 | End: 2024-12-04

## 2024-12-04 RX ORDER — CLOBETASOL PROPIONATE 0.5 MG/G
OINTMENT TOPICAL ONCE
OUTPATIENT
Start: 2024-12-04 | End: 2024-12-04

## 2024-12-04 RX ORDER — LIDOCAINE HYDROCHLORIDE 40 MG/ML
SOLUTION TOPICAL ONCE
Status: COMPLETED | OUTPATIENT
Start: 2024-12-04 | End: 2024-12-04

## 2024-12-04 RX ORDER — SILVER SULFADIAZINE 10 MG/G
CREAM TOPICAL ONCE
OUTPATIENT
Start: 2024-12-04 | End: 2024-12-04

## 2024-12-04 RX ORDER — LIDOCAINE HYDROCHLORIDE 40 MG/ML
SOLUTION TOPICAL ONCE
OUTPATIENT
Start: 2024-12-04 | End: 2024-12-04

## 2024-12-04 RX ORDER — LIDOCAINE 50 MG/G
OINTMENT TOPICAL ONCE
OUTPATIENT
Start: 2024-12-04 | End: 2024-12-04

## 2024-12-04 RX ADMIN — LIDOCAINE HYDROCHLORIDE 10 ML: 40 SOLUTION TOPICAL at 07:44

## 2024-12-04 ASSESSMENT — PAIN DESCRIPTION - FREQUENCY: FREQUENCY: INTERMITTENT

## 2024-12-04 ASSESSMENT — PAIN DESCRIPTION - DESCRIPTORS: DESCRIPTORS: ACHING;STABBING

## 2024-12-04 ASSESSMENT — PAIN - FUNCTIONAL ASSESSMENT: PAIN_FUNCTIONAL_ASSESSMENT: PREVENTS OR INTERFERES SOME ACTIVE ACTIVITIES AND ADLS

## 2024-12-04 ASSESSMENT — PAIN SCALES - GENERAL: PAINLEVEL_OUTOF10: 7

## 2024-12-04 ASSESSMENT — PAIN DESCRIPTION - LOCATION: LOCATION: LEG

## 2024-12-04 ASSESSMENT — PAIN DESCRIPTION - ORIENTATION: ORIENTATION: LEFT

## 2024-12-04 ASSESSMENT — PAIN DESCRIPTION - ONSET: ONSET: ON-GOING

## 2024-12-04 ASSESSMENT — PAIN DESCRIPTION - PAIN TYPE: TYPE: CHRONIC PAIN

## 2024-12-04 NOTE — PROGRESS NOTES
tolerated unna boot and aquacell - some improvement  216/22  Appearance improved, slightly larger  Consider drawtek next week but overall drainage seems reasonably managed as periwound appears ok  2/23/2022  The wound, has some exudate, no recent cultures were done, will do wound cultures today  3/2/22  Reflux study reviewed - no significant reflux  Will treat culture - augmentin 875 mg bid x 10 days - script sent  More drainage - stable size  3/9/22  Wound appearance improved, stable in size  drawtek  3/16/22  Wound slightly larger in size, new wound of ankle  Continue Drawtek, change to profore  Avoid shoes which will contact ankle area  3/23/22  Wounds stable  Overall appearance improved  Will have pt see ID re cultures - disc with Dr Duncan  3/30/22  Much improved appearance  On oral abx per ID - concerns re pt ability to work while on IV - will see how her wound progresses  4/6/22  Appearance improved but size not much different  Finished oral abx  Will re culture next week if no significant size change - possible advance skin therapy  4/20/2022  Ulcer on the medial aspect, fairly clean and granulating, ulcer of the lateral aspect, has some exudate, debrided  4/27/22  Wounds stable   Hypergranulation tissue removed  Culture done - possible graft in future  5/4/22  Wound stable  Disc culture with Dr Duncan who would like to start her on iv abx  5/11/22  Wound stable  Iv abx have not been started yet - spoke with Dr Duncan - spoke with pt she will call his office  She had spoke with [facility] but there were some issues  5/18/22  Calf stable, ankle improved  Iv abx to start this week after picc placement  On exam   L dp 2+, PT triphasic - with compression of dp - PT becomes weakly biphasic  Concern that PT though triphasic is not getting inline flow and as a result isn't healing in the calf distribution because of occlusive disease  We discussed angiogram - will schedule  I reviewed the procedure with the patient and

## 2024-12-09 NOTE — DISCHARGE INSTRUCTIONS
Visit Discharge/Physician Orders     Discharge condition: Stable     Assessment of pain at discharge: yes     Anesthetic used: 4% lidocaine solution     Discharge to: Home     Left via:Private automobile     Accompanied by:self     ECF/HHA: [facility] [phone]     Dressing Orders: LEFT MEDIAL LEG: Cleanse with normal saline, apply zinc to fiona wound, drawtex, ABD pad, and profore lite . Change weekly.        Treatment Orders: Eat a diet high in protein and vitamin C. Take a multiple vitamin daily unless contraindicated.     Dr. Duncan as needed.       Must wear slip on shoe to work due to wrap on leg!        Keep wrap dry at all times. If wrap becomes wet or falls 2 inches call [facility] ([phone])and may remove wrap and apply double tubigrip.      11/27/24 Compression wrap for left leg to be ordered through [facility].      [facility] followup visit : 1 week Dr. HERNANDEZ __________________________________  (Please note your next appointment above and if you are unable to keep, kindly give a 24 hour notice. Thank you.)     Physician signature:__________________________     If you experience any of the following, please call the Wound Care Center during business hours:     * Increase in Pain  * Temperature over 101  * Increase in drainage from your wound  * Drainage with a foul odor  * Bleeding  * Increase in swelling  * Need for compression bandage changes due to slippage, breakthrough drainage.     If you need medical attention outside of the business hours of the Wound Care Centers please contact your PCP or go to the nearest emergency room

## 2024-12-11 ENCOUNTER — HOSPITAL ENCOUNTER (OUTPATIENT)
Dept: WOUND CARE | Age: 69
Discharge: HOME | End: 2024-12-11
Attending: SURGERY
Payer: MEDICARE

## 2024-12-11 VITALS
SYSTOLIC BLOOD PRESSURE: 116 MMHG | DIASTOLIC BLOOD PRESSURE: 52 MMHG | TEMPERATURE: 96.7 F | RESPIRATION RATE: 18 BRPM | HEART RATE: 82 BPM | WEIGHT: 166 LBS | BODY MASS INDEX: 25.16 KG/M2 | HEIGHT: 68 IN

## 2024-12-11 DIAGNOSIS — I70.242 ATHEROSCLEROSIS OF NATIVE ARTERY OF LEFT LOWER EXTREMITY WITH ULCERATION OF CALF (HCC): ICD-10-CM

## 2024-12-11 DIAGNOSIS — L97.322 VARICOSE VEINS OF LEFT LOWER EXTREMITY WITH ULCER OF ANKLE WITH FAT LAYER EXPOSED (HCC): Primary | ICD-10-CM

## 2024-12-11 DIAGNOSIS — I83.023 VARICOSE VEINS OF LEFT LOWER EXTREMITY WITH ULCER OF ANKLE WITH FAT LAYER EXPOSED (HCC): Primary | ICD-10-CM

## 2024-12-11 PROCEDURE — 11042 DBRDMT SUBQ TIS 1ST 20SQCM/<: CPT | Performed by: SURGERY

## 2024-12-11 PROCEDURE — 11042 DBRDMT SUBQ TIS 1ST 20SQCM/<: CPT

## 2024-12-11 RX ORDER — TRIAMCINOLONE ACETONIDE 1 MG/G
OINTMENT TOPICAL ONCE
OUTPATIENT
Start: 2024-12-11 | End: 2024-12-11

## 2024-12-11 RX ORDER — MUPIROCIN 20 MG/G
OINTMENT TOPICAL ONCE
OUTPATIENT
Start: 2024-12-11 | End: 2024-12-11

## 2024-12-11 RX ORDER — LIDOCAINE 40 MG/G
CREAM TOPICAL ONCE
OUTPATIENT
Start: 2024-12-11 | End: 2024-12-11

## 2024-12-11 RX ORDER — NEOMYCIN/BACITRACIN/POLYMYXINB 3.5-400-5K
OINTMENT (GRAM) TOPICAL ONCE
OUTPATIENT
Start: 2024-12-11 | End: 2024-12-11

## 2024-12-11 RX ORDER — CLOBETASOL PROPIONATE 0.5 MG/G
OINTMENT TOPICAL ONCE
OUTPATIENT
Start: 2024-12-11 | End: 2024-12-11

## 2024-12-11 RX ORDER — BETAMETHASONE DIPROPIONATE 0.05 %
OINTMENT (GRAM) TOPICAL ONCE
OUTPATIENT
Start: 2024-12-11 | End: 2024-12-11

## 2024-12-11 RX ORDER — LIDOCAINE HYDROCHLORIDE 20 MG/ML
JELLY TOPICAL ONCE
OUTPATIENT
Start: 2024-12-11 | End: 2024-12-11

## 2024-12-11 RX ORDER — SILVER SULFADIAZINE 10 MG/G
CREAM TOPICAL ONCE
OUTPATIENT
Start: 2024-12-11 | End: 2024-12-11

## 2024-12-11 RX ORDER — GENTAMICIN SULFATE 1 MG/G
OINTMENT TOPICAL ONCE
OUTPATIENT
Start: 2024-12-11 | End: 2024-12-11

## 2024-12-11 RX ORDER — BACITRACIN ZINC AND POLYMYXIN B SULFATE 500; 1000 [USP'U]/G; [USP'U]/G
OINTMENT TOPICAL ONCE
OUTPATIENT
Start: 2024-12-11 | End: 2024-12-11

## 2024-12-11 RX ORDER — LIDOCAINE HYDROCHLORIDE 40 MG/ML
SOLUTION TOPICAL ONCE
OUTPATIENT
Start: 2024-12-11 | End: 2024-12-11

## 2024-12-11 RX ORDER — LIDOCAINE 50 MG/G
OINTMENT TOPICAL ONCE
OUTPATIENT
Start: 2024-12-11 | End: 2024-12-11

## 2024-12-11 RX ORDER — GINSENG 100 MG
CAPSULE ORAL ONCE
OUTPATIENT
Start: 2024-12-11 | End: 2024-12-11

## 2024-12-11 ASSESSMENT — PAIN - FUNCTIONAL ASSESSMENT: PAIN_FUNCTIONAL_ASSESSMENT: PREVENTS OR INTERFERES SOME ACTIVE ACTIVITIES AND ADLS

## 2024-12-11 ASSESSMENT — PAIN DESCRIPTION - ORIENTATION: ORIENTATION: LEFT

## 2024-12-11 ASSESSMENT — PAIN DESCRIPTION - FREQUENCY: FREQUENCY: INTERMITTENT

## 2024-12-11 ASSESSMENT — PAIN DESCRIPTION - ONSET: ONSET: ON-GOING

## 2024-12-11 ASSESSMENT — PAIN DESCRIPTION - PAIN TYPE: TYPE: CHRONIC PAIN

## 2024-12-11 ASSESSMENT — PAIN SCALES - GENERAL: PAINLEVEL_OUTOF10: 7

## 2024-12-11 ASSESSMENT — PAIN DESCRIPTION - DESCRIPTORS: DESCRIPTORS: ACHING;STABBING

## 2024-12-11 ASSESSMENT — PAIN DESCRIPTION - LOCATION: LOCATION: LEG

## 2024-12-11 NOTE — PROGRESS NOTES
Wound Healing Center Followup Visit Note    Referring Physician : Rosenda Cormier MD  Amanda Ledesma  MEDICAL RECORD NUMBER:  [id number]  AGE: 69 y.o.   GENDER: female  : 1955  EPISODE DATE:  2024    Subjective:     Chief Complaint   Patient presents with    Wound Check     leg      HISTORY of PRESENT ILLNESS HPI   Amanda Ledesma is a 69 y.o. female who presents today in regards to follow up evaluation and treatment of wound/ulcer.  That patient's past medical, family and social hx were reviewed and changes were made if present.    History of Wound Context:  The patient has had a wound of her left ankle/calf which was first noted approximately 2021.  This has been treated local wound care. On their initial visit to the wound healing center, 22,  the patient has noted that the wound has been improving.  The patient has not had similar previous wounds in the past.      She started seeing Dr. Street in 2021 and than Dr. Gillis.  She was started in unna boot ~ 2021.  She has noticed some improvement since starting unna boot.  She is currently following with Dr. Haskins.      Pt is not on abx at time of initial visit, but has been treated with previously by podiatry.      She is not a DM.  She is not a smoker.  She denies hx of DVT, and per her report had recent us noting no evidence of dvt at [facility].  She also had arterial studies done.    I had previously seen her in the past in regards to left buttock thigh wound, which started as abscess.      Pt works at [employer] in the [place] and is on her feet all day.    22  Reflux study - if significant findings will schedule for fu  Continue compression therapy [facility] per podiatry with aquacell dressing  Elevation  She does not have significant arterial occlusive disease  22  Patient has asked to continuing following with me going forward because of our previous relationship  She is going to let Dr. Schuler office know  She

## 2024-12-11 NOTE — PLAN OF CARE
Problem: Pain  Goal: Verbalizes/displays adequate comfort level or baseline comfort level  12/11/2024 0748 by Maria L Wynn RN  Outcome: Progressing  12/11/2024 0747 by Maria L Wynn RN  Outcome: Progressing     Problem: Cognitive:  Goal: Knowledge of wound care  Description: Knowledge of wound care  12/11/2024 0748 by Maria L Wynn RN  Outcome: Progressing  12/11/2024 0747 by Maria L Wynn RN  Outcome: Progressing  Goal: Understands risk factors for wounds  Description: Understands risk factors for wounds  12/11/2024 0748 by Maria L Wynn RN  Outcome: Progressing  12/11/2024 0747 by Maria L Wynn RN  Outcome: Progressing     Problem: Wound:  Goal: Will show signs of wound healing; wound closure and no evidence of infection  Description: Will show signs of wound healing; wound closure and no evidence of infection  12/11/2024 0748 by Maria L Wynn RN  Outcome: Progressing  12/11/2024 0747 by Maria L Wynn RN  Outcome: Progressing     Problem: Venous:  Goal: Signs of wound healing will improve  Description: Signs of wound healing will improve  12/11/2024 0748 by Maria L Wynn RN  Outcome: Progressing  12/11/2024 0747 by Maria L Wynn RN  Outcome: Progressing

## 2024-12-12 NOTE — DISCHARGE INSTRUCTIONS
Visit Discharge/Physician Orders     Discharge condition: Stable     Assessment of pain at discharge: yes     Anesthetic used: 4% lidocaine solution     Discharge to: Home     Left via:Private automobile     Accompanied by:self     ECF/HHA: [facility] [phone]     Dressing Orders: LEFT MEDIAL LEG: Cleanse with normal saline, apply zinc to fiona wound, drawtex, ABD pad, and profore lite . Change weekly.        Treatment Orders: Eat a diet high in protein and vitamin C. Take a multiple vitamin daily unless contraindicated.     Dr. Duncan as needed.       Must wear slip on shoe to work due to wrap on leg!        Keep wrap dry at all times. If wrap becomes wet or falls 2 inches call [facility] ([phone])and may remove wrap and apply double tubigrip.      11/27/24 Compression wrap for left leg to be ordered through [facility].      [facility] followup visit : 1 week Dr. HERNANDEZ (Thursday PM 12/26/24 & 01/02/25) __________________________________  (Please note your next appointment above and if you are unable to keep, kindly give a 24 hour notice. Thank you.)     Physician signature:__________________________     If you experience any of the following, please call the Wound Care Center during business hours:     * Increase in Pain  * Temperature over 101  * Increase in drainage from your wound  * Drainage with a foul odor  * Bleeding  * Increase in swelling  * Need for compression bandage changes due to slippage, breakthrough drainage.     If you need medical attention outside of the business hours of the Wound Care Centers please contact your PCP or go to the nearest emergency room

## 2024-12-18 ENCOUNTER — HOSPITAL ENCOUNTER (OUTPATIENT)
Dept: WOUND CARE | Age: 69
Discharge: HOME | End: 2024-12-18
Attending: SURGERY
Payer: MEDICARE

## 2024-12-18 VITALS
WEIGHT: 166 LBS | RESPIRATION RATE: 18 BRPM | SYSTOLIC BLOOD PRESSURE: 150 MMHG | HEART RATE: 70 BPM | HEIGHT: 68 IN | DIASTOLIC BLOOD PRESSURE: 83 MMHG | BODY MASS INDEX: 25.16 KG/M2 | TEMPERATURE: 98.3 F

## 2024-12-18 DIAGNOSIS — I70.243 ATHEROSCLEROSIS OF NATIVE ARTERY OF LEFT LOWER EXTREMITY WITH ULCERATION OF ANKLE (HCC): Chronic | ICD-10-CM

## 2024-12-18 DIAGNOSIS — I83.023 VARICOSE VEINS OF LEFT LOWER EXTREMITY WITH ULCER OF ANKLE WITH FAT LAYER EXPOSED (HCC): Primary | ICD-10-CM

## 2024-12-18 DIAGNOSIS — L97.322 VARICOSE VEINS OF LEFT LOWER EXTREMITY WITH ULCER OF ANKLE WITH FAT LAYER EXPOSED (HCC): Primary | ICD-10-CM

## 2024-12-18 DIAGNOSIS — I70.242 ATHEROSCLEROSIS OF NATIVE ARTERY OF LEFT LOWER EXTREMITY WITH ULCERATION OF CALF (HCC): ICD-10-CM

## 2024-12-18 PROCEDURE — 11042 DBRDMT SUBQ TIS 1ST 20SQCM/<: CPT | Performed by: SURGERY

## 2024-12-18 PROCEDURE — 11042 DBRDMT SUBQ TIS 1ST 20SQCM/<: CPT

## 2024-12-18 RX ORDER — BETAMETHASONE DIPROPIONATE 0.05 %
OINTMENT (GRAM) TOPICAL ONCE
OUTPATIENT
Start: 2024-12-18 | End: 2024-12-18

## 2024-12-18 RX ORDER — OXYCODONE AND ACETAMINOPHEN 5; 325 MG/1; MG/1
1 TABLET ORAL EVERY 6 HOURS PRN
Qty: 28 TABLET | Refills: 0 | Status: SHIPPED | OUTPATIENT
Start: 2024-12-18 | End: 2024-12-25

## 2024-12-18 RX ORDER — SILVER SULFADIAZINE 10 MG/G
CREAM TOPICAL ONCE
OUTPATIENT
Start: 2024-12-18 | End: 2024-12-18

## 2024-12-18 RX ORDER — BACITRACIN ZINC AND POLYMYXIN B SULFATE 500; 1000 [USP'U]/G; [USP'U]/G
OINTMENT TOPICAL ONCE
OUTPATIENT
Start: 2024-12-18 | End: 2024-12-18

## 2024-12-18 RX ORDER — LIDOCAINE HYDROCHLORIDE 40 MG/ML
SOLUTION TOPICAL ONCE
Status: COMPLETED | OUTPATIENT
Start: 2024-12-18 | End: 2024-12-18

## 2024-12-18 RX ORDER — LIDOCAINE HYDROCHLORIDE 20 MG/ML
JELLY TOPICAL ONCE
OUTPATIENT
Start: 2024-12-18 | End: 2024-12-18

## 2024-12-18 RX ORDER — CLOBETASOL PROPIONATE 0.5 MG/G
OINTMENT TOPICAL ONCE
OUTPATIENT
Start: 2024-12-18 | End: 2024-12-18

## 2024-12-18 RX ORDER — TRIAMCINOLONE ACETONIDE 1 MG/G
OINTMENT TOPICAL ONCE
OUTPATIENT
Start: 2024-12-18 | End: 2024-12-18

## 2024-12-18 RX ORDER — LIDOCAINE 40 MG/G
CREAM TOPICAL ONCE
OUTPATIENT
Start: 2024-12-18 | End: 2024-12-18

## 2024-12-18 RX ORDER — NEOMYCIN/BACITRACIN/POLYMYXINB 3.5-400-5K
OINTMENT (GRAM) TOPICAL ONCE
OUTPATIENT
Start: 2024-12-18 | End: 2024-12-18

## 2024-12-18 RX ORDER — LIDOCAINE 50 MG/G
OINTMENT TOPICAL ONCE
OUTPATIENT
Start: 2024-12-18 | End: 2024-12-18

## 2024-12-18 RX ORDER — GINSENG 100 MG
CAPSULE ORAL ONCE
OUTPATIENT
Start: 2024-12-18 | End: 2024-12-18

## 2024-12-18 RX ORDER — GENTAMICIN SULFATE 1 MG/G
OINTMENT TOPICAL ONCE
OUTPATIENT
Start: 2024-12-18 | End: 2024-12-18

## 2024-12-18 RX ORDER — LIDOCAINE HYDROCHLORIDE 40 MG/ML
SOLUTION TOPICAL ONCE
OUTPATIENT
Start: 2024-12-18 | End: 2024-12-18

## 2024-12-18 RX ORDER — MUPIROCIN 20 MG/G
OINTMENT TOPICAL ONCE
OUTPATIENT
Start: 2024-12-18 | End: 2024-12-18

## 2024-12-18 RX ADMIN — LIDOCAINE HYDROCHLORIDE: 40 SOLUTION TOPICAL at 08:04

## 2024-12-18 ASSESSMENT — PAIN SCALES - GENERAL: PAINLEVEL_OUTOF10: 7

## 2024-12-18 ASSESSMENT — PAIN DESCRIPTION - FREQUENCY: FREQUENCY: INTERMITTENT

## 2024-12-18 ASSESSMENT — PAIN DESCRIPTION - PAIN TYPE: TYPE: CHRONIC PAIN

## 2024-12-18 ASSESSMENT — PAIN DESCRIPTION - LOCATION: LOCATION: ANKLE

## 2024-12-18 ASSESSMENT — PAIN DESCRIPTION - DESCRIPTORS: DESCRIPTORS: ACHING;STABBING

## 2024-12-18 ASSESSMENT — PAIN - FUNCTIONAL ASSESSMENT: PAIN_FUNCTIONAL_ASSESSMENT: PREVENTS OR INTERFERES SOME ACTIVE ACTIVITIES AND ADLS

## 2024-12-18 ASSESSMENT — PAIN DESCRIPTION - ORIENTATION: ORIENTATION: LEFT

## 2024-12-18 ASSESSMENT — PAIN DESCRIPTION - ONSET: ONSET: ON-GOING

## 2024-12-18 NOTE — PROGRESS NOTES
Wound Healing Center Followup Visit Note    Referring Physician : Rosenda Cormier MD  Amanda Ledesma  MEDICAL RECORD NUMBER:  [id number]  AGE: 69 y.o.   GENDER: female  : 1955  EPISODE DATE:  2024    Subjective:     Chief Complaint   Patient presents with    Wound Check     Left ankle      HISTORY of PRESENT ILLNESS HPI   Amanda Ledesma is a 69 y.o. female who presents today in regards to follow up evaluation and treatment of wound/ulcer.  That patient's past medical, family and social hx were reviewed and changes were made if present.    History of Wound Context:  The patient has had a wound of her left ankle/calf which was first noted approximately 2021.  This has been treated local wound care. On their initial visit to the wound healing center, 22,  the patient has noted that the wound has been improving.  The patient has not had similar previous wounds in the past.      She started seeing Dr. Street in 2021 and than Dr. Gillis.  She was started in unna boot ~ 2021.  She has noticed some improvement since starting unna boot.  She is currently following with Dr. Haskins.      Pt is not on abx at time of initial visit, but has been treated with previously by podiatry.      She is not a DM.  She is not a smoker.  She denies hx of DVT, and per her report had recent us noting no evidence of dvt at [facility].  She also had arterial studies done.    I had previously seen her in the past in regards to left buttock thigh wound, which started as abscess.      Pt works at [employer] in the [employer] and is on her feet all day.    22  Reflux study - if significant findings will schedule for fu  Continue compression therapy [facility] per podiatry with aquacell dressing  Elevation  She does not have significant arterial occlusive disease  22  Patient has asked to continuing following with me going forward because of our previous relationship  She is going to let Dr. Schuler office

## 2024-12-20 NOTE — DISCHARGE INSTRUCTIONS
Visit Discharge/Physician Orders     Discharge condition: Stable     Assessment of pain at discharge: yes     Anesthetic used: 4% lidocaine solution     Discharge to: Home     Left via:Private automobile     Accompanied by:self     [facility]F/[facility]HA: [facility] [phone]     Dressing Orders: LEFT MEDIAL LEG: Cleanse with normal saline, apply zinc to fiona wound, drawtex, ABD pad, and profore lite . Change weekly.        Treatment Orders: Eat a diet high in protein and vitamin C. Take a multiple vitamin daily unless contraindicated.     Dr. Duncan as needed.       Must wear slip on shoe to work due to wrap on leg!        Keep wrap dry at all times. If wrap becomes wet or falls 2 inches call [facility] ([phone])and may remove wrap and apply double tubigrip.      11/27/24 Compression wrap for left leg to be ordered through [facility].      [facility] followup visit : 1 week Dr. HERNANDEZ  01/02/25 __________________________________  (Please note your next appointment above and if you are unable to keep, kindly give a 24 hour notice. Thank you.)     Physician signature:__________________________     If you experience any of the following, please call the Wound Care Center during business hours:     * Increase in Pain  * Temperature over 101  * Increase in drainage from your wound  * Drainage with a foul odor  * Bleeding  * Increase in swelling  * Need for compression bandage changes due to slippage, breakthrough drainage.     If you need medical attention outside of the business hours of the Wound Care Centers please contact your PCP or go to the nearest emergency room

## 2024-12-26 ENCOUNTER — HOSPITAL ENCOUNTER (OUTPATIENT)
Dept: WOUND CARE | Age: 69
Discharge: HOME | End: 2024-12-26
Attending: SURGERY
Payer: MEDICARE

## 2024-12-26 VITALS
WEIGHT: 166 LBS | RESPIRATION RATE: 20 BRPM | HEART RATE: 69 BPM | TEMPERATURE: 96.7 F | HEIGHT: 68 IN | SYSTOLIC BLOOD PRESSURE: 147 MMHG | BODY MASS INDEX: 25.16 KG/M2 | DIASTOLIC BLOOD PRESSURE: 66 MMHG

## 2024-12-26 DIAGNOSIS — I83.023 VARICOSE VEINS OF LEFT LOWER EXTREMITY WITH ULCER OF ANKLE WITH FAT LAYER EXPOSED (HCC): Primary | ICD-10-CM

## 2024-12-26 DIAGNOSIS — L97.322 VARICOSE VEINS OF LEFT LOWER EXTREMITY WITH ULCER OF ANKLE WITH FAT LAYER EXPOSED (HCC): Primary | ICD-10-CM

## 2024-12-26 DIAGNOSIS — I70.242 ATHEROSCLEROSIS OF NATIVE ARTERY OF LEFT LOWER EXTREMITY WITH ULCERATION OF CALF (HCC): ICD-10-CM

## 2024-12-26 PROCEDURE — 11042 DBRDMT SUBQ TIS 1ST 20SQCM/<: CPT

## 2024-12-26 RX ORDER — LIDOCAINE 50 MG/G
OINTMENT TOPICAL ONCE
OUTPATIENT
Start: 2024-12-26 | End: 2024-12-26

## 2024-12-26 RX ORDER — NEOMYCIN/BACITRACIN/POLYMYXINB 3.5-400-5K
OINTMENT (GRAM) TOPICAL ONCE
OUTPATIENT
Start: 2024-12-26 | End: 2024-12-26

## 2024-12-26 RX ORDER — LIDOCAINE HYDROCHLORIDE 20 MG/ML
JELLY TOPICAL ONCE
OUTPATIENT
Start: 2024-12-26 | End: 2024-12-26

## 2024-12-26 RX ORDER — LIDOCAINE HYDROCHLORIDE 40 MG/ML
SOLUTION TOPICAL ONCE
OUTPATIENT
Start: 2024-12-26 | End: 2024-12-26

## 2024-12-26 RX ORDER — TRIAMCINOLONE ACETONIDE 1 MG/G
OINTMENT TOPICAL ONCE
OUTPATIENT
Start: 2024-12-26 | End: 2024-12-26

## 2024-12-26 RX ORDER — BETAMETHASONE DIPROPIONATE 0.05 %
OINTMENT (GRAM) TOPICAL ONCE
OUTPATIENT
Start: 2024-12-26 | End: 2024-12-26

## 2024-12-26 RX ORDER — CLOBETASOL PROPIONATE 0.5 MG/G
OINTMENT TOPICAL ONCE
OUTPATIENT
Start: 2024-12-26 | End: 2024-12-26

## 2024-12-26 RX ORDER — LIDOCAINE HYDROCHLORIDE 40 MG/ML
SOLUTION TOPICAL ONCE
Status: COMPLETED | OUTPATIENT
Start: 2024-12-26 | End: 2024-12-26

## 2024-12-26 RX ORDER — GINSENG 100 MG
CAPSULE ORAL ONCE
OUTPATIENT
Start: 2024-12-26 | End: 2024-12-26

## 2024-12-26 RX ORDER — OXYCODONE AND ACETAMINOPHEN 5; 325 MG/1; MG/1
1 TABLET ORAL EVERY 6 HOURS PRN
Qty: 28 TABLET | Refills: 0 | Status: SHIPPED | OUTPATIENT
Start: 2024-12-30 | End: 2025-01-06

## 2024-12-26 RX ORDER — MUPIROCIN 20 MG/G
OINTMENT TOPICAL ONCE
OUTPATIENT
Start: 2024-12-26 | End: 2024-12-26

## 2024-12-26 RX ORDER — BACITRACIN ZINC AND POLYMYXIN B SULFATE 500; 1000 [USP'U]/G; [USP'U]/G
OINTMENT TOPICAL ONCE
OUTPATIENT
Start: 2024-12-26 | End: 2024-12-26

## 2024-12-26 RX ORDER — GENTAMICIN SULFATE 1 MG/G
OINTMENT TOPICAL ONCE
OUTPATIENT
Start: 2024-12-26 | End: 2024-12-26

## 2024-12-26 RX ORDER — LIDOCAINE 40 MG/G
CREAM TOPICAL ONCE
OUTPATIENT
Start: 2024-12-26 | End: 2024-12-26

## 2024-12-26 RX ORDER — SILVER SULFADIAZINE 10 MG/G
CREAM TOPICAL ONCE
OUTPATIENT
Start: 2024-12-26 | End: 2024-12-26

## 2024-12-26 RX ADMIN — LIDOCAINE HYDROCHLORIDE 5 ML: 40 SOLUTION TOPICAL at 14:08

## 2024-12-26 ASSESSMENT — PAIN DESCRIPTION - DESCRIPTORS: DESCRIPTORS: ACHING;STABBING

## 2024-12-26 ASSESSMENT — PAIN SCALES - GENERAL: PAINLEVEL_OUTOF10: 7

## 2024-12-26 ASSESSMENT — PAIN DESCRIPTION - LOCATION: LOCATION: ANKLE

## 2024-12-26 ASSESSMENT — PAIN DESCRIPTION - ORIENTATION: ORIENTATION: LEFT

## 2024-12-30 NOTE — DISCHARGE INSTRUCTIONS
Visit Discharge/Physician Orders     Discharge condition: Stable     Assessment of pain at discharge: yes     Anesthetic used: 4% lidocaine solution     Discharge to: Home     Left via:Private automobile     Accompanied by:self     ECF/HHA: [facility] [phone]     Dressing Orders: LEFT MEDIAL LEG: Cleanse with normal saline, apply zinc to fiona wound, drawtex, ABD pad, and profore lite . Change weekly.        Treatment Orders: Eat a diet high in protein and vitamin C. Take a multiple vitamin daily unless contraindicated.     Dr. Duncan as needed.       Must wear slip on shoe to work due to wrap on leg!        Keep wrap dry at all times. If wrap becomes wet or falls 2 inches call [facility] ([phone])and may remove wrap and apply double tubigrip.      11/27/24 Compression wrap for left leg to be ordered through [facility].      [facility] followup visit : 1 week Dr. HERNANDEZ   __________________________________  (Please note your next appointment above and if you are unable to keep, kindly give a 24 hour notice. Thank you.)     Physician signature:__________________________     If you experience any of the following, please call the Wound Care Center during business hours:     * Increase in Pain  * Temperature over 101  * Increase in drainage from your wound  * Drainage with a foul odor  * Bleeding  * Increase in swelling  * Need for compression bandage changes due to slippage, breakthrough drainage.     If you need medical attention outside of the business hours of the Wound Care Centers please contact your PCP or go to the nearest emergency room

## 2025-01-02 ENCOUNTER — HOSPITAL ENCOUNTER (OUTPATIENT)
Dept: WOUND CARE | Age: 70
Discharge: HOME | End: 2025-01-02
Attending: SURGERY
Payer: MEDICARE

## 2025-01-02 VITALS
DIASTOLIC BLOOD PRESSURE: 81 MMHG | SYSTOLIC BLOOD PRESSURE: 145 MMHG | TEMPERATURE: 97.8 F | HEART RATE: 75 BPM | BODY MASS INDEX: 25.16 KG/M2 | WEIGHT: 166 LBS | HEIGHT: 68 IN | RESPIRATION RATE: 18 BRPM

## 2025-01-02 DIAGNOSIS — I83.023 VARICOSE VEINS OF LEFT LOWER EXTREMITY WITH ULCER OF ANKLE WITH FAT LAYER EXPOSED (HCC): Primary | ICD-10-CM

## 2025-01-02 DIAGNOSIS — I70.242 ATHEROSCLEROSIS OF NATIVE ARTERY OF LEFT LOWER EXTREMITY WITH ULCERATION OF CALF (HCC): ICD-10-CM

## 2025-01-02 DIAGNOSIS — L97.322 VARICOSE VEINS OF LEFT LOWER EXTREMITY WITH ULCER OF ANKLE WITH FAT LAYER EXPOSED (HCC): Primary | ICD-10-CM

## 2025-01-02 PROCEDURE — 11042 DBRDMT SUBQ TIS 1ST 20SQCM/<: CPT | Performed by: SURGERY

## 2025-01-02 PROCEDURE — 11042 DBRDMT SUBQ TIS 1ST 20SQCM/<: CPT

## 2025-01-02 RX ORDER — NEOMYCIN/BACITRACIN/POLYMYXINB 3.5-400-5K
OINTMENT (GRAM) TOPICAL ONCE
OUTPATIENT
Start: 2025-01-02 | End: 2025-01-02

## 2025-01-02 RX ORDER — LIDOCAINE HYDROCHLORIDE 20 MG/ML
JELLY TOPICAL ONCE
OUTPATIENT
Start: 2025-01-02 | End: 2025-01-02

## 2025-01-02 RX ORDER — LIDOCAINE 40 MG/G
CREAM TOPICAL ONCE
OUTPATIENT
Start: 2025-01-02 | End: 2025-01-02

## 2025-01-02 RX ORDER — GENTAMICIN SULFATE 1 MG/G
OINTMENT TOPICAL ONCE
OUTPATIENT
Start: 2025-01-02 | End: 2025-01-02

## 2025-01-02 RX ORDER — LIDOCAINE HYDROCHLORIDE 40 MG/ML
SOLUTION TOPICAL ONCE
Status: COMPLETED | OUTPATIENT
Start: 2025-01-02 | End: 2025-01-02

## 2025-01-02 RX ORDER — BETAMETHASONE DIPROPIONATE 0.05 %
OINTMENT (GRAM) TOPICAL ONCE
OUTPATIENT
Start: 2025-01-02 | End: 2025-01-02

## 2025-01-02 RX ORDER — CLOBETASOL PROPIONATE 0.5 MG/G
OINTMENT TOPICAL ONCE
OUTPATIENT
Start: 2025-01-02 | End: 2025-01-02

## 2025-01-02 RX ORDER — LIDOCAINE 50 MG/G
OINTMENT TOPICAL ONCE
OUTPATIENT
Start: 2025-01-02 | End: 2025-01-02

## 2025-01-02 RX ORDER — GINSENG 100 MG
CAPSULE ORAL ONCE
OUTPATIENT
Start: 2025-01-02 | End: 2025-01-02

## 2025-01-02 RX ORDER — TRIAMCINOLONE ACETONIDE 1 MG/G
OINTMENT TOPICAL ONCE
OUTPATIENT
Start: 2025-01-02 | End: 2025-01-02

## 2025-01-02 RX ORDER — BACITRACIN ZINC AND POLYMYXIN B SULFATE 500; 1000 [USP'U]/G; [USP'U]/G
OINTMENT TOPICAL ONCE
OUTPATIENT
Start: 2025-01-02 | End: 2025-01-02

## 2025-01-02 RX ORDER — MUPIROCIN 20 MG/G
OINTMENT TOPICAL ONCE
OUTPATIENT
Start: 2025-01-02 | End: 2025-01-02

## 2025-01-02 RX ORDER — LIDOCAINE HYDROCHLORIDE 40 MG/ML
SOLUTION TOPICAL ONCE
OUTPATIENT
Start: 2025-01-02 | End: 2025-01-02

## 2025-01-02 RX ORDER — SILVER SULFADIAZINE 10 MG/G
CREAM TOPICAL ONCE
OUTPATIENT
Start: 2025-01-02 | End: 2025-01-02

## 2025-01-02 RX ADMIN — LIDOCAINE HYDROCHLORIDE 10 ML: 40 SOLUTION TOPICAL at 13:44

## 2025-01-02 ASSESSMENT — PAIN DESCRIPTION - DESCRIPTORS: DESCRIPTORS: ACHING;STABBING

## 2025-01-02 ASSESSMENT — PAIN DESCRIPTION - ORIENTATION: ORIENTATION: LEFT

## 2025-01-02 ASSESSMENT — PAIN DESCRIPTION - ONSET: ONSET: ON-GOING

## 2025-01-02 ASSESSMENT — PAIN DESCRIPTION - FREQUENCY: FREQUENCY: INTERMITTENT

## 2025-01-02 ASSESSMENT — PAIN SCALES - GENERAL: PAINLEVEL_OUTOF10: 7

## 2025-01-02 ASSESSMENT — PAIN DESCRIPTION - LOCATION: LOCATION: ANKLE

## 2025-01-02 ASSESSMENT — PAIN DESCRIPTION - PAIN TYPE: TYPE: CHRONIC PAIN

## 2025-01-02 ASSESSMENT — PAIN - FUNCTIONAL ASSESSMENT: PAIN_FUNCTIONAL_ASSESSMENT: PREVENTS OR INTERFERES SOME ACTIVE ACTIVITIES AND ADLS

## 2025-01-02 NOTE — PROGRESS NOTES
tablet Take 1 tablet by mouth daily (with breakfast)      aspirin-acetaminophen-caffeine (EXCEDRIN MIGRAINE) 250-250-65 MG per tablet Take 1 tablet by mouth as needed for Headaches 300 tablet 3    oxyCODONE-acetaminophen (PERCOCET) 5-325 MG per tablet Take 1 tablet by mouth every 6 hours as needed for Pain for up to 7 days. Intended supply: 7 days. Take lowest dose possible to manage pain Max Daily Amount: 4 tablets 28 tablet 0    EPINEPHrine (EPIPEN) 0.3 MG/0.3ML SOAJ injection Use as directed for allergic reaction 1 each 5     No current facility-administered medications on file prior to encounter.       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    Relevant Orders    Initiate Outpatient Wound Care Protocol    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

## 2025-01-06 NOTE — DISCHARGE INSTRUCTIONS
Visit Discharge/Physician Orders     Discharge condition: Stable     Assessment of pain at discharge: yes     Anesthetic used: 4% lidocaine solution     Discharge to: Home     Left via:Private automobile     Accompanied by:self     ECF/HHA: [facility] [phone]     Dressing Orders: LEFT MEDIAL LEG: Cleanse with normal saline, apply zinc to fiona wound, drawtex, ABD pad, and profore lite . Change weekly.        Treatment Orders: Eat a diet high in protein and vitamin C. Take a multiple vitamin daily unless contraindicated.     Dr. Duncan as needed.       Must wear slip on shoe to work due to wrap on leg!        Keep wrap dry at all times. If wrap becomes wet or falls 2 inches call [facility] ([phone])and may remove wrap and apply double tubigrip.      11/27/24 Compression wrap for left leg to be ordered through [facility].      [facility] followup visit : 1 week Dr. HERNANDEZ   __________________________________  (Please note your next appointment above and if you are unable to keep, kindly give a 24 hour notice. Thank you.)     Physician signature:__________________________     If you experience any of the following, please call the Wound Care Center during business hours:     * Increase in Pain  * Temperature over 101  * Increase in drainage from your wound  * Drainage with a foul odor  * Bleeding  * Increase in swelling  * Need for compression bandage changes due to slippage, breakthrough drainage.     If you need medical attention outside of the business hours of the Wound Care Centers please contact your PCP or go to the nearest emergency room

## 2025-01-08 ENCOUNTER — HOSPITAL ENCOUNTER (OUTPATIENT)
Dept: WOUND CARE | Age: 70
Discharge: HOME | End: 2025-01-08
Attending: SURGERY
Payer: MEDICARE

## 2025-01-08 VITALS
RESPIRATION RATE: 18 BRPM | SYSTOLIC BLOOD PRESSURE: 110 MMHG | BODY MASS INDEX: 25.16 KG/M2 | WEIGHT: 166 LBS | HEIGHT: 68 IN | HEART RATE: 88 BPM | TEMPERATURE: 97.2 F | DIASTOLIC BLOOD PRESSURE: 87 MMHG

## 2025-01-08 DIAGNOSIS — L97.322 VARICOSE VEINS OF LEFT LOWER EXTREMITY WITH ULCER OF ANKLE WITH FAT LAYER EXPOSED (HCC): Primary | ICD-10-CM

## 2025-01-08 DIAGNOSIS — I70.243 ATHEROSCLEROSIS OF NATIVE ARTERY OF LEFT LOWER EXTREMITY WITH ULCERATION OF ANKLE (HCC): Chronic | ICD-10-CM

## 2025-01-08 DIAGNOSIS — I83.023 VARICOSE VEINS OF LEFT LOWER EXTREMITY WITH ULCER OF ANKLE WITH FAT LAYER EXPOSED (HCC): Primary | ICD-10-CM

## 2025-01-08 DIAGNOSIS — I70.242 ATHEROSCLEROSIS OF NATIVE ARTERY OF LEFT LOWER EXTREMITY WITH ULCERATION OF CALF (HCC): ICD-10-CM

## 2025-01-08 PROCEDURE — 11042 DBRDMT SUBQ TIS 1ST 20SQCM/<: CPT | Performed by: SURGERY

## 2025-01-08 PROCEDURE — 11042 DBRDMT SUBQ TIS 1ST 20SQCM/<: CPT

## 2025-01-08 RX ORDER — BACITRACIN ZINC AND POLYMYXIN B SULFATE 500; 1000 [USP'U]/G; [USP'U]/G
OINTMENT TOPICAL ONCE
OUTPATIENT
Start: 2025-01-08 | End: 2025-01-08

## 2025-01-08 RX ORDER — MUPIROCIN 20 MG/G
OINTMENT TOPICAL ONCE
OUTPATIENT
Start: 2025-01-08 | End: 2025-01-08

## 2025-01-08 RX ORDER — OXYCODONE AND ACETAMINOPHEN 5; 325 MG/1; MG/1
1 TABLET ORAL EVERY 6 HOURS PRN
Qty: 28 TABLET | Refills: 0 | Status: SHIPPED | OUTPATIENT
Start: 2025-01-10 | End: 2025-01-17

## 2025-01-08 RX ORDER — TRIAMCINOLONE ACETONIDE 1 MG/G
OINTMENT TOPICAL ONCE
OUTPATIENT
Start: 2025-01-08 | End: 2025-01-08

## 2025-01-08 RX ORDER — LIDOCAINE HYDROCHLORIDE 20 MG/ML
JELLY TOPICAL ONCE
OUTPATIENT
Start: 2025-01-08 | End: 2025-01-08

## 2025-01-08 RX ORDER — LIDOCAINE HYDROCHLORIDE 40 MG/ML
SOLUTION TOPICAL ONCE
OUTPATIENT
Start: 2025-01-08 | End: 2025-01-08

## 2025-01-08 RX ORDER — GENTAMICIN SULFATE 1 MG/G
OINTMENT TOPICAL ONCE
OUTPATIENT
Start: 2025-01-08 | End: 2025-01-08

## 2025-01-08 RX ORDER — LIDOCAINE HYDROCHLORIDE 40 MG/ML
SOLUTION TOPICAL ONCE
Status: COMPLETED | OUTPATIENT
Start: 2025-01-08 | End: 2025-01-08

## 2025-01-08 RX ORDER — LIDOCAINE 40 MG/G
CREAM TOPICAL ONCE
OUTPATIENT
Start: 2025-01-08 | End: 2025-01-08

## 2025-01-08 RX ORDER — BETAMETHASONE DIPROPIONATE 0.05 %
OINTMENT (GRAM) TOPICAL ONCE
OUTPATIENT
Start: 2025-01-08 | End: 2025-01-08

## 2025-01-08 RX ORDER — CLOBETASOL PROPIONATE 0.5 MG/G
OINTMENT TOPICAL ONCE
OUTPATIENT
Start: 2025-01-08 | End: 2025-01-08

## 2025-01-08 RX ORDER — SILVER SULFADIAZINE 10 MG/G
CREAM TOPICAL ONCE
OUTPATIENT
Start: 2025-01-08 | End: 2025-01-08

## 2025-01-08 RX ORDER — GINSENG 100 MG
CAPSULE ORAL ONCE
OUTPATIENT
Start: 2025-01-08 | End: 2025-01-08

## 2025-01-08 RX ORDER — NEOMYCIN/BACITRACIN/POLYMYXINB 3.5-400-5K
OINTMENT (GRAM) TOPICAL ONCE
OUTPATIENT
Start: 2025-01-08 | End: 2025-01-08

## 2025-01-08 RX ORDER — LIDOCAINE 50 MG/G
OINTMENT TOPICAL ONCE
OUTPATIENT
Start: 2025-01-08 | End: 2025-01-08

## 2025-01-08 RX ADMIN — LIDOCAINE HYDROCHLORIDE 10 ML: 40 SOLUTION TOPICAL at 07:56

## 2025-01-08 ASSESSMENT — PAIN SCALES - GENERAL: PAINLEVEL_OUTOF10: 7

## 2025-01-08 ASSESSMENT — PAIN DESCRIPTION - DESCRIPTORS: DESCRIPTORS: ACHING;STABBING

## 2025-01-08 ASSESSMENT — PAIN DESCRIPTION - ORIENTATION: ORIENTATION: LEFT

## 2025-01-08 ASSESSMENT — PAIN DESCRIPTION - LOCATION: LOCATION: ANKLE

## 2025-01-09 NOTE — DISCHARGE INSTRUCTIONS
Visit Discharge/Physician Orders     Discharge condition: Stable     Assessment of pain at discharge: yes     Anesthetic used: 4% lidocaine solution     Discharge to: Home     Left via:Private automobile     Accompanied by:self     ECF/HHA: [facility] [phone]     Dressing Orders: LEFT MEDIAL LEG: Cleanse with normal saline, apply zinc to fiona wound, drawtex, ABD pad, and profore lite . Change weekly.        Treatment Orders: Eat a diet high in protein and vitamin C. Take a multiple vitamin daily unless contraindicated.     Dr. Duncan as needed.       Must wear slip on shoe to work due to wrap on leg!        Keep wrap dry at all times. If wrap becomes wet or falls 2 inches call [facility] ([phone])and may remove wrap and apply double tubigrip.      11/27/24 Compression wrap for left leg to be ordered through [facility].      [facility] followup visit : 1 week Dr. HERNANDEZ   __________________________________  (Please note your next appointment above and if you are unable to keep, kindly give a 24 hour notice. Thank you.)     Physician signature:__________________________     If you experience any of the following, please call the Wound Care Center during business hours:     * Increase in Pain  * Temperature over 101  * Increase in drainage from your wound  * Drainage with a foul odor  * Bleeding  * Increase in swelling  * Need for compression bandage changes due to slippage, breakthrough drainage.     If you need medical attention outside of the business hours of the Wound Care Centers please contact your PCP or go to the nearest emergency room

## 2025-01-14 ENCOUNTER — TELEPHONE (OUTPATIENT)
Dept: SURGERY | Age: 70
End: 2025-01-14

## 2025-01-14 NOTE — TELEPHONE ENCOUNTER
Attempted to reach patient to schedule office visit with Dr. Peña for hx of hiatal hernia/gastritis.    left with call back information.  Electronically signed by Caryl Flores RN on 1/14/2025 at 10:50 AM

## 2025-01-15 ENCOUNTER — HOSPITAL ENCOUNTER (OUTPATIENT)
Dept: WOUND CARE | Age: 70
Discharge: HOME | End: 2025-01-15
Attending: SURGERY
Payer: MEDICARE

## 2025-01-15 VITALS
SYSTOLIC BLOOD PRESSURE: 131 MMHG | HEIGHT: 68 IN | TEMPERATURE: 96.7 F | RESPIRATION RATE: 18 BRPM | BODY MASS INDEX: 25.16 KG/M2 | DIASTOLIC BLOOD PRESSURE: 58 MMHG | HEART RATE: 69 BPM | WEIGHT: 166 LBS

## 2025-01-15 DIAGNOSIS — I70.242 ATHEROSCLEROSIS OF NATIVE ARTERY OF LEFT LOWER EXTREMITY WITH ULCERATION OF CALF (HCC): ICD-10-CM

## 2025-01-15 DIAGNOSIS — L97.322 VARICOSE VEINS OF LEFT LOWER EXTREMITY WITH ULCER OF ANKLE WITH FAT LAYER EXPOSED (HCC): Primary | ICD-10-CM

## 2025-01-15 DIAGNOSIS — I70.243 ATHEROSCLEROSIS OF NATIVE ARTERY OF LEFT LOWER EXTREMITY WITH ULCERATION OF ANKLE (HCC): Chronic | ICD-10-CM

## 2025-01-15 DIAGNOSIS — I83.023 VARICOSE VEINS OF LEFT LOWER EXTREMITY WITH ULCER OF ANKLE WITH FAT LAYER EXPOSED (HCC): Primary | ICD-10-CM

## 2025-01-15 PROCEDURE — 11042 DBRDMT SUBQ TIS 1ST 20SQCM/<: CPT

## 2025-01-15 PROCEDURE — 11042 DBRDMT SUBQ TIS 1ST 20SQCM/<: CPT | Performed by: SURGERY

## 2025-01-15 RX ORDER — GENTAMICIN SULFATE 1 MG/G
OINTMENT TOPICAL ONCE
OUTPATIENT
Start: 2025-01-15 | End: 2025-01-15

## 2025-01-15 RX ORDER — LIDOCAINE HYDROCHLORIDE 20 MG/ML
JELLY TOPICAL ONCE
OUTPATIENT
Start: 2025-01-15 | End: 2025-01-15

## 2025-01-15 RX ORDER — GINSENG 100 MG
CAPSULE ORAL ONCE
OUTPATIENT
Start: 2025-01-15 | End: 2025-01-15

## 2025-01-15 RX ORDER — LIDOCAINE 50 MG/G
OINTMENT TOPICAL ONCE
OUTPATIENT
Start: 2025-01-15 | End: 2025-01-15

## 2025-01-15 RX ORDER — MUPIROCIN 20 MG/G
OINTMENT TOPICAL ONCE
OUTPATIENT
Start: 2025-01-15 | End: 2025-01-15

## 2025-01-15 RX ORDER — LIDOCAINE 40 MG/G
CREAM TOPICAL ONCE
OUTPATIENT
Start: 2025-01-15 | End: 2025-01-15

## 2025-01-15 RX ORDER — BACITRACIN ZINC AND POLYMYXIN B SULFATE 500; 1000 [USP'U]/G; [USP'U]/G
OINTMENT TOPICAL ONCE
OUTPATIENT
Start: 2025-01-15 | End: 2025-01-15

## 2025-01-15 RX ORDER — LIDOCAINE HYDROCHLORIDE 40 MG/ML
SOLUTION TOPICAL ONCE
Status: COMPLETED | OUTPATIENT
Start: 2025-01-15 | End: 2025-01-15

## 2025-01-15 RX ORDER — BETAMETHASONE DIPROPIONATE 0.05 %
OINTMENT (GRAM) TOPICAL ONCE
OUTPATIENT
Start: 2025-01-15 | End: 2025-01-15

## 2025-01-15 RX ORDER — CLOBETASOL PROPIONATE 0.5 MG/G
OINTMENT TOPICAL ONCE
OUTPATIENT
Start: 2025-01-15 | End: 2025-01-15

## 2025-01-15 RX ORDER — TRIAMCINOLONE ACETONIDE 1 MG/G
OINTMENT TOPICAL ONCE
OUTPATIENT
Start: 2025-01-15 | End: 2025-01-15

## 2025-01-15 RX ORDER — NEOMYCIN/BACITRACIN/POLYMYXINB 3.5-400-5K
OINTMENT (GRAM) TOPICAL ONCE
OUTPATIENT
Start: 2025-01-15 | End: 2025-01-15

## 2025-01-15 RX ORDER — SILVER SULFADIAZINE 10 MG/G
CREAM TOPICAL ONCE
OUTPATIENT
Start: 2025-01-15 | End: 2025-01-15

## 2025-01-15 RX ORDER — LIDOCAINE HYDROCHLORIDE 40 MG/ML
SOLUTION TOPICAL ONCE
OUTPATIENT
Start: 2025-01-15 | End: 2025-01-15

## 2025-01-15 RX ADMIN — LIDOCAINE HYDROCHLORIDE 10 ML: 40 SOLUTION TOPICAL at 07:42

## 2025-01-15 ASSESSMENT — PAIN DESCRIPTION - ORIENTATION: ORIENTATION: LEFT

## 2025-01-15 ASSESSMENT — PAIN DESCRIPTION - LOCATION: LOCATION: ANKLE

## 2025-01-15 ASSESSMENT — PAIN - FUNCTIONAL ASSESSMENT: PAIN_FUNCTIONAL_ASSESSMENT: PREVENTS OR INTERFERES SOME ACTIVE ACTIVITIES AND ADLS

## 2025-01-15 ASSESSMENT — PAIN DESCRIPTION - PAIN TYPE: TYPE: CHRONIC PAIN

## 2025-01-15 ASSESSMENT — PAIN DESCRIPTION - FREQUENCY: FREQUENCY: INTERMITTENT

## 2025-01-15 ASSESSMENT — PAIN DESCRIPTION - ONSET: ONSET: ON-GOING

## 2025-01-15 ASSESSMENT — PAIN DESCRIPTION - DESCRIPTORS: DESCRIPTORS: ACHING;STABBING

## 2025-01-15 ASSESSMENT — PAIN SCALES - GENERAL: PAINLEVEL_OUTOF10: 7

## 2025-01-15 NOTE — PROGRESS NOTES
Wound Healing Center Followup Visit Note    Referring Physician : Rosenda Cormier MD  Amanda Ledesma  MEDICAL RECORD NUMBER:  [id number]  AGE: 69 y.o.   GENDER: female  : 1955  EPISODE DATE:  1/15/2025    Subjective:     Chief Complaint   Patient presents with    Wound Check     LEFT LEG      HISTORY of PRESENT ILLNESS HPI   Amanda Ledesma is a 69 y.o. female who presents today in regards to follow up evaluation and treatment of wound/ulcer.  That patient's past medical, family and social hx were reviewed and changes were made if present.    History of Wound Context:  The patient has had a wound of her left ankle/calf which was first noted approximately 2021.  This has been treated local wound care. On their initial visit to the wound healing center, 22,  the patient has noted that the wound has been improving.  The patient has not had similar previous wounds in the past.      She started seeing Dr. Street in 2021 and than Dr. Gillis.  She was started in unna boot ~ 2021.  She has noticed some improvement since starting unna boot.  She is currently following with Dr. Haskins.      Pt is not on abx at time of initial visit, but has been treated with previously by podiatry.      She is not a DM.  She is not a smoker.  She denies hx of DVT, and per her report had recent us noting no evidence of dvt at [facility].  She also had arterial studies done.    I had previously seen her in the past in regards to left buttock thigh wound, which started as abscess.      Pt works at [employer] in the [employer] and is on her feet all day.    22  Reflux study - if significant findings will schedule for fu  Continue compression therapy [facility] per podiatry with aquacell dressing  Elevation  She does not have significant arterial occlusive disease  22  Patient has asked to continuing following with me going forward because of our previous relationship  She is going to let Dr. Schuler office know  She

## 2025-01-16 NOTE — DISCHARGE INSTRUCTIONS
Visit Discharge/Physician Orders     Discharge condition: Stable     Assessment of pain at discharge: yes     Anesthetic used: 4% lidocaine solution     Discharge to: Home     Left via:Private automobile     Accompanied by:self     [facility]F/HHA: [facility] [phone]     Dressing Orders: LEFT MEDIAL LEG: Cleanse with normal saline, apply zinc to fiona wound, drawtex, ABD pad, and profore lite . Change weekly.        Treatment Orders: Eat a diet high in protein and vitamin C. Take a multiple vitamin daily unless contraindicated.     Dr. Duncan as needed.       Must wear slip on shoe to work due to wrap on leg!        Keep wrap dry at all times. If wrap becomes wet or falls 2 inches call [facility] ([phone])and may remove wrap and apply double tubigrip.      11/27/24 Compression wrap for left leg to be ordered through [facility].      [facility] followup visit : 1 week Dr. HERNANDEZ   __________________________________  (Please note your next appointment above and if you are unable to keep, kindly give a 24 hour notice. Thank you.)     Physician signature:__________________________     If you experience any of the following, please call the Wound Care Center during business hours:     * Increase in Pain  * Temperature over 101  * Increase in drainage from your wound  * Drainage with a foul odor  * Bleeding  * Increase in swelling  * Need for compression bandage changes due to slippage, breakthrough drainage.     If you need medical attention outside of the business hours of the Wound Care Centers please contact your PCP or go to the nearest emergency room

## 2025-01-19 NOTE — PLAN OF CARE
Problem: Pain  Goal: Verbalizes/displays adequate comfort level or baseline comfort level  Outcome: Progressing     Problem: Wound:  Goal: Will show signs of wound healing; wound closure and no evidence of infection  Description: Will show signs of wound healing; wound closure and no evidence of infection  Outcome: Progressing     Problem: Venous:  Goal: Signs of wound healing will improve  Description: Signs of wound healing will improve  Outcome: Adequate for Discharge     Problem: Compression therapy:  Goal: Will be free from complications associated with compression therapy  Description: Will be free from complications associated with compression therapy  Outcome: Progressing
No

## 2025-01-20 ENCOUNTER — INITIAL CONSULT (OUTPATIENT)
Dept: SURGERY | Age: 70
End: 2025-01-20
Payer: MEDICARE

## 2025-01-20 ENCOUNTER — HOSPITAL ENCOUNTER (OUTPATIENT)
Age: 70
Setting detail: OUTPATIENT SURGERY
End: 2025-01-20
Attending: SURGERY | Admitting: SURGERY
Payer: MEDICARE

## 2025-01-20 ENCOUNTER — TELEPHONE (OUTPATIENT)
Dept: SURGERY | Age: 70
End: 2025-01-20

## 2025-01-20 VITALS
SYSTOLIC BLOOD PRESSURE: 124 MMHG | HEIGHT: 68 IN | TEMPERATURE: 97.5 F | HEART RATE: 71 BPM | WEIGHT: 166 LBS | DIASTOLIC BLOOD PRESSURE: 66 MMHG | BODY MASS INDEX: 25.16 KG/M2 | RESPIRATION RATE: 18 BRPM | OXYGEN SATURATION: 98 %

## 2025-01-20 DIAGNOSIS — K21.00 GASTROESOPHAGEAL REFLUX DISEASE WITH ESOPHAGITIS WITHOUT HEMORRHAGE: Primary | ICD-10-CM

## 2025-01-20 DIAGNOSIS — R11.2 NAUSEA AND VOMITING, UNSPECIFIED VOMITING TYPE: ICD-10-CM

## 2025-01-20 PROBLEM — K21.9 GERD (GASTROESOPHAGEAL REFLUX DISEASE): Status: ACTIVE | Noted: 2025-01-20

## 2025-01-20 PROCEDURE — 1124F ACP DISCUSS-NO DSCNMKR DOCD: CPT | Performed by: SURGERY

## 2025-01-20 PROCEDURE — 1090F PRES/ABSN URINE INCON ASSESS: CPT | Performed by: SURGERY

## 2025-01-20 PROCEDURE — 1159F MED LIST DOCD IN RCRD: CPT | Performed by: SURGERY

## 2025-01-20 PROCEDURE — 3074F SYST BP LT 130 MM HG: CPT | Performed by: SURGERY

## 2025-01-20 PROCEDURE — 99203 OFFICE O/P NEW LOW 30 MIN: CPT | Performed by: SURGERY

## 2025-01-20 PROCEDURE — 3078F DIAST BP <80 MM HG: CPT | Performed by: SURGERY

## 2025-01-20 PROCEDURE — 1036F TOBACCO NON-USER: CPT | Performed by: SURGERY

## 2025-01-20 PROCEDURE — 1160F RVW MEDS BY RX/DR IN RCRD: CPT | Performed by: SURGERY

## 2025-01-20 PROCEDURE — G8400 PT W/DXA NO RESULTS DOC: HCPCS | Performed by: SURGERY

## 2025-01-20 PROCEDURE — G8427 DOCREV CUR MEDS BY ELIG CLIN: HCPCS | Performed by: SURGERY

## 2025-01-20 PROCEDURE — 1125F AMNT PAIN NOTED PAIN PRSNT: CPT | Performed by: SURGERY

## 2025-01-20 PROCEDURE — 3017F COLORECTAL CA SCREEN DOC REV: CPT | Performed by: SURGERY

## 2025-01-20 PROCEDURE — G8419 CALC BMI OUT NRM PARAM NOF/U: HCPCS | Performed by: SURGERY

## 2025-01-20 NOTE — PROGRESS NOTES
vein thrombosis (DVT) of popliteal vein of left lower extremity (HCC)    Anemia    Primary hypertension       Past Medical History:   Diagnosis Date    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 ([facility]) 05/18/2022    Dizziness - light-headed     GERD (gastroesophageal reflux disease)     Herpes dermatitis 04/27/2017    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    Lightheadedness     Migraine     PONV (postoperative nausea and vomiting)     Primary hypertension 3/6/2024    Skin ulcer of buttock with fat layer exposed ([facility]) 07/20/2016    Venous stasis ulcer of left ankle with fat layer exposed with varicose veins ([facility]) 02/02/2022       Past Surgical History:   Procedure Laterality Date    CHOLECYSTECTOMY, LAPAROSCOPIC  04/08/2014    LEG SURGERY Left 8/24/2022    LEFT LOWER EXTREMITY DEBRIDMENT WITH POSSIBLE ADVANCED SKIN THERAPY, POSSIBLE UNNA BOOT performed by Ashley Staples MD at [facility] [place]    LEG SURGERY Left 10/25/2022    DEBRIDEMENT LEFT LEG, POSSIBLE ADVANCED SKIN THERAPY with acell micromatrix application , UNNA BOOT performed by Ashley Staples MD at [facility] [place]    LEG SURGERY Left 5/2/2023    LEFT LOWER EXTREMITY DEBRIDEMENT, POSSIBLE UNNA BOOT, POSSIBLE ADVANCED SKIN THERAPY performed by Ashley Staples MD at [facility] [place]    LEG SURGERY Left 6/27/2023    DEBRIDEMENT LEFT LEG performed by Ashley Staples MD at [facility] [place]    LEG SURGERY Left 7/25/2023    DEBRIDEMENT OF LEFT LOWER EXTREMITY WOUND, ADVANCED SKIN THERAPY/APPLICATION OF ACF performed by Ashley Staples MD at [facility] [place]    LEG SURGERY Left 9/12/2023    left leg debridement, AC5 application, unna boot application performed by Ashley Staples MD at [facility] [place]    LEG SURGERY Left 11/7/2023    DEBRIDEMENT LEFT LEG performed by Ashley Staples MD at [facility] [place]    SAPHENOUS VEIN ABLATION Left 1/12/2023    RADIOFREQUENCY ABLATION LEFT LESSER SAPHENOUS VEIN WITH STAB PHLEBECTOMIES, LEFT LEG WOUND

## 2025-01-20 NOTE — TELEPHONE ENCOUNTER
UGI schceduled at [facility] on 1/24/25.    Per Dr. Peña, patient scheduled for EGD with biopsy at [facility] on 2/10/25. Surgery scheduled via [facility], surgeon's calendar updated. Follow up appointment scheduled. Dr. Peña to enter orders.   Most recent EGD report/path requested from [facility].    Electronically signed by Caryl Flores RN on 1/20/2025 at 10:41 AM

## 2025-01-22 ENCOUNTER — HOSPITAL ENCOUNTER (OUTPATIENT)
Dept: WOUND CARE | Age: 70
Discharge: HOME | End: 2025-01-22
Attending: SURGERY
Payer: MEDICARE

## 2025-01-22 VITALS
HEIGHT: 68 IN | DIASTOLIC BLOOD PRESSURE: 68 MMHG | WEIGHT: 166 LBS | RESPIRATION RATE: 20 BRPM | BODY MASS INDEX: 25.16 KG/M2 | SYSTOLIC BLOOD PRESSURE: 142 MMHG | HEART RATE: 78 BPM | TEMPERATURE: 98.7 F

## 2025-01-22 DIAGNOSIS — L97.322 VARICOSE VEINS OF LEFT LOWER EXTREMITY WITH ULCER OF ANKLE WITH FAT LAYER EXPOSED (HCC): Primary | ICD-10-CM

## 2025-01-22 DIAGNOSIS — I70.242 ATHEROSCLEROSIS OF NATIVE ARTERY OF LEFT LOWER EXTREMITY WITH ULCERATION OF CALF (HCC): ICD-10-CM

## 2025-01-22 DIAGNOSIS — I83.023 VARICOSE VEINS OF LEFT LOWER EXTREMITY WITH ULCER OF ANKLE WITH FAT LAYER EXPOSED (HCC): Primary | ICD-10-CM

## 2025-01-22 PROCEDURE — 11042 DBRDMT SUBQ TIS 1ST 20SQCM/<: CPT

## 2025-01-22 PROCEDURE — 11045 DBRDMT SUBQ TISS EACH ADDL: CPT | Performed by: SURGERY

## 2025-01-22 PROCEDURE — 11042 DBRDMT SUBQ TIS 1ST 20SQCM/<: CPT | Performed by: SURGERY

## 2025-01-22 RX ORDER — CLOBETASOL PROPIONATE 0.5 MG/G
OINTMENT TOPICAL ONCE
OUTPATIENT
Start: 2025-01-22 | End: 2025-01-22

## 2025-01-22 RX ORDER — LIDOCAINE HYDROCHLORIDE 40 MG/ML
SOLUTION TOPICAL ONCE
Status: COMPLETED | OUTPATIENT
Start: 2025-01-22 | End: 2025-01-22

## 2025-01-22 RX ORDER — LIDOCAINE 50 MG/G
OINTMENT TOPICAL ONCE
OUTPATIENT
Start: 2025-01-22 | End: 2025-01-22

## 2025-01-22 RX ORDER — LIDOCAINE HYDROCHLORIDE 40 MG/ML
SOLUTION TOPICAL ONCE
OUTPATIENT
Start: 2025-01-22 | End: 2025-01-22

## 2025-01-22 RX ORDER — SILVER SULFADIAZINE 10 MG/G
CREAM TOPICAL ONCE
OUTPATIENT
Start: 2025-01-22 | End: 2025-01-22

## 2025-01-22 RX ORDER — GENTAMICIN SULFATE 1 MG/G
OINTMENT TOPICAL ONCE
OUTPATIENT
Start: 2025-01-22 | End: 2025-01-22

## 2025-01-22 RX ORDER — NEOMYCIN/BACITRACIN/POLYMYXINB 3.5-400-5K
OINTMENT (GRAM) TOPICAL ONCE
OUTPATIENT
Start: 2025-01-22 | End: 2025-01-22

## 2025-01-22 RX ORDER — LIDOCAINE HYDROCHLORIDE 20 MG/ML
JELLY TOPICAL ONCE
OUTPATIENT
Start: 2025-01-22 | End: 2025-01-22

## 2025-01-22 RX ORDER — BACITRACIN ZINC AND POLYMYXIN B SULFATE 500; 1000 [USP'U]/G; [USP'U]/G
OINTMENT TOPICAL ONCE
OUTPATIENT
Start: 2025-01-22 | End: 2025-01-22

## 2025-01-22 RX ORDER — BETAMETHASONE DIPROPIONATE 0.05 %
OINTMENT (GRAM) TOPICAL ONCE
OUTPATIENT
Start: 2025-01-22 | End: 2025-01-22

## 2025-01-22 RX ORDER — OXYCODONE AND ACETAMINOPHEN 5; 325 MG/1; MG/1
1 TABLET ORAL EVERY 6 HOURS PRN
Qty: 28 TABLET | Refills: 0 | Status: SHIPPED | OUTPATIENT
Start: 2025-01-22 | End: 2025-01-29

## 2025-01-22 RX ORDER — LIDOCAINE 40 MG/G
CREAM TOPICAL ONCE
OUTPATIENT
Start: 2025-01-22 | End: 2025-01-22

## 2025-01-22 RX ORDER — MUPIROCIN 20 MG/G
OINTMENT TOPICAL ONCE
OUTPATIENT
Start: 2025-01-22 | End: 2025-01-22

## 2025-01-22 RX ORDER — TRIAMCINOLONE ACETONIDE 1 MG/G
OINTMENT TOPICAL ONCE
OUTPATIENT
Start: 2025-01-22 | End: 2025-01-22

## 2025-01-22 RX ORDER — GINSENG 100 MG
CAPSULE ORAL ONCE
OUTPATIENT
Start: 2025-01-22 | End: 2025-01-22

## 2025-01-22 RX ADMIN — LIDOCAINE HYDROCHLORIDE 10 ML: 40 SOLUTION TOPICAL at 07:46

## 2025-01-22 ASSESSMENT — PAIN SCALES - GENERAL: PAINLEVEL_OUTOF10: 7

## 2025-01-22 ASSESSMENT — PAIN DESCRIPTION - PAIN TYPE: TYPE: CHRONIC PAIN

## 2025-01-22 ASSESSMENT — PAIN - FUNCTIONAL ASSESSMENT: PAIN_FUNCTIONAL_ASSESSMENT: PREVENTS OR INTERFERES SOME ACTIVE ACTIVITIES AND ADLS

## 2025-01-22 ASSESSMENT — PAIN DESCRIPTION - LOCATION: LOCATION: ANKLE

## 2025-01-22 ASSESSMENT — PAIN DESCRIPTION - DESCRIPTORS: DESCRIPTORS: ACHING;SHARP;STABBING

## 2025-01-22 ASSESSMENT — PAIN DESCRIPTION - ORIENTATION: ORIENTATION: LEFT

## 2025-01-22 NOTE — DISCHARGE INSTRUCTIONS
Visit Discharge/Physician Orders     Discharge condition: Stable     Assessment of pain at discharge: yes     Anesthetic used: 4% lidocaine solution     Discharge to: Home     Left via:Private automobile     Accompanied by:self     ECF/HHA: [facility] [phone]     Dressing Orders: LEFT MEDIAL LEG: Cleanse with normal saline, apply zinc to fiona wound, drawtex, ABD pad, kerlix, and Spandagrip. Change daily.         Treatment Orders: Eat a diet high in protein and vitamin C. Take a multiple vitamin daily unless contraindicated.     Dr. Duncan as needed.       Must wear slip on shoe to work due to wrap on leg!             11/27/24 Compression wrap for left leg to be ordered through [facility].      [facility] followup visit : 1 week Dr. HERNANDEZ   __________________________________  (Please note your next appointment above and if you are unable to keep, kindly give a 24 hour notice. Thank you.)     Physician signature:__________________________     If you experience any of the following, please call the Wound Care Center during business hours:     * Increase in Pain  * Temperature over 101  * Increase in drainage from your wound  * Drainage with a foul odor  * Bleeding  * Increase in swelling  * Need for compression bandage changes due to slippage, breakthrough drainage.     If you need medical attention outside of the business hours of the Wound Care Centers please contact your PCP or go to the nearest emergency room

## 2025-01-22 NOTE — PROGRESS NOTES
Wound Healing Center Followup Visit Note    Referring Physician : Rosenda Cormier MD  Amanda Ledesma  MEDICAL RECORD NUMBER:  [id number]  AGE: 69 y.o.   GENDER: female  : 1955  EPISODE DATE:  2025    Subjective:     Chief Complaint   Patient presents with    Wound Check     Left ankle      HISTORY of PRESENT ILLNESS HPI   Amanda Ledesma is a 69 y.o. female who presents today in regards to follow up evaluation and treatment of wound/ulcer.  That patient's past medical, family and social hx were reviewed and changes were made if present.    History of Wound Context:  The patient has had a wound of her left ankle/calf which was first noted approximately 2021.  This has been treated local wound care. On their initial visit to the wound healing center, 22,  the patient has noted that the wound has been improving.  The patient has not had similar previous wounds in the past.      She started seeing Dr. Street in 2021 and than Dr. Gillis.  She was started in unna boot ~ 2021.  She has noticed some improvement since starting unna boot.  She is currently following with Dr. Haskins.      Pt is not on abx at time of initial visit, but has been treated with previously by podiatry.      She is not a DM.  She is not a smoker.  She denies hx of DVT, and per her report had recent us noting no evidence of dvt at [facility].  She also had arterial studies done.    I had previously seen her in the past in regards to left buttock thigh wound, which started as abscess.      Pt works at [employer] in the [place] and is on her feet all day.    22  Reflux study - if significant findings will schedule for fu  Continue compression therapy [facility] per podiatry with aquacell dressing  Elevation  She does not have significant arterial occlusive disease  22  Patient has asked to continuing following with me going forward because of our previous relationship  She is going to let Dr. Schuler office know  She

## 2025-01-24 ENCOUNTER — HOSPITAL ENCOUNTER (OUTPATIENT)
Dept: GENERAL RADIOLOGY | Age: 70
Discharge: HOME | End: 2025-01-26
Attending: SURGERY

## 2025-01-24 DIAGNOSIS — K21.00 GASTROESOPHAGEAL REFLUX DISEASE WITH ESOPHAGITIS WITHOUT HEMORRHAGE: ICD-10-CM

## 2025-01-29 ENCOUNTER — HOSPITAL ENCOUNTER (OUTPATIENT)
Dept: WOUND CARE | Age: 70
Discharge: HOME | End: 2025-01-29
Attending: SURGERY
Payer: MEDICARE

## 2025-01-29 VITALS
HEIGHT: 68 IN | RESPIRATION RATE: 18 BRPM | WEIGHT: 166 LBS | SYSTOLIC BLOOD PRESSURE: 120 MMHG | HEART RATE: 77 BPM | TEMPERATURE: 98.6 F | BODY MASS INDEX: 25.16 KG/M2 | DIASTOLIC BLOOD PRESSURE: 63 MMHG

## 2025-01-29 DIAGNOSIS — I70.242 ATHEROSCLEROSIS OF NATIVE ARTERY OF LEFT LOWER EXTREMITY WITH ULCERATION OF CALF (HCC): ICD-10-CM

## 2025-01-29 DIAGNOSIS — I70.243 ATHEROSCLEROSIS OF NATIVE ARTERY OF LEFT LOWER EXTREMITY WITH ULCERATION OF ANKLE (HCC): Chronic | ICD-10-CM

## 2025-01-29 DIAGNOSIS — L97.322 VARICOSE VEINS OF LEFT LOWER EXTREMITY WITH ULCER OF ANKLE WITH FAT LAYER EXPOSED (HCC): Primary | ICD-10-CM

## 2025-01-29 DIAGNOSIS — I83.023 VARICOSE VEINS OF LEFT LOWER EXTREMITY WITH ULCER OF ANKLE WITH FAT LAYER EXPOSED (HCC): Primary | ICD-10-CM

## 2025-01-29 PROCEDURE — 11042 DBRDMT SUBQ TIS 1ST 20SQCM/<: CPT

## 2025-01-29 PROCEDURE — 11042 DBRDMT SUBQ TIS 1ST 20SQCM/<: CPT | Performed by: SURGERY

## 2025-01-29 RX ORDER — SILVER SULFADIAZINE 10 MG/G
CREAM TOPICAL ONCE
OUTPATIENT
Start: 2025-01-29 | End: 2025-01-29

## 2025-01-29 RX ORDER — BETAMETHASONE DIPROPIONATE 0.05 %
OINTMENT (GRAM) TOPICAL ONCE
OUTPATIENT
Start: 2025-01-29 | End: 2025-01-29

## 2025-01-29 RX ORDER — GENTAMICIN SULFATE 1 MG/G
OINTMENT TOPICAL ONCE
OUTPATIENT
Start: 2025-01-29 | End: 2025-01-29

## 2025-01-29 RX ORDER — BACITRACIN ZINC AND POLYMYXIN B SULFATE 500; 1000 [USP'U]/G; [USP'U]/G
OINTMENT TOPICAL ONCE
OUTPATIENT
Start: 2025-01-29 | End: 2025-01-29

## 2025-01-29 RX ORDER — LIDOCAINE HYDROCHLORIDE 40 MG/ML
SOLUTION TOPICAL ONCE
OUTPATIENT
Start: 2025-01-29 | End: 2025-01-29

## 2025-01-29 RX ORDER — CLOBETASOL PROPIONATE 0.5 MG/G
OINTMENT TOPICAL ONCE
OUTPATIENT
Start: 2025-01-29 | End: 2025-01-29

## 2025-01-29 RX ORDER — LIDOCAINE HYDROCHLORIDE 20 MG/ML
JELLY TOPICAL ONCE
OUTPATIENT
Start: 2025-01-29 | End: 2025-01-29

## 2025-01-29 RX ORDER — LIDOCAINE 40 MG/G
CREAM TOPICAL ONCE
OUTPATIENT
Start: 2025-01-29 | End: 2025-01-29

## 2025-01-29 RX ORDER — OXYCODONE AND ACETAMINOPHEN 5; 325 MG/1; MG/1
1 TABLET ORAL EVERY 6 HOURS PRN
Qty: 28 TABLET | Refills: 0 | Status: SHIPPED | OUTPATIENT
Start: 2025-02-03 | End: 2025-02-10

## 2025-01-29 RX ORDER — NEOMYCIN/BACITRACIN/POLYMYXINB 3.5-400-5K
OINTMENT (GRAM) TOPICAL ONCE
OUTPATIENT
Start: 2025-01-29 | End: 2025-01-29

## 2025-01-29 RX ORDER — LIDOCAINE 50 MG/G
OINTMENT TOPICAL ONCE
OUTPATIENT
Start: 2025-01-29 | End: 2025-01-29

## 2025-01-29 RX ORDER — TRIAMCINOLONE ACETONIDE 1 MG/G
OINTMENT TOPICAL ONCE
OUTPATIENT
Start: 2025-01-29 | End: 2025-01-29

## 2025-01-29 RX ORDER — GINSENG 100 MG
CAPSULE ORAL ONCE
OUTPATIENT
Start: 2025-01-29 | End: 2025-01-29

## 2025-01-29 RX ORDER — MUPIROCIN 20 MG/G
OINTMENT TOPICAL ONCE
OUTPATIENT
Start: 2025-01-29 | End: 2025-01-29

## 2025-01-29 RX ORDER — LIDOCAINE HYDROCHLORIDE 40 MG/ML
SOLUTION TOPICAL ONCE
Status: COMPLETED | OUTPATIENT
Start: 2025-01-29 | End: 2025-01-29

## 2025-01-29 RX ADMIN — LIDOCAINE HYDROCHLORIDE: 40 SOLUTION TOPICAL at 07:40

## 2025-01-29 ASSESSMENT — PAIN DESCRIPTION - DESCRIPTORS: DESCRIPTORS: ACHING;SHARP

## 2025-01-29 ASSESSMENT — PAIN - FUNCTIONAL ASSESSMENT: PAIN_FUNCTIONAL_ASSESSMENT: PREVENTS OR INTERFERES SOME ACTIVE ACTIVITIES AND ADLS

## 2025-01-29 ASSESSMENT — PAIN DESCRIPTION - ORIENTATION: ORIENTATION: LEFT

## 2025-01-29 ASSESSMENT — PAIN SCALES - GENERAL: PAINLEVEL_OUTOF10: 6

## 2025-01-29 ASSESSMENT — PAIN DESCRIPTION - LOCATION: LOCATION: ANKLE

## 2025-01-30 ENCOUNTER — HOSPITAL ENCOUNTER (OUTPATIENT)
Dept: GENERAL RADIOLOGY | Age: 70
Discharge: HOME | End: 2025-02-01
Attending: SURGERY
Payer: MEDICARE

## 2025-01-30 ENCOUNTER — TELEPHONE (OUTPATIENT)
Dept: HYPERBARIC MEDICINE | Age: 70
End: 2025-01-30

## 2025-01-30 PROCEDURE — 74246 X-RAY XM UPR GI TRC 2CNTRST: CPT

## 2025-01-30 PROCEDURE — 6370000000 HC RX 637 (ALT 250 FOR IP): Performed by: SURGERY

## 2025-01-30 PROCEDURE — 2500000003 HC RX 250 WO HCPCS: Performed by: SURGERY

## 2025-01-30 RX ADMIN — BARIUM SULFATE 176 ML: 960 POWDER, FOR SUSPENSION ORAL at 08:36

## 2025-01-30 RX ADMIN — ANTACID/ANTIFLATULENT 1 EACH: 380; 550; 10; 10 GRANULE, EFFERVESCENT ORAL at 08:36

## 2025-01-30 RX ADMIN — BARIUM SULFATE 340 ML: 980 POWDER, FOR SUSPENSION ORAL at 08:36

## 2025-01-30 NOTE — WOUND CARE
Amanda from [facility] called to notify that patient's insurance does not cover compression. Patient must pay out of pocket for $75 per compression. Notified  and Amanda matute [facility] notified patient.

## 2025-01-30 NOTE — TELEPHONE ENCOUNTER
Spoke with Amanda Walters who stated patient's specific insurance plan does not cover compression. That the pricing would be around $75.00 and the patient could make payments if needed. Spoke with patient who stated that is not a possibility at the moment and needed further clarification as to why the compression is not covered. Patient requesting to speak with Amanda Walters. Amanda Walters notified that patient was requesting clarification on insurance regarding compression. Amanda matute Lorena stated she would double check patient's insurance plan and call patient. Dr. Staples notified.

## 2025-01-31 NOTE — PROGRESS NOTES
Wound Care Supplies      Supply Company:     [facility] [address] [facility] [address] [place] [address]  p: [phone] f: [phone]       Referral [facility] [id number]    Ordering Center:     [facility]  [address]  [facility] [address]  [phone]  WOUND CARE Dept: [phone]   FAX NUMBER [phone]    Patient Information:      Amanda Ledesma  [address]  [facility] [address]   [phone]   : 1955  AGE: 69 y.o.     GENDER: female   EPISODE DATE: 2025    Insurance:      PRIMARY INSURANCE:  Plan: [employer] DUAL COMPLETE  Coverage: [facility] MEDICARE  Effective Date: 2022  Group Number: [unfilled]  Subscriber Number: [id number] - (Medicare Managed)    Payer/Plan Subscr  Sex Relation Sub. Ins. ID Effective Group Num   1. [facility] MEDICARE * AMANDA LEDESMA 1955 Female Self [id number]                                   [address]   2. 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 
  Compression Garments    Supply Company for Compression Stockings:     [facility] [address] [facility] [address] [place] [address]  p: [phone] f: [phone]     Referral [facility] [id number]    Ordering Center:      [facility]  [address]  [facility] [address]  [phone]  WOUND CARE Dept: [phone]   FAX NUMBER [phone]    Patient Information:      Josh Ledesma  [address]  [facility] [address]   [phone]   : 1955  AGE: 69 y.o.     GENDER: female   TODAYS DATE:  2025    Insurance:      PRIMARY INSURANCE:  Plan: [employer] DUAL COMPLETE  Coverage: [facility] MEDICARE  Effective Date: 2022  Group Number: [unfilled]  Subscriber Number: [id number] - (Medicare Managed)    Payer/Plan Subscr  Sex Relation Sub. Ins. ID Effective Group Num   1. [facility] MEDICARE * JOSH LEDESMA 1955 Female Self [id number]                                   [address]   2. 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 
Amanda called to inform wound care that supplies were delivered to her house and she is concerned about out of pocket cost, Amanda from [facility] is supposed to reach out to the patient, advised her not to open product until speaking with Amanda.  
is to stay off her feet and elevate  1/8/25  38 slightly larger, appearance stable  Off work now  Script for percocet 5/325 mg #28 1/10OARRS   Discussed with pt re use of tylenol  Having significant issues with reflux today, emesis - per her report has hx of ulcers, gastritis  In review of chart has hx of small hiatal hernia and antral gastritis from egd in 2013 by Dr Young  Had lap choley in 2014 by Dr Young  She has seen GI in the past but would like to have 2nd opinion  Will refer to Dr Peña for evaluation re reflux, hx of duodenal ulcers, emesis  1/15/25  36  Appearance improved  1/22/25  40  Appearance much improved  Script for percocet 5/325 mg #28 1/10OARRS  1/29/25  Smaller at 33  Large drainage - change to daily dressings   Obtain circaid juxtafit essentials    Wound/Ulcer Pain Timing/Severity: constant, moderate  Quality of pain: aching, throbbing, tender  Severity:  5/ 10   Modifying Factors: Pain worsens with dressing changes, debridement  Associated Signs/Symptoms: edema, drainage and pain    Ulcer Identification:  Ulcer Type: venous  Contributing Factors: edema and venous stasis    Diabetic/Pressure/Non Pressure Ulcers only:  Ulcer: Non-Pressure ulcer, fat layer exposed        PAST MEDICAL HISTORY      Diagnosis Date    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 ([facility]) 05/18/2022    Dizziness - light-headed     GERD (gastroesophageal reflux disease)     Herpes dermatitis 04/27/2017    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    Lightheadedness     Migraine     PONV (postoperative nausea and vomiting)     Primary hypertension 3/6/2024    Skin ulcer of buttock with fat layer exposed ([facility]) 07/20/2016    Venous stasis ulcer of left ankle with fat layer exposed with varicose veins (HCC) 02/02/2022     Past Surgical History:   Procedure Laterality Date    CHOLECYSTECTOMY, LAPAROSCOPIC  04/08/2014    ESOPHAGOGASTRODUODENOSCOPY  06/04/2024    [facility]    LEG SURGERY

## 2025-01-31 NOTE — DISCHARGE INSTRUCTIONS
Visit Discharge/Physician Orders     Discharge condition: Stable     Assessment of pain at discharge: yes     Anesthetic used: 4% lidocaine solution     Discharge to: Home     Left via:Private automobile     Accompanied by:self     ECF/HHA: [facility] [phone]     Dressing Orders: LEFT MEDIAL LEG: Cleanse with normal saline, apply zinc to fiona wound, drawtex, ABD pad, kerlix, and Spandagrip. Change daily.         Treatment Orders: Eat a diet high in protein and vitamin C. Take a multiple vitamin daily unless contraindicated.     Dr. Duncan as needed.       Must wear slip on shoe to work due to wrap on leg!       2/5/25 Wound Culture     [facility] followup visit : 1 week Dr. HERNANDEZ   __________________________________  (Please note your next appointment above and if you are unable to keep, kindly give a 24 hour notice. Thank you.)     Physician signature:__________________________     If you experience any of the following, please call the Wound Care Center during business hours:     * Increase in Pain  * Temperature over 101  * Increase in drainage from your wound  * Drainage with a foul odor  * Bleeding  * Increase in swelling  * Need for compression bandage changes due to slippage, breakthrough drainage.     If you need medical attention outside of the business hours of the Wound Care Centers please contact your PCP or go to the nearest emergency room

## 2025-02-05 ENCOUNTER — HOSPITAL ENCOUNTER (OUTPATIENT)
Dept: WOUND CARE | Age: 70
Discharge: HOME | End: 2025-02-05
Attending: SURGERY
Payer: MEDICARE

## 2025-02-05 VITALS
DIASTOLIC BLOOD PRESSURE: 68 MMHG | HEART RATE: 80 BPM | RESPIRATION RATE: 18 BRPM | WEIGHT: 166 LBS | HEIGHT: 68 IN | TEMPERATURE: 97.7 F | BODY MASS INDEX: 25.16 KG/M2 | SYSTOLIC BLOOD PRESSURE: 138 MMHG

## 2025-02-05 DIAGNOSIS — I83.023 VARICOSE VEINS OF LEFT LOWER EXTREMITY WITH ULCER OF ANKLE WITH FAT LAYER EXPOSED (HCC): Primary | ICD-10-CM

## 2025-02-05 DIAGNOSIS — I70.242 ATHEROSCLEROSIS OF NATIVE ARTERY OF LEFT LOWER EXTREMITY WITH ULCERATION OF CALF (HCC): ICD-10-CM

## 2025-02-05 DIAGNOSIS — L97.322 VARICOSE VEINS OF LEFT LOWER EXTREMITY WITH ULCER OF ANKLE WITH FAT LAYER EXPOSED (HCC): Primary | ICD-10-CM

## 2025-02-05 PROCEDURE — 87077 CULTURE AEROBIC IDENTIFY: CPT

## 2025-02-05 PROCEDURE — 87070 CULTURE OTHR SPECIMN AEROBIC: CPT

## 2025-02-05 PROCEDURE — 87205 SMEAR GRAM STAIN: CPT

## 2025-02-05 PROCEDURE — 11042 DBRDMT SUBQ TIS 1ST 20SQCM/<: CPT

## 2025-02-05 RX ORDER — LIDOCAINE HYDROCHLORIDE 40 MG/ML
SOLUTION TOPICAL ONCE
OUTPATIENT
Start: 2025-02-05 | End: 2025-02-05

## 2025-02-05 RX ORDER — MUPIROCIN 20 MG/G
OINTMENT TOPICAL ONCE
OUTPATIENT
Start: 2025-02-05 | End: 2025-02-05

## 2025-02-05 RX ORDER — CLOBETASOL PROPIONATE 0.5 MG/G
OINTMENT TOPICAL ONCE
OUTPATIENT
Start: 2025-02-05 | End: 2025-02-05

## 2025-02-05 RX ORDER — LIDOCAINE 50 MG/G
OINTMENT TOPICAL ONCE
OUTPATIENT
Start: 2025-02-05 | End: 2025-02-05

## 2025-02-05 RX ORDER — LIDOCAINE HYDROCHLORIDE 40 MG/ML
SOLUTION TOPICAL ONCE
Status: COMPLETED | OUTPATIENT
Start: 2025-02-05 | End: 2025-02-05

## 2025-02-05 RX ORDER — GENTAMICIN SULFATE 1 MG/G
OINTMENT TOPICAL ONCE
OUTPATIENT
Start: 2025-02-05 | End: 2025-02-05

## 2025-02-05 RX ORDER — BACITRACIN ZINC AND POLYMYXIN B SULFATE 500; 1000 [USP'U]/G; [USP'U]/G
OINTMENT TOPICAL ONCE
OUTPATIENT
Start: 2025-02-05 | End: 2025-02-05

## 2025-02-05 RX ORDER — BETAMETHASONE DIPROPIONATE 0.05 %
OINTMENT (GRAM) TOPICAL ONCE
OUTPATIENT
Start: 2025-02-05 | End: 2025-02-05

## 2025-02-05 RX ORDER — LIDOCAINE HYDROCHLORIDE 20 MG/ML
JELLY TOPICAL ONCE
OUTPATIENT
Start: 2025-02-05 | End: 2025-02-05

## 2025-02-05 RX ORDER — SILVER SULFADIAZINE 10 MG/G
CREAM TOPICAL ONCE
OUTPATIENT
Start: 2025-02-05 | End: 2025-02-05

## 2025-02-05 RX ORDER — GINSENG 100 MG
CAPSULE ORAL ONCE
OUTPATIENT
Start: 2025-02-05 | End: 2025-02-05

## 2025-02-05 RX ORDER — NEOMYCIN/BACITRACIN/POLYMYXINB 3.5-400-5K
OINTMENT (GRAM) TOPICAL ONCE
OUTPATIENT
Start: 2025-02-05 | End: 2025-02-05

## 2025-02-05 RX ORDER — TRIAMCINOLONE ACETONIDE 1 MG/G
OINTMENT TOPICAL ONCE
OUTPATIENT
Start: 2025-02-05 | End: 2025-02-05

## 2025-02-05 RX ORDER — LIDOCAINE 40 MG/G
CREAM TOPICAL ONCE
OUTPATIENT
Start: 2025-02-05 | End: 2025-02-05

## 2025-02-05 RX ADMIN — LIDOCAINE HYDROCHLORIDE 10 ML: 40 SOLUTION TOPICAL at 07:38

## 2025-02-05 ASSESSMENT — PAIN DESCRIPTION - LOCATION: LOCATION: LEG

## 2025-02-05 ASSESSMENT — PAIN DESCRIPTION - PAIN TYPE: TYPE: CHRONIC PAIN

## 2025-02-05 ASSESSMENT — PAIN DESCRIPTION - FREQUENCY: FREQUENCY: INTERMITTENT

## 2025-02-05 ASSESSMENT — PAIN DESCRIPTION - DESCRIPTORS: DESCRIPTORS: ACHING;SHARP

## 2025-02-05 ASSESSMENT — PAIN SCALES - GENERAL: PAINLEVEL_OUTOF10: 7

## 2025-02-05 ASSESSMENT — PAIN DESCRIPTION - ONSET: ONSET: ON-GOING

## 2025-02-05 ASSESSMENT — PAIN - FUNCTIONAL ASSESSMENT: PAIN_FUNCTIONAL_ASSESSMENT: PREVENTS OR INTERFERES SOME ACTIVE ACTIVITIES AND ADLS

## 2025-02-05 ASSESSMENT — PAIN DESCRIPTION - ORIENTATION: ORIENTATION: LEFT

## 2025-02-05 NOTE — PROGRESS NOTES
Wound Healing Center Followup Visit Note    Referring Physician : Rosenda Cormier MD  Amanda Ledesma  MEDICAL RECORD NUMBER:  [id number]  AGE: 69 y.o.   GENDER: female  : 1955  EPISODE DATE:  2025    Subjective:     Chief Complaint   Patient presents with    Wound Check     leg      HISTORY of PRESENT ILLNESS HPI   Amanda Ledesma is a 69 y.o. female who presents today in regards to follow up evaluation and treatment of wound/ulcer.  That patient's past medical, family and social hx were reviewed and changes were made if present.    History of Wound Context:  The patient has had a wound of her left ankle/calf which was first noted approximately 2021.  This has been treated local wound care. On their initial visit to the wound healing center, 22,  the patient has noted that the wound has been improving.  The patient has not had similar previous wounds in the past.      She started seeing Dr. Street in 2021 and than Dr. Gillis.  She was started in unna boot ~ 2021.  She has noticed some improvement since starting unna boot.  She is currently following with Dr. Haskins.      Pt is not on abx at time of initial visit, but has been treated with previously by podiatry.      She is not a DM.  She is not a smoker.  She denies hx of DVT, and per her report had recent us noting no evidence of dvt at [facility].  She also had arterial studies done.    I had previously seen her in the past in regards to left buttock thigh wound, which started as abscess.      Pt works at [employer] in the [employer] and is on her feet all day.    22  Reflux study - if significant findings will schedule for fu  Continue compression therapy [facility] per podiatry with aquacell dressing  Elevation  She does not have significant arterial occlusive disease  22  Patient has asked to continuing following with me going forward because of our previous relationship  She is going to let Dr. Schuler office know  She

## 2025-02-06 RX ORDER — SODIUM CHLORIDE, SODIUM LACTATE, POTASSIUM CHLORIDE, CALCIUM CHLORIDE 600; 310; 30; 20 MG/100ML; MG/100ML; MG/100ML; MG/100ML
INJECTION, SOLUTION INTRAVENOUS CONTINUOUS
Status: CANCELLED | OUTPATIENT
Start: 2025-02-06

## 2025-02-06 RX ORDER — SODIUM CHLORIDE 0.9 % (FLUSH) 0.9 %
5-40 SYRINGE (ML) INJECTION EVERY 12 HOURS SCHEDULED
Status: CANCELLED | OUTPATIENT
Start: 2025-02-06

## 2025-02-06 RX ORDER — SODIUM CHLORIDE 9 MG/ML
INJECTION, SOLUTION INTRAVENOUS PRN
Status: CANCELLED | OUTPATIENT
Start: 2025-02-06

## 2025-02-06 RX ORDER — SODIUM CHLORIDE 0.9 % (FLUSH) 0.9 %
5-40 SYRINGE (ML) INJECTION PRN
Status: CANCELLED | OUTPATIENT
Start: 2025-02-06

## 2025-02-07 ENCOUNTER — OFFICE VISIT (OUTPATIENT)
Dept: FAMILY MEDICINE CLINIC | Age: 70
End: 2025-02-07

## 2025-02-07 VITALS
OXYGEN SATURATION: 97 % | DIASTOLIC BLOOD PRESSURE: 80 MMHG | SYSTOLIC BLOOD PRESSURE: 126 MMHG | HEART RATE: 91 BPM | WEIGHT: 174 LBS | BODY MASS INDEX: 26.46 KG/M2 | TEMPERATURE: 98.7 F | RESPIRATION RATE: 18 BRPM

## 2025-02-07 DIAGNOSIS — J10.1 INFLUENZA A: Primary | ICD-10-CM

## 2025-02-07 DIAGNOSIS — R05.9 COUGH, UNSPECIFIED TYPE: ICD-10-CM

## 2025-02-07 LAB
INFLUENZA A ANTIBODY: POSITIVE
INFLUENZA B ANTIBODY: ABNORMAL
Lab: NORMAL
PERFORMING INSTRUMENT: NORMAL
QC PASS/FAIL: NORMAL
SARS-COV-2, POC: NORMAL

## 2025-02-07 RX ORDER — CEFDINIR 300 MG/1
300 CAPSULE ORAL 2 TIMES DAILY
Qty: 14 CAPSULE | Refills: 0 | Status: SHIPPED | OUTPATIENT
Start: 2025-02-07 | End: 2025-02-14

## 2025-02-07 RX ORDER — BENZONATATE 100 MG/1
100 CAPSULE ORAL 3 TIMES DAILY PRN
Qty: 90 CAPSULE | Refills: 1 | Status: SHIPPED | OUTPATIENT
Start: 2025-02-07

## 2025-02-07 RX ORDER — OSELTAMIVIR PHOSPHATE 75 MG/1
75 CAPSULE ORAL 2 TIMES DAILY
Qty: 10 CAPSULE | Refills: 0 | Status: SHIPPED | OUTPATIENT
Start: 2025-02-07 | End: 2025-02-12

## 2025-02-07 ASSESSMENT — ENCOUNTER SYMPTOMS
SORE THROAT: 1
EYES NEGATIVE: 1
GASTROINTESTINAL NEGATIVE: 1
COUGH: 1
SINUS PRESSURE: 1
TROUBLE SWALLOWING: 0
SINUS PAIN: 1

## 2025-02-07 NOTE — DISCHARGE INSTRUCTIONS
Visit Discharge/Physician Orders     Discharge condition: Stable     Assessment of pain at discharge: yes     Anesthetic used: 4% lidocaine solution     Discharge to: Home     Left via:Private automobile     Accompanied by:self     ECF/HHA: [facility] [phone]     Dressing Orders: LEFT MEDIAL LEG: Cleanse with normal saline, apply zinc to fiona wound, apply Calcium Alginate Ag and then apply Drawtex over the Calcium Alginate Ag, ABD pad, and Coban 2. Change weekly.    Treatment Orders: Eat a diet high in protein and vitamin C. Take a multiple vitamin daily unless contraindicated.      Must wear slip on shoe to work due to wrap on leg!         [facility] followup visit : 1 week Dr. HERNANDEZ   __________________________________  (Please note your next appointment above and if you are unable to keep, kindly give a 24 hour notice. Thank you.)     Physician signature:__________________________     If you experience any of the following, please call the Wound Care Center during business hours:     * Increase in Pain  * Temperature over 101  * Increase in drainage from your wound  * Drainage with a foul odor  * Bleeding  * Increase in swelling  * Need for compression bandage changes due to slippage, breakthrough drainage.     If you need medical attention outside of the business hours of the Wound Care Centers please contact your PCP or go to the nearest emergency room

## 2025-02-07 NOTE — PROGRESS NOTES
Amanda Ledesma (:  1955) is a 69 y.o. female,Established patient, here for evaluation of the following chief complaint(s):  Cough (Started last week - has gotten worse over the last couple days), Congestion, and Headache         Assessment & Plan  Cough, unspecified type       Orders:    POCT COVID-19, Antigen    POCT Influenza A/B    oseltamivir (TAMIFLU) 75 MG capsule; Take 1 capsule by mouth 2 times daily for 5 days    cefdinir (OMNICEF) 300 MG capsule; Take 1 capsule by mouth 2 times daily for 7 days    benzonatate (TESSALON) 100 MG capsule; Take 1 capsule by mouth 3 times daily as needed for Cough    Influenza A  Positive for influenza.  Will cover for community-acquired pneumonia as well.  Follow-up with PCP in 1 to 2 weeks to ensure resolution    I discussed that these occur return to clinic/emergency department.  Patient voiced understanding    Orders:    oseltamivir (TAMIFLU) 75 MG capsule; Take 1 capsule by mouth 2 times daily for 5 days    cefdinir (OMNICEF) 300 MG capsule; Take 1 capsule by mouth 2 times daily for 7 days    benzonatate (TESSALON) 100 MG capsule; Take 1 capsule by mouth 3 times daily as needed for Cough      No follow-ups on file.       Subjective   HPI  Patient presents today for evaluation of several day history of worsening headache, congestion, and mildly productive cough.  No known sick contact or recent travel.  No chest pain or shortness of breath.  No nausea vomiting diarrhea.  No recent travel.  Review of Systems   Constitutional:  Negative for fever.   HENT:  Positive for congestion, postnasal drip, sinus pressure, sinus pain and sore throat. Negative for trouble swallowing.    Eyes: Negative.    Respiratory:  Positive for cough.    Cardiovascular: Negative.    Gastrointestinal: Negative.    Musculoskeletal:  Negative for neck pain.   Skin:  Negative for rash.   Neurological:  Negative for headaches.   Hematological:  Negative for adenopathy.   All other systems

## 2025-02-08 LAB
MICROORGANISM SPEC CULT: ABNORMAL
MICROORGANISM SPEC CULT: ABNORMAL
MICROORGANISM/AGENT SPEC: ABNORMAL
SERVICE CMNT-IMP: ABNORMAL
SPECIMEN DESCRIPTION: ABNORMAL

## 2025-02-12 ENCOUNTER — HOSPITAL ENCOUNTER (OUTPATIENT)
Dept: WOUND CARE | Age: 70
Discharge: HOME | End: 2025-02-12
Attending: SURGERY
Payer: MEDICARE

## 2025-02-12 VITALS
WEIGHT: 174 LBS | HEIGHT: 68 IN | RESPIRATION RATE: 18 BRPM | BODY MASS INDEX: 26.37 KG/M2 | SYSTOLIC BLOOD PRESSURE: 109 MMHG | HEART RATE: 78 BPM | DIASTOLIC BLOOD PRESSURE: 48 MMHG | TEMPERATURE: 99.1 F

## 2025-02-12 DIAGNOSIS — I83.023 VARICOSE VEINS OF LEFT LOWER EXTREMITY WITH ULCER OF ANKLE WITH FAT LAYER EXPOSED (HCC): Primary | ICD-10-CM

## 2025-02-12 DIAGNOSIS — L97.322 VARICOSE VEINS OF LEFT LOWER EXTREMITY WITH ULCER OF ANKLE WITH FAT LAYER EXPOSED (HCC): Primary | ICD-10-CM

## 2025-02-12 DIAGNOSIS — I70.243 ATHEROSCLEROSIS OF NATIVE ARTERY OF LEFT LOWER EXTREMITY WITH ULCERATION OF ANKLE (HCC): Chronic | ICD-10-CM

## 2025-02-12 DIAGNOSIS — I70.242 ATHEROSCLEROSIS OF NATIVE ARTERY OF LEFT LOWER EXTREMITY WITH ULCERATION OF CALF (HCC): ICD-10-CM

## 2025-02-12 PROCEDURE — 11042 DBRDMT SUBQ TIS 1ST 20SQCM/<: CPT | Performed by: SURGERY

## 2025-02-12 PROCEDURE — 11042 DBRDMT SUBQ TIS 1ST 20SQCM/<: CPT

## 2025-02-12 RX ORDER — CLOBETASOL PROPIONATE 0.5 MG/G
OINTMENT TOPICAL ONCE
OUTPATIENT
Start: 2025-02-12 | End: 2025-02-12

## 2025-02-12 RX ORDER — GINSENG 100 MG
CAPSULE ORAL ONCE
OUTPATIENT
Start: 2025-02-12 | End: 2025-02-12

## 2025-02-12 RX ORDER — LIDOCAINE HYDROCHLORIDE 40 MG/ML
SOLUTION TOPICAL ONCE
OUTPATIENT
Start: 2025-02-12 | End: 2025-02-12

## 2025-02-12 RX ORDER — LIDOCAINE HYDROCHLORIDE 40 MG/ML
SOLUTION TOPICAL ONCE
Status: COMPLETED | OUTPATIENT
Start: 2025-02-12 | End: 2025-02-12

## 2025-02-12 RX ORDER — LIDOCAINE HYDROCHLORIDE 20 MG/ML
JELLY TOPICAL ONCE
OUTPATIENT
Start: 2025-02-12 | End: 2025-02-12

## 2025-02-12 RX ORDER — BETAMETHASONE DIPROPIONATE 0.05 %
OINTMENT (GRAM) TOPICAL ONCE
OUTPATIENT
Start: 2025-02-12 | End: 2025-02-12

## 2025-02-12 RX ORDER — MUPIROCIN 20 MG/G
OINTMENT TOPICAL ONCE
OUTPATIENT
Start: 2025-02-12 | End: 2025-02-12

## 2025-02-12 RX ORDER — LIDOCAINE 40 MG/G
CREAM TOPICAL ONCE
OUTPATIENT
Start: 2025-02-12 | End: 2025-02-12

## 2025-02-12 RX ORDER — TRIAMCINOLONE ACETONIDE 1 MG/G
OINTMENT TOPICAL ONCE
OUTPATIENT
Start: 2025-02-12 | End: 2025-02-12

## 2025-02-12 RX ORDER — SILVER SULFADIAZINE 10 MG/G
CREAM TOPICAL ONCE
OUTPATIENT
Start: 2025-02-12 | End: 2025-02-12

## 2025-02-12 RX ORDER — OXYCODONE AND ACETAMINOPHEN 5; 325 MG/1; MG/1
1 TABLET ORAL EVERY 6 HOURS PRN
Qty: 28 TABLET | Refills: 0 | Status: SHIPPED | OUTPATIENT
Start: 2025-02-12 | End: 2025-02-19

## 2025-02-12 RX ORDER — NEOMYCIN/BACITRACIN/POLYMYXINB 3.5-400-5K
OINTMENT (GRAM) TOPICAL ONCE
OUTPATIENT
Start: 2025-02-12 | End: 2025-02-12

## 2025-02-12 RX ORDER — LIDOCAINE 50 MG/G
OINTMENT TOPICAL ONCE
OUTPATIENT
Start: 2025-02-12 | End: 2025-02-12

## 2025-02-12 RX ORDER — BACITRACIN ZINC AND POLYMYXIN B SULFATE 500; 1000 [USP'U]/G; [USP'U]/G
OINTMENT TOPICAL ONCE
OUTPATIENT
Start: 2025-02-12 | End: 2025-02-12

## 2025-02-12 RX ORDER — GENTAMICIN SULFATE 1 MG/G
OINTMENT TOPICAL ONCE
OUTPATIENT
Start: 2025-02-12 | End: 2025-02-12

## 2025-02-12 RX ADMIN — LIDOCAINE HYDROCHLORIDE 5 ML: 40 SOLUTION TOPICAL at 08:00

## 2025-02-12 ASSESSMENT — PAIN DESCRIPTION - DESCRIPTORS: DESCRIPTORS: ACHING

## 2025-02-12 ASSESSMENT — PAIN DESCRIPTION - LOCATION: LOCATION: LEG

## 2025-02-12 ASSESSMENT — PAIN DESCRIPTION - FREQUENCY: FREQUENCY: CONTINUOUS

## 2025-02-12 ASSESSMENT — PAIN - FUNCTIONAL ASSESSMENT: PAIN_FUNCTIONAL_ASSESSMENT: ACTIVITIES ARE NOT PREVENTED

## 2025-02-12 ASSESSMENT — PAIN DESCRIPTION - PAIN TYPE: TYPE: CHRONIC PAIN

## 2025-02-12 ASSESSMENT — PAIN DESCRIPTION - ONSET: ONSET: ON-GOING

## 2025-02-12 ASSESSMENT — PAIN DESCRIPTION - ORIENTATION: ORIENTATION: LEFT

## 2025-02-12 ASSESSMENT — PAIN SCALES - GENERAL: PAINLEVEL_OUTOF10: 7

## 2025-02-12 NOTE — PLAN OF CARE
Problem: Wound:  Goal: Will show signs of wound healing; wound closure and no evidence of infection  Description: Will show signs of wound healing; wound closure and no evidence of infection  Outcome: Progressing     Problem: Cognitive:  Goal: Knowledge of wound care  Description: Knowledge of wound care  Outcome: Progressing  Goal: Understands risk factors for wounds  Description: Understands risk factors for wounds  Outcome: Progressing     Problem: Venous:  Goal: Signs of wound healing will improve  Description: Signs of wound healing will improve  Outcome: Progressing

## 2025-02-12 NOTE — PROGRESS NOTES
Wound Healing Center Followup Visit Note    Referring Physician : Rosenda Cormier MD  Amanda Ledesma  MEDICAL RECORD NUMBER:  [id number]  AGE: 69 y.o.   GENDER: female  : 1955  EPISODE DATE:  2025    Subjective:     Chief Complaint   Patient presents with    Wound Check     Left Leg      HISTORY of PRESENT ILLNESS HPI   Amanda Ledesma is a 69 y.o. female who presents today in regards to follow up evaluation and treatment of wound/ulcer.  That patient's past medical, family and social hx were reviewed and changes were made if present.    History of Wound Context:  The patient has had a wound of her left ankle/calf which was first noted approximately 2021.  This has been treated local wound care. On their initial visit to the wound healing center, 22,  the patient has noted that the wound has been improving.  The patient has not had similar previous wounds in the past.      She started seeing Dr. Street in 2021 and than Dr. Gillis.  She was started in unna boot ~ 2021.  She has noticed some improvement since starting unna boot.  She is currently following with Dr. Haskins.      Pt is not on abx at time of initial visit, but has been treated with previously by podiatry.      She is not a DM.  She is not a smoker.  She denies hx of DVT, and per her report had recent us noting no evidence of dvt at [facility].  She also had arterial studies done.    I had previously seen her in the past in regards to left buttock thigh wound, which started as abscess.      Pt works at [employer] in the [employer] and is on her feet all day.    22  Reflux study - if significant findings will schedule for fu  Continue compression therapy [facility] per podiatry with aquacell dressing  Elevation  She does not have significant arterial occlusive disease  22  Patient has asked to continuing following with me going forward because of our previous relationship  She is going to let Dr. Schuler office know  She

## 2025-02-13 NOTE — DISCHARGE INSTRUCTIONS
Visit Discharge/Physician Orders     Discharge condition: Stable     Assessment of pain at discharge: yes     Anesthetic used: 4% lidocaine solution     Discharge to: Home     Left via:Private automobile     Accompanied by:self     ECF/HHA: [facility] [phone]     Dressing Orders: LEFT MEDIAL LEG: Cleanse with normal saline, apply zinc to fiona wound, apply Calcium Alginate Ag and then apply Drawtex over the Calcium Alginate Ag, ABD pad, and Coban 2. Change weekly.     Treatment Orders: Eat a diet high in protein and vitamin C. Take a multiple vitamin daily unless contraindicated.      Must wear slip on shoe to work due to wrap on leg!         [facility] followup visit : 1 week Dr. HERNANDEZ   __________________________________  (Please note your next appointment above and if you are unable to keep, kindly give a 24 hour notice. Thank you.)     Physician signature:__________________________     If you experience any of the following, please call the Wound Care Center during business hours:     * Increase in Pain  * Temperature over 101  * Increase in drainage from your wound  * Drainage with a foul odor  * Bleeding  * Increase in swelling  * Need for compression bandage changes due to slippage, breakthrough drainage.     If you need medical attention outside of the business hours of the Wound Care Centers please contact your PCP or go to the nearest emergency room

## 2025-02-19 ENCOUNTER — HOSPITAL ENCOUNTER (OUTPATIENT)
Dept: WOUND CARE | Age: 70
Discharge: HOME | End: 2025-02-19
Attending: SURGERY
Payer: MEDICARE

## 2025-02-19 VITALS
BODY MASS INDEX: 26.37 KG/M2 | DIASTOLIC BLOOD PRESSURE: 66 MMHG | HEART RATE: 78 BPM | TEMPERATURE: 97.8 F | WEIGHT: 174 LBS | HEIGHT: 68 IN | SYSTOLIC BLOOD PRESSURE: 118 MMHG | RESPIRATION RATE: 18 BRPM

## 2025-02-19 DIAGNOSIS — L97.322 VARICOSE VEINS OF LEFT LOWER EXTREMITY WITH ULCER OF ANKLE WITH FAT LAYER EXPOSED (HCC): Primary | ICD-10-CM

## 2025-02-19 DIAGNOSIS — I70.242 ATHEROSCLEROSIS OF NATIVE ARTERY OF LEFT LOWER EXTREMITY WITH ULCERATION OF CALF (HCC): ICD-10-CM

## 2025-02-19 DIAGNOSIS — I70.243 ATHEROSCLEROSIS OF NATIVE ARTERY OF LEFT LOWER EXTREMITY WITH ULCERATION OF ANKLE (HCC): Chronic | ICD-10-CM

## 2025-02-19 DIAGNOSIS — I83.023 VARICOSE VEINS OF LEFT LOWER EXTREMITY WITH ULCER OF ANKLE WITH FAT LAYER EXPOSED (HCC): Primary | ICD-10-CM

## 2025-02-19 PROCEDURE — 11042 DBRDMT SUBQ TIS 1ST 20SQCM/<: CPT

## 2025-02-19 PROCEDURE — 11042 DBRDMT SUBQ TIS 1ST 20SQCM/<: CPT | Performed by: SURGERY

## 2025-02-19 RX ORDER — LIDOCAINE HYDROCHLORIDE 20 MG/ML
JELLY TOPICAL ONCE
OUTPATIENT
Start: 2025-02-19 | End: 2025-02-19

## 2025-02-19 RX ORDER — LIDOCAINE HYDROCHLORIDE 40 MG/ML
SOLUTION TOPICAL ONCE
OUTPATIENT
Start: 2025-02-19 | End: 2025-02-19

## 2025-02-19 RX ORDER — LIDOCAINE 40 MG/G
CREAM TOPICAL ONCE
OUTPATIENT
Start: 2025-02-19 | End: 2025-02-19

## 2025-02-19 RX ORDER — LIDOCAINE HYDROCHLORIDE 40 MG/ML
SOLUTION TOPICAL ONCE
Status: COMPLETED | OUTPATIENT
Start: 2025-02-19 | End: 2025-02-19

## 2025-02-19 RX ORDER — MUPIROCIN 20 MG/G
OINTMENT TOPICAL ONCE
OUTPATIENT
Start: 2025-02-19 | End: 2025-02-19

## 2025-02-19 RX ORDER — GINSENG 100 MG
CAPSULE ORAL ONCE
OUTPATIENT
Start: 2025-02-19 | End: 2025-02-19

## 2025-02-19 RX ORDER — BETAMETHASONE DIPROPIONATE 0.05 %
OINTMENT (GRAM) TOPICAL ONCE
OUTPATIENT
Start: 2025-02-19 | End: 2025-02-19

## 2025-02-19 RX ORDER — TRIAMCINOLONE ACETONIDE 1 MG/G
OINTMENT TOPICAL ONCE
OUTPATIENT
Start: 2025-02-19 | End: 2025-02-19

## 2025-02-19 RX ORDER — NEOMYCIN/BACITRACIN/POLYMYXINB 3.5-400-5K
OINTMENT (GRAM) TOPICAL ONCE
OUTPATIENT
Start: 2025-02-19 | End: 2025-02-19

## 2025-02-19 RX ORDER — LIDOCAINE 50 MG/G
OINTMENT TOPICAL ONCE
OUTPATIENT
Start: 2025-02-19 | End: 2025-02-19

## 2025-02-19 RX ORDER — CLOBETASOL PROPIONATE 0.5 MG/G
OINTMENT TOPICAL ONCE
OUTPATIENT
Start: 2025-02-19 | End: 2025-02-19

## 2025-02-19 RX ORDER — BACITRACIN ZINC AND POLYMYXIN B SULFATE 500; 1000 [USP'U]/G; [USP'U]/G
OINTMENT TOPICAL ONCE
OUTPATIENT
Start: 2025-02-19 | End: 2025-02-19

## 2025-02-19 RX ORDER — GENTAMICIN SULFATE 1 MG/G
OINTMENT TOPICAL ONCE
OUTPATIENT
Start: 2025-02-19 | End: 2025-02-19

## 2025-02-19 RX ORDER — SILVER SULFADIAZINE 10 MG/G
CREAM TOPICAL ONCE
OUTPATIENT
Start: 2025-02-19 | End: 2025-02-19

## 2025-02-19 RX ADMIN — LIDOCAINE HYDROCHLORIDE 25 ML: 40 SOLUTION TOPICAL at 07:55

## 2025-02-19 ASSESSMENT — PAIN DESCRIPTION - FREQUENCY: FREQUENCY: CONTINUOUS

## 2025-02-19 ASSESSMENT — PAIN - FUNCTIONAL ASSESSMENT: PAIN_FUNCTIONAL_ASSESSMENT: ACTIVITIES ARE NOT PREVENTED

## 2025-02-19 ASSESSMENT — PAIN DESCRIPTION - DESCRIPTORS: DESCRIPTORS: ACHING;STABBING

## 2025-02-19 ASSESSMENT — PAIN DESCRIPTION - ORIENTATION: ORIENTATION: LEFT

## 2025-02-19 ASSESSMENT — PAIN SCALES - GENERAL: PAINLEVEL_OUTOF10: 7

## 2025-02-19 ASSESSMENT — PAIN DESCRIPTION - LOCATION: LOCATION: LEG

## 2025-02-19 ASSESSMENT — PAIN DESCRIPTION - PAIN TYPE: TYPE: CHRONIC PAIN

## 2025-02-19 ASSESSMENT — PAIN DESCRIPTION - PROGRESSION: CLINICAL_PROGRESSION: NOT CHANGED

## 2025-02-19 ASSESSMENT — PAIN DESCRIPTION - ONSET: ONSET: ON-GOING

## 2025-02-20 RX ORDER — OXYCODONE AND ACETAMINOPHEN 5; 325 MG/1; MG/1
1 TABLET ORAL EVERY 6 HOURS PRN
Qty: 28 TABLET | Refills: 0 | Status: SHIPPED | OUTPATIENT
Start: 2025-02-20 | End: 2025-02-27

## 2025-02-20 NOTE — PROGRESS NOTES
is to stay off her feet and elevate  1/8/25  38 slightly larger, appearance stable  Off work now  Script for percocet 5/325 mg #28 1/10OARRS   Discussed with pt re use of tylenol  Having significant issues with reflux today, emesis - per her report has hx of ulcers, gastritis  In review of chart has hx of small hiatal hernia and antral gastritis from egd in 2013 by Dr Young  Had lap choley in 2014 by Dr Young  She has seen GI in the past but would like to have 2nd opinion  Will refer to Dr Peña for evaluation re reflux, hx of duodenal ulcers, emesis  1/15/25  36  Appearance improved  1/22/25  40  Appearance much improved  Script for percocet 5/325 mg #28 1/10OARRS  1/29/25  Smaller at 33  Large drainage - change to daily dressings   Obtain circaid juxtafit essentials  2/5/25  Stable at 33  Wants to go back to wrap  Culture done  2/12/25  Stable at 34  Cx - pseudomonas - light growth - will disc with D rLlimbu  Overall pain seems worse - appearance same  Script for percocet 5/325 mg #28 OARRS  2/19/25  Smaller at 31  Pain is still an issue  Will have pt see Dr Duncan re iv abx  Percocet 5/325 mg #28 oarrs rvwd    Wound/Ulcer Pain Timing/Severity: constant, moderate  Quality of pain: aching, throbbing, tender  Severity:  \7/ 10   Modifying Factors: Pain worsens with dressing changes, debridement  Associated Signs/Symptoms: edema, drainage and pain    Ulcer Identification:  Ulcer Type: venous  Contributing Factors: edema and venous stasis    Diabetic/Pressure/Non Pressure Ulcers only:  Ulcer: Non-Pressure ulcer, fat layer exposed        PAST MEDICAL HISTORY      Diagnosis Date    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 3/6/2024

## 2025-02-20 NOTE — DISCHARGE INSTRUCTIONS
Visit Discharge/Physician Orders     Discharge condition: Stable     Assessment of pain at discharge: yes     Anesthetic used: 4% lidocaine solution     Discharge to: Home     Left via:Private automobile     Accompanied by:self     ECF/HHA: [facility] [phone]     Dressing Orders: LEFT MEDIAL LEG: Cleanse with normal saline, apply zinc to fiona wound, apply Calcium Alginate Ag and then apply Drawtex over the Calcium Alginate Ag, ABD pad, and Coban 2. Change weekly.     Wrap from base of toes to base of knees- change weekly at wound care center    Keep wrap dry at all times. If wrap becomes wet or falls or becomes painful- may remove wrap and do daily dressing changes and apply spandigrip on in morning and off at night.      Treatment Orders: Eat a diet high in protein and vitamin C. Take a multiple vitamin daily unless contraindicated.    Drink Protein Shakes ([employer])     [facility] followup visit : 1 week Dr. HERNANDEZ   __________________________________  (Please note your next appointment above and if you are unable to keep, kindly give a 24 hour notice. Thank you.)     Physician signature:__________________________     If you experience any of the following, please call the Wound Care Center during business hours:     * Increase in Pain  * Temperature over 101  * Increase in drainage from your wound  * Drainage with a foul odor  * Bleeding  * Increase in swelling  * Need for compression bandage changes due to slippage, breakthrough drainage.     If you need medical attention outside of the business hours of the Wound Care Centers please contact your PCP or go to the nearest emergency room

## 2025-02-26 ENCOUNTER — HOSPITAL ENCOUNTER (OUTPATIENT)
Dept: WOUND CARE | Age: 70
Discharge: HOME | End: 2025-02-26
Attending: SURGERY
Payer: MEDICARE

## 2025-02-26 VITALS
TEMPERATURE: 97.1 F | DIASTOLIC BLOOD PRESSURE: 64 MMHG | BODY MASS INDEX: 26.37 KG/M2 | HEART RATE: 81 BPM | SYSTOLIC BLOOD PRESSURE: 121 MMHG | RESPIRATION RATE: 18 BRPM | WEIGHT: 174 LBS | HEIGHT: 68 IN

## 2025-02-26 DIAGNOSIS — I83.023 VARICOSE VEINS OF LEFT LOWER EXTREMITY WITH ULCER OF ANKLE WITH FAT LAYER EXPOSED (HCC): Primary | ICD-10-CM

## 2025-02-26 DIAGNOSIS — L97.322 VARICOSE VEINS OF LEFT LOWER EXTREMITY WITH ULCER OF ANKLE WITH FAT LAYER EXPOSED (HCC): Primary | ICD-10-CM

## 2025-02-26 PROCEDURE — 11042 DBRDMT SUBQ TIS 1ST 20SQCM/<: CPT | Performed by: SURGERY

## 2025-02-26 PROCEDURE — 11042 DBRDMT SUBQ TIS 1ST 20SQCM/<: CPT

## 2025-02-26 ASSESSMENT — PAIN DESCRIPTION - ORIENTATION: ORIENTATION: LEFT

## 2025-02-26 ASSESSMENT — PAIN SCALES - GENERAL: PAINLEVEL_OUTOF10: 7

## 2025-02-26 ASSESSMENT — PAIN DESCRIPTION - LOCATION: LOCATION: LEG

## 2025-02-26 ASSESSMENT — PAIN DESCRIPTION - DESCRIPTORS: DESCRIPTORS: ACHING

## 2025-02-26 NOTE — PROGRESS NOTES
Wound Healing Center Followup Visit Note    Referring Physician : Rosenda Cormier MD  Amanda Ledesma  MEDICAL RECORD NUMBER:  [id number]  AGE: 69 y.o.   GENDER: female  : 1955  EPISODE DATE:  2025    Subjective:     Chief Complaint   Patient presents with    Wound Check     Left leg       HISTORY of PRESENT ILLNESS HPI   Amanda Ledesma is a 69 y.o. female who presents today in regards to follow up evaluation and treatment of wound/ulcer.  That patient's past medical, family and social hx were reviewed and changes were made if present.    History of Wound Context:  The patient has had a wound of her left ankle/calf which was first noted approximately 2021.  This has been treated local wound care. On their initial visit to the wound healing center, 22,  the patient has noted that the wound has been improving.  The patient has not had similar previous wounds in the past.      She started seeing Dr. Street in 2021 and than Dr. Gillis.  She was started in unna boot ~ 2021.  She has noticed some improvement since starting unna boot.  She is currently following with Dr. Haskins.      Pt is not on abx at time of initial visit, but has been treated with previously by podiatry.      She is not a DM.  She is not a smoker.  She denies hx of DVT, and per her report had recent us noting no evidence of dvt at [facility].  She also had arterial studies done.    I had previously seen her in the past in regards to left buttock thigh wound, which started as abscess.      Pt works at [employer] in the [employer] and is on her feet all day.    22  Reflux study - if significant findings will schedule for fu  Continue compression therapy [facility] per podiatry with aquacell dressing  Elevation  She does not have significant arterial occlusive disease  22  Patient has asked to continuing following with me going forward because of our previous relationship  She is going to let Dr. Schuler office know  She

## 2025-02-27 NOTE — DISCHARGE INSTRUCTIONS
Visit Discharge/Physician Orders     Discharge condition: Stable     Assessment of pain at discharge: yes     Anesthetic used: 4% lidocaine solution     Discharge to: Home     Left via:Private automobile     Accompanied by:self     ECF/HHA: [facility] [phone]     Dressing Orders: LEFT MEDIAL LEG: Cleanse with normal saline, apply zinc to fiona wound, apply Calcium Alginate Ag and then apply Drawtex over the Calcium Alginate Ag, ABD pad, and Coban 2. Change weekly.     Wrap from base of toes to base of knees- change weekly at wound care center     Keep wrap dry at all times. If wrap becomes wet or falls or becomes painful- may remove wrap and do daily dressing changes and apply spandigrip on in morning and off at night.       Treatment Orders: Eat a diet high in protein and vitamin C. Take a multiple vitamin daily unless contraindicated.     Drink Protein Shakes ([employer])     Follow-up with Dr. Duncan     [facility] followup visit : 1 week Dr. HERNANDEZ   __________________________________  (Please note your next appointment above and if you are unable to keep, kindly give a 24 hour notice. Thank you.)     Physician signature:__________________________     If you experience any of the following, please call the Wound Care Center during business hours:     * Increase in Pain  * Temperature over 101  * Increase in drainage from your wound  * Drainage with a foul odor  * Bleeding  * Increase in swelling  * Need for compression bandage changes due to slippage, breakthrough drainage.     If you need medical attention outside of the business hours of the Wound Care Centers please contact your PCP or go to the nearest emergency room

## 2025-03-05 ENCOUNTER — HOSPITAL ENCOUNTER (OUTPATIENT)
Dept: WOUND CARE | Age: 70
Discharge: HOME | End: 2025-03-05
Attending: SURGERY
Payer: MEDICARE

## 2025-03-05 VITALS
HEART RATE: 80 BPM | TEMPERATURE: 96.2 F | RESPIRATION RATE: 18 BRPM | WEIGHT: 174 LBS | SYSTOLIC BLOOD PRESSURE: 110 MMHG | DIASTOLIC BLOOD PRESSURE: 58 MMHG | HEIGHT: 68 IN | BODY MASS INDEX: 26.37 KG/M2

## 2025-03-05 DIAGNOSIS — I70.243 ATHEROSCLEROSIS OF NATIVE ARTERY OF LEFT LOWER EXTREMITY WITH ULCERATION OF ANKLE (HCC): Chronic | ICD-10-CM

## 2025-03-05 DIAGNOSIS — L97.322 VARICOSE VEINS OF LEFT LOWER EXTREMITY WITH ULCER OF ANKLE WITH FAT LAYER EXPOSED (HCC): Primary | ICD-10-CM

## 2025-03-05 DIAGNOSIS — I83.023 VARICOSE VEINS OF LEFT LOWER EXTREMITY WITH ULCER OF ANKLE WITH FAT LAYER EXPOSED (HCC): Primary | ICD-10-CM

## 2025-03-05 PROCEDURE — 11042 DBRDMT SUBQ TIS 1ST 20SQCM/<: CPT

## 2025-03-05 PROCEDURE — 11042 DBRDMT SUBQ TIS 1ST 20SQCM/<: CPT | Performed by: SURGERY

## 2025-03-05 RX ORDER — OXYCODONE AND ACETAMINOPHEN 5; 325 MG/1; MG/1
1 TABLET ORAL EVERY 6 HOURS PRN
Qty: 28 TABLET | Refills: 0 | Status: SHIPPED | OUTPATIENT
Start: 2025-03-05 | End: 2025-03-19

## 2025-03-05 ASSESSMENT — PAIN - FUNCTIONAL ASSESSMENT: PAIN_FUNCTIONAL_ASSESSMENT: ACTIVITIES ARE NOT PREVENTED

## 2025-03-05 ASSESSMENT — PAIN DESCRIPTION - ORIENTATION: ORIENTATION: LEFT

## 2025-03-05 ASSESSMENT — PAIN DESCRIPTION - FREQUENCY: FREQUENCY: INTERMITTENT

## 2025-03-05 ASSESSMENT — PAIN DESCRIPTION - LOCATION: LOCATION: LEG

## 2025-03-05 ASSESSMENT — PAIN SCALES - GENERAL: PAINLEVEL_OUTOF10: 7

## 2025-03-05 ASSESSMENT — PAIN DESCRIPTION - ONSET: ONSET: ON-GOING

## 2025-03-05 ASSESSMENT — PAIN DESCRIPTION - PAIN TYPE: TYPE: CHRONIC PAIN

## 2025-03-05 ASSESSMENT — PAIN DESCRIPTION - DESCRIPTORS: DESCRIPTORS: ACHING

## 2025-03-05 NOTE — PLAN OF CARE
Problem: Cognitive:  Goal: Knowledge of wound care  Description: Knowledge of wound care  3/5/2025 0748 by Maria L Wynn RN  Outcome: Progressing  3/5/2025 0748 by Maria L Wynn RN  Outcome: Progressing  Goal: Understands risk factors for wounds  Description: Understands risk factors for wounds  3/5/2025 0748 by Maria L Wynn RN  Outcome: Progressing  3/5/2025 0748 by Maria L Wynn RN  Outcome: Progressing     Problem: Wound:  Goal: Will show signs of wound healing; wound closure and no evidence of infection  Description: Will show signs of wound healing; wound closure and no evidence of infection  3/5/2025 0748 by Maria L Wynn RN  Outcome: Progressing  3/5/2025 0748 by Maria L Wynn RN  Outcome: Progressing     Problem: Venous:  Goal: Signs of wound healing will improve  Description: Signs of wound healing will improve  3/5/2025 0748 by Maria L Wynn RN  Outcome: Progressing  3/5/2025 0748 by Maria L Wynn RN  Outcome: Progressing

## 2025-03-05 NOTE — PROGRESS NOTES
is to stay off her feet and elevate  1/8/25  38 slightly larger, appearance stable  Off work now  Script for percocet 5/325 mg #28 1/10OARRS   Discussed with pt re use of tylenol  Having significant issues with reflux today, emesis - per her report has hx of ulcers, gastritis  In review of chart has hx of small hiatal hernia and antral gastritis from egd in 2013 by Dr Young  Had lap choley in 2014 by Dr Young  She has seen GI in the past but would like to have 2nd opinion  Will refer to Dr Peña for evaluation re reflux, hx of duodenal ulcers, emesis  1/15/25  36  Appearance improved  1/22/25  40  Appearance much improved  Script for percocet 5/325 mg #28 1/10OARRS  1/29/25  Smaller at 33  Large drainage - change to daily dressings   Obtain circaid juxtafit essentials  2/5/25  Stable at 33  Wants to go back to wrap  Culture done  2/12/25  Stable at 34  Cx - pseudomonas - light growth - will disc with D rLlimbu  Overall pain seems worse - appearance same  Script for percocet 5/325 mg #28 OARRS  2/19/25  Smaller at 31  Pain is still an issue  Will have pt see Dr Duncan re iv abx  Percocet 5/325 mg #28 oarrs rvwd  2/26/25  Stable at 30  Have ID Dr Duncan see in re to pseudmonas  3/5/25  Smaller at 28  Percocet 5/325 mg #28 oarrs rvwd    Wound/Ulcer Pain Timing/Severity: constant, moderate  Quality of pain: aching, throbbing, tender  Severity:  7/ 10   Modifying Factors: Pain worsens with dressing changes, debridement  Associated Signs/Symptoms: edema, drainage and pain    Ulcer Identification:  Ulcer Type: venous  Contributing Factors: edema and venous stasis    Diabetic/Pressure/Non Pressure Ulcers only:  Ulcer: Non-Pressure ulcer, fat layer exposed        PAST MEDICAL HISTORY      Diagnosis Date    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) 05/18/2022    Dizziness - light-headed     GERD (gastroesophageal reflux disease)     Herpes dermatitis 04/27/2017    Insomnia secondary to anxiety

## 2025-03-06 DIAGNOSIS — I10 PRIMARY HYPERTENSION: ICD-10-CM

## 2025-03-06 RX ORDER — LISINOPRIL 10 MG/1
10 TABLET ORAL DAILY
Qty: 14 TABLET | Refills: 0 | Status: SHIPPED | OUTPATIENT
Start: 2025-03-06

## 2025-03-06 NOTE — TELEPHONE ENCOUNTER
Last Appointment   9/11/2024  Next Appointment  3/19/2025    Patient states that has only 4 pills left, so needs enough medications to get through to 3/19/2025 appointment      Name of Medication(s) Requested:  Requested Prescriptions     Pending Prescriptions Disp Refills    lisinopril (PRINIVIL;ZESTRIL) 10 MG tablet [Pharmacy Med Name: Lisinopril Oral Tablet 10 MG] 90 tablet 0     Sig: TAKE ONE TABLET BY MOUTH DAILY       Medication is on current medication list Yes    Dosage and directions were verified? Yes    Quantity verified: 90 day supply     Pharmacy Verified?  Yes    Last Appointment:  9/11/2024    Future appts:  Future Appointments   Date Time Provider Department Center   3/12/2025  7:30 AM Ashley Staples MD [facility]   3/19/2025  7:30 AM Ashley Staples MD [facility]   3/19/2025  1:00 PM Rosenda Cormier MD [facility]   9/17/2025  1:00 PM Rosenda Corimer MD [facility]        (If no appt send self scheduling link. .REFILLAPPT)  Scheduling request sent?     [] Yes  [x] No    Does patient need updated?  [] Yes  [x] No

## 2025-03-07 NOTE — DISCHARGE INSTRUCTIONS
Visit Discharge/Physician Orders     Discharge condition: Stable     Assessment of pain at discharge: yes     Anesthetic used: 4% lidocaine solution     Discharge to: Home     Left via:Private automobile     Accompanied by:self     ECF/HHA: [facility] [phone]     Dressing Orders: LEFT MEDIAL LEG: Cleanse with normal saline, apply zinc to fiona wound, apply Calcium Alginate Ag, ABD pad, Kerlix, and Spandagrip. Change daily.      Treatment Orders: Eat a diet high in protein and vitamin C. Take a multiple vitamin daily unless contraindicated.     Drink Protein Shakes ([employer])      Follow-up with Dr. Duncan     [facility] followup visit : 1 week Dr. HERNANDEZ   __________________________________  (Please note your next appointment above and if you are unable to keep, kindly give a 24 hour notice. Thank you.)     Physician signature:__________________________     If you experience any of the following, please call the Wound Care Center during business hours:     * Increase in Pain  * Temperature over 101  * Increase in drainage from your wound  * Drainage with a foul odor  * Bleeding  * Increase in swelling  * Need for compression bandage changes due to slippage, breakthrough drainage.     If you need medical attention outside of the business hours of the Wound Care Centers please contact your PCP or go to the nearest emergency room

## 2025-03-12 ENCOUNTER — HOSPITAL ENCOUNTER (OUTPATIENT)
Dept: WOUND CARE | Age: 70
Discharge: HOME | End: 2025-03-12
Attending: SURGERY
Payer: MEDICARE

## 2025-03-12 VITALS
BODY MASS INDEX: 26.37 KG/M2 | WEIGHT: 174 LBS | HEIGHT: 68 IN | RESPIRATION RATE: 18 BRPM | SYSTOLIC BLOOD PRESSURE: 118 MMHG | TEMPERATURE: 97.8 F | HEART RATE: 80 BPM | DIASTOLIC BLOOD PRESSURE: 62 MMHG

## 2025-03-12 DIAGNOSIS — I83.023 VARICOSE VEINS OF LEFT LOWER EXTREMITY WITH ULCER OF ANKLE WITH FAT LAYER EXPOSED (HCC): Primary | ICD-10-CM

## 2025-03-12 DIAGNOSIS — I70.242 ATHEROSCLEROSIS OF NATIVE ARTERY OF LEFT LOWER EXTREMITY WITH ULCERATION OF CALF (HCC): ICD-10-CM

## 2025-03-12 DIAGNOSIS — L97.322 VARICOSE VEINS OF LEFT LOWER EXTREMITY WITH ULCER OF ANKLE WITH FAT LAYER EXPOSED (HCC): Primary | ICD-10-CM

## 2025-03-12 PROCEDURE — 11042 DBRDMT SUBQ TIS 1ST 20SQCM/<: CPT | Performed by: SURGERY

## 2025-03-12 PROCEDURE — 11042 DBRDMT SUBQ TIS 1ST 20SQCM/<: CPT

## 2025-03-12 RX ORDER — LIDOCAINE HYDROCHLORIDE 20 MG/ML
JELLY TOPICAL ONCE
OUTPATIENT
Start: 2025-03-12 | End: 2025-03-12

## 2025-03-12 RX ORDER — LIDOCAINE HYDROCHLORIDE 40 MG/ML
SOLUTION TOPICAL ONCE
Status: COMPLETED | OUTPATIENT
Start: 2025-03-12 | End: 2025-03-12

## 2025-03-12 RX ORDER — LIDOCAINE 50 MG/G
OINTMENT TOPICAL ONCE
OUTPATIENT
Start: 2025-03-12 | End: 2025-03-12

## 2025-03-12 RX ORDER — CLOBETASOL PROPIONATE 0.5 MG/G
OINTMENT TOPICAL ONCE
OUTPATIENT
Start: 2025-03-12 | End: 2025-03-12

## 2025-03-12 RX ORDER — MUPIROCIN 20 MG/G
OINTMENT TOPICAL ONCE
OUTPATIENT
Start: 2025-03-12 | End: 2025-03-12

## 2025-03-12 RX ORDER — NEOMYCIN/BACITRACIN/POLYMYXINB 3.5-400-5K
OINTMENT (GRAM) TOPICAL ONCE
OUTPATIENT
Start: 2025-03-12 | End: 2025-03-12

## 2025-03-12 RX ORDER — BETAMETHASONE DIPROPIONATE 0.05 %
OINTMENT (GRAM) TOPICAL ONCE
OUTPATIENT
Start: 2025-03-12 | End: 2025-03-12

## 2025-03-12 RX ORDER — GINSENG 100 MG
CAPSULE ORAL ONCE
OUTPATIENT
Start: 2025-03-12 | End: 2025-03-12

## 2025-03-12 RX ORDER — BACITRACIN ZINC AND POLYMYXIN B SULFATE 500; 1000 [USP'U]/G; [USP'U]/G
OINTMENT TOPICAL ONCE
OUTPATIENT
Start: 2025-03-12 | End: 2025-03-12

## 2025-03-12 RX ORDER — SILVER SULFADIAZINE 10 MG/G
CREAM TOPICAL ONCE
OUTPATIENT
Start: 2025-03-12 | End: 2025-03-12

## 2025-03-12 RX ORDER — LIDOCAINE HYDROCHLORIDE 40 MG/ML
SOLUTION TOPICAL ONCE
OUTPATIENT
Start: 2025-03-12 | End: 2025-03-12

## 2025-03-12 RX ORDER — TRIAMCINOLONE ACETONIDE 1 MG/G
OINTMENT TOPICAL ONCE
OUTPATIENT
Start: 2025-03-12 | End: 2025-03-12

## 2025-03-12 RX ORDER — GENTAMICIN SULFATE 1 MG/G
OINTMENT TOPICAL ONCE
OUTPATIENT
Start: 2025-03-12 | End: 2025-03-12

## 2025-03-12 RX ORDER — LIDOCAINE 40 MG/G
CREAM TOPICAL ONCE
OUTPATIENT
Start: 2025-03-12 | End: 2025-03-12

## 2025-03-12 RX ADMIN — LIDOCAINE HYDROCHLORIDE 10 ML: 40 SOLUTION TOPICAL at 07:41

## 2025-03-12 ASSESSMENT — PAIN SCALES - GENERAL: PAINLEVEL_OUTOF10: 7

## 2025-03-12 ASSESSMENT — PAIN DESCRIPTION - ORIENTATION: ORIENTATION: LEFT

## 2025-03-12 ASSESSMENT — PAIN DESCRIPTION - DESCRIPTORS: DESCRIPTORS: ACHING

## 2025-03-12 ASSESSMENT — PAIN - FUNCTIONAL ASSESSMENT: PAIN_FUNCTIONAL_ASSESSMENT: ACTIVITIES ARE NOT PREVENTED

## 2025-03-12 ASSESSMENT — PAIN DESCRIPTION - FREQUENCY: FREQUENCY: INTERMITTENT

## 2025-03-12 ASSESSMENT — PAIN DESCRIPTION - LOCATION: LOCATION: LEG

## 2025-03-12 ASSESSMENT — PAIN DESCRIPTION - ONSET: ONSET: ON-GOING

## 2025-03-12 ASSESSMENT — PAIN DESCRIPTION - PAIN TYPE: TYPE: CHRONIC PAIN

## 2025-03-12 NOTE — PROGRESS NOTES
Wound Care Supplies      Supply Company:     [facility] [address] [facility] [address] [place] [id number]  p: [phone] f: [phone]     Please call patient for approval of any out of pocket expense prior to shipment of supplies.    Ordering Center:     [facility]  [address]  [facility] [address]  [phone]  WOUND CARE Dept: [phone]   FAX NUMBER [phone]    Patient Information:      Amanda Ledesma  [address] [facility] [address]   [phone]   : 1955  AGE: 69 y.o.     GENDER: female   EPISODE DATE: 3/12/2025    Insurance:      PRIMARY INSURANCE:  Plan: [employer] DUAL COMPLETE  Coverage: [facility] MEDICARE  Effective Date: 2022  Group Number: [unfilled]  Subscriber Number: [id number] - (Medicare Managed)    Payer/Plan Subscr  Sex Relation Sub. Ins. ID Effective Group Num   1. [employer] MEDICARE * AMANDA LEDESMA 1955 Female Self [id number]                                   [address]   2. 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 
current facility-administered medications on file prior to encounter.       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) - Primary    Relevant Orders    Initiate Outpatient Wound Care Protocol    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) (Chronic)    Relevant Orders    Initiate Outpatient Wound Care Protocol       Pre Debridement Measurements:  Are located in the Wound/Ulcer Documentation Flow Sheet  Post Debridement Measurements:  Wound/Ulcer Descriptions are Pre Debridement except measurements:     Wound 08/10/16 Other (Comment) Buttocks Left;Distal #2 acq 8/4/16 (Active)   Number of days: 3135       Wound 08/31/16 Buttocks Left;Proximal #3 aquired 8-27-26 (Active)   Number of days: 3114       Wound 02/02/22 Ankle Left;Medial #1 (Active)   Wound Image   03/12/25 0741   Wound Etiology Venous 10/18/23 0824   Dressing Status New dressing applied 03/05/25 0826   Wound Cleansed Cleansed with saline 03/05/25 0826   Dressing/Treatment Alginate with Ag;ABD;Zinc paste 03/05/25 0826   Offloading for Diabetic Foot Ulcers Offloading ordered 02/26/25 0836   Wound Length (cm) 7.9 cm 03/12/25 0741   Wound Width (cm) 3.5 cm 03/12/25 0741   Wound Depth (cm) 0.2 cm 03/12/25 0741   Wound Surface Area (cm^2) 27.65 cm^2 03/12/25 0741   Change in Wound Size % (l*w) -2.18 03/12/25 0741   Wound Volume

## 2025-03-13 NOTE — DISCHARGE INSTRUCTIONS
Visit Discharge/Physician Orders     Discharge condition: Stable     Assessment of pain at discharge: yes     Anesthetic used: 4% lidocaine solution     Discharge to: Home     Left via:Private automobile     Accompanied by:self     ECF/HHA: [facility] [phone]     Dressing Orders: LEFT MEDIAL LEG: Cleanse with normal saline, apply zinc to fiona wound, apply Calcium Alginate Ag, ABD pad, and Coban 2. Change weekly.   Treatment Orders: Eat a diet high in protein and vitamin C. Take a multiple vitamin daily unless contraindicated.     Drink Protein Shakes ([employer])      Follow-up with Dr. Duncan     3/19/25 Patient declining visit with Dr. Duncan (ID) at this time.     [facility] followup visit : 1 week Dr. HERNANDEZ   __________________________________  (Please note your next appointment above and if you are unable to keep, kindly give a 24 hour notice. Thank you.)     Physician signature:__________________________     If you experience any of the following, please call the Wound Care Center during business hours:     * Increase in Pain  * Temperature over 101  * Increase in drainage from your wound  * Drainage with a foul odor  * Bleeding  * Increase in swelling  * Need for compression bandage changes due to slippage, breakthrough drainage.     If you need medical attention outside of the business hours of the Wound Care Centers please contact your PCP or go to the nearest emergency room

## 2025-03-14 DIAGNOSIS — S93.05XD OPEN DISLOCATION OF LEFT ANKLE, SUBSEQUENT ENCOUNTER: Primary | ICD-10-CM

## 2025-03-14 DIAGNOSIS — S91.002D OPEN DISLOCATION OF LEFT ANKLE, SUBSEQUENT ENCOUNTER: Primary | ICD-10-CM

## 2025-03-14 PROBLEM — S93.05XA OPEN DISLOCATION OF LEFT ANKLE: Status: ACTIVE | Noted: 2025-03-14

## 2025-03-14 PROBLEM — S91.002A OPEN DISLOCATION OF LEFT ANKLE: Status: ACTIVE | Noted: 2025-03-14

## 2025-03-14 RX ORDER — SODIUM CHLORIDE 9 MG/ML
5-250 INJECTION, SOLUTION INTRAVENOUS PRN
OUTPATIENT
Start: 2025-03-14

## 2025-03-14 RX ORDER — HYDROCORTISONE SODIUM SUCCINATE 100 MG/2ML
100 INJECTION INTRAMUSCULAR; INTRAVENOUS
OUTPATIENT
Start: 2025-03-14

## 2025-03-14 RX ORDER — DIPHENHYDRAMINE HYDROCHLORIDE 50 MG/ML
50 INJECTION INTRAMUSCULAR; INTRAVENOUS
OUTPATIENT
Start: 2025-03-14

## 2025-03-14 RX ORDER — SODIUM CHLORIDE 0.9 % (FLUSH) 0.9 %
5-40 SYRINGE (ML) INJECTION PRN
OUTPATIENT
Start: 2025-03-14

## 2025-03-14 RX ORDER — EPINEPHRINE 1 MG/ML
0.3 INJECTION, SOLUTION, CONCENTRATE INTRAVENOUS PRN
OUTPATIENT
Start: 2025-03-14

## 2025-03-14 RX ORDER — DIPHENHYDRAMINE HCL 25 MG
50 TABLET ORAL ONCE
Start: 2025-03-14 | End: 2025-03-14

## 2025-03-14 RX ORDER — HEPARIN 100 UNIT/ML
500 SYRINGE INTRAVENOUS PRN
OUTPATIENT
Start: 2025-03-14

## 2025-03-17 ENCOUNTER — TELEPHONE (OUTPATIENT)
Dept: INFUSION THERAPY | Age: 70
End: 2025-03-17

## 2025-03-17 NOTE — TELEPHONE ENCOUNTER
Called patient and someone answered and then hung up. I then tried to call back and it went to voicemail. I left a voicemail stating the patient has an appointment and to please call back and confirm.

## 2025-03-19 ENCOUNTER — OFFICE VISIT (OUTPATIENT)
Dept: FAMILY MEDICINE CLINIC | Age: 70
End: 2025-03-19
Payer: MEDICARE

## 2025-03-19 ENCOUNTER — HOSPITAL ENCOUNTER (OUTPATIENT)
Dept: WOUND CARE | Age: 70
Discharge: HOME | End: 2025-03-19
Attending: SURGERY
Payer: MEDICARE

## 2025-03-19 VITALS
SYSTOLIC BLOOD PRESSURE: 120 MMHG | HEIGHT: 68 IN | HEART RATE: 83 BPM | WEIGHT: 174 LBS | BODY MASS INDEX: 26.37 KG/M2 | RESPIRATION RATE: 18 BRPM | DIASTOLIC BLOOD PRESSURE: 53 MMHG

## 2025-03-19 VITALS
OXYGEN SATURATION: 92 % | TEMPERATURE: 97.2 F | DIASTOLIC BLOOD PRESSURE: 62 MMHG | WEIGHT: 175 LBS | SYSTOLIC BLOOD PRESSURE: 98 MMHG | BODY MASS INDEX: 26.52 KG/M2 | HEART RATE: 62 BPM | RESPIRATION RATE: 16 BRPM | HEIGHT: 68 IN

## 2025-03-19 DIAGNOSIS — G44.029 CHRONIC CLUSTER HEADACHE, NOT INTRACTABLE: ICD-10-CM

## 2025-03-19 DIAGNOSIS — I70.242 ATHEROSCLEROSIS OF NATIVE ARTERY OF LEFT LOWER EXTREMITY WITH ULCERATION OF CALF: ICD-10-CM

## 2025-03-19 DIAGNOSIS — K44.9 HIATAL HERNIA WITH GERD: ICD-10-CM

## 2025-03-19 DIAGNOSIS — Z12.31 ENCOUNTER FOR SCREENING MAMMOGRAM FOR MALIGNANT NEOPLASM OF BREAST: ICD-10-CM

## 2025-03-19 DIAGNOSIS — K21.00 GASTROESOPHAGEAL REFLUX DISEASE WITH ESOPHAGITIS WITHOUT HEMORRHAGE: Chronic | ICD-10-CM

## 2025-03-19 DIAGNOSIS — Z76.0 ENCOUNTER FOR MEDICATION REFILL: Primary | ICD-10-CM

## 2025-03-19 DIAGNOSIS — L97.322 VARICOSE VEINS OF LEFT LOWER EXTREMITY WITH ULCER OF ANKLE WITH FAT LAYER EXPOSED: Primary | ICD-10-CM

## 2025-03-19 DIAGNOSIS — I10 PRIMARY HYPERTENSION: ICD-10-CM

## 2025-03-19 DIAGNOSIS — T63.441A ALLERGIC REACTION TO BEE STING: ICD-10-CM

## 2025-03-19 DIAGNOSIS — B00.9 HERPES: ICD-10-CM

## 2025-03-19 DIAGNOSIS — I70.243 ATHEROSCLEROSIS OF NATIVE ARTERY OF LEFT LOWER EXTREMITY WITH ULCERATION OF ANKLE: Chronic | ICD-10-CM

## 2025-03-19 DIAGNOSIS — G43.909 MIGRAINE WITHOUT STATUS MIGRAINOSUS, NOT INTRACTABLE, UNSPECIFIED MIGRAINE TYPE: ICD-10-CM

## 2025-03-19 DIAGNOSIS — I83.023 VARICOSE VEINS OF LEFT LOWER EXTREMITY WITH ULCER OF ANKLE WITH FAT LAYER EXPOSED: Primary | ICD-10-CM

## 2025-03-19 DIAGNOSIS — D50.8 OTHER IRON DEFICIENCY ANEMIA: ICD-10-CM

## 2025-03-19 DIAGNOSIS — F51.05 INSOMNIA SECONDARY TO ANXIETY: ICD-10-CM

## 2025-03-19 DIAGNOSIS — K21.9 HIATAL HERNIA WITH GERD: ICD-10-CM

## 2025-03-19 DIAGNOSIS — F41.9 INSOMNIA SECONDARY TO ANXIETY: ICD-10-CM

## 2025-03-19 PROCEDURE — 1036F TOBACCO NON-USER: CPT | Performed by: FAMILY MEDICINE

## 2025-03-19 PROCEDURE — 3017F COLORECTAL CA SCREEN DOC REV: CPT | Performed by: FAMILY MEDICINE

## 2025-03-19 PROCEDURE — 1159F MED LIST DOCD IN RCRD: CPT | Performed by: FAMILY MEDICINE

## 2025-03-19 PROCEDURE — G8419 CALC BMI OUT NRM PARAM NOF/U: HCPCS | Performed by: FAMILY MEDICINE

## 2025-03-19 PROCEDURE — 11042 DBRDMT SUBQ TIS 1ST 20SQCM/<: CPT | Performed by: SURGERY

## 2025-03-19 PROCEDURE — 3074F SYST BP LT 130 MM HG: CPT | Performed by: FAMILY MEDICINE

## 2025-03-19 PROCEDURE — 99214 OFFICE O/P EST MOD 30 MIN: CPT | Performed by: FAMILY MEDICINE

## 2025-03-19 PROCEDURE — G8427 DOCREV CUR MEDS BY ELIG CLIN: HCPCS | Performed by: FAMILY MEDICINE

## 2025-03-19 PROCEDURE — 1090F PRES/ABSN URINE INCON ASSESS: CPT | Performed by: FAMILY MEDICINE

## 2025-03-19 PROCEDURE — 3078F DIAST BP <80 MM HG: CPT | Performed by: FAMILY MEDICINE

## 2025-03-19 PROCEDURE — G8400 PT W/DXA NO RESULTS DOC: HCPCS | Performed by: FAMILY MEDICINE

## 2025-03-19 PROCEDURE — 1160F RVW MEDS BY RX/DR IN RCRD: CPT | Performed by: FAMILY MEDICINE

## 2025-03-19 PROCEDURE — 1124F ACP DISCUSS-NO DSCNMKR DOCD: CPT | Performed by: FAMILY MEDICINE

## 2025-03-19 PROCEDURE — G2211 COMPLEX E/M VISIT ADD ON: HCPCS | Performed by: FAMILY MEDICINE

## 2025-03-19 PROCEDURE — 11042 DBRDMT SUBQ TIS 1ST 20SQCM/<: CPT

## 2025-03-19 RX ORDER — SILVER SULFADIAZINE 10 MG/G
CREAM TOPICAL ONCE
OUTPATIENT
Start: 2025-03-19 | End: 2025-03-19

## 2025-03-19 RX ORDER — ACYCLOVIR 200 MG/1
200 CAPSULE ORAL DAILY
Qty: 90 CAPSULE | Refills: 1 | Status: SHIPPED | OUTPATIENT
Start: 2025-03-19 | End: 2025-09-19

## 2025-03-19 RX ORDER — HYDROXYZINE HYDROCHLORIDE 25 MG/1
TABLET, FILM COATED ORAL
Qty: 180 TABLET | Refills: 1 | Status: SHIPPED | OUTPATIENT
Start: 2025-03-19 | End: 2025-09-19

## 2025-03-19 RX ORDER — LIDOCAINE 40 MG/G
CREAM TOPICAL ONCE
OUTPATIENT
Start: 2025-03-19 | End: 2025-03-19

## 2025-03-19 RX ORDER — LIDOCAINE HYDROCHLORIDE 40 MG/ML
SOLUTION TOPICAL ONCE
OUTPATIENT
Start: 2025-03-19 | End: 2025-03-19

## 2025-03-19 RX ORDER — LIDOCAINE HYDROCHLORIDE 20 MG/ML
JELLY TOPICAL ONCE
OUTPATIENT
Start: 2025-03-19 | End: 2025-03-19

## 2025-03-19 RX ORDER — LIDOCAINE HYDROCHLORIDE 40 MG/ML
SOLUTION TOPICAL ONCE
Status: DISCONTINUED | OUTPATIENT
Start: 2025-03-19 | End: 2025-03-20 | Stop reason: HOSPADM

## 2025-03-19 RX ORDER — LIDOCAINE 50 MG/G
OINTMENT TOPICAL ONCE
OUTPATIENT
Start: 2025-03-19 | End: 2025-03-19

## 2025-03-19 RX ORDER — GINSENG 100 MG
CAPSULE ORAL ONCE
OUTPATIENT
Start: 2025-03-19 | End: 2025-03-19

## 2025-03-19 RX ORDER — OXYCODONE AND ACETAMINOPHEN 5; 325 MG/1; MG/1
1 TABLET ORAL EVERY 6 HOURS PRN
Qty: 28 TABLET | Refills: 0 | Status: SHIPPED | OUTPATIENT
Start: 2025-03-19 | End: 2025-03-26

## 2025-03-19 RX ORDER — NEOMYCIN/BACITRACIN/POLYMYXINB 3.5-400-5K
OINTMENT (GRAM) TOPICAL ONCE
OUTPATIENT
Start: 2025-03-19 | End: 2025-03-19

## 2025-03-19 RX ORDER — BACITRACIN ZINC AND POLYMYXIN B SULFATE 500; 1000 [USP'U]/G; [USP'U]/G
OINTMENT TOPICAL ONCE
OUTPATIENT
Start: 2025-03-19 | End: 2025-03-19

## 2025-03-19 RX ORDER — EPINEPHRINE 0.3 MG/.3ML
INJECTION SUBCUTANEOUS
Qty: 1 EACH | Refills: 2 | Status: SHIPPED | OUTPATIENT
Start: 2025-03-19 | End: 2025-09-19

## 2025-03-19 RX ORDER — TRIAMCINOLONE ACETONIDE 1 MG/G
OINTMENT TOPICAL ONCE
OUTPATIENT
Start: 2025-03-19 | End: 2025-03-19

## 2025-03-19 RX ORDER — LISINOPRIL 10 MG/1
10 TABLET ORAL DAILY
Qty: 90 TABLET | Refills: 1 | Status: SHIPPED | OUTPATIENT
Start: 2025-03-19 | End: 2025-09-19

## 2025-03-19 RX ORDER — BETAMETHASONE DIPROPIONATE 0.05 %
OINTMENT (GRAM) TOPICAL ONCE
OUTPATIENT
Start: 2025-03-19 | End: 2025-03-19

## 2025-03-19 RX ORDER — MUPIROCIN 20 MG/G
OINTMENT TOPICAL ONCE
OUTPATIENT
Start: 2025-03-19 | End: 2025-03-19

## 2025-03-19 RX ORDER — CLOBETASOL PROPIONATE 0.5 MG/G
OINTMENT TOPICAL ONCE
OUTPATIENT
Start: 2025-03-19 | End: 2025-03-19

## 2025-03-19 RX ORDER — BUTALBITAL, ACETAMINOPHEN AND CAFFEINE 50; 325; 40 MG/1; MG/1; MG/1
1 TABLET ORAL 3 TIMES DAILY PRN
Qty: 90 TABLET | Refills: 5 | Status: SHIPPED | OUTPATIENT
Start: 2025-03-19 | End: 2025-09-19

## 2025-03-19 RX ORDER — GENTAMICIN SULFATE 1 MG/G
OINTMENT TOPICAL ONCE
OUTPATIENT
Start: 2025-03-19 | End: 2025-03-19

## 2025-03-19 RX ORDER — PANTOPRAZOLE SODIUM 40 MG/1
40 TABLET, DELAYED RELEASE ORAL
Qty: 90 TABLET | Refills: 1 | Status: SHIPPED | OUTPATIENT
Start: 2025-03-19 | End: 2025-09-19

## 2025-03-19 SDOH — ECONOMIC STABILITY: FOOD INSECURITY: WITHIN THE PAST 12 MONTHS, YOU WORRIED THAT YOUR FOOD WOULD RUN OUT BEFORE YOU GOT MONEY TO BUY MORE.: NEVER TRUE

## 2025-03-19 SDOH — ECONOMIC STABILITY: FOOD INSECURITY: WITHIN THE PAST 12 MONTHS, THE FOOD YOU BOUGHT JUST DIDN'T LAST AND YOU DIDN'T HAVE MONEY TO GET MORE.: NEVER TRUE

## 2025-03-19 ASSESSMENT — PAIN - FUNCTIONAL ASSESSMENT: PAIN_FUNCTIONAL_ASSESSMENT: ACTIVITIES ARE NOT PREVENTED

## 2025-03-19 ASSESSMENT — PAIN DESCRIPTION - ONSET: ONSET: ON-GOING

## 2025-03-19 ASSESSMENT — PAIN DESCRIPTION - PAIN TYPE: TYPE: CHRONIC PAIN

## 2025-03-19 ASSESSMENT — PATIENT HEALTH QUESTIONNAIRE - PHQ9
2. FEELING DOWN, DEPRESSED OR HOPELESS: NOT AT ALL
1. LITTLE INTEREST OR PLEASURE IN DOING THINGS: NOT AT ALL
SUM OF ALL RESPONSES TO PHQ QUESTIONS 1-9: 0

## 2025-03-19 ASSESSMENT — PAIN SCALES - GENERAL: PAINLEVEL_OUTOF10: 7

## 2025-03-19 ASSESSMENT — PAIN DESCRIPTION - DESCRIPTORS: DESCRIPTORS: ACHING

## 2025-03-19 ASSESSMENT — PAIN DESCRIPTION - ORIENTATION: ORIENTATION: LEFT

## 2025-03-19 ASSESSMENT — PAIN DESCRIPTION - LOCATION: LOCATION: LEG

## 2025-03-19 NOTE — PROGRESS NOTES
debridement  1/18/23  Us rescheduled for 1/23  Wound appearance much improved, size stable  Oarrs reviewed - Percocet 5/325 mg #28  1/25/23  Us noted popliteal vein dvt - started on eliquis  Wound appearance improved  2/1/23  Wounds improved  Will extend leave from work to 2/27/23 2/8/23  Wounds improved  Medial 33, lateral 13  2/15/23  Size improved, pain improved  Discussed appropriate use of NSAIDs for breakthrough pain to begin weening off her percocet  2/22/23  Improved appearance  Oarrs reviewed - Percocet 5/325 mg #28  3/1/23  Improved  3/8/23  Improved 28 sq cm   3/15/23  Medial larger - may need surgical debridement due to tolerance  Lateral improved  Oarrs reviewed - Percocet 5/325 mg #28  3/22/23  Improved appearance  Tolerated more debridement today  3/29/23  Medial slightly smaller at 48, lateral larger at 17  Suspect in part due to being back at work now as she is on her feet all day  Plan for surgical debridement, possible advance skin therapy  4/12/2023  Ulcer stable with minimal exudate  4/19/23  Medial 44, Lateral 14  Still having signfiicant pain, poor tolerance of debridement  Reculture  Likely will need or debridement  Percocet 5/325 mg #28, oarrs reviewed  4/26/23  Medial 49 Lateral 17  Still having signfiicant pain refusing debridement  S Aureus cx - clindamycin x 10 days script  Or debridement  Discussed work as a factor in why declining - on her feet all day  5/2/23  OR debridement, application of cytal 3 layer graft, unna boot  5/10/23  Medial appears improved, lateral slightly larger  Percocet 7/5/325 mg #50 given q 8 hrs, oarrs reviewed  Pt having increased issues with pain  5/17/23  Wounds with improved appearance  Pain at baseline  5/24/23  Medial stable - appearance improved  Lateral improved  5/31/23  Medial slightly smaller 48  Lateral slightly larger 19  Percocet 7/5/325 mg #42 given q 8 hrs (14 days), oarrs reviewed  6/7/23  Increased pain per pt  Greenish drainage from medial -

## 2025-03-19 NOTE — PROGRESS NOTES
Amanda Ledesma is a 69 y.o. female who presents today for     Chief Complaint   Patient presents with    Check-Up    Medication Refill     Acyclovir, and hydroxyzine needs refilled too. No longer on patient medication list.       She has been treated for GERD and migraine/cluster headaches.  She continues to experience anxiety frequently; hydroxyzine helps.  Since last visit, she has been diagnosed with HTN and is now on lisinopril 20 mg/day.    Due for medication refills today.      Hypertension:  Patient is here for follow up chronic hypertension.  This is  generally controlled on current medication regimen (Lisinopril 10mg/day).  BP today is 98/62.  Takes meds as directed and tolerates them well.  Most recent labs reviewed with patient and are not remarkable.  No symptoms from htn standpoint per ROS.  Patient is not compliant with lifestyle modifications.  Patient does not smoke.        Varicose Veins/Vascular Insufficiency/Stasis Ulcer:  She remains under the care of Dr. HERNANDEZ, who is seeing her for wound debridement weekly. He has advised her to retire, as she is on her feet all day. She is not ready to do so; she is aware that prolonged standing is only aggravating the stasis issue and prolonging the treatment to clear the ulcers.  This is not healing, despite no history of diabetes, not eating excessive sugar, no alcohol use.      Headache  Patient with a history of  headache. Symptoms began about several years ago. Generally, the headaches last about several hours and occur several times per week. The headaches do not seem to be related to any time of day or year. The headaches are usually moderate, dull, sharp and throbbing and are located in global scalp/head.  The patient rates her most severe headaches a 8 on a scale from 1 to 10. Recently, the headaches have been stable. Work attendance or other daily activities are not affected by the headaches. Precipitating factors include: cold temperatures,

## 2025-03-20 NOTE — DISCHARGE INSTRUCTIONS
Visit Discharge/Physician Orders     Discharge condition: Stable     Assessment of pain at discharge: yes     Anesthetic used: 4% lidocaine solution     Discharge to: Home     Left via:Private automobile     Accompanied by:self     ECF/HHA: [facility] [phone]     Dressing Orders: LEFT MEDIAL LEG: Cleanse with normal saline, apply zinc to fiona wound, apply Calcium Alginate Ag, ABD pad, and Coban 2. Change weekly.     Treatment Orders: Eat a diet high in protein and vitamin C. Take a multiple vitamin daily unless contraindicated.     Drink Protein Shakes ([employer])      Dr. Duncan re: IV antibiotics      [facility] followup visit : 1 week Dr. HERNANDEZ   __________________________________  (Please note your next appointment above and if you are unable to keep, kindly give a 24 hour notice. Thank you.)     Physician signature:__________________________     If you experience any of the following, please call the Wound Care Center during business hours:     * Increase in Pain  * Temperature over 101  * Increase in drainage from your wound  * Drainage with a foul odor  * Bleeding  * Increase in swelling  * Need for compression bandage changes due to slippage, breakthrough drainage.     If you need medical attention outside of the business hours of the Wound Care Centers please contact your PCP or go to the nearest emergency room

## 2025-03-25 ENCOUNTER — TELEPHONE (OUTPATIENT)
Dept: SURGERY | Age: 70
End: 2025-03-25

## 2025-03-25 PROBLEM — R11.2 NAUSEA AND VOMITING: Status: ACTIVE | Noted: 2025-03-25

## 2025-03-25 NOTE — TELEPHONE ENCOUNTER
Per Dr. Peña, patient scheduled for EGD poss biopsy at [facility] on 4/17/25. Surgery scheduled via [facility], surgeon's calendar updated. Follow up appointment scheduled. Dr. Peña to enter orders.   Electronically signed by Caryl Flores RN on 3/25/2025 at 10:40 AM

## 2025-03-26 ENCOUNTER — HOSPITAL ENCOUNTER (OUTPATIENT)
Age: 70
Discharge: HOME | End: 2025-03-26
Payer: MEDICARE

## 2025-03-26 ENCOUNTER — HOSPITAL ENCOUNTER (OUTPATIENT)
Dept: WOUND CARE | Age: 70
Discharge: HOME | End: 2025-03-26
Attending: SURGERY
Payer: MEDICARE

## 2025-03-26 ENCOUNTER — TELEPHONE (OUTPATIENT)
Dept: FAMILY MEDICINE CLINIC | Age: 70
End: 2025-03-26

## 2025-03-26 VITALS
BODY MASS INDEX: 26.52 KG/M2 | DIASTOLIC BLOOD PRESSURE: 58 MMHG | TEMPERATURE: 97.1 F | RESPIRATION RATE: 18 BRPM | SYSTOLIC BLOOD PRESSURE: 131 MMHG | WEIGHT: 175 LBS | HEIGHT: 68 IN | HEART RATE: 70 BPM

## 2025-03-26 DIAGNOSIS — L97.322 VARICOSE VEINS OF LEFT LOWER EXTREMITY WITH ULCER OF ANKLE WITH FAT LAYER EXPOSED: Primary | ICD-10-CM

## 2025-03-26 DIAGNOSIS — I10 PRIMARY HYPERTENSION: ICD-10-CM

## 2025-03-26 DIAGNOSIS — I83.023 VARICOSE VEINS OF LEFT LOWER EXTREMITY WITH ULCER OF ANKLE WITH FAT LAYER EXPOSED: Primary | ICD-10-CM

## 2025-03-26 DIAGNOSIS — I70.243 ATHEROSCLEROSIS OF NATIVE ARTERY OF LEFT LOWER EXTREMITY WITH ULCERATION OF ANKLE: Chronic | ICD-10-CM

## 2025-03-26 DIAGNOSIS — I70.242 ATHEROSCLEROSIS OF NATIVE ARTERY OF LEFT LOWER EXTREMITY WITH ULCERATION OF CALF: ICD-10-CM

## 2025-03-26 DIAGNOSIS — D50.8 OTHER IRON DEFICIENCY ANEMIA: ICD-10-CM

## 2025-03-26 LAB
ALBUMIN SERPL-MCNC: 3.8 G/DL (ref 3.5–5.2)
ALP SERPL-CCNC: 80 U/L (ref 35–104)
ALT SERPL-CCNC: 11 U/L (ref 0–32)
ANION GAP SERPL CALCULATED.3IONS-SCNC: 13 MMOL/L (ref 7–16)
AST SERPL-CCNC: 14 U/L (ref 0–31)
BASOPHILS # BLD: 0.04 K/UL (ref 0–0.2)
BASOPHILS NFR BLD: 1 % (ref 0–2)
BILIRUB SERPL-MCNC: 0.2 MG/DL (ref 0–1.2)
BUN SERPL-MCNC: 12 MG/DL (ref 6–23)
CALCIUM SERPL-MCNC: 9.3 MG/DL (ref 8.6–10.2)
CHLORIDE SERPL-SCNC: 107 MMOL/L (ref 98–107)
CO2 SERPL-SCNC: 20 MMOL/L (ref 22–29)
CREAT SERPL-MCNC: 1 MG/DL (ref 0.5–1)
EOSINOPHIL # BLD: 0.08 K/UL (ref 0.05–0.5)
EOSINOPHILS RELATIVE PERCENT: 1 % (ref 0–6)
ERYTHROCYTE [DISTWIDTH] IN BLOOD BY AUTOMATED COUNT: 14.6 % (ref 11.5–15)
FERRITIN SERPL-MCNC: 119 NG/ML
GFR, ESTIMATED: 60 ML/MIN/1.73M2
GLUCOSE SERPL-MCNC: 92 MG/DL (ref 74–99)
HCT VFR BLD AUTO: 32.8 % (ref 34–48)
HGB BLD-MCNC: 10.2 G/DL (ref 11.5–15.5)
IMM GRANULOCYTES # BLD AUTO: <0.03 K/UL (ref 0–0.58)
IMM GRANULOCYTES NFR BLD: 0 % (ref 0–5)
IRON SATN MFR SERPL: 33 % (ref 15–50)
IRON SERPL-MCNC: 86 UG/DL (ref 37–145)
LYMPHOCYTES NFR BLD: 1.46 K/UL (ref 1.5–4)
LYMPHOCYTES RELATIVE PERCENT: 25 % (ref 20–42)
MCH RBC QN AUTO: 30.4 PG (ref 26–35)
MCHC RBC AUTO-ENTMCNC: 31.1 G/DL (ref 32–34.5)
MCV RBC AUTO: 97.6 FL (ref 80–99.9)
MONOCYTES NFR BLD: 0.55 K/UL (ref 0.1–0.95)
MONOCYTES NFR BLD: 9 % (ref 2–12)
NEUTROPHILS NFR BLD: 64 % (ref 43–80)
NEUTS SEG NFR BLD: 3.74 K/UL (ref 1.8–7.3)
PLATELET # BLD AUTO: 208 K/UL (ref 130–450)
PMV BLD AUTO: 9.9 FL (ref 7–12)
POTASSIUM SERPL-SCNC: 4.6 MMOL/L (ref 3.5–5)
PROT SERPL-MCNC: 7.1 G/DL (ref 6.4–8.3)
RBC # BLD AUTO: 3.36 M/UL (ref 3.5–5.5)
SODIUM SERPL-SCNC: 140 MMOL/L (ref 132–146)
TIBC SERPL-MCNC: 259 UG/DL (ref 250–450)
WBC OTHER # BLD: 5.9 K/UL (ref 4.5–11.5)

## 2025-03-26 PROCEDURE — 80061 LIPID PANEL: CPT

## 2025-03-26 PROCEDURE — 83550 IRON BINDING TEST: CPT

## 2025-03-26 PROCEDURE — 36415 COLL VENOUS BLD VENIPUNCTURE: CPT

## 2025-03-26 PROCEDURE — 99213 OFFICE O/P EST LOW 20 MIN: CPT | Performed by: SURGERY

## 2025-03-26 PROCEDURE — 99213 OFFICE O/P EST LOW 20 MIN: CPT

## 2025-03-26 PROCEDURE — 82728 ASSAY OF FERRITIN: CPT

## 2025-03-26 PROCEDURE — 80053 COMPREHEN METABOLIC PANEL: CPT

## 2025-03-26 PROCEDURE — 85025 COMPLETE CBC W/AUTO DIFF WBC: CPT

## 2025-03-26 PROCEDURE — 83540 ASSAY OF IRON: CPT

## 2025-03-26 RX ORDER — CLOBETASOL PROPIONATE 0.5 MG/G
OINTMENT TOPICAL ONCE
OUTPATIENT
Start: 2025-03-26 | End: 2025-03-26

## 2025-03-26 RX ORDER — SILVER SULFADIAZINE 10 MG/G
CREAM TOPICAL ONCE
OUTPATIENT
Start: 2025-03-26 | End: 2025-03-26

## 2025-03-26 RX ORDER — NEOMYCIN/BACITRACIN/POLYMYXINB 3.5-400-5K
OINTMENT (GRAM) TOPICAL ONCE
OUTPATIENT
Start: 2025-03-26 | End: 2025-03-26

## 2025-03-26 RX ORDER — BACITRACIN ZINC AND POLYMYXIN B SULFATE 500; 1000 [USP'U]/G; [USP'U]/G
OINTMENT TOPICAL ONCE
OUTPATIENT
Start: 2025-03-26 | End: 2025-03-26

## 2025-03-26 RX ORDER — MUPIROCIN 20 MG/G
OINTMENT TOPICAL ONCE
OUTPATIENT
Start: 2025-03-26 | End: 2025-03-26

## 2025-03-26 RX ORDER — LIDOCAINE HYDROCHLORIDE 40 MG/ML
SOLUTION TOPICAL ONCE
Status: COMPLETED | OUTPATIENT
Start: 2025-03-26 | End: 2025-03-26

## 2025-03-26 RX ORDER — LIDOCAINE 50 MG/G
OINTMENT TOPICAL ONCE
OUTPATIENT
Start: 2025-03-26 | End: 2025-03-26

## 2025-03-26 RX ORDER — TRIAMCINOLONE ACETONIDE 1 MG/G
OINTMENT TOPICAL ONCE
OUTPATIENT
Start: 2025-03-26 | End: 2025-03-26

## 2025-03-26 RX ORDER — GENTAMICIN SULFATE 1 MG/G
OINTMENT TOPICAL ONCE
OUTPATIENT
Start: 2025-03-26 | End: 2025-03-26

## 2025-03-26 RX ORDER — BETAMETHASONE DIPROPIONATE 0.05 %
OINTMENT (GRAM) TOPICAL ONCE
OUTPATIENT
Start: 2025-03-26 | End: 2025-03-26

## 2025-03-26 RX ORDER — SODIUM CHLORIDE 9 MG/ML
5-250 INJECTION, SOLUTION INTRAVENOUS PRN
OUTPATIENT
Start: 2025-03-26

## 2025-03-26 RX ORDER — LIDOCAINE HYDROCHLORIDE 40 MG/ML
SOLUTION TOPICAL ONCE
OUTPATIENT
Start: 2025-03-26 | End: 2025-03-26

## 2025-03-26 RX ORDER — LIDOCAINE 40 MG/G
CREAM TOPICAL ONCE
OUTPATIENT
Start: 2025-03-26 | End: 2025-03-26

## 2025-03-26 RX ORDER — SODIUM CHLORIDE 0.9 % (FLUSH) 0.9 %
5-40 SYRINGE (ML) INJECTION PRN
OUTPATIENT
Start: 2025-03-26

## 2025-03-26 RX ORDER — GINSENG 100 MG
CAPSULE ORAL ONCE
OUTPATIENT
Start: 2025-03-26 | End: 2025-03-26

## 2025-03-26 RX ORDER — LIDOCAINE HYDROCHLORIDE 20 MG/ML
JELLY TOPICAL ONCE
OUTPATIENT
Start: 2025-03-26 | End: 2025-03-26

## 2025-03-26 RX ADMIN — LIDOCAINE HYDROCHLORIDE 10 ML: 40 SOLUTION TOPICAL at 07:54

## 2025-03-26 ASSESSMENT — PAIN DESCRIPTION - DESCRIPTORS: DESCRIPTORS: ACHING

## 2025-03-26 ASSESSMENT — PAIN DESCRIPTION - FREQUENCY: FREQUENCY: INTERMITTENT

## 2025-03-26 ASSESSMENT — PAIN DESCRIPTION - ONSET: ONSET: ON-GOING

## 2025-03-26 ASSESSMENT — PAIN DESCRIPTION - PAIN TYPE: TYPE: CHRONIC PAIN

## 2025-03-26 ASSESSMENT — PAIN DESCRIPTION - ORIENTATION: ORIENTATION: LEFT

## 2025-03-26 ASSESSMENT — PAIN SCALES - GENERAL: PAINLEVEL_OUTOF10: 10

## 2025-03-26 ASSESSMENT — PAIN DESCRIPTION - LOCATION: LOCATION: LEG

## 2025-03-26 ASSESSMENT — PAIN - FUNCTIONAL ASSESSMENT: PAIN_FUNCTIONAL_ASSESSMENT: PREVENTS OR INTERFERES SOME ACTIVE ACTIVITIES AND ADLS

## 2025-03-26 NOTE — PROGRESS NOTES
Wound Healing Center Followup Visit Note    Referring Physician : Rosenda Cormier MD  Amanda Ledesma  MEDICAL RECORD NUMBER:  [id number]  AGE: 69 y.o.   GENDER: female  : 1955  EPISODE DATE:  3/26/2025    Subjective:     Chief Complaint   Patient presents with    Wound Check     leg      HISTORY of PRESENT ILLNESS HPI   Amanda Ledesma is a 69 y.o. female who presents today in regards to follow up evaluation and treatment of wound/ulcer.  That patient's past medical, family and social hx were reviewed and changes were made if present.    History of Wound Context:  The patient has had a wound of her left ankle/calf which was first noted approximately 2021.  This has been treated local wound care. On their initial visit to the wound healing center, 22,  the patient has noted that the wound has been improving.  The patient has not had similar previous wounds in the past.      She started seeing Dr. Street in 2021 and than Dr. Gillis.  She was started in unna boot ~ 2021.  She has noticed some improvement since starting unna boot.  She is currently following with Dr. Haskins.      Pt is not on abx at time of initial visit, but has been treated with previously by podiatry.      She is not a DM.  She is not a smoker.  She denies hx of DVT, and per her report had recent us noting no evidence of dvt at [facility].  She also had arterial studies done.    I had previously seen her in the past in regards to left buttock thigh wound, which started as abscess.      Pt works at [employer] in the [employer] and is on her feet all day.    22  Reflux study - if significant findings will schedule for fu  Continue compression therapy [facility] per podiatry with aquacell dressing  Elevation  She does not have significant arterial occlusive disease  22  Patient has asked to continuing following with me going forward because of our previous relationship  She is going to let Dr. Schuler office know  She

## 2025-03-26 NOTE — TELEPHONE ENCOUNTER
Ana Cristina with [facility] called to see if PCP will follow for Skilled Nursing. Pt will be getting a PICC line for antibiotics on Friday.    Last seen 3/19/2025  Next appt 9/17/2025

## 2025-03-26 NOTE — TELEPHONE ENCOUNTER
Per Maida Hernandez-  Shouldn't the prescriber of the IV antibiotics be managing the HHC?       Called Ana Cristina to advise PCP note. Ana Cristina will go through that provider and wanted to make sure pt was current with PCP.

## 2025-03-27 ENCOUNTER — RESULTS FOLLOW-UP (OUTPATIENT)
Dept: FAMILY MEDICINE CLINIC | Age: 70
End: 2025-03-27

## 2025-03-27 DIAGNOSIS — E78.00 PURE HYPERCHOLESTEROLEMIA: Primary | ICD-10-CM

## 2025-03-27 LAB
CHOLEST SERPL-MCNC: 252 MG/DL
HDLC SERPL-MCNC: 58 MG/DL
LDLC SERPL CALC-MCNC: 172 MG/DL
TRIGL SERPL-MCNC: 112 MG/DL
VLDLC SERPL CALC-MCNC: 22 MG/DL

## 2025-03-27 RX ORDER — ATORVASTATIN CALCIUM 10 MG/1
10 TABLET, FILM COATED ORAL DAILY
Qty: 90 TABLET | Refills: 3 | Status: SHIPPED | OUTPATIENT
Start: 2025-03-27

## 2025-03-27 NOTE — RESULT ENCOUNTER NOTE
Total cholesterol and bad cholesterol are very high.    In addition to following a low cholesterol diet, recommendation is to start cholesterol medication at this time.    Atorvastatin 10 mg 1 tablet at bedtime.    Recommend starting co-Q10 100 mg/day to mitigate any muscle side effects 1 week prior to starting the atorvastatin.    Labs will be drawn for repeat cholesterol in 3 months to assess the response of the cholesterol to the medication treatment.

## 2025-03-27 NOTE — DISCHARGE INSTRUCTIONS
Visit Discharge/Physician Orders     Discharge condition: Stable     Assessment of pain at discharge: yes     Anesthetic used: 4% lidocaine solution     Discharge to: Home     Left via:Private automobile     Accompanied by:self     ECF/HHA: [facility] [phone]     Dressing Orders: LEFT MEDIAL LEG: Cleanse with normal saline, apply zinc to fiona wound, apply Calcium Alginate Ag, ABD pad, and Coban 2. Change weekly.     Treatment Orders: Eat a diet high in protein and vitamin C. Take a multiple vitamin daily unless contraindicated.     Drink Protein Shakes ([employer])      Dr. Duncan re: IV antibiotics      [facility] followup visit : 1 week Dr. HERNANDEZ   __________________________________  (Please note your next appointment above and if you are unable to keep, kindly give a 24 hour notice. Thank you.)     Physician signature:__________________________     If you experience any of the following, please call the Wound Care Center during business hours:     * Increase in Pain  * Temperature over 101  * Increase in drainage from your wound  * Drainage with a foul odor  * Bleeding  * Increase in swelling  * Need for compression bandage changes due to slippage, breakthrough drainage.     If you need medical attention outside of the business hours of the Wound Care Centers please contact your PCP or go to the nearest emergency room

## 2025-04-01 ENCOUNTER — HOSPITAL ENCOUNTER (OUTPATIENT)
Dept: INFUSION THERAPY | Age: 70
Setting detail: INFUSION SERIES
Discharge: HOME | End: 2025-04-01
Payer: MEDICARE

## 2025-04-01 VITALS
HEART RATE: 60 BPM | SYSTOLIC BLOOD PRESSURE: 112 MMHG | OXYGEN SATURATION: 97 % | HEIGHT: 68 IN | DIASTOLIC BLOOD PRESSURE: 70 MMHG | BODY MASS INDEX: 26.52 KG/M2 | WEIGHT: 175 LBS | TEMPERATURE: 97.4 F | RESPIRATION RATE: 16 BRPM

## 2025-04-01 DIAGNOSIS — S91.002D OPEN DISLOCATION OF LEFT ANKLE, SUBSEQUENT ENCOUNTER: Primary | ICD-10-CM

## 2025-04-01 DIAGNOSIS — S93.05XD OPEN DISLOCATION OF LEFT ANKLE, SUBSEQUENT ENCOUNTER: Primary | ICD-10-CM

## 2025-04-01 LAB
ALBUMIN SERPL-MCNC: 3.8 G/DL (ref 3.5–5.2)
ALP SERPL-CCNC: 79 U/L (ref 35–104)
ALT SERPL-CCNC: 9 U/L (ref 0–32)
ANION GAP SERPL CALCULATED.3IONS-SCNC: 8 MMOL/L (ref 7–16)
AST SERPL-CCNC: 13 U/L (ref 0–31)
BASOPHILS # BLD: 0.04 K/UL (ref 0–0.2)
BASOPHILS NFR BLD: 1 % (ref 0–2)
BILIRUB SERPL-MCNC: 0.2 MG/DL (ref 0–1.2)
BUN SERPL-MCNC: 15 MG/DL (ref 6–23)
CALCIUM SERPL-MCNC: 9.1 MG/DL (ref 8.6–10.2)
CHLORIDE SERPL-SCNC: 107 MMOL/L (ref 98–107)
CO2 SERPL-SCNC: 22 MMOL/L (ref 22–29)
CREAT SERPL-MCNC: 1 MG/DL (ref 0.5–1)
CRP SERPL HS-MCNC: 9 MG/L (ref 0–5)
EOSINOPHIL # BLD: 0.04 K/UL (ref 0.05–0.5)
EOSINOPHILS RELATIVE PERCENT: 1 % (ref 0–6)
ERYTHROCYTE [DISTWIDTH] IN BLOOD BY AUTOMATED COUNT: 14.4 % (ref 11.5–15)
ERYTHROCYTE [SEDIMENTATION RATE] IN BLOOD BY WESTERGREN METHOD: 40 MM/HR (ref 0–20)
GFR, ESTIMATED: 63 ML/MIN/1.73M2
GLUCOSE SERPL-MCNC: 89 MG/DL (ref 74–99)
HCT VFR BLD AUTO: 33.6 % (ref 34–48)
HGB BLD-MCNC: 10.5 G/DL (ref 11.5–15.5)
IMM GRANULOCYTES # BLD AUTO: <0.03 K/UL (ref 0–0.58)
IMM GRANULOCYTES NFR BLD: 0 % (ref 0–5)
LYMPHOCYTES NFR BLD: 2.01 K/UL (ref 1.5–4)
LYMPHOCYTES RELATIVE PERCENT: 28 % (ref 20–42)
MCH RBC QN AUTO: 31 PG (ref 26–35)
MCHC RBC AUTO-ENTMCNC: 31.3 G/DL (ref 32–34.5)
MCV RBC AUTO: 99.1 FL (ref 80–99.9)
MONOCYTES NFR BLD: 0.59 K/UL (ref 0.1–0.95)
MONOCYTES NFR BLD: 8 % (ref 2–12)
NEUTROPHILS NFR BLD: 63 % (ref 43–80)
NEUTS SEG NFR BLD: 4.54 K/UL (ref 1.8–7.3)
PLATELET # BLD AUTO: 245 K/UL (ref 130–450)
PMV BLD AUTO: 9.7 FL (ref 7–12)
POTASSIUM SERPL-SCNC: 4.5 MMOL/L (ref 3.5–5)
PROT SERPL-MCNC: 7.1 G/DL (ref 6.4–8.3)
RBC # BLD AUTO: 3.39 M/UL (ref 3.5–5.5)
SODIUM SERPL-SCNC: 137 MMOL/L (ref 132–146)
WBC OTHER # BLD: 7.2 K/UL (ref 4.5–11.5)

## 2025-04-01 PROCEDURE — 6360000002 HC RX W HCPCS: Performed by: INTERNAL MEDICINE

## 2025-04-01 PROCEDURE — 2500000003 HC RX 250 WO HCPCS: Performed by: INTERNAL MEDICINE

## 2025-04-01 PROCEDURE — 80053 COMPREHEN METABOLIC PANEL: CPT

## 2025-04-01 PROCEDURE — 85652 RBC SED RATE AUTOMATED: CPT

## 2025-04-01 PROCEDURE — 96365 THER/PROPH/DIAG IV INF INIT: CPT

## 2025-04-01 PROCEDURE — C1751 CATH, INF, PER/CENT/MIDLINE: HCPCS

## 2025-04-01 PROCEDURE — 86140 C-REACTIVE PROTEIN: CPT

## 2025-04-01 PROCEDURE — 36410 VNPNXR 3YR/> PHY/QHP DX/THER: CPT

## 2025-04-01 PROCEDURE — 2580000003 HC RX 258: Performed by: INTERNAL MEDICINE

## 2025-04-01 PROCEDURE — 85025 COMPLETE CBC W/AUTO DIFF WBC: CPT

## 2025-04-01 RX ORDER — SODIUM CHLORIDE 9 MG/ML
INJECTION, SOLUTION INTRAVENOUS PRN
Status: DISCONTINUED | OUTPATIENT
Start: 2025-04-01 | End: 2025-04-02 | Stop reason: HOSPADM

## 2025-04-01 RX ORDER — SODIUM CHLORIDE 0.9 % (FLUSH) 0.9 %
5-40 SYRINGE (ML) INJECTION PRN
Status: DISCONTINUED | OUTPATIENT
Start: 2025-04-01 | End: 2025-04-01

## 2025-04-01 RX ORDER — SODIUM CHLORIDE 0.9 % (FLUSH) 0.9 %
5-40 SYRINGE (ML) INJECTION PRN
Status: CANCELLED | OUTPATIENT
Start: 2025-04-01

## 2025-04-01 RX ORDER — LIDOCAINE HYDROCHLORIDE 10 MG/ML
50 INJECTION, SOLUTION EPIDURAL; INFILTRATION; INTRACAUDAL; PERINEURAL ONCE
Status: COMPLETED | OUTPATIENT
Start: 2025-04-01 | End: 2025-04-01

## 2025-04-01 RX ORDER — SODIUM CHLORIDE 9 MG/ML
5-250 INJECTION, SOLUTION INTRAVENOUS PRN
Status: CANCELLED | OUTPATIENT
Start: 2025-04-01

## 2025-04-01 RX ORDER — SODIUM CHLORIDE 0.9 % (FLUSH) 0.9 %
5-40 SYRINGE (ML) INJECTION EVERY 12 HOURS SCHEDULED
Status: DISCONTINUED | OUTPATIENT
Start: 2025-04-01 | End: 2025-04-02 | Stop reason: HOSPADM

## 2025-04-01 RX ORDER — HEPARIN 100 UNIT/ML
100 SYRINGE INTRAVENOUS PRN
Status: DISCONTINUED | OUTPATIENT
Start: 2025-04-01 | End: 2025-04-02 | Stop reason: HOSPADM

## 2025-04-01 RX ORDER — SODIUM CHLORIDE 0.9 % (FLUSH) 0.9 %
5-40 SYRINGE (ML) INJECTION PRN
Status: DISCONTINUED | OUTPATIENT
Start: 2025-04-01 | End: 2025-04-02 | Stop reason: HOSPADM

## 2025-04-01 RX ADMIN — SODIUM CHLORIDE, PRESERVATIVE FREE 10 ML: 5 INJECTION INTRAVENOUS at 12:00

## 2025-04-01 RX ADMIN — HEPARIN 100 UNITS: 100 SYRINGE at 12:46

## 2025-04-01 RX ADMIN — SODIUM CHLORIDE, PRESERVATIVE FREE 10 ML: 5 INJECTION INTRAVENOUS at 12:01

## 2025-04-01 RX ADMIN — LIDOCAINE HYDROCHLORIDE 10 MG: 10 SOLUTION INTRAVENOUS at 11:22

## 2025-04-01 RX ADMIN — SODIUM CHLORIDE, PRESERVATIVE FREE 10 ML: 5 INJECTION INTRAVENOUS at 12:46

## 2025-04-01 RX ADMIN — SODIUM CHLORIDE, PRESERVATIVE FREE 10 ML: 5 INJECTION INTRAVENOUS at 12:45

## 2025-04-01 RX ADMIN — CEFEPIME 2000 MG: 2 INJECTION, POWDER, FOR SOLUTION INTRAVENOUS at 12:15

## 2025-04-01 ASSESSMENT — PAIN - FUNCTIONAL ASSESSMENT: PAIN_FUNCTIONAL_ASSESSMENT: PREVENTS OR INTERFERES SOME ACTIVE ACTIVITIES AND ADLS

## 2025-04-01 ASSESSMENT — PAIN DESCRIPTION - LOCATION: LOCATION: LEG

## 2025-04-01 ASSESSMENT — PAIN DESCRIPTION - ONSET: ONSET: ON-GOING

## 2025-04-01 ASSESSMENT — PAIN DESCRIPTION - ORIENTATION: ORIENTATION: LEFT

## 2025-04-01 ASSESSMENT — PAIN DESCRIPTION - DESCRIPTORS: DESCRIPTORS: ACHING

## 2025-04-01 ASSESSMENT — PAIN DESCRIPTION - PAIN TYPE: TYPE: CHRONIC PAIN

## 2025-04-01 ASSESSMENT — PAIN DESCRIPTION - FREQUENCY: FREQUENCY: INTERMITTENT

## 2025-04-01 ASSESSMENT — PAIN SCALES - GENERAL: PAINLEVEL_OUTOF10: 8

## 2025-04-01 NOTE — PROGRESS NOTES
Tolerated maxipime  infusion well.  Reviewed therapy plan, offered education material and/or discharge material, reviewed medication information and signs and symptoms  and educated on possible side effects, verbalizes good knowledge of current plan patient verbalizes understanding, and has no signs or symptoms to report at this time.   Patient discharged. Patient alert and oriented x3.   No distress noted.   Vital signs stable.   Patient denies any new or worsening pain.  Patient denies any needs.  All questions answered.  Next appointment scheduled. Declines  copy of AVS. Patient instructed on s/s infection/complication with midline to report and instructed on keeping midline dressing clean, dry and intact patient verbalizes understanding.  Pt has home care coming out but would like to come here 2xweekly for dressing change and labwork.

## 2025-04-02 ENCOUNTER — HOSPITAL ENCOUNTER (OUTPATIENT)
Dept: WOUND CARE | Age: 70
Discharge: HOME | End: 2025-04-02
Attending: SURGERY
Payer: MEDICARE

## 2025-04-02 VITALS
SYSTOLIC BLOOD PRESSURE: 140 MMHG | BODY MASS INDEX: 26.52 KG/M2 | DIASTOLIC BLOOD PRESSURE: 69 MMHG | TEMPERATURE: 98.1 F | HEIGHT: 68 IN | RESPIRATION RATE: 18 BRPM | HEART RATE: 67 BPM | WEIGHT: 175 LBS

## 2025-04-02 DIAGNOSIS — L97.322 VARICOSE VEINS OF LEFT LOWER EXTREMITY WITH ULCER OF ANKLE WITH FAT LAYER EXPOSED: Primary | ICD-10-CM

## 2025-04-02 DIAGNOSIS — I70.243 ATHEROSCLEROSIS OF NATIVE ARTERY OF LEFT LOWER EXTREMITY WITH ULCERATION OF ANKLE: Chronic | ICD-10-CM

## 2025-04-02 DIAGNOSIS — I70.242 ATHEROSCLEROSIS OF NATIVE ARTERY OF LEFT LOWER EXTREMITY WITH ULCERATION OF CALF: ICD-10-CM

## 2025-04-02 DIAGNOSIS — I83.023 VARICOSE VEINS OF LEFT LOWER EXTREMITY WITH ULCER OF ANKLE WITH FAT LAYER EXPOSED: Primary | ICD-10-CM

## 2025-04-02 PROCEDURE — 11042 DBRDMT SUBQ TIS 1ST 20SQCM/<: CPT

## 2025-04-02 PROCEDURE — 11042 DBRDMT SUBQ TIS 1ST 20SQCM/<: CPT | Performed by: SURGERY

## 2025-04-02 RX ORDER — NEOMYCIN/BACITRACIN/POLYMYXINB 3.5-400-5K
OINTMENT (GRAM) TOPICAL ONCE
OUTPATIENT
Start: 2025-04-02 | End: 2025-04-02

## 2025-04-02 RX ORDER — CLOBETASOL PROPIONATE 0.5 MG/G
OINTMENT TOPICAL ONCE
OUTPATIENT
Start: 2025-04-02 | End: 2025-04-02

## 2025-04-02 RX ORDER — TRIAMCINOLONE ACETONIDE 1 MG/G
OINTMENT TOPICAL ONCE
OUTPATIENT
Start: 2025-04-02 | End: 2025-04-02

## 2025-04-02 RX ORDER — LIDOCAINE HYDROCHLORIDE 20 MG/ML
JELLY TOPICAL ONCE
OUTPATIENT
Start: 2025-04-02 | End: 2025-04-02

## 2025-04-02 RX ORDER — LIDOCAINE HYDROCHLORIDE 40 MG/ML
SOLUTION TOPICAL ONCE
OUTPATIENT
Start: 2025-04-02 | End: 2025-04-02

## 2025-04-02 RX ORDER — SILVER SULFADIAZINE 10 MG/G
CREAM TOPICAL ONCE
OUTPATIENT
Start: 2025-04-02 | End: 2025-04-02

## 2025-04-02 RX ORDER — MUPIROCIN 20 MG/G
OINTMENT TOPICAL ONCE
OUTPATIENT
Start: 2025-04-02 | End: 2025-04-02

## 2025-04-02 RX ORDER — BETAMETHASONE DIPROPIONATE 0.05 %
OINTMENT (GRAM) TOPICAL ONCE
OUTPATIENT
Start: 2025-04-02 | End: 2025-04-02

## 2025-04-02 RX ORDER — OXYCODONE AND ACETAMINOPHEN 5; 325 MG/1; MG/1
1 TABLET ORAL EVERY 6 HOURS PRN
Qty: 28 TABLET | Refills: 0 | Status: SHIPPED | OUTPATIENT
Start: 2025-04-02 | End: 2025-04-09

## 2025-04-02 RX ORDER — BACITRACIN ZINC AND POLYMYXIN B SULFATE 500; 1000 [USP'U]/G; [USP'U]/G
OINTMENT TOPICAL ONCE
OUTPATIENT
Start: 2025-04-02 | End: 2025-04-02

## 2025-04-02 RX ORDER — LIDOCAINE 40 MG/G
CREAM TOPICAL ONCE
OUTPATIENT
Start: 2025-04-02 | End: 2025-04-02

## 2025-04-02 RX ORDER — LIDOCAINE HYDROCHLORIDE 40 MG/ML
SOLUTION TOPICAL ONCE
Status: COMPLETED | OUTPATIENT
Start: 2025-04-02 | End: 2025-04-02

## 2025-04-02 RX ORDER — GENTAMICIN SULFATE 1 MG/G
OINTMENT TOPICAL ONCE
OUTPATIENT
Start: 2025-04-02 | End: 2025-04-02

## 2025-04-02 RX ORDER — GINSENG 100 MG
CAPSULE ORAL ONCE
OUTPATIENT
Start: 2025-04-02 | End: 2025-04-02

## 2025-04-02 RX ORDER — LIDOCAINE 50 MG/G
OINTMENT TOPICAL ONCE
OUTPATIENT
Start: 2025-04-02 | End: 2025-04-02

## 2025-04-02 RX ADMIN — LIDOCAINE HYDROCHLORIDE 10 ML: 40 SOLUTION TOPICAL at 07:42

## 2025-04-02 ASSESSMENT — PAIN DESCRIPTION - ORIENTATION: ORIENTATION: LEFT

## 2025-04-02 ASSESSMENT — PAIN DESCRIPTION - DESCRIPTORS: DESCRIPTORS: ACHING

## 2025-04-02 ASSESSMENT — PAIN - FUNCTIONAL ASSESSMENT: PAIN_FUNCTIONAL_ASSESSMENT: PREVENTS OR INTERFERES SOME ACTIVE ACTIVITIES AND ADLS

## 2025-04-02 ASSESSMENT — PAIN DESCRIPTION - PAIN TYPE: TYPE: CHRONIC PAIN

## 2025-04-02 ASSESSMENT — PAIN DESCRIPTION - FREQUENCY: FREQUENCY: INTERMITTENT

## 2025-04-02 ASSESSMENT — PAIN DESCRIPTION - ONSET: ONSET: ON-GOING

## 2025-04-02 ASSESSMENT — PAIN DESCRIPTION - LOCATION: LOCATION: LEG

## 2025-04-02 ASSESSMENT — PAIN SCALES - GENERAL: PAINLEVEL_OUTOF10: 8

## 2025-04-03 ENCOUNTER — TELEPHONE (OUTPATIENT)
Dept: FAMILY MEDICINE CLINIC | Age: 70
End: 2025-04-03

## 2025-04-03 NOTE — DISCHARGE INSTRUCTIONS
Visit Discharge/Physician Orders     Discharge condition: Stable     Assessment of pain at discharge: yes     Anesthetic used: 4% lidocaine solution     Discharge to: Home     Left via:Private automobile     Accompanied by:self     ECF/HHA: [facility] [phone]     Dressing Orders: LEFT MEDIAL LEG: Cleanse with normal saline, apply zinc to fiona wound, apply Calcium Alginate Ag, ABD pad, and Coban 2. Change weekly.      Treatment Orders: Eat a diet high in protein and vitamin C. Take a multiple vitamin daily unless contraindicated.     Drink Protein Shakes ([employer])      Dr. Duncan re: IV antibiotics      [facility] followup visit : 1 week Dr. HERNANDEZ   __________________________________  (Please note your next appointment above and if you are unable to keep, kindly give a 24 hour notice. Thank you.)     Physician signature:__________________________     If you experience any of the following, please call the Wound Care Center during business hours:     * Increase in Pain  * Temperature over 101  * Increase in drainage from your wound  * Drainage with a foul odor  * Bleeding  * Increase in swelling  * Need for compression bandage changes due to slippage, breakthrough drainage.     If you need medical attention outside of the business hours of the Wound Care Centers please contact your PCP or go to the nearest emergency room

## 2025-04-03 NOTE — TELEPHONE ENCOUNTER
[facility] called to inform Dr. Cormier that patient will be seen for IV ABX.    Last seen 3/19/2025  Next appt 9/17/2025

## 2025-04-09 ENCOUNTER — HOSPITAL ENCOUNTER (OUTPATIENT)
Dept: WOUND CARE | Age: 70
Discharge: HOME | End: 2025-04-09
Attending: SURGERY
Payer: MEDICARE

## 2025-04-09 VITALS
BODY MASS INDEX: 26.52 KG/M2 | DIASTOLIC BLOOD PRESSURE: 66 MMHG | TEMPERATURE: 98.4 F | SYSTOLIC BLOOD PRESSURE: 118 MMHG | RESPIRATION RATE: 18 BRPM | HEART RATE: 80 BPM | HEIGHT: 68 IN | WEIGHT: 175 LBS

## 2025-04-09 DIAGNOSIS — I83.023 VARICOSE VEINS OF LEFT LOWER EXTREMITY WITH ULCER OF ANKLE WITH FAT LAYER EXPOSED: Primary | ICD-10-CM

## 2025-04-09 DIAGNOSIS — I70.242 ATHEROSCLEROSIS OF NATIVE ARTERY OF LEFT LOWER EXTREMITY WITH ULCERATION OF CALF: ICD-10-CM

## 2025-04-09 DIAGNOSIS — I70.243 ATHEROSCLEROSIS OF NATIVE ARTERY OF LEFT LOWER EXTREMITY WITH ULCERATION OF ANKLE: Chronic | ICD-10-CM

## 2025-04-09 DIAGNOSIS — L97.322 VARICOSE VEINS OF LEFT LOWER EXTREMITY WITH ULCER OF ANKLE WITH FAT LAYER EXPOSED: Primary | ICD-10-CM

## 2025-04-09 PROCEDURE — 11042 DBRDMT SUBQ TIS 1ST 20SQCM/<: CPT | Performed by: SURGERY

## 2025-04-09 PROCEDURE — 11042 DBRDMT SUBQ TIS 1ST 20SQCM/<: CPT

## 2025-04-09 RX ORDER — NEOMYCIN/BACITRACIN/POLYMYXINB 3.5-400-5K
OINTMENT (GRAM) TOPICAL ONCE
OUTPATIENT
Start: 2025-04-09 | End: 2025-04-09

## 2025-04-09 RX ORDER — LIDOCAINE HYDROCHLORIDE 40 MG/ML
SOLUTION TOPICAL ONCE
OUTPATIENT
Start: 2025-04-09 | End: 2025-04-09

## 2025-04-09 RX ORDER — BACITRACIN ZINC AND POLYMYXIN B SULFATE 500; 1000 [USP'U]/G; [USP'U]/G
OINTMENT TOPICAL ONCE
OUTPATIENT
Start: 2025-04-09 | End: 2025-04-09

## 2025-04-09 RX ORDER — GENTAMICIN SULFATE 1 MG/G
OINTMENT TOPICAL ONCE
OUTPATIENT
Start: 2025-04-09 | End: 2025-04-09

## 2025-04-09 RX ORDER — LIDOCAINE HYDROCHLORIDE 20 MG/ML
JELLY TOPICAL ONCE
OUTPATIENT
Start: 2025-04-09 | End: 2025-04-09

## 2025-04-09 RX ORDER — MUPIROCIN 20 MG/G
OINTMENT TOPICAL ONCE
OUTPATIENT
Start: 2025-04-09 | End: 2025-04-09

## 2025-04-09 RX ORDER — CLOBETASOL PROPIONATE 0.5 MG/G
OINTMENT TOPICAL ONCE
OUTPATIENT
Start: 2025-04-09 | End: 2025-04-09

## 2025-04-09 RX ORDER — LIDOCAINE 40 MG/G
CREAM TOPICAL ONCE
OUTPATIENT
Start: 2025-04-09 | End: 2025-04-09

## 2025-04-09 RX ORDER — BETAMETHASONE DIPROPIONATE 0.05 %
OINTMENT (GRAM) TOPICAL ONCE
OUTPATIENT
Start: 2025-04-09 | End: 2025-04-09

## 2025-04-09 RX ORDER — SILVER SULFADIAZINE 10 MG/G
CREAM TOPICAL ONCE
OUTPATIENT
Start: 2025-04-09 | End: 2025-04-09

## 2025-04-09 RX ORDER — OXYCODONE AND ACETAMINOPHEN 5; 325 MG/1; MG/1
1 TABLET ORAL EVERY 6 HOURS PRN
Qty: 28 TABLET | Refills: 0 | Status: SHIPPED | OUTPATIENT
Start: 2025-04-14 | End: 2025-04-21

## 2025-04-09 RX ORDER — GINSENG 100 MG
CAPSULE ORAL ONCE
OUTPATIENT
Start: 2025-04-09 | End: 2025-04-09

## 2025-04-09 RX ORDER — LIDOCAINE HYDROCHLORIDE 40 MG/ML
SOLUTION TOPICAL ONCE
Status: COMPLETED | OUTPATIENT
Start: 2025-04-09 | End: 2025-04-09

## 2025-04-09 RX ORDER — LIDOCAINE 50 MG/G
OINTMENT TOPICAL ONCE
OUTPATIENT
Start: 2025-04-09 | End: 2025-04-09

## 2025-04-09 RX ORDER — TRIAMCINOLONE ACETONIDE 1 MG/G
OINTMENT TOPICAL ONCE
OUTPATIENT
Start: 2025-04-09 | End: 2025-04-09

## 2025-04-09 RX ADMIN — LIDOCAINE HYDROCHLORIDE 20 ML: 40 SOLUTION TOPICAL at 07:56

## 2025-04-09 ASSESSMENT — PAIN DESCRIPTION - PROGRESSION: CLINICAL_PROGRESSION: NOT CHANGED

## 2025-04-11 NOTE — DISCHARGE INSTRUCTIONS
Visit Discharge/Physician Orders     Discharge condition: Stable     Assessment of pain at discharge: yes     Anesthetic used: 4% lidocaine solution     Discharge to: Home     Left via:Private automobile     Accompanied by:self     ECF/HHA: [facility] [phone]     Dressing Orders: LEFT MEDIAL LEG: Cleanse with normal saline, apply zinc to fiona wound, apply Calcium Alginate Ag, ABD pad, and Coban 2. Change weekly.      Treatment Orders: Eat a diet high in protein and vitamin C. Take a multiple vitamin daily unless contraindicated.     Drink Protein Shakes ([employer])      Dr. Duncan re: IV antibiotics      [facility] followup visit : 1 week Dr. HERNANDEZ   __________________________________  (Please note your next appointment above and if you are unable to keep, kindly give a 24 hour notice. Thank you.)     Physician signature:__________________________     If you experience any of the following, please call the Wound Care Center during business hours:     * Increase in Pain  * Temperature over 101  * Increase in drainage from your wound  * Drainage with a foul odor  * Bleeding  * Increase in swelling  * Need for compression bandage changes due to slippage, breakthrough drainage.     If you need medical attention outside of the business hours of the Wound Care Centers please contact your PCP or go to the nearest emergency room

## 2025-04-14 ENCOUNTER — HOSPITAL ENCOUNTER (OUTPATIENT)
Dept: INFUSION THERAPY | Age: 70
Setting detail: INFUSION SERIES
Discharge: HOME | End: 2025-04-14
Payer: MEDICARE

## 2025-04-14 VITALS
RESPIRATION RATE: 18 BRPM | DIASTOLIC BLOOD PRESSURE: 38 MMHG | OXYGEN SATURATION: 100 % | WEIGHT: 175 LBS | SYSTOLIC BLOOD PRESSURE: 110 MMHG | HEIGHT: 68 IN | HEART RATE: 77 BPM | TEMPERATURE: 97.7 F | BODY MASS INDEX: 26.52 KG/M2

## 2025-04-14 DIAGNOSIS — Z48.00 DRESSING CHANGE: Primary | ICD-10-CM

## 2025-04-14 DIAGNOSIS — S91.002D OPEN WOUND OF LEFT ANKLE, SUBSEQUENT ENCOUNTER: Primary | ICD-10-CM

## 2025-04-14 DIAGNOSIS — S91.002D OPEN WOUND OF LEFT ANKLE, SUBSEQUENT ENCOUNTER: ICD-10-CM

## 2025-04-14 PROBLEM — S91.002A OPEN WOUND OF LEFT ANKLE: Status: ACTIVE | Noted: 2025-04-14

## 2025-04-14 LAB
ALBUMIN SERPL-MCNC: 4.3 G/DL (ref 3.5–5.2)
ALP SERPL-CCNC: 79 U/L (ref 35–104)
ALT SERPL-CCNC: 15 U/L (ref 0–32)
ANION GAP SERPL CALCULATED.3IONS-SCNC: 13 MMOL/L (ref 7–16)
AST SERPL-CCNC: 16 U/L (ref 0–31)
BASOPHILS # BLD: 0.1 K/UL (ref 0–0.2)
BASOPHILS NFR BLD: 1 % (ref 0–2)
BILIRUB SERPL-MCNC: 0.2 MG/DL (ref 0–1.2)
BUN SERPL-MCNC: 29 MG/DL (ref 6–23)
CALCIUM SERPL-MCNC: 9.5 MG/DL (ref 8.6–10.2)
CHLORIDE SERPL-SCNC: 110 MMOL/L (ref 98–107)
CO2 SERPL-SCNC: 16 MMOL/L (ref 22–29)
CREAT SERPL-MCNC: 1.5 MG/DL (ref 0.5–1)
CRP SERPL HS-MCNC: <3 MG/L (ref 0–5)
EOSINOPHIL # BLD: 0.27 K/UL (ref 0.05–0.5)
EOSINOPHILS RELATIVE PERCENT: 4 % (ref 0–6)
ERYTHROCYTE [DISTWIDTH] IN BLOOD BY AUTOMATED COUNT: 15 % (ref 11.5–15)
ERYTHROCYTE [SEDIMENTATION RATE] IN BLOOD BY WESTERGREN METHOD: 35 MM/HR (ref 0–20)
GFR, ESTIMATED: 40 ML/MIN/1.73M2
GLUCOSE SERPL-MCNC: 80 MG/DL (ref 74–99)
HCT VFR BLD AUTO: 35.9 % (ref 34–48)
HGB BLD-MCNC: 11.6 G/DL (ref 11.5–15.5)
IMM GRANULOCYTES # BLD AUTO: 0.05 K/UL (ref 0–0.58)
IMM GRANULOCYTES NFR BLD: 1 % (ref 0–5)
LYMPHOCYTES NFR BLD: 1.94 K/UL (ref 1.5–4)
LYMPHOCYTES RELATIVE PERCENT: 27 % (ref 20–42)
MCH RBC QN AUTO: 31.4 PG (ref 26–35)
MCHC RBC AUTO-ENTMCNC: 32.3 G/DL (ref 32–34.5)
MCV RBC AUTO: 97.3 FL (ref 80–99.9)
MONOCYTES NFR BLD: 0.62 K/UL (ref 0.1–0.95)
MONOCYTES NFR BLD: 9 % (ref 2–12)
NEUTROPHILS NFR BLD: 58 % (ref 43–80)
NEUTS SEG NFR BLD: 4.12 K/UL (ref 1.8–7.3)
PLATELET # BLD AUTO: 194 K/UL (ref 130–450)
PMV BLD AUTO: 11.1 FL (ref 7–12)
POTASSIUM SERPL-SCNC: 4.7 MMOL/L (ref 3.5–5)
PROT SERPL-MCNC: 7.6 G/DL (ref 6.4–8.3)
RBC # BLD AUTO: 3.69 M/UL (ref 3.5–5.5)
SODIUM SERPL-SCNC: 139 MMOL/L (ref 132–146)
WBC OTHER # BLD: 7.1 K/UL (ref 4.5–11.5)

## 2025-04-14 PROCEDURE — 85652 RBC SED RATE AUTOMATED: CPT

## 2025-04-14 PROCEDURE — 2500000003 HC RX 250 WO HCPCS: Performed by: INTERNAL MEDICINE

## 2025-04-14 PROCEDURE — 85025 COMPLETE CBC W/AUTO DIFF WBC: CPT

## 2025-04-14 PROCEDURE — 86140 C-REACTIVE PROTEIN: CPT

## 2025-04-14 PROCEDURE — 80053 COMPREHEN METABOLIC PANEL: CPT

## 2025-04-14 PROCEDURE — 96523 IRRIG DRUG DELIVERY DEVICE: CPT

## 2025-04-14 PROCEDURE — 6360000002 HC RX W HCPCS: Performed by: INTERNAL MEDICINE

## 2025-04-14 PROCEDURE — 36415 COLL VENOUS BLD VENIPUNCTURE: CPT

## 2025-04-14 RX ORDER — SODIUM CHLORIDE 0.9 % (FLUSH) 0.9 %
5-40 SYRINGE (ML) INJECTION PRN
Status: CANCELLED | OUTPATIENT
Start: 2025-04-14

## 2025-04-14 RX ORDER — SODIUM CHLORIDE 9 MG/ML
25 INJECTION, SOLUTION INTRAVENOUS PRN
Status: CANCELLED | OUTPATIENT
Start: 2025-04-14

## 2025-04-14 RX ORDER — HEPARIN 100 UNIT/ML
100 SYRINGE INTRAVENOUS PRN
Status: CANCELLED | OUTPATIENT
Start: 2025-04-14

## 2025-04-14 RX ORDER — SODIUM CHLORIDE 0.9 % (FLUSH) 0.9 %
5-40 SYRINGE (ML) INJECTION PRN
Status: DISCONTINUED | OUTPATIENT
Start: 2025-04-14 | End: 2025-04-15 | Stop reason: HOSPADM

## 2025-04-14 RX ORDER — HEPARIN 100 UNIT/ML
100 SYRINGE INTRAVENOUS PRN
Status: DISCONTINUED | OUTPATIENT
Start: 2025-04-14 | End: 2025-04-15 | Stop reason: HOSPADM

## 2025-04-14 RX ADMIN — SODIUM CHLORIDE, PRESERVATIVE FREE 10 ML: 5 INJECTION INTRAVENOUS at 13:59

## 2025-04-14 RX ADMIN — HEPARIN 100 UNITS: 100 SYRINGE at 14:00

## 2025-04-14 RX ADMIN — SODIUM CHLORIDE, PRESERVATIVE FREE 10 ML: 5 INJECTION INTRAVENOUS at 13:58

## 2025-04-14 ASSESSMENT — PAIN DESCRIPTION - ORIENTATION: ORIENTATION: LEFT

## 2025-04-14 ASSESSMENT — PAIN DESCRIPTION - LOCATION: LOCATION: LEG

## 2025-04-14 ASSESSMENT — PAIN DESCRIPTION - DESCRIPTORS: DESCRIPTORS: ACHING

## 2025-04-14 ASSESSMENT — PAIN DESCRIPTION - FREQUENCY: FREQUENCY: CONTINUOUS

## 2025-04-14 ASSESSMENT — PAIN SCALES - GENERAL: PAINLEVEL_OUTOF10: 8

## 2025-04-14 NOTE — PROGRESS NOTES
Tolerated midline flush well.  Reviewed therapy plan, offered education material and/or discharge material, verbalizes good knowledge of current plan patient verbalizes understanding, and has no signs or symptoms to report at this time. Patient discharged. Patient alert and oriented x3.   No distress noted.   Vital signs stable.   Patient denies any new or worsening pain.  Patient denies any needs.  All questions answered.  Next appointment scheduled. Declines copy of AVS.

## 2025-04-15 ENCOUNTER — HOSPITAL ENCOUNTER (OUTPATIENT)
Dept: INFUSION THERAPY | Age: 70
Setting detail: INFUSION SERIES
Discharge: HOME | End: 2025-04-15
Payer: MEDICARE

## 2025-04-15 VITALS
OXYGEN SATURATION: 100 % | BODY MASS INDEX: 26.52 KG/M2 | HEIGHT: 68 IN | WEIGHT: 175 LBS | RESPIRATION RATE: 16 BRPM | SYSTOLIC BLOOD PRESSURE: 134 MMHG | HEART RATE: 91 BPM | DIASTOLIC BLOOD PRESSURE: 83 MMHG | TEMPERATURE: 97.6 F

## 2025-04-15 DIAGNOSIS — S91.002D OPEN WOUND OF LEFT ANKLE, SUBSEQUENT ENCOUNTER: Primary | ICD-10-CM

## 2025-04-15 PROCEDURE — 6360000002 HC RX W HCPCS: Performed by: INTERNAL MEDICINE

## 2025-04-15 PROCEDURE — 96523 IRRIG DRUG DELIVERY DEVICE: CPT

## 2025-04-15 PROCEDURE — 2500000003 HC RX 250 WO HCPCS: Performed by: INTERNAL MEDICINE

## 2025-04-15 RX ORDER — HEPARIN 100 UNIT/ML
100 SYRINGE INTRAVENOUS PRN
Status: CANCELLED | OUTPATIENT
Start: 2025-04-15

## 2025-04-15 RX ORDER — HEPARIN 100 UNIT/ML
100 SYRINGE INTRAVENOUS PRN
Status: DISCONTINUED | OUTPATIENT
Start: 2025-04-15 | End: 2025-04-16 | Stop reason: HOSPADM

## 2025-04-15 RX ORDER — SODIUM CHLORIDE 0.9 % (FLUSH) 0.9 %
5-40 SYRINGE (ML) INJECTION PRN
Status: CANCELLED | OUTPATIENT
Start: 2025-04-15

## 2025-04-15 RX ORDER — SODIUM CHLORIDE 0.9 % (FLUSH) 0.9 %
5-40 SYRINGE (ML) INJECTION PRN
Status: DISCONTINUED | OUTPATIENT
Start: 2025-04-15 | End: 2025-04-16 | Stop reason: HOSPADM

## 2025-04-15 RX ORDER — SODIUM CHLORIDE 9 MG/ML
25 INJECTION, SOLUTION INTRAVENOUS PRN
Status: CANCELLED | OUTPATIENT
Start: 2025-04-15

## 2025-04-15 RX ADMIN — HEPARIN 100 UNITS: 100 SYRINGE at 08:48

## 2025-04-15 RX ADMIN — SODIUM CHLORIDE, PRESERVATIVE FREE 20 ML: 5 INJECTION INTRAVENOUS at 08:48

## 2025-04-15 NOTE — PROGRESS NOTES
Patient called in and left message yesterday evening on the voicemail that she was unable to flush her line. Called and spoke with patient instructed her to make sure all the clamps are open and she said she did check and they are all open. She said she did screw the flush on but was unable to flush and she tried a different saline flush because she though she had a bad one. Appointment made and instructed patient to come now so we can assess the line she agrees.

## 2025-04-15 NOTE — PROGRESS NOTES
Patient came in today stating that her Midline will not flush. Patient had both the white and yellow clamps clamped off. Clamps were unclamped and midline flushed without difficulty. Patient stated that she did not know to unclamp a yellow clamp. Patient was explained how to unclamp the yellow clamps and a return demonstration was performed.

## 2025-04-16 ENCOUNTER — HOSPITAL ENCOUNTER (OUTPATIENT)
Dept: WOUND CARE | Age: 70
Discharge: HOME | End: 2025-04-16
Attending: SURGERY
Payer: MEDICARE

## 2025-04-16 VITALS
DIASTOLIC BLOOD PRESSURE: 59 MMHG | SYSTOLIC BLOOD PRESSURE: 114 MMHG | HEART RATE: 80 BPM | WEIGHT: 175 LBS | HEIGHT: 68 IN | RESPIRATION RATE: 18 BRPM | BODY MASS INDEX: 26.52 KG/M2

## 2025-04-16 DIAGNOSIS — I70.242 ATHEROSCLEROSIS OF NATIVE ARTERY OF LEFT LOWER EXTREMITY WITH ULCERATION OF CALF: ICD-10-CM

## 2025-04-16 DIAGNOSIS — I70.243 ATHEROSCLEROSIS OF NATIVE ARTERY OF LEFT LOWER EXTREMITY WITH ULCERATION OF ANKLE: Chronic | ICD-10-CM

## 2025-04-16 DIAGNOSIS — L97.322 VARICOSE VEINS OF LEFT LOWER EXTREMITY WITH ULCER OF ANKLE WITH FAT LAYER EXPOSED: Primary | ICD-10-CM

## 2025-04-16 DIAGNOSIS — I83.023 VARICOSE VEINS OF LEFT LOWER EXTREMITY WITH ULCER OF ANKLE WITH FAT LAYER EXPOSED: Primary | ICD-10-CM

## 2025-04-16 PROCEDURE — 11042 DBRDMT SUBQ TIS 1ST 20SQCM/<: CPT | Performed by: SURGERY

## 2025-04-16 PROCEDURE — 11042 DBRDMT SUBQ TIS 1ST 20SQCM/<: CPT

## 2025-04-16 RX ORDER — CLOBETASOL PROPIONATE 0.5 MG/G
OINTMENT TOPICAL ONCE
OUTPATIENT
Start: 2025-04-16 | End: 2025-04-16

## 2025-04-16 RX ORDER — LIDOCAINE HYDROCHLORIDE 40 MG/ML
SOLUTION TOPICAL ONCE
OUTPATIENT
Start: 2025-04-16 | End: 2025-04-16

## 2025-04-16 RX ORDER — BETAMETHASONE DIPROPIONATE 0.05 %
OINTMENT (GRAM) TOPICAL ONCE
OUTPATIENT
Start: 2025-04-16 | End: 2025-04-16

## 2025-04-16 RX ORDER — SILVER SULFADIAZINE 10 MG/G
CREAM TOPICAL ONCE
OUTPATIENT
Start: 2025-04-16 | End: 2025-04-16

## 2025-04-16 RX ORDER — TRIAMCINOLONE ACETONIDE 1 MG/G
OINTMENT TOPICAL ONCE
OUTPATIENT
Start: 2025-04-16 | End: 2025-04-16

## 2025-04-16 RX ORDER — LIDOCAINE 50 MG/G
OINTMENT TOPICAL ONCE
OUTPATIENT
Start: 2025-04-16 | End: 2025-04-16

## 2025-04-16 RX ORDER — LIDOCAINE 40 MG/G
CREAM TOPICAL ONCE
OUTPATIENT
Start: 2025-04-16 | End: 2025-04-16

## 2025-04-16 RX ORDER — GINSENG 100 MG
CAPSULE ORAL ONCE
OUTPATIENT
Start: 2025-04-16 | End: 2025-04-16

## 2025-04-16 RX ORDER — MUPIROCIN 20 MG/G
OINTMENT TOPICAL ONCE
OUTPATIENT
Start: 2025-04-16 | End: 2025-04-16

## 2025-04-16 RX ORDER — GENTAMICIN SULFATE 1 MG/G
OINTMENT TOPICAL ONCE
OUTPATIENT
Start: 2025-04-16 | End: 2025-04-16

## 2025-04-16 RX ORDER — BACITRACIN ZINC AND POLYMYXIN B SULFATE 500; 1000 [USP'U]/G; [USP'U]/G
OINTMENT TOPICAL ONCE
OUTPATIENT
Start: 2025-04-16 | End: 2025-04-16

## 2025-04-16 RX ORDER — CEFEPIME HYDROCHLORIDE 2 G/1
1 INJECTION, POWDER, FOR SOLUTION INTRAVENOUS EVERY 8 HOURS
COMMUNITY
Start: 2025-04-14

## 2025-04-16 RX ORDER — LIDOCAINE HYDROCHLORIDE 20 MG/ML
JELLY TOPICAL ONCE
OUTPATIENT
Start: 2025-04-16 | End: 2025-04-16

## 2025-04-16 RX ORDER — OXYCODONE AND ACETAMINOPHEN 5; 325 MG/1; MG/1
1 TABLET ORAL EVERY 6 HOURS PRN
Qty: 28 TABLET | Refills: 0 | Status: SHIPPED | OUTPATIENT
Start: 2025-04-26 | End: 2025-05-03

## 2025-04-16 RX ORDER — LIDOCAINE HYDROCHLORIDE 40 MG/ML
SOLUTION TOPICAL ONCE
Status: COMPLETED | OUTPATIENT
Start: 2025-04-16 | End: 2025-04-16

## 2025-04-16 RX ORDER — NEOMYCIN/BACITRACIN/POLYMYXINB 3.5-400-5K
OINTMENT (GRAM) TOPICAL ONCE
OUTPATIENT
Start: 2025-04-16 | End: 2025-04-16

## 2025-04-16 RX ADMIN — LIDOCAINE HYDROCHLORIDE 10 ML: 40 SOLUTION TOPICAL at 07:45

## 2025-04-16 ASSESSMENT — PAIN DESCRIPTION - FREQUENCY: FREQUENCY: INTERMITTENT

## 2025-04-16 ASSESSMENT — PAIN DESCRIPTION - DESCRIPTORS: DESCRIPTORS: ACHING

## 2025-04-16 ASSESSMENT — PAIN - FUNCTIONAL ASSESSMENT: PAIN_FUNCTIONAL_ASSESSMENT: PREVENTS OR INTERFERES SOME ACTIVE ACTIVITIES AND ADLS

## 2025-04-16 ASSESSMENT — PAIN DESCRIPTION - ORIENTATION: ORIENTATION: LEFT

## 2025-04-16 ASSESSMENT — PAIN DESCRIPTION - ONSET: ONSET: ON-GOING

## 2025-04-16 ASSESSMENT — PAIN DESCRIPTION - PAIN TYPE: TYPE: CHRONIC PAIN

## 2025-04-16 ASSESSMENT — PAIN DESCRIPTION - LOCATION: LOCATION: LEG

## 2025-04-16 ASSESSMENT — PAIN SCALES - GENERAL: PAINLEVEL_OUTOF10: 7

## 2025-04-16 NOTE — PROGRESS NOTES
[facility]                                                                                                                    PRE OP INSTRUCTIONS FOR  Amanda Ledesma        Date: 4/16/2025    Date of surgery: 4/17/25   Arrival Time: Hospital will call you between 5pm and 7pm with your final arrival time for surgery. Go to front of hospital and check in at information desk.    Nothing by mouth (NPO) as instructed. May have clear liquids up to 2 hours prior to surgery. Nothing solid after midnight. Examples: water, apple juice, black coffee, plain tea    Take the following medications with a small sip of water on the morning of Surgery: Hydroxyzine, Percocet prn, IV antibiotic     Diabetics may take half the evening dose of insulin but none after midnight.  If you feel symptomatic or have low blood sugar morning of surgery drink 1-2 ounces of apple juice only. If you take a weekly insulin injection _______________, stop 7 days prior to surgery. If you take _______________, stop 3-4 days prior to surgery.    Aspirin, Ibuprofen, Advil, Naproxen, other Anti-inflammatory products should be stopped before surgery as directed by your surgeon, cardiologist, or primary care Doctor. Herbal supplements and Vitamin E should be stopped five days prior.  May take Tylenol unless instructed otherwise by your surgeon.    Check with your Doctor regarding stopping Plavix, Coumadin, Lovenox, Eliquis, Effient, or other blood thinners such as, pradaxa, lixiana, xaralto and savaysa.    Do not smoke, chew tobacco, vape, or use illicit drugs and do not drink any alcoholic beverages 24 hours prior to surgery.    You may brush your teeth the morning of surgery.      You MUST make arrangements for a responsible adult, 18 and over, to take you home after your surgery. You will not be allowed to leave alone or drive yourself home.  You will need someone stay with you the first 24 hrs. Your surgery will be cancelled if

## 2025-04-16 NOTE — PROGRESS NOTES
Wound Healing Center Followup Visit Note    Referring Physician : Rosenda Cormier MD  Amanda Ledesma  MEDICAL RECORD NUMBER:  [id number]  AGE: 69 y.o.   GENDER: female  : 1955  EPISODE DATE:  2025    Subjective:     Chief Complaint   Patient presents with    Wound Check     Left leg      HISTORY of PRESENT ILLNESS HPI   Amanda Ledesma is a 69 y.o. female who presents today in regards to follow up evaluation and treatment of wound/ulcer.  That patient's past medical, family and social hx were reviewed and changes were made if present.    History of Wound Context:  The patient has had a wound of her left ankle/calf which was first noted approximately 2021.  This has been treated local wound care. On their initial visit to the wound healing center, 22,  the patient has noted that the wound has been improving.  The patient has not had similar previous wounds in the past.      She started seeing Dr. Street in 2021 and than Dr. Gillis.  She was started in unna boot ~ 2021.  She has noticed some improvement since starting unna boot.  She is currently following with Dr. Haskins.      Pt is not on abx at time of initial visit, but has been treated with previously by podiatry.      She is not a DM.  She is not a smoker.  She denies hx of DVT, and per her report had recent us noting no evidence of dvt at [facility].  She also had arterial studies done.    I had previously seen her in the past in regards to left buttock thigh wound, which started as abscess.      Pt works at [employer] in the [place] and is on her feet all day.    22  Reflux study - if significant findings will schedule for fu  Continue compression therapy [facility] per podiatry with aquacell dressing  Elevation  She does not have significant arterial occlusive disease  22  Patient has asked to continuing following with me going forward because of our previous relationship  She is going to let Dr. Schuler office know  She

## 2025-04-17 ENCOUNTER — ANESTHESIA (OUTPATIENT)
Dept: ENDOSCOPY | Age: 70
End: 2025-04-17
Payer: MEDICARE

## 2025-04-17 ENCOUNTER — HOSPITAL ENCOUNTER (OUTPATIENT)
Age: 70
Setting detail: OUTPATIENT SURGERY
Discharge: HOME | End: 2025-04-17
Attending: SURGERY | Admitting: SURGERY
Payer: MEDICARE

## 2025-04-17 ENCOUNTER — ANESTHESIA EVENT (OUTPATIENT)
Dept: ENDOSCOPY | Age: 70
End: 2025-04-17
Payer: MEDICARE

## 2025-04-17 VITALS
OXYGEN SATURATION: 95 % | BODY MASS INDEX: 26.22 KG/M2 | DIASTOLIC BLOOD PRESSURE: 64 MMHG | RESPIRATION RATE: 20 BRPM | TEMPERATURE: 97.8 F | SYSTOLIC BLOOD PRESSURE: 110 MMHG | HEART RATE: 68 BPM | WEIGHT: 173 LBS | HEIGHT: 68 IN

## 2025-04-17 DIAGNOSIS — R11.2 NAUSEA AND VOMITING: ICD-10-CM

## 2025-04-17 DIAGNOSIS — K21.9 GERD (GASTROESOPHAGEAL REFLUX DISEASE): ICD-10-CM

## 2025-04-17 PROCEDURE — 88305 TISSUE EXAM BY PATHOLOGIST: CPT

## 2025-04-17 PROCEDURE — 6360000002 HC RX W HCPCS: Performed by: ANESTHESIOLOGY

## 2025-04-17 PROCEDURE — 2709999900 HC NON-CHARGEABLE SUPPLY: Performed by: SURGERY

## 2025-04-17 PROCEDURE — 7100000010 HC PHASE II RECOVERY - FIRST 15 MIN: Performed by: SURGERY

## 2025-04-17 PROCEDURE — 88342 IMHCHEM/IMCYTCHM 1ST ANTB: CPT

## 2025-04-17 PROCEDURE — 2500000003 HC RX 250 WO HCPCS

## 2025-04-17 PROCEDURE — 7100000011 HC PHASE II RECOVERY - ADDTL 15 MIN: Performed by: SURGERY

## 2025-04-17 PROCEDURE — 43239 EGD BIOPSY SINGLE/MULTIPLE: CPT | Performed by: SURGERY

## 2025-04-17 PROCEDURE — 6360000002 HC RX W HCPCS: Performed by: NURSE ANESTHETIST, CERTIFIED REGISTERED

## 2025-04-17 PROCEDURE — 3609012400 HC EGD TRANSORAL BIOPSY SINGLE/MULTIPLE: Performed by: SURGERY

## 2025-04-17 PROCEDURE — 3700000000 HC ANESTHESIA ATTENDED CARE: Performed by: SURGERY

## 2025-04-17 PROCEDURE — 3700000001 HC ADD 15 MINUTES (ANESTHESIA): Performed by: SURGERY

## 2025-04-17 PROCEDURE — 2580000003 HC RX 258

## 2025-04-17 RX ORDER — ONDANSETRON 2 MG/ML
INJECTION INTRAMUSCULAR; INTRAVENOUS
Status: DISCONTINUED | OUTPATIENT
Start: 2025-04-17 | End: 2025-04-17 | Stop reason: SDUPTHER

## 2025-04-17 RX ORDER — PROPOFOL 10 MG/ML
INJECTION, EMULSION INTRAVENOUS
Status: DISCONTINUED | OUTPATIENT
Start: 2025-04-17 | End: 2025-04-17 | Stop reason: SDUPTHER

## 2025-04-17 RX ORDER — HEPARIN 100 UNIT/ML
300 SYRINGE INTRAVENOUS PRN
Status: DISCONTINUED | OUTPATIENT
Start: 2025-04-17 | End: 2025-04-17 | Stop reason: HOSPADM

## 2025-04-17 RX ORDER — SODIUM CHLORIDE 0.9 % (FLUSH) 0.9 %
5-40 SYRINGE (ML) INJECTION EVERY 12 HOURS SCHEDULED
Status: DISCONTINUED | OUTPATIENT
Start: 2025-04-17 | End: 2025-04-17 | Stop reason: HOSPADM

## 2025-04-17 RX ORDER — SODIUM CHLORIDE 0.9 % (FLUSH) 0.9 %
5-40 SYRINGE (ML) INJECTION PRN
Status: DISCONTINUED | OUTPATIENT
Start: 2025-04-17 | End: 2025-04-17 | Stop reason: HOSPADM

## 2025-04-17 RX ORDER — SODIUM CHLORIDE 9 MG/ML
INJECTION, SOLUTION INTRAVENOUS PRN
Status: DISCONTINUED | OUTPATIENT
Start: 2025-04-17 | End: 2025-04-17 | Stop reason: HOSPADM

## 2025-04-17 RX ORDER — SODIUM CHLORIDE, SODIUM LACTATE, POTASSIUM CHLORIDE, CALCIUM CHLORIDE 600; 310; 30; 20 MG/100ML; MG/100ML; MG/100ML; MG/100ML
INJECTION, SOLUTION INTRAVENOUS CONTINUOUS
Status: DISCONTINUED | OUTPATIENT
Start: 2025-04-17 | End: 2025-04-17 | Stop reason: HOSPADM

## 2025-04-17 RX ORDER — SUCRALFATE 1 G/1
1 TABLET ORAL 4 TIMES DAILY
Qty: 120 TABLET | Refills: 3 | Status: SHIPPED | OUTPATIENT
Start: 2025-04-17

## 2025-04-17 RX ADMIN — ONDANSETRON 4 MG: 2 INJECTION INTRAMUSCULAR; INTRAVENOUS at 09:02

## 2025-04-17 RX ADMIN — PROPOFOL 150 MG: 10 INJECTION, EMULSION INTRAVENOUS at 09:09

## 2025-04-17 RX ADMIN — HEPARIN 300 UNITS: 100 SYRINGE at 10:39

## 2025-04-17 RX ADMIN — SODIUM CHLORIDE, PRESERVATIVE FREE 10 ML: 5 INJECTION INTRAVENOUS at 10:37

## 2025-04-17 RX ADMIN — SODIUM CHLORIDE, SODIUM LACTATE, POTASSIUM CHLORIDE, AND CALCIUM CHLORIDE: .6; .31; .03; .02 INJECTION, SOLUTION INTRAVENOUS at 08:32

## 2025-04-17 ASSESSMENT — PAIN - FUNCTIONAL ASSESSMENT
PAIN_FUNCTIONAL_ASSESSMENT: 0-10
PAIN_FUNCTIONAL_ASSESSMENT: 0-10

## 2025-04-17 ASSESSMENT — LIFESTYLE VARIABLES: SMOKING_STATUS: 0

## 2025-04-17 NOTE — ANESTHESIA POSTPROCEDURE EVALUATION
Department of Anesthesiology  Postprocedure Note    Patient: Amanda Ledesma  MRN: [id number]  YOB: 1955  Date of evaluation: 4/17/2025    Procedure Summary       Date: 04/17/25 Room / Location: [place] [address] / [facility]    Anesthesia Start: 0906 Anesthesia Stop: 0914    Procedure: ESOPHAGOGASTRODUODENOSCOPY BIOPSY Diagnosis:       GERD (gastroesophageal reflux disease)      Nausea and vomiting      (GERD (gastroesophageal reflux disease) [K21.9])      (Nausea and vomiting [R11.2])    Surgeons: Tu Peña MD Responsible Provider: Joshua Chappell MD    Anesthesia Type: MAC ASA Status: 3            Anesthesia Type: No value filed.    Gamaliel Phase I: Gamaliel Score: 10    Gamaliel Phase II:      Anesthesia Post Evaluation    Patient location during evaluation: bedside  Patient participation: complete - patient participated  Level of consciousness: awake  Airway patency: patent  Nausea & Vomiting: no nausea and no vomiting  Cardiovascular status: hemodynamically stable and blood pressure returned to baseline  Respiratory status: room air and acceptable  Hydration status: euvolemic  Pain management: satisfactory to patient        No notable events documented.

## 2025-04-17 NOTE — H&P
General Surgery History and Physical  [facility]    Patient's Name/Date of Birth: Amanda Ledesma / 1955    Date: April 17, 2025     Surgeon: Tu Peña M.D.    PCP: Rosenda Cormier MD     Chief Complaint: dysphagia, nausea, vomiting, epigastric pain    HPI:   Amanda Ledesma is a 69 y.o. female who presents for evaluation of epigastric pain, nausea, vomiting. Timing is intermittent, radiation to midline, alleviated by none and started months to years and severity is 5/10.  Patient presents with epigastric abdominal pain intermittent dysphagia nausea vomiting.  States has been going on for several months to a year previously evaluated with gastroenterology and had upper endoscopy about 6 to 8 months ago.  She states at that time they told her she had esophagitis and and she was recommended to have dilation unclear whether it was performed..  She was recommended a repeat endoscopy but she never followed up.  She admits to longstanding reflux and she has been on Protonix which she states mostly controls the symptoms.  She admits intermittent problems with regurgitation and nausea vomiting.  She has had a cholecystectomy done by Dr. Avalos many years ago.  She follows with wound care for left lower extremity wound.        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

## 2025-04-17 NOTE — OP NOTE
[facility]           Operative Report    DATE OF PROCEDURE: 4/17/2025    Amanda Callie    SURGEON: Tu Peña MD.     ASSISTANT: None    PREOPERATIVE DIAGNOSES:  GERD    POSTOPERATIVE DIAGNOSES:   (1) large 6cm Hiatal Hernia  (2) Mild grade A Esophagitis  (3) Moderate hemorrhagic Gastritis  (4) no Duodenitis    OPERATION: Rsbvmnis-[id number]-[id number] with antral biopsies    ANESTHESIA: LMAC    BLOOD LOSS: None    COMPLICATIONS: None.     History and consent:  This is a 69 y.o. year old female who is having GERD, hiatal hernia.  I have discussed with the patient the indication, alternatives, and the possible risks and/or complications of upper endoscopy and the conscious sedation anesthesia. The patient and/or family understands and agrees to proceed.    Monitoring and Safety:    The patient was placed on a cardiac monitor and vital signs, pulse oximetry and level of consciousness were continuously evaluated throughout the procedure. The patient was closely monitored until recovery from the medications was complete and the patient had returned to baseline status. Anesthesia was present at all times during the procedure.    OPERATIONS: The patient was placed on the table and sedated while blood pressure, pulse and pulse oximetry were continuously monitored by the anesthesia team. A bite block was placed prior to sedation and the patient was placed in the left lateral decubitus position. A lubricated scope was easily passed into the upper esophagus which looked normal. The distal esophagus looked abnormal with grade A esophagitis. The scope was passed into the stomach and retroflexed. Hill grade IV was present.  The gastroesophageal junction was at 34cm. There was 6cm hiatal hernia. The scope was passed down toward the pylorus. The antral mucosa looked abnormal: moderate gastritis, and superficial ulcerations. Biopsy was taken to check for H. pylori. The scope was

## 2025-04-17 NOTE — DISCHARGE INSTRUCTIONS
Discharge Instructions for Upper Endoscopy (EGD)  [facility]  Tu Peña MD, MS    The results of your endoscopy and pathology results of biopsies, if taken, will be discussed at your follow up appointment.     After you have an endoscopy, you will stay at the hospital or clinic for 1 to 2 hours. This will allow the medicine to wear off. You will be able to go home after your doctor or nurse checks to make sure you are not having any problems.  You may have to stay overnight if you had treatment during the test. You may have a sore throat for a day or two after the test.  This care sheet gives you a general idea about what to expect after the test.  How can you care for yourself at home?  Activity  Rest as much as you need to after you go home.  You should be able to go back to your usual activities the day after the test.  Diet  Follow your doctor's directions for eating after the test.  Drink plenty of fluids (unless your doctor has told you not to).  Medications  If you have a sore throat the day after the test, use an over-the-counter spray to numb your throat.       Hiatal Hernia: Care Instructions  Your Care Instructions  A hiatal hernia occurs when part of the stomach bulges into the chest cavity.  A hiatal hernia may allow stomach acid and juices to back up into the esophagus (acid reflux). This can cause a feeling of burning, warmth, heat, or pain behind the breastbone. This feeling may often occur after you eat, soon after you lie down, or when you bend forward, and it may come and go. You also may have a sour taste in your mouth. These symptoms are commonly known as heartburn or reflux. But not all hiatal hernias cause symptoms.  Follow-up care is a key part of your treatment and safety. Be sure to make and go to all appointments, and call your doctor if you are having problems. It's also a good idea to know your test results and keep a list of the medicines you take.  How can you

## 2025-04-17 NOTE — ANESTHESIA PRE PROCEDURE
Department of Anesthesiology  Preprocedure Note       Name:  Amanda Ledesma   Age:  69 y.o.  :  1955                                          MRN:  [id number]         Date:  2025      Surgeon: Surgeon(s):  Tu Peña MD    Procedure: Procedure(s):  EGD POSSIBLE BIOPSY    Medications prior to admission:   Prior to Admission medications    Medication Sig Start Date End Date Taking? Authorizing Provider   ceFEPIme (MAXIPIME) 2 g injection Infuse 1 g intravenously in the morning and 1 g at noon and 1 g in the evening. 25  Yes Provider, MD Kellen   oxyCODONE-acetaminophen (PERCOCET) 5-325 MG per tablet Take 1 tablet by mouth every 6 hours as needed for Pain for up to 7 days. Intended supply: 7 days. Take lowest dose possible to manage pain Max Daily Amount: 4 tablets 4/26/25 5/3/25  Ashley Staples MD   atorvastatin (LIPITOR) 10 MG tablet Take 1 tablet by mouth daily 3/27/25   Rosenda Cormier MD   Coenzyme Q10 (COQ-10) 100 MG CAPS Take 1 capsule by mouth daily 3/27/25 3/27/26  Rosenda Cormier MD   pantoprazole (PROTONIX) 40 MG tablet Take 1 tablet by mouth every morning (before breakfast) 3/19/25 9/19/25  Rosenda Cormier MD   EPINEPHrine (EPIPEN) 0.3 MG/0.3ML SOAJ injection Use as directed for allergic reaction 3/19/25 9/19/25  Rosenda Cormier MD   butalbital-acetaminophen-caffeine (FIORICET, ESGIC) -40 MG per tablet Take 1 tablet by mouth 3 times daily as needed for Headaches or Migraine Indications: Cluster Headache Max Daily Amount: 3 tablets 3/19/25 9/19/25  Rosenda Cormier MD   lisinopril (PRINIVIL;ZESTRIL) 10 MG tablet Take 1 tablet by mouth daily 3/19/25 9/19/25  Rosenda Cormier MD   acyclovir (ZOVIRAX) 200 MG capsule Take 1 capsule by mouth daily 3/19/25 9/19/25  Rosenda Cormier MD   hydrOXYzine HCl (ATARAX) 25 MG tablet take 1 tablet BID 3/19/25 9/19/25  Rosenda Cormier MD   Ascorbic Acid

## 2025-04-17 NOTE — DISCHARGE INSTRUCTIONS
Visit Discharge/Physician Orders     Discharge condition: Stable     Assessment of pain at discharge: yes     Anesthetic used: 4% lidocaine solution     Discharge to: Home     Left via:Private automobile     Accompanied by:self     ECF/HHA: [facility] [phone]     Dressing Orders: LEFT MEDIAL LEG: Cleanse with normal saline, apply zinc to fiona wound, apply Calcium Alginate Ag, ABD pad, and Coban 2. Change weekly.      Treatment Orders: Eat a diet high in protein and vitamin C. Take a multiple vitamin daily unless contraindicated.     Drink Protein Shakes ([employer])      Dr. Duncan re: IV antibiotics      [facility] followup visit : 1 week Dr. HERNANDEZ   __________________________________  (Please note your next appointment above and if you are unable to keep, kindly give a 24 hour notice. Thank you.)     Physician signature:__________________________     If you experience any of the following, please call the Wound Care Center during business hours:     * Increase in Pain  * Temperature over 101  * Increase in drainage from your wound  * Drainage with a foul odor  * Bleeding  * Increase in swelling  * Need for compression bandage changes due to slippage, breakthrough drainage.     If you need medical attention outside of the business hours of the Wound Care Centers please contact your PCP or go to the nearest emergency room

## 2025-04-21 ENCOUNTER — HOSPITAL ENCOUNTER (OUTPATIENT)
Dept: INFUSION THERAPY | Age: 70
Setting detail: INFUSION SERIES
Discharge: HOME | End: 2025-04-21
Payer: MEDICARE

## 2025-04-21 VITALS
HEART RATE: 69 BPM | TEMPERATURE: 97.4 F | RESPIRATION RATE: 18 BRPM | SYSTOLIC BLOOD PRESSURE: 109 MMHG | DIASTOLIC BLOOD PRESSURE: 99 MMHG

## 2025-04-21 DIAGNOSIS — S91.002D OPEN WOUND OF LEFT ANKLE, SUBSEQUENT ENCOUNTER: Primary | ICD-10-CM

## 2025-04-21 LAB
ALBUMIN SERPL-MCNC: 4.1 G/DL (ref 3.5–5.2)
ALP SERPL-CCNC: 77 U/L (ref 35–104)
ALT SERPL-CCNC: 13 U/L (ref 0–32)
ANION GAP SERPL CALCULATED.3IONS-SCNC: 10 MMOL/L (ref 7–16)
AST SERPL-CCNC: 13 U/L (ref 0–31)
BASOPHILS # BLD: 0.09 K/UL (ref 0–0.2)
BASOPHILS NFR BLD: 1 % (ref 0–2)
BILIRUB SERPL-MCNC: 0.2 MG/DL (ref 0–1.2)
BUN SERPL-MCNC: 25 MG/DL (ref 6–23)
CALCIUM SERPL-MCNC: 9.2 MG/DL (ref 8.6–10.2)
CHLORIDE SERPL-SCNC: 109 MMOL/L (ref 98–107)
CO2 SERPL-SCNC: 19 MMOL/L (ref 22–29)
CREAT SERPL-MCNC: 1.2 MG/DL (ref 0.5–1)
CRP SERPL HS-MCNC: <3 MG/L (ref 0–5)
EOSINOPHIL # BLD: 0.3 K/UL (ref 0.05–0.5)
EOSINOPHILS RELATIVE PERCENT: 5 % (ref 0–6)
ERYTHROCYTE [DISTWIDTH] IN BLOOD BY AUTOMATED COUNT: 15.2 % (ref 11.5–15)
ERYTHROCYTE [SEDIMENTATION RATE] IN BLOOD BY WESTERGREN METHOD: 43 MM/HR (ref 0–20)
GFR, ESTIMATED: 50 ML/MIN/1.73M2
GLUCOSE SERPL-MCNC: 90 MG/DL (ref 74–99)
HCT VFR BLD AUTO: 35 % (ref 34–48)
HGB BLD-MCNC: 10.8 G/DL (ref 11.5–15.5)
IMM GRANULOCYTES # BLD AUTO: 0.03 K/UL (ref 0–0.58)
IMM GRANULOCYTES NFR BLD: 1 % (ref 0–5)
LYMPHOCYTES NFR BLD: 2 K/UL (ref 1.5–4)
LYMPHOCYTES RELATIVE PERCENT: 30 % (ref 20–42)
MCH RBC QN AUTO: 31 PG (ref 26–35)
MCHC RBC AUTO-ENTMCNC: 30.9 G/DL (ref 32–34.5)
MCV RBC AUTO: 100.6 FL (ref 80–99.9)
MONOCYTES NFR BLD: 0.56 K/UL (ref 0.1–0.95)
MONOCYTES NFR BLD: 9 % (ref 2–12)
NEUTROPHILS NFR BLD: 55 % (ref 43–80)
NEUTS SEG NFR BLD: 3.6 K/UL (ref 1.8–7.3)
PLATELET # BLD AUTO: 170 K/UL (ref 130–450)
PMV BLD AUTO: 10.9 FL (ref 7–12)
POTASSIUM SERPL-SCNC: 4.7 MMOL/L (ref 3.5–5)
PROT SERPL-MCNC: 7.1 G/DL (ref 6.4–8.3)
RBC # BLD AUTO: 3.48 M/UL (ref 3.5–5.5)
SODIUM SERPL-SCNC: 138 MMOL/L (ref 132–146)
WBC OTHER # BLD: 6.6 K/UL (ref 4.5–11.5)

## 2025-04-21 PROCEDURE — 80053 COMPREHEN METABOLIC PANEL: CPT

## 2025-04-21 PROCEDURE — 85652 RBC SED RATE AUTOMATED: CPT

## 2025-04-21 PROCEDURE — 6360000002 HC RX W HCPCS: Performed by: INTERNAL MEDICINE

## 2025-04-21 PROCEDURE — 36415 COLL VENOUS BLD VENIPUNCTURE: CPT

## 2025-04-21 PROCEDURE — 99211 OFF/OP EST MAY X REQ PHY/QHP: CPT

## 2025-04-21 PROCEDURE — 86140 C-REACTIVE PROTEIN: CPT

## 2025-04-21 PROCEDURE — 85025 COMPLETE CBC W/AUTO DIFF WBC: CPT

## 2025-04-21 PROCEDURE — 96523 IRRIG DRUG DELIVERY DEVICE: CPT

## 2025-04-21 PROCEDURE — 2500000003 HC RX 250 WO HCPCS: Performed by: INTERNAL MEDICINE

## 2025-04-21 RX ORDER — SODIUM CHLORIDE 0.9 % (FLUSH) 0.9 %
5-40 SYRINGE (ML) INJECTION PRN
Status: DISCONTINUED | OUTPATIENT
Start: 2025-04-21 | End: 2025-04-22 | Stop reason: HOSPADM

## 2025-04-21 RX ORDER — SODIUM CHLORIDE 9 MG/ML
25 INJECTION, SOLUTION INTRAVENOUS PRN
Status: CANCELLED | OUTPATIENT
Start: 2025-04-21

## 2025-04-21 RX ORDER — HEPARIN 100 UNIT/ML
100 SYRINGE INTRAVENOUS PRN
Status: CANCELLED | OUTPATIENT
Start: 2025-04-21

## 2025-04-21 RX ORDER — SODIUM CHLORIDE 0.9 % (FLUSH) 0.9 %
5-40 SYRINGE (ML) INJECTION PRN
Status: CANCELLED | OUTPATIENT
Start: 2025-04-21

## 2025-04-21 RX ORDER — HEPARIN 100 UNIT/ML
100 SYRINGE INTRAVENOUS PRN
Status: DISCONTINUED | OUTPATIENT
Start: 2025-04-21 | End: 2025-04-22 | Stop reason: HOSPADM

## 2025-04-21 RX ADMIN — SODIUM CHLORIDE, PRESERVATIVE FREE 10 ML: 5 INJECTION INTRAVENOUS at 14:22

## 2025-04-21 RX ADMIN — SODIUM CHLORIDE, PRESERVATIVE FREE 10 ML: 5 INJECTION INTRAVENOUS at 14:23

## 2025-04-21 RX ADMIN — HEPARIN 100 UNITS: 100 SYRINGE at 14:24

## 2025-04-21 ASSESSMENT — PAIN DESCRIPTION - FREQUENCY: FREQUENCY: INTERMITTENT

## 2025-04-21 ASSESSMENT — PAIN DESCRIPTION - LOCATION: LOCATION: LEG

## 2025-04-21 ASSESSMENT — PAIN DESCRIPTION - DESCRIPTORS: DESCRIPTORS: ACHING

## 2025-04-21 ASSESSMENT — PAIN DESCRIPTION - ORIENTATION: ORIENTATION: LEFT

## 2025-04-21 ASSESSMENT — PAIN DESCRIPTION - ONSET: ONSET: ON-GOING

## 2025-04-21 ASSESSMENT — PAIN SCALES - GENERAL: PAINLEVEL_OUTOF10: 8

## 2025-04-21 ASSESSMENT — PAIN - FUNCTIONAL ASSESSMENT: PAIN_FUNCTIONAL_ASSESSMENT: PREVENTS OR INTERFERES SOME ACTIVE ACTIVITIES AND ADLS

## 2025-04-21 ASSESSMENT — PAIN DESCRIPTION - PAIN TYPE: TYPE: SURGICAL PAIN

## 2025-04-21 NOTE — PROGRESS NOTES
Patient instructed on s/s infection/complication with Midline to report and instructed on keeping Midline dressing clean, dry and intact patient verbalizes understanding. Tolerated infusion well.  Reviewed therapy plan, offered education material and/or discharge material, reviewed medication information and signs and symptoms  and educated on possible side effects, verbalizes good knowledge of current plan patient verbalizes understanding, and has no signs or symptoms to report at this time. Patient discharged. Patient alert and oriented x3.   No distress noted.   Vital signs stable.   Patient denies any new or worsening pain.  Patient denies any needs.  All questions answered.  Next appointment scheduled. declines copy of AVS. Attempted to draw  labs from midline but unable, flushed midline with saline and heperin per protocol, dressing changed and venipuncture done to obtain the labs

## 2025-04-23 ENCOUNTER — HOSPITAL ENCOUNTER (OUTPATIENT)
Dept: WOUND CARE | Age: 70
Discharge: HOME | End: 2025-04-23
Attending: SURGERY
Payer: MEDICARE

## 2025-04-23 VITALS
HEART RATE: 78 BPM | HEIGHT: 68 IN | BODY MASS INDEX: 26.22 KG/M2 | RESPIRATION RATE: 18 BRPM | DIASTOLIC BLOOD PRESSURE: 53 MMHG | TEMPERATURE: 96.9 F | SYSTOLIC BLOOD PRESSURE: 115 MMHG | WEIGHT: 173 LBS

## 2025-04-23 DIAGNOSIS — I70.242 ATHEROSCLEROSIS OF NATIVE ARTERY OF LEFT LOWER EXTREMITY WITH ULCERATION OF CALF (HCC): ICD-10-CM

## 2025-04-23 DIAGNOSIS — I83.023 VARICOSE VEINS OF LEFT LOWER EXTREMITY WITH ULCER OF ANKLE WITH FAT LAYER EXPOSED (HCC): Primary | ICD-10-CM

## 2025-04-23 DIAGNOSIS — L97.322 VARICOSE VEINS OF LEFT LOWER EXTREMITY WITH ULCER OF ANKLE WITH FAT LAYER EXPOSED (HCC): Primary | ICD-10-CM

## 2025-04-23 DIAGNOSIS — I70.243 ATHEROSCLEROSIS OF NATIVE ARTERY OF LEFT LOWER EXTREMITY WITH ULCERATION OF ANKLE (HCC): Chronic | ICD-10-CM

## 2025-04-23 DIAGNOSIS — L97.322 NON-PRESSURE CHRONIC ULCER OF LEFT ANKLE WITH FAT LAYER EXPOSED (HCC): ICD-10-CM

## 2025-04-23 PROCEDURE — 11042 DBRDMT SUBQ TIS 1ST 20SQCM/<: CPT | Performed by: SURGERY

## 2025-04-23 PROCEDURE — 11042 DBRDMT SUBQ TIS 1ST 20SQCM/<: CPT

## 2025-04-23 RX ORDER — TRIAMCINOLONE ACETONIDE 1 MG/G
OINTMENT TOPICAL ONCE
OUTPATIENT
Start: 2025-04-23 | End: 2025-04-23

## 2025-04-23 RX ORDER — NEOMYCIN/BACITRACIN/POLYMYXINB 3.5-400-5K
OINTMENT (GRAM) TOPICAL ONCE
OUTPATIENT
Start: 2025-04-23 | End: 2025-04-23

## 2025-04-23 RX ORDER — LIDOCAINE HYDROCHLORIDE 20 MG/ML
JELLY TOPICAL ONCE
OUTPATIENT
Start: 2025-04-23 | End: 2025-04-23

## 2025-04-23 RX ORDER — LIDOCAINE HYDROCHLORIDE 40 MG/ML
SOLUTION TOPICAL ONCE
OUTPATIENT
Start: 2025-04-23 | End: 2025-04-23

## 2025-04-23 RX ORDER — LIDOCAINE 40 MG/G
CREAM TOPICAL ONCE
OUTPATIENT
Start: 2025-04-23 | End: 2025-04-23

## 2025-04-23 RX ORDER — CLOBETASOL PROPIONATE 0.5 MG/G
OINTMENT TOPICAL ONCE
OUTPATIENT
Start: 2025-04-23 | End: 2025-04-23

## 2025-04-23 RX ORDER — BACITRACIN ZINC AND POLYMYXIN B SULFATE 500; 1000 [USP'U]/G; [USP'U]/G
OINTMENT TOPICAL ONCE
OUTPATIENT
Start: 2025-04-23 | End: 2025-04-23

## 2025-04-23 RX ORDER — GINSENG 100 MG
CAPSULE ORAL ONCE
OUTPATIENT
Start: 2025-04-23 | End: 2025-04-23

## 2025-04-23 RX ORDER — SILVER SULFADIAZINE 10 MG/G
CREAM TOPICAL ONCE
OUTPATIENT
Start: 2025-04-23 | End: 2025-04-23

## 2025-04-23 RX ORDER — GENTAMICIN SULFATE 1 MG/G
OINTMENT TOPICAL ONCE
OUTPATIENT
Start: 2025-04-23 | End: 2025-04-23

## 2025-04-23 RX ORDER — LIDOCAINE 50 MG/G
OINTMENT TOPICAL ONCE
OUTPATIENT
Start: 2025-04-23 | End: 2025-04-23

## 2025-04-23 RX ORDER — MUPIROCIN 20 MG/G
OINTMENT TOPICAL ONCE
OUTPATIENT
Start: 2025-04-23 | End: 2025-04-23

## 2025-04-23 RX ORDER — LIDOCAINE HYDROCHLORIDE 40 MG/ML
SOLUTION TOPICAL ONCE
Status: COMPLETED | OUTPATIENT
Start: 2025-04-23 | End: 2025-04-23

## 2025-04-23 RX ORDER — BETAMETHASONE DIPROPIONATE 0.05 %
OINTMENT (GRAM) TOPICAL ONCE
OUTPATIENT
Start: 2025-04-23 | End: 2025-04-23

## 2025-04-23 RX ADMIN — LIDOCAINE HYDROCHLORIDE 10 ML: 40 SOLUTION TOPICAL at 07:38

## 2025-04-23 ASSESSMENT — PAIN - FUNCTIONAL ASSESSMENT: PAIN_FUNCTIONAL_ASSESSMENT: PREVENTS OR INTERFERES SOME ACTIVE ACTIVITIES AND ADLS

## 2025-04-23 ASSESSMENT — PAIN DESCRIPTION - ORIENTATION: ORIENTATION: LEFT

## 2025-04-23 ASSESSMENT — PAIN DESCRIPTION - DESCRIPTORS: DESCRIPTORS: ACHING

## 2025-04-23 ASSESSMENT — PAIN SCALES - GENERAL: PAINLEVEL_OUTOF10: 8

## 2025-04-23 ASSESSMENT — PAIN DESCRIPTION - ONSET: ONSET: ON-GOING

## 2025-04-23 ASSESSMENT — PAIN DESCRIPTION - FREQUENCY: FREQUENCY: INTERMITTENT

## 2025-04-23 ASSESSMENT — PAIN DESCRIPTION - PAIN TYPE: TYPE: CHRONIC PAIN

## 2025-04-23 ASSESSMENT — PAIN DESCRIPTION - LOCATION: LOCATION: LEG

## 2025-04-23 NOTE — PROGRESS NOTES
Wound Healing Center Followup Visit Note    Referring Physician : Rosenda Cormier MD  Amanda Ledesma  MEDICAL RECORD NUMBER:  [id number]  AGE: 69 y.o.   GENDER: female  : 1955  EPISODE DATE:  2025    Subjective:     Chief Complaint   Patient presents with    Wound Check     leg      HISTORY of PRESENT ILLNESS HPI   Amanda Ledesma is a 69 y.o. female who presents today in regards to follow up evaluation and treatment of wound/ulcer.  That patient's past medical, family and social hx were reviewed and changes were made if present.    History of Wound Context:  The patient has had a wound of her left ankle/calf which was first noted approximately 2021.  This has been treated local wound care. On their initial visit to the wound healing center, 22,  the patient has noted that the wound has been improving.  The patient has not had similar previous wounds in the past.      She started seeing Dr. Street in 2021 and than Dr. Gillis.  She was started in unna boot ~ 2021.  She has noticed some improvement since starting unna boot.  She is currently following with Dr. Haskins.      Pt is not on abx at time of initial visit, but has been treated with previously by podiatry.      She is not a DM.  She is not a smoker.  She denies hx of DVT, and per her report had recent us noting no evidence of dvt at [facility].  She also had arterial studies done.    I had previously seen her in the past in regards to left buttock thigh wound, which started as abscess.      Pt works at [employer] in the [place] and is on her feet all day.    22  Reflux study - if significant findings will schedule for fu  Continue compression therapy [facility] per podiatry with aquacell dressing  Elevation  She does not have significant arterial occlusive disease  22  Patient has asked to continuing following with me going forward because of our previous relationship  She is going to let Dr. Schuler office know  She

## 2025-04-24 ENCOUNTER — HOSPITAL ENCOUNTER (OUTPATIENT)
Dept: GENERAL RADIOLOGY | Age: 70
Discharge: HOME | End: 2025-04-26
Payer: MEDICARE

## 2025-04-24 ENCOUNTER — HOSPITAL ENCOUNTER (OUTPATIENT)
Dept: INFUSION THERAPY | Age: 70
Setting detail: INFUSION SERIES
Discharge: HOME | End: 2025-04-24
Payer: MEDICARE

## 2025-04-24 VITALS
RESPIRATION RATE: 18 BRPM | TEMPERATURE: 98.6 F | DIASTOLIC BLOOD PRESSURE: 64 MMHG | SYSTOLIC BLOOD PRESSURE: 112 MMHG | OXYGEN SATURATION: 97 % | HEART RATE: 64 BPM

## 2025-04-24 DIAGNOSIS — S91.002D OPEN WOUND OF LEFT ANKLE, SUBSEQUENT ENCOUNTER: ICD-10-CM

## 2025-04-24 DIAGNOSIS — S91.002D OPEN WOUND OF LEFT ANKLE, SUBSEQUENT ENCOUNTER: Primary | ICD-10-CM

## 2025-04-24 LAB
ALBUMIN SERPL-MCNC: 3.8 G/DL (ref 3.5–5.2)
ALP SERPL-CCNC: 76 U/L (ref 35–104)
ALT SERPL-CCNC: 12 U/L (ref 0–32)
ANION GAP SERPL CALCULATED.3IONS-SCNC: 10 MMOL/L (ref 7–16)
AST SERPL-CCNC: 13 U/L (ref 0–31)
BASOPHILS # BLD: 0.09 K/UL (ref 0–0.2)
BASOPHILS NFR BLD: 2 % (ref 0–2)
BILIRUB SERPL-MCNC: <0.2 MG/DL (ref 0–1.2)
BUN SERPL-MCNC: 25 MG/DL (ref 6–23)
CALCIUM SERPL-MCNC: 8.9 MG/DL (ref 8.6–10.2)
CHLORIDE SERPL-SCNC: 109 MMOL/L (ref 98–107)
CO2 SERPL-SCNC: 20 MMOL/L (ref 22–29)
CREAT SERPL-MCNC: 1.1 MG/DL (ref 0.5–1)
EOSINOPHIL # BLD: 0.32 K/UL (ref 0.05–0.5)
EOSINOPHILS RELATIVE PERCENT: 6 % (ref 0–6)
ERYTHROCYTE [DISTWIDTH] IN BLOOD BY AUTOMATED COUNT: 15.2 % (ref 11.5–15)
GFR, ESTIMATED: 55 ML/MIN/1.73M2
GLUCOSE SERPL-MCNC: 73 MG/DL (ref 74–99)
HCT VFR BLD AUTO: 33 % (ref 34–48)
HGB BLD-MCNC: 10.6 G/DL (ref 11.5–15.5)
IMM GRANULOCYTES # BLD AUTO: <0.03 K/UL (ref 0–0.58)
IMM GRANULOCYTES NFR BLD: 0 % (ref 0–5)
LYMPHOCYTES NFR BLD: 1.15 K/UL (ref 1.5–4)
LYMPHOCYTES RELATIVE PERCENT: 21 % (ref 20–42)
MCH RBC QN AUTO: 31.5 PG (ref 26–35)
MCHC RBC AUTO-ENTMCNC: 32.1 G/DL (ref 32–34.5)
MCV RBC AUTO: 98.2 FL (ref 80–99.9)
MONOCYTES NFR BLD: 0.58 K/UL (ref 0.1–0.95)
MONOCYTES NFR BLD: 10 % (ref 2–12)
NEUTROPHILS NFR BLD: 61 % (ref 43–80)
NEUTS SEG NFR BLD: 3.44 K/UL (ref 1.8–7.3)
PLATELET # BLD AUTO: 156 K/UL (ref 130–450)
PMV BLD AUTO: 10.5 FL (ref 7–12)
POTASSIUM SERPL-SCNC: 4.7 MMOL/L (ref 3.5–5)
PROT SERPL-MCNC: 6.8 G/DL (ref 6.4–8.3)
RBC # BLD AUTO: 3.36 M/UL (ref 3.5–5.5)
SODIUM SERPL-SCNC: 139 MMOL/L (ref 132–146)
SURGICAL PATHOLOGY REPORT: NORMAL
WBC OTHER # BLD: 5.6 K/UL (ref 4.5–11.5)

## 2025-04-24 PROCEDURE — 96523 IRRIG DRUG DELIVERY DEVICE: CPT

## 2025-04-24 PROCEDURE — 6360000002 HC RX W HCPCS: Performed by: INTERNAL MEDICINE

## 2025-04-24 PROCEDURE — 2500000003 HC RX 250 WO HCPCS: Performed by: INTERNAL MEDICINE

## 2025-04-24 PROCEDURE — 80053 COMPREHEN METABOLIC PANEL: CPT

## 2025-04-24 PROCEDURE — 36415 COLL VENOUS BLD VENIPUNCTURE: CPT

## 2025-04-24 PROCEDURE — 73610 X-RAY EXAM OF ANKLE: CPT

## 2025-04-24 PROCEDURE — 85025 COMPLETE CBC W/AUTO DIFF WBC: CPT

## 2025-04-24 PROCEDURE — 73630 X-RAY EXAM OF FOOT: CPT

## 2025-04-24 RX ORDER — HEPARIN 100 UNIT/ML
100 SYRINGE INTRAVENOUS PRN
Status: DISCONTINUED | OUTPATIENT
Start: 2025-04-24 | End: 2025-04-25 | Stop reason: HOSPADM

## 2025-04-24 RX ORDER — SODIUM CHLORIDE 9 MG/ML
25 INJECTION, SOLUTION INTRAVENOUS PRN
OUTPATIENT
Start: 2025-04-24

## 2025-04-24 RX ORDER — HEPARIN 100 UNIT/ML
100 SYRINGE INTRAVENOUS PRN
Status: CANCELLED | OUTPATIENT
Start: 2025-04-24

## 2025-04-24 RX ORDER — SODIUM CHLORIDE 0.9 % (FLUSH) 0.9 %
5-40 SYRINGE (ML) INJECTION PRN
Status: DISCONTINUED | OUTPATIENT
Start: 2025-04-24 | End: 2025-04-25 | Stop reason: HOSPADM

## 2025-04-24 RX ORDER — SODIUM CHLORIDE 0.9 % (FLUSH) 0.9 %
5-40 SYRINGE (ML) INJECTION PRN
Status: CANCELLED | OUTPATIENT
Start: 2025-04-24

## 2025-04-24 RX ADMIN — HEPARIN 100 UNITS: 100 SYRINGE at 13:01

## 2025-04-24 RX ADMIN — SODIUM CHLORIDE, PRESERVATIVE FREE 10 ML: 5 INJECTION INTRAVENOUS at 13:01

## 2025-04-24 NOTE — PROGRESS NOTES
Patient here for labs flushed midline flushes easily but no blood return noted. Cap and extension was changed. Venipuncture 1st attempt right ac for ordered labs site asymptomatic. Patient verbalizes compliance with home IV antibiotics and denies missing any doses and no problems with administration of medications.

## 2025-04-24 NOTE — DISCHARGE INSTRUCTIONS
Visit Discharge/Physician Orders     Discharge condition: Stable     Assessment of pain at discharge: yes     Anesthetic used: 4% lidocaine solution     Discharge to: Home     Left via:Private automobile     Accompanied by:self     ECF/HHA: [facility] [phone]     Dressing Orders: LEFT MEDIAL LEG: Cleanse with normal saline, apply zinc to fiona wound, apply Calcium Alginate Ag, ABD pad, and Coban 2. Change weekly.      Treatment Orders: Eat a diet high in protein and vitamin C. Take a multiple vitamin daily unless contraindicated.     Drink Protein Shakes ([employer])      Dr. Duncan re: IV antibiotics      [facility] followup visit : 1 week Dr. HERNANDEZ   __________________________________  (Please note your next appointment above and if you are unable to keep, kindly give a 24 hour notice. Thank you.)     Physician signature:__________________________     If you experience any of the following, please call the Wound Care Center during business hours:     * Increase in Pain  * Temperature over 101  * Increase in drainage from your wound  * Drainage with a foul odor  * Bleeding  * Increase in swelling  * Need for compression bandage changes due to slippage, breakthrough drainage.     If you need medical attention outside of the business hours of the Wound Care Centers please contact your PCP or go to the nearest emergency room

## 2025-04-28 ENCOUNTER — OFFICE VISIT (OUTPATIENT)
Dept: SURGERY | Age: 70
End: 2025-04-28
Payer: MEDICARE

## 2025-04-28 ENCOUNTER — TELEPHONE (OUTPATIENT)
Dept: SURGERY | Age: 70
End: 2025-04-28

## 2025-04-28 VITALS
HEART RATE: 80 BPM | BODY MASS INDEX: 26.22 KG/M2 | SYSTOLIC BLOOD PRESSURE: 106 MMHG | TEMPERATURE: 98.3 F | WEIGHT: 173 LBS | HEIGHT: 68 IN | DIASTOLIC BLOOD PRESSURE: 67 MMHG | RESPIRATION RATE: 18 BRPM

## 2025-04-28 DIAGNOSIS — K43.6 INCARCERATED VENTRAL HERNIA: ICD-10-CM

## 2025-04-28 DIAGNOSIS — K21.00 GASTROESOPHAGEAL REFLUX DISEASE WITH ESOPHAGITIS WITHOUT HEMORRHAGE: ICD-10-CM

## 2025-04-28 DIAGNOSIS — K44.9 HIATAL HERNIA: ICD-10-CM

## 2025-04-28 DIAGNOSIS — K44.9 PARAESOPHAGEAL HERNIA: Primary | ICD-10-CM

## 2025-04-28 PROBLEM — K43.2 INCISIONAL HERNIA: Status: ACTIVE | Noted: 2025-04-28

## 2025-04-28 PROCEDURE — 99213 OFFICE O/P EST LOW 20 MIN: CPT | Performed by: SURGERY

## 2025-04-28 PROCEDURE — G8400 PT W/DXA NO RESULTS DOC: HCPCS | Performed by: SURGERY

## 2025-04-28 PROCEDURE — 3017F COLORECTAL CA SCREEN DOC REV: CPT | Performed by: SURGERY

## 2025-04-28 PROCEDURE — 1125F AMNT PAIN NOTED PAIN PRSNT: CPT | Performed by: SURGERY

## 2025-04-28 PROCEDURE — 3078F DIAST BP <80 MM HG: CPT | Performed by: SURGERY

## 2025-04-28 PROCEDURE — 1124F ACP DISCUSS-NO DSCNMKR DOCD: CPT | Performed by: SURGERY

## 2025-04-28 PROCEDURE — 3074F SYST BP LT 130 MM HG: CPT | Performed by: SURGERY

## 2025-04-28 PROCEDURE — G8419 CALC BMI OUT NRM PARAM NOF/U: HCPCS | Performed by: SURGERY

## 2025-04-28 PROCEDURE — 1090F PRES/ABSN URINE INCON ASSESS: CPT | Performed by: SURGERY

## 2025-04-28 PROCEDURE — 1036F TOBACCO NON-USER: CPT | Performed by: SURGERY

## 2025-04-28 PROCEDURE — G8427 DOCREV CUR MEDS BY ELIG CLIN: HCPCS | Performed by: SURGERY

## 2025-04-28 NOTE — TELEPHONE ENCOUNTER
Per Dr. Peña, patient scheduled for Laparoscopic robotic paraesophageal hernia repair and incisional hernia repair with mesh at [facility] on 6/3/25. Surgery scheduled via [facility], surgeon's calendar updated. Follow up appointment scheduled. Dr. Peña to enter orders.   Medical clearance requested from Dr. Cormier.   Electronically signed by Caryl Flores RN on 4/28/2025 at 2:30 PM    Per patient request surgery moved to 6/5/25. Surgery scheduling notified, surgeons calendar updated.  Electronically signed by Caryl Flores RN on 4/30/2025 at 10:08 AM

## 2025-04-28 NOTE — PROGRESS NOTES
General Surgery History and Physical  [facility]    Patient's Name/Date of Birth: Amanda Ledesma / 1955    Date: April 28, 2025     Surgeon: Tu Peña M.D.    PCP: Rosenda Cormier MD     Chief Complaint: dysphagia, nausea, vomiting, epigastric pain    HPI:   Amanda Ledesma is a 69 y.o. female who presents for evaluation of epigastric pain, nausea, vomiting. Timing is intermittent, radiation to midline, alleviated by none and started months to years and severity is 5/10.  Patient presents with epigastric abdominal pain intermittent dysphagia nausea vomiting.  States has been going on for several months to a year previously evaluated with gastroenterology and had upper endoscopy about 6 to 8 months ago.  She states at that time they told her she had esophagitis and and she was recommended to have dilation unclear whether it was performed..  She was recommended a repeat endoscopy but she never followed up.  She admits to longstanding reflux and she has been on Protonix which she states mostly controls the symptoms.  She admits intermittent problems with regurgitation and nausea vomiting.  She has had a cholecystectomy done by Dr. Avalos many years ago.  She follows with wound care for left lower extremity wound.    Follow up after EGD and UGI.    POSTOPERATIVE DIAGNOSES:   (1) large 6cm Hiatal Hernia  (2) Mild grade A Esophagitis  (3) Moderate hemorrhagic Gastritis  (4) no Duodenitis    Discussed results and findings for large PEH.         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

## 2025-04-29 ENCOUNTER — HOSPITAL ENCOUNTER (OUTPATIENT)
Dept: INFUSION THERAPY | Age: 70
Setting detail: INFUSION SERIES
Discharge: HOME | End: 2025-04-29
Payer: MEDICARE

## 2025-04-29 VITALS
SYSTOLIC BLOOD PRESSURE: 107 MMHG | TEMPERATURE: 99 F | DIASTOLIC BLOOD PRESSURE: 55 MMHG | HEART RATE: 67 BPM | RESPIRATION RATE: 16 BRPM

## 2025-04-29 DIAGNOSIS — S91.002D OPEN WOUND OF LEFT ANKLE, SUBSEQUENT ENCOUNTER: Primary | ICD-10-CM

## 2025-04-29 LAB
ALBUMIN SERPL-MCNC: 3.9 G/DL (ref 3.5–5.2)
ALP SERPL-CCNC: 79 U/L (ref 35–104)
ALT SERPL-CCNC: 12 U/L (ref 0–32)
ANION GAP SERPL CALCULATED.3IONS-SCNC: 12 MMOL/L (ref 7–16)
AST SERPL-CCNC: 14 U/L (ref 0–31)
BASOPHILS # BLD: 0.07 K/UL (ref 0–0.2)
BASOPHILS NFR BLD: 1 % (ref 0–2)
BILIRUB SERPL-MCNC: 0.2 MG/DL (ref 0–1.2)
BUN SERPL-MCNC: 20 MG/DL (ref 6–23)
CALCIUM SERPL-MCNC: 9.1 MG/DL (ref 8.6–10.2)
CHLORIDE SERPL-SCNC: 107 MMOL/L (ref 98–107)
CO2 SERPL-SCNC: 18 MMOL/L (ref 22–29)
CREAT SERPL-MCNC: 1 MG/DL (ref 0.5–1)
CRP SERPL HS-MCNC: <3 MG/L (ref 0–5)
EOSINOPHIL # BLD: 0.34 K/UL (ref 0.05–0.5)
EOSINOPHILS RELATIVE PERCENT: 7 % (ref 0–6)
ERYTHROCYTE [DISTWIDTH] IN BLOOD BY AUTOMATED COUNT: 14.7 % (ref 11.5–15)
ERYTHROCYTE [SEDIMENTATION RATE] IN BLOOD BY WESTERGREN METHOD: 26 MM/HR (ref 0–20)
GFR, ESTIMATED: 62 ML/MIN/1.73M2
GLUCOSE SERPL-MCNC: 89 MG/DL (ref 74–99)
HCT VFR BLD AUTO: 35.1 % (ref 34–48)
HGB BLD-MCNC: 11.1 G/DL (ref 11.5–15.5)
IMM GRANULOCYTES # BLD AUTO: <0.03 K/UL (ref 0–0.58)
IMM GRANULOCYTES NFR BLD: 0 % (ref 0–5)
LYMPHOCYTES NFR BLD: 1.32 K/UL (ref 1.5–4)
LYMPHOCYTES RELATIVE PERCENT: 26 % (ref 20–42)
MCH RBC QN AUTO: 31.2 PG (ref 26–35)
MCHC RBC AUTO-ENTMCNC: 31.6 G/DL (ref 32–34.5)
MCV RBC AUTO: 98.6 FL (ref 80–99.9)
MONOCYTES NFR BLD: 0.56 K/UL (ref 0.1–0.95)
MONOCYTES NFR BLD: 11 % (ref 2–12)
NEUTROPHILS NFR BLD: 54 % (ref 43–80)
NEUTS SEG NFR BLD: 2.74 K/UL (ref 1.8–7.3)
PLATELET # BLD AUTO: 159 K/UL (ref 130–450)
PMV BLD AUTO: 11 FL (ref 7–12)
POTASSIUM SERPL-SCNC: 4.8 MMOL/L (ref 3.5–5)
PROT SERPL-MCNC: 7.1 G/DL (ref 6.4–8.3)
RBC # BLD AUTO: 3.56 M/UL (ref 3.5–5.5)
SODIUM SERPL-SCNC: 137 MMOL/L (ref 132–146)
WBC OTHER # BLD: 5 K/UL (ref 4.5–11.5)

## 2025-04-29 PROCEDURE — 99211 OFF/OP EST MAY X REQ PHY/QHP: CPT

## 2025-04-29 PROCEDURE — 85025 COMPLETE CBC W/AUTO DIFF WBC: CPT

## 2025-04-29 PROCEDURE — 36415 COLL VENOUS BLD VENIPUNCTURE: CPT

## 2025-04-29 PROCEDURE — 80053 COMPREHEN METABOLIC PANEL: CPT

## 2025-04-29 PROCEDURE — 96523 IRRIG DRUG DELIVERY DEVICE: CPT

## 2025-04-29 PROCEDURE — 86140 C-REACTIVE PROTEIN: CPT

## 2025-04-29 PROCEDURE — 85652 RBC SED RATE AUTOMATED: CPT

## 2025-04-29 RX ORDER — SODIUM CHLORIDE 9 MG/ML
25 INJECTION, SOLUTION INTRAVENOUS PRN
OUTPATIENT
Start: 2025-04-29

## 2025-04-29 RX ORDER — SODIUM CHLORIDE 0.9 % (FLUSH) 0.9 %
5-40 SYRINGE (ML) INJECTION PRN
Status: DISCONTINUED | OUTPATIENT
Start: 2025-04-29 | End: 2025-04-30 | Stop reason: HOSPADM

## 2025-04-29 RX ORDER — SODIUM CHLORIDE 0.9 % (FLUSH) 0.9 %
5-40 SYRINGE (ML) INJECTION PRN
OUTPATIENT
Start: 2025-04-29

## 2025-04-29 RX ORDER — HEPARIN 100 UNIT/ML
100 SYRINGE INTRAVENOUS PRN
Status: DISCONTINUED | OUTPATIENT
Start: 2025-04-29 | End: 2025-04-30 | Stop reason: HOSPADM

## 2025-04-29 RX ORDER — HEPARIN 100 UNIT/ML
100 SYRINGE INTRAVENOUS PRN
OUTPATIENT
Start: 2025-04-29

## 2025-04-29 ASSESSMENT — PAIN SCALES - GENERAL: PAINLEVEL_OUTOF10: 6

## 2025-04-29 NOTE — PROGRESS NOTES
Venipuncture was performed  right wrist  due to midline  did not provide  enough  for lab specimens     pessure applied to site   specimens  labled and transported to lab  identity was confirmed with label  specimens

## 2025-04-30 ENCOUNTER — HOSPITAL ENCOUNTER (OUTPATIENT)
Dept: WOUND CARE | Age: 70
Discharge: HOME | End: 2025-04-30
Attending: SURGERY
Payer: MEDICARE

## 2025-04-30 VITALS
DIASTOLIC BLOOD PRESSURE: 60 MMHG | HEART RATE: 72 BPM | BODY MASS INDEX: 26.22 KG/M2 | SYSTOLIC BLOOD PRESSURE: 112 MMHG | HEIGHT: 68 IN | WEIGHT: 173 LBS | TEMPERATURE: 98.9 F | RESPIRATION RATE: 16 BRPM

## 2025-04-30 DIAGNOSIS — I70.243 ATHEROSCLEROSIS OF NATIVE ARTERY OF LEFT LOWER EXTREMITY WITH ULCERATION OF ANKLE (HCC): Chronic | ICD-10-CM

## 2025-04-30 DIAGNOSIS — I70.242 ATHEROSCLEROSIS OF NATIVE ARTERY OF LEFT LOWER EXTREMITY WITH ULCERATION OF CALF (HCC): ICD-10-CM

## 2025-04-30 DIAGNOSIS — I83.023 VARICOSE VEINS OF LEFT LOWER EXTREMITY WITH ULCER OF ANKLE WITH FAT LAYER EXPOSED (HCC): Primary | ICD-10-CM

## 2025-04-30 DIAGNOSIS — L97.322 VARICOSE VEINS OF LEFT LOWER EXTREMITY WITH ULCER OF ANKLE WITH FAT LAYER EXPOSED (HCC): Primary | ICD-10-CM

## 2025-04-30 PROCEDURE — 11042 DBRDMT SUBQ TIS 1ST 20SQCM/<: CPT | Performed by: SURGERY

## 2025-04-30 PROCEDURE — 11042 DBRDMT SUBQ TIS 1ST 20SQCM/<: CPT

## 2025-04-30 RX ORDER — LIDOCAINE HYDROCHLORIDE 20 MG/ML
JELLY TOPICAL ONCE
OUTPATIENT
Start: 2025-04-30 | End: 2025-04-30

## 2025-04-30 RX ORDER — GINSENG 100 MG
CAPSULE ORAL ONCE
OUTPATIENT
Start: 2025-04-30 | End: 2025-04-30

## 2025-04-30 RX ORDER — GENTAMICIN SULFATE 1 MG/G
OINTMENT TOPICAL ONCE
OUTPATIENT
Start: 2025-04-30 | End: 2025-04-30

## 2025-04-30 RX ORDER — LIDOCAINE 50 MG/G
OINTMENT TOPICAL ONCE
OUTPATIENT
Start: 2025-04-30 | End: 2025-04-30

## 2025-04-30 RX ORDER — BETAMETHASONE DIPROPIONATE 0.05 %
OINTMENT (GRAM) TOPICAL ONCE
OUTPATIENT
Start: 2025-04-30 | End: 2025-04-30

## 2025-04-30 RX ORDER — BACITRACIN ZINC AND POLYMYXIN B SULFATE 500; 1000 [USP'U]/G; [USP'U]/G
OINTMENT TOPICAL ONCE
OUTPATIENT
Start: 2025-04-30 | End: 2025-04-30

## 2025-04-30 RX ORDER — TRIAMCINOLONE ACETONIDE 1 MG/G
OINTMENT TOPICAL ONCE
OUTPATIENT
Start: 2025-04-30 | End: 2025-04-30

## 2025-04-30 RX ORDER — LIDOCAINE HYDROCHLORIDE 40 MG/ML
SOLUTION TOPICAL ONCE
Status: COMPLETED | OUTPATIENT
Start: 2025-04-30 | End: 2025-04-30

## 2025-04-30 RX ORDER — MUPIROCIN 20 MG/G
OINTMENT TOPICAL ONCE
OUTPATIENT
Start: 2025-04-30 | End: 2025-04-30

## 2025-04-30 RX ORDER — OXYCODONE AND ACETAMINOPHEN 5; 325 MG/1; MG/1
1 TABLET ORAL EVERY 6 HOURS PRN
Qty: 28 TABLET | Refills: 0 | Status: SHIPPED | OUTPATIENT
Start: 2025-04-30 | End: 2025-05-07

## 2025-04-30 RX ORDER — NEOMYCIN/BACITRACIN/POLYMYXINB 3.5-400-5K
OINTMENT (GRAM) TOPICAL ONCE
OUTPATIENT
Start: 2025-04-30 | End: 2025-04-30

## 2025-04-30 RX ORDER — LIDOCAINE 40 MG/G
CREAM TOPICAL ONCE
OUTPATIENT
Start: 2025-04-30 | End: 2025-04-30

## 2025-04-30 RX ORDER — CLOBETASOL PROPIONATE 0.5 MG/G
OINTMENT TOPICAL ONCE
OUTPATIENT
Start: 2025-04-30 | End: 2025-04-30

## 2025-04-30 RX ORDER — LIDOCAINE HYDROCHLORIDE 40 MG/ML
SOLUTION TOPICAL ONCE
OUTPATIENT
Start: 2025-04-30 | End: 2025-04-30

## 2025-04-30 RX ORDER — SILVER SULFADIAZINE 10 MG/G
CREAM TOPICAL ONCE
OUTPATIENT
Start: 2025-04-30 | End: 2025-04-30

## 2025-04-30 RX ADMIN — LIDOCAINE HYDROCHLORIDE 20 ML: 40 SOLUTION TOPICAL at 08:00

## 2025-04-30 ASSESSMENT — PAIN DESCRIPTION - ONSET: ONSET: ON-GOING

## 2025-04-30 ASSESSMENT — PAIN DESCRIPTION - ORIENTATION: ORIENTATION: LEFT

## 2025-04-30 ASSESSMENT — PAIN DESCRIPTION - FREQUENCY: FREQUENCY: CONTINUOUS

## 2025-04-30 ASSESSMENT — PAIN DESCRIPTION - DESCRIPTORS: DESCRIPTORS: ACHING;STABBING

## 2025-04-30 ASSESSMENT — PAIN SCALES - GENERAL: PAINLEVEL_OUTOF10: 8

## 2025-04-30 ASSESSMENT — PAIN - FUNCTIONAL ASSESSMENT: PAIN_FUNCTIONAL_ASSESSMENT: PREVENTS OR INTERFERES SOME ACTIVE ACTIVITIES AND ADLS

## 2025-04-30 ASSESSMENT — PAIN DESCRIPTION - PAIN TYPE: TYPE: CHRONIC PAIN

## 2025-04-30 ASSESSMENT — PAIN DESCRIPTION - LOCATION: LOCATION: LEG

## 2025-05-01 NOTE — DISCHARGE INSTRUCTIONS
Visit Discharge/Physician Orders     Discharge condition: Stable     Assessment of pain at discharge: yes     Anesthetic used: 4% lidocaine solution     Discharge to: Home     Left via:Private automobile     Accompanied by:self     ECF/HHA: Romeo [phone]     Dressing Orders: LEFT MEDIAL LEG: Cleanse with normal saline, apply zinc to fiona wound, apply Calcium Alginate Ag, ABD pad, and Coban 2. Change weekly.      Treatment Orders: Eat a diet high in protein and vitamin C. Take a multiple vitamin daily unless contraindicated.     Drink Protein Shakes ([employer])      Dr. Duncan re: IV antibiotics      [facility] followup visit : 1 week Dr. HERNANDEZ   __________________________________  (Please note your next appointment above and if you are unable to keep, kindly give a 24 hour notice. Thank you.)     Physician signature:__________________________     If you experience any of the following, please call the Wound Care Center during business hours:     * Increase in Pain  * Temperature over 101  * Increase in drainage from your wound  * Drainage with a foul odor  * Bleeding  * Increase in swelling  * Need for compression bandage changes due to slippage, breakthrough drainage.     If you need medical attention outside of the business hours of the Wound Care Centers please contact your PCP or go to the nearest emergency room

## 2025-05-02 ENCOUNTER — HOSPITAL ENCOUNTER (OUTPATIENT)
Dept: INFUSION THERAPY | Age: 70
Setting detail: INFUSION SERIES
Discharge: HOME | End: 2025-05-02
Payer: MEDICARE

## 2025-05-02 VITALS
RESPIRATION RATE: 16 BRPM | OXYGEN SATURATION: 97 % | DIASTOLIC BLOOD PRESSURE: 55 MMHG | TEMPERATURE: 98.1 F | HEART RATE: 75 BPM | SYSTOLIC BLOOD PRESSURE: 105 MMHG

## 2025-05-02 PROCEDURE — 99211 OFF/OP EST MAY X REQ PHY/QHP: CPT

## 2025-05-02 ASSESSMENT — PAIN DESCRIPTION - ORIENTATION: ORIENTATION: LEFT

## 2025-05-02 ASSESSMENT — PAIN SCALES - GENERAL: PAINLEVEL_OUTOF10: 5

## 2025-05-02 ASSESSMENT — PAIN DESCRIPTION - PAIN TYPE: TYPE: CHRONIC PAIN

## 2025-05-02 ASSESSMENT — PAIN DESCRIPTION - ONSET: ONSET: ON-GOING

## 2025-05-02 ASSESSMENT — PAIN DESCRIPTION - FREQUENCY: FREQUENCY: INTERMITTENT

## 2025-05-02 ASSESSMENT — PAIN DESCRIPTION - DESCRIPTORS: DESCRIPTORS: ACHING

## 2025-05-02 ASSESSMENT — PAIN DESCRIPTION - LOCATION: LOCATION: LEG;FOOT

## 2025-05-02 NOTE — PROGRESS NOTES
15  cm  removed  picc line  intact   no reddness irritation  at site   sterile  4x4  pressure  and tape  applied    no bleeding  noted   educated  on aftercare  picc line removal

## 2025-05-06 ENCOUNTER — OFFICE VISIT (OUTPATIENT)
Dept: FAMILY MEDICINE CLINIC | Age: 70
End: 2025-05-06
Payer: MEDICARE

## 2025-05-06 VITALS
RESPIRATION RATE: 16 BRPM | HEIGHT: 68 IN | WEIGHT: 172.3 LBS | TEMPERATURE: 97.7 F | HEART RATE: 68 BPM | SYSTOLIC BLOOD PRESSURE: 104 MMHG | BODY MASS INDEX: 26.11 KG/M2 | OXYGEN SATURATION: 95 % | DIASTOLIC BLOOD PRESSURE: 60 MMHG

## 2025-05-06 DIAGNOSIS — K21.01 GASTROESOPHAGEAL REFLUX DISEASE WITH ESOPHAGITIS AND HEMORRHAGE: ICD-10-CM

## 2025-05-06 DIAGNOSIS — K44.9 HIATAL HERNIA WITH GERD: ICD-10-CM

## 2025-05-06 DIAGNOSIS — K21.00 GASTROESOPHAGEAL REFLUX DISEASE WITH ESOPHAGITIS WITHOUT HEMORRHAGE: ICD-10-CM

## 2025-05-06 DIAGNOSIS — Z01.818 PREOPERATIVE EXAMINATION: Primary | ICD-10-CM

## 2025-05-06 DIAGNOSIS — K21.9 HIATAL HERNIA WITH GERD: ICD-10-CM

## 2025-05-06 PROCEDURE — 1159F MED LIST DOCD IN RCRD: CPT | Performed by: FAMILY MEDICINE

## 2025-05-06 PROCEDURE — 3017F COLORECTAL CA SCREEN DOC REV: CPT | Performed by: FAMILY MEDICINE

## 2025-05-06 PROCEDURE — G8400 PT W/DXA NO RESULTS DOC: HCPCS | Performed by: FAMILY MEDICINE

## 2025-05-06 PROCEDURE — 1160F RVW MEDS BY RX/DR IN RCRD: CPT | Performed by: FAMILY MEDICINE

## 2025-05-06 PROCEDURE — 1124F ACP DISCUSS-NO DSCNMKR DOCD: CPT | Performed by: FAMILY MEDICINE

## 2025-05-06 PROCEDURE — 99214 OFFICE O/P EST MOD 30 MIN: CPT | Performed by: FAMILY MEDICINE

## 2025-05-06 PROCEDURE — 93000 ELECTROCARDIOGRAM COMPLETE: CPT | Performed by: FAMILY MEDICINE

## 2025-05-06 PROCEDURE — G8419 CALC BMI OUT NRM PARAM NOF/U: HCPCS | Performed by: FAMILY MEDICINE

## 2025-05-06 PROCEDURE — 3078F DIAST BP <80 MM HG: CPT | Performed by: FAMILY MEDICINE

## 2025-05-06 PROCEDURE — 1090F PRES/ABSN URINE INCON ASSESS: CPT | Performed by: FAMILY MEDICINE

## 2025-05-06 PROCEDURE — 3074F SYST BP LT 130 MM HG: CPT | Performed by: FAMILY MEDICINE

## 2025-05-06 PROCEDURE — 1036F TOBACCO NON-USER: CPT | Performed by: FAMILY MEDICINE

## 2025-05-06 PROCEDURE — G8427 DOCREV CUR MEDS BY ELIG CLIN: HCPCS | Performed by: FAMILY MEDICINE

## 2025-05-06 ASSESSMENT — ENCOUNTER SYMPTOMS
VOMITING: 1
WHEEZING: 0
NAUSEA: 1
CONSTIPATION: 1
BLOOD IN STOOL: 0
DIARRHEA: 0
COUGH: 0
SORE THROAT: 0
ABDOMINAL PAIN: 1
SHORTNESS OF BREATH: 0
BACK PAIN: 0

## 2025-05-06 NOTE — PROGRESS NOTES
107 04/29/2025 03:38 PM    CO2 18 04/29/2025 03:38 PM    BUN 20 04/29/2025 03:38 PM    CREATININE 1.0 04/29/2025 03:38 PM    CALCIUM 9.1 04/29/2025 03:38 PM    GFRAA >60 06/01/2022 09:50 AM    LABGLOM 62 04/29/2025 03:38 PM    LABGLOM >60 05/03/2023 03:27 AM    GLUCOSE 89 04/29/2025 03:38 PM    AST 14 04/29/2025 03:38 PM    ALT 12 04/29/2025 03:38 PM    ALKPHOS 79 04/29/2025 03:38 PM    BILITOT 0.2 04/29/2025 03:38 PM    VITD25 27 08/20/2019 12:00 PM    CHOL 252 03/26/2025 08:44 AM    TRIG 112 03/26/2025 08:44 AM    HDL 58 03/26/2025 08:44 AM    LABA1C 5.1 08/31/2022 03:30 PM        Lab Results   Component Value Date    CHOL 252 (H) 03/26/2025     Lab Results   Component Value Date    TRIG 112 03/26/2025     Lab Results   Component Value Date    HDL 58 03/26/2025     No components found for: \"LDLCALC\", \"LDLCHOLESTEROL\"    Lab Results   Component Value Date    LABA1C 5.1 08/31/2022     Lab Results   Component Value Date    CREATININE 1.0 04/29/2025           Assessment / Plan:      Amanda was seen today for pre-op exam.    Diagnoses and all orders for this visit:    Preoperative examination: Low to Intermediate risk candidate for an intermediate risk procedure  -     EKG 12 Lead - Clinic Performed; Future  -     EKG 12 Lead - Clinic Performed    Hiatal hernia with GERD: Low to Intermediate risk candidate for an intermediate risk procedure    Gastroesophageal reflux disease with esophagitis without hemorrhage: Low to Intermediate risk candidate for an intermediate risk procedure    Gastroesophageal reflux disease with esophagitis and hemorrhage: Low to Intermediate risk candidate for an intermediate risk procedure      Follow Up:  Return if symptoms worsen or fail to improve. or sooner if necessary.      Call or go to ED immediately if symptoms worsen or persist.    Educational materials and/or home exercises printed for patient's review and were included in patient instructions on his/her AfterVisit Summary and given

## 2025-05-07 ENCOUNTER — HOSPITAL ENCOUNTER (OUTPATIENT)
Dept: WOUND CARE | Age: 70
Discharge: HOME | End: 2025-05-07
Attending: SURGERY

## 2025-05-08 NOTE — DISCHARGE INSTRUCTIONS
Visit Discharge/Physician Orders     Discharge condition: Stable     Assessment of pain at discharge: yes     Anesthetic used: 4% lidocaine solution     Discharge to: Home     Left via:Private automobile     Accompanied by:self     ECF/HHA: Romeo [phone]     Dressing Orders: LEFT MEDIAL LEG: Cleanse with normal saline, apply zinc to fiona wound, apply Calcium Alginate Ag, ABD pad, and Coban 2. Change weekly.      Treatment Orders: Eat a diet high in protein and vitamin C. Take a multiple vitamin daily unless contraindicated.     Drink Protein Shakes ([employer])      Dr. Duncan as needed.      [facility] followup visit : 1 week Dr. HERNANDEZ   __________________________________  (Please note your next appointment above and if you are unable to keep, kindly give a 24 hour notice. Thank you.)     Physician signature:__________________________     If you experience any of the following, please call the Wound Care Center during business hours:     * Increase in Pain  * Temperature over 101  * Increase in drainage from your wound  * Drainage with a foul odor  * Bleeding  * Increase in swelling  * Need for compression bandage changes due to slippage, breakthrough drainage.     If you need medical attention outside of the business hours of the Wound Care Centers please contact your PCP or go to the nearest emergency room

## 2025-05-14 ENCOUNTER — HOSPITAL ENCOUNTER (OUTPATIENT)
Dept: WOUND CARE | Age: 70
Discharge: HOME | End: 2025-05-14
Attending: SURGERY
Payer: MEDICARE

## 2025-05-14 VITALS
DIASTOLIC BLOOD PRESSURE: 70 MMHG | HEART RATE: 80 BPM | SYSTOLIC BLOOD PRESSURE: 125 MMHG | TEMPERATURE: 97.8 F | HEIGHT: 68 IN | RESPIRATION RATE: 18 BRPM | BODY MASS INDEX: 26.07 KG/M2 | WEIGHT: 172 LBS

## 2025-05-14 DIAGNOSIS — I70.243 ATHEROSCLEROSIS OF NATIVE ARTERY OF LEFT LOWER EXTREMITY WITH ULCERATION OF ANKLE (HCC): Chronic | ICD-10-CM

## 2025-05-14 DIAGNOSIS — L97.322 VARICOSE VEINS OF LEFT LOWER EXTREMITY WITH ULCER OF ANKLE WITH FAT LAYER EXPOSED (HCC): Primary | ICD-10-CM

## 2025-05-14 DIAGNOSIS — I70.242 ATHEROSCLEROSIS OF NATIVE ARTERY OF LEFT LOWER EXTREMITY WITH ULCERATION OF CALF (HCC): ICD-10-CM

## 2025-05-14 DIAGNOSIS — I83.023 VARICOSE VEINS OF LEFT LOWER EXTREMITY WITH ULCER OF ANKLE WITH FAT LAYER EXPOSED (HCC): Primary | ICD-10-CM

## 2025-05-14 PROCEDURE — 11042 DBRDMT SUBQ TIS 1ST 20SQCM/<: CPT | Performed by: SURGERY

## 2025-05-14 PROCEDURE — 11042 DBRDMT SUBQ TIS 1ST 20SQCM/<: CPT

## 2025-05-14 RX ORDER — CLOBETASOL PROPIONATE 0.5 MG/G
OINTMENT TOPICAL ONCE
OUTPATIENT
Start: 2025-05-14 | End: 2025-05-14

## 2025-05-14 RX ORDER — LIDOCAINE HYDROCHLORIDE 40 MG/ML
SOLUTION TOPICAL ONCE
OUTPATIENT
Start: 2025-05-14 | End: 2025-05-14

## 2025-05-14 RX ORDER — SILVER SULFADIAZINE 10 MG/G
CREAM TOPICAL ONCE
OUTPATIENT
Start: 2025-05-14 | End: 2025-05-14

## 2025-05-14 RX ORDER — LIDOCAINE HYDROCHLORIDE 20 MG/ML
JELLY TOPICAL ONCE
OUTPATIENT
Start: 2025-05-14 | End: 2025-05-14

## 2025-05-14 RX ORDER — BETAMETHASONE DIPROPIONATE 0.05 %
OINTMENT (GRAM) TOPICAL ONCE
OUTPATIENT
Start: 2025-05-14 | End: 2025-05-14

## 2025-05-14 RX ORDER — TRIAMCINOLONE ACETONIDE 1 MG/G
OINTMENT TOPICAL ONCE
OUTPATIENT
Start: 2025-05-14 | End: 2025-05-14

## 2025-05-14 RX ORDER — GINSENG 100 MG
CAPSULE ORAL ONCE
OUTPATIENT
Start: 2025-05-14 | End: 2025-05-14

## 2025-05-14 RX ORDER — LIDOCAINE HYDROCHLORIDE 40 MG/ML
SOLUTION TOPICAL ONCE
Status: COMPLETED | OUTPATIENT
Start: 2025-05-14 | End: 2025-05-14

## 2025-05-14 RX ORDER — LIDOCAINE 40 MG/G
CREAM TOPICAL ONCE
OUTPATIENT
Start: 2025-05-14 | End: 2025-05-14

## 2025-05-14 RX ORDER — LIDOCAINE 50 MG/G
OINTMENT TOPICAL ONCE
OUTPATIENT
Start: 2025-05-14 | End: 2025-05-14

## 2025-05-14 RX ORDER — GENTAMICIN SULFATE 1 MG/G
OINTMENT TOPICAL ONCE
OUTPATIENT
Start: 2025-05-14 | End: 2025-05-14

## 2025-05-14 RX ORDER — MUPIROCIN 20 MG/G
OINTMENT TOPICAL ONCE
OUTPATIENT
Start: 2025-05-14 | End: 2025-05-14

## 2025-05-14 RX ORDER — BACITRACIN ZINC AND POLYMYXIN B SULFATE 500; 1000 [USP'U]/G; [USP'U]/G
OINTMENT TOPICAL ONCE
OUTPATIENT
Start: 2025-05-14 | End: 2025-05-14

## 2025-05-14 RX ORDER — OXYCODONE AND ACETAMINOPHEN 5; 325 MG/1; MG/1
1 TABLET ORAL EVERY 6 HOURS PRN
Qty: 28 TABLET | Refills: 0 | Status: SHIPPED | OUTPATIENT
Start: 2025-05-20 | End: 2025-05-27

## 2025-05-14 RX ORDER — NEOMYCIN/BACITRACIN/POLYMYXINB 3.5-400-5K
OINTMENT (GRAM) TOPICAL ONCE
OUTPATIENT
Start: 2025-05-14 | End: 2025-05-14

## 2025-05-14 RX ADMIN — LIDOCAINE HYDROCHLORIDE 10 ML: 40 SOLUTION TOPICAL at 07:35

## 2025-05-14 ASSESSMENT — PAIN DESCRIPTION - DESCRIPTORS: DESCRIPTORS: ACHING

## 2025-05-14 ASSESSMENT — PAIN - FUNCTIONAL ASSESSMENT: PAIN_FUNCTIONAL_ASSESSMENT: PREVENTS OR INTERFERES SOME ACTIVE ACTIVITIES AND ADLS

## 2025-05-14 ASSESSMENT — PAIN DESCRIPTION - ORIENTATION: ORIENTATION: LEFT

## 2025-05-14 ASSESSMENT — PAIN DESCRIPTION - PAIN TYPE: TYPE: CHRONIC PAIN

## 2025-05-14 ASSESSMENT — PAIN SCALES - GENERAL: PAINLEVEL_OUTOF10: 8

## 2025-05-14 ASSESSMENT — PAIN DESCRIPTION - FREQUENCY: FREQUENCY: INTERMITTENT

## 2025-05-14 ASSESSMENT — PAIN DESCRIPTION - ONSET: ONSET: ON-GOING

## 2025-05-14 ASSESSMENT — PAIN DESCRIPTION - LOCATION: LOCATION: LEG

## 2025-05-14 NOTE — PROGRESS NOTES
the Wound Care Center during business hours:     * Increase in Pain  * Temperature over 101  * Increase in drainage from your wound  * Drainage with a foul odor  * Bleeding  * Increase in swelling  * Need for compression bandage changes due to slippage, breakthrough drainage.     If you need medical attention outside of the business hours of the Wound Care Centers please contact your PCP or go to the nearest emergency room       Electronically signed by Ashley Staples MD

## 2025-05-15 NOTE — DISCHARGE INSTRUCTIONS
Visit Discharge/Physician Orders     Discharge condition: Stable     Assessment of pain at discharge: yes     Anesthetic used: 4% lidocaine solution     Discharge to: Home     Left via:Private automobile     Accompanied by:self     ECF/HHA: Romeo [phone]     Dressing Orders: LEFT MEDIAL LEG: Cleanse with normal saline, apply zinc to fiona wound, apply Calcium Alginate Ag, ABD pad, and Coban 2. Change weekly.      Treatment Orders: Eat a diet high in protein and vitamin C. Take a multiple vitamin daily unless contraindicated.     Drink Protein Shakes ([email or web address])      Dr. Duncan as needed.      [facility] followup visit : 1 week Dr. HERNANDEZ   __________________________________  (Please note your next appointment above and if you are unable to keep, kindly give a 24 hour notice. Thank you.)     Physician signature:__________________________     If you experience any of the following, please call the Wound Care Center during business hours:     * Increase in Pain  * Temperature over 101  * Increase in drainage from your wound  * Drainage with a foul odor  * Bleeding  * Increase in swelling  * Need for compression bandage changes due to slippage, breakthrough drainage.     If you need medical attention outside of the business hours of the Wound Care Centers please contact your PCP or go to the nearest emergency room         no

## 2025-05-21 ENCOUNTER — HOSPITAL ENCOUNTER (OUTPATIENT)
Dept: WOUND CARE | Age: 70
Discharge: HOME | End: 2025-05-21
Attending: SURGERY
Payer: MEDICARE

## 2025-05-21 VITALS
HEART RATE: 82 BPM | TEMPERATURE: 98 F | DIASTOLIC BLOOD PRESSURE: 56 MMHG | SYSTOLIC BLOOD PRESSURE: 120 MMHG | RESPIRATION RATE: 18 BRPM

## 2025-05-21 DIAGNOSIS — I70.243 ATHEROSCLEROSIS OF NATIVE ARTERY OF LEFT LOWER EXTREMITY WITH ULCERATION OF ANKLE (HCC): Chronic | ICD-10-CM

## 2025-05-21 DIAGNOSIS — I70.242 ATHEROSCLEROSIS OF NATIVE ARTERY OF LEFT LOWER EXTREMITY WITH ULCERATION OF CALF (HCC): ICD-10-CM

## 2025-05-21 DIAGNOSIS — L97.322 VARICOSE VEINS OF LEFT LOWER EXTREMITY WITH ULCER OF ANKLE WITH FAT LAYER EXPOSED (HCC): Primary | ICD-10-CM

## 2025-05-21 DIAGNOSIS — I83.023 VARICOSE VEINS OF LEFT LOWER EXTREMITY WITH ULCER OF ANKLE WITH FAT LAYER EXPOSED (HCC): Primary | ICD-10-CM

## 2025-05-21 PROCEDURE — 11042 DBRDMT SUBQ TIS 1ST 20SQCM/<: CPT

## 2025-05-21 PROCEDURE — 11042 DBRDMT SUBQ TIS 1ST 20SQCM/<: CPT | Performed by: SURGERY

## 2025-05-21 RX ORDER — BACITRACIN ZINC AND POLYMYXIN B SULFATE 500; 1000 [USP'U]/G; [USP'U]/G
OINTMENT TOPICAL ONCE
OUTPATIENT
Start: 2025-05-21 | End: 2025-05-21

## 2025-05-21 RX ORDER — CLOBETASOL PROPIONATE 0.5 MG/G
OINTMENT TOPICAL ONCE
OUTPATIENT
Start: 2025-05-21 | End: 2025-05-21

## 2025-05-21 RX ORDER — TRIAMCINOLONE ACETONIDE 1 MG/G
OINTMENT TOPICAL ONCE
OUTPATIENT
Start: 2025-05-21 | End: 2025-05-21

## 2025-05-21 RX ORDER — GENTAMICIN SULFATE 1 MG/G
OINTMENT TOPICAL ONCE
OUTPATIENT
Start: 2025-05-21 | End: 2025-05-21

## 2025-05-21 RX ORDER — LIDOCAINE 40 MG/G
CREAM TOPICAL ONCE
OUTPATIENT
Start: 2025-05-21 | End: 2025-05-21

## 2025-05-21 RX ORDER — LIDOCAINE HYDROCHLORIDE 20 MG/ML
JELLY TOPICAL ONCE
OUTPATIENT
Start: 2025-05-21 | End: 2025-05-21

## 2025-05-21 RX ORDER — SILVER SULFADIAZINE 10 MG/G
CREAM TOPICAL ONCE
OUTPATIENT
Start: 2025-05-21 | End: 2025-05-21

## 2025-05-21 RX ORDER — GINSENG 100 MG
CAPSULE ORAL ONCE
OUTPATIENT
Start: 2025-05-21 | End: 2025-05-21

## 2025-05-21 RX ORDER — LIDOCAINE HYDROCHLORIDE 40 MG/ML
SOLUTION TOPICAL ONCE
Status: COMPLETED | OUTPATIENT
Start: 2025-05-21 | End: 2025-05-21

## 2025-05-21 RX ORDER — LIDOCAINE 50 MG/G
OINTMENT TOPICAL ONCE
OUTPATIENT
Start: 2025-05-21 | End: 2025-05-21

## 2025-05-21 RX ORDER — BETAMETHASONE DIPROPIONATE 0.05 %
OINTMENT (GRAM) TOPICAL ONCE
OUTPATIENT
Start: 2025-05-21 | End: 2025-05-21

## 2025-05-21 RX ORDER — NEOMYCIN/BACITRACIN/POLYMYXINB 3.5-400-5K
OINTMENT (GRAM) TOPICAL ONCE
OUTPATIENT
Start: 2025-05-21 | End: 2025-05-21

## 2025-05-21 RX ORDER — LIDOCAINE HYDROCHLORIDE 40 MG/ML
SOLUTION TOPICAL ONCE
OUTPATIENT
Start: 2025-05-21 | End: 2025-05-21

## 2025-05-21 RX ORDER — MUPIROCIN 20 MG/G
OINTMENT TOPICAL ONCE
OUTPATIENT
Start: 2025-05-21 | End: 2025-05-21

## 2025-05-21 RX ADMIN — LIDOCAINE HYDROCHLORIDE: 40 SOLUTION TOPICAL at 07:55

## 2025-05-21 NOTE — PROGRESS NOTES
tolerate debridement well  Medial larger 57  Lateral larger 26  6/14/23  Culture ng  Still not tolerating debridement well  Recommended OR debridement again  Medial 57 stable  Lateral 26 stable  6/21/23  Medial 50 improved  Lateral 22 improved  Cbc ordered to make sure pt not anemic  Pt would like to go forward with debridement in or - will plan on using ac5  I reviewed the procedure with the patient and family as available.  I discussed the procedure, risks, benefits, complications, and alternatives of the procedure.  They understand and consent.  All questions were answered  7/5/2023  Wounds were debrided last week and surgery, still has some exudate, no cellulitis, debrided including subcu  7/12/2023  Tissue clean without signs of infection or surrounding cellulitis  Slightly improved dimension from last week  Significant pain with debridement - patient states pain has worsened since being out of her medications  7/19/23  Tolerated debridement better  Wounds appearance improved  Plan for ac5 application and debridement next week on 7/25 Tuesday at noon - pt understands she needs to be here at 10 am   Medial 51 (64) lateral 27 (26)  7/31/2023  Patient was concerned, came to the wound care center, felt like the bandage was too tight causing pain, remove the Band-Aids and wanted the wound to be evaluated  Patient underwent surgery by Dr. Staples 25 July, excisional debridement of the left lower extremity wound, along with application of AC5 graft  The wounds themselves look clean, the posterior wound has some turbid drainage come from the graft and culture was taken, patient also recommended, CBC with differential for comparison and coordination of care along with the wound cultures, and was instructed, if she any fever or chills come to the emergency room  All her questions were answered  8/2/23  Pain was better during week but got worse yesterday  Percocet 7/5/325 mg #42 given q 8 hrs (14 days), oarrs

## 2025-05-22 NOTE — DISCHARGE INSTRUCTIONS
Visit Discharge/Physician Orders     Discharge condition: Stable     Assessment of pain at discharge: yes     Anesthetic used: 4% lidocaine solution     Discharge to: Home     Left via:Private automobile     Accompanied by:self     ECF/HHA: Romeo [phone]     Dressing Orders: LEFT MEDIAL LEG: Cleanse with normal saline, apply zinc to fiona wound, apply Calcium Alginate Ag, ABD pad, and Coban 2. Change weekly.      Treatment Orders: Eat a diet high in protein and vitamin C. Take a multiple vitamin daily unless contraindicated.     Drink Protein Shakes ([employer])      Dr. Duncan as needed.      [facility] followup visit : 1 week Dr. HERNANDEZ   __________________________________  (Please note your next appointment above and if you are unable to keep, kindly give a 24 hour notice. Thank you.)     Physician signature:__________________________     If you experience any of the following, please call the Wound Care Center during business hours:     * Increase in Pain  * Temperature over 101  * Increase in drainage from your wound  * Drainage with a foul odor  * Bleeding  * Increase in swelling  * Need for compression bandage changes due to slippage, breakthrough drainage.     If you need medical attention outside of the business hours of the Wound Care Centers please contact your PCP or go to the nearest emergency room

## 2025-05-28 ENCOUNTER — HOSPITAL ENCOUNTER (OUTPATIENT)
Dept: WOUND CARE | Age: 70
Discharge: HOME | End: 2025-05-28
Attending: SURGERY
Payer: MEDICARE

## 2025-05-28 ENCOUNTER — HOSPITAL ENCOUNTER (OUTPATIENT)
Age: 70
Discharge: HOME | End: 2025-05-28
Payer: MEDICARE

## 2025-05-28 VITALS
DIASTOLIC BLOOD PRESSURE: 54 MMHG | WEIGHT: 172 LBS | HEART RATE: 70 BPM | RESPIRATION RATE: 18 BRPM | BODY MASS INDEX: 26.07 KG/M2 | SYSTOLIC BLOOD PRESSURE: 124 MMHG | HEIGHT: 68 IN | TEMPERATURE: 98.1 F

## 2025-05-28 DIAGNOSIS — I83.023 VARICOSE VEINS OF LEFT LOWER EXTREMITY WITH ULCER OF ANKLE WITH FAT LAYER EXPOSED (HCC): Primary | ICD-10-CM

## 2025-05-28 DIAGNOSIS — I70.242 ATHEROSCLEROSIS OF NATIVE ARTERY OF LEFT LOWER EXTREMITY WITH ULCERATION OF CALF (HCC): ICD-10-CM

## 2025-05-28 DIAGNOSIS — L97.322 VARICOSE VEINS OF LEFT LOWER EXTREMITY WITH ULCER OF ANKLE WITH FAT LAYER EXPOSED (HCC): Primary | ICD-10-CM

## 2025-05-28 DIAGNOSIS — I70.243 ATHEROSCLEROSIS OF NATIVE ARTERY OF LEFT LOWER EXTREMITY WITH ULCERATION OF ANKLE (HCC): Chronic | ICD-10-CM

## 2025-05-28 LAB
ANION GAP SERPL CALCULATED.3IONS-SCNC: 11 MMOL/L (ref 7–16)
BASOPHILS # BLD: 0.06 K/UL (ref 0–0.2)
BASOPHILS NFR BLD: 1 % (ref 0–2)
BUN SERPL-MCNC: 21 MG/DL (ref 6–23)
CALCIUM SERPL-MCNC: 9.5 MG/DL (ref 8.6–10.2)
CHLORIDE SERPL-SCNC: 105 MMOL/L (ref 98–107)
CO2 SERPL-SCNC: 20 MMOL/L (ref 22–29)
CREAT SERPL-MCNC: 1 MG/DL (ref 0.5–1)
EOSINOPHIL # BLD: 0.17 K/UL (ref 0.05–0.5)
EOSINOPHILS RELATIVE PERCENT: 3 % (ref 0–6)
ERYTHROCYTE [DISTWIDTH] IN BLOOD BY AUTOMATED COUNT: 13.3 % (ref 11.5–15)
GFR, ESTIMATED: 60 ML/MIN/1.73M2
GLUCOSE SERPL-MCNC: 80 MG/DL (ref 74–99)
HCT VFR BLD AUTO: 34.9 % (ref 34–48)
HGB BLD-MCNC: 11.3 G/DL (ref 11.5–15.5)
IMM GRANULOCYTES # BLD AUTO: <0.03 K/UL (ref 0–0.58)
IMM GRANULOCYTES NFR BLD: 0 % (ref 0–5)
LYMPHOCYTES NFR BLD: 1.58 K/UL (ref 1.5–4)
LYMPHOCYTES RELATIVE PERCENT: 31 % (ref 20–42)
MCH RBC QN AUTO: 32 PG (ref 26–35)
MCHC RBC AUTO-ENTMCNC: 32.4 G/DL (ref 32–34.5)
MCV RBC AUTO: 98.9 FL (ref 80–99.9)
MONOCYTES NFR BLD: 0.47 K/UL (ref 0.1–0.95)
MONOCYTES NFR BLD: 9 % (ref 2–12)
NEUTROPHILS NFR BLD: 55 % (ref 43–80)
NEUTS SEG NFR BLD: 2.85 K/UL (ref 1.8–7.3)
PLATELET # BLD AUTO: 209 K/UL (ref 130–450)
PMV BLD AUTO: 9.4 FL (ref 7–12)
POTASSIUM SERPL-SCNC: 5.7 MMOL/L (ref 3.5–5)
RBC # BLD AUTO: 3.53 M/UL (ref 3.5–5.5)
SODIUM SERPL-SCNC: 136 MMOL/L (ref 132–146)
WBC OTHER # BLD: 5.2 K/UL (ref 4.5–11.5)

## 2025-05-28 PROCEDURE — 11042 DBRDMT SUBQ TIS 1ST 20SQCM/<: CPT

## 2025-05-28 PROCEDURE — 36415 COLL VENOUS BLD VENIPUNCTURE: CPT

## 2025-05-28 PROCEDURE — 11042 DBRDMT SUBQ TIS 1ST 20SQCM/<: CPT | Performed by: SURGERY

## 2025-05-28 PROCEDURE — 85025 COMPLETE CBC W/AUTO DIFF WBC: CPT

## 2025-05-28 PROCEDURE — 80048 BASIC METABOLIC PNL TOTAL CA: CPT

## 2025-05-28 RX ORDER — LIDOCAINE HYDROCHLORIDE 40 MG/ML
SOLUTION TOPICAL ONCE
OUTPATIENT
Start: 2025-05-28 | End: 2025-05-28

## 2025-05-28 RX ORDER — GINSENG 100 MG
CAPSULE ORAL ONCE
OUTPATIENT
Start: 2025-05-28 | End: 2025-05-28

## 2025-05-28 RX ORDER — LIDOCAINE HYDROCHLORIDE 20 MG/ML
JELLY TOPICAL ONCE
OUTPATIENT
Start: 2025-05-28 | End: 2025-05-28

## 2025-05-28 RX ORDER — SILVER SULFADIAZINE 10 MG/G
CREAM TOPICAL ONCE
OUTPATIENT
Start: 2025-05-28 | End: 2025-05-28

## 2025-05-28 RX ORDER — GENTAMICIN SULFATE 1 MG/G
OINTMENT TOPICAL ONCE
OUTPATIENT
Start: 2025-05-28 | End: 2025-05-28

## 2025-05-28 RX ORDER — LIDOCAINE 50 MG/G
OINTMENT TOPICAL ONCE
OUTPATIENT
Start: 2025-05-28 | End: 2025-05-28

## 2025-05-28 RX ORDER — CLOBETASOL PROPIONATE 0.5 MG/G
OINTMENT TOPICAL ONCE
OUTPATIENT
Start: 2025-05-28 | End: 2025-05-28

## 2025-05-28 RX ORDER — TRIAMCINOLONE ACETONIDE 1 MG/G
OINTMENT TOPICAL ONCE
OUTPATIENT
Start: 2025-05-28 | End: 2025-05-28

## 2025-05-28 RX ORDER — LIDOCAINE 40 MG/G
CREAM TOPICAL ONCE
OUTPATIENT
Start: 2025-05-28 | End: 2025-05-28

## 2025-05-28 RX ORDER — OXYCODONE AND ACETAMINOPHEN 5; 325 MG/1; MG/1
1 TABLET ORAL EVERY 6 HOURS PRN
Qty: 28 TABLET | Refills: 0 | Status: SHIPPED | OUTPATIENT
Start: 2025-06-01 | End: 2025-06-08

## 2025-05-28 RX ORDER — BETAMETHASONE DIPROPIONATE 0.05 %
OINTMENT (GRAM) TOPICAL ONCE
OUTPATIENT
Start: 2025-05-28 | End: 2025-05-28

## 2025-05-28 RX ORDER — NEOMYCIN/BACITRACIN/POLYMYXINB 3.5-400-5K
OINTMENT (GRAM) TOPICAL ONCE
OUTPATIENT
Start: 2025-05-28 | End: 2025-05-28

## 2025-05-28 RX ORDER — BACITRACIN ZINC AND POLYMYXIN B SULFATE 500; 1000 [USP'U]/G; [USP'U]/G
OINTMENT TOPICAL ONCE
OUTPATIENT
Start: 2025-05-28 | End: 2025-05-28

## 2025-05-28 RX ORDER — MUPIROCIN 20 MG/G
OINTMENT TOPICAL ONCE
OUTPATIENT
Start: 2025-05-28 | End: 2025-05-28

## 2025-05-28 RX ORDER — LIDOCAINE HYDROCHLORIDE 40 MG/ML
SOLUTION TOPICAL ONCE
Status: COMPLETED | OUTPATIENT
Start: 2025-05-28 | End: 2025-05-28

## 2025-05-28 RX ADMIN — LIDOCAINE HYDROCHLORIDE 10 ML: 40 SOLUTION TOPICAL at 07:41

## 2025-05-28 ASSESSMENT — PAIN DESCRIPTION - PAIN TYPE: TYPE: CHRONIC PAIN

## 2025-05-28 ASSESSMENT — PAIN DESCRIPTION - FREQUENCY: FREQUENCY: INTERMITTENT

## 2025-05-28 ASSESSMENT — PAIN SCALES - GENERAL: PAINLEVEL_OUTOF10: 8

## 2025-05-28 ASSESSMENT — PAIN DESCRIPTION - ONSET: ONSET: ON-GOING

## 2025-05-28 ASSESSMENT — PAIN DESCRIPTION - DESCRIPTORS: DESCRIPTORS: ACHING

## 2025-05-28 ASSESSMENT — PAIN - FUNCTIONAL ASSESSMENT: PAIN_FUNCTIONAL_ASSESSMENT: PREVENTS OR INTERFERES SOME ACTIVE ACTIVITIES AND ADLS

## 2025-05-28 ASSESSMENT — PAIN DESCRIPTION - LOCATION: LOCATION: LEG

## 2025-05-28 ASSESSMENT — PAIN DESCRIPTION - ORIENTATION: ORIENTATION: LEFT

## 2025-05-28 NOTE — PROGRESS NOTES
Wound Healing Center Followup Visit Note    Referring Physician : Rosenda Cormier MD  Amanda Ledesma  MEDICAL RECORD NUMBER:  [id number]  AGE: 69 y.o.   GENDER: female  : 1955  EPISODE DATE:  2025    Subjective:     Chief Complaint   Patient presents with    Wound Check     leg      HISTORY of PRESENT ILLNESS HPI   Amanda Ledesma is a 69 y.o. female who presents today in regards to follow up evaluation and treatment of wound/ulcer.  That patient's past medical, family and social hx were reviewed and changes were made if present.    History of Wound Context:  The patient has had a wound of her left ankle/calf which was first noted approximately 2021.  This has been treated local wound care. On their initial visit to the wound healing center, 22,  the patient has noted that the wound has been improving.  The patient has not had similar previous wounds in the past.      She started seeing Dr. Street in 2021 and than Dr. Gillis.  She was started in unna boot ~ 2021.  She has noticed some improvement since starting unna boot.  She is currently following with Dr. Haskins.      Pt is not on abx at time of initial visit, but has been treated with previously by podiatry.      She is not a DM.  She is not a smoker.  She denies hx of DVT, and per her report had recent us noting no evidence of dvt at [facility].  She also had arterial studies done.    I had previously seen her in the past in regards to left buttock thigh wound, which started as abscess.      Pt works at [employer] in the [employer] and is on her feet all day.    22  Reflux study - if significant findings will schedule for fu  Continue compression therapy [facility] per podiatry with aquacell dressing  Elevation  She does not have significant arterial occlusive disease  22  Patient has asked to continuing following with me going forward because of our previous relationship  She is going to let Dr. Schuler office know  She

## 2025-06-02 NOTE — DISCHARGE INSTRUCTIONS
Visit Discharge/Physician Orders     Discharge condition: Stable     Assessment of pain at discharge: yes     Anesthetic used: 4% lidocaine solution     Discharge to: Home     Left via:Private automobile     Accompanied by:self     ECF/HHA: Romeo [phone]     Dressing Orders: LEFT MEDIAL LEG: Cleanse with normal saline, apply zinc to fiona wound, apply Calcium Alginate Ag, ABD pad, and Coban 2. Change weekly.      Treatment Orders: Eat a diet high in protein and vitamin C. Take a multiple vitamin daily unless contraindicated.     Drink Protein Shakes ([employer])      Dr. Duncan as needed.      [facility] followup visit : 1 week Dr. HERNANDEZ   __________________________________  (Please note your next appointment above and if you are unable to keep, kindly give a 24 hour notice. Thank you.)     Physician signature:__________________________     If you experience any of the following, please call the Wound Care Center during business hours:     * Increase in Pain  * Temperature over 101  * Increase in drainage from your wound  * Drainage with a foul odor  * Bleeding  * Increase in swelling  * Need for compression bandage changes due to slippage, breakthrough drainage.     If you need medical attention outside of the business hours of the Wound Care Centers please contact your PCP or go to the nearest emergency room

## 2025-06-04 ENCOUNTER — HOSPITAL ENCOUNTER (OUTPATIENT)
Dept: WOUND CARE | Age: 70
Discharge: HOME | End: 2025-06-04
Attending: SURGERY
Payer: MEDICARE

## 2025-06-04 VITALS
RESPIRATION RATE: 18 BRPM | TEMPERATURE: 97.2 F | SYSTOLIC BLOOD PRESSURE: 127 MMHG | HEART RATE: 81 BPM | DIASTOLIC BLOOD PRESSURE: 57 MMHG

## 2025-06-04 DIAGNOSIS — I83.023 VARICOSE VEINS OF LEFT LOWER EXTREMITY WITH ULCER OF ANKLE WITH FAT LAYER EXPOSED (HCC): Primary | ICD-10-CM

## 2025-06-04 DIAGNOSIS — I70.242 ATHEROSCLEROSIS OF NATIVE ARTERY OF LEFT LOWER EXTREMITY WITH ULCERATION OF CALF (HCC): ICD-10-CM

## 2025-06-04 DIAGNOSIS — I70.243 ATHEROSCLEROSIS OF NATIVE ARTERY OF LEFT LOWER EXTREMITY WITH ULCERATION OF ANKLE (HCC): Chronic | ICD-10-CM

## 2025-06-04 DIAGNOSIS — L97.322 VARICOSE VEINS OF LEFT LOWER EXTREMITY WITH ULCER OF ANKLE WITH FAT LAYER EXPOSED (HCC): Primary | ICD-10-CM

## 2025-06-04 PROCEDURE — 11042 DBRDMT SUBQ TIS 1ST 20SQCM/<: CPT | Performed by: SURGERY

## 2025-06-04 PROCEDURE — 11042 DBRDMT SUBQ TIS 1ST 20SQCM/<: CPT

## 2025-06-04 RX ORDER — TRIAMCINOLONE ACETONIDE 1 MG/G
OINTMENT TOPICAL ONCE
OUTPATIENT
Start: 2025-06-04 | End: 2025-06-04

## 2025-06-04 RX ORDER — LIDOCAINE HYDROCHLORIDE 20 MG/ML
JELLY TOPICAL ONCE
OUTPATIENT
Start: 2025-06-04 | End: 2025-06-04

## 2025-06-04 RX ORDER — LIDOCAINE 50 MG/G
OINTMENT TOPICAL ONCE
OUTPATIENT
Start: 2025-06-04 | End: 2025-06-04

## 2025-06-04 RX ORDER — NEOMYCIN/BACITRACIN/POLYMYXINB 3.5-400-5K
OINTMENT (GRAM) TOPICAL ONCE
OUTPATIENT
Start: 2025-06-04 | End: 2025-06-04

## 2025-06-04 RX ORDER — GENTAMICIN SULFATE 1 MG/G
OINTMENT TOPICAL ONCE
OUTPATIENT
Start: 2025-06-04 | End: 2025-06-04

## 2025-06-04 RX ORDER — LIDOCAINE 40 MG/G
CREAM TOPICAL ONCE
OUTPATIENT
Start: 2025-06-04 | End: 2025-06-04

## 2025-06-04 RX ORDER — MUPIROCIN 20 MG/G
OINTMENT TOPICAL ONCE
OUTPATIENT
Start: 2025-06-04 | End: 2025-06-04

## 2025-06-04 RX ORDER — BACITRACIN ZINC AND POLYMYXIN B SULFATE 500; 1000 [USP'U]/G; [USP'U]/G
OINTMENT TOPICAL ONCE
OUTPATIENT
Start: 2025-06-04 | End: 2025-06-04

## 2025-06-04 RX ORDER — LIDOCAINE HYDROCHLORIDE 40 MG/ML
SOLUTION TOPICAL ONCE
OUTPATIENT
Start: 2025-06-04 | End: 2025-06-04

## 2025-06-04 RX ORDER — CLOBETASOL PROPIONATE 0.5 MG/G
OINTMENT TOPICAL ONCE
OUTPATIENT
Start: 2025-06-04 | End: 2025-06-04

## 2025-06-04 RX ORDER — SILVER SULFADIAZINE 10 MG/G
CREAM TOPICAL ONCE
OUTPATIENT
Start: 2025-06-04 | End: 2025-06-04

## 2025-06-04 RX ORDER — BETAMETHASONE DIPROPIONATE 0.05 %
OINTMENT (GRAM) TOPICAL ONCE
OUTPATIENT
Start: 2025-06-04 | End: 2025-06-04

## 2025-06-04 RX ORDER — LIDOCAINE HYDROCHLORIDE 40 MG/ML
SOLUTION TOPICAL ONCE
Status: COMPLETED | OUTPATIENT
Start: 2025-06-04 | End: 2025-06-04

## 2025-06-04 RX ORDER — GINSENG 100 MG
CAPSULE ORAL ONCE
OUTPATIENT
Start: 2025-06-04 | End: 2025-06-04

## 2025-06-04 RX ADMIN — LIDOCAINE HYDROCHLORIDE 5 ML: 40 SOLUTION TOPICAL at 07:40

## 2025-06-04 ASSESSMENT — PAIN SCALES - GENERAL: PAINLEVEL_OUTOF10: 7

## 2025-06-04 ASSESSMENT — PAIN DESCRIPTION - PAIN TYPE: TYPE: CHRONIC PAIN

## 2025-06-04 ASSESSMENT — PAIN DESCRIPTION - FREQUENCY: FREQUENCY: INTERMITTENT

## 2025-06-04 ASSESSMENT — PAIN DESCRIPTION - DESCRIPTORS: DESCRIPTORS: ACHING

## 2025-06-04 ASSESSMENT — PAIN - FUNCTIONAL ASSESSMENT: PAIN_FUNCTIONAL_ASSESSMENT: PREVENTS OR INTERFERES SOME ACTIVE ACTIVITIES AND ADLS

## 2025-06-04 ASSESSMENT — PAIN DESCRIPTION - LOCATION: LOCATION: LEG

## 2025-06-04 ASSESSMENT — PAIN DESCRIPTION - ONSET: ONSET: ON-GOING

## 2025-06-04 ASSESSMENT — PAIN DESCRIPTION - ORIENTATION: ORIENTATION: LEFT

## 2025-06-04 NOTE — PROGRESS NOTES
Wound Healing Center Followup Visit Note    Referring Physician : Rosenda Cormier MD  Amanda Ledesma  MEDICAL RECORD NUMBER:  [id number]  AGE: 69 y.o.   GENDER: female  : 1955  EPISODE DATE:  2025    Subjective:     Chief Complaint   Patient presents with    Wound Check      HISTORY of PRESENT ILLNESS HPI   Amanda Ledesma is a 69 y.o. female who presents today in regards to follow up evaluation and treatment of wound/ulcer.  That patient's past medical, family and social hx were reviewed and changes were made if present.    History of Wound Context:  The patient has had a wound of her left ankle/calf which was first noted approximately 2021.  This has been treated local wound care. On their initial visit to the wound healing center, 22,  the patient has noted that the wound has been improving.  The patient has not had similar previous wounds in the past.      She started seeing Dr. Street in 2021 and than Dr. Gillis.  She was started in unna boot ~ 2021.  She has noticed some improvement since starting [facility] boot.  She is currently following with Dr. Haskins.      Pt is not on abx at time of initial visit, but has been treated with previously by podiatry.      She is not a DM.  She is not a smoker.  She denies hx of DVT, and per her report had recent us noting no evidence of dvt at [facility].  She also had arterial studies done.    I had previously seen her in the past in regards to left buttock thigh wound, which started as abscess.      Pt works at [employer] in the [employer] and is on her feet all day.    22  Reflux study - if significant findings will schedule for fu  Continue compression therapy [facility] boot per podiatry with aquacell dressing  Elevation  She does not have significant arterial occlusive disease  22  Patient has asked to continuing following with me going forward because of our previous relationship  She is going to let Dr. Schuler office know  She tolerated unna

## 2025-06-04 NOTE — PROGRESS NOTES
[facility]                                                                                                                    PRE OP INSTRUCTIONS FOR  Amanda Ledesma        Date: 6/4/2025    Date of surgery: 6/5/25   Arrival Time: Hospital will call you between 5pm and 7pm with your final arrival time for surgery. Go to front of hospital and check in at information desk.    Nothing by mouth (NPO) as instructed. May have clear liquids up to 2 hours prior to surgery. Nothing solid after midnight. Examples: water, apple juice, black coffee, plain tea    Take the following medications with a small sip of water on the morning of Surgery: Percocet prn     Diabetics may take half the evening dose of insulin but none after midnight.  If you feel symptomatic or have low blood sugar morning of surgery drink 1-2 ounces of apple juice only. If you take a weekly insulin injection _______________, stop 7 days prior to surgery. If you take _______________, stop 3-4 days prior to surgery.    Aspirin, Ibuprofen, Advil, Naproxen, other Anti-inflammatory products should be stopped before surgery as directed by your surgeon, cardiologist, or primary care Doctor. Herbal supplements and Vitamin E should be stopped five days prior.  May take Tylenol unless instructed otherwise by your surgeon.    Check with your Doctor regarding stopping Plavix, Coumadin, Lovenox, Eliquis, Effient, or other blood thinners such as, pradaxa, lixiana, xaralto and savaysa.    Do not smoke, chew tobacco, vape, or use illicit drugs and do not drink any alcoholic beverages 24 hours prior to surgery.    You may brush your teeth the morning of surgery.      You MUST make arrangements for a responsible adult, 18 and over, to take you home after your surgery. You will not be allowed to leave alone or drive yourself home.  You will need someone stay with you the first 24 hrs. Your surgery will be cancelled if you do not have a ride home or

## 2025-06-05 ENCOUNTER — APPOINTMENT (OUTPATIENT)
Dept: ULTRASOUND IMAGING | Age: 70
End: 2025-06-05
Attending: SURGERY
Payer: MEDICARE

## 2025-06-05 ENCOUNTER — ANESTHESIA (OUTPATIENT)
Dept: OPERATING ROOM | Age: 70
End: 2025-06-05
Payer: MEDICARE

## 2025-06-05 ENCOUNTER — ANESTHESIA EVENT (OUTPATIENT)
Dept: OPERATING ROOM | Age: 70
End: 2025-06-05
Payer: MEDICARE

## 2025-06-05 ENCOUNTER — HOSPITAL ENCOUNTER (OUTPATIENT)
Age: 70
Setting detail: OUTPATIENT SURGERY
Discharge: HOME | End: 2025-06-05
Attending: SURGERY | Admitting: SURGERY
Payer: MEDICARE

## 2025-06-05 VITALS
OXYGEN SATURATION: 97 % | TEMPERATURE: 97.2 F | SYSTOLIC BLOOD PRESSURE: 134 MMHG | HEART RATE: 67 BPM | BODY MASS INDEX: 26.22 KG/M2 | WEIGHT: 173 LBS | DIASTOLIC BLOOD PRESSURE: 81 MMHG | HEIGHT: 68 IN | RESPIRATION RATE: 16 BRPM

## 2025-06-05 DIAGNOSIS — K44.9 HIATAL HERNIA: ICD-10-CM

## 2025-06-05 DIAGNOSIS — M79.89 LEFT UPPER EXTREMITY SWELLING: Primary | ICD-10-CM

## 2025-06-05 DIAGNOSIS — K43.2 INCISIONAL HERNIA: ICD-10-CM

## 2025-06-05 LAB
ANION GAP SERPL CALCULATED.3IONS-SCNC: 16 MMOL/L (ref 7–16)
BUN SERPL-MCNC: 26 MG/DL (ref 6–23)
CALCIUM SERPL-MCNC: 9.4 MG/DL (ref 8.6–10.2)
CHLORIDE SERPL-SCNC: 103 MMOL/L (ref 98–107)
CO2 SERPL-SCNC: 20 MMOL/L (ref 22–29)
CREAT SERPL-MCNC: 1.2 MG/DL (ref 0.5–1)
GFR, ESTIMATED: 48 ML/MIN/1.73M2
GLUCOSE SERPL-MCNC: 81 MG/DL (ref 74–99)
POTASSIUM SERPL-SCNC: 5.3 MMOL/L (ref 3.5–5)
SODIUM SERPL-SCNC: 139 MMOL/L (ref 132–146)

## 2025-06-05 PROCEDURE — 7100000010 HC PHASE II RECOVERY - FIRST 15 MIN: Performed by: SURGERY

## 2025-06-05 PROCEDURE — 2580000003 HC RX 258

## 2025-06-05 PROCEDURE — 2500000003 HC RX 250 WO HCPCS: Performed by: SURGERY

## 2025-06-05 PROCEDURE — 7100000000 HC PACU RECOVERY - FIRST 15 MIN: Performed by: SURGERY

## 2025-06-05 PROCEDURE — 7100000011 HC PHASE II RECOVERY - ADDTL 15 MIN: Performed by: SURGERY

## 2025-06-05 PROCEDURE — 88302 TISSUE EXAM BY PATHOLOGIST: CPT

## 2025-06-05 PROCEDURE — 2500000003 HC RX 250 WO HCPCS

## 2025-06-05 PROCEDURE — 6360000002 HC RX W HCPCS: Performed by: ANESTHESIOLOGY

## 2025-06-05 PROCEDURE — 43281 LAP PARAESOPHAG HERN REPAIR: CPT | Performed by: SURGERY

## 2025-06-05 PROCEDURE — 2709999900 HC NON-CHARGEABLE SUPPLY: Performed by: SURGERY

## 2025-06-05 PROCEDURE — 43289 UNLISTED LAPS PX ESOPH: CPT | Performed by: SURGERY

## 2025-06-05 PROCEDURE — 15734 MUSCLE-SKIN GRAFT TRUNK: CPT | Performed by: SURGERY

## 2025-06-05 PROCEDURE — 49592 RPR AA HRN 1ST < 3 NCR/STRN: CPT | Performed by: SURGERY

## 2025-06-05 PROCEDURE — 6360000002 HC RX W HCPCS: Performed by: SURGERY

## 2025-06-05 PROCEDURE — 3600000009 HC SURGERY ROBOT BASE: Performed by: SURGERY

## 2025-06-05 PROCEDURE — 80048 BASIC METABOLIC PNL TOTAL CA: CPT

## 2025-06-05 PROCEDURE — 6360000002 HC RX W HCPCS

## 2025-06-05 PROCEDURE — 36415 COLL VENOUS BLD VENIPUNCTURE: CPT

## 2025-06-05 PROCEDURE — 3700000001 HC ADD 15 MINUTES (ANESTHESIA): Performed by: SURGERY

## 2025-06-05 PROCEDURE — 93971 EXTREMITY STUDY: CPT

## 2025-06-05 PROCEDURE — 7100000001 HC PACU RECOVERY - ADDTL 15 MIN: Performed by: SURGERY

## 2025-06-05 PROCEDURE — S2900 ROBOTIC SURGICAL SYSTEM: HCPCS | Performed by: SURGERY

## 2025-06-05 PROCEDURE — 76937 US GUIDE VASCULAR ACCESS: CPT

## 2025-06-05 PROCEDURE — 3700000000 HC ANESTHESIA ATTENDED CARE: Performed by: SURGERY

## 2025-06-05 PROCEDURE — 3600000019 HC SURGERY ROBOT ADDTL 15MIN: Performed by: SURGERY

## 2025-06-05 PROCEDURE — 2720000010 HC SURG SUPPLY STERILE: Performed by: SURGERY

## 2025-06-05 RX ORDER — PROMETHAZINE HYDROCHLORIDE 25 MG/ML
6.25 INJECTION, SOLUTION INTRAMUSCULAR; INTRAVENOUS EVERY 6 HOURS PRN
Status: DISCONTINUED | OUTPATIENT
Start: 2025-06-05 | End: 2025-06-05

## 2025-06-05 RX ORDER — ROCURONIUM BROMIDE 10 MG/ML
INJECTION, SOLUTION INTRAVENOUS
Status: DISCONTINUED | OUTPATIENT
Start: 2025-06-05 | End: 2025-06-05 | Stop reason: SDUPTHER

## 2025-06-05 RX ORDER — ONDANSETRON 4 MG/1
4 TABLET, ORALLY DISINTEGRATING ORAL EVERY 8 HOURS PRN
Qty: 20 TABLET | Refills: 1 | Status: SHIPPED | OUTPATIENT
Start: 2025-06-05

## 2025-06-05 RX ORDER — METHOCARBAMOL 750 MG/1
750 TABLET, FILM COATED ORAL 4 TIMES DAILY
Qty: 40 TABLET | Refills: 0 | Status: SHIPPED | OUTPATIENT
Start: 2025-06-05 | End: 2025-06-15

## 2025-06-05 RX ORDER — SODIUM CHLORIDE 9 MG/ML
INJECTION, SOLUTION INTRAVENOUS PRN
Status: DISCONTINUED | OUTPATIENT
Start: 2025-06-05 | End: 2025-06-05 | Stop reason: HOSPADM

## 2025-06-05 RX ORDER — NEOSTIGMINE METHYLSULFATE 1 MG/ML
INJECTION, SOLUTION INTRAVENOUS
Status: DISCONTINUED | OUTPATIENT
Start: 2025-06-05 | End: 2025-06-05

## 2025-06-05 RX ORDER — NALOXONE HYDROCHLORIDE 0.4 MG/ML
INJECTION, SOLUTION INTRAMUSCULAR; INTRAVENOUS; SUBCUTANEOUS PRN
Status: DISCONTINUED | OUTPATIENT
Start: 2025-06-05 | End: 2025-06-05 | Stop reason: HOSPADM

## 2025-06-05 RX ORDER — LABETALOL HYDROCHLORIDE 5 MG/ML
INJECTION, SOLUTION INTRAVENOUS
Status: DISCONTINUED | OUTPATIENT
Start: 2025-06-05 | End: 2025-06-05 | Stop reason: SDUPTHER

## 2025-06-05 RX ORDER — OXYCODONE AND ACETAMINOPHEN 5; 325 MG/1; MG/1
1 TABLET ORAL
Refills: 0 | Status: DISCONTINUED | OUTPATIENT
Start: 2025-06-05 | End: 2025-06-05 | Stop reason: HOSPADM

## 2025-06-05 RX ORDER — DEXAMETHASONE SODIUM PHOSPHATE 10 MG/ML
INJECTION, SOLUTION INTRAMUSCULAR; INTRAVENOUS
Status: DISCONTINUED | OUTPATIENT
Start: 2025-06-05 | End: 2025-06-05 | Stop reason: SDUPTHER

## 2025-06-05 RX ORDER — ONDANSETRON 2 MG/ML
INJECTION INTRAMUSCULAR; INTRAVENOUS
Status: DISCONTINUED | OUTPATIENT
Start: 2025-06-05 | End: 2025-06-05 | Stop reason: SDUPTHER

## 2025-06-05 RX ORDER — HALOPERIDOL 5 MG/ML
0.5 INJECTION INTRAMUSCULAR ONCE
Status: COMPLETED | OUTPATIENT
Start: 2025-06-05 | End: 2025-06-05

## 2025-06-05 RX ORDER — LIDOCAINE HYDROCHLORIDE 20 MG/ML
INJECTION, SOLUTION INTRAVENOUS
Status: DISCONTINUED | OUTPATIENT
Start: 2025-06-05 | End: 2025-06-05 | Stop reason: SDUPTHER

## 2025-06-05 RX ORDER — PROPOFOL 10 MG/ML
INJECTION, EMULSION INTRAVENOUS
Status: DISCONTINUED | OUTPATIENT
Start: 2025-06-05 | End: 2025-06-05 | Stop reason: SDUPTHER

## 2025-06-05 RX ORDER — SODIUM CHLORIDE 0.9 % (FLUSH) 0.9 %
5-40 SYRINGE (ML) INJECTION PRN
Status: DISCONTINUED | OUTPATIENT
Start: 2025-06-05 | End: 2025-06-05 | Stop reason: HOSPADM

## 2025-06-05 RX ORDER — SODIUM CHLORIDE 0.9 % (FLUSH) 0.9 %
5-40 SYRINGE (ML) INJECTION EVERY 12 HOURS SCHEDULED
Status: DISCONTINUED | OUTPATIENT
Start: 2025-06-05 | End: 2025-06-05 | Stop reason: HOSPADM

## 2025-06-05 RX ORDER — PROCHLORPERAZINE EDISYLATE 5 MG/ML
5 INJECTION INTRAMUSCULAR; INTRAVENOUS
Status: COMPLETED | OUTPATIENT
Start: 2025-06-05 | End: 2025-06-05

## 2025-06-05 RX ORDER — DROPERIDOL 2.5 MG/ML
0.62 INJECTION, SOLUTION INTRAMUSCULAR; INTRAVENOUS
Status: COMPLETED | OUTPATIENT
Start: 2025-06-05 | End: 2025-06-05

## 2025-06-05 RX ORDER — GLYCOPYRROLATE 0.2 MG/ML
INJECTION INTRAMUSCULAR; INTRAVENOUS
Status: DISCONTINUED | OUTPATIENT
Start: 2025-06-05 | End: 2025-06-05

## 2025-06-05 RX ORDER — MIDAZOLAM HYDROCHLORIDE 1 MG/ML
2 INJECTION, SOLUTION INTRAMUSCULAR; INTRAVENOUS
Status: DISCONTINUED | OUTPATIENT
Start: 2025-06-05 | End: 2025-06-05 | Stop reason: HOSPADM

## 2025-06-05 RX ORDER — DEXMEDETOMIDINE HYDROCHLORIDE 100 UG/ML
INJECTION, SOLUTION INTRAVENOUS
Status: DISCONTINUED | OUTPATIENT
Start: 2025-06-05 | End: 2025-06-05 | Stop reason: SDUPTHER

## 2025-06-05 RX ORDER — BUPIVACAINE HYDROCHLORIDE AND EPINEPHRINE 2.5; 5 MG/ML; UG/ML
INJECTION, SOLUTION EPIDURAL; INFILTRATION; INTRACAUDAL; PERINEURAL PRN
Status: DISCONTINUED | OUTPATIENT
Start: 2025-06-05 | End: 2025-06-05 | Stop reason: ALTCHOICE

## 2025-06-05 RX ORDER — IPRATROPIUM BROMIDE AND ALBUTEROL SULFATE 2.5; .5 MG/3ML; MG/3ML
1 SOLUTION RESPIRATORY (INHALATION)
Status: DISCONTINUED | OUTPATIENT
Start: 2025-06-05 | End: 2025-06-05 | Stop reason: HOSPADM

## 2025-06-05 RX ORDER — FENTANYL CITRATE 50 UG/ML
25 INJECTION, SOLUTION INTRAMUSCULAR; INTRAVENOUS ONCE
Status: COMPLETED | OUTPATIENT
Start: 2025-06-05 | End: 2025-06-05

## 2025-06-05 RX ORDER — METHOCARBAMOL 100 MG/ML
1000 INJECTION, SOLUTION INTRAMUSCULAR; INTRAVENOUS ONCE
Status: COMPLETED | OUTPATIENT
Start: 2025-06-05 | End: 2025-06-05

## 2025-06-05 RX ORDER — NEOSTIGMINE METHYLSULFATE 1 MG/ML
INJECTION, SOLUTION INTRAVENOUS
Status: DISCONTINUED | OUTPATIENT
Start: 2025-06-05 | End: 2025-06-05 | Stop reason: SDUPTHER

## 2025-06-05 RX ORDER — GLYCOPYRROLATE 0.2 MG/ML
INJECTION INTRAMUSCULAR; INTRAVENOUS
Status: DISCONTINUED | OUTPATIENT
Start: 2025-06-05 | End: 2025-06-05 | Stop reason: SDUPTHER

## 2025-06-05 RX ORDER — FENTANYL CITRATE 0.05 MG/ML
25 INJECTION, SOLUTION INTRAMUSCULAR; INTRAVENOUS EVERY 5 MIN PRN
Status: COMPLETED | OUTPATIENT
Start: 2025-06-05 | End: 2025-06-05

## 2025-06-05 RX ORDER — MIDAZOLAM HYDROCHLORIDE 1 MG/ML
INJECTION, SOLUTION INTRAMUSCULAR; INTRAVENOUS
Status: DISCONTINUED | OUTPATIENT
Start: 2025-06-05 | End: 2025-06-05 | Stop reason: SDUPTHER

## 2025-06-05 RX ORDER — MEPERIDINE HYDROCHLORIDE 50 MG/ML
12.5 INJECTION INTRAMUSCULAR; INTRAVENOUS; SUBCUTANEOUS EVERY 5 MIN PRN
Status: DISCONTINUED | OUTPATIENT
Start: 2025-06-05 | End: 2025-06-05 | Stop reason: HOSPADM

## 2025-06-05 RX ORDER — FENTANYL CITRATE 50 UG/ML
INJECTION, SOLUTION INTRAMUSCULAR; INTRAVENOUS
Status: DISCONTINUED | OUTPATIENT
Start: 2025-06-05 | End: 2025-06-05 | Stop reason: SDUPTHER

## 2025-06-05 RX ORDER — PROMETHAZINE HYDROCHLORIDE 25 MG/ML
25 INJECTION, SOLUTION INTRAMUSCULAR; INTRAVENOUS ONCE
Status: COMPLETED | OUTPATIENT
Start: 2025-06-05 | End: 2025-06-05

## 2025-06-05 RX ORDER — SODIUM CHLORIDE, SODIUM LACTATE, POTASSIUM CHLORIDE, CALCIUM CHLORIDE 600; 310; 30; 20 MG/100ML; MG/100ML; MG/100ML; MG/100ML
INJECTION, SOLUTION INTRAVENOUS CONTINUOUS
Status: DISCONTINUED | OUTPATIENT
Start: 2025-06-05 | End: 2025-06-05 | Stop reason: HOSPADM

## 2025-06-05 RX ORDER — DIPHENHYDRAMINE HYDROCHLORIDE 50 MG/ML
12.5 INJECTION, SOLUTION INTRAMUSCULAR; INTRAVENOUS
Status: DISCONTINUED | OUTPATIENT
Start: 2025-06-05 | End: 2025-06-05 | Stop reason: HOSPADM

## 2025-06-05 RX ORDER — KETOROLAC TROMETHAMINE 30 MG/ML
15 INJECTION, SOLUTION INTRAMUSCULAR; INTRAVENOUS ONCE
Status: DISCONTINUED | OUTPATIENT
Start: 2025-06-05 | End: 2025-06-05 | Stop reason: HOSPADM

## 2025-06-05 RX ORDER — HYDRALAZINE HYDROCHLORIDE 20 MG/ML
10 INJECTION INTRAMUSCULAR; INTRAVENOUS
Status: DISCONTINUED | OUTPATIENT
Start: 2025-06-05 | End: 2025-06-05 | Stop reason: HOSPADM

## 2025-06-05 RX ORDER — DOCUSATE SODIUM 100 MG/1
100 CAPSULE, LIQUID FILLED ORAL 2 TIMES DAILY
Qty: 30 CAPSULE | Refills: 0 | Status: SHIPPED | OUTPATIENT
Start: 2025-06-05 | End: 2025-06-20

## 2025-06-05 RX ORDER — LABETALOL HYDROCHLORIDE 5 MG/ML
10 INJECTION, SOLUTION INTRAVENOUS
Status: DISCONTINUED | OUTPATIENT
Start: 2025-06-05 | End: 2025-06-05 | Stop reason: HOSPADM

## 2025-06-05 RX ADMIN — DEXMEDETOMIDINE HYDROCHLORIDE 12 MCG: 100 INJECTION, SOLUTION INTRAVENOUS at 10:58

## 2025-06-05 RX ADMIN — Medication 3 MG: at 11:58

## 2025-06-05 RX ADMIN — DROPERIDOL 0.62 MG: 2.5 INJECTION, SOLUTION INTRAMUSCULAR; INTRAVENOUS at 13:02

## 2025-06-05 RX ADMIN — FENTANYL CITRATE 50 MCG: 50 INJECTION, SOLUTION INTRAMUSCULAR; INTRAVENOUS at 11:00

## 2025-06-05 RX ADMIN — FENTANYL CITRATE 50 MCG: 50 INJECTION, SOLUTION INTRAMUSCULAR; INTRAVENOUS at 10:40

## 2025-06-05 RX ADMIN — LIDOCAINE HYDROCHLORIDE 100 MG: 20 INJECTION, SOLUTION INTRAVENOUS at 10:38

## 2025-06-05 RX ADMIN — PROCHLORPERAZINE EDISYLATE 5 MG: 5 INJECTION INTRAMUSCULAR; INTRAVENOUS at 12:37

## 2025-06-05 RX ADMIN — ONDANSETRON 4 MG: 2 INJECTION INTRAMUSCULAR; INTRAVENOUS at 11:47

## 2025-06-05 RX ADMIN — FENTANYL CITRATE 50 MCG: 50 INJECTION, SOLUTION INTRAMUSCULAR; INTRAVENOUS at 10:55

## 2025-06-05 RX ADMIN — HYDROMORPHONE HYDROCHLORIDE 0.5 MG: 1 INJECTION, SOLUTION INTRAMUSCULAR; INTRAVENOUS; SUBCUTANEOUS at 12:31

## 2025-06-05 RX ADMIN — SODIUM CHLORIDE, POTASSIUM CHLORIDE, SODIUM LACTATE AND CALCIUM CHLORIDE: 600; 310; 30; 20 INJECTION, SOLUTION INTRAVENOUS at 11:26

## 2025-06-05 RX ADMIN — SODIUM CHLORIDE, POTASSIUM CHLORIDE, SODIUM LACTATE AND CALCIUM CHLORIDE: 600; 310; 30; 20 INJECTION, SOLUTION INTRAVENOUS at 09:29

## 2025-06-05 RX ADMIN — LABETALOL HYDROCHLORIDE 2.5 MG: 5 INJECTION INTRAVENOUS at 11:03

## 2025-06-05 RX ADMIN — PROPOFOL 200 MG: 10 INJECTION, EMULSION INTRAVENOUS at 10:38

## 2025-06-05 RX ADMIN — FENTANYL CITRATE 50 MCG: 50 INJECTION, SOLUTION INTRAMUSCULAR; INTRAVENOUS at 11:50

## 2025-06-05 RX ADMIN — GLYCOPYRROLATE 0.6 MG: 0.2 INJECTION INTRAMUSCULAR; INTRAVENOUS at 11:58

## 2025-06-05 RX ADMIN — FENTANYL CITRATE 25 MCG: 0.05 INJECTION, SOLUTION INTRAMUSCULAR; INTRAVENOUS at 13:13

## 2025-06-05 RX ADMIN — ROCURONIUM BROMIDE 50 MG: 10 SOLUTION INTRAVENOUS at 10:38

## 2025-06-05 RX ADMIN — LABETALOL HYDROCHLORIDE 2.5 MG: 5 INJECTION INTRAVENOUS at 11:07

## 2025-06-05 RX ADMIN — PROMETHAZINE HYDROCHLORIDE 25 MG: 25 INJECTION INTRAMUSCULAR; INTRAVENOUS at 14:53

## 2025-06-05 RX ADMIN — DEXAMETHASONE SODIUM PHOSPHATE 10 MG: 10 INJECTION, SOLUTION INTRAMUSCULAR; INTRAVENOUS at 10:40

## 2025-06-05 RX ADMIN — SUGAMMADEX 200 MG: 100 INJECTION, SOLUTION INTRAVENOUS at 12:04

## 2025-06-05 RX ADMIN — FENTANYL CITRATE 25 MCG: 50 INJECTION INTRAMUSCULAR; INTRAVENOUS at 13:33

## 2025-06-05 RX ADMIN — LABETALOL HYDROCHLORIDE 5 MG: 5 INJECTION INTRAVENOUS at 11:15

## 2025-06-05 RX ADMIN — SODIUM CHLORIDE, POTASSIUM CHLORIDE, SODIUM LACTATE AND CALCIUM CHLORIDE: 600; 310; 30; 20 INJECTION, SOLUTION INTRAVENOUS at 10:33

## 2025-06-05 RX ADMIN — MIDAZOLAM 2 MG: 1 INJECTION INTRAMUSCULAR; INTRAVENOUS at 10:37

## 2025-06-05 RX ADMIN — ROCURONIUM BROMIDE 10 MG: 10 SOLUTION INTRAVENOUS at 11:52

## 2025-06-05 RX ADMIN — HALOPERIDOL LACTATE 0.5 MG: 5 INJECTION, SOLUTION INTRAMUSCULAR at 13:50

## 2025-06-05 RX ADMIN — DEXMEDETOMIDINE HYDROCHLORIDE 12 MCG: 100 INJECTION, SOLUTION INTRAVENOUS at 10:36

## 2025-06-05 RX ADMIN — FENTANYL CITRATE 25 MCG: 0.05 INJECTION, SOLUTION INTRAMUSCULAR; INTRAVENOUS at 12:43

## 2025-06-05 RX ADMIN — PROPOFOL 50 MG: 10 INJECTION, EMULSION INTRAVENOUS at 11:50

## 2025-06-05 RX ADMIN — DEXMEDETOMIDINE HYDROCHLORIDE 12 MCG: 100 INJECTION, SOLUTION INTRAVENOUS at 11:18

## 2025-06-05 RX ADMIN — METHOCARBAMOL 1000 MG: 100 INJECTION INTRAMUSCULAR; INTRAVENOUS at 13:06

## 2025-06-05 RX ADMIN — DEXMEDETOMIDINE HYDROCHLORIDE 4 MCG: 100 INJECTION, SOLUTION INTRAVENOUS at 11:00

## 2025-06-05 RX ADMIN — CEFAZOLIN SODIUM 2000 MG: 1 POWDER, FOR SOLUTION INTRAMUSCULAR; INTRAVENOUS at 10:48

## 2025-06-05 ASSESSMENT — PAIN - FUNCTIONAL ASSESSMENT
PAIN_FUNCTIONAL_ASSESSMENT: ADULT NONVERBAL PAIN SCALE (NPVS)
PAIN_FUNCTIONAL_ASSESSMENT: 0-10
PAIN_FUNCTIONAL_ASSESSMENT: ADULT NONVERBAL PAIN SCALE (NPVS)

## 2025-06-05 ASSESSMENT — PAIN SCALES - GENERAL
PAINLEVEL_OUTOF10: 7
PAINLEVEL_OUTOF10: 6
PAINLEVEL_OUTOF10: 7
PAINLEVEL_OUTOF10: 6

## 2025-06-05 ASSESSMENT — PAIN DESCRIPTION - DESCRIPTORS: DESCRIPTORS: DISCOMFORT

## 2025-06-05 NOTE — DISCHARGE INSTRUCTIONS
Patient Discharge Instructions  Tu Peña MD, MS  Laparascopic Hiatal Hernia Repair  Discharge Date:  6/5/2025    Discharged To:  Home    RESUME ACTIVITY:      BATHING:  May shower 24hrs after surgery, remove dressings after 24hrs if in place, leave steristrips in place as they will fall of independently. You may have adhesive glue covering your incisions which will dissolve on it own.  May bathe or swim 5 days after surgery    DRIVING: No driving while on pain medications    RETURN TO WORK: after follow up appointment    WALKING:  Yes    SEXUAL ACTIVITY: Yes    STAIRS:  Yes    LIFTING: Less than 15 pounds for 4 weeks    DIET: Soft diet: Care Instructions  Your Care Instructions  Having esophageal surgery limits the foods you can eat for a few weeks. This makes it hard to eat. You will have to drink your meals or eat only very soft foods, such as mashed potatoes or pureed foods. Liquid meal supplements, such as Ensure and Boost, help make sure that you get protein, vitamins, and minerals. They also have high-calorie versions, so you can make sure that you keep your weight up.  Your doctor may suggest that you see a dietitian to help you plan meals.  Follow-up care is a key part of your treatment and safety. Be sure to make and go to all appointments, and call your doctor if you are having problems. It's also a good idea to know your test results and keep a list of the medicines you take.  How can you care for yourself at home?  Eat soups that have been put in a  (pureed), milkshakes, baby food, or any foods you like that are liquid or pureed. You can puree vegetables or fruits in a  or . Put milk or soy milk with yogurt or ice cream and fruit in a  to make milkshakes.  Drink liquid supplements, such as Ensure or Boost, one or two times a day.  Try to drink or eat liquid foods 6 times a day rather than 3 times a day.  Sit up while you eat.  Swallow slowly to keep from  hours. After the first 36hours only take the pills as needed and stop them as soon as possible. Pain meds cause constipation so pay close attention to the \"bowels\" topic above.     Keep incisions clean and dry.  Vicodin/Percocet and ibuprofen for pain as prescribed. Okay to resume anticoagulant medication after 24hrs.      Do not exceed 4000mg of Tylenol/Acetaminophen per day. Vicodin/Norco/Percocet contain acetaminophen. Do not take additional amounts of Tylenol if you are taking these medications.

## 2025-06-05 NOTE — ANESTHESIA PRE PROCEDURE
Department of Anesthesiology  Preprocedure Note       Name:  Amanda Ledesma   Age:  69 y.o.  :  1955                                          MRN:  [id number]         Date:  2025      Surgeon: Surgeon(s):  Tu Peña MD    Procedure: Procedure(s):  LAPAROSCOPIC ROBOTIC XI PARAESOPHAGEAL HERNIA REPAIR WITH FUNDOPLICATION, MYOFASCIAL FLAP AND POSSIBLE GASTROPEXY  INCISIONAL HERNIA REPAIR WITH MESH    Medications prior to admission:   Prior to Admission medications    Medication Sig Start Date End Date Taking? Authorizing Provider   oxyCODONE-acetaminophen (PERCOCET) 5-325 MG per tablet Take 1 tablet by mouth every 6 hours as needed for Pain for up to 7 days. Intended supply: 7 days. Take lowest dose possible to manage pain Max Daily Amount: 4 tablets 25 Yes Ashley Staples MD   pantoprazole (PROTONIX) 40 MG tablet Take 1 tablet by mouth every morning (before breakfast) 3/19/25 9/19/25 Yes Rosenda Cormier MD   sucralfate (CARAFATE) 1 GM tablet Take 1 tablet by mouth 4 times daily  Patient not taking: Reported on 2025   Tu Peña MD   atorvastatin (LIPITOR) 10 MG tablet Take 1 tablet by mouth daily 3/27/25   Rosenda Cormier MD   Coenzyme Q10 (COQ-10) 100 MG CAPS Take 1 capsule by mouth daily 3/27/25 3/27/26  Rosenda Cormier MD   EPINEPHrine (EPIPEN) 0.3 MG/0.3ML SOAJ injection Use as directed for allergic reaction 3/19/25 9/19/25  Rosenda Cormier MD   butalbital-acetaminophen-caffeine (FIORICET, ESGIC) -40 MG per tablet Take 1 tablet by mouth 3 times daily as needed for Headaches or Migraine Indications: Cluster Headache Max Daily Amount: 3 tablets 3/19/25 9/19/25  Rosenda Cormier MD   lisinopril (PRINIVIL;ZESTRIL) 10 MG tablet Take 1 tablet by mouth daily 3/19/25 9/19/25  Rosenda Cormier MD   acyclovir (ZOVIRAX) 200 MG capsule Take 1 capsule by mouth daily 3/19/25 9/19/25  Genia

## 2025-06-05 NOTE — H&P
General Surgery History and Physical  [facility]    Patient's Name/Date of Birth: Amanda Ledesma / 1955    Date: June 5, 2025     Surgeon: Tu Peña M.D.    PCP: Rosenda Cormier MD     Chief Complaint: dysphagia, nausea, vomiting, epigastric pain    HPI:   Amanda Ledesma is a 69 y.o. female who presents for evaluation of epigastric pain, nausea, vomiting. Timing is intermittent, radiation to midline, alleviated by none and started months to years and severity is 5/10.  Patient presents with epigastric abdominal pain intermittent dysphagia nausea vomiting.  States has been going on for several months to a year previously evaluated with gastroenterology and had upper endoscopy about 6 to 8 months ago.  She states at that time they told her she had esophagitis and and she was recommended to have dilation unclear whether it was performed..  She was recommended a repeat endoscopy but she never followed up.  She admits to longstanding reflux and she has been on Protonix which she states mostly controls the symptoms.  She admits intermittent problems with regurgitation and nausea vomiting.  She has had a cholecystectomy done by Dr. Avalos many years ago.  She follows with wound care for left lower extremity wound.    Follow up after EGD and UGI.    POSTOPERATIVE DIAGNOSES:   (1) large 6cm Hiatal Hernia  (2) Mild grade A Esophagitis  (3) Moderate hemorrhagic Gastritis  (4) no Duodenitis    Discussed results and findings for large PEH.         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

## 2025-06-05 NOTE — DISCHARGE INSTRUCTIONS
Visit Discharge/Physician Orders     Discharge condition: Stable     Assessment of pain at discharge: yes     Anesthetic used: 4% lidocaine solution     Discharge to: Home     Left via:Private automobile     Accompanied by:self     ECF/HHA: Romeo [phone]     Dressing Orders: LEFT MEDIAL LEG: Cleanse with normal saline, apply zinc to fiona wound, apply Calcium Alginate Ag, ABD pad, and Coban 2. Change weekly.      Treatment Orders: Eat a diet high in protein and vitamin C. Take a multiple vitamin daily unless contraindicated.     Drink Protein Shakes ([employer])      Dr. Duncan as needed.      [facility] followup visit : 1 week Dr. HERNANDEZ   __________________________________  (Please note your next appointment above and if you are unable to keep, kindly give a 24 hour notice. Thank you.)     Physician signature:__________________________     If you experience any of the following, please call the Wound Care Center during business hours:     * Increase in Pain  * Temperature over 101  * Increase in drainage from your wound  * Drainage with a foul odor  * Bleeding  * Increase in swelling  * Need for compression bandage changes due to slippage, breakthrough drainage.     If you need medical attention outside of the business hours of the Wound Care Centers please contact your PCP or go to the nearest emergency room

## 2025-06-05 NOTE — PROGRESS NOTES
Notified dr white, patients left arm [upper arm], area swollen, painful with palpitation, skin color normal in color, warm to touch. Patient states she had a midline in her left arm, removed about 1 month ago.

## 2025-06-05 NOTE — OP NOTE
Amanda Ledesma     Operative Note  [facility]     DATE OF PROCEDURE: 6/5/2025      SURGEON: SONIA DELGADO M.D.      ASSISTANT:  1. 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      FLUIDS: Crystalloids.      SPECIMEN: hernia sac umbilical      INDICATIONS: Amanda Ledesma is a 69 y.o. female with a history of dysphagia, reflux, early satiety and chronic belching. After being explained the risks, benefits and alternatives of the procedure, including but not limited to postop nausea, vomiting, dysphagia, vagal and other nerve and vascular injury, bleeding, recurrence of the hernia. They had undergone appropriate preoperative testing. Imaging and endoscopy had confirmed presence of a large paraesophageal hernia without evidence of dysplasia at the gastroesophageal junction/ The patient was explained the risks, benefits and alternatives of the procedure. They agreed to proceed.      DESCRIPTION OF PROCEDURE: They was taken to the operating room, placed supine on the operating table and administered general anesthesia and intubated. Once the airway was secured and they were adequately sedated, the patient prepped and draped in normal sterile fashion. Time-out was performed to confirm the surgical site and the patient's name. The patient received antibiotics IV  Vicryl was then used to place dermal stitches to reapproximate the skin and then 4-0 Monocryl to run the skin closed surgical glue was placed.   The patient tolerated the procedure well. We then removed the liver retractor under direct visualization and each one of our trocars after closing each one of the skin incisions with a 4-0 Monocryl stitch. Surgical glue was placed over the top.  The patient was then awoken and extubated in the operating room without any difficulty and transferred to the postoperative care unit in stable condition. All instrument counts, lap counts and needle counts were correct at the completion of the procedure.          Tu Peña MD, MS  Minimally Invasive and Bariatric Surgery  [phone] (p)  6/5/2025  12:20 PM

## 2025-06-05 NOTE — PROGRESS NOTES
Reviewed labwork results 5/28/25 with dr michaud, orders obtained to repeat bmp. No need to repeat cbc per dr xiong.  Iv team here to start iv, bmp obtained .

## 2025-06-05 NOTE — ANESTHESIA POSTPROCEDURE EVALUATION
Department of Anesthesiology  Postprocedure Note    Patient: Amanda Ledesma  MRN: [id number]  YOB: 1955  Date of evaluation: 6/5/2025    Procedure Summary       Date: 06/05/25 Room / Location: [address]    Anesthesia Start: 1033 Anesthesia Stop: 1215    Procedures:       LAPAROSCOPIC ROBOTIC XI PARAESOPHAGEAL HERNIA REPAIR WITH FUNDOPLICATION, MYOFASCIAL FLAP AND POSSIBLE GASTROPEXY (Abdomen)      INCISIONAL HERNIA REPAIR WITH MESH (Abdomen) Diagnosis:       Hiatal hernia      Incisional hernia      (Hiatal hernia [K44.9])      (Incisional hernia [K43.2])    Surgeons: Tu Peña MD Responsible Provider: Tj Epperson DO    Anesthesia Type: general ASA Status: 3            Anesthesia Type: No value filed.    Gamaliel Phase I: Gamaliel Score: 10    Gamaliel Phase II:      Anesthesia Post Evaluation    Patient location during evaluation: PACU  Patient participation: complete - patient participated  Level of consciousness: awake and alert  Pain score: 0  Airway patency: patent  Nausea & Vomiting: no nausea and no vomiting  Cardiovascular status: blood pressure returned to baseline and hemodynamically stable  Respiratory status: acceptable  Hydration status: stable  Pain management: adequate    No notable events documented.

## 2025-06-11 ENCOUNTER — HOSPITAL ENCOUNTER (OUTPATIENT)
Dept: WOUND CARE | Age: 70
Discharge: HOME | End: 2025-06-11
Attending: SURGERY

## 2025-06-12 LAB — SURGICAL PATHOLOGY REPORT: NORMAL

## 2025-06-12 NOTE — DISCHARGE INSTRUCTIONS
Visit Discharge/Physician Orders     Discharge condition: Stable     Assessment of pain at discharge: yes     Anesthetic used: 4% lidocaine solution     Discharge to: Home     Left via:Private automobile     Accompanied by:self     ECF/HHA: Romeo [phone]     Dressing Orders: LEFT MEDIAL LEG: Cleanse with normal saline, apply zinc to fiona wound, apply Calcium Alginate Ag, ABD pad, and Coban 2. Change weekly.      Treatment Orders: Eat a diet high in protein and vitamin C. Take a multiple vitamin daily unless contraindicated.     Drink Protein Shakes ([employer])      Dr. Duncan as needed.      [facility] followup visit : 1 week Dr. HERNANDEZ   __________________________________  (Please note your next appointment above and if you are unable to keep, kindly give a 24 hour notice. Thank you.)     Physician signature:__________________________     If you experience any of the following, please call the Wound Care Center during business hours:     * Increase in Pain  * Temperature over 101  * Increase in drainage from your wound  * Drainage with a foul odor  * Bleeding  * Increase in swelling  * Need for compression bandage changes due to slippage, breakthrough drainage.     If you need medical attention outside of the business hours of the Wound Care Centers please contact your PCP or go to the nearest emergency room

## 2025-06-18 ENCOUNTER — HOSPITAL ENCOUNTER (OUTPATIENT)
Dept: WOUND CARE | Age: 70
Discharge: HOME | End: 2025-06-18
Attending: SURGERY

## 2025-06-25 ENCOUNTER — HOSPITAL ENCOUNTER (OUTPATIENT)
Dept: WOUND CARE | Age: 70
Discharge: HOME | End: 2025-06-25
Attending: SURGERY
Payer: MEDICARE

## 2025-06-25 VITALS
RESPIRATION RATE: 18 BRPM | TEMPERATURE: 96.5 F | DIASTOLIC BLOOD PRESSURE: 64 MMHG | WEIGHT: 173 LBS | SYSTOLIC BLOOD PRESSURE: 99 MMHG | HEART RATE: 83 BPM | HEIGHT: 68 IN | BODY MASS INDEX: 26.22 KG/M2

## 2025-06-25 DIAGNOSIS — I83.023 VARICOSE VEINS OF LEFT LOWER EXTREMITY WITH ULCER OF ANKLE WITH FAT LAYER EXPOSED (HCC): Primary | ICD-10-CM

## 2025-06-25 DIAGNOSIS — I70.243 ATHEROSCLEROSIS OF NATIVE ARTERY OF LEFT LOWER EXTREMITY WITH ULCERATION OF ANKLE (HCC): Chronic | ICD-10-CM

## 2025-06-25 DIAGNOSIS — L97.322 VARICOSE VEINS OF LEFT LOWER EXTREMITY WITH ULCER OF ANKLE WITH FAT LAYER EXPOSED (HCC): Primary | ICD-10-CM

## 2025-06-25 DIAGNOSIS — I70.242 ATHEROSCLEROSIS OF NATIVE ARTERY OF LEFT LOWER EXTREMITY WITH ULCERATION OF CALF (HCC): ICD-10-CM

## 2025-06-25 PROCEDURE — 11042 DBRDMT SUBQ TIS 1ST 20SQCM/<: CPT

## 2025-06-25 PROCEDURE — 11042 DBRDMT SUBQ TIS 1ST 20SQCM/<: CPT | Performed by: SURGERY

## 2025-06-25 RX ORDER — MUPIROCIN 2 %
OINTMENT (GRAM) TOPICAL ONCE
OUTPATIENT
Start: 2025-06-25 | End: 2025-06-25

## 2025-06-25 RX ORDER — LIDOCAINE 40 MG/G
CREAM TOPICAL ONCE
OUTPATIENT
Start: 2025-06-25 | End: 2025-06-25

## 2025-06-25 RX ORDER — GENTAMICIN SULFATE 1 MG/G
OINTMENT TOPICAL ONCE
OUTPATIENT
Start: 2025-06-25 | End: 2025-06-25

## 2025-06-25 RX ORDER — LIDOCAINE HYDROCHLORIDE 20 MG/ML
JELLY TOPICAL ONCE
OUTPATIENT
Start: 2025-06-25 | End: 2025-06-25

## 2025-06-25 RX ORDER — LIDOCAINE 50 MG/G
OINTMENT TOPICAL ONCE
OUTPATIENT
Start: 2025-06-25 | End: 2025-06-25

## 2025-06-25 RX ORDER — LIDOCAINE HYDROCHLORIDE 40 MG/ML
SOLUTION TOPICAL ONCE
OUTPATIENT
Start: 2025-06-25 | End: 2025-06-25

## 2025-06-25 RX ORDER — BETAMETHASONE DIPROPIONATE 0.05 %
OINTMENT (GRAM) TOPICAL ONCE
OUTPATIENT
Start: 2025-06-25 | End: 2025-06-25

## 2025-06-25 RX ORDER — TRIAMCINOLONE ACETONIDE 1 MG/G
OINTMENT TOPICAL ONCE
OUTPATIENT
Start: 2025-06-25 | End: 2025-06-25

## 2025-06-25 RX ORDER — OXYCODONE AND ACETAMINOPHEN 5; 325 MG/1; MG/1
1 TABLET ORAL EVERY 6 HOURS PRN
Qty: 28 TABLET | Refills: 0 | Status: SHIPPED | OUTPATIENT
Start: 2025-06-25 | End: 2025-07-02

## 2025-06-25 RX ORDER — NEOMYCIN/BACITRACIN/POLYMYXINB 3.5-400-5K
OINTMENT (GRAM) TOPICAL ONCE
OUTPATIENT
Start: 2025-06-25 | End: 2025-06-25

## 2025-06-25 RX ORDER — BACITRACIN ZINC AND POLYMYXIN B SULFATE 500; 1000 [USP'U]/G; [USP'U]/G
OINTMENT TOPICAL ONCE
OUTPATIENT
Start: 2025-06-25 | End: 2025-06-25

## 2025-06-25 RX ORDER — SILVER SULFADIAZINE 10 MG/G
CREAM TOPICAL ONCE
OUTPATIENT
Start: 2025-06-25 | End: 2025-06-25

## 2025-06-25 RX ORDER — CLOBETASOL PROPIONATE 0.5 MG/G
OINTMENT TOPICAL ONCE
OUTPATIENT
Start: 2025-06-25 | End: 2025-06-25

## 2025-06-25 RX ORDER — GINSENG 100 MG
CAPSULE ORAL ONCE
OUTPATIENT
Start: 2025-06-25 | End: 2025-06-25

## 2025-06-25 RX ORDER — LIDOCAINE HYDROCHLORIDE 40 MG/ML
SOLUTION TOPICAL ONCE
Status: COMPLETED | OUTPATIENT
Start: 2025-06-25 | End: 2025-06-25

## 2025-06-25 RX ADMIN — LIDOCAINE HYDROCHLORIDE 10 ML: 40 SOLUTION TOPICAL at 08:03

## 2025-06-25 ASSESSMENT — PAIN DESCRIPTION - LOCATION: LOCATION: ANKLE

## 2025-06-25 ASSESSMENT — PAIN DESCRIPTION - FREQUENCY: FREQUENCY: INTERMITTENT

## 2025-06-25 ASSESSMENT — PAIN DESCRIPTION - ONSET: ONSET: ON-GOING

## 2025-06-25 ASSESSMENT — PAIN SCALES - GENERAL: PAINLEVEL_OUTOF10: 7

## 2025-06-25 ASSESSMENT — PAIN - FUNCTIONAL ASSESSMENT: PAIN_FUNCTIONAL_ASSESSMENT: PREVENTS OR INTERFERES SOME ACTIVE ACTIVITIES AND ADLS

## 2025-06-25 ASSESSMENT — PAIN DESCRIPTION - PAIN TYPE: TYPE: CHRONIC PAIN

## 2025-06-25 ASSESSMENT — PAIN DESCRIPTION - DESCRIPTORS: DESCRIPTORS: DISCOMFORT

## 2025-06-25 ASSESSMENT — PAIN DESCRIPTION - ORIENTATION: ORIENTATION: LEFT

## 2025-06-25 NOTE — DISCHARGE INSTRUCTIONS
Visit Discharge/Physician Orders     Discharge condition: Stable     Assessment of pain at discharge: yes     Anesthetic used: 4% lidocaine solution     Discharge to: Home     Left via:Private automobile     Accompanied by:self     ECF/HHA: Romeo [phone]     Dressing Orders: LEFT MEDIAL LEG: Cleanse with normal saline, apply zinc to fiona wound, apply Calcium Alginate Ag, ABD pad, and Coban 2. Change weekly.      Treatment Orders: Eat a diet high in protein and vitamin C. Take a multiple vitamin daily unless contraindicated.  Drink Protein Shakes ([employer])   Dr. Duncan as needed.      [facility] followup visit : 1 week Dr. HERNANDEZ   __________________________________  (Please note your next appointment above and if you are unable to keep, kindly give a 24 hour notice. Thank you.)     Physician signature:__________________________     If you experience any of the following, please call the Wound Care Center during business hours:     * Increase in Pain  * Temperature over 101  * Increase in drainage from your wound  * Drainage with a foul odor  * Bleeding  * Increase in swelling  * Need for compression bandage changes due to slippage, breakthrough drainage.     If you need medical attention outside of the business hours of the Wound Care Centers please contact your PCP or go to the nearest emergency room

## 2025-06-25 NOTE — PROGRESS NOTES
Wound Healing Center Followup Visit Note    Referring Physician : Rosenda Cormier MD  Amanda Ledesma  MEDICAL RECORD NUMBER:  [id number]  AGE: 69 y.o.   GENDER: female  : 1955  EPISODE DATE:  2025    Subjective:     Chief Complaint   Patient presents with    Wound Check     leg      HISTORY of PRESENT ILLNESS HPI   Amanda Ledesma is a 69 y.o. female who presents today in regards to follow up evaluation and treatment of wound/ulcer.  That patient's past medical, family and social hx were reviewed and changes were made if present.    History of Wound Context:  The patient has had a wound of her left ankle/calf which was first noted approximately 2021.  This has been treated local wound care. On their initial visit to the wound healing center, 22,  the patient has noted that the wound has been improving.  The patient has not had similar previous wounds in the past.      She started seeing Dr. Street in 2021 and than Dr. Gillis.  She was started in unna boot ~ 2021.  She has noticed some improvement since starting unna boot.  She is currently following with Dr. Haskins.      Pt is not on abx at time of initial visit, but has been treated with previously by podiatry.      She is not a DM.  She is not a smoker.  She denies hx of DVT, and per her report had recent us noting no evidence of dvt at [facility].  She also had arterial studies done.    I had previously seen her in the past in regards to left buttock thigh wound, which started as abscess.      Pt works at [employer] in the [employer] and is on her feet all day.    22  Reflux study - if significant findings will schedule for fu  Continue compression therapy [facility] per podiatry with aquacell dressing  Elevation  She does not have significant arterial occlusive disease  22  Patient has asked to continuing following with me going forward because of our previous relationship  She is going to let Dr. Schuler office know  She

## 2025-06-26 ENCOUNTER — OFFICE VISIT (OUTPATIENT)
Dept: SURGERY | Age: 70
End: 2025-06-26

## 2025-06-26 VITALS
HEIGHT: 68 IN | DIASTOLIC BLOOD PRESSURE: 66 MMHG | TEMPERATURE: 97 F | HEART RATE: 82 BPM | SYSTOLIC BLOOD PRESSURE: 92 MMHG | BODY MASS INDEX: 26.22 KG/M2 | WEIGHT: 173 LBS

## 2025-06-26 DIAGNOSIS — I82.619 BASILIC VEIN THROMBOSIS: ICD-10-CM

## 2025-06-26 DIAGNOSIS — K44.9 HIATAL HERNIA: Primary | ICD-10-CM

## 2025-06-26 PROCEDURE — 99024 POSTOP FOLLOW-UP VISIT: CPT | Performed by: SURGERY

## 2025-06-26 NOTE — PROGRESS NOTES
General Surgery Office Note  [facility]  Tu Peña MD, MS    Patient's Name/Date of Birth: Amanda Ledesma / 1955    Date: June 26, 2025     Chief compaint: Postop visit from laparoscopic robot assisted PEH repair    Surgeon: MD Mariana    Patient Active Problem List   Diagnosis    Chronic cluster headache, not intractable    Migraine    Gastroesophageal reflux disease with esophagitis without hemorrhage    Acute esophagogastric ulcer    Herpes dermatitis    Insomnia secondary to anxiety    Encounter for monitoring long-term proton pump inhibitor therapy    Herpes suppression    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)    Encounter for medication refill    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)    PVD (peripheral vascular disease)    Non-pressure chronic ulcer of left ankle with fat layer exposed (HCC)    Acute deep vein thrombosis (DVT) of popliteal vein of left lower extremity (HCC)    Anemia    Primary hypertension    GERD (gastroesophageal reflux disease)    Open dislocation of left ankle    Nausea and vomiting    Dressing change    Open wound of left ankle    Hiatal hernia    Incisional hernia       Subjective: Doing well, no new complaints, pain from surgery has resolved, tolerating diet, reflux resolved, bowel function normal, no sticking with thin foods    Objective:  BP 92/66   Pulse 82   Temp 97 °F (36.1 °C)   Ht 1.727 m (5' 8\")   Wt 78.5 kg (173 lb)   BMI 26.30 kg/m²     General appearance: AA, NAD  HEENT: NCAT, PERRLA, EOMI  Lungs: Clear, equal rise bilateral  Heart: Reg  Abdomen: soft, nondistended, nontender, incisions well healed, no signs of infection, no cellulitis, no hematoma  Ext: Atraumatic, no swelling  : No CVA tenderness    Assessment/Plan:  Amanda Ledesma is a 69 y.o. female 3 weeks s/p laparoscopic robot assisted paraesophageal hernia repair with partial fundoplication and

## 2025-06-27 ENCOUNTER — TELEPHONE (OUTPATIENT)
Dept: FAMILY MEDICINE CLINIC | Age: 70
End: 2025-06-27

## 2025-06-27 NOTE — TELEPHONE ENCOUNTER
Spoke with Dr Hernandez.  Per Dr Hernandez patient to not take bp medication today and to take 1/2 tablet starting tomorrow everyday until sees Dr Hernandez. Check blood pressure at home. If thing worsen or not right to go to the ED.  Patient voiced understanding of this.    Will contact patient with appointment.

## 2025-06-27 NOTE — TELEPHONE ENCOUNTER
Patient called stating that blood pressure has been running low on the low side.Had surgery a couple of weeks ago. Not experiencing any dizziness. Patient states that went to the doctor offices for a few days ago and is on the low side. 94/66, 99/64.  Patient is nervous about it and did not take the lisinopril today.  Patient would like to have your advise.

## 2025-06-30 NOTE — DISCHARGE INSTRUCTIONS
Visit Discharge/Physician Orders     Discharge condition: Stable     Assessment of pain at discharge: yes     Anesthetic used: 4% lidocaine solution     Discharge to: Home     Left via:Private automobile     Accompanied by:self     ECF/HHA: Romeo [phone]     Dressing Orders: LEFT MEDIAL LEG: Cleanse with normal saline, apply zinc to fiona wound, apply Calcium Alginate Ag, ABD pad, and Coban 2. Change weekly.      Treatment Orders: Eat a diet high in protein and vitamin C. Take a multiple vitamin daily unless contraindicated.    Drink Protein Shakes ([employer])     Dr. Duncan as needed.     [facility] followup visit : 1 week Dr. HERNANDEZ   __________________________________  (Please note your next appointment above and if you are unable to keep, kindly give a 24 hour notice. Thank you.)     Physician signature:__________________________     If you experience any of the following, please call the Wound Care Center during business hours:     * Increase in Pain  * Temperature over 101  * Increase in drainage from your wound  * Drainage with a foul odor  * Bleeding  * Increase in swelling  * Need for compression bandage changes due to slippage, breakthrough drainage.     If you need medical attention outside of the business hours of the Wound Care Centers please contact your PCP or go to the nearest emergency room

## 2025-07-02 ENCOUNTER — OFFICE VISIT (OUTPATIENT)
Dept: FAMILY MEDICINE CLINIC | Age: 70
End: 2025-07-02
Payer: MEDICARE

## 2025-07-02 ENCOUNTER — HOSPITAL ENCOUNTER (OUTPATIENT)
Dept: WOUND CARE | Age: 70
Discharge: HOME | End: 2025-07-02
Attending: SURGERY
Payer: MEDICARE

## 2025-07-02 VITALS
SYSTOLIC BLOOD PRESSURE: 109 MMHG | TEMPERATURE: 96.9 F | HEIGHT: 68 IN | WEIGHT: 173 LBS | DIASTOLIC BLOOD PRESSURE: 56 MMHG | BODY MASS INDEX: 26.22 KG/M2 | RESPIRATION RATE: 18 BRPM | HEART RATE: 78 BPM

## 2025-07-02 VITALS
WEIGHT: 164 LBS | RESPIRATION RATE: 16 BRPM | HEIGHT: 68 IN | OXYGEN SATURATION: 96 % | TEMPERATURE: 97.4 F | BODY MASS INDEX: 24.86 KG/M2 | HEART RATE: 57 BPM | SYSTOLIC BLOOD PRESSURE: 100 MMHG | DIASTOLIC BLOOD PRESSURE: 62 MMHG

## 2025-07-02 DIAGNOSIS — I70.243 ATHEROSCLEROSIS OF NATIVE ARTERY OF LEFT LOWER EXTREMITY WITH ULCERATION OF ANKLE (HCC): Chronic | ICD-10-CM

## 2025-07-02 DIAGNOSIS — I10 PRIMARY HYPERTENSION: Primary | ICD-10-CM

## 2025-07-02 DIAGNOSIS — I95.89 OTHER SPECIFIED HYPOTENSION: ICD-10-CM

## 2025-07-02 DIAGNOSIS — I70.242 ATHEROSCLEROSIS OF NATIVE ARTERY OF LEFT LOWER EXTREMITY WITH ULCERATION OF CALF (HCC): ICD-10-CM

## 2025-07-02 DIAGNOSIS — I83.023 VARICOSE VEINS OF LEFT LOWER EXTREMITY WITH ULCER OF ANKLE WITH FAT LAYER EXPOSED (HCC): Primary | ICD-10-CM

## 2025-07-02 DIAGNOSIS — L97.322 VARICOSE VEINS OF LEFT LOWER EXTREMITY WITH ULCER OF ANKLE WITH FAT LAYER EXPOSED (HCC): Primary | ICD-10-CM

## 2025-07-02 PROCEDURE — G8427 DOCREV CUR MEDS BY ELIG CLIN: HCPCS | Performed by: FAMILY MEDICINE

## 2025-07-02 PROCEDURE — 1159F MED LIST DOCD IN RCRD: CPT | Performed by: FAMILY MEDICINE

## 2025-07-02 PROCEDURE — 11042 DBRDMT SUBQ TIS 1ST 20SQCM/<: CPT | Performed by: SURGERY

## 2025-07-02 PROCEDURE — 1124F ACP DISCUSS-NO DSCNMKR DOCD: CPT | Performed by: FAMILY MEDICINE

## 2025-07-02 PROCEDURE — 3078F DIAST BP <80 MM HG: CPT | Performed by: FAMILY MEDICINE

## 2025-07-02 PROCEDURE — 3017F COLORECTAL CA SCREEN DOC REV: CPT | Performed by: FAMILY MEDICINE

## 2025-07-02 PROCEDURE — 11042 DBRDMT SUBQ TIS 1ST 20SQCM/<: CPT

## 2025-07-02 PROCEDURE — G8420 CALC BMI NORM PARAMETERS: HCPCS | Performed by: FAMILY MEDICINE

## 2025-07-02 PROCEDURE — 1160F RVW MEDS BY RX/DR IN RCRD: CPT | Performed by: FAMILY MEDICINE

## 2025-07-02 PROCEDURE — G8400 PT W/DXA NO RESULTS DOC: HCPCS | Performed by: FAMILY MEDICINE

## 2025-07-02 PROCEDURE — 1036F TOBACCO NON-USER: CPT | Performed by: FAMILY MEDICINE

## 2025-07-02 PROCEDURE — 1090F PRES/ABSN URINE INCON ASSESS: CPT | Performed by: FAMILY MEDICINE

## 2025-07-02 PROCEDURE — 3074F SYST BP LT 130 MM HG: CPT | Performed by: FAMILY MEDICINE

## 2025-07-02 PROCEDURE — G2211 COMPLEX E/M VISIT ADD ON: HCPCS | Performed by: FAMILY MEDICINE

## 2025-07-02 PROCEDURE — 99214 OFFICE O/P EST MOD 30 MIN: CPT | Performed by: FAMILY MEDICINE

## 2025-07-02 RX ORDER — LIDOCAINE HYDROCHLORIDE 20 MG/ML
JELLY TOPICAL ONCE
OUTPATIENT
Start: 2025-07-02 | End: 2025-07-02

## 2025-07-02 RX ORDER — TRIAMCINOLONE ACETONIDE 1 MG/G
OINTMENT TOPICAL ONCE
OUTPATIENT
Start: 2025-07-02 | End: 2025-07-02

## 2025-07-02 RX ORDER — LIDOCAINE 40 MG/G
CREAM TOPICAL ONCE
OUTPATIENT
Start: 2025-07-02 | End: 2025-07-02

## 2025-07-02 RX ORDER — NEOMYCIN/BACITRACIN/POLYMYXINB 3.5-400-5K
OINTMENT (GRAM) TOPICAL ONCE
OUTPATIENT
Start: 2025-07-02 | End: 2025-07-02

## 2025-07-02 RX ORDER — SILVER SULFADIAZINE 10 MG/G
CREAM TOPICAL ONCE
OUTPATIENT
Start: 2025-07-02 | End: 2025-07-02

## 2025-07-02 RX ORDER — LIDOCAINE HYDROCHLORIDE 40 MG/ML
SOLUTION TOPICAL ONCE
Status: COMPLETED | OUTPATIENT
Start: 2025-07-02 | End: 2025-07-02

## 2025-07-02 RX ORDER — BETAMETHASONE DIPROPIONATE 0.05 %
OINTMENT (GRAM) TOPICAL ONCE
OUTPATIENT
Start: 2025-07-02 | End: 2025-07-02

## 2025-07-02 RX ORDER — BACITRACIN ZINC AND POLYMYXIN B SULFATE 500; 1000 [USP'U]/G; [USP'U]/G
OINTMENT TOPICAL ONCE
OUTPATIENT
Start: 2025-07-02 | End: 2025-07-02

## 2025-07-02 RX ORDER — MUPIROCIN 2 %
OINTMENT (GRAM) TOPICAL ONCE
OUTPATIENT
Start: 2025-07-02 | End: 2025-07-02

## 2025-07-02 RX ORDER — LIDOCAINE 50 MG/G
OINTMENT TOPICAL ONCE
OUTPATIENT
Start: 2025-07-02 | End: 2025-07-02

## 2025-07-02 RX ORDER — GENTAMICIN SULFATE 1 MG/G
OINTMENT TOPICAL ONCE
OUTPATIENT
Start: 2025-07-02 | End: 2025-07-02

## 2025-07-02 RX ORDER — CLOBETASOL PROPIONATE 0.5 MG/G
OINTMENT TOPICAL ONCE
OUTPATIENT
Start: 2025-07-02 | End: 2025-07-02

## 2025-07-02 RX ORDER — GINSENG 100 MG
CAPSULE ORAL ONCE
OUTPATIENT
Start: 2025-07-02 | End: 2025-07-02

## 2025-07-02 RX ORDER — LIDOCAINE HYDROCHLORIDE 40 MG/ML
SOLUTION TOPICAL ONCE
OUTPATIENT
Start: 2025-07-02 | End: 2025-07-02

## 2025-07-02 RX ORDER — OXYCODONE AND ACETAMINOPHEN 5; 325 MG/1; MG/1
1 TABLET ORAL EVERY 6 HOURS PRN
Qty: 28 TABLET | Refills: 0 | Status: SHIPPED | OUTPATIENT
Start: 2025-07-06 | End: 2025-07-13

## 2025-07-02 RX ADMIN — LIDOCAINE HYDROCHLORIDE 10 ML: 40 SOLUTION TOPICAL at 07:34

## 2025-07-02 ASSESSMENT — ENCOUNTER SYMPTOMS
COUGH: 0
ABDOMINAL PAIN: 0
SHORTNESS OF BREATH: 0
NAUSEA: 0
BLOOD IN STOOL: 0
VOMITING: 0
DIARRHEA: 0
WHEEZING: 0
CONSTIPATION: 0
BACK PAIN: 0
SORE THROAT: 0

## 2025-07-02 ASSESSMENT — PAIN SCALES - GENERAL: PAINLEVEL_OUTOF10: 7

## 2025-07-02 ASSESSMENT — PAIN DESCRIPTION - PAIN TYPE: TYPE: CHRONIC PAIN

## 2025-07-02 ASSESSMENT — PAIN DESCRIPTION - ORIENTATION: ORIENTATION: LEFT

## 2025-07-02 ASSESSMENT — PAIN DESCRIPTION - ONSET: ONSET: ON-GOING

## 2025-07-02 ASSESSMENT — PAIN - FUNCTIONAL ASSESSMENT: PAIN_FUNCTIONAL_ASSESSMENT: PREVENTS OR INTERFERES SOME ACTIVE ACTIVITIES AND ADLS

## 2025-07-02 ASSESSMENT — PAIN DESCRIPTION - FREQUENCY: FREQUENCY: INTERMITTENT

## 2025-07-02 ASSESSMENT — PAIN DESCRIPTION - LOCATION: LOCATION: ANKLE

## 2025-07-02 ASSESSMENT — PAIN DESCRIPTION - DESCRIPTORS: DESCRIPTORS: DISCOMFORT

## 2025-07-02 NOTE — PLAN OF CARE
Problem: Cognitive:  Goal: Knowledge of wound care  Description: Knowledge of wound care  Outcome: Progressing  Goal: Understands risk factors for wounds  Description: Understands risk factors for wounds  Outcome: Progressing     Problem: Wound:  Goal: Will show signs of wound healing; wound closure and no evidence of infection  Description: Will show signs of wound healing; wound closure and no evidence of infection  Outcome: Progressing     Problem: Venous:  Goal: Signs of wound healing will improve  Description: Signs of wound healing will improve  Outcome: Progressing     
DISCHARGE

## 2025-07-02 NOTE — PROGRESS NOTES
as this is important in assessing forundiagnosed pathology, especially cancer, as well as protecting against potentially harmful/life threatening disease.      Patient and/or guardian verbalizes understandingand agrees with above counseling, assessment and plan.    All questions answered.    Rosenda Cormier MD on 7/2/25

## 2025-07-03 NOTE — DISCHARGE INSTRUCTIONS
Visit Discharge/Physician Orders     Discharge condition: Stable     Assessment of pain at discharge: yes     Anesthetic used: 4% lidocaine solution     Discharge to: Home     Left via:Private automobile     Accompanied by:self     ECF/HHA: Romeo [phone]     Dressing Orders: LEFT MEDIAL LEG: Cleanse with normal saline, apply zinc to fiona wound, apply Calcium Alginate Ag, ABD pad, and Coban 2. Change weekly.      Treatment Orders: Eat a diet high in protein and vitamin C. Take a multiple vitamin daily unless contraindicated.     Drink Protein Shakes ([employer])      Dr. Duncan as needed.      [facility] followup visit : 1 week Dr. HERNANDEZ   __________________________________  (Please note your next appointment above and if you are unable to keep, kindly give a 24 hour notice. Thank you.)     Physician signature:__________________________     If you experience any of the following, please call the Wound Care Center during business hours:     * Increase in Pain  * Temperature over 101  * Increase in drainage from your wound  * Drainage with a foul odor  * Bleeding  * Increase in swelling  * Need for compression bandage changes due to slippage, breakthrough drainage.     If you need medical attention outside of the business hours of the Wound Care Centers please contact your PCP or go to the nearest emergency room

## 2025-07-09 ENCOUNTER — HOSPITAL ENCOUNTER (OUTPATIENT)
Dept: WOUND CARE | Age: 70
Discharge: HOME | End: 2025-07-09
Attending: SURGERY
Payer: MEDICARE

## 2025-07-09 VITALS — DIASTOLIC BLOOD PRESSURE: 55 MMHG | HEART RATE: 68 BPM | SYSTOLIC BLOOD PRESSURE: 109 MMHG | RESPIRATION RATE: 16 BRPM

## 2025-07-09 DIAGNOSIS — I83.023 VARICOSE VEINS OF LEFT LOWER EXTREMITY WITH ULCER OF ANKLE WITH FAT LAYER EXPOSED (HCC): Primary | ICD-10-CM

## 2025-07-09 DIAGNOSIS — I70.242 ATHEROSCLEROSIS OF NATIVE ARTERY OF LEFT LOWER EXTREMITY WITH ULCERATION OF CALF (HCC): ICD-10-CM

## 2025-07-09 DIAGNOSIS — L97.322 VARICOSE VEINS OF LEFT LOWER EXTREMITY WITH ULCER OF ANKLE WITH FAT LAYER EXPOSED (HCC): Primary | ICD-10-CM

## 2025-07-09 PROCEDURE — 11042 DBRDMT SUBQ TIS 1ST 20SQCM/<: CPT

## 2025-07-09 RX ORDER — LIDOCAINE HYDROCHLORIDE 20 MG/ML
JELLY TOPICAL ONCE
OUTPATIENT
Start: 2025-07-09 | End: 2025-07-09

## 2025-07-09 RX ORDER — MUPIROCIN 2 %
OINTMENT (GRAM) TOPICAL ONCE
OUTPATIENT
Start: 2025-07-09 | End: 2025-07-09

## 2025-07-09 RX ORDER — BACITRACIN ZINC AND POLYMYXIN B SULFATE 500; 1000 [USP'U]/G; [USP'U]/G
OINTMENT TOPICAL ONCE
OUTPATIENT
Start: 2025-07-09 | End: 2025-07-09

## 2025-07-09 RX ORDER — SILVER SULFADIAZINE 10 MG/G
CREAM TOPICAL ONCE
OUTPATIENT
Start: 2025-07-09 | End: 2025-07-09

## 2025-07-09 RX ORDER — TRIAMCINOLONE ACETONIDE 1 MG/G
OINTMENT TOPICAL ONCE
OUTPATIENT
Start: 2025-07-09 | End: 2025-07-09

## 2025-07-09 RX ORDER — LIDOCAINE HYDROCHLORIDE 40 MG/ML
SOLUTION TOPICAL ONCE
OUTPATIENT
Start: 2025-07-09 | End: 2025-07-09

## 2025-07-09 RX ORDER — GINSENG 100 MG
CAPSULE ORAL ONCE
OUTPATIENT
Start: 2025-07-09 | End: 2025-07-09

## 2025-07-09 RX ORDER — GENTAMICIN SULFATE 1 MG/G
OINTMENT TOPICAL ONCE
OUTPATIENT
Start: 2025-07-09 | End: 2025-07-09

## 2025-07-09 RX ORDER — LIDOCAINE HYDROCHLORIDE 40 MG/ML
SOLUTION TOPICAL ONCE
Status: COMPLETED | OUTPATIENT
Start: 2025-07-09 | End: 2025-07-09

## 2025-07-09 RX ORDER — BETAMETHASONE DIPROPIONATE 0.05 %
OINTMENT (GRAM) TOPICAL ONCE
OUTPATIENT
Start: 2025-07-09 | End: 2025-07-09

## 2025-07-09 RX ORDER — LIDOCAINE 50 MG/G
OINTMENT TOPICAL ONCE
OUTPATIENT
Start: 2025-07-09 | End: 2025-07-09

## 2025-07-09 RX ORDER — CLOBETASOL PROPIONATE 0.5 MG/G
OINTMENT TOPICAL ONCE
OUTPATIENT
Start: 2025-07-09 | End: 2025-07-09

## 2025-07-09 RX ORDER — NEOMYCIN/BACITRACIN/POLYMYXINB 3.5-400-5K
OINTMENT (GRAM) TOPICAL ONCE
OUTPATIENT
Start: 2025-07-09 | End: 2025-07-09

## 2025-07-09 RX ORDER — LIDOCAINE 40 MG/G
CREAM TOPICAL ONCE
OUTPATIENT
Start: 2025-07-09 | End: 2025-07-09

## 2025-07-09 RX ADMIN — LIDOCAINE HYDROCHLORIDE 5 ML: 40 SOLUTION TOPICAL at 07:46

## 2025-07-09 ASSESSMENT — PAIN DESCRIPTION - LOCATION: LOCATION: ANKLE

## 2025-07-09 ASSESSMENT — PAIN DESCRIPTION - ORIENTATION: ORIENTATION: LEFT

## 2025-07-10 NOTE — DISCHARGE INSTRUCTIONS
Visit Discharge/Physician Orders     Discharge condition: Stable     Assessment of pain at discharge: yes     Anesthetic used: 4% lidocaine solution     Discharge to: Home     Left via:Private automobile     Accompanied by:self     ECF/HHA: Romeo [phone]     Dressing Orders: LEFT MEDIAL LEG: Cleanse with normal saline, apply zinc to fiona wound, apply Calcium Alginate Ag, ABD pad, and Coban 2. Change weekly.      Treatment Orders: Elevate legs throughout the day above the heart level   Eat a diet high in protein and vitamin C. Take a multiple vitamin daily unless contraindicated.  Drink Protein Shakes ([employer])   Dr. Duncan as needed.      [facility] followup visit : 1 week Dr. HERNANDEZ   __________________________________  (Please note your next appointment above and if you are unable to keep, kindly give a 24 hour notice. Thank you.)     Physician signature:__________________________     If you experience any of the following, please call the Wound Care Center during business hours:     * Increase in Pain  * Temperature over 101  * Increase in drainage from your wound  * Drainage with a foul odor  * Bleeding  * Increase in swelling  * Need for compression bandage changes due to slippage, breakthrough drainage.     If you need medical attention outside of the business hours of the Wound Care Centers please contact your PCP or go to the nearest emergency room

## 2025-07-16 ENCOUNTER — HOSPITAL ENCOUNTER (OUTPATIENT)
Dept: WOUND CARE | Age: 70
Discharge: HOME | End: 2025-07-16
Attending: SURGERY
Payer: MEDICARE

## 2025-07-16 VITALS
TEMPERATURE: 97.7 F | RESPIRATION RATE: 16 BRPM | DIASTOLIC BLOOD PRESSURE: 64 MMHG | SYSTOLIC BLOOD PRESSURE: 126 MMHG | HEART RATE: 68 BPM

## 2025-07-16 DIAGNOSIS — I70.242 ATHEROSCLEROSIS OF NATIVE ARTERY OF LEFT LOWER EXTREMITY WITH ULCERATION OF CALF (HCC): ICD-10-CM

## 2025-07-16 DIAGNOSIS — L97.322 VARICOSE VEINS OF LEFT LOWER EXTREMITY WITH ULCER OF ANKLE WITH FAT LAYER EXPOSED (HCC): Primary | ICD-10-CM

## 2025-07-16 DIAGNOSIS — I83.023 VARICOSE VEINS OF LEFT LOWER EXTREMITY WITH ULCER OF ANKLE WITH FAT LAYER EXPOSED (HCC): Primary | ICD-10-CM

## 2025-07-16 PROCEDURE — 11042 DBRDMT SUBQ TIS 1ST 20SQCM/<: CPT

## 2025-07-16 PROCEDURE — 11042 DBRDMT SUBQ TIS 1ST 20SQCM/<: CPT | Performed by: SURGERY

## 2025-07-16 RX ORDER — OXYCODONE AND ACETAMINOPHEN 5; 325 MG/1; MG/1
1 TABLET ORAL EVERY 6 HOURS PRN
Qty: 28 TABLET | Refills: 0 | Status: SHIPPED | OUTPATIENT
Start: 2025-07-16 | End: 2025-07-23

## 2025-07-16 RX ORDER — SILVER SULFADIAZINE 10 MG/G
CREAM TOPICAL ONCE
OUTPATIENT
Start: 2025-07-16 | End: 2025-07-16

## 2025-07-16 RX ORDER — TRIAMCINOLONE ACETONIDE 1 MG/G
OINTMENT TOPICAL ONCE
OUTPATIENT
Start: 2025-07-16 | End: 2025-07-16

## 2025-07-16 RX ORDER — NEOMYCIN/BACITRACIN/POLYMYXINB 3.5-400-5K
OINTMENT (GRAM) TOPICAL ONCE
OUTPATIENT
Start: 2025-07-16 | End: 2025-07-16

## 2025-07-16 RX ORDER — BACITRACIN ZINC AND POLYMYXIN B SULFATE 500; 1000 [USP'U]/G; [USP'U]/G
OINTMENT TOPICAL ONCE
OUTPATIENT
Start: 2025-07-16 | End: 2025-07-16

## 2025-07-16 RX ORDER — LIDOCAINE HYDROCHLORIDE 40 MG/ML
SOLUTION TOPICAL ONCE
OUTPATIENT
Start: 2025-07-16 | End: 2025-07-16

## 2025-07-16 RX ORDER — MUPIROCIN 2 %
OINTMENT (GRAM) TOPICAL ONCE
OUTPATIENT
Start: 2025-07-16 | End: 2025-07-16

## 2025-07-16 RX ORDER — LIDOCAINE HYDROCHLORIDE 20 MG/ML
JELLY TOPICAL ONCE
OUTPATIENT
Start: 2025-07-16 | End: 2025-07-16

## 2025-07-16 RX ORDER — GINSENG 100 MG
CAPSULE ORAL ONCE
OUTPATIENT
Start: 2025-07-16 | End: 2025-07-16

## 2025-07-16 RX ORDER — GENTAMICIN SULFATE 1 MG/G
OINTMENT TOPICAL ONCE
OUTPATIENT
Start: 2025-07-16 | End: 2025-07-16

## 2025-07-16 RX ORDER — BETAMETHASONE DIPROPIONATE 0.05 %
OINTMENT (GRAM) TOPICAL ONCE
OUTPATIENT
Start: 2025-07-16 | End: 2025-07-16

## 2025-07-16 RX ORDER — CLOBETASOL PROPIONATE 0.5 MG/G
OINTMENT TOPICAL ONCE
OUTPATIENT
Start: 2025-07-16 | End: 2025-07-16

## 2025-07-16 RX ORDER — LIDOCAINE 50 MG/G
OINTMENT TOPICAL ONCE
OUTPATIENT
Start: 2025-07-16 | End: 2025-07-16

## 2025-07-16 RX ORDER — LIDOCAINE 40 MG/G
CREAM TOPICAL ONCE
OUTPATIENT
Start: 2025-07-16 | End: 2025-07-16

## 2025-07-16 RX ORDER — LIDOCAINE HYDROCHLORIDE 40 MG/ML
SOLUTION TOPICAL ONCE
Status: COMPLETED | OUTPATIENT
Start: 2025-07-16 | End: 2025-07-16

## 2025-07-16 RX ADMIN — LIDOCAINE HYDROCHLORIDE 5 ML: 40 SOLUTION TOPICAL at 07:50

## 2025-07-16 ASSESSMENT — PAIN DESCRIPTION - DESCRIPTORS: DESCRIPTORS: DISCOMFORT

## 2025-07-16 ASSESSMENT — PAIN DESCRIPTION - ORIENTATION: ORIENTATION: LEFT

## 2025-07-16 ASSESSMENT — PAIN DESCRIPTION - LOCATION: LOCATION: ANKLE

## 2025-07-16 ASSESSMENT — PAIN SCALES - GENERAL: PAINLEVEL_OUTOF10: 7

## 2025-07-21 NOTE — DISCHARGE INSTRUCTIONS
Visit Discharge/Physician Orders     Discharge condition: Stable     Assessment of pain at discharge: yes     Anesthetic used: 4% lidocaine solution     Discharge to: Home     Left via:Private automobile     Accompanied by:self     ECF/HHA: Romeo [phone]     Dressing Orders: LEFT MEDIAL LEG: Cleanse with normal saline, apply zinc to fiona wound, apply Calcium Alginate Ag, ABD pad, and Coban 2. Change weekly.      Treatment Orders: Elevate legs throughout the day above the heart level   Eat a diet high in protein and vitamin C. Take a multiple vitamin daily unless contraindicated.  Drink Mannie 2x/day.   Dr. Duncan as needed.      [facility] followup visit : 1 week Dr. HERNANDEZ   __________________________________  (Please note your next appointment above and if you are unable to keep, kindly give a 24 hour notice. Thank you.)     Physician signature:__________________________     If you experience any of the following, please call the Wound Care Center during business hours:     * Increase in Pain  * Temperature over 101  * Increase in drainage from your wound  * Drainage with a foul odor  * Bleeding  * Increase in swelling  * Need for compression bandage changes due to slippage, breakthrough drainage.     If you need medical attention outside of the business hours of the Wound Care Centers please contact your PCP or go to the nearest emergency room

## 2025-07-23 ENCOUNTER — HOSPITAL ENCOUNTER (OUTPATIENT)
Age: 70
Discharge: HOME | End: 2025-07-23
Payer: MEDICARE

## 2025-07-23 ENCOUNTER — HOSPITAL ENCOUNTER (OUTPATIENT)
Dept: WOUND CARE | Age: 70
Discharge: HOME | End: 2025-07-23
Attending: SURGERY
Payer: MEDICARE

## 2025-07-23 VITALS
TEMPERATURE: 96.7 F | SYSTOLIC BLOOD PRESSURE: 160 MMHG | RESPIRATION RATE: 18 BRPM | DIASTOLIC BLOOD PRESSURE: 72 MMHG | HEART RATE: 70 BPM

## 2025-07-23 DIAGNOSIS — I95.89 OTHER SPECIFIED HYPOTENSION: ICD-10-CM

## 2025-07-23 DIAGNOSIS — I83.023 VARICOSE VEINS OF LEFT LOWER EXTREMITY WITH ULCER OF ANKLE WITH FAT LAYER EXPOSED (HCC): Primary | ICD-10-CM

## 2025-07-23 DIAGNOSIS — I10 PRIMARY HYPERTENSION: ICD-10-CM

## 2025-07-23 DIAGNOSIS — I70.243 ATHEROSCLEROSIS OF NATIVE ARTERY OF LEFT LOWER EXTREMITY WITH ULCERATION OF ANKLE (HCC): Chronic | ICD-10-CM

## 2025-07-23 DIAGNOSIS — L97.322 VARICOSE VEINS OF LEFT LOWER EXTREMITY WITH ULCER OF ANKLE WITH FAT LAYER EXPOSED (HCC): Primary | ICD-10-CM

## 2025-07-23 DIAGNOSIS — I70.242 ATHEROSCLEROSIS OF NATIVE ARTERY OF LEFT LOWER EXTREMITY WITH ULCERATION OF CALF (HCC): ICD-10-CM

## 2025-07-23 LAB
ALBUMIN SERPL-MCNC: 3.8 G/DL (ref 3.5–5.2)
ALP SERPL-CCNC: 84 U/L (ref 35–104)
ALT SERPL-CCNC: 12 U/L (ref 0–35)
ANION GAP SERPL CALCULATED.3IONS-SCNC: 14 MMOL/L (ref 7–16)
AST SERPL-CCNC: 19 U/L (ref 0–35)
BASOPHILS # BLD: 0.02 K/UL (ref 0–0.2)
BASOPHILS NFR BLD: 0 % (ref 0–2)
BILIRUB SERPL-MCNC: <0.2 MG/DL (ref 0–1.2)
BILIRUB UR QL STRIP: NEGATIVE
BUN SERPL-MCNC: 16 MG/DL (ref 8–23)
CALCIUM SERPL-MCNC: 9.6 MG/DL (ref 8.8–10.2)
CHLORIDE SERPL-SCNC: 107 MMOL/L (ref 98–107)
CHOLEST SERPL-MCNC: 205 MG/DL
CLARITY UR: CLEAR
CO2 SERPL-SCNC: 22 MMOL/L (ref 22–29)
COLOR UR: YELLOW
COMMENT: NORMAL
CREAT SERPL-MCNC: 0.9 MG/DL (ref 0.5–1)
EOSINOPHIL # BLD: 0.11 K/UL (ref 0.05–0.5)
EOSINOPHILS RELATIVE PERCENT: 2 % (ref 0–6)
ERYTHROCYTE [DISTWIDTH] IN BLOOD BY AUTOMATED COUNT: 14.1 % (ref 11.5–15)
GFR, ESTIMATED: 69 ML/MIN/1.73M2
GLUCOSE SERPL-MCNC: 77 MG/DL (ref 74–99)
GLUCOSE UR STRIP-MCNC: NEGATIVE MG/DL
HCT VFR BLD AUTO: 32.9 % (ref 34–48)
HDLC SERPL-MCNC: 61 MG/DL
HGB BLD-MCNC: 10.4 G/DL (ref 11.5–15.5)
HGB UR QL STRIP.AUTO: NEGATIVE
IMM GRANULOCYTES # BLD AUTO: <0.03 K/UL (ref 0–0.58)
IMM GRANULOCYTES NFR BLD: 0 % (ref 0–5)
KETONES UR STRIP-MCNC: NEGATIVE MG/DL
LDLC SERPL CALC-MCNC: 124 MG/DL
LEUKOCYTE ESTERASE UR QL STRIP: NEGATIVE
LYMPHOCYTES NFR BLD: 1.62 K/UL (ref 1.5–4)
LYMPHOCYTES RELATIVE PERCENT: 28 % (ref 20–42)
MCH RBC QN AUTO: 31.6 PG (ref 26–35)
MCHC RBC AUTO-ENTMCNC: 31.6 G/DL (ref 32–34.5)
MCV RBC AUTO: 100 FL (ref 80–99.9)
MONOCYTES NFR BLD: 0.58 K/UL (ref 0.1–0.95)
MONOCYTES NFR BLD: 10 % (ref 2–12)
NEUTROPHILS NFR BLD: 60 % (ref 43–80)
NEUTS SEG NFR BLD: 3.44 K/UL (ref 1.8–7.3)
NITRITE UR QL STRIP: NEGATIVE
PH UR STRIP: 6 [PH] (ref 5–8)
PLATELET # BLD AUTO: 206 K/UL (ref 130–450)
PMV BLD AUTO: 10.4 FL (ref 7–12)
POTASSIUM SERPL-SCNC: 4.4 MMOL/L (ref 3.5–5.1)
PROT SERPL-MCNC: 7 G/DL (ref 6.4–8.3)
PROT UR STRIP-MCNC: NEGATIVE MG/DL
RBC # BLD AUTO: 3.29 M/UL (ref 3.5–5.5)
SODIUM SERPL-SCNC: 143 MMOL/L (ref 136–145)
SP GR UR STRIP: 1.02 (ref 1–1.03)
TRIGL SERPL-MCNC: 102 MG/DL
TSH SERPL DL<=0.05 MIU/L-ACNC: 2.03 UIU/ML (ref 0.27–4.2)
UROBILINOGEN UR STRIP-ACNC: 0.2 EU/DL (ref 0–1)
VLDLC SERPL CALC-MCNC: 20 MG/DL
WBC OTHER # BLD: 5.8 K/UL (ref 4.5–11.5)

## 2025-07-23 PROCEDURE — 80053 COMPREHEN METABOLIC PANEL: CPT

## 2025-07-23 PROCEDURE — 81003 URINALYSIS AUTO W/O SCOPE: CPT

## 2025-07-23 PROCEDURE — 85025 COMPLETE CBC W/AUTO DIFF WBC: CPT

## 2025-07-23 PROCEDURE — 36415 COLL VENOUS BLD VENIPUNCTURE: CPT

## 2025-07-23 PROCEDURE — 84443 ASSAY THYROID STIM HORMONE: CPT

## 2025-07-23 PROCEDURE — 11042 DBRDMT SUBQ TIS 1ST 20SQCM/<: CPT | Performed by: SURGERY

## 2025-07-23 PROCEDURE — 80061 LIPID PANEL: CPT

## 2025-07-23 PROCEDURE — 11042 DBRDMT SUBQ TIS 1ST 20SQCM/<: CPT

## 2025-07-23 RX ORDER — GINSENG 100 MG
CAPSULE ORAL ONCE
OUTPATIENT
Start: 2025-07-23 | End: 2025-07-23

## 2025-07-23 RX ORDER — LIDOCAINE HYDROCHLORIDE 20 MG/ML
JELLY TOPICAL ONCE
OUTPATIENT
Start: 2025-07-23 | End: 2025-07-23

## 2025-07-23 RX ORDER — BETAMETHASONE DIPROPIONATE 0.05 %
OINTMENT (GRAM) TOPICAL ONCE
OUTPATIENT
Start: 2025-07-23 | End: 2025-07-23

## 2025-07-23 RX ORDER — GENTAMICIN SULFATE 1 MG/G
OINTMENT TOPICAL ONCE
OUTPATIENT
Start: 2025-07-23 | End: 2025-07-23

## 2025-07-23 RX ORDER — NEOMYCIN/BACITRACIN/POLYMYXINB 3.5-400-5K
OINTMENT (GRAM) TOPICAL ONCE
OUTPATIENT
Start: 2025-07-23 | End: 2025-07-23

## 2025-07-23 RX ORDER — LIDOCAINE HYDROCHLORIDE 40 MG/ML
SOLUTION TOPICAL ONCE
OUTPATIENT
Start: 2025-07-23 | End: 2025-07-23

## 2025-07-23 RX ORDER — OXYCODONE AND ACETAMINOPHEN 5; 325 MG/1; MG/1
1 TABLET ORAL EVERY 6 HOURS PRN
Qty: 28 TABLET | Refills: 0 | Status: SHIPPED | OUTPATIENT
Start: 2025-07-28 | End: 2025-08-04

## 2025-07-23 RX ORDER — LIDOCAINE HYDROCHLORIDE 40 MG/ML
SOLUTION TOPICAL ONCE
Status: COMPLETED | OUTPATIENT
Start: 2025-07-23 | End: 2025-07-23

## 2025-07-23 RX ORDER — CLOBETASOL PROPIONATE 0.5 MG/G
OINTMENT TOPICAL ONCE
OUTPATIENT
Start: 2025-07-23 | End: 2025-07-23

## 2025-07-23 RX ORDER — LIDOCAINE 40 MG/G
CREAM TOPICAL ONCE
OUTPATIENT
Start: 2025-07-23 | End: 2025-07-23

## 2025-07-23 RX ORDER — LIDOCAINE 50 MG/G
OINTMENT TOPICAL ONCE
OUTPATIENT
Start: 2025-07-23 | End: 2025-07-23

## 2025-07-23 RX ORDER — TRIAMCINOLONE ACETONIDE 1 MG/G
OINTMENT TOPICAL ONCE
OUTPATIENT
Start: 2025-07-23 | End: 2025-07-23

## 2025-07-23 RX ORDER — MUPIROCIN 2 %
OINTMENT (GRAM) TOPICAL ONCE
OUTPATIENT
Start: 2025-07-23 | End: 2025-07-23

## 2025-07-23 RX ORDER — BACITRACIN ZINC AND POLYMYXIN B SULFATE 500; 1000 [USP'U]/G; [USP'U]/G
OINTMENT TOPICAL ONCE
OUTPATIENT
Start: 2025-07-23 | End: 2025-07-23

## 2025-07-23 RX ORDER — SILVER SULFADIAZINE 10 MG/G
CREAM TOPICAL ONCE
OUTPATIENT
Start: 2025-07-23 | End: 2025-07-23

## 2025-07-23 RX ADMIN — LIDOCAINE HYDROCHLORIDE: 40 SOLUTION TOPICAL at 07:47

## 2025-07-23 ASSESSMENT — PAIN DESCRIPTION - LOCATION: LOCATION: ANKLE

## 2025-07-23 ASSESSMENT — PAIN - FUNCTIONAL ASSESSMENT: PAIN_FUNCTIONAL_ASSESSMENT: PREVENTS OR INTERFERES SOME ACTIVE ACTIVITIES AND ADLS

## 2025-07-23 ASSESSMENT — PAIN SCALES - GENERAL: PAINLEVEL_OUTOF10: 7

## 2025-07-23 ASSESSMENT — PAIN DESCRIPTION - DESCRIPTORS: DESCRIPTORS: DISCOMFORT

## 2025-07-23 ASSESSMENT — PAIN DESCRIPTION - ORIENTATION: ORIENTATION: LEFT

## 2025-07-23 NOTE — PROGRESS NOTES
veins (HCC) 02/02/2022     Past Surgical History:   Procedure Laterality Date    CHOLECYSTECTOMY, LAPAROSCOPIC  04/08/2014    ESOPHAGOGASTRODUODENOSCOPY  06/04/2024    [facility]    HIATAL HERNIA REPAIR N/A 6/5/2025    LAPAROSCOPIC ROBOTIC XI PARAESOPHAGEAL HERNIA REPAIR WITH FUNDOPLICATION, MYOFASCIAL FLAP AND POSSIBLE GASTROPEXY performed by Tu Peña MD at [facility] OR    LEG SURGERY Left 08/24/2022    LEFT LOWER EXTREMITY DEBRIDMENT WITH POSSIBLE ADVANCED SKIN THERAPY, POSSIBLE UNNA BOOT performed by Ashley Staples MD at [facility] [place]    LEG SURGERY Left 10/25/2022    DEBRIDEMENT LEFT LEG, POSSIBLE ADVANCED SKIN THERAPY with acell micromatrix application , UNNA BOOT performed by Ashley Staples MD at [facility] [place]    LEG SURGERY Left 05/02/2023    LEFT LOWER EXTREMITY DEBRIDEMENT, POSSIBLE UNNA BOOT, POSSIBLE ADVANCED SKIN THERAPY performed by Ashley Staples MD at [facility] [place]    LEG SURGERY Left 06/27/2023    DEBRIDEMENT LEFT LEG performed by Ashley Staples MD at [facility] [place]    LEG SURGERY Left 07/25/2023    DEBRIDEMENT OF LEFT LOWER EXTREMITY WOUND, ADVANCED SKIN THERAPY/APPLICATION OF ACF performed by Ahsley Staples MD at [facility] [place]    LEG SURGERY Left 09/12/2023    left leg debridement, AC5 application, unna boot application performed by Ashley Staples MD at [facility] [place]    LEG SURGERY Left 11/07/2023    DEBRIDEMENT LEFT LEG performed by Ashley Staples MD at [facility] [place]    SAPHENOUS VEIN ABLATION Left 01/12/2023    RADIOFREQUENCY ABLATION LEFT LESSER SAPHENOUS VEIN WITH STAB PHLEBECTOMIES, LEFT LEG WOUND DEBRIDEMENT performed by Ashley Staples MD at [facility] OR    TONSILLECTOMY  1960    1960s    UPPER GASTROINTESTINAL ENDOSCOPY  12/13/2013    mild gastritis and small hiatal hernia, Dr Young, [facility]    UPPER GASTROINTESTINAL ENDOSCOPY  2015    GERD, Dr. Maki, office    UPPER GASTROINTESTINAL ENDOSCOPY N/A 4/17/2025    ESOPHAGOGASTRODUODENOSCOPY

## 2025-07-24 NOTE — DISCHARGE INSTRUCTIONS
Visit Discharge/Physician Orders     Discharge condition: Stable     Assessment of pain at discharge: yes     Anesthetic used: 4% lidocaine solution     Discharge to: Home     Left via:Private automobile     Accompanied by:self     [facility]: [facility] [phone]     Dressing Orders: LEFT MEDIAL LEG: Cleanse with normal saline, apply zinc to fiona wound, apply Calcium Alginate Ag, ABD pad, and Coban 2. Change weekly.      Treatment Orders: Elevate legs throughout the day above the heart level   Eat a diet high in protein and vitamin C. Take a multiple vitamin daily unless contraindicated.  Drink Mannie 2x/day.   Dr. Duncan as needed.     7/30/25 Wound Culture     [facility] followup visit : 1 week Dr. HERNANDEZ   __________________________________  (Please note your next appointment above and if you are unable to keep, kindly give a 24 hour notice. Thank you.)     Physician signature:__________________________     If you experience any of the following, please call the Wound Care Center during business hours:     * Increase in Pain  * Temperature over 101  * Increase in drainage from your wound  * Drainage with a foul odor  * Bleeding  * Increase in swelling  * Need for compression bandage changes due to slippage, breakthrough drainage.     If you need medical attention outside of the business hours of the W[facility] Centers please contact your PCP or go to the nearest emergency room

## 2025-07-30 ENCOUNTER — TELEPHONE (OUTPATIENT)
Dept: FAMILY MEDICINE CLINIC | Age: 70
End: 2025-07-30

## 2025-07-30 ENCOUNTER — HOSPITAL ENCOUNTER (OUTPATIENT)
Dept: WOUND CARE | Age: 70
Discharge: HOME | End: 2025-07-30
Attending: SURGERY
Payer: MEDICARE

## 2025-07-30 VITALS
TEMPERATURE: 97.1 F | DIASTOLIC BLOOD PRESSURE: 70 MMHG | HEART RATE: 76 BPM | RESPIRATION RATE: 16 BRPM | SYSTOLIC BLOOD PRESSURE: 125 MMHG

## 2025-07-30 DIAGNOSIS — L97.322 NON-PRESSURE CHRONIC ULCER OF LEFT ANKLE WITH FAT LAYER EXPOSED (HCC): Primary | ICD-10-CM

## 2025-07-30 PROCEDURE — 11042 DBRDMT SUBQ TIS 1ST 20SQCM/<: CPT | Performed by: SURGERY

## 2025-07-30 PROCEDURE — 87077 CULTURE AEROBIC IDENTIFY: CPT

## 2025-07-30 PROCEDURE — 11042 DBRDMT SUBQ TIS 1ST 20SQCM/<: CPT

## 2025-07-30 PROCEDURE — 87205 SMEAR GRAM STAIN: CPT

## 2025-07-30 PROCEDURE — 87070 CULTURE OTHR SPECIMN AEROBIC: CPT

## 2025-07-30 ASSESSMENT — PAIN DESCRIPTION - ORIENTATION: ORIENTATION: LEFT

## 2025-07-30 ASSESSMENT — PAIN DESCRIPTION - ONSET: ONSET: ON-GOING

## 2025-07-30 ASSESSMENT — PAIN SCALES - GENERAL: PAINLEVEL_OUTOF10: 7

## 2025-07-30 ASSESSMENT — PAIN - FUNCTIONAL ASSESSMENT: PAIN_FUNCTIONAL_ASSESSMENT: PREVENTS OR INTERFERES SOME ACTIVE ACTIVITIES AND ADLS

## 2025-07-30 ASSESSMENT — PAIN DESCRIPTION - LOCATION: LOCATION: ANKLE

## 2025-07-30 ASSESSMENT — PAIN DESCRIPTION - FREQUENCY: FREQUENCY: INTERMITTENT

## 2025-07-30 ASSESSMENT — PAIN DESCRIPTION - DESCRIPTORS: DESCRIPTORS: DISCOMFORT

## 2025-07-30 ASSESSMENT — PAIN DESCRIPTION - PAIN TYPE: TYPE: CHRONIC PAIN

## 2025-07-30 NOTE — PROGRESS NOTES
antibiotics  4/30/25  Wound appearance improved, size 32  Percocet 5/325 mg #28 oarrs  5/14/25  Smaller 27  Percocet 5/325 mg #28 oarrs for 5/20 5/21/25  Smaller 22  5/28/25  Smaller 21, more new skin on edges  Percocet 5/325 mg #28 Oarrs for 6/1  Hiatal hernia repair next week with Dr Peña  6/4/25  Improved  6/25/25  Had hiatal hernia repair with Dr Peña   Wound is much smaller 9 - likely due to decreased activity postop  Encouraged her to continue keeping leg up  Percocet 5/325 mg #28 Oarrs  7/2/25  Wound is much smaller 7.3  Encouraged her to continue keeping leg up  Percocet 5/325 mg #28 Oarrs  7/9/25  Wound 6 smaller  7/16/25  Larger 15  Emphasize importance of elevation  As she has felt better after hiatal hernia repair her activity has increased - likely etiology of why her wound has worsened  Percocet 5/325 mg #28 Oarrs  7/23/25  Larger 18  Emphasize importance of elevation  Percocet 5/325 mg #28 Oarrs - 7/29 7/30/2025  Wound, has more exudate, according to the nursing staff excised wound cultures were done again today    Wound/Ulcer Pain Timing/Severity: constant, moderate  Quality of pain: aching, throbbing, tender  Severity:  8/ 10   Modifying Factors: Pain worsens with dressing changes, debridement  Associated Signs/Symptoms: edema, drainage and pain    Ulcer Identification:  Ulcer Type: venous  Contributing Factors: edema and venous stasis    Diabetic/Pressure/Non Pressure Ulcers only:  Ulcer: Non-Pressure ulcer, fat layer exposed        PAST MEDICAL HISTORY      Diagnosis Date    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) 05/18/2022    Dizziness - light-headed     GERD (gastroesophageal reflux disease)     Herpes dermatitis 04/27/2017    History of blood transfusion     Hx of blood clots     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    Lightheadedness     Migraine     PONV (postoperative nausea and vomiting)     Primary hypertension 03/06/2024    Skin ulcer of buttock

## 2025-08-04 ENCOUNTER — CLINICAL DOCUMENTATION (OUTPATIENT)
Dept: VASCULAR SURGERY | Age: 70
End: 2025-08-04

## 2025-08-04 ENCOUNTER — TELEPHONE (OUTPATIENT)
Dept: WOUND CARE | Age: 70
End: 2025-08-04

## 2025-08-04 RX ORDER — AMOXICILLIN 500 MG/1
500 CAPSULE ORAL 3 TIMES DAILY
Qty: 30 CAPSULE | Refills: 0 | Status: SHIPPED | OUTPATIENT
Start: 2025-08-04 | End: 2025-08-14

## 2025-08-06 ENCOUNTER — HOSPITAL ENCOUNTER (OUTPATIENT)
Dept: WOUND CARE | Age: 70
Discharge: HOME | End: 2025-08-06
Attending: SURGERY
Payer: MEDICARE

## 2025-08-06 VITALS
TEMPERATURE: 97.8 F | BODY MASS INDEX: 26.22 KG/M2 | HEIGHT: 68 IN | DIASTOLIC BLOOD PRESSURE: 75 MMHG | SYSTOLIC BLOOD PRESSURE: 152 MMHG | WEIGHT: 173 LBS | RESPIRATION RATE: 14 BRPM

## 2025-08-06 DIAGNOSIS — I83.023 VARICOSE VEINS OF LEFT LOWER EXTREMITY WITH ULCER OF ANKLE WITH FAT LAYER EXPOSED (HCC): Primary | ICD-10-CM

## 2025-08-06 DIAGNOSIS — I70.242 ATHEROSCLEROSIS OF NATIVE ARTERY OF LEFT LOWER EXTREMITY WITH ULCERATION OF CALF (HCC): ICD-10-CM

## 2025-08-06 DIAGNOSIS — L97.322 VARICOSE VEINS OF LEFT LOWER EXTREMITY WITH ULCER OF ANKLE WITH FAT LAYER EXPOSED (HCC): Primary | ICD-10-CM

## 2025-08-06 PROCEDURE — 11045 DBRDMT SUBQ TISS EACH ADDL: CPT

## 2025-08-06 PROCEDURE — 11042 DBRDMT SUBQ TIS 1ST 20SQCM/<: CPT | Performed by: SURGERY

## 2025-08-06 PROCEDURE — 11045 DBRDMT SUBQ TISS EACH ADDL: CPT | Performed by: SURGERY

## 2025-08-06 PROCEDURE — 11042 DBRDMT SUBQ TIS 1ST 20SQCM/<: CPT

## 2025-08-06 RX ORDER — LIDOCAINE HYDROCHLORIDE 40 MG/ML
SOLUTION TOPICAL ONCE
Status: COMPLETED | OUTPATIENT
Start: 2025-08-06 | End: 2025-08-06

## 2025-08-06 RX ORDER — GINSENG 100 MG
CAPSULE ORAL ONCE
OUTPATIENT
Start: 2025-08-06 | End: 2025-08-06

## 2025-08-06 RX ORDER — LIDOCAINE HYDROCHLORIDE 20 MG/ML
JELLY TOPICAL ONCE
OUTPATIENT
Start: 2025-08-06 | End: 2025-08-06

## 2025-08-06 RX ORDER — LIDOCAINE 50 MG/G
OINTMENT TOPICAL ONCE
OUTPATIENT
Start: 2025-08-06 | End: 2025-08-06

## 2025-08-06 RX ORDER — MUPIROCIN 2 %
OINTMENT (GRAM) TOPICAL ONCE
OUTPATIENT
Start: 2025-08-06 | End: 2025-08-06

## 2025-08-06 RX ORDER — NEOMYCIN/BACITRACIN/POLYMYXINB 3.5-400-5K
OINTMENT (GRAM) TOPICAL ONCE
OUTPATIENT
Start: 2025-08-06 | End: 2025-08-06

## 2025-08-06 RX ORDER — GENTAMICIN SULFATE 1 MG/G
OINTMENT TOPICAL ONCE
OUTPATIENT
Start: 2025-08-06 | End: 2025-08-06

## 2025-08-06 RX ORDER — BETAMETHASONE DIPROPIONATE 0.05 %
OINTMENT (GRAM) TOPICAL ONCE
OUTPATIENT
Start: 2025-08-06 | End: 2025-08-06

## 2025-08-06 RX ORDER — LIDOCAINE HYDROCHLORIDE 40 MG/ML
SOLUTION TOPICAL ONCE
OUTPATIENT
Start: 2025-08-06 | End: 2025-08-06

## 2025-08-06 RX ORDER — LIDOCAINE 40 MG/G
CREAM TOPICAL ONCE
OUTPATIENT
Start: 2025-08-06 | End: 2025-08-06

## 2025-08-06 RX ORDER — BACITRACIN ZINC AND POLYMYXIN B SULFATE 500; 1000 [USP'U]/G; [USP'U]/G
OINTMENT TOPICAL ONCE
OUTPATIENT
Start: 2025-08-06 | End: 2025-08-06

## 2025-08-06 RX ORDER — CLOBETASOL PROPIONATE 0.5 MG/G
OINTMENT TOPICAL ONCE
OUTPATIENT
Start: 2025-08-06 | End: 2025-08-06

## 2025-08-06 RX ORDER — TRIAMCINOLONE ACETONIDE 1 MG/G
OINTMENT TOPICAL ONCE
OUTPATIENT
Start: 2025-08-06 | End: 2025-08-06

## 2025-08-06 RX ORDER — SILVER SULFADIAZINE 10 MG/G
CREAM TOPICAL ONCE
OUTPATIENT
Start: 2025-08-06 | End: 2025-08-06

## 2025-08-06 RX ADMIN — LIDOCAINE HYDROCHLORIDE 5 ML: 40 SOLUTION TOPICAL at 07:58

## 2025-08-06 ASSESSMENT — PAIN SCALES - GENERAL: PAINLEVEL_OUTOF10: 7

## 2025-08-06 ASSESSMENT — PAIN DESCRIPTION - DESCRIPTORS: DESCRIPTORS: DISCOMFORT;ACHING

## 2025-08-06 ASSESSMENT — PAIN DESCRIPTION - FREQUENCY: FREQUENCY: CONTINUOUS

## 2025-08-06 ASSESSMENT — PAIN DESCRIPTION - ORIENTATION: ORIENTATION: LEFT

## 2025-08-06 ASSESSMENT — PAIN DESCRIPTION - ONSET: ONSET: ON-GOING

## 2025-08-06 ASSESSMENT — PAIN DESCRIPTION - LOCATION: LOCATION: LEG

## 2025-08-06 ASSESSMENT — PAIN DESCRIPTION - PAIN TYPE: TYPE: CHRONIC PAIN

## 2025-08-13 ENCOUNTER — HOSPITAL ENCOUNTER (OUTPATIENT)
Dept: WOUND CARE | Age: 70
Discharge: HOME | End: 2025-08-13
Attending: SURGERY
Payer: MEDICARE

## 2025-08-13 VITALS
HEIGHT: 68 IN | DIASTOLIC BLOOD PRESSURE: 67 MMHG | HEART RATE: 75 BPM | TEMPERATURE: 97.2 F | WEIGHT: 173 LBS | RESPIRATION RATE: 18 BRPM | SYSTOLIC BLOOD PRESSURE: 109 MMHG | BODY MASS INDEX: 26.22 KG/M2

## 2025-08-13 DIAGNOSIS — I70.243 ATHEROSCLEROSIS OF NATIVE ARTERY OF LEFT LOWER EXTREMITY WITH ULCERATION OF ANKLE (HCC): Chronic | ICD-10-CM

## 2025-08-13 DIAGNOSIS — I83.023 VARICOSE VEINS OF LEFT LOWER EXTREMITY WITH ULCER OF ANKLE WITH FAT LAYER EXPOSED (HCC): Primary | ICD-10-CM

## 2025-08-13 DIAGNOSIS — L97.322 VARICOSE VEINS OF LEFT LOWER EXTREMITY WITH ULCER OF ANKLE WITH FAT LAYER EXPOSED (HCC): Primary | ICD-10-CM

## 2025-08-13 PROCEDURE — 11042 DBRDMT SUBQ TIS 1ST 20SQCM/<: CPT

## 2025-08-13 PROCEDURE — 11042 DBRDMT SUBQ TIS 1ST 20SQCM/<: CPT | Performed by: SURGERY

## 2025-08-13 RX ORDER — OXYCODONE AND ACETAMINOPHEN 5; 325 MG/1; MG/1
1 TABLET ORAL EVERY 6 HOURS PRN
Qty: 28 TABLET | Refills: 0 | Status: SHIPPED | OUTPATIENT
Start: 2025-08-13 | End: 2025-08-20

## 2025-08-13 ASSESSMENT — PAIN DESCRIPTION - FREQUENCY: FREQUENCY: CONTINUOUS

## 2025-08-13 ASSESSMENT — PAIN SCALES - GENERAL: PAINLEVEL_OUTOF10: 7

## 2025-08-13 ASSESSMENT — PAIN DESCRIPTION - LOCATION: LOCATION: LEG

## 2025-08-13 ASSESSMENT — PAIN DESCRIPTION - PAIN TYPE: TYPE: CHRONIC PAIN

## 2025-08-13 ASSESSMENT — PAIN DESCRIPTION - ONSET: ONSET: ON-GOING

## 2025-08-13 ASSESSMENT — PAIN - FUNCTIONAL ASSESSMENT: PAIN_FUNCTIONAL_ASSESSMENT: PREVENTS OR INTERFERES SOME ACTIVE ACTIVITIES AND ADLS

## 2025-08-13 ASSESSMENT — PAIN DESCRIPTION - DESCRIPTORS: DESCRIPTORS: DISCOMFORT;ACHING

## 2025-08-13 ASSESSMENT — PAIN DESCRIPTION - ORIENTATION: ORIENTATION: LEFT

## 2025-08-20 ENCOUNTER — HOSPITAL ENCOUNTER (OUTPATIENT)
Dept: WOUND CARE | Age: 70
Discharge: HOME | End: 2025-08-20
Attending: SURGERY
Payer: MEDICARE

## 2025-08-20 VITALS
SYSTOLIC BLOOD PRESSURE: 104 MMHG | TEMPERATURE: 97.8 F | RESPIRATION RATE: 18 BRPM | DIASTOLIC BLOOD PRESSURE: 60 MMHG | HEIGHT: 68 IN | WEIGHT: 173 LBS | HEART RATE: 68 BPM | BODY MASS INDEX: 26.22 KG/M2

## 2025-08-20 DIAGNOSIS — I70.242 ATHEROSCLEROSIS OF NATIVE ARTERY OF LEFT LOWER EXTREMITY WITH ULCERATION OF CALF (HCC): ICD-10-CM

## 2025-08-20 DIAGNOSIS — I83.023 VARICOSE VEINS OF LEFT LOWER EXTREMITY WITH ULCER OF ANKLE WITH FAT LAYER EXPOSED (HCC): Primary | ICD-10-CM

## 2025-08-20 DIAGNOSIS — I70.243 ATHEROSCLEROSIS OF NATIVE ARTERY OF LEFT LOWER EXTREMITY WITH ULCERATION OF ANKLE (HCC): Chronic | ICD-10-CM

## 2025-08-20 DIAGNOSIS — L97.322 VARICOSE VEINS OF LEFT LOWER EXTREMITY WITH ULCER OF ANKLE WITH FAT LAYER EXPOSED (HCC): Primary | ICD-10-CM

## 2025-08-20 PROCEDURE — 11042 DBRDMT SUBQ TIS 1ST 20SQCM/<: CPT | Performed by: SURGERY

## 2025-08-20 PROCEDURE — 11042 DBRDMT SUBQ TIS 1ST 20SQCM/<: CPT

## 2025-08-20 RX ORDER — LIDOCAINE 50 MG/G
OINTMENT TOPICAL ONCE
OUTPATIENT
Start: 2025-08-20 | End: 2025-08-20

## 2025-08-20 RX ORDER — BETAMETHASONE DIPROPIONATE 0.05 %
OINTMENT (GRAM) TOPICAL ONCE
OUTPATIENT
Start: 2025-08-20 | End: 2025-08-20

## 2025-08-20 RX ORDER — LIDOCAINE HYDROCHLORIDE 40 MG/ML
SOLUTION TOPICAL ONCE
OUTPATIENT
Start: 2025-08-20 | End: 2025-08-20

## 2025-08-20 RX ORDER — LIDOCAINE 40 MG/G
CREAM TOPICAL ONCE
OUTPATIENT
Start: 2025-08-20 | End: 2025-08-20

## 2025-08-20 RX ORDER — BACITRACIN ZINC AND POLYMYXIN B SULFATE 500; 1000 [USP'U]/G; [USP'U]/G
OINTMENT TOPICAL ONCE
OUTPATIENT
Start: 2025-08-20 | End: 2025-08-20

## 2025-08-20 RX ORDER — LIDOCAINE HYDROCHLORIDE 20 MG/ML
JELLY TOPICAL ONCE
OUTPATIENT
Start: 2025-08-20 | End: 2025-08-20

## 2025-08-20 RX ORDER — SILVER SULFADIAZINE 10 MG/G
CREAM TOPICAL ONCE
OUTPATIENT
Start: 2025-08-20 | End: 2025-08-20

## 2025-08-20 RX ORDER — LIDOCAINE HYDROCHLORIDE 40 MG/ML
SOLUTION TOPICAL ONCE
Status: COMPLETED | OUTPATIENT
Start: 2025-08-20 | End: 2025-08-20

## 2025-08-20 RX ORDER — GINSENG 100 MG
CAPSULE ORAL ONCE
OUTPATIENT
Start: 2025-08-20 | End: 2025-08-20

## 2025-08-20 RX ORDER — GENTAMICIN SULFATE 1 MG/G
OINTMENT TOPICAL ONCE
OUTPATIENT
Start: 2025-08-20 | End: 2025-08-20

## 2025-08-20 RX ORDER — NEOMYCIN/BACITRACIN/POLYMYXINB 3.5-400-5K
OINTMENT (GRAM) TOPICAL ONCE
OUTPATIENT
Start: 2025-08-20 | End: 2025-08-20

## 2025-08-20 RX ORDER — CLOBETASOL PROPIONATE 0.5 MG/G
OINTMENT TOPICAL ONCE
OUTPATIENT
Start: 2025-08-20 | End: 2025-08-20

## 2025-08-20 RX ORDER — OXYCODONE AND ACETAMINOPHEN 5; 325 MG/1; MG/1
1 TABLET ORAL EVERY 6 HOURS PRN
Qty: 28 TABLET | Refills: 0 | Status: SHIPPED | OUTPATIENT
Start: 2025-08-24 | End: 2025-08-31

## 2025-08-20 RX ORDER — TRIAMCINOLONE ACETONIDE 1 MG/G
OINTMENT TOPICAL ONCE
OUTPATIENT
Start: 2025-08-20 | End: 2025-08-20

## 2025-08-20 RX ORDER — MUPIROCIN 2 %
OINTMENT (GRAM) TOPICAL ONCE
OUTPATIENT
Start: 2025-08-20 | End: 2025-08-20

## 2025-08-20 RX ADMIN — LIDOCAINE HYDROCHLORIDE 15 ML: 40 SOLUTION TOPICAL at 07:58

## 2025-08-27 ENCOUNTER — HOSPITAL ENCOUNTER (OUTPATIENT)
Dept: WOUND CARE | Age: 70
Discharge: HOME | End: 2025-08-27
Attending: SURGERY
Payer: MEDICARE

## 2025-08-27 VITALS
DIASTOLIC BLOOD PRESSURE: 69 MMHG | WEIGHT: 173 LBS | SYSTOLIC BLOOD PRESSURE: 132 MMHG | BODY MASS INDEX: 26.22 KG/M2 | TEMPERATURE: 96.6 F | HEART RATE: 69 BPM | HEIGHT: 68 IN | RESPIRATION RATE: 18 BRPM

## 2025-08-27 DIAGNOSIS — L97.322 VARICOSE VEINS OF LEFT LOWER EXTREMITY WITH ULCER OF ANKLE WITH FAT LAYER EXPOSED (HCC): Primary | ICD-10-CM

## 2025-08-27 DIAGNOSIS — I83.023 VARICOSE VEINS OF LEFT LOWER EXTREMITY WITH ULCER OF ANKLE WITH FAT LAYER EXPOSED (HCC): Primary | ICD-10-CM

## 2025-08-27 DIAGNOSIS — I70.242 ATHEROSCLEROSIS OF NATIVE ARTERY OF LEFT LOWER EXTREMITY WITH ULCERATION OF CALF (HCC): ICD-10-CM

## 2025-08-27 PROCEDURE — 11042 DBRDMT SUBQ TIS 1ST 20SQCM/<: CPT | Performed by: SURGERY

## 2025-08-27 PROCEDURE — 11042 DBRDMT SUBQ TIS 1ST 20SQCM/<: CPT

## 2025-08-27 PROCEDURE — 87205 SMEAR GRAM STAIN: CPT

## 2025-08-27 PROCEDURE — 87077 CULTURE AEROBIC IDENTIFY: CPT

## 2025-08-27 PROCEDURE — 87070 CULTURE OTHR SPECIMN AEROBIC: CPT

## 2025-08-27 RX ORDER — LIDOCAINE 50 MG/G
OINTMENT TOPICAL ONCE
OUTPATIENT
Start: 2025-08-27 | End: 2025-08-27

## 2025-08-27 RX ORDER — BETAMETHASONE DIPROPIONATE 0.05 %
OINTMENT (GRAM) TOPICAL ONCE
OUTPATIENT
Start: 2025-08-27 | End: 2025-08-27

## 2025-08-27 RX ORDER — SILVER SULFADIAZINE 10 MG/G
CREAM TOPICAL ONCE
OUTPATIENT
Start: 2025-08-27 | End: 2025-08-27

## 2025-08-27 RX ORDER — TRIAMCINOLONE ACETONIDE 1 MG/G
OINTMENT TOPICAL ONCE
OUTPATIENT
Start: 2025-08-27 | End: 2025-08-27

## 2025-08-27 RX ORDER — MUPIROCIN 2 %
OINTMENT (GRAM) TOPICAL ONCE
OUTPATIENT
Start: 2025-08-27 | End: 2025-08-27

## 2025-08-27 RX ORDER — GENTAMICIN SULFATE 1 MG/G
OINTMENT TOPICAL ONCE
OUTPATIENT
Start: 2025-08-27 | End: 2025-08-27

## 2025-08-27 RX ORDER — GINSENG 100 MG
CAPSULE ORAL ONCE
OUTPATIENT
Start: 2025-08-27 | End: 2025-08-27

## 2025-08-27 RX ORDER — NEOMYCIN/BACITRACIN/POLYMYXINB 3.5-400-5K
OINTMENT (GRAM) TOPICAL ONCE
OUTPATIENT
Start: 2025-08-27 | End: 2025-08-27

## 2025-08-27 RX ORDER — LIDOCAINE HYDROCHLORIDE 40 MG/ML
SOLUTION TOPICAL ONCE
OUTPATIENT
Start: 2025-08-27 | End: 2025-08-27

## 2025-08-27 RX ORDER — LIDOCAINE HYDROCHLORIDE 40 MG/ML
SOLUTION TOPICAL ONCE
Status: COMPLETED | OUTPATIENT
Start: 2025-08-27 | End: 2025-08-27

## 2025-08-27 RX ORDER — LIDOCAINE 40 MG/G
CREAM TOPICAL ONCE
OUTPATIENT
Start: 2025-08-27 | End: 2025-08-27

## 2025-08-27 RX ORDER — BACITRACIN ZINC AND POLYMYXIN B SULFATE 500; 1000 [USP'U]/G; [USP'U]/G
OINTMENT TOPICAL ONCE
OUTPATIENT
Start: 2025-08-27 | End: 2025-08-27

## 2025-08-27 RX ORDER — CLOBETASOL PROPIONATE 0.5 MG/G
OINTMENT TOPICAL ONCE
OUTPATIENT
Start: 2025-08-27 | End: 2025-08-27

## 2025-08-27 RX ORDER — LIDOCAINE HYDROCHLORIDE 20 MG/ML
JELLY TOPICAL ONCE
OUTPATIENT
Start: 2025-08-27 | End: 2025-08-27

## 2025-08-27 RX ADMIN — LIDOCAINE HYDROCHLORIDE 10 ML: 40 SOLUTION TOPICAL at 07:34

## 2025-08-27 ASSESSMENT — PAIN DESCRIPTION - LOCATION: LOCATION: LEG

## 2025-08-27 ASSESSMENT — PAIN DESCRIPTION - DESCRIPTORS: DESCRIPTORS: DISCOMFORT;ACHING

## 2025-08-27 ASSESSMENT — PAIN DESCRIPTION - FREQUENCY: FREQUENCY: CONTINUOUS

## 2025-08-27 ASSESSMENT — PAIN SCALES - GENERAL: PAINLEVEL_OUTOF10: 7

## 2025-08-27 ASSESSMENT — PAIN DESCRIPTION - PAIN TYPE: TYPE: CHRONIC PAIN

## 2025-08-27 ASSESSMENT — PAIN DESCRIPTION - ONSET: ONSET: ON-GOING

## 2025-08-27 ASSESSMENT — PAIN - FUNCTIONAL ASSESSMENT: PAIN_FUNCTIONAL_ASSESSMENT: PREVENTS OR INTERFERES SOME ACTIVE ACTIVITIES AND ADLS

## 2025-08-27 ASSESSMENT — PAIN DESCRIPTION - ORIENTATION: ORIENTATION: RIGHT

## 2025-08-30 LAB
MICROORGANISM SPEC CULT: ABNORMAL
MICROORGANISM/AGENT SPEC: ABNORMAL
SERVICE CMNT-IMP: ABNORMAL
SPECIMEN DESCRIPTION: ABNORMAL

## 2025-09-02 RX ORDER — LINEZOLID 600 MG/1
600 TABLET, FILM COATED ORAL 2 TIMES DAILY
Qty: 28 TABLET | Refills: 0 | Status: SHIPPED | OUTPATIENT
Start: 2025-09-02 | End: 2025-09-16

## 2025-09-03 ENCOUNTER — HOSPITAL ENCOUNTER (OUTPATIENT)
Dept: WOUND CARE | Age: 70
Discharge: HOME | End: 2025-09-03
Attending: SURGERY
Payer: MEDICARE

## 2025-09-03 VITALS
DIASTOLIC BLOOD PRESSURE: 43 MMHG | RESPIRATION RATE: 18 BRPM | SYSTOLIC BLOOD PRESSURE: 125 MMHG | BODY MASS INDEX: 26.22 KG/M2 | WEIGHT: 173 LBS | TEMPERATURE: 97 F | HEART RATE: 74 BPM | HEIGHT: 68 IN

## 2025-09-03 DIAGNOSIS — L97.322 VARICOSE VEINS OF LEFT LOWER EXTREMITY WITH ULCER OF ANKLE WITH FAT LAYER EXPOSED (HCC): Primary | ICD-10-CM

## 2025-09-03 DIAGNOSIS — I70.242 ATHEROSCLEROSIS OF NATIVE ARTERY OF LEFT LOWER EXTREMITY WITH ULCERATION OF CALF (HCC): ICD-10-CM

## 2025-09-03 DIAGNOSIS — I83.023 VARICOSE VEINS OF LEFT LOWER EXTREMITY WITH ULCER OF ANKLE WITH FAT LAYER EXPOSED (HCC): Primary | ICD-10-CM

## 2025-09-03 PROCEDURE — 11042 DBRDMT SUBQ TIS 1ST 20SQCM/<: CPT

## 2025-09-03 PROCEDURE — 11042 DBRDMT SUBQ TIS 1ST 20SQCM/<: CPT | Performed by: SURGERY

## 2025-09-03 RX ORDER — LIDOCAINE HYDROCHLORIDE 20 MG/ML
JELLY TOPICAL ONCE
OUTPATIENT
Start: 2025-09-03 | End: 2025-09-03

## 2025-09-03 RX ORDER — LIDOCAINE HYDROCHLORIDE 40 MG/ML
SOLUTION TOPICAL ONCE
Status: COMPLETED | OUTPATIENT
Start: 2025-09-03 | End: 2025-09-03

## 2025-09-03 RX ORDER — MUPIROCIN 2 %
OINTMENT (GRAM) TOPICAL ONCE
OUTPATIENT
Start: 2025-09-03 | End: 2025-09-03

## 2025-09-03 RX ORDER — TRIAMCINOLONE ACETONIDE 1 MG/G
OINTMENT TOPICAL ONCE
OUTPATIENT
Start: 2025-09-03 | End: 2025-09-03

## 2025-09-03 RX ORDER — CLOBETASOL PROPIONATE 0.5 MG/G
OINTMENT TOPICAL ONCE
OUTPATIENT
Start: 2025-09-03 | End: 2025-09-03

## 2025-09-03 RX ORDER — BACITRACIN ZINC AND POLYMYXIN B SULFATE 500; 1000 [USP'U]/G; [USP'U]/G
OINTMENT TOPICAL ONCE
OUTPATIENT
Start: 2025-09-03 | End: 2025-09-03

## 2025-09-03 RX ORDER — LIDOCAINE 50 MG/G
OINTMENT TOPICAL ONCE
OUTPATIENT
Start: 2025-09-03 | End: 2025-09-03

## 2025-09-03 RX ORDER — NEOMYCIN/BACITRACIN/POLYMYXINB 3.5-400-5K
OINTMENT (GRAM) TOPICAL ONCE
OUTPATIENT
Start: 2025-09-03 | End: 2025-09-03

## 2025-09-03 RX ORDER — LIDOCAINE 40 MG/G
CREAM TOPICAL ONCE
OUTPATIENT
Start: 2025-09-03 | End: 2025-09-03

## 2025-09-03 RX ORDER — LIDOCAINE HYDROCHLORIDE 40 MG/ML
SOLUTION TOPICAL ONCE
OUTPATIENT
Start: 2025-09-03 | End: 2025-09-03

## 2025-09-03 RX ORDER — OXYCODONE AND ACETAMINOPHEN 5; 325 MG/1; MG/1
1 TABLET ORAL EVERY 6 HOURS PRN
Qty: 28 TABLET | Refills: 0 | Status: SHIPPED | OUTPATIENT
Start: 2025-09-03 | End: 2025-09-10

## 2025-09-03 RX ORDER — GENTAMICIN SULFATE 1 MG/G
OINTMENT TOPICAL ONCE
OUTPATIENT
Start: 2025-09-03 | End: 2025-09-03

## 2025-09-03 RX ORDER — GINSENG 100 MG
CAPSULE ORAL ONCE
OUTPATIENT
Start: 2025-09-03 | End: 2025-09-03

## 2025-09-03 RX ORDER — BETAMETHASONE DIPROPIONATE 0.05 %
OINTMENT (GRAM) TOPICAL ONCE
OUTPATIENT
Start: 2025-09-03 | End: 2025-09-03

## 2025-09-03 RX ORDER — SILVER SULFADIAZINE 10 MG/G
CREAM TOPICAL ONCE
OUTPATIENT
Start: 2025-09-03 | End: 2025-09-03

## 2025-09-03 RX ADMIN — LIDOCAINE HYDROCHLORIDE 10 ML: 40 SOLUTION TOPICAL at 07:33

## 2025-09-03 ASSESSMENT — PAIN DESCRIPTION - ONSET: ONSET: ON-GOING

## 2025-09-03 ASSESSMENT — PAIN SCALES - GENERAL: PAINLEVEL_OUTOF10: 8

## 2025-09-03 ASSESSMENT — PAIN - FUNCTIONAL ASSESSMENT: PAIN_FUNCTIONAL_ASSESSMENT: PREVENTS OR INTERFERES SOME ACTIVE ACTIVITIES AND ADLS

## 2025-09-03 ASSESSMENT — PAIN DESCRIPTION - LOCATION: LOCATION: LEG

## 2025-09-03 ASSESSMENT — PAIN DESCRIPTION - ORIENTATION: ORIENTATION: LEFT

## 2025-09-03 ASSESSMENT — PAIN DESCRIPTION - DESCRIPTORS: DESCRIPTORS: DISCOMFORT;ACHING

## 2025-09-03 ASSESSMENT — PAIN DESCRIPTION - FREQUENCY: FREQUENCY: INTERMITTENT

## 2025-09-03 ASSESSMENT — PAIN DESCRIPTION - PAIN TYPE: TYPE: CHRONIC PAIN

## (undated) DEVICE — 18 GA N.G. KIT, 10 PACK: Brand: SITE-RITE

## (undated) DEVICE — WIPES SKIN CLOTH READYPREP 9 X 10.5 IN 2% CHLORHEX GLUCONATE CHG PREOP

## (undated) DEVICE — SPONGE LAP W18XL18IN WHT COT 4 PLY FLD STRUNG RADPQ DISP ST

## (undated) DEVICE — SHEAR HARMONIC VES SEAL 360 DEG SHFT L 45 MM DIA 5 MM JAW L

## (undated) DEVICE — TOWEL,OR,DSP,ST,BLUE,STD,6/PK,12PK/CS: Brand: MEDLINE

## (undated) DEVICE — APPLICATOR MEDICATED 26 CC SOLUTION HI LT ORNG CHLORAPREP

## (undated) DEVICE — DRESSING TRNSPAR W3XL4IN SILIC1 POLYUR RECT NET W/ SAFETAC

## (undated) DEVICE — SUTURE DEV SZ 0 L6IN N ABSORBABLE

## (undated) DEVICE — SEALANT HEMSTAT 5ML HUM FIBRIN THROM 2 VI APPL DEV EVICEL

## (undated) DEVICE — SPONGE GZ 4IN 4IN 4 PLY N WVN AVANT

## (undated) DEVICE — GLOVE SURG SZ 65 THK91MIL LTX FREE SYN POLYISOPRENE

## (undated) DEVICE — LIQUIBAND RAPID ADHESIVE 36/CS 0.8ML: Brand: MEDLINE

## (undated) DEVICE — GLOVE SURG SIGNATURE LTX ESS LTX PF 7.5

## (undated) DEVICE — GLOVE ORANGE PI 8   MSG9080

## (undated) DEVICE — PAD,ABDOMINAL,5"X9",ST,LF,25/BX: Brand: MEDLINE INDUSTRIES, INC.

## (undated) DEVICE — BANDAGE,GAUZE,4.5"X4.1YD,STERILE,LF: Brand: MEDLINE

## (undated) DEVICE — COVER,LIGHT HANDLE,FLX,1/PK: Brand: MEDLINE INDUSTRIES, INC.

## (undated) DEVICE — COLUMN DRAPE

## (undated) DEVICE — FORCEPS BX L240CM JAW DIA2.8MM L CAP W/ NDL MIC MESH TOOTH

## (undated) DEVICE — AIR SHEET,LAT,COMFORT GLIDE, BLEND 40X80: Brand: MEDLINE

## (undated) DEVICE — MEGA SUTURECUT ND: Brand: ENDOWRIST

## (undated) DEVICE — TIP COVER ACCESSORY

## (undated) DEVICE — GOWN,SIRUS,FABRNF,XL,20/CS: Brand: MEDLINE

## (undated) DEVICE — DRESSING ALG WND OPTICELL GELLING FBR 4.25X4.25IN OPTICELL

## (undated) DEVICE — ELECTRODE PT RET AD L9FT HI MOIST COND ADH HYDRGEL CORDED

## (undated) DEVICE — Device

## (undated) DEVICE — SOL IRR SOD CHL 0.9% TITAN XL CNTNR 3000ML

## (undated) DEVICE — CATHETER ABLAT 7FR L60CM HEAT ELEMENT L7CM 0.025IN

## (undated) DEVICE — MINOR VASCULAR: Brand: MEDLINE INDUSTRIES, INC.

## (undated) DEVICE — BASIC DOUBLE BASIN 2-LF: Brand: MEDLINE INDUSTRIES, INC.

## (undated) DEVICE — TUBING, SUCTION, 1/4" X 10', STRAIGHT: Brand: MEDLINE

## (undated) DEVICE — ELECTRO LUBE IS A SINGLE PATIENT USE DEVICE THAT IS INTENDED TO BE USED ON ELECTROSURGICAL ELECTRODES TO REDUCE STICKING.: Brand: KEY SURGICAL ELECTRO LUBE

## (undated) DEVICE — GOWN ISOLATN REG YEL M WT MULTIPLY SIDETIE LEV 2

## (undated) DEVICE — 3M™ STERI-DRAPE™ ISOLATION BAG, 10 PER CARTON / 4 CARTONS PER CASE, 1003: Brand: 3M™ STERI-DRAPE™

## (undated) DEVICE — THIS PRODUCT IS SINGLE USE AND INTENDED TO BE USED FOR BLUNT DISSECTION OF TISSUE.: Brand: ASPEN® ENDOSCOPIC KITTNER, SINGLE TIP

## (undated) DEVICE — 4-PORT MANIFOLD: Brand: NEPTUNE 2

## (undated) DEVICE — KENDALL 450 SERIES MONITORING FOAM ELECTRODE - RECTANGULAR SHAPE ( 3/PK): Brand: KENDALL

## (undated) DEVICE — BLADELESS OBTURATOR: Brand: WECK VISTA

## (undated) DEVICE — ADAPTER CLEANING PORPOISE CLEANING

## (undated) DEVICE — LAPAROSCOPIC WIRE L HK 45 CM

## (undated) DEVICE — GLOVE ORANGE PI 7 1/2   MSG9075

## (undated) DEVICE — GAUZE,SPONGE,2"X2",8PLY,STERILE,LF,2'S: Brand: MEDLINE

## (undated) DEVICE — 3M™ COBAN™ NL STERILE NON-LATEX SELF-ADHERENT WRAP, 2084S, 4 IN X 5 YD (10 CM X 4,5 M), 18 ROLLS/CASE: Brand: 3M™ COBAN™

## (undated) DEVICE — BLOCK BITE 60FR CAREGUARD

## (undated) DEVICE — DRESSING ANTIMIC ALG OPTICELL GELLING FBR 6X6IN

## (undated) DEVICE — DRAPE,EXTREMITY,89X128,STERILE: Brand: MEDLINE

## (undated) DEVICE — APPLIER CLP M/L SHFT DIA5MM 15 LIG LIGAMAX 5

## (undated) DEVICE — PLUMEPORT ACTIV LAPAROSCOPIC SMOKE FILTRATION DEVICE: Brand: PLUMEPORT ACTIVE

## (undated) DEVICE — GOWN,SIRUS,NONRNF,SETINSLV,XL,20/CS: Brand: MEDLINE

## (undated) DEVICE — ARM DRAPE

## (undated) DEVICE — STRIP,CLOSURE,WOUND,MEDI-STRIP,1/2X4: Brand: MEDLINE

## (undated) DEVICE — NEEDLE HYPO 25GA L1.5IN BLU POLYPR HUB S STL REG BVL STR

## (undated) DEVICE — INSUFFLATION NEEDLE TO ESTABLISH PNEUMOPERITONEUM.: Brand: INSUFFLATION NEEDLE

## (undated) DEVICE — KIT BEDSIDE REVITAL OX 500ML

## (undated) DEVICE — INTRODUCER SHTH 7FR L11CM CLSR FAST SET

## (undated) DEVICE — SOLUTION IV IRRIG POUR BRL 0.9% SODIUM CHL 2F7124

## (undated) DEVICE — CONTAINER SPEC COLL 960ML POLYPR TRIANG GRAD INTAKE/OUTPUT

## (undated) DEVICE — CONTAINER SPEC 480ML CLR POLYSTYR 10% NEUT BUFF FRMLN ZN

## (undated) DEVICE — INFILTRATION TUBING SET: Brand: SINGLE SPIKE INFILTRATION TUBING

## (undated) DEVICE — MARKER,SKIN,WI/RULER AND LABELS: Brand: MEDLINE

## (undated) DEVICE — SYRINGE, LUER LOCK, 3ML: Brand: MEDLINE

## (undated) DEVICE — FOR ASEPTIC DECANTING OF I.V. FLUIDS FROM FLEXIBLE CONTAINERS.: Brand: BAG DECANTER

## (undated) DEVICE — SEAL

## (undated) DEVICE — YANKAUER,BULB TIP,W/O VENT,RIGID,STERILE: Brand: MEDLINE

## (undated) DEVICE — BANDAGE COMPR W6INXL15YD WHT BGE POLY COT WV E HK LOOP CLSR

## (undated) DEVICE — LUBRICANT SURG JELLY ST BACTER TUBE 4.25OZ

## (undated) DEVICE — SCISSORS SURG DIA8MM MPLR CRV ENDOWRIST

## (undated) DEVICE — GOWN SURG PERF LG STD STRL AERO BLU

## (undated) DEVICE — GEL US 20GM NONIRRITATING OVERWRAPPED FILE PCH TRNSMIT

## (undated) DEVICE — VALVE SUCTION AIR H2O HYDR H2O JET CONN STRL ORCA POD + DISP

## (undated) DEVICE — 6 X 9  1.75MIL 4-WALL LABGUARD: Brand: MINIGRIP COMMERCIAL LLC

## (undated) DEVICE — PUMP SUC IRR TBNG L10FT W/ HNDPC ASSEMB STRYKEFLOW 2

## (undated) DEVICE — DRESSING,CONTACT LAYER,VERSATEL ONE,3X4: Brand: MEDLINE

## (undated) DEVICE — STAPLER INT DIA5MM 25 ABSRB STRP FIX DISP FOR HERN MESH

## (undated) DEVICE — SYRINGE MED 10ML LUERLOCK TIP W/O SFTY DISP

## (undated) DEVICE — DRAIN SURG PENROSE 0.25X12 IN CLOSED WND DRAINAGE PREM SIL

## (undated) DEVICE — GARMENT,MEDLINE,DVT,INT,CALF,MED, GEN2: Brand: MEDLINE

## (undated) DEVICE — SHEET,DRAPE,70X100,STERILE: Brand: MEDLINE

## (undated) DEVICE — [HIGH FLOW INSUFFLATOR,  DO NOT USE IF PACKAGE IS DAMAGED,  KEEP DRY,  KEEP AWAY FROM SUNLIGHT,  PROTECT FROM HEAT AND RADIOACTIVE SOURCES.]: Brand: PNEUMOSURE

## (undated) DEVICE — GLOVE SURG SZ 7.5 L11.73IN FNGR THK9.8MIL STRW LTX POLYMER

## (undated) DEVICE — SYRINGE MED 10ML TRNSLUC BRL PLUNG BLK MRK POLYPR CTRL

## (undated) DEVICE — SMALL GRASPING RETRACTOR: Brand: ENDOWRIST

## (undated) DEVICE — FLEXOR, CHECK-FLO, INTRODUCER SET: Brand: FLEXOR

## (undated) DEVICE — WARMER SCP LAP

## (undated) DEVICE — SHEET,DRAPE,40X58,STERILE: Brand: MEDLINE

## (undated) DEVICE — BLADE CLIPPER GEN PURP NS

## (undated) DEVICE — CLEANER LENS C-CLEAR